# Patient Record
Sex: FEMALE | Race: BLACK OR AFRICAN AMERICAN | NOT HISPANIC OR LATINO
[De-identification: names, ages, dates, MRNs, and addresses within clinical notes are randomized per-mention and may not be internally consistent; named-entity substitution may affect disease eponyms.]

---

## 2021-01-01 ENCOUNTER — RESULT REVIEW (OUTPATIENT)
Age: 66
End: 2021-01-01

## 2021-01-01 ENCOUNTER — APPOINTMENT (OUTPATIENT)
Dept: SURGICAL ONCOLOGY | Facility: HOSPITAL | Age: 66
End: 2021-01-01

## 2021-01-01 ENCOUNTER — TRANSCRIPTION ENCOUNTER (OUTPATIENT)
Age: 66
End: 2021-01-01

## 2021-01-01 ENCOUNTER — APPOINTMENT (OUTPATIENT)
Dept: GYNECOLOGIC ONCOLOGY | Facility: CLINIC | Age: 66
End: 2021-01-01

## 2021-01-01 ENCOUNTER — INPATIENT (INPATIENT)
Facility: HOSPITAL | Age: 66
LOS: 9 days | Discharge: HOME | End: 2021-08-04
Attending: HOSPITALIST | Admitting: HOSPITALIST
Payer: MEDICARE

## 2021-01-01 ENCOUNTER — INPATIENT (INPATIENT)
Facility: HOSPITAL | Age: 66
LOS: 39 days | End: 2021-09-20
Attending: STUDENT IN AN ORGANIZED HEALTH CARE EDUCATION/TRAINING PROGRAM | Admitting: STUDENT IN AN ORGANIZED HEALTH CARE EDUCATION/TRAINING PROGRAM
Payer: MEDICARE

## 2021-01-01 ENCOUNTER — NON-APPOINTMENT (OUTPATIENT)
Age: 66
End: 2021-01-01

## 2021-01-01 ENCOUNTER — APPOINTMENT (OUTPATIENT)
Dept: PULMONOLOGY | Facility: CLINIC | Age: 66
End: 2021-01-01

## 2021-01-01 VITALS
HEIGHT: 63 IN | OXYGEN SATURATION: 94 % | RESPIRATION RATE: 27 BRPM | TEMPERATURE: 97 F | DIASTOLIC BLOOD PRESSURE: 54 MMHG | HEART RATE: 115 BPM | SYSTOLIC BLOOD PRESSURE: 100 MMHG

## 2021-01-01 VITALS
RESPIRATION RATE: 18 BRPM | OXYGEN SATURATION: 99 % | HEART RATE: 107 BPM | TEMPERATURE: 97 F | DIASTOLIC BLOOD PRESSURE: 78 MMHG | SYSTOLIC BLOOD PRESSURE: 142 MMHG

## 2021-01-01 VITALS
DIASTOLIC BLOOD PRESSURE: 34 MMHG | RESPIRATION RATE: 26 BRPM | OXYGEN SATURATION: 70 % | SYSTOLIC BLOOD PRESSURE: 56 MMHG | HEART RATE: 99 BPM

## 2021-01-01 VITALS
DIASTOLIC BLOOD PRESSURE: 83 MMHG | RESPIRATION RATE: 19 BRPM | TEMPERATURE: 98 F | OXYGEN SATURATION: 94 % | SYSTOLIC BLOOD PRESSURE: 143 MMHG | HEART RATE: 112 BPM

## 2021-01-01 DIAGNOSIS — E78.5 HYPERLIPIDEMIA, UNSPECIFIED: ICD-10-CM

## 2021-01-01 DIAGNOSIS — Z29.9 ENCOUNTER FOR PROPHYLACTIC MEASURES, UNSPECIFIED: ICD-10-CM

## 2021-01-01 DIAGNOSIS — R73.03 PREDIABETES.: ICD-10-CM

## 2021-01-01 DIAGNOSIS — R00.0 TACHYCARDIA, UNSPECIFIED: ICD-10-CM

## 2021-01-01 DIAGNOSIS — N13.30 UNSPECIFIED HYDRONEPHROSIS: ICD-10-CM

## 2021-01-01 DIAGNOSIS — R18.8 OTHER ASCITES: ICD-10-CM

## 2021-01-01 DIAGNOSIS — E87.2 ACIDOSIS: ICD-10-CM

## 2021-01-01 DIAGNOSIS — Z96.0 PRESENCE OF UROGENITAL IMPLANTS: Chronic | ICD-10-CM

## 2021-01-01 DIAGNOSIS — E87.5 HYPERKALEMIA: ICD-10-CM

## 2021-01-01 DIAGNOSIS — N17.9 ACUTE KIDNEY FAILURE, UNSPECIFIED: ICD-10-CM

## 2021-01-01 DIAGNOSIS — Z51.5 ENCOUNTER FOR PALLIATIVE CARE: ICD-10-CM

## 2021-01-01 DIAGNOSIS — C83.30 DIFFUSE LARGE B-CELL LYMPHOMA, UNSPECIFIED SITE: ICD-10-CM

## 2021-01-01 DIAGNOSIS — J96.01 ACUTE RESPIRATORY FAILURE WITH HYPOXIA: ICD-10-CM

## 2021-01-01 DIAGNOSIS — R06.02 SHORTNESS OF BREATH: ICD-10-CM

## 2021-01-01 DIAGNOSIS — I10 ESSENTIAL (PRIMARY) HYPERTENSION: ICD-10-CM

## 2021-01-01 DIAGNOSIS — J96.02 ACUTE RESPIRATORY FAILURE WITH HYPERCAPNIA: ICD-10-CM

## 2021-01-01 DIAGNOSIS — E87.3 ALKALOSIS: ICD-10-CM

## 2021-01-01 DIAGNOSIS — R18.0 MALIGNANT ASCITES: ICD-10-CM

## 2021-01-01 DIAGNOSIS — J90 PLEURAL EFFUSION, NOT ELSEWHERE CLASSIFIED: ICD-10-CM

## 2021-01-01 DIAGNOSIS — R11.2 NAUSEA WITH VOMITING, UNSPECIFIED: ICD-10-CM

## 2021-01-01 DIAGNOSIS — J18.9 PNEUMONIA, UNSPECIFIED ORGANISM: ICD-10-CM

## 2021-01-01 DIAGNOSIS — D72.829 ELEVATED WHITE BLOOD CELL COUNT, UNSPECIFIED: ICD-10-CM

## 2021-01-01 DIAGNOSIS — R52 PAIN, UNSPECIFIED: ICD-10-CM

## 2021-01-01 DIAGNOSIS — N83.8 OTHER NONINFLAMMATORY DISORDERS OF OVARY, FALLOPIAN TUBE AND BROAD LIGAMENT: ICD-10-CM

## 2021-01-01 DIAGNOSIS — E87.1 HYPO-OSMOLALITY AND HYPONATREMIA: ICD-10-CM

## 2021-01-01 DIAGNOSIS — R73.03 PREDIABETES: ICD-10-CM

## 2021-01-01 DIAGNOSIS — R60.0 LOCALIZED EDEMA: ICD-10-CM

## 2021-01-01 DIAGNOSIS — R19.00 INTRA-ABDOMINAL AND PELVIC SWELLING, MASS AND LUMP, UNSPECIFIED SITE: ICD-10-CM

## 2021-01-01 DIAGNOSIS — N28.9 DISORDER OF KIDNEY AND URETER, UNSPECIFIED: ICD-10-CM

## 2021-01-01 DIAGNOSIS — E83.51 HYPOCALCEMIA: ICD-10-CM

## 2021-01-01 DIAGNOSIS — R10.9 UNSPECIFIED ABDOMINAL PAIN: ICD-10-CM

## 2021-01-01 DIAGNOSIS — E87.0 HYPEROSMOLALITY AND HYPERNATREMIA: ICD-10-CM

## 2021-01-01 LAB
% ALBUMIN: 40.2 % — SIGNIFICANT CHANGE UP
% ALPHA 1: 6.8 % — SIGNIFICANT CHANGE UP
% ALPHA 2: 20.2 % — SIGNIFICANT CHANGE UP
% BETA: 16.6 % — SIGNIFICANT CHANGE UP
% GAMMA: 16.2 % — SIGNIFICANT CHANGE UP
-  AMIKACIN: SIGNIFICANT CHANGE UP
-  AMPICILLIN/SULBACTAM: SIGNIFICANT CHANGE UP
-  AMPICILLIN: SIGNIFICANT CHANGE UP
-  AZTREONAM: SIGNIFICANT CHANGE UP
-  CEFAZOLIN: SIGNIFICANT CHANGE UP
-  CEFEPIME: SIGNIFICANT CHANGE UP
-  CEFOXITIN: SIGNIFICANT CHANGE UP
-  CEFTRIAXONE: SIGNIFICANT CHANGE UP
-  CIPROFLOXACIN: SIGNIFICANT CHANGE UP
-  ERTAPENEM: SIGNIFICANT CHANGE UP
-  GENTAMICIN: SIGNIFICANT CHANGE UP
-  IMIPENEM: SIGNIFICANT CHANGE UP
-  K. PNEUMONIAE GROUP: SIGNIFICANT CHANGE UP
-  LEVOFLOXACIN: SIGNIFICANT CHANGE UP
-  MEROPENEM: SIGNIFICANT CHANGE UP
-  PIPERACILLIN/TAZOBACTAM: SIGNIFICANT CHANGE UP
-  TOBRAMYCIN: SIGNIFICANT CHANGE UP
-  TRIMETHOPRIM/SULFAMETHOXAZOLE: SIGNIFICANT CHANGE UP
A1C WITH ESTIMATED AVERAGE GLUCOSE RESULT: 6.5 % — HIGH (ref 4–5.6)
ALBUMIN FLD-MCNC: 1.9 G/DL — SIGNIFICANT CHANGE UP
ALBUMIN FLD-MCNC: 2.3 G/DL — SIGNIFICANT CHANGE UP
ALBUMIN FLD-MCNC: 2.5 G/DL — SIGNIFICANT CHANGE UP
ALBUMIN SERPL ELPH-MCNC: 1.8 G/DL — LOW (ref 3.3–5)
ALBUMIN SERPL ELPH-MCNC: 1.8 G/DL — LOW (ref 3.3–5)
ALBUMIN SERPL ELPH-MCNC: 1.9 G/DL — LOW (ref 3.3–5)
ALBUMIN SERPL ELPH-MCNC: 2 G/DL — LOW (ref 3.3–5)
ALBUMIN SERPL ELPH-MCNC: 2.1 G/DL — LOW (ref 3.3–5)
ALBUMIN SERPL ELPH-MCNC: 2.2 G/DL — LOW (ref 3.3–5)
ALBUMIN SERPL ELPH-MCNC: 2.3 G/DL — LOW (ref 3.3–5)
ALBUMIN SERPL ELPH-MCNC: 2.4 G/DL — LOW (ref 3.3–5)
ALBUMIN SERPL ELPH-MCNC: 2.5 G/DL — LOW (ref 3.3–5)
ALBUMIN SERPL ELPH-MCNC: 2.53 G/DL — LOW (ref 3.3–4.4)
ALBUMIN SERPL ELPH-MCNC: 2.6 G/DL — LOW (ref 3.3–5)
ALBUMIN SERPL ELPH-MCNC: 2.7 G/DL — LOW (ref 3.3–5)
ALBUMIN SERPL ELPH-MCNC: 2.8 G/DL — LOW (ref 3.3–5)
ALBUMIN SERPL ELPH-MCNC: 2.9 G/DL — LOW (ref 3.3–5)
ALBUMIN SERPL ELPH-MCNC: 3 G/DL — LOW (ref 3.3–5)
ALBUMIN SERPL ELPH-MCNC: 3.1 G/DL — LOW (ref 3.3–5)
ALBUMIN SERPL ELPH-MCNC: 3.2 G/DL — LOW (ref 3.3–5)
ALBUMIN SERPL ELPH-MCNC: 3.3 G/DL — SIGNIFICANT CHANGE UP (ref 3.3–5)
ALBUMIN SERPL ELPH-MCNC: 3.4 G/DL — SIGNIFICANT CHANGE UP (ref 3.3–5)
ALBUMIN SERPL ELPH-MCNC: 3.4 G/DL — SIGNIFICANT CHANGE UP (ref 3.3–5)
ALBUMIN SERPL ELPH-MCNC: 3.5 G/DL — SIGNIFICANT CHANGE UP (ref 3.3–5)
ALBUMIN SERPL ELPH-MCNC: 3.6 G/DL — SIGNIFICANT CHANGE UP (ref 3.3–5)
ALBUMIN SERPL ELPH-MCNC: 3.6 G/DL — SIGNIFICANT CHANGE UP (ref 3.3–5)
ALBUMIN/GLOB SERPL ELPH: 0.7 RATIO — SIGNIFICANT CHANGE UP
ALP SERPL-CCNC: 101 U/L — SIGNIFICANT CHANGE UP (ref 40–120)
ALP SERPL-CCNC: 104 U/L — SIGNIFICANT CHANGE UP (ref 40–120)
ALP SERPL-CCNC: 105 U/L — SIGNIFICANT CHANGE UP (ref 40–120)
ALP SERPL-CCNC: 107 U/L — SIGNIFICANT CHANGE UP (ref 40–120)
ALP SERPL-CCNC: 108 U/L — SIGNIFICANT CHANGE UP (ref 40–120)
ALP SERPL-CCNC: 109 U/L — SIGNIFICANT CHANGE UP (ref 40–120)
ALP SERPL-CCNC: 109 U/L — SIGNIFICANT CHANGE UP (ref 40–120)
ALP SERPL-CCNC: 110 U/L — SIGNIFICANT CHANGE UP (ref 40–120)
ALP SERPL-CCNC: 111 U/L — SIGNIFICANT CHANGE UP (ref 40–120)
ALP SERPL-CCNC: 114 U/L — SIGNIFICANT CHANGE UP (ref 40–120)
ALP SERPL-CCNC: 116 U/L — SIGNIFICANT CHANGE UP (ref 40–120)
ALP SERPL-CCNC: 116 U/L — SIGNIFICANT CHANGE UP (ref 40–120)
ALP SERPL-CCNC: 118 U/L — SIGNIFICANT CHANGE UP (ref 40–120)
ALP SERPL-CCNC: 119 U/L — SIGNIFICANT CHANGE UP (ref 40–120)
ALP SERPL-CCNC: 120 U/L — SIGNIFICANT CHANGE UP (ref 40–120)
ALP SERPL-CCNC: 125 U/L — HIGH (ref 40–120)
ALP SERPL-CCNC: 129 U/L — HIGH (ref 40–120)
ALP SERPL-CCNC: 145 U/L — HIGH (ref 40–120)
ALP SERPL-CCNC: 146 U/L — HIGH (ref 40–120)
ALP SERPL-CCNC: 152 U/L — HIGH (ref 40–120)
ALP SERPL-CCNC: 152 U/L — HIGH (ref 40–120)
ALP SERPL-CCNC: 165 U/L — HIGH (ref 40–120)
ALP SERPL-CCNC: 167 U/L — HIGH (ref 40–120)
ALP SERPL-CCNC: 187 U/L — HIGH (ref 40–120)
ALP SERPL-CCNC: 213 U/L — HIGH (ref 40–120)
ALP SERPL-CCNC: 217 U/L — HIGH (ref 40–120)
ALP SERPL-CCNC: 225 U/L — HIGH (ref 40–120)
ALP SERPL-CCNC: 236 U/L — HIGH (ref 40–120)
ALP SERPL-CCNC: 253 U/L — HIGH (ref 40–120)
ALP SERPL-CCNC: 337 U/L — HIGH (ref 40–120)
ALP SERPL-CCNC: 360 U/L — HIGH (ref 40–120)
ALP SERPL-CCNC: 410 U/L — HIGH (ref 40–120)
ALP SERPL-CCNC: 416 U/L — HIGH (ref 40–120)
ALP SERPL-CCNC: 435 U/L — HIGH (ref 40–120)
ALP SERPL-CCNC: 487 U/L — HIGH (ref 40–120)
ALP SERPL-CCNC: 500 U/L — HIGH (ref 40–120)
ALP SERPL-CCNC: 520 U/L — HIGH (ref 40–120)
ALP SERPL-CCNC: 528 U/L — HIGH (ref 40–120)
ALP SERPL-CCNC: 535 U/L — HIGH (ref 40–120)
ALP SERPL-CCNC: 565 U/L — HIGH (ref 40–120)
ALP SERPL-CCNC: 579 U/L — HIGH (ref 40–120)
ALP SERPL-CCNC: 65 U/L — SIGNIFICANT CHANGE UP (ref 40–120)
ALP SERPL-CCNC: 67 U/L — SIGNIFICANT CHANGE UP (ref 40–120)
ALP SERPL-CCNC: 67 U/L — SIGNIFICANT CHANGE UP (ref 40–120)
ALP SERPL-CCNC: 68 U/L — SIGNIFICANT CHANGE UP (ref 40–120)
ALP SERPL-CCNC: 68 U/L — SIGNIFICANT CHANGE UP (ref 40–120)
ALP SERPL-CCNC: 69 U/L — SIGNIFICANT CHANGE UP (ref 40–120)
ALP SERPL-CCNC: 71 U/L — SIGNIFICANT CHANGE UP (ref 40–120)
ALP SERPL-CCNC: 73 U/L — SIGNIFICANT CHANGE UP (ref 40–120)
ALP SERPL-CCNC: 75 U/L — SIGNIFICANT CHANGE UP (ref 40–120)
ALP SERPL-CCNC: 75 U/L — SIGNIFICANT CHANGE UP (ref 40–120)
ALP SERPL-CCNC: 76 U/L — SIGNIFICANT CHANGE UP (ref 40–120)
ALP SERPL-CCNC: 78 U/L — SIGNIFICANT CHANGE UP (ref 40–120)
ALP SERPL-CCNC: 79 U/L — SIGNIFICANT CHANGE UP (ref 40–120)
ALP SERPL-CCNC: 79 U/L — SIGNIFICANT CHANGE UP (ref 40–120)
ALP SERPL-CCNC: 81 U/L — SIGNIFICANT CHANGE UP (ref 40–120)
ALP SERPL-CCNC: 81 U/L — SIGNIFICANT CHANGE UP (ref 40–120)
ALP SERPL-CCNC: 86 U/L — SIGNIFICANT CHANGE UP (ref 40–120)
ALP SERPL-CCNC: 88 U/L — SIGNIFICANT CHANGE UP (ref 40–120)
ALP SERPL-CCNC: 89 U/L — SIGNIFICANT CHANGE UP (ref 40–120)
ALP SERPL-CCNC: 91 U/L — SIGNIFICANT CHANGE UP (ref 40–120)
ALP SERPL-CCNC: 93 U/L — SIGNIFICANT CHANGE UP (ref 40–120)
ALP SERPL-CCNC: 93 U/L — SIGNIFICANT CHANGE UP (ref 40–120)
ALP SERPL-CCNC: 94 U/L — SIGNIFICANT CHANGE UP (ref 40–120)
ALP SERPL-CCNC: 95 U/L — SIGNIFICANT CHANGE UP (ref 40–120)
ALP SERPL-CCNC: 96 U/L — SIGNIFICANT CHANGE UP (ref 40–120)
ALP SERPL-CCNC: 96 U/L — SIGNIFICANT CHANGE UP (ref 40–120)
ALP SERPL-CCNC: 97 U/L — SIGNIFICANT CHANGE UP (ref 40–120)
ALPHA1 GLOB SERPL ELPH-MCNC: 0.43 G/DL — HIGH (ref 0.1–0.3)
ALPHA2 GLOB SERPL ELPH-MCNC: 1.3 G/DL — HIGH (ref 0.6–1)
ALT FLD-CCNC: 10 U/L — SIGNIFICANT CHANGE UP (ref 4–33)
ALT FLD-CCNC: 11 U/L — SIGNIFICANT CHANGE UP (ref 4–33)
ALT FLD-CCNC: 12 U/L — SIGNIFICANT CHANGE UP (ref 4–33)
ALT FLD-CCNC: 13 U/L — SIGNIFICANT CHANGE UP (ref 4–33)
ALT FLD-CCNC: 14 U/L — SIGNIFICANT CHANGE UP (ref 4–33)
ALT FLD-CCNC: 14 U/L — SIGNIFICANT CHANGE UP (ref 4–33)
ALT FLD-CCNC: 15 U/L — SIGNIFICANT CHANGE UP (ref 4–33)
ALT FLD-CCNC: 16 U/L — SIGNIFICANT CHANGE UP (ref 4–33)
ALT FLD-CCNC: 17 U/L — SIGNIFICANT CHANGE UP (ref 4–33)
ALT FLD-CCNC: 19 U/L — SIGNIFICANT CHANGE UP (ref 4–33)
ALT FLD-CCNC: 20 U/L — SIGNIFICANT CHANGE UP (ref 4–33)
ALT FLD-CCNC: 21 U/L — SIGNIFICANT CHANGE UP (ref 4–33)
ALT FLD-CCNC: 27 U/L — SIGNIFICANT CHANGE UP (ref 4–33)
ALT FLD-CCNC: 29 U/L — SIGNIFICANT CHANGE UP (ref 4–33)
ALT FLD-CCNC: 32 U/L — SIGNIFICANT CHANGE UP (ref 4–33)
ALT FLD-CCNC: 50 U/L — HIGH (ref 4–33)
ALT FLD-CCNC: 61 U/L — HIGH (ref 4–33)
ALT FLD-CCNC: 65 U/L — HIGH (ref 4–33)
ALT FLD-CCNC: 7 U/L — SIGNIFICANT CHANGE UP (ref 4–33)
ALT FLD-CCNC: 8 U/L — SIGNIFICANT CHANGE UP (ref 4–33)
ALT FLD-CCNC: 9 U/L — SIGNIFICANT CHANGE UP (ref 4–33)
AMMONIA BLD-MCNC: 19 UMOL/L — SIGNIFICANT CHANGE UP (ref 11–55)
AMMONIA BLD-MCNC: 30 UMOL/L — SIGNIFICANT CHANGE UP (ref 11–55)
ANION GAP SERPL CALC-SCNC: 11 MMOL/L — SIGNIFICANT CHANGE UP (ref 7–14)
ANION GAP SERPL CALC-SCNC: 12 MMOL/L — SIGNIFICANT CHANGE UP (ref 7–14)
ANION GAP SERPL CALC-SCNC: 13 MMOL/L — SIGNIFICANT CHANGE UP (ref 7–14)
ANION GAP SERPL CALC-SCNC: 14 MMOL/L — SIGNIFICANT CHANGE UP (ref 7–14)
ANION GAP SERPL CALC-SCNC: 15 MMOL/L — HIGH (ref 7–14)
ANION GAP SERPL CALC-SCNC: 16 MMOL/L — HIGH (ref 7–14)
ANION GAP SERPL CALC-SCNC: 17 MMOL/L — HIGH (ref 7–14)
ANION GAP SERPL CALC-SCNC: 18 MMOL/L — HIGH (ref 7–14)
ANION GAP SERPL CALC-SCNC: 19 MMOL/L — HIGH (ref 7–14)
ANION GAP SERPL CALC-SCNC: 20 MMOL/L — HIGH (ref 7–14)
ANION GAP SERPL CALC-SCNC: 21 MMOL/L — HIGH (ref 7–14)
ANION GAP SERPL CALC-SCNC: 22 MMOL/L — HIGH (ref 7–14)
ANION GAP SERPL CALC-SCNC: 23 MMOL/L — HIGH (ref 7–14)
ANION GAP SERPL CALC-SCNC: 23 MMOL/L — HIGH (ref 7–14)
ANION GAP SERPL CALC-SCNC: 24 MMOL/L — HIGH (ref 7–14)
ANION GAP SERPL CALC-SCNC: 25 MMOL/L — HIGH (ref 7–14)
ANION GAP SERPL CALC-SCNC: 25 MMOL/L — HIGH (ref 7–14)
ANION GAP SERPL CALC-SCNC: 26 MMOL/L — HIGH (ref 7–14)
ANION GAP SERPL CALC-SCNC: 26 MMOL/L — HIGH (ref 7–14)
ANION GAP SERPL CALC-SCNC: 27 MMOL/L — HIGH (ref 7–14)
ANION GAP SERPL CALC-SCNC: 28 MMOL/L — HIGH (ref 7–14)
ANION GAP SERPL CALC-SCNC: 29 MMOL/L — HIGH (ref 7–14)
ANION GAP SERPL CALC-SCNC: 30 MMOL/L — HIGH (ref 7–14)
ANION GAP SERPL CALC-SCNC: 35 MMOL/L — HIGH (ref 7–14)
ANISOCYTOSIS BLD QL: SLIGHT — SIGNIFICANT CHANGE UP
ANISOCYTOSIS BLD QL: SLIGHT — SIGNIFICANT CHANGE UP
APPEARANCE CSF: CLEAR — SIGNIFICANT CHANGE UP
APPEARANCE SPUN FLD: COLORLESS — SIGNIFICANT CHANGE UP
APPEARANCE UR: ABNORMAL
APTT BLD: 21.3 SEC — LOW (ref 27–36.3)
APTT BLD: 23.3 SEC — LOW (ref 27–36.3)
APTT BLD: 23.3 SEC — LOW (ref 27–36.3)
APTT BLD: 24.3 SEC — LOW (ref 27–36.3)
APTT BLD: 26.2 SEC — LOW (ref 27–36.3)
APTT BLD: 26.6 SEC — LOW (ref 27–36.3)
APTT BLD: 27 SEC — SIGNIFICANT CHANGE UP (ref 27–36.3)
APTT BLD: 27.7 SEC — SIGNIFICANT CHANGE UP (ref 27–36.3)
APTT BLD: 27.8 SEC — SIGNIFICANT CHANGE UP (ref 27–36.3)
APTT BLD: 28.1 SEC — SIGNIFICANT CHANGE UP (ref 27–36.3)
APTT BLD: 29 SEC — SIGNIFICANT CHANGE UP (ref 27–36.3)
APTT BLD: 29.5 SEC — SIGNIFICANT CHANGE UP (ref 27–36.3)
APTT BLD: 29.9 SEC — SIGNIFICANT CHANGE UP (ref 27–36.3)
APTT BLD: 30.1 SEC — SIGNIFICANT CHANGE UP (ref 27–36.3)
APTT BLD: 30.2 SEC — SIGNIFICANT CHANGE UP (ref 27–36.3)
APTT BLD: 30.2 SEC — SIGNIFICANT CHANGE UP (ref 27–36.3)
APTT BLD: 30.4 SEC — SIGNIFICANT CHANGE UP (ref 27–36.3)
APTT BLD: 31.1 SEC — SIGNIFICANT CHANGE UP (ref 27–36.3)
APTT BLD: 31.1 SEC — SIGNIFICANT CHANGE UP (ref 27–36.3)
APTT BLD: 31.7 SEC — SIGNIFICANT CHANGE UP (ref 27–36.3)
APTT BLD: 32.8 SEC — SIGNIFICANT CHANGE UP (ref 27–36.3)
APTT BLD: 35 SEC — SIGNIFICANT CHANGE UP (ref 27–36.3)
APTT BLD: 36 SEC — SIGNIFICANT CHANGE UP (ref 27–36.3)
APTT BLD: 36.3 SEC — SIGNIFICANT CHANGE UP (ref 27–36.3)
APTT BLD: 41.1 SEC — HIGH (ref 27–36.3)
APTT BLD: 42.2 SEC — HIGH (ref 27–36.3)
AST SERPL-CCNC: 23 U/L — SIGNIFICANT CHANGE UP (ref 4–32)
AST SERPL-CCNC: 23 U/L — SIGNIFICANT CHANGE UP (ref 4–32)
AST SERPL-CCNC: 24 U/L — SIGNIFICANT CHANGE UP (ref 4–32)
AST SERPL-CCNC: 26 U/L — SIGNIFICANT CHANGE UP (ref 4–32)
AST SERPL-CCNC: 27 U/L — SIGNIFICANT CHANGE UP (ref 4–32)
AST SERPL-CCNC: 27 U/L — SIGNIFICANT CHANGE UP (ref 4–32)
AST SERPL-CCNC: 28 U/L — SIGNIFICANT CHANGE UP (ref 4–32)
AST SERPL-CCNC: 28 U/L — SIGNIFICANT CHANGE UP (ref 4–32)
AST SERPL-CCNC: 29 U/L — SIGNIFICANT CHANGE UP (ref 4–32)
AST SERPL-CCNC: 30 U/L — SIGNIFICANT CHANGE UP (ref 4–32)
AST SERPL-CCNC: 31 U/L — SIGNIFICANT CHANGE UP (ref 4–32)
AST SERPL-CCNC: 32 U/L — SIGNIFICANT CHANGE UP (ref 4–32)
AST SERPL-CCNC: 33 U/L — HIGH (ref 4–32)
AST SERPL-CCNC: 34 U/L — HIGH (ref 4–32)
AST SERPL-CCNC: 35 U/L — HIGH (ref 4–32)
AST SERPL-CCNC: 36 U/L — HIGH (ref 4–32)
AST SERPL-CCNC: 36 U/L — HIGH (ref 4–32)
AST SERPL-CCNC: 37 U/L — HIGH (ref 4–32)
AST SERPL-CCNC: 38 U/L — HIGH (ref 4–32)
AST SERPL-CCNC: 39 U/L — HIGH (ref 4–32)
AST SERPL-CCNC: 39 U/L — HIGH (ref 4–32)
AST SERPL-CCNC: 40 U/L — HIGH (ref 4–32)
AST SERPL-CCNC: 40 U/L — HIGH (ref 4–32)
AST SERPL-CCNC: 41 U/L — HIGH (ref 4–32)
AST SERPL-CCNC: 42 U/L — HIGH (ref 4–32)
AST SERPL-CCNC: 42 U/L — HIGH (ref 4–32)
AST SERPL-CCNC: 43 U/L — HIGH (ref 4–32)
AST SERPL-CCNC: 43 U/L — HIGH (ref 4–32)
AST SERPL-CCNC: 44 U/L — HIGH (ref 4–32)
AST SERPL-CCNC: 44 U/L — HIGH (ref 4–32)
AST SERPL-CCNC: 46 U/L — HIGH (ref 4–32)
AST SERPL-CCNC: 46 U/L — HIGH (ref 4–32)
AST SERPL-CCNC: 55 U/L — HIGH (ref 4–32)
AST SERPL-CCNC: 57 U/L — HIGH (ref 4–32)
AST SERPL-CCNC: 58 U/L — HIGH (ref 4–32)
AST SERPL-CCNC: 62 U/L — HIGH (ref 4–32)
AST SERPL-CCNC: 64 U/L — HIGH (ref 4–32)
AST SERPL-CCNC: 80 U/L — HIGH (ref 4–32)
AST SERPL-CCNC: 83 U/L — HIGH (ref 4–32)
B PERT DNA SPEC QL NAA+PROBE: SIGNIFICANT CHANGE UP
B PERT DNA SPEC QL NAA+PROBE: SIGNIFICANT CHANGE UP
B PERT IGG+IGM PNL SER: ABNORMAL
B PERT+PARAPERT DNA PNL SPEC NAA+PROBE: SIGNIFICANT CHANGE UP
B-GLOBULIN SERPL ELPH-MCNC: 1.05 G/DL — SIGNIFICANT CHANGE UP (ref 0.6–1.1)
B-OH-BUTYR SERPL-SCNC: 2.4 MMOL/L — HIGH (ref 0–0.4)
BACTERIA # UR AUTO: ABNORMAL
BACTERIAL AG PNL SER: 0 % — SIGNIFICANT CHANGE UP
BASE EXCESS BLDA CALC-SCNC: -11.8 MMOL/L — LOW (ref -2–3)
BASE EXCESS BLDA CALC-SCNC: -12.3 MMOL/L — LOW (ref -2–3)
BASE EXCESS BLDA CALC-SCNC: -3.2 MMOL/L — LOW (ref -2–3)
BASE EXCESS BLDV CALC-SCNC: -1.2 MMOL/L — SIGNIFICANT CHANGE UP (ref -2–3)
BASE EXCESS BLDV CALC-SCNC: -14.5 MMOL/L — LOW (ref -2–3)
BASE EXCESS BLDV CALC-SCNC: -3.9 MMOL/L — LOW (ref -2–3)
BASE EXCESS BLDV CALC-SCNC: 0.1 MMOL/L — SIGNIFICANT CHANGE UP (ref -2–3)
BASE EXCESS BLDV CALC-SCNC: 2.5 MMOL/L — SIGNIFICANT CHANGE UP (ref -2–3)
BASE EXCESS BLDV CALC-SCNC: 7.1 MMOL/L — HIGH (ref -3–2)
BASE EXCESS BLDV CALC-SCNC: 8.9 MMOL/L — HIGH (ref -2–3)
BASOPHILS # BLD AUTO: 0 K/UL — SIGNIFICANT CHANGE UP (ref 0–0.2)
BASOPHILS # BLD AUTO: 0.01 K/UL — SIGNIFICANT CHANGE UP (ref 0–0.2)
BASOPHILS # BLD AUTO: 0.02 K/UL — SIGNIFICANT CHANGE UP (ref 0–0.2)
BASOPHILS # BLD AUTO: 0.03 K/UL — SIGNIFICANT CHANGE UP (ref 0–0.2)
BASOPHILS # BLD AUTO: 0.04 K/UL — SIGNIFICANT CHANGE UP (ref 0–0.2)
BASOPHILS # BLD AUTO: 0.05 K/UL — SIGNIFICANT CHANGE UP (ref 0–0.2)
BASOPHILS # BLD AUTO: 0.06 K/UL — SIGNIFICANT CHANGE UP (ref 0–0.2)
BASOPHILS # BLD AUTO: 0.06 K/UL — SIGNIFICANT CHANGE UP (ref 0–0.2)
BASOPHILS # BLD AUTO: 0.09 K/UL — SIGNIFICANT CHANGE UP (ref 0–0.2)
BASOPHILS NFR BLD AUTO: 0 % — SIGNIFICANT CHANGE UP (ref 0–2)
BASOPHILS NFR BLD AUTO: 0.1 % — SIGNIFICANT CHANGE UP (ref 0–2)
BASOPHILS NFR BLD AUTO: 0.2 % — SIGNIFICANT CHANGE UP (ref 0–2)
BASOPHILS NFR BLD AUTO: 0.3 % — SIGNIFICANT CHANGE UP (ref 0–2)
BASOPHILS NFR BLD AUTO: 0.8 % — SIGNIFICANT CHANGE UP (ref 0–2)
BASOPHILS NFR BLD AUTO: 2.3 % — HIGH (ref 0–2)
BASOPHILS NFR BLD AUTO: 2.4 % — HIGH (ref 0–2)
BASOPHILS NFR BLD AUTO: 2.5 % — HIGH (ref 0–2)
BASOPHILS NFR BLD AUTO: 2.7 % — HIGH (ref 0–2)
BASOPHILS NFR BLD AUTO: 20 % — HIGH (ref 0–2)
BILIRUB DIRECT SERPL-MCNC: 1.2 MG/DL — HIGH (ref 0–0.2)
BILIRUB DIRECT SERPL-MCNC: 5.1 MG/DL — HIGH (ref 0–0.2)
BILIRUB DIRECT SERPL-MCNC: 6.1 MG/DL — HIGH (ref 0–0.2)
BILIRUB INDIRECT FLD-MCNC: 0.1 MG/DL — SIGNIFICANT CHANGE UP (ref 0–1)
BILIRUB INDIRECT FLD-MCNC: 0.4 MG/DL — SIGNIFICANT CHANGE UP (ref 0–1)
BILIRUB SERPL-MCNC: 0.2 MG/DL — SIGNIFICANT CHANGE UP (ref 0.2–1.2)
BILIRUB SERPL-MCNC: 0.3 MG/DL — SIGNIFICANT CHANGE UP (ref 0.2–1.2)
BILIRUB SERPL-MCNC: 0.4 MG/DL — SIGNIFICANT CHANGE UP (ref 0.2–1.2)
BILIRUB SERPL-MCNC: 0.5 MG/DL — SIGNIFICANT CHANGE UP (ref 0.2–1.2)
BILIRUB SERPL-MCNC: 0.6 MG/DL — SIGNIFICANT CHANGE UP (ref 0.2–1.2)
BILIRUB SERPL-MCNC: 0.6 MG/DL — SIGNIFICANT CHANGE UP (ref 0.2–1.2)
BILIRUB SERPL-MCNC: 0.8 MG/DL — SIGNIFICANT CHANGE UP (ref 0.2–1.2)
BILIRUB SERPL-MCNC: 0.9 MG/DL — SIGNIFICANT CHANGE UP (ref 0.2–1.2)
BILIRUB SERPL-MCNC: 0.9 MG/DL — SIGNIFICANT CHANGE UP (ref 0.2–1.2)
BILIRUB SERPL-MCNC: 1 MG/DL — SIGNIFICANT CHANGE UP (ref 0.2–1.2)
BILIRUB SERPL-MCNC: 1 MG/DL — SIGNIFICANT CHANGE UP (ref 0.2–1.2)
BILIRUB SERPL-MCNC: 1.2 MG/DL — SIGNIFICANT CHANGE UP (ref 0.2–1.2)
BILIRUB SERPL-MCNC: 1.2 MG/DL — SIGNIFICANT CHANGE UP (ref 0.2–1.2)
BILIRUB SERPL-MCNC: 1.3 MG/DL — HIGH (ref 0.2–1.2)
BILIRUB SERPL-MCNC: 1.5 MG/DL — HIGH (ref 0.2–1.2)
BILIRUB SERPL-MCNC: 1.6 MG/DL — HIGH (ref 0.2–1.2)
BILIRUB SERPL-MCNC: 1.6 MG/DL — HIGH (ref 0.2–1.2)
BILIRUB SERPL-MCNC: 10.1 MG/DL — HIGH (ref 0.2–1.2)
BILIRUB SERPL-MCNC: 10.2 MG/DL — HIGH (ref 0.2–1.2)
BILIRUB SERPL-MCNC: 10.6 MG/DL — HIGH (ref 0.2–1.2)
BILIRUB SERPL-MCNC: 12 MG/DL — HIGH (ref 0.2–1.2)
BILIRUB SERPL-MCNC: 13.6 MG/DL — HIGH (ref 0.2–1.2)
BILIRUB SERPL-MCNC: 2 MG/DL — HIGH (ref 0.2–1.2)
BILIRUB SERPL-MCNC: 2.5 MG/DL — HIGH (ref 0.2–1.2)
BILIRUB SERPL-MCNC: 3.6 MG/DL — HIGH (ref 0.2–1.2)
BILIRUB SERPL-MCNC: 3.6 MG/DL — HIGH (ref 0.2–1.2)
BILIRUB SERPL-MCNC: 3.7 MG/DL — HIGH (ref 0.2–1.2)
BILIRUB SERPL-MCNC: 3.7 MG/DL — HIGH (ref 0.2–1.2)
BILIRUB SERPL-MCNC: 4.1 MG/DL — HIGH (ref 0.2–1.2)
BILIRUB SERPL-MCNC: 5.3 MG/DL — HIGH (ref 0.2–1.2)
BILIRUB SERPL-MCNC: 6.1 MG/DL — HIGH (ref 0.2–1.2)
BILIRUB SERPL-MCNC: 6.2 MG/DL — HIGH (ref 0.2–1.2)
BILIRUB SERPL-MCNC: 6.2 MG/DL — HIGH (ref 0.2–1.2)
BILIRUB SERPL-MCNC: 7.8 MG/DL — HIGH (ref 0.2–1.2)
BILIRUB SERPL-MCNC: 8.4 MG/DL — HIGH (ref 0.2–1.2)
BILIRUB SERPL-MCNC: 8.6 MG/DL — HIGH (ref 0.2–1.2)
BILIRUB SERPL-MCNC: 9.6 MG/DL — HIGH (ref 0.2–1.2)
BILIRUB SERPL-MCNC: <0.2 MG/DL — SIGNIFICANT CHANGE UP (ref 0.2–1.2)
BILIRUB UR-MCNC: NEGATIVE — SIGNIFICANT CHANGE UP
BLD GP AB SCN SERPL QL: NEGATIVE — SIGNIFICANT CHANGE UP
BLOOD GAS ARTERIAL - LYTES,HGB,ICA,LACT RESULT: SIGNIFICANT CHANGE UP
BLOOD GAS ARTERIAL COMPREHENSIVE RESULT: SIGNIFICANT CHANGE UP
BLOOD GAS VENOUS COMPREHENSIVE RESULT: SIGNIFICANT CHANGE UP
BORDETELLA PARAPERTUSSIS (RAPRVP): SIGNIFICANT CHANGE UP
BUN SERPL-MCNC: 14 MG/DL — SIGNIFICANT CHANGE UP (ref 7–23)
BUN SERPL-MCNC: 16 MG/DL — SIGNIFICANT CHANGE UP (ref 7–23)
BUN SERPL-MCNC: 18 MG/DL — SIGNIFICANT CHANGE UP (ref 7–23)
BUN SERPL-MCNC: 19 MG/DL — SIGNIFICANT CHANGE UP (ref 7–23)
BUN SERPL-MCNC: 21 MG/DL — SIGNIFICANT CHANGE UP (ref 7–23)
BUN SERPL-MCNC: 22 MG/DL — SIGNIFICANT CHANGE UP (ref 7–23)
BUN SERPL-MCNC: 23 MG/DL — SIGNIFICANT CHANGE UP (ref 7–23)
BUN SERPL-MCNC: 24 MG/DL — HIGH (ref 7–23)
BUN SERPL-MCNC: 25 MG/DL — HIGH (ref 7–23)
BUN SERPL-MCNC: 26 MG/DL — HIGH (ref 7–23)
BUN SERPL-MCNC: 26 MG/DL — HIGH (ref 7–23)
BUN SERPL-MCNC: 27 MG/DL — HIGH (ref 7–23)
BUN SERPL-MCNC: 29 MG/DL — HIGH (ref 7–23)
BUN SERPL-MCNC: 29 MG/DL — HIGH (ref 7–23)
BUN SERPL-MCNC: 32 MG/DL — HIGH (ref 7–23)
BUN SERPL-MCNC: 34 MG/DL — HIGH (ref 7–23)
BUN SERPL-MCNC: 35 MG/DL — HIGH (ref 7–23)
BUN SERPL-MCNC: 39 MG/DL — HIGH (ref 7–23)
BUN SERPL-MCNC: 40 MG/DL — HIGH (ref 7–23)
BUN SERPL-MCNC: 41 MG/DL — HIGH (ref 7–23)
BUN SERPL-MCNC: 42 MG/DL — HIGH (ref 7–23)
BUN SERPL-MCNC: 42 MG/DL — HIGH (ref 7–23)
BUN SERPL-MCNC: 43 MG/DL — HIGH (ref 7–23)
BUN SERPL-MCNC: 44 MG/DL — HIGH (ref 7–23)
BUN SERPL-MCNC: 45 MG/DL — HIGH (ref 7–23)
BUN SERPL-MCNC: 45 MG/DL — HIGH (ref 7–23)
BUN SERPL-MCNC: 47 MG/DL — HIGH (ref 7–23)
BUN SERPL-MCNC: 48 MG/DL — HIGH (ref 7–23)
BUN SERPL-MCNC: 50 MG/DL — HIGH (ref 7–23)
BUN SERPL-MCNC: 51 MG/DL — HIGH (ref 7–23)
BUN SERPL-MCNC: 52 MG/DL — HIGH (ref 7–23)
BUN SERPL-MCNC: 53 MG/DL — HIGH (ref 7–23)
BUN SERPL-MCNC: 56 MG/DL — HIGH (ref 7–23)
BUN SERPL-MCNC: 56 MG/DL — HIGH (ref 7–23)
BUN SERPL-MCNC: 58 MG/DL — HIGH (ref 7–23)
BUN SERPL-MCNC: 59 MG/DL — HIGH (ref 7–23)
BUN SERPL-MCNC: 60 MG/DL — HIGH (ref 7–23)
BUN SERPL-MCNC: 61 MG/DL — HIGH (ref 7–23)
BUN SERPL-MCNC: 61 MG/DL — HIGH (ref 7–23)
BUN SERPL-MCNC: 63 MG/DL — HIGH (ref 7–23)
BUN SERPL-MCNC: 64 MG/DL — HIGH (ref 7–23)
BUN SERPL-MCNC: 66 MG/DL — HIGH (ref 7–23)
BUN SERPL-MCNC: 68 MG/DL — HIGH (ref 7–23)
BUN SERPL-MCNC: 68 MG/DL — HIGH (ref 7–23)
BUN SERPL-MCNC: 69 MG/DL — HIGH (ref 7–23)
BUN SERPL-MCNC: 71 MG/DL — HIGH (ref 7–23)
BUN SERPL-MCNC: 72 MG/DL — HIGH (ref 7–23)
BUN SERPL-MCNC: 73 MG/DL — HIGH (ref 7–23)
BUN SERPL-MCNC: 73 MG/DL — HIGH (ref 7–23)
BUN SERPL-MCNC: 74 MG/DL — HIGH (ref 7–23)
BUN SERPL-MCNC: 75 MG/DL — HIGH (ref 7–23)
BUN SERPL-MCNC: 76 MG/DL — HIGH (ref 7–23)
BUN SERPL-MCNC: 78 MG/DL — HIGH (ref 7–23)
BUN SERPL-MCNC: 78 MG/DL — HIGH (ref 7–23)
BUN SERPL-MCNC: 79 MG/DL — HIGH (ref 7–23)
BUN SERPL-MCNC: 79 MG/DL — HIGH (ref 7–23)
BUN SERPL-MCNC: 80 MG/DL — HIGH (ref 7–23)
BUN SERPL-MCNC: 81 MG/DL — HIGH (ref 7–23)
BUN SERPL-MCNC: 82 MG/DL — HIGH (ref 7–23)
BUN SERPL-MCNC: 83 MG/DL — HIGH (ref 7–23)
BUN SERPL-MCNC: 83 MG/DL — HIGH (ref 7–23)
BUN SERPL-MCNC: 84 MG/DL — HIGH (ref 7–23)
BUN SERPL-MCNC: 84 MG/DL — HIGH (ref 7–23)
BUN SERPL-MCNC: 86 MG/DL — HIGH (ref 7–23)
BUN SERPL-MCNC: 86 MG/DL — HIGH (ref 7–23)
BUN SERPL-MCNC: 87 MG/DL — HIGH (ref 7–23)
BUN SERPL-MCNC: 87 MG/DL — HIGH (ref 7–23)
C PNEUM DNA SPEC QL NAA+PROBE: SIGNIFICANT CHANGE UP
C PNEUM DNA SPEC QL NAA+PROBE: SIGNIFICANT CHANGE UP
CA-I BLD-SCNC: 0.73 MMOL/L — LOW (ref 1.15–1.29)
CA-I BLD-SCNC: 0.78 MMOL/L — LOW (ref 1.15–1.29)
CA-I BLD-SCNC: 0.81 MMOL/L — LOW (ref 1.15–1.29)
CA-I BLD-SCNC: 0.81 MMOL/L — LOW (ref 1.15–1.29)
CA-I BLD-SCNC: 0.82 MMOL/L — LOW (ref 1.15–1.29)
CA-I BLD-SCNC: 0.83 MMOL/L — LOW (ref 1.15–1.29)
CA-I BLD-SCNC: 0.85 MMOL/L — LOW (ref 1.15–1.29)
CA-I BLD-SCNC: 0.86 MMOL/L — LOW (ref 1.15–1.29)
CA-I BLD-SCNC: 0.87 MMOL/L — LOW (ref 1.15–1.29)
CA-I BLD-SCNC: 0.87 MMOL/L — LOW (ref 1.15–1.29)
CA-I BLD-SCNC: 0.88 MMOL/L — LOW (ref 1.15–1.29)
CA-I BLD-SCNC: 0.89 MMOL/L — LOW (ref 1.15–1.29)
CA-I BLD-SCNC: 0.91 MMOL/L — LOW (ref 1.15–1.29)
CA-I BLD-SCNC: 0.92 MMOL/L — LOW (ref 1.15–1.29)
CA-I BLD-SCNC: 0.93 MMOL/L — LOW (ref 1.15–1.29)
CA-I BLD-SCNC: 0.96 MMOL/L — LOW (ref 1.15–1.29)
CA-I BLD-SCNC: 0.97 MMOL/L — LOW (ref 1.15–1.29)
CA-I BLD-SCNC: 0.97 MMOL/L — LOW (ref 1.15–1.29)
CA-I BLD-SCNC: 0.98 MMOL/L — LOW (ref 1.15–1.29)
CA-I BLD-SCNC: 1 MMOL/L — LOW (ref 1.15–1.29)
CA-I BLD-SCNC: 1.02 MMOL/L — LOW (ref 1.15–1.29)
CA-I BLD-SCNC: 1.03 MMOL/L — LOW (ref 1.15–1.29)
CA-I BLD-SCNC: 1.04 MMOL/L — LOW (ref 1.15–1.29)
CA-I BLD-SCNC: 1.04 MMOL/L — LOW (ref 1.15–1.29)
CA-I BLD-SCNC: 1.05 MMOL/L — LOW (ref 1.15–1.29)
CA-I BLD-SCNC: 1.06 MMOL/L — LOW (ref 1.15–1.29)
CA-I BLD-SCNC: 1.07 MMOL/L — LOW (ref 1.15–1.29)
CA-I BLD-SCNC: 1.08 MMOL/L — LOW (ref 1.15–1.29)
CA-I BLD-SCNC: 1.09 MMOL/L — LOW (ref 1.15–1.29)
CA-I BLD-SCNC: 1.1 MMOL/L — LOW (ref 1.15–1.29)
CA-I BLD-SCNC: 1.1 MMOL/L — LOW (ref 1.15–1.29)
CALCIUM SERPL-MCNC: 10 MG/DL — SIGNIFICANT CHANGE UP (ref 8.4–10.5)
CALCIUM SERPL-MCNC: 10.2 MG/DL — SIGNIFICANT CHANGE UP (ref 8.4–10.5)
CALCIUM SERPL-MCNC: 10.3 MG/DL — SIGNIFICANT CHANGE UP (ref 8.4–10.5)
CALCIUM SERPL-MCNC: 10.3 MG/DL — SIGNIFICANT CHANGE UP (ref 8.4–10.5)
CALCIUM SERPL-MCNC: 10.8 MG/DL — HIGH (ref 8.4–10.5)
CALCIUM SERPL-MCNC: 10.8 MG/DL — HIGH (ref 8.4–10.5)
CALCIUM SERPL-MCNC: 6.4 MG/DL — CRITICAL LOW (ref 8.4–10.5)
CALCIUM SERPL-MCNC: 6.4 MG/DL — CRITICAL LOW (ref 8.4–10.5)
CALCIUM SERPL-MCNC: 6.5 MG/DL — CRITICAL LOW (ref 8.4–10.5)
CALCIUM SERPL-MCNC: 6.5 MG/DL — CRITICAL LOW (ref 8.4–10.5)
CALCIUM SERPL-MCNC: 6.6 MG/DL — LOW (ref 8.4–10.5)
CALCIUM SERPL-MCNC: 6.6 MG/DL — LOW (ref 8.4–10.5)
CALCIUM SERPL-MCNC: 6.7 MG/DL — LOW (ref 8.4–10.5)
CALCIUM SERPL-MCNC: 6.8 MG/DL — LOW (ref 8.4–10.5)
CALCIUM SERPL-MCNC: 7 MG/DL — LOW (ref 8.4–10.5)
CALCIUM SERPL-MCNC: 7 MG/DL — LOW (ref 8.4–10.5)
CALCIUM SERPL-MCNC: 7.1 MG/DL — LOW (ref 8.4–10.5)
CALCIUM SERPL-MCNC: 7.2 MG/DL — LOW (ref 8.4–10.5)
CALCIUM SERPL-MCNC: 7.3 MG/DL — LOW (ref 8.4–10.5)
CALCIUM SERPL-MCNC: 7.3 MG/DL — LOW (ref 8.4–10.5)
CALCIUM SERPL-MCNC: 7.4 MG/DL — LOW (ref 8.4–10.5)
CALCIUM SERPL-MCNC: 7.5 MG/DL — LOW (ref 8.4–10.5)
CALCIUM SERPL-MCNC: 7.6 MG/DL — LOW (ref 8.4–10.5)
CALCIUM SERPL-MCNC: 7.7 MG/DL — LOW (ref 8.4–10.5)
CALCIUM SERPL-MCNC: 7.7 MG/DL — LOW (ref 8.4–10.5)
CALCIUM SERPL-MCNC: 7.8 MG/DL — LOW (ref 8.4–10.5)
CALCIUM SERPL-MCNC: 7.9 MG/DL — LOW (ref 8.4–10.5)
CALCIUM SERPL-MCNC: 8 MG/DL — LOW (ref 8.4–10.5)
CALCIUM SERPL-MCNC: 8 MG/DL — LOW (ref 8.4–10.5)
CALCIUM SERPL-MCNC: 8.1 MG/DL — LOW (ref 8.4–10.5)
CALCIUM SERPL-MCNC: 8.2 MG/DL — LOW (ref 8.4–10.5)
CALCIUM SERPL-MCNC: 8.3 MG/DL — LOW (ref 8.4–10.5)
CALCIUM SERPL-MCNC: 8.5 MG/DL — SIGNIFICANT CHANGE UP (ref 8.4–10.5)
CALCIUM SERPL-MCNC: 8.5 MG/DL — SIGNIFICANT CHANGE UP (ref 8.4–10.5)
CALCIUM SERPL-MCNC: 8.9 MG/DL — SIGNIFICANT CHANGE UP (ref 8.4–10.5)
CALCIUM SERPL-MCNC: 9.2 MG/DL — SIGNIFICANT CHANGE UP (ref 8.4–10.5)
CALCIUM SERPL-MCNC: 9.3 MG/DL — SIGNIFICANT CHANGE UP (ref 8.4–10.5)
CALCIUM SERPL-MCNC: 9.3 MG/DL — SIGNIFICANT CHANGE UP (ref 8.4–10.5)
CALCIUM SERPL-MCNC: 9.4 MG/DL — SIGNIFICANT CHANGE UP (ref 8.4–10.5)
CALCIUM SERPL-MCNC: 9.5 MG/DL — SIGNIFICANT CHANGE UP (ref 8.4–10.5)
CALCIUM SERPL-MCNC: 9.6 MG/DL — SIGNIFICANT CHANGE UP (ref 8.4–10.5)
CALCIUM SERPL-MCNC: 9.7 MG/DL — SIGNIFICANT CHANGE UP (ref 8.4–10.5)
CALCIUM SERPL-MCNC: 9.7 MG/DL — SIGNIFICANT CHANGE UP (ref 8.4–10.5)
CALCIUM SERPL-MCNC: 9.8 MG/DL — SIGNIFICANT CHANGE UP (ref 8.4–10.5)
CALCIUM SERPL-MCNC: 9.9 MG/DL — SIGNIFICANT CHANGE UP (ref 8.4–10.5)
CANCER AG125 SERPL-ACNC: 346 U/ML — HIGH
CANCER AG19-9 SERPL-ACNC: <2 U/ML — SIGNIFICANT CHANGE UP
CEA SERPL-MCNC: <1 NG/ML — LOW (ref 1–3.8)
CHLORIDE BLDV-SCNC: 101 MMOL/L — SIGNIFICANT CHANGE UP (ref 96–108)
CHLORIDE BLDV-SCNC: 101 MMOL/L — SIGNIFICANT CHANGE UP (ref 96–108)
CHLORIDE BLDV-SCNC: 103 MMOL/L — SIGNIFICANT CHANGE UP (ref 96–108)
CHLORIDE BLDV-SCNC: 109 MMOL/L — HIGH (ref 96–108)
CHLORIDE BLDV-SCNC: 98 MMOL/L — SIGNIFICANT CHANGE UP (ref 96–108)
CHLORIDE BLDV-SCNC: 99 MMOL/L — SIGNIFICANT CHANGE UP (ref 96–108)
CHLORIDE SERPL-SCNC: 100 MMOL/L — SIGNIFICANT CHANGE UP (ref 98–107)
CHLORIDE SERPL-SCNC: 101 MMOL/L — SIGNIFICANT CHANGE UP (ref 98–107)
CHLORIDE SERPL-SCNC: 102 MMOL/L — SIGNIFICANT CHANGE UP (ref 98–107)
CHLORIDE SERPL-SCNC: 103 MMOL/L — SIGNIFICANT CHANGE UP (ref 98–107)
CHLORIDE SERPL-SCNC: 104 MMOL/L — SIGNIFICANT CHANGE UP (ref 98–107)
CHLORIDE SERPL-SCNC: 104 MMOL/L — SIGNIFICANT CHANGE UP (ref 98–107)
CHLORIDE SERPL-SCNC: 105 MMOL/L — SIGNIFICANT CHANGE UP (ref 98–107)
CHLORIDE SERPL-SCNC: 106 MMOL/L — SIGNIFICANT CHANGE UP (ref 98–107)
CHLORIDE SERPL-SCNC: 107 MMOL/L — SIGNIFICANT CHANGE UP (ref 98–107)
CHLORIDE SERPL-SCNC: 108 MMOL/L — HIGH (ref 98–107)
CHLORIDE SERPL-SCNC: 108 MMOL/L — HIGH (ref 98–107)
CHLORIDE SERPL-SCNC: 109 MMOL/L — HIGH (ref 98–107)
CHLORIDE SERPL-SCNC: 109 MMOL/L — HIGH (ref 98–107)
CHLORIDE SERPL-SCNC: 112 MMOL/L — HIGH (ref 98–107)
CHLORIDE SERPL-SCNC: 91 MMOL/L — LOW (ref 98–107)
CHLORIDE SERPL-SCNC: 91 MMOL/L — LOW (ref 98–107)
CHLORIDE SERPL-SCNC: 92 MMOL/L — LOW (ref 98–107)
CHLORIDE SERPL-SCNC: 93 MMOL/L — LOW (ref 98–107)
CHLORIDE SERPL-SCNC: 94 MMOL/L — LOW (ref 98–107)
CHLORIDE SERPL-SCNC: 95 MMOL/L — LOW (ref 98–107)
CHLORIDE SERPL-SCNC: 96 MMOL/L — LOW (ref 98–107)
CHLORIDE SERPL-SCNC: 97 MMOL/L — LOW (ref 98–107)
CHLORIDE SERPL-SCNC: 98 MMOL/L — SIGNIFICANT CHANGE UP (ref 98–107)
CHLORIDE SERPL-SCNC: 99 MMOL/L — SIGNIFICANT CHANGE UP (ref 98–107)
CHLORIDE UR-SCNC: 39 MMOL/L — SIGNIFICANT CHANGE UP
CHLORIDE UR-SCNC: <20 MMOL/L — SIGNIFICANT CHANGE UP
CO2 BLDA-SCNC: 12 MMOL/L — LOW (ref 19–24)
CO2 BLDA-SCNC: 15 MMOL/L — LOW (ref 19–24)
CO2 BLDA-SCNC: 20 MMOL/L — SIGNIFICANT CHANGE UP (ref 19–24)
CO2 BLDV-SCNC: 12 MMOL/L — LOW (ref 22–26)
CO2 BLDV-SCNC: 22.8 MMOL/L — SIGNIFICANT CHANGE UP (ref 22–26)
CO2 BLDV-SCNC: 26.7 MMOL/L — HIGH (ref 22–26)
CO2 BLDV-SCNC: 27.4 MMOL/L — HIGH (ref 22–26)
CO2 BLDV-SCNC: 30.1 MMOL/L — HIGH (ref 22–26)
CO2 BLDV-SCNC: 34.9 MMOL/L — HIGH (ref 22–26)
CO2 SERPL-SCNC: 11 MMOL/L — LOW (ref 22–31)
CO2 SERPL-SCNC: 12 MMOL/L — LOW (ref 22–31)
CO2 SERPL-SCNC: 13 MMOL/L — LOW (ref 22–31)
CO2 SERPL-SCNC: 14 MMOL/L — LOW (ref 22–31)
CO2 SERPL-SCNC: 15 MMOL/L — LOW (ref 22–31)
CO2 SERPL-SCNC: 16 MMOL/L — LOW (ref 22–31)
CO2 SERPL-SCNC: 17 MMOL/L — LOW (ref 22–31)
CO2 SERPL-SCNC: 18 MMOL/L — LOW (ref 22–31)
CO2 SERPL-SCNC: 19 MMOL/L — LOW (ref 22–31)
CO2 SERPL-SCNC: 20 MMOL/L — LOW (ref 22–31)
CO2 SERPL-SCNC: 21 MMOL/L — LOW (ref 22–31)
CO2 SERPL-SCNC: 22 MMOL/L — SIGNIFICANT CHANGE UP (ref 22–31)
CO2 SERPL-SCNC: 23 MMOL/L — SIGNIFICANT CHANGE UP (ref 22–31)
CO2 SERPL-SCNC: 24 MMOL/L — SIGNIFICANT CHANGE UP (ref 22–31)
CO2 SERPL-SCNC: 25 MMOL/L — SIGNIFICANT CHANGE UP (ref 22–31)
CO2 SERPL-SCNC: 26 MMOL/L — SIGNIFICANT CHANGE UP (ref 22–31)
CO2 SERPL-SCNC: 27 MMOL/L — SIGNIFICANT CHANGE UP (ref 22–31)
CO2 SERPL-SCNC: 28 MMOL/L — SIGNIFICANT CHANGE UP (ref 22–31)
CO2 SERPL-SCNC: 28 MMOL/L — SIGNIFICANT CHANGE UP (ref 22–31)
CO2 SERPL-SCNC: 29 MMOL/L — SIGNIFICANT CHANGE UP (ref 22–31)
CO2 SERPL-SCNC: 30 MMOL/L — SIGNIFICANT CHANGE UP (ref 22–31)
CO2 SERPL-SCNC: 9 MMOL/L — CRITICAL LOW (ref 22–31)
COLOR CSF: COLORLESS — SIGNIFICANT CHANGE UP
COLOR FLD: YELLOW
COLOR SPEC: ABNORMAL
COLOR SPEC: YELLOW — SIGNIFICANT CHANGE UP
COLOR SPEC: YELLOW — SIGNIFICANT CHANGE UP
COMMENT - FLUIDS: SIGNIFICANT CHANGE UP
CORTIS AM PEAK SERPL-MCNC: 3 UG/DL — LOW (ref 6–18.4)
COVID-19 SPIKE DOMAIN AB INTERP: POSITIVE
COVID-19 SPIKE DOMAIN AB INTERP: POSITIVE
COVID-19 SPIKE DOMAIN ANTIBODY RESULT: >250 U/ML — HIGH
COVID-19 SPIKE DOMAIN ANTIBODY RESULT: >250 U/ML — HIGH
CREAT ?TM UR-MCNC: 122 MG/DL — SIGNIFICANT CHANGE UP
CREAT ?TM UR-MCNC: 214 MG/DL — SIGNIFICANT CHANGE UP
CREAT SERPL-MCNC: 0.71 MG/DL — SIGNIFICANT CHANGE UP (ref 0.5–1.3)
CREAT SERPL-MCNC: 0.72 MG/DL — SIGNIFICANT CHANGE UP (ref 0.5–1.3)
CREAT SERPL-MCNC: 0.74 MG/DL — SIGNIFICANT CHANGE UP (ref 0.5–1.3)
CREAT SERPL-MCNC: 0.74 MG/DL — SIGNIFICANT CHANGE UP (ref 0.5–1.3)
CREAT SERPL-MCNC: 0.76 MG/DL — SIGNIFICANT CHANGE UP (ref 0.5–1.3)
CREAT SERPL-MCNC: 0.76 MG/DL — SIGNIFICANT CHANGE UP (ref 0.5–1.3)
CREAT SERPL-MCNC: 0.77 MG/DL — SIGNIFICANT CHANGE UP (ref 0.5–1.3)
CREAT SERPL-MCNC: 0.78 MG/DL — SIGNIFICANT CHANGE UP (ref 0.5–1.3)
CREAT SERPL-MCNC: 0.8 MG/DL — SIGNIFICANT CHANGE UP (ref 0.5–1.3)
CREAT SERPL-MCNC: 0.8 MG/DL — SIGNIFICANT CHANGE UP (ref 0.5–1.3)
CREAT SERPL-MCNC: 0.81 MG/DL — SIGNIFICANT CHANGE UP (ref 0.5–1.3)
CREAT SERPL-MCNC: 0.85 MG/DL — SIGNIFICANT CHANGE UP (ref 0.5–1.3)
CREAT SERPL-MCNC: 0.86 MG/DL — SIGNIFICANT CHANGE UP (ref 0.5–1.3)
CREAT SERPL-MCNC: 0.88 MG/DL — SIGNIFICANT CHANGE UP (ref 0.5–1.3)
CREAT SERPL-MCNC: 0.89 MG/DL — SIGNIFICANT CHANGE UP (ref 0.5–1.3)
CREAT SERPL-MCNC: 0.93 MG/DL — SIGNIFICANT CHANGE UP (ref 0.5–1.3)
CREAT SERPL-MCNC: 0.94 MG/DL — SIGNIFICANT CHANGE UP (ref 0.5–1.3)
CREAT SERPL-MCNC: 0.97 MG/DL — SIGNIFICANT CHANGE UP (ref 0.5–1.3)
CREAT SERPL-MCNC: 0.99 MG/DL — SIGNIFICANT CHANGE UP (ref 0.5–1.3)
CREAT SERPL-MCNC: 0.99 MG/DL — SIGNIFICANT CHANGE UP (ref 0.5–1.3)
CREAT SERPL-MCNC: 1.02 MG/DL — SIGNIFICANT CHANGE UP (ref 0.5–1.3)
CREAT SERPL-MCNC: 1.05 MG/DL — SIGNIFICANT CHANGE UP (ref 0.5–1.3)
CREAT SERPL-MCNC: 1.06 MG/DL — SIGNIFICANT CHANGE UP (ref 0.5–1.3)
CREAT SERPL-MCNC: 1.07 MG/DL — SIGNIFICANT CHANGE UP (ref 0.5–1.3)
CREAT SERPL-MCNC: 1.07 MG/DL — SIGNIFICANT CHANGE UP (ref 0.5–1.3)
CREAT SERPL-MCNC: 1.08 MG/DL — SIGNIFICANT CHANGE UP (ref 0.5–1.3)
CREAT SERPL-MCNC: 1.1 MG/DL — SIGNIFICANT CHANGE UP (ref 0.5–1.3)
CREAT SERPL-MCNC: 1.1 MG/DL — SIGNIFICANT CHANGE UP (ref 0.5–1.3)
CREAT SERPL-MCNC: 1.14 MG/DL — SIGNIFICANT CHANGE UP (ref 0.5–1.3)
CREAT SERPL-MCNC: 1.16 MG/DL — SIGNIFICANT CHANGE UP (ref 0.5–1.3)
CREAT SERPL-MCNC: 1.17 MG/DL — SIGNIFICANT CHANGE UP (ref 0.5–1.3)
CREAT SERPL-MCNC: 1.17 MG/DL — SIGNIFICANT CHANGE UP (ref 0.5–1.3)
CREAT SERPL-MCNC: 1.21 MG/DL — SIGNIFICANT CHANGE UP (ref 0.5–1.3)
CREAT SERPL-MCNC: 1.24 MG/DL — SIGNIFICANT CHANGE UP (ref 0.5–1.3)
CREAT SERPL-MCNC: 1.26 MG/DL — SIGNIFICANT CHANGE UP (ref 0.5–1.3)
CREAT SERPL-MCNC: 1.34 MG/DL — HIGH (ref 0.5–1.3)
CREAT SERPL-MCNC: 1.35 MG/DL — HIGH (ref 0.5–1.3)
CREAT SERPL-MCNC: 1.36 MG/DL — HIGH (ref 0.5–1.3)
CREAT SERPL-MCNC: 1.38 MG/DL — HIGH (ref 0.5–1.3)
CREAT SERPL-MCNC: 1.41 MG/DL — HIGH (ref 0.5–1.3)
CREAT SERPL-MCNC: 1.47 MG/DL — HIGH (ref 0.5–1.3)
CREAT SERPL-MCNC: 1.48 MG/DL — HIGH (ref 0.5–1.3)
CREAT SERPL-MCNC: 1.6 MG/DL — HIGH (ref 0.5–1.3)
CREAT SERPL-MCNC: 1.65 MG/DL — HIGH (ref 0.5–1.3)
CREAT SERPL-MCNC: 1.66 MG/DL — HIGH (ref 0.5–1.3)
CREAT SERPL-MCNC: 1.67 MG/DL — HIGH (ref 0.5–1.3)
CREAT SERPL-MCNC: 1.72 MG/DL — HIGH (ref 0.5–1.3)
CREAT SERPL-MCNC: 1.73 MG/DL — HIGH (ref 0.5–1.3)
CREAT SERPL-MCNC: 1.78 MG/DL — HIGH (ref 0.5–1.3)
CREAT SERPL-MCNC: 1.85 MG/DL — HIGH (ref 0.5–1.3)
CREAT SERPL-MCNC: 1.88 MG/DL — HIGH (ref 0.5–1.3)
CREAT SERPL-MCNC: 1.89 MG/DL — HIGH (ref 0.5–1.3)
CREAT SERPL-MCNC: 1.9 MG/DL — HIGH (ref 0.5–1.3)
CREAT SERPL-MCNC: 1.92 MG/DL — HIGH (ref 0.5–1.3)
CREAT SERPL-MCNC: 1.93 MG/DL — HIGH (ref 0.5–1.3)
CREAT SERPL-MCNC: 1.99 MG/DL — HIGH (ref 0.5–1.3)
CREAT SERPL-MCNC: 2.05 MG/DL — HIGH (ref 0.5–1.3)
CREAT SERPL-MCNC: 2.05 MG/DL — HIGH (ref 0.5–1.3)
CREAT SERPL-MCNC: 2.13 MG/DL — HIGH (ref 0.5–1.3)
CREAT SERPL-MCNC: 2.19 MG/DL — HIGH (ref 0.5–1.3)
CREAT SERPL-MCNC: 2.19 MG/DL — HIGH (ref 0.5–1.3)
CREAT SERPL-MCNC: 2.23 MG/DL — HIGH (ref 0.5–1.3)
CREAT SERPL-MCNC: 2.24 MG/DL — HIGH (ref 0.5–1.3)
CREAT SERPL-MCNC: 2.29 MG/DL — HIGH (ref 0.5–1.3)
CREAT SERPL-MCNC: 2.34 MG/DL — HIGH (ref 0.5–1.3)
CREAT SERPL-MCNC: 2.34 MG/DL — HIGH (ref 0.5–1.3)
CREAT SERPL-MCNC: 2.35 MG/DL — HIGH (ref 0.5–1.3)
CREAT SERPL-MCNC: 2.36 MG/DL — HIGH (ref 0.5–1.3)
CREAT SERPL-MCNC: 2.36 MG/DL — HIGH (ref 0.5–1.3)
CREAT SERPL-MCNC: 2.37 MG/DL — HIGH (ref 0.5–1.3)
CREAT SERPL-MCNC: 2.38 MG/DL — HIGH (ref 0.5–1.3)
CREAT SERPL-MCNC: 2.39 MG/DL — HIGH (ref 0.5–1.3)
CREAT SERPL-MCNC: 2.39 MG/DL — HIGH (ref 0.5–1.3)
CREAT SERPL-MCNC: 2.4 MG/DL — HIGH (ref 0.5–1.3)
CREAT SERPL-MCNC: 2.4 MG/DL — HIGH (ref 0.5–1.3)
CREAT SERPL-MCNC: 2.44 MG/DL — HIGH (ref 0.5–1.3)
CREAT SERPL-MCNC: 2.45 MG/DL — HIGH (ref 0.5–1.3)
CREAT SERPL-MCNC: 2.46 MG/DL — HIGH (ref 0.5–1.3)
CREAT SERPL-MCNC: 2.59 MG/DL — HIGH (ref 0.5–1.3)
CREAT SERPL-MCNC: 3.74 MG/DL — HIGH (ref 0.5–1.3)
CREAT SERPL-MCNC: 6.48 MG/DL — HIGH (ref 0.5–1.3)
CREAT SERPL-MCNC: 6.49 MG/DL — HIGH (ref 0.5–1.3)
CREAT SERPL-MCNC: 6.55 MG/DL — HIGH (ref 0.5–1.3)
CREAT SERPL-MCNC: 7.06 MG/DL — HIGH (ref 0.5–1.3)
CREAT SERPL-MCNC: 7.12 MG/DL — HIGH (ref 0.5–1.3)
CREAT SERPL-MCNC: 7.26 MG/DL — HIGH (ref 0.5–1.3)
CREAT SERPL-MCNC: 7.54 MG/DL — HIGH (ref 0.5–1.3)
CREAT SERPL-MCNC: 7.57 MG/DL — HIGH (ref 0.5–1.3)
CRYPTOC AG CSF-ACNC: NEGATIVE — SIGNIFICANT CHANGE UP
CSF PCR RESULT: SIGNIFICANT CHANGE UP
CULTURE RESULTS: NO GROWTH — SIGNIFICANT CHANGE UP
CULTURE RESULTS: SIGNIFICANT CHANGE UP
DACRYOCYTES BLD QL SMEAR: SLIGHT — SIGNIFICANT CHANGE UP
DIALYSIS INSTRUMENT RESULT - HEPATITIS B SURFACE ANTIGEN: NEGATIVE — SIGNIFICANT CHANGE UP
DIALYSIS INSTRUMENT RESULT - HEPATITIS B SURFACE ANTIGEN: NEGATIVE — SIGNIFICANT CHANGE UP
DIFF PNL FLD: ABNORMAL
ELLIPTOCYTES BLD QL SMEAR: SLIGHT — SIGNIFICANT CHANGE UP
EOSINOPHIL # BLD AUTO: 0 K/UL — SIGNIFICANT CHANGE UP (ref 0–0.5)
EOSINOPHIL # BLD AUTO: 0.01 K/UL — SIGNIFICANT CHANGE UP (ref 0–0.5)
EOSINOPHIL # BLD AUTO: 0.02 K/UL — SIGNIFICANT CHANGE UP (ref 0–0.5)
EOSINOPHIL # BLD AUTO: 0.03 K/UL — SIGNIFICANT CHANGE UP (ref 0–0.5)
EOSINOPHIL # BLD AUTO: 0.04 K/UL — SIGNIFICANT CHANGE UP (ref 0–0.5)
EOSINOPHIL # BLD AUTO: 0.07 K/UL — SIGNIFICANT CHANGE UP (ref 0–0.5)
EOSINOPHIL # BLD AUTO: 0.36 K/UL — SIGNIFICANT CHANGE UP (ref 0–0.5)
EOSINOPHIL # CSF: 0 % — SIGNIFICANT CHANGE UP
EOSINOPHIL # FLD: 0 % — SIGNIFICANT CHANGE UP
EOSINOPHIL NFR BLD AUTO: 0 % — SIGNIFICANT CHANGE UP (ref 0–6)
EOSINOPHIL NFR BLD AUTO: 0.1 % — SIGNIFICANT CHANGE UP (ref 0–6)
EOSINOPHIL NFR BLD AUTO: 0.2 % — SIGNIFICANT CHANGE UP (ref 0–6)
EOSINOPHIL NFR BLD AUTO: 0.3 % — SIGNIFICANT CHANGE UP (ref 0–6)
EOSINOPHIL NFR BLD AUTO: 0.3 % — SIGNIFICANT CHANGE UP (ref 0–6)
EOSINOPHIL NFR BLD AUTO: 0.8 % — SIGNIFICANT CHANGE UP (ref 0–6)
EOSINOPHIL NFR BLD AUTO: 0.9 % — SIGNIFICANT CHANGE UP (ref 0–6)
EOSINOPHIL NFR BLD AUTO: 1.5 % — SIGNIFICANT CHANGE UP (ref 0–6)
EOSINOPHIL NFR BLD AUTO: 1.7 % — SIGNIFICANT CHANGE UP (ref 0–6)
EOSINOPHIL NFR BLD AUTO: 1.8 % — SIGNIFICANT CHANGE UP (ref 0–6)
EOSINOPHIL NFR BLD AUTO: 10 % — HIGH (ref 0–6)
EOSINOPHIL NFR BLD AUTO: 12.5 % — HIGH (ref 0–6)
EOSINOPHIL NFR BLD AUTO: 14.3 % — HIGH (ref 0–6)
EOSINOPHIL NFR BLD AUTO: 16.7 % — HIGH (ref 0–6)
EOSINOPHIL NFR BLD AUTO: 16.7 % — SIGNIFICANT CHANGE UP (ref 0–6)
EOSINOPHIL NFR BLD AUTO: 2.4 % — SIGNIFICANT CHANGE UP (ref 0–6)
EOSINOPHIL NFR BLD AUTO: 25 % — HIGH (ref 0–6)
EOSINOPHIL NFR BLD AUTO: 7.1 % — HIGH (ref 0–6)
EOSINOPHIL NFR BLD AUTO: 8.3 % — HIGH (ref 0–6)
EOSINOPHIL NFR BLD AUTO: 9.1 % — HIGH (ref 0–6)
ESTIMATED AVERAGE GLUCOSE: 140 — SIGNIFICANT CHANGE UP
FLUAV SUBTYP SPEC NAA+PROBE: SIGNIFICANT CHANGE UP
FLUAV SUBTYP SPEC NAA+PROBE: SIGNIFICANT CHANGE UP
FLUBV RNA SPEC QL NAA+PROBE: SIGNIFICANT CHANGE UP
FLUBV RNA SPEC QL NAA+PROBE: SIGNIFICANT CHANGE UP
FLUID INTAKE SUBSTANCE CLASS: SIGNIFICANT CHANGE UP
FLUID SEGMENTED GRANULOCYTES: 0 % — SIGNIFICANT CHANGE UP
FLUID SEGMENTED GRANULOCYTES: 0 % — SIGNIFICANT CHANGE UP
FLUID SEGMENTED GRANULOCYTES: 2 % — SIGNIFICANT CHANGE UP
FLUID SEGMENTED GRANULOCYTES: 2 % — SIGNIFICANT CHANGE UP
FOLATE+VIT B12 SERBLD-IMP: 0 % — SIGNIFICANT CHANGE UP
G6PD RBC-CCNC: 21.5 U/G HGB — HIGH (ref 7–20.5)
GAMMA GLOBULIN: 1.02 G/DL — SIGNIFICANT CHANGE UP (ref 0.7–1.7)
GAMMA INTERFERON BACKGROUND BLD IA-ACNC: 0.01 IU/ML — SIGNIFICANT CHANGE UP
GAMMA INTERFERON BACKGROUND BLD IA-ACNC: 0.03 IU/ML — SIGNIFICANT CHANGE UP
GAS PNL BLDA: SIGNIFICANT CHANGE UP
GAS PNL BLDV: 130 MMOL/L — LOW (ref 136–145)
GAS PNL BLDV: 132 MMOL/L — LOW (ref 136–145)
GAS PNL BLDV: 134 MMOL/L — LOW (ref 136–145)
GAS PNL BLDV: 138 MMOL/L — SIGNIFICANT CHANGE UP (ref 136–145)
GAS PNL BLDV: 141 MMOL/L — SIGNIFICANT CHANGE UP (ref 136–145)
GAS PNL BLDV: 141 MMOL/L — SIGNIFICANT CHANGE UP (ref 136–145)
GAS PNL BLDV: SIGNIFICANT CHANGE UP
GIANT PLATELETS BLD QL SMEAR: PRESENT — SIGNIFICANT CHANGE UP
GLUCOSE BLDC GLUCOMTR-MCNC: 101 MG/DL — HIGH (ref 70–99)
GLUCOSE BLDC GLUCOMTR-MCNC: 102 MG/DL — HIGH (ref 70–99)
GLUCOSE BLDC GLUCOMTR-MCNC: 103 MG/DL — HIGH (ref 70–99)
GLUCOSE BLDC GLUCOMTR-MCNC: 104 MG/DL — HIGH (ref 70–99)
GLUCOSE BLDC GLUCOMTR-MCNC: 104 MG/DL — HIGH (ref 70–99)
GLUCOSE BLDC GLUCOMTR-MCNC: 105 MG/DL — HIGH (ref 70–99)
GLUCOSE BLDC GLUCOMTR-MCNC: 106 MG/DL — HIGH (ref 70–99)
GLUCOSE BLDC GLUCOMTR-MCNC: 108 MG/DL — HIGH (ref 70–99)
GLUCOSE BLDC GLUCOMTR-MCNC: 108 MG/DL — HIGH (ref 70–99)
GLUCOSE BLDC GLUCOMTR-MCNC: 109 MG/DL — HIGH (ref 70–99)
GLUCOSE BLDC GLUCOMTR-MCNC: 110 MG/DL — HIGH (ref 70–99)
GLUCOSE BLDC GLUCOMTR-MCNC: 111 MG/DL — HIGH (ref 70–99)
GLUCOSE BLDC GLUCOMTR-MCNC: 111 MG/DL — HIGH (ref 70–99)
GLUCOSE BLDC GLUCOMTR-MCNC: 112 MG/DL — HIGH (ref 70–99)
GLUCOSE BLDC GLUCOMTR-MCNC: 112 MG/DL — HIGH (ref 70–99)
GLUCOSE BLDC GLUCOMTR-MCNC: 113 MG/DL — HIGH (ref 70–99)
GLUCOSE BLDC GLUCOMTR-MCNC: 114 MG/DL — HIGH (ref 70–99)
GLUCOSE BLDC GLUCOMTR-MCNC: 115 MG/DL — HIGH (ref 70–99)
GLUCOSE BLDC GLUCOMTR-MCNC: 116 MG/DL — HIGH (ref 70–99)
GLUCOSE BLDC GLUCOMTR-MCNC: 118 MG/DL — HIGH (ref 70–99)
GLUCOSE BLDC GLUCOMTR-MCNC: 118 MG/DL — HIGH (ref 70–99)
GLUCOSE BLDC GLUCOMTR-MCNC: 119 MG/DL — HIGH (ref 70–99)
GLUCOSE BLDC GLUCOMTR-MCNC: 120 MG/DL — HIGH (ref 70–99)
GLUCOSE BLDC GLUCOMTR-MCNC: 120 MG/DL — HIGH (ref 70–99)
GLUCOSE BLDC GLUCOMTR-MCNC: 121 MG/DL — HIGH (ref 70–99)
GLUCOSE BLDC GLUCOMTR-MCNC: 122 MG/DL — HIGH (ref 70–99)
GLUCOSE BLDC GLUCOMTR-MCNC: 123 MG/DL — HIGH (ref 70–99)
GLUCOSE BLDC GLUCOMTR-MCNC: 124 MG/DL — HIGH (ref 70–99)
GLUCOSE BLDC GLUCOMTR-MCNC: 125 MG/DL — HIGH (ref 70–99)
GLUCOSE BLDC GLUCOMTR-MCNC: 126 MG/DL — HIGH (ref 70–99)
GLUCOSE BLDC GLUCOMTR-MCNC: 127 MG/DL — HIGH (ref 70–99)
GLUCOSE BLDC GLUCOMTR-MCNC: 127 MG/DL — HIGH (ref 70–99)
GLUCOSE BLDC GLUCOMTR-MCNC: 128 MG/DL — HIGH (ref 70–99)
GLUCOSE BLDC GLUCOMTR-MCNC: 129 MG/DL — HIGH (ref 70–99)
GLUCOSE BLDC GLUCOMTR-MCNC: 130 MG/DL — HIGH (ref 70–99)
GLUCOSE BLDC GLUCOMTR-MCNC: 131 MG/DL — HIGH (ref 70–99)
GLUCOSE BLDC GLUCOMTR-MCNC: 132 MG/DL — HIGH (ref 70–99)
GLUCOSE BLDC GLUCOMTR-MCNC: 132 MG/DL — HIGH (ref 70–99)
GLUCOSE BLDC GLUCOMTR-MCNC: 133 MG/DL — HIGH (ref 70–99)
GLUCOSE BLDC GLUCOMTR-MCNC: 135 MG/DL — HIGH (ref 70–99)
GLUCOSE BLDC GLUCOMTR-MCNC: 136 MG/DL — HIGH (ref 70–99)
GLUCOSE BLDC GLUCOMTR-MCNC: 139 MG/DL — HIGH (ref 70–99)
GLUCOSE BLDC GLUCOMTR-MCNC: 140 MG/DL — HIGH (ref 70–99)
GLUCOSE BLDC GLUCOMTR-MCNC: 141 MG/DL — HIGH (ref 70–99)
GLUCOSE BLDC GLUCOMTR-MCNC: 142 MG/DL — HIGH (ref 70–99)
GLUCOSE BLDC GLUCOMTR-MCNC: 143 MG/DL — HIGH (ref 70–99)
GLUCOSE BLDC GLUCOMTR-MCNC: 147 MG/DL — HIGH (ref 70–99)
GLUCOSE BLDC GLUCOMTR-MCNC: 147 MG/DL — HIGH (ref 70–99)
GLUCOSE BLDC GLUCOMTR-MCNC: 148 MG/DL — HIGH (ref 70–99)
GLUCOSE BLDC GLUCOMTR-MCNC: 151 MG/DL — HIGH (ref 70–99)
GLUCOSE BLDC GLUCOMTR-MCNC: 159 MG/DL — HIGH (ref 70–99)
GLUCOSE BLDC GLUCOMTR-MCNC: 160 MG/DL — HIGH (ref 70–99)
GLUCOSE BLDC GLUCOMTR-MCNC: 160 MG/DL — HIGH (ref 70–99)
GLUCOSE BLDC GLUCOMTR-MCNC: 161 MG/DL — HIGH (ref 70–99)
GLUCOSE BLDC GLUCOMTR-MCNC: 165 MG/DL — HIGH (ref 70–99)
GLUCOSE BLDC GLUCOMTR-MCNC: 166 MG/DL — HIGH (ref 70–99)
GLUCOSE BLDC GLUCOMTR-MCNC: 168 MG/DL — HIGH (ref 70–99)
GLUCOSE BLDC GLUCOMTR-MCNC: 173 MG/DL — HIGH (ref 70–99)
GLUCOSE BLDC GLUCOMTR-MCNC: 213 MG/DL — HIGH (ref 70–99)
GLUCOSE BLDC GLUCOMTR-MCNC: 221 MG/DL — HIGH (ref 70–99)
GLUCOSE BLDC GLUCOMTR-MCNC: 227 MG/DL — HIGH (ref 70–99)
GLUCOSE BLDC GLUCOMTR-MCNC: 228 MG/DL — HIGH (ref 70–99)
GLUCOSE BLDC GLUCOMTR-MCNC: 236 MG/DL — HIGH (ref 70–99)
GLUCOSE BLDC GLUCOMTR-MCNC: 244 MG/DL — HIGH (ref 70–99)
GLUCOSE BLDC GLUCOMTR-MCNC: 251 MG/DL — HIGH (ref 70–99)
GLUCOSE BLDC GLUCOMTR-MCNC: 271 MG/DL — HIGH (ref 70–99)
GLUCOSE BLDC GLUCOMTR-MCNC: 281 MG/DL — HIGH (ref 70–99)
GLUCOSE BLDC GLUCOMTR-MCNC: 299 MG/DL — HIGH (ref 70–99)
GLUCOSE BLDC GLUCOMTR-MCNC: 51 MG/DL — CRITICAL LOW (ref 70–99)
GLUCOSE BLDC GLUCOMTR-MCNC: 51 MG/DL — CRITICAL LOW (ref 70–99)
GLUCOSE BLDC GLUCOMTR-MCNC: 64 MG/DL — LOW (ref 70–99)
GLUCOSE BLDC GLUCOMTR-MCNC: 76 MG/DL — SIGNIFICANT CHANGE UP (ref 70–99)
GLUCOSE BLDC GLUCOMTR-MCNC: 78 MG/DL — SIGNIFICANT CHANGE UP (ref 70–99)
GLUCOSE BLDC GLUCOMTR-MCNC: 80 MG/DL — SIGNIFICANT CHANGE UP (ref 70–99)
GLUCOSE BLDC GLUCOMTR-MCNC: 83 MG/DL — SIGNIFICANT CHANGE UP (ref 70–99)
GLUCOSE BLDC GLUCOMTR-MCNC: 87 MG/DL — SIGNIFICANT CHANGE UP (ref 70–99)
GLUCOSE BLDC GLUCOMTR-MCNC: 92 MG/DL — SIGNIFICANT CHANGE UP (ref 70–99)
GLUCOSE BLDC GLUCOMTR-MCNC: 94 MG/DL — SIGNIFICANT CHANGE UP (ref 70–99)
GLUCOSE BLDC GLUCOMTR-MCNC: 95 MG/DL — SIGNIFICANT CHANGE UP (ref 70–99)
GLUCOSE BLDC GLUCOMTR-MCNC: 99 MG/DL — SIGNIFICANT CHANGE UP (ref 70–99)
GLUCOSE BLDV-MCNC: 102 MG/DL — HIGH (ref 70–99)
GLUCOSE BLDV-MCNC: 105 MG/DL — HIGH (ref 70–99)
GLUCOSE BLDV-MCNC: 122 MG/DL — HIGH (ref 70–99)
GLUCOSE BLDV-MCNC: 124 MG/DL — HIGH (ref 70–99)
GLUCOSE BLDV-MCNC: 88 MG/DL — SIGNIFICANT CHANGE UP (ref 70–99)
GLUCOSE BLDV-MCNC: 95 MG/DL — SIGNIFICANT CHANGE UP (ref 70–99)
GLUCOSE CSF-MCNC: 111 MG/DL — HIGH (ref 40–70)
GLUCOSE FLD-MCNC: 125 MG/DL — SIGNIFICANT CHANGE UP
GLUCOSE FLD-MCNC: 138 MG/DL — SIGNIFICANT CHANGE UP
GLUCOSE FLD-MCNC: 96 MG/DL — SIGNIFICANT CHANGE UP
GLUCOSE SERPL-MCNC: 102 MG/DL — HIGH (ref 70–99)
GLUCOSE SERPL-MCNC: 104 MG/DL — HIGH (ref 70–99)
GLUCOSE SERPL-MCNC: 104 MG/DL — HIGH (ref 70–99)
GLUCOSE SERPL-MCNC: 105 MG/DL — HIGH (ref 70–99)
GLUCOSE SERPL-MCNC: 105 MG/DL — HIGH (ref 70–99)
GLUCOSE SERPL-MCNC: 106 MG/DL — HIGH (ref 70–99)
GLUCOSE SERPL-MCNC: 106 MG/DL — HIGH (ref 70–99)
GLUCOSE SERPL-MCNC: 107 MG/DL — HIGH (ref 70–99)
GLUCOSE SERPL-MCNC: 108 MG/DL — HIGH (ref 70–99)
GLUCOSE SERPL-MCNC: 109 MG/DL — HIGH (ref 70–99)
GLUCOSE SERPL-MCNC: 110 MG/DL — HIGH (ref 70–99)
GLUCOSE SERPL-MCNC: 111 MG/DL — HIGH (ref 70–99)
GLUCOSE SERPL-MCNC: 112 MG/DL — HIGH (ref 70–99)
GLUCOSE SERPL-MCNC: 112 MG/DL — HIGH (ref 70–99)
GLUCOSE SERPL-MCNC: 113 MG/DL — HIGH (ref 70–99)
GLUCOSE SERPL-MCNC: 114 MG/DL — HIGH (ref 70–99)
GLUCOSE SERPL-MCNC: 115 MG/DL — HIGH (ref 70–99)
GLUCOSE SERPL-MCNC: 116 MG/DL — HIGH (ref 70–99)
GLUCOSE SERPL-MCNC: 117 MG/DL — HIGH (ref 70–99)
GLUCOSE SERPL-MCNC: 118 MG/DL — HIGH (ref 70–99)
GLUCOSE SERPL-MCNC: 119 MG/DL — HIGH (ref 70–99)
GLUCOSE SERPL-MCNC: 120 MG/DL — HIGH (ref 70–99)
GLUCOSE SERPL-MCNC: 121 MG/DL — HIGH (ref 70–99)
GLUCOSE SERPL-MCNC: 122 MG/DL — HIGH (ref 70–99)
GLUCOSE SERPL-MCNC: 122 MG/DL — HIGH (ref 70–99)
GLUCOSE SERPL-MCNC: 123 MG/DL — HIGH (ref 70–99)
GLUCOSE SERPL-MCNC: 125 MG/DL — HIGH (ref 70–99)
GLUCOSE SERPL-MCNC: 126 MG/DL — HIGH (ref 70–99)
GLUCOSE SERPL-MCNC: 126 MG/DL — HIGH (ref 70–99)
GLUCOSE SERPL-MCNC: 127 MG/DL — HIGH (ref 70–99)
GLUCOSE SERPL-MCNC: 127 MG/DL — HIGH (ref 70–99)
GLUCOSE SERPL-MCNC: 129 MG/DL — HIGH (ref 70–99)
GLUCOSE SERPL-MCNC: 130 MG/DL — HIGH (ref 70–99)
GLUCOSE SERPL-MCNC: 134 MG/DL — HIGH (ref 70–99)
GLUCOSE SERPL-MCNC: 135 MG/DL — HIGH (ref 70–99)
GLUCOSE SERPL-MCNC: 136 MG/DL — HIGH (ref 70–99)
GLUCOSE SERPL-MCNC: 137 MG/DL — HIGH (ref 70–99)
GLUCOSE SERPL-MCNC: 141 MG/DL — HIGH (ref 70–99)
GLUCOSE SERPL-MCNC: 141 MG/DL — HIGH (ref 70–99)
GLUCOSE SERPL-MCNC: 142 MG/DL — HIGH (ref 70–99)
GLUCOSE SERPL-MCNC: 149 MG/DL — HIGH (ref 70–99)
GLUCOSE SERPL-MCNC: 149 MG/DL — HIGH (ref 70–99)
GLUCOSE SERPL-MCNC: 152 MG/DL — HIGH (ref 70–99)
GLUCOSE SERPL-MCNC: 153 MG/DL — HIGH (ref 70–99)
GLUCOSE SERPL-MCNC: 153 MG/DL — HIGH (ref 70–99)
GLUCOSE SERPL-MCNC: 155 MG/DL — HIGH (ref 70–99)
GLUCOSE SERPL-MCNC: 156 MG/DL — HIGH (ref 70–99)
GLUCOSE SERPL-MCNC: 166 MG/DL — HIGH (ref 70–99)
GLUCOSE SERPL-MCNC: 167 MG/DL — HIGH (ref 70–99)
GLUCOSE SERPL-MCNC: 167 MG/DL — HIGH (ref 70–99)
GLUCOSE SERPL-MCNC: 169 MG/DL — HIGH (ref 70–99)
GLUCOSE SERPL-MCNC: 171 MG/DL — HIGH (ref 70–99)
GLUCOSE SERPL-MCNC: 178 MG/DL — HIGH (ref 70–99)
GLUCOSE SERPL-MCNC: 180 MG/DL — HIGH (ref 70–99)
GLUCOSE SERPL-MCNC: 182 MG/DL — HIGH (ref 70–99)
GLUCOSE SERPL-MCNC: 184 MG/DL — HIGH (ref 70–99)
GLUCOSE SERPL-MCNC: 185 MG/DL — HIGH (ref 70–99)
GLUCOSE SERPL-MCNC: 186 MG/DL — HIGH (ref 70–99)
GLUCOSE SERPL-MCNC: 191 MG/DL — HIGH (ref 70–99)
GLUCOSE SERPL-MCNC: 209 MG/DL — HIGH (ref 70–99)
GLUCOSE SERPL-MCNC: 216 MG/DL — HIGH (ref 70–99)
GLUCOSE SERPL-MCNC: 237 MG/DL — HIGH (ref 70–99)
GLUCOSE SERPL-MCNC: 254 MG/DL — HIGH (ref 70–99)
GLUCOSE SERPL-MCNC: 264 MG/DL — HIGH (ref 70–99)
GLUCOSE SERPL-MCNC: 310 MG/DL — HIGH (ref 70–99)
GLUCOSE SERPL-MCNC: 64 MG/DL — LOW (ref 70–99)
GLUCOSE SERPL-MCNC: 67 MG/DL — LOW (ref 70–99)
GLUCOSE SERPL-MCNC: 71 MG/DL — SIGNIFICANT CHANGE UP (ref 70–99)
GLUCOSE SERPL-MCNC: 84 MG/DL — SIGNIFICANT CHANGE UP (ref 70–99)
GLUCOSE SERPL-MCNC: 85 MG/DL — SIGNIFICANT CHANGE UP (ref 70–99)
GLUCOSE SERPL-MCNC: 86 MG/DL — SIGNIFICANT CHANGE UP (ref 70–99)
GLUCOSE SERPL-MCNC: 88 MG/DL — SIGNIFICANT CHANGE UP (ref 70–99)
GLUCOSE SERPL-MCNC: 93 MG/DL — SIGNIFICANT CHANGE UP (ref 70–99)
GLUCOSE SERPL-MCNC: 96 MG/DL — SIGNIFICANT CHANGE UP (ref 70–99)
GLUCOSE SERPL-MCNC: 96 MG/DL — SIGNIFICANT CHANGE UP (ref 70–99)
GLUCOSE SERPL-MCNC: 97 MG/DL — SIGNIFICANT CHANGE UP (ref 70–99)
GLUCOSE SERPL-MCNC: 98 MG/DL — SIGNIFICANT CHANGE UP (ref 70–99)
GLUCOSE SERPL-MCNC: 98 MG/DL — SIGNIFICANT CHANGE UP (ref 70–99)
GLUCOSE SERPL-MCNC: 99 MG/DL — SIGNIFICANT CHANGE UP (ref 70–99)
GLUCOSE UR QL: ABNORMAL
GLUCOSE UR QL: NEGATIVE — SIGNIFICANT CHANGE UP
GLUCOSE UR QL: NEGATIVE — SIGNIFICANT CHANGE UP
GRAM STN FLD: SIGNIFICANT CHANGE UP
GRAN CASTS # UR COMP ASSIST: 2 /LPF — HIGH
HADV DNA SPEC QL NAA+PROBE: SIGNIFICANT CHANGE UP
HADV DNA SPEC QL NAA+PROBE: SIGNIFICANT CHANGE UP
HAPTOGLOB SERPL-MCNC: 263 MG/DL — HIGH (ref 34–200)
HAV IGM SER-ACNC: SIGNIFICANT CHANGE UP
HBV CORE AB SER-ACNC: SIGNIFICANT CHANGE UP
HBV CORE IGM SER-ACNC: SIGNIFICANT CHANGE UP
HBV SURFACE AB SER-ACNC: <3 MIU/ML — LOW
HBV SURFACE AG SER-ACNC: SIGNIFICANT CHANGE UP
HBV SURFACE AG SER-ACNC: SIGNIFICANT CHANGE UP
HCO3 BLDA-SCNC: 11 MMOL/L — LOW (ref 21–28)
HCO3 BLDA-SCNC: 14 MMOL/L — LOW (ref 21–28)
HCO3 BLDA-SCNC: 20 MMOL/L — LOW (ref 21–28)
HCO3 BLDV-SCNC: 11 MMOL/L — LOW (ref 22–29)
HCO3 BLDV-SCNC: 22 MMOL/L — SIGNIFICANT CHANGE UP (ref 22–29)
HCO3 BLDV-SCNC: 25 MMOL/L — SIGNIFICANT CHANGE UP (ref 22–29)
HCO3 BLDV-SCNC: 26 MMOL/L — SIGNIFICANT CHANGE UP (ref 22–29)
HCO3 BLDV-SCNC: 28 MMOL/L — SIGNIFICANT CHANGE UP (ref 22–29)
HCO3 BLDV-SCNC: 31 MMOL/L — HIGH (ref 20–27)
HCO3 BLDV-SCNC: 34 MMOL/L — HIGH (ref 22–29)
HCOV 229E RNA SPEC QL NAA+PROBE: SIGNIFICANT CHANGE UP
HCOV 229E RNA SPEC QL NAA+PROBE: SIGNIFICANT CHANGE UP
HCOV HKU1 RNA SPEC QL NAA+PROBE: SIGNIFICANT CHANGE UP
HCOV HKU1 RNA SPEC QL NAA+PROBE: SIGNIFICANT CHANGE UP
HCOV NL63 RNA SPEC QL NAA+PROBE: SIGNIFICANT CHANGE UP
HCOV NL63 RNA SPEC QL NAA+PROBE: SIGNIFICANT CHANGE UP
HCOV OC43 RNA SPEC QL NAA+PROBE: SIGNIFICANT CHANGE UP
HCOV OC43 RNA SPEC QL NAA+PROBE: SIGNIFICANT CHANGE UP
HCT VFR BLD CALC: 18.1 % — CRITICAL LOW (ref 34.5–45)
HCT VFR BLD CALC: 18.8 % — CRITICAL LOW (ref 34.5–45)
HCT VFR BLD CALC: 19.5 % — CRITICAL LOW (ref 34.5–45)
HCT VFR BLD CALC: 20.9 % — CRITICAL LOW (ref 34.5–45)
HCT VFR BLD CALC: 20.9 % — CRITICAL LOW (ref 34.5–45)
HCT VFR BLD CALC: 21.4 % — LOW (ref 34.5–45)
HCT VFR BLD CALC: 21.5 % — LOW (ref 34.5–45)
HCT VFR BLD CALC: 21.5 % — LOW (ref 34.5–45)
HCT VFR BLD CALC: 21.7 % — LOW (ref 34.5–45)
HCT VFR BLD CALC: 21.8 % — LOW (ref 34.5–45)
HCT VFR BLD CALC: 22 % — LOW (ref 34.5–45)
HCT VFR BLD CALC: 22.1 % — LOW (ref 34.5–45)
HCT VFR BLD CALC: 22.3 % — LOW (ref 34.5–45)
HCT VFR BLD CALC: 22.6 % — LOW (ref 34.5–45)
HCT VFR BLD CALC: 22.7 % — LOW (ref 34.5–45)
HCT VFR BLD CALC: 22.7 % — LOW (ref 34.5–45)
HCT VFR BLD CALC: 22.8 % — LOW (ref 34.5–45)
HCT VFR BLD CALC: 23.4 % — LOW (ref 34.5–45)
HCT VFR BLD CALC: 23.6 % — LOW (ref 34.5–45)
HCT VFR BLD CALC: 23.6 % — LOW (ref 34.5–45)
HCT VFR BLD CALC: 23.7 % — LOW (ref 34.5–45)
HCT VFR BLD CALC: 23.7 % — LOW (ref 34.5–45)
HCT VFR BLD CALC: 23.8 % — LOW (ref 34.5–45)
HCT VFR BLD CALC: 23.9 % — LOW (ref 34.5–45)
HCT VFR BLD CALC: 24 % — LOW (ref 34.5–45)
HCT VFR BLD CALC: 24.8 % — LOW (ref 34.5–45)
HCT VFR BLD CALC: 25.1 % — LOW (ref 34.5–45)
HCT VFR BLD CALC: 25.5 % — LOW (ref 34.5–45)
HCT VFR BLD CALC: 26.3 % — LOW (ref 34.5–45)
HCT VFR BLD CALC: 26.4 % — LOW (ref 34.5–45)
HCT VFR BLD CALC: 26.4 % — LOW (ref 34.5–45)
HCT VFR BLD CALC: 27.3 % — LOW (ref 34.5–45)
HCT VFR BLD CALC: 27.6 % — LOW (ref 34.5–45)
HCT VFR BLD CALC: 28 % — LOW (ref 34.5–45)
HCT VFR BLD CALC: 28.9 % — LOW (ref 34.5–45)
HCT VFR BLD CALC: 29 % — LOW (ref 34.5–45)
HCT VFR BLD CALC: 29.5 % — LOW (ref 34.5–45)
HCT VFR BLD CALC: 29.6 % — LOW (ref 34.5–45)
HCT VFR BLD CALC: 29.7 % — LOW (ref 34.5–45)
HCT VFR BLD CALC: 29.7 % — LOW (ref 34.5–45)
HCT VFR BLD CALC: 29.8 % — LOW (ref 34.5–45)
HCT VFR BLD CALC: 29.9 % — LOW (ref 34.5–45)
HCT VFR BLD CALC: 30.5 % — LOW (ref 34.5–45)
HCT VFR BLD CALC: 30.6 % — LOW (ref 34.5–45)
HCT VFR BLD CALC: 30.8 % — LOW (ref 34.5–45)
HCT VFR BLD CALC: 30.8 % — LOW (ref 34.5–45)
HCT VFR BLD CALC: 31.2 % — LOW (ref 34.5–45)
HCT VFR BLD CALC: 31.3 % — LOW (ref 34.5–45)
HCT VFR BLD CALC: 31.4 % — LOW (ref 34.5–45)
HCT VFR BLD CALC: 31.8 % — LOW (ref 34.5–45)
HCT VFR BLD CALC: 31.9 % — LOW (ref 34.5–45)
HCT VFR BLD CALC: 33.4 % — LOW (ref 34.5–45)
HCT VFR BLD CALC: 33.5 % — LOW (ref 34.5–45)
HCT VFR BLD CALC: 33.6 % — LOW (ref 34.5–45)
HCT VFR BLD CALC: 33.8 % — LOW (ref 34.5–45)
HCT VFR BLD CALC: 33.8 % — LOW (ref 34.5–45)
HCT VFR BLD CALC: 33.9 % — LOW (ref 34.5–45)
HCT VFR BLD CALC: 33.9 % — LOW (ref 34.5–45)
HCT VFR BLD CALC: 34 % — LOW (ref 34.5–45)
HCT VFR BLD CALC: 34.1 % — LOW (ref 34.5–45)
HCT VFR BLD CALC: 34.4 % — LOW (ref 34.5–45)
HCT VFR BLD CALC: 35 % — SIGNIFICANT CHANGE UP (ref 34.5–45)
HCT VFR BLD CALC: 35.1 % — SIGNIFICANT CHANGE UP (ref 34.5–45)
HCT VFR BLD CALC: 35.4 % — SIGNIFICANT CHANGE UP (ref 34.5–45)
HCT VFR BLD CALC: 36.9 % — SIGNIFICANT CHANGE UP (ref 34.5–45)
HCT VFR BLD CALC: 37 % — SIGNIFICANT CHANGE UP (ref 34.5–45)
HCT VFR BLD CALC: 37.9 % — SIGNIFICANT CHANGE UP (ref 34.5–45)
HCT VFR BLDA CALC: 23 % — LOW (ref 34.5–46.5)
HCT VFR BLDA CALC: 29 % — LOW (ref 34.5–46.5)
HCT VFR BLDA CALC: 31 % — LOW (ref 34.5–46.5)
HCT VFR BLDA CALC: 31 % — LOW (ref 34.5–46.5)
HCT VFR BLDA CALC: 33 % — LOW (ref 34.5–46.5)
HCT VFR BLDA CALC: 34 % — LOW (ref 34.5–46.5)
HCV AB S/CO SERPL IA: 0.13 S/CO — SIGNIFICANT CHANGE UP (ref 0–0.99)
HCV AB S/CO SERPL IA: 0.2 S/CO — SIGNIFICANT CHANGE UP (ref 0–0.99)
HCV AB SERPL-IMP: SIGNIFICANT CHANGE UP
HCV AB SERPL-IMP: SIGNIFICANT CHANGE UP
HEMATOPATHOLOGY REPORT: SIGNIFICANT CHANGE UP
HGB BLD CALC-MCNC: 10.2 G/DL — LOW (ref 11.5–15.5)
HGB BLD CALC-MCNC: 10.3 G/DL — LOW (ref 11.5–15.5)
HGB BLD CALC-MCNC: 11.1 G/DL — LOW (ref 11.5–15.5)
HGB BLD CALC-MCNC: 11.2 G/DL — LOW (ref 11.5–15.5)
HGB BLD CALC-MCNC: 7.6 G/DL — LOW (ref 11.5–15.5)
HGB BLD CALC-MCNC: 9.7 G/DL — LOW (ref 11.5–15.5)
HGB BLD-MCNC: 10 G/DL — LOW (ref 11.5–15.5)
HGB BLD-MCNC: 10 G/DL — LOW (ref 11.5–15.5)
HGB BLD-MCNC: 10.1 G/DL — LOW (ref 11.5–15.5)
HGB BLD-MCNC: 10.3 G/DL — LOW (ref 11.5–15.5)
HGB BLD-MCNC: 10.4 G/DL — LOW (ref 11.5–15.5)
HGB BLD-MCNC: 10.5 G/DL — LOW (ref 11.5–15.5)
HGB BLD-MCNC: 10.5 G/DL — LOW (ref 11.5–15.5)
HGB BLD-MCNC: 10.6 G/DL — LOW (ref 11.5–15.5)
HGB BLD-MCNC: 10.7 G/DL — LOW (ref 11.5–15.5)
HGB BLD-MCNC: 10.7 G/DL — LOW (ref 11.5–15.5)
HGB BLD-MCNC: 10.8 G/DL — LOW (ref 11.5–15.5)
HGB BLD-MCNC: 10.8 G/DL — LOW (ref 11.5–15.5)
HGB BLD-MCNC: 10.9 G/DL — LOW (ref 11.5–15.5)
HGB BLD-MCNC: 10.9 G/DL — LOW (ref 11.5–15.5)
HGB BLD-MCNC: 11 G/DL — LOW (ref 11.5–15.5)
HGB BLD-MCNC: 11 G/DL — LOW (ref 11.5–15.5)
HGB BLD-MCNC: 11.3 G/DL — LOW (ref 11.5–15.5)
HGB BLD-MCNC: 11.5 G/DL — SIGNIFICANT CHANGE UP (ref 11.5–15.5)
HGB BLD-MCNC: 11.6 G/DL — SIGNIFICANT CHANGE UP (ref 11.5–15.5)
HGB BLD-MCNC: 11.9 G/DL — SIGNIFICANT CHANGE UP (ref 11.5–15.5)
HGB BLD-MCNC: 6.4 G/DL — CRITICAL LOW (ref 11.5–15.5)
HGB BLD-MCNC: 6.8 G/DL — CRITICAL LOW (ref 11.5–15.5)
HGB BLD-MCNC: 7.1 G/DL — LOW (ref 11.5–15.5)
HGB BLD-MCNC: 7.5 G/DL — LOW (ref 11.5–15.5)
HGB BLD-MCNC: 7.5 G/DL — LOW (ref 11.5–15.5)
HGB BLD-MCNC: 7.7 G/DL — LOW (ref 11.5–15.5)
HGB BLD-MCNC: 7.8 G/DL — LOW (ref 11.5–15.5)
HGB BLD-MCNC: 7.9 G/DL — LOW (ref 11.5–15.5)
HGB BLD-MCNC: 8 G/DL — LOW (ref 11.5–15.5)
HGB BLD-MCNC: 8 G/DL — LOW (ref 11.5–15.5)
HGB BLD-MCNC: 8.2 G/DL — LOW (ref 11.5–15.5)
HGB BLD-MCNC: 8.3 G/DL — LOW (ref 11.5–15.5)
HGB BLD-MCNC: 8.4 G/DL — LOW (ref 11.5–15.5)
HGB BLD-MCNC: 8.5 G/DL — LOW (ref 11.5–15.5)
HGB BLD-MCNC: 8.6 G/DL — LOW (ref 11.5–15.5)
HGB BLD-MCNC: 8.7 G/DL — LOW (ref 11.5–15.5)
HGB BLD-MCNC: 8.7 G/DL — LOW (ref 11.5–15.5)
HGB BLD-MCNC: 8.8 G/DL — LOW (ref 11.5–15.5)
HGB BLD-MCNC: 8.8 G/DL — LOW (ref 11.5–15.5)
HGB BLD-MCNC: 8.9 G/DL — LOW (ref 11.5–15.5)
HGB BLD-MCNC: 9 G/DL — LOW (ref 11.5–15.5)
HGB BLD-MCNC: 9 G/DL — LOW (ref 11.5–15.5)
HGB BLD-MCNC: 9.1 G/DL — LOW (ref 11.5–15.5)
HGB BLD-MCNC: 9.2 G/DL — LOW (ref 11.5–15.5)
HGB BLD-MCNC: 9.2 G/DL — LOW (ref 11.5–15.5)
HGB BLD-MCNC: 9.3 G/DL — LOW (ref 11.5–15.5)
HGB BLD-MCNC: 9.4 G/DL — LOW (ref 11.5–15.5)
HGB BLD-MCNC: 9.4 G/DL — LOW (ref 11.5–15.5)
HGB BLD-MCNC: 9.5 G/DL — LOW (ref 11.5–15.5)
HGB BLD-MCNC: 9.7 G/DL — LOW (ref 11.5–15.5)
HGB BLD-MCNC: 9.7 G/DL — LOW (ref 11.5–15.5)
HGB BLD-MCNC: 9.8 G/DL — LOW (ref 11.5–15.5)
HIV 1+2 AB+HIV1 P24 AG SERPL QL IA: SIGNIFICANT CHANGE UP
HMPV RNA SPEC QL NAA+PROBE: SIGNIFICANT CHANGE UP
HMPV RNA SPEC QL NAA+PROBE: SIGNIFICANT CHANGE UP
HPIV1 RNA SPEC QL NAA+PROBE: SIGNIFICANT CHANGE UP
HPIV1 RNA SPEC QL NAA+PROBE: SIGNIFICANT CHANGE UP
HPIV2 RNA SPEC QL NAA+PROBE: SIGNIFICANT CHANGE UP
HPIV2 RNA SPEC QL NAA+PROBE: SIGNIFICANT CHANGE UP
HPIV3 RNA SPEC QL NAA+PROBE: SIGNIFICANT CHANGE UP
HPIV3 RNA SPEC QL NAA+PROBE: SIGNIFICANT CHANGE UP
HPIV4 RNA SPEC QL NAA+PROBE: SIGNIFICANT CHANGE UP
HPIV4 RNA SPEC QL NAA+PROBE: SIGNIFICANT CHANGE UP
HTLV I+II AB PATRN SER RIPA-IMP: SIGNIFICANT CHANGE UP
HYPOCHROMIA BLD QL: SLIGHT — SIGNIFICANT CHANGE UP
IANC: 0 K/UL — LOW (ref 1.5–8.5)
IANC: 0.01 K/UL — LOW (ref 1.5–8.5)
IANC: 0.02 K/UL — LOW (ref 1.5–8.5)
IANC: 0.07 K/UL — LOW (ref 1.5–8.5)
IANC: 0.53 K/UL — LOW (ref 1.5–8.5)
IANC: 1.37 K/UL — LOW (ref 1.5–8.5)
IANC: 10.61 K/UL — HIGH (ref 1.5–8.5)
IANC: 10.98 K/UL — HIGH (ref 1.5–8.5)
IANC: 11.05 K/UL — HIGH (ref 1.5–8.5)
IANC: 11.41 K/UL — HIGH (ref 1.5–8.5)
IANC: 11.56 K/UL — HIGH (ref 1.5–8.5)
IANC: 11.64 K/UL — HIGH (ref 1.5–8.5)
IANC: 11.92 K/UL — HIGH (ref 1.5–8.5)
IANC: 12.07 K/UL — HIGH (ref 1.5–8.5)
IANC: 12.18 K/UL — HIGH (ref 1.5–8.5)
IANC: 12.21 K/UL — HIGH (ref 1.5–8.5)
IANC: 12.3 K/UL — HIGH (ref 1.5–8.5)
IANC: 12.85 K/UL — HIGH (ref 1.5–8.5)
IANC: 13.13 K/UL — HIGH (ref 1.5–8.5)
IANC: 13.46 K/UL — HIGH (ref 1.5–8.5)
IANC: 14.12 K/UL — HIGH (ref 1.5–8.5)
IANC: 14.22 K/UL — HIGH (ref 1.5–8.5)
IANC: 14.57 K/UL — HIGH (ref 1.5–8.5)
IANC: 14.75 K/UL — HIGH (ref 1.5–8.5)
IANC: 15.09 K/UL — HIGH (ref 1.5–8.5)
IANC: 15.3 K/UL — HIGH (ref 1.5–8.5)
IANC: 15.37 K/UL — HIGH (ref 1.5–8.5)
IANC: 15.42 K/UL — HIGH (ref 1.5–8.5)
IANC: 15.56 K/UL — HIGH (ref 1.5–8.5)
IANC: 16.59 K/UL — HIGH (ref 1.5–8.5)
IANC: 16.67 K/UL — HIGH (ref 1.5–8.5)
IANC: 16.79 K/UL — HIGH (ref 1.5–8.5)
IANC: 16.91 K/UL — HIGH (ref 1.5–8.5)
IANC: 17.12 K/UL — HIGH (ref 1.5–8.5)
IANC: 19.42 K/UL — HIGH (ref 1.5–8.5)
IANC: 19.55 K/UL — HIGH (ref 1.5–8.5)
IANC: 2.05 K/UL — SIGNIFICANT CHANGE UP (ref 1.5–8.5)
IANC: 2.89 K/UL — SIGNIFICANT CHANGE UP (ref 1.5–8.5)
IANC: 6.05 K/UL — SIGNIFICANT CHANGE UP (ref 1.5–8.5)
IANC: 7.22 K/UL — SIGNIFICANT CHANGE UP (ref 1.5–8.5)
IANC: 8.99 K/UL — HIGH (ref 1.5–8.5)
IANC: 9.98 K/UL — HIGH (ref 1.5–8.5)
IMM GRANULOCYTES NFR BLD AUTO: 0 % — SIGNIFICANT CHANGE UP (ref 0–1.5)
IMM GRANULOCYTES NFR BLD AUTO: 0.6 % — SIGNIFICANT CHANGE UP (ref 0–1.5)
IMM GRANULOCYTES NFR BLD AUTO: 0.6 % — SIGNIFICANT CHANGE UP (ref 0–1.5)
IMM GRANULOCYTES NFR BLD AUTO: 0.7 % — SIGNIFICANT CHANGE UP (ref 0–1.5)
IMM GRANULOCYTES NFR BLD AUTO: 0.7 % — SIGNIFICANT CHANGE UP (ref 0–1.5)
IMM GRANULOCYTES NFR BLD AUTO: 0.9 % — SIGNIFICANT CHANGE UP (ref 0–1.5)
IMM GRANULOCYTES NFR BLD AUTO: 1.1 % — SIGNIFICANT CHANGE UP (ref 0–1.5)
IMM GRANULOCYTES NFR BLD AUTO: 1.1 % — SIGNIFICANT CHANGE UP (ref 0–1.5)
IMM GRANULOCYTES NFR BLD AUTO: 1.2 % — SIGNIFICANT CHANGE UP (ref 0–1.5)
IMM GRANULOCYTES NFR BLD AUTO: 1.2 % — SIGNIFICANT CHANGE UP (ref 0–1.5)
IMM GRANULOCYTES NFR BLD AUTO: 1.4 % — SIGNIFICANT CHANGE UP (ref 0–1.5)
IMM GRANULOCYTES NFR BLD AUTO: 1.5 % — SIGNIFICANT CHANGE UP (ref 0–1.5)
IMM GRANULOCYTES NFR BLD AUTO: 1.5 % — SIGNIFICANT CHANGE UP (ref 0–1.5)
IMM GRANULOCYTES NFR BLD AUTO: 1.6 % — HIGH (ref 0–1.5)
IMM GRANULOCYTES NFR BLD AUTO: 1.7 % — HIGH (ref 0–1.5)
IMM GRANULOCYTES NFR BLD AUTO: 1.8 % — HIGH (ref 0–1.5)
IMM GRANULOCYTES NFR BLD AUTO: 1.9 % — HIGH (ref 0–1.5)
IMM GRANULOCYTES NFR BLD AUTO: 1.9 % — HIGH (ref 0–1.5)
IMM GRANULOCYTES NFR BLD AUTO: 12.3 % — HIGH (ref 0–1.5)
IMM GRANULOCYTES NFR BLD AUTO: 13.6 % — HIGH (ref 0–1.5)
IMM GRANULOCYTES NFR BLD AUTO: 19.5 % — HIGH (ref 0–1.5)
IMM GRANULOCYTES NFR BLD AUTO: 2.1 % — HIGH (ref 0–1.5)
IMM GRANULOCYTES NFR BLD AUTO: 2.8 % — HIGH (ref 0–1.5)
IMM GRANULOCYTES NFR BLD AUTO: 2.9 % — HIGH (ref 0–1.5)
IMM GRANULOCYTES NFR BLD AUTO: 2.9 % — HIGH (ref 0–1.5)
IMM GRANULOCYTES NFR BLD AUTO: 3 % — HIGH (ref 0–1.5)
IMM GRANULOCYTES NFR BLD AUTO: 3.3 % — HIGH (ref 0–1.5)
IMM GRANULOCYTES NFR BLD AUTO: 4.2 % — HIGH (ref 0–1.5)
IMM GRANULOCYTES NFR BLD AUTO: 4.3 % — HIGH (ref 0–1.5)
IMM GRANULOCYTES NFR BLD AUTO: 4.4 % — HIGH (ref 0–1.5)
IMM GRANULOCYTES NFR BLD AUTO: 4.4 % — HIGH (ref 0–1.5)
IMM GRANULOCYTES NFR BLD AUTO: 4.6 % — HIGH (ref 0–1.5)
IMM GRANULOCYTES NFR BLD AUTO: 4.7 % — HIGH (ref 0–1.5)
IMM GRANULOCYTES NFR BLD AUTO: 4.8 % — HIGH (ref 0–1.5)
IMM GRANULOCYTES NFR BLD AUTO: 8.3 % — HIGH (ref 0–1.5)
IMM GRANULOCYTES NFR BLD AUTO: 9.1 % — HIGH (ref 0–1.5)
INR BLD: 0.96 RATIO — SIGNIFICANT CHANGE UP (ref 0.88–1.16)
INR BLD: 0.99 RATIO — SIGNIFICANT CHANGE UP (ref 0.88–1.16)
INR BLD: 1 RATIO — SIGNIFICANT CHANGE UP (ref 0.88–1.16)
INR BLD: 1.01 RATIO — SIGNIFICANT CHANGE UP (ref 0.88–1.16)
INR BLD: 1.02 RATIO — SIGNIFICANT CHANGE UP (ref 0.88–1.16)
INR BLD: 1.03 RATIO — SIGNIFICANT CHANGE UP (ref 0.88–1.16)
INR BLD: 1.07 RATIO — SIGNIFICANT CHANGE UP (ref 0.88–1.16)
INR BLD: 1.1 RATIO — SIGNIFICANT CHANGE UP (ref 0.88–1.16)
INR BLD: 1.12 RATIO — SIGNIFICANT CHANGE UP (ref 0.88–1.16)
INR BLD: 1.13 RATIO — SIGNIFICANT CHANGE UP (ref 0.88–1.16)
INR BLD: 1.13 RATIO — SIGNIFICANT CHANGE UP (ref 0.88–1.16)
INR BLD: 1.14 RATIO — SIGNIFICANT CHANGE UP (ref 0.88–1.16)
INR BLD: 1.17 RATIO — HIGH (ref 0.88–1.16)
INR BLD: 1.18 RATIO — HIGH (ref 0.88–1.16)
INR BLD: 1.19 RATIO — HIGH (ref 0.88–1.16)
INR BLD: 1.22 RATIO — HIGH (ref 0.88–1.16)
INR BLD: 1.24 RATIO — HIGH (ref 0.88–1.16)
INR BLD: 1.25 RATIO — HIGH (ref 0.88–1.16)
INR BLD: 1.28 RATIO — HIGH (ref 0.88–1.16)
INR BLD: 1.28 RATIO — HIGH (ref 0.88–1.16)
INR BLD: 1.3 RATIO — HIGH (ref 0.88–1.16)
INR BLD: 1.32 RATIO — HIGH (ref 0.88–1.16)
INR BLD: 1.33 RATIO — HIGH (ref 0.88–1.16)
INR BLD: 1.34 RATIO — HIGH (ref 0.88–1.16)
KETONES UR-MCNC: ABNORMAL
KETONES UR-MCNC: ABNORMAL
KETONES UR-MCNC: NEGATIVE — SIGNIFICANT CHANGE UP
LABORATORY COMMENT REPORT: SIGNIFICANT CHANGE UP
LACTATE BLDV-MCNC: 1.6 MMOL/L — SIGNIFICANT CHANGE UP (ref 0.5–2)
LACTATE BLDV-MCNC: 2.2 MMOL/L — HIGH (ref 0.5–2)
LACTATE BLDV-MCNC: 2.2 MMOL/L — HIGH (ref 0.5–2)
LACTATE BLDV-MCNC: 2.5 MMOL/L — HIGH (ref 0.5–2)
LACTATE BLDV-MCNC: 2.8 MMOL/L — HIGH (ref 0.5–2)
LACTATE BLDV-MCNC: 3.4 MMOL/L — HIGH (ref 0.5–2)
LACTATE SERPL-SCNC: 1 MMOL/L — SIGNIFICANT CHANGE UP (ref 0.5–2)
LACTATE SERPL-SCNC: 1.9 MMOL/L — SIGNIFICANT CHANGE UP (ref 0.5–2)
LACTATE SERPL-SCNC: 2.4 MMOL/L — HIGH (ref 0.5–2)
LACTATE SERPL-SCNC: 2.4 MMOL/L — HIGH (ref 0.5–2)
LDH CSF L TO P-CCNC: 25 U/L — SIGNIFICANT CHANGE UP
LDH FLD-CCNC: 25 U/L — SIGNIFICANT CHANGE UP
LDH SERPL L TO P-CCNC: 1024 U/L — HIGH (ref 135–225)
LDH SERPL L TO P-CCNC: 1024 U/L — HIGH (ref 135–225)
LDH SERPL L TO P-CCNC: 1061 U/L — HIGH (ref 135–225)
LDH SERPL L TO P-CCNC: 1064 U/L — HIGH (ref 135–225)
LDH SERPL L TO P-CCNC: 1084 U/L — HIGH (ref 135–225)
LDH SERPL L TO P-CCNC: 1088 U/L — HIGH (ref 135–225)
LDH SERPL L TO P-CCNC: 1097 U/L — HIGH (ref 135–225)
LDH SERPL L TO P-CCNC: 1098 U/L — HIGH (ref 135–225)
LDH SERPL L TO P-CCNC: 1145 U/L — HIGH (ref 135–225)
LDH SERPL L TO P-CCNC: 382 U/L — HIGH (ref 135–225)
LDH SERPL L TO P-CCNC: 395 U/L — HIGH (ref 135–225)
LDH SERPL L TO P-CCNC: 395 U/L — HIGH (ref 135–225)
LDH SERPL L TO P-CCNC: 408 U/L — HIGH (ref 135–225)
LDH SERPL L TO P-CCNC: 466 U/L — HIGH (ref 135–225)
LDH SERPL L TO P-CCNC: 487 U/L — HIGH (ref 135–225)
LDH SERPL L TO P-CCNC: 491 U/L — HIGH (ref 135–225)
LDH SERPL L TO P-CCNC: 549 U/L — HIGH (ref 135–225)
LDH SERPL L TO P-CCNC: 572 U/L — HIGH (ref 135–225)
LDH SERPL L TO P-CCNC: 572 U/L — HIGH (ref 135–225)
LDH SERPL L TO P-CCNC: 588 U/L — HIGH (ref 135–225)
LDH SERPL L TO P-CCNC: 590 U/L — HIGH (ref 135–225)
LDH SERPL L TO P-CCNC: 601 U/L — HIGH (ref 135–225)
LDH SERPL L TO P-CCNC: 631 U/L — HIGH (ref 135–225)
LDH SERPL L TO P-CCNC: 647 U/L — HIGH (ref 135–225)
LDH SERPL L TO P-CCNC: 654 U/L — HIGH (ref 135–225)
LDH SERPL L TO P-CCNC: 664 U/L — HIGH (ref 135–225)
LDH SERPL L TO P-CCNC: 674 U/L — HIGH (ref 135–225)
LDH SERPL L TO P-CCNC: 677 U/L — HIGH (ref 135–225)
LDH SERPL L TO P-CCNC: 677 U/L — HIGH (ref 135–225)
LDH SERPL L TO P-CCNC: 687 U/L — HIGH (ref 135–225)
LDH SERPL L TO P-CCNC: 689 U/L — SIGNIFICANT CHANGE UP
LDH SERPL L TO P-CCNC: 692 U/L — HIGH (ref 135–225)
LDH SERPL L TO P-CCNC: 702 U/L — HIGH (ref 135–225)
LDH SERPL L TO P-CCNC: 702 U/L — SIGNIFICANT CHANGE UP
LDH SERPL L TO P-CCNC: 703 U/L — HIGH (ref 135–225)
LDH SERPL L TO P-CCNC: 734 U/L — HIGH (ref 135–225)
LDH SERPL L TO P-CCNC: 760 U/L — HIGH (ref 135–225)
LDH SERPL L TO P-CCNC: 777 U/L — HIGH (ref 135–225)
LDH SERPL L TO P-CCNC: 780 U/L — HIGH (ref 135–225)
LDH SERPL L TO P-CCNC: 819 U/L — HIGH (ref 135–225)
LDH SERPL L TO P-CCNC: 827 U/L — HIGH (ref 135–225)
LDH SERPL L TO P-CCNC: 830 U/L — HIGH (ref 135–225)
LDH SERPL L TO P-CCNC: 837 U/L — HIGH (ref 135–225)
LDH SERPL L TO P-CCNC: 838 U/L — HIGH (ref 135–225)
LDH SERPL L TO P-CCNC: 846 U/L — SIGNIFICANT CHANGE UP
LDH SERPL L TO P-CCNC: 849 U/L — HIGH (ref 135–225)
LDH SERPL L TO P-CCNC: 851 U/L — HIGH (ref 135–225)
LDH SERPL L TO P-CCNC: 860 U/L — HIGH (ref 135–225)
LDH SERPL L TO P-CCNC: 864 U/L — HIGH (ref 135–225)
LDH SERPL L TO P-CCNC: 869 U/L — HIGH (ref 135–225)
LDH SERPL L TO P-CCNC: 870 U/L — HIGH (ref 135–225)
LDH SERPL L TO P-CCNC: 871 U/L — HIGH (ref 135–225)
LDH SERPL L TO P-CCNC: 875 U/L — HIGH (ref 135–225)
LDH SERPL L TO P-CCNC: 876 U/L — HIGH (ref 135–225)
LDH SERPL L TO P-CCNC: 888 U/L — HIGH (ref 135–225)
LDH SERPL L TO P-CCNC: 898 U/L — HIGH (ref 135–225)
LDH SERPL L TO P-CCNC: 898 U/L — HIGH (ref 135–225)
LDH SERPL L TO P-CCNC: 906 U/L — HIGH (ref 135–225)
LDH SERPL L TO P-CCNC: 912 U/L — HIGH (ref 135–225)
LDH SERPL L TO P-CCNC: 923 U/L — HIGH (ref 135–225)
LDH SERPL L TO P-CCNC: 927 U/L — HIGH (ref 135–225)
LDH SERPL L TO P-CCNC: 932 U/L — HIGH (ref 135–225)
LDH SERPL L TO P-CCNC: 943 U/L — HIGH (ref 135–225)
LDH SERPL L TO P-CCNC: 948 U/L — HIGH (ref 135–225)
LDH SERPL L TO P-CCNC: 969 U/L — HIGH (ref 135–225)
LEUKOCYTE ESTERASE UR-ACNC: ABNORMAL
LIDOCAIN IGE QN: 104 U/L — HIGH (ref 7–60)
LYMPHOCYTES # BLD AUTO: 0 % — LOW (ref 13–44)
LYMPHOCYTES # BLD AUTO: 0 K/UL — LOW (ref 1–3.3)
LYMPHOCYTES # BLD AUTO: 0.03 K/UL — LOW (ref 1–3.3)
LYMPHOCYTES # BLD AUTO: 0.04 K/UL — LOW (ref 1–3.3)
LYMPHOCYTES # BLD AUTO: 0.04 K/UL — SIGNIFICANT CHANGE UP (ref 1–3.3)
LYMPHOCYTES # BLD AUTO: 0.05 K/UL — LOW (ref 1–3.3)
LYMPHOCYTES # BLD AUTO: 0.06 K/UL — LOW (ref 1–3.3)
LYMPHOCYTES # BLD AUTO: 0.08 K/UL — LOW (ref 1–3.3)
LYMPHOCYTES # BLD AUTO: 0.1 K/UL — LOW (ref 1–3.3)
LYMPHOCYTES # BLD AUTO: 0.11 K/UL — LOW (ref 1–3.3)
LYMPHOCYTES # BLD AUTO: 0.11 K/UL — LOW (ref 1–3.3)
LYMPHOCYTES # BLD AUTO: 0.13 K/UL — LOW (ref 1–3.3)
LYMPHOCYTES # BLD AUTO: 0.14 K/UL — LOW (ref 1–3.3)
LYMPHOCYTES # BLD AUTO: 0.15 K/UL — LOW (ref 1–3.3)
LYMPHOCYTES # BLD AUTO: 0.22 K/UL — LOW (ref 1–3.3)
LYMPHOCYTES # BLD AUTO: 0.25 K/UL — LOW (ref 1–3.3)
LYMPHOCYTES # BLD AUTO: 0.32 K/UL — LOW (ref 1–3.3)
LYMPHOCYTES # BLD AUTO: 0.37 K/UL — LOW (ref 1–3.3)
LYMPHOCYTES # BLD AUTO: 0.37 K/UL — LOW (ref 1–3.3)
LYMPHOCYTES # BLD AUTO: 0.38 K/UL — LOW (ref 1–3.3)
LYMPHOCYTES # BLD AUTO: 0.4 K/UL — LOW (ref 1–3.3)
LYMPHOCYTES # BLD AUTO: 0.45 K/UL — LOW (ref 1–3.3)
LYMPHOCYTES # BLD AUTO: 0.46 K/UL — LOW (ref 1–3.3)
LYMPHOCYTES # BLD AUTO: 0.5 K/UL — LOW (ref 1–3.3)
LYMPHOCYTES # BLD AUTO: 0.65 K/UL — LOW (ref 1–3.3)
LYMPHOCYTES # BLD AUTO: 0.66 K/UL — LOW (ref 1–3.3)
LYMPHOCYTES # BLD AUTO: 0.68 K/UL — LOW (ref 1–3.3)
LYMPHOCYTES # BLD AUTO: 0.7 K/UL — LOW (ref 1–3.3)
LYMPHOCYTES # BLD AUTO: 0.71 K/UL — LOW (ref 1–3.3)
LYMPHOCYTES # BLD AUTO: 0.73 K/UL — LOW (ref 1–3.3)
LYMPHOCYTES # BLD AUTO: 0.75 K/UL — LOW (ref 1–3.3)
LYMPHOCYTES # BLD AUTO: 0.78 K/UL — LOW (ref 1–3.3)
LYMPHOCYTES # BLD AUTO: 0.79 K/UL — LOW (ref 1–3.3)
LYMPHOCYTES # BLD AUTO: 0.8 % — LOW (ref 13–44)
LYMPHOCYTES # BLD AUTO: 0.8 K/UL — LOW (ref 1–3.3)
LYMPHOCYTES # BLD AUTO: 0.81 K/UL — LOW (ref 1–3.3)
LYMPHOCYTES # BLD AUTO: 0.87 K/UL — LOW (ref 1–3.3)
LYMPHOCYTES # BLD AUTO: 0.88 K/UL — LOW (ref 1–3.3)
LYMPHOCYTES # BLD AUTO: 0.9 % — LOW (ref 13–44)
LYMPHOCYTES # BLD AUTO: 0.93 K/UL — LOW (ref 1–3.3)
LYMPHOCYTES # BLD AUTO: 0.94 K/UL — LOW (ref 1–3.3)
LYMPHOCYTES # BLD AUTO: 0.99 K/UL — LOW (ref 1–3.3)
LYMPHOCYTES # BLD AUTO: 1 % — LOW (ref 13–44)
LYMPHOCYTES # BLD AUTO: 1.02 K/UL — SIGNIFICANT CHANGE UP (ref 1–3.3)
LYMPHOCYTES # BLD AUTO: 1.14 K/UL — SIGNIFICANT CHANGE UP (ref 1–3.3)
LYMPHOCYTES # BLD AUTO: 1.2 % — LOW (ref 13–44)
LYMPHOCYTES # BLD AUTO: 1.3 % — LOW (ref 13–44)
LYMPHOCYTES # BLD AUTO: 1.7 % — LOW (ref 13–44)
LYMPHOCYTES # BLD AUTO: 100 % — HIGH (ref 13–44)
LYMPHOCYTES # BLD AUTO: 2 % — LOW (ref 13–44)
LYMPHOCYTES # BLD AUTO: 2.2 % — LOW (ref 13–44)
LYMPHOCYTES # BLD AUTO: 2.5 % — LOW (ref 13–44)
LYMPHOCYTES # BLD AUTO: 2.9 % — LOW (ref 13–44)
LYMPHOCYTES # BLD AUTO: 3.1 % — LOW (ref 13–44)
LYMPHOCYTES # BLD AUTO: 3.1 % — LOW (ref 13–44)
LYMPHOCYTES # BLD AUTO: 3.5 % — LOW (ref 13–44)
LYMPHOCYTES # BLD AUTO: 3.6 % — LOW (ref 13–44)
LYMPHOCYTES # BLD AUTO: 3.9 % — LOW (ref 13–44)
LYMPHOCYTES # BLD AUTO: 4.1 % — LOW (ref 13–44)
LYMPHOCYTES # BLD AUTO: 4.3 % — LOW (ref 13–44)
LYMPHOCYTES # BLD AUTO: 4.3 % — LOW (ref 13–44)
LYMPHOCYTES # BLD AUTO: 4.4 % — LOW (ref 13–44)
LYMPHOCYTES # BLD AUTO: 4.4 % — LOW (ref 13–44)
LYMPHOCYTES # BLD AUTO: 4.6 % — LOW (ref 13–44)
LYMPHOCYTES # BLD AUTO: 4.6 % — LOW (ref 13–44)
LYMPHOCYTES # BLD AUTO: 4.7 % — LOW (ref 13–44)
LYMPHOCYTES # BLD AUTO: 4.7 % — LOW (ref 13–44)
LYMPHOCYTES # BLD AUTO: 4.8 % — LOW (ref 13–44)
LYMPHOCYTES # BLD AUTO: 4.8 % — LOW (ref 13–44)
LYMPHOCYTES # BLD AUTO: 4.9 % — LOW (ref 13–44)
LYMPHOCYTES # BLD AUTO: 5.2 % — LOW (ref 13–44)
LYMPHOCYTES # BLD AUTO: 5.3 % — LOW (ref 13–44)
LYMPHOCYTES # BLD AUTO: 5.4 % — LOW (ref 13–44)
LYMPHOCYTES # BLD AUTO: 5.6 % — LOW (ref 13–44)
LYMPHOCYTES # BLD AUTO: 5.6 % — LOW (ref 13–44)
LYMPHOCYTES # BLD AUTO: 5.9 % — LOW (ref 13–44)
LYMPHOCYTES # BLD AUTO: 50 % — HIGH (ref 13–44)
LYMPHOCYTES # BLD AUTO: 6.8 % — LOW (ref 13–44)
LYMPHOCYTES # BLD AUTO: 60 % — HIGH (ref 13–44)
LYMPHOCYTES # BLD AUTO: 62.5 % — HIGH (ref 13–44)
LYMPHOCYTES # BLD AUTO: 66.7 % — HIGH (ref 13–44)
LYMPHOCYTES # BLD AUTO: 66.7 % — SIGNIFICANT CHANGE UP (ref 13–44)
LYMPHOCYTES # BLD AUTO: 7.6 % — LOW (ref 13–44)
LYMPHOCYTES # BLD AUTO: 71.4 % — HIGH (ref 13–44)
LYMPHOCYTES # BLD AUTO: 72.7 % — HIGH (ref 13–44)
LYMPHOCYTES # BLD AUTO: 75 % — HIGH (ref 13–44)
LYMPHOCYTES # BLD AUTO: 75 % — HIGH (ref 13–44)
LYMPHOCYTES # BLD AUTO: 8 % — LOW (ref 13–44)
LYMPHOCYTES # BLD AUTO: 80 % — HIGH (ref 13–44)
LYMPHOCYTES # BLD AUTO: 83.3 % — HIGH (ref 13–44)
LYMPHOCYTES # BLD AUTO: 83.3 % — HIGH (ref 13–44)
LYMPHOCYTES # BLD AUTO: 9.8 % — LOW (ref 13–44)
LYMPHOCYTES # CSF: 77 % — SIGNIFICANT CHANGE UP
LYMPHOCYTES # FLD: 0 % — SIGNIFICANT CHANGE UP
LYMPHOCYTES # FLD: 17 % — SIGNIFICANT CHANGE UP
LYMPHOCYTES # FLD: 23 % — SIGNIFICANT CHANGE UP
LYMPHOCYTES # FLD: 84 % — SIGNIFICANT CHANGE UP
M PNEUMO DNA SPEC QL NAA+PROBE: SIGNIFICANT CHANGE UP
M TB IFN-G BLD-IMP: ABNORMAL
M TB IFN-G BLD-IMP: ABNORMAL
M TB IFN-G CD4+ BCKGRND COR BLD-ACNC: -0.01 IU/ML — SIGNIFICANT CHANGE UP
M TB IFN-G CD4+ BCKGRND COR BLD-ACNC: 0 IU/ML — SIGNIFICANT CHANGE UP
M TB IFN-G CD4+CD8+ BCKGRND COR BLD-ACNC: -0.01 IU/ML — SIGNIFICANT CHANGE UP
M TB IFN-G CD4+CD8+ BCKGRND COR BLD-ACNC: 0 IU/ML — SIGNIFICANT CHANGE UP
MAGNESIUM SERPL-MCNC: 1.5 MG/DL — LOW (ref 1.6–2.6)
MAGNESIUM SERPL-MCNC: 1.6 MG/DL — SIGNIFICANT CHANGE UP (ref 1.6–2.6)
MAGNESIUM SERPL-MCNC: 1.7 MG/DL — SIGNIFICANT CHANGE UP (ref 1.6–2.6)
MAGNESIUM SERPL-MCNC: 1.8 MG/DL — SIGNIFICANT CHANGE UP (ref 1.6–2.6)
MAGNESIUM SERPL-MCNC: 1.8 MG/DL — SIGNIFICANT CHANGE UP (ref 1.6–2.6)
MAGNESIUM SERPL-MCNC: 1.9 MG/DL — SIGNIFICANT CHANGE UP (ref 1.6–2.6)
MAGNESIUM SERPL-MCNC: 2 MG/DL — SIGNIFICANT CHANGE UP (ref 1.6–2.6)
MAGNESIUM SERPL-MCNC: 2.1 MG/DL — SIGNIFICANT CHANGE UP (ref 1.6–2.6)
MAGNESIUM SERPL-MCNC: 2.2 MG/DL — SIGNIFICANT CHANGE UP (ref 1.6–2.6)
MAGNESIUM SERPL-MCNC: 2.3 MG/DL — SIGNIFICANT CHANGE UP (ref 1.6–2.6)
MAGNESIUM SERPL-MCNC: 2.4 MG/DL — SIGNIFICANT CHANGE UP (ref 1.6–2.6)
MAGNESIUM SERPL-MCNC: 2.5 MG/DL — SIGNIFICANT CHANGE UP (ref 1.6–2.6)
MAGNESIUM SERPL-MCNC: 2.5 MG/DL — SIGNIFICANT CHANGE UP (ref 1.6–2.6)
MAGNESIUM SERPL-MCNC: 2.7 MG/DL — HIGH (ref 1.6–2.6)
MAGNESIUM SERPL-MCNC: 3 MG/DL — HIGH (ref 1.6–2.6)
MANUAL SMEAR VERIFICATION: SIGNIFICANT CHANGE UP
MCHC RBC-ENTMCNC: 24 PG — LOW (ref 27–34)
MCHC RBC-ENTMCNC: 24.2 PG — LOW (ref 27–34)
MCHC RBC-ENTMCNC: 24.3 PG — LOW (ref 27–34)
MCHC RBC-ENTMCNC: 24.4 PG — LOW (ref 27–34)
MCHC RBC-ENTMCNC: 24.5 PG — LOW (ref 27–34)
MCHC RBC-ENTMCNC: 24.6 PG — LOW (ref 27–34)
MCHC RBC-ENTMCNC: 24.7 PG — LOW (ref 27–34)
MCHC RBC-ENTMCNC: 24.8 PG — LOW (ref 27–34)
MCHC RBC-ENTMCNC: 24.8 PG — LOW (ref 27–34)
MCHC RBC-ENTMCNC: 24.9 PG — LOW (ref 27–34)
MCHC RBC-ENTMCNC: 25 PG — LOW (ref 27–34)
MCHC RBC-ENTMCNC: 25 PG — LOW (ref 27–34)
MCHC RBC-ENTMCNC: 25.1 PG — LOW (ref 27–34)
MCHC RBC-ENTMCNC: 25.2 PG — LOW (ref 27–34)
MCHC RBC-ENTMCNC: 25.3 PG — LOW (ref 27–34)
MCHC RBC-ENTMCNC: 25.6 PG — LOW (ref 27–34)
MCHC RBC-ENTMCNC: 25.7 PG — LOW (ref 27–34)
MCHC RBC-ENTMCNC: 25.7 PG — LOW (ref 27–34)
MCHC RBC-ENTMCNC: 25.9 PG — LOW (ref 27–34)
MCHC RBC-ENTMCNC: 26 PG — LOW (ref 27–34)
MCHC RBC-ENTMCNC: 26 PG — LOW (ref 27–34)
MCHC RBC-ENTMCNC: 26.2 PG — LOW (ref 27–34)
MCHC RBC-ENTMCNC: 26.3 PG — LOW (ref 27–34)
MCHC RBC-ENTMCNC: 26.3 PG — LOW (ref 27–34)
MCHC RBC-ENTMCNC: 26.4 PG — LOW (ref 27–34)
MCHC RBC-ENTMCNC: 26.5 PG — LOW (ref 27–34)
MCHC RBC-ENTMCNC: 26.6 PG — LOW (ref 27–34)
MCHC RBC-ENTMCNC: 26.7 PG — LOW (ref 27–34)
MCHC RBC-ENTMCNC: 26.8 PG — LOW (ref 27–34)
MCHC RBC-ENTMCNC: 26.9 PG — LOW (ref 27–34)
MCHC RBC-ENTMCNC: 26.9 PG — LOW (ref 27–34)
MCHC RBC-ENTMCNC: 27 PG — SIGNIFICANT CHANGE UP (ref 27–34)
MCHC RBC-ENTMCNC: 27.1 PG — SIGNIFICANT CHANGE UP (ref 27–34)
MCHC RBC-ENTMCNC: 27.3 PG — SIGNIFICANT CHANGE UP (ref 27–34)
MCHC RBC-ENTMCNC: 27.3 PG — SIGNIFICANT CHANGE UP (ref 27–34)
MCHC RBC-ENTMCNC: 28 PG — SIGNIFICANT CHANGE UP (ref 27–34)
MCHC RBC-ENTMCNC: 28 PG — SIGNIFICANT CHANGE UP (ref 27–34)
MCHC RBC-ENTMCNC: 28.4 PG — SIGNIFICANT CHANGE UP (ref 27–34)
MCHC RBC-ENTMCNC: 30.2 GM/DL — LOW (ref 32–36)
MCHC RBC-ENTMCNC: 30.4 GM/DL — LOW (ref 32–36)
MCHC RBC-ENTMCNC: 30.8 GM/DL — LOW (ref 32–36)
MCHC RBC-ENTMCNC: 30.8 GM/DL — LOW (ref 32–36)
MCHC RBC-ENTMCNC: 31 GM/DL — LOW (ref 32–36)
MCHC RBC-ENTMCNC: 31 GM/DL — LOW (ref 32–36)
MCHC RBC-ENTMCNC: 31.1 GM/DL — LOW (ref 32–36)
MCHC RBC-ENTMCNC: 31.1 GM/DL — LOW (ref 32–36)
MCHC RBC-ENTMCNC: 31.2 GM/DL — LOW (ref 32–36)
MCHC RBC-ENTMCNC: 31.3 GM/DL — LOW (ref 32–36)
MCHC RBC-ENTMCNC: 31.3 GM/DL — LOW (ref 32–36)
MCHC RBC-ENTMCNC: 31.4 GM/DL — LOW (ref 32–36)
MCHC RBC-ENTMCNC: 31.5 GM/DL — LOW (ref 32–36)
MCHC RBC-ENTMCNC: 31.5 GM/DL — LOW (ref 32–36)
MCHC RBC-ENTMCNC: 31.6 GM/DL — LOW (ref 32–36)
MCHC RBC-ENTMCNC: 31.7 GM/DL — LOW (ref 32–36)
MCHC RBC-ENTMCNC: 31.7 GM/DL — LOW (ref 32–36)
MCHC RBC-ENTMCNC: 31.8 GM/DL — LOW (ref 32–36)
MCHC RBC-ENTMCNC: 31.9 GM/DL — LOW (ref 32–36)
MCHC RBC-ENTMCNC: 32.2 GM/DL — SIGNIFICANT CHANGE UP (ref 32–36)
MCHC RBC-ENTMCNC: 32.4 GM/DL — SIGNIFICANT CHANGE UP (ref 32–36)
MCHC RBC-ENTMCNC: 32.5 GM/DL — SIGNIFICANT CHANGE UP (ref 32–36)
MCHC RBC-ENTMCNC: 32.6 GM/DL — SIGNIFICANT CHANGE UP (ref 32–36)
MCHC RBC-ENTMCNC: 32.6 GM/DL — SIGNIFICANT CHANGE UP (ref 32–36)
MCHC RBC-ENTMCNC: 33.1 GM/DL — SIGNIFICANT CHANGE UP (ref 32–36)
MCHC RBC-ENTMCNC: 33.2 GM/DL — SIGNIFICANT CHANGE UP (ref 32–36)
MCHC RBC-ENTMCNC: 33.7 GM/DL — SIGNIFICANT CHANGE UP (ref 32–36)
MCHC RBC-ENTMCNC: 33.8 GM/DL — SIGNIFICANT CHANGE UP (ref 32–36)
MCHC RBC-ENTMCNC: 33.9 GM/DL — SIGNIFICANT CHANGE UP (ref 32–36)
MCHC RBC-ENTMCNC: 34.2 GM/DL — SIGNIFICANT CHANGE UP (ref 32–36)
MCHC RBC-ENTMCNC: 34.6 GM/DL — SIGNIFICANT CHANGE UP (ref 32–36)
MCHC RBC-ENTMCNC: 34.6 GM/DL — SIGNIFICANT CHANGE UP (ref 32–36)
MCHC RBC-ENTMCNC: 34.7 GM/DL — SIGNIFICANT CHANGE UP (ref 32–36)
MCHC RBC-ENTMCNC: 34.9 GM/DL — SIGNIFICANT CHANGE UP (ref 32–36)
MCHC RBC-ENTMCNC: 35 GM/DL — SIGNIFICANT CHANGE UP (ref 32–36)
MCHC RBC-ENTMCNC: 35.3 GM/DL — SIGNIFICANT CHANGE UP (ref 32–36)
MCHC RBC-ENTMCNC: 35.3 GM/DL — SIGNIFICANT CHANGE UP (ref 32–36)
MCHC RBC-ENTMCNC: 35.4 GM/DL — SIGNIFICANT CHANGE UP (ref 32–36)
MCHC RBC-ENTMCNC: 35.8 GM/DL — SIGNIFICANT CHANGE UP (ref 32–36)
MCHC RBC-ENTMCNC: 35.9 GM/DL — SIGNIFICANT CHANGE UP (ref 32–36)
MCHC RBC-ENTMCNC: 36.2 GM/DL — HIGH (ref 32–36)
MCHC RBC-ENTMCNC: 36.3 GM/DL — HIGH (ref 32–36)
MCHC RBC-ENTMCNC: 36.3 GM/DL — HIGH (ref 32–36)
MCHC RBC-ENTMCNC: 36.4 GM/DL — HIGH (ref 32–36)
MCHC RBC-ENTMCNC: 36.4 GM/DL — HIGH (ref 32–36)
MCHC RBC-ENTMCNC: 36.7 GM/DL — HIGH (ref 32–36)
MCHC RBC-ENTMCNC: 36.8 GM/DL — HIGH (ref 32–36)
MCHC RBC-ENTMCNC: 36.8 GM/DL — HIGH (ref 32–36)
MCHC RBC-ENTMCNC: 37 GM/DL — HIGH (ref 32–36)
MCV RBC AUTO: 72.7 FL — LOW (ref 80–100)
MCV RBC AUTO: 73 FL — LOW (ref 80–100)
MCV RBC AUTO: 73.1 FL — LOW (ref 80–100)
MCV RBC AUTO: 73.1 FL — LOW (ref 80–100)
MCV RBC AUTO: 73.3 FL — LOW (ref 80–100)
MCV RBC AUTO: 73.3 FL — LOW (ref 80–100)
MCV RBC AUTO: 73.9 FL — LOW (ref 80–100)
MCV RBC AUTO: 74.4 FL — LOW (ref 80–100)
MCV RBC AUTO: 74.6 FL — LOW (ref 80–100)
MCV RBC AUTO: 74.7 FL — LOW (ref 80–100)
MCV RBC AUTO: 74.8 FL — LOW (ref 80–100)
MCV RBC AUTO: 74.8 FL — LOW (ref 80–100)
MCV RBC AUTO: 75 FL — LOW (ref 80–100)
MCV RBC AUTO: 75.2 FL — LOW (ref 80–100)
MCV RBC AUTO: 75.2 FL — LOW (ref 80–100)
MCV RBC AUTO: 75.4 FL — LOW (ref 80–100)
MCV RBC AUTO: 75.5 FL — LOW (ref 80–100)
MCV RBC AUTO: 75.6 FL — LOW (ref 80–100)
MCV RBC AUTO: 76.3 FL — LOW (ref 80–100)
MCV RBC AUTO: 76.5 FL — LOW (ref 80–100)
MCV RBC AUTO: 76.6 FL — LOW (ref 80–100)
MCV RBC AUTO: 76.7 FL — LOW (ref 80–100)
MCV RBC AUTO: 76.9 FL — LOW (ref 80–100)
MCV RBC AUTO: 77 FL — LOW (ref 80–100)
MCV RBC AUTO: 77 FL — LOW (ref 80–100)
MCV RBC AUTO: 77.1 FL — LOW (ref 80–100)
MCV RBC AUTO: 77.1 FL — LOW (ref 80–100)
MCV RBC AUTO: 77.3 FL — LOW (ref 80–100)
MCV RBC AUTO: 77.5 FL — LOW (ref 80–100)
MCV RBC AUTO: 77.6 FL — LOW (ref 80–100)
MCV RBC AUTO: 77.6 FL — LOW (ref 80–100)
MCV RBC AUTO: 77.7 FL — LOW (ref 80–100)
MCV RBC AUTO: 77.7 FL — LOW (ref 80–100)
MCV RBC AUTO: 77.8 FL — LOW (ref 80–100)
MCV RBC AUTO: 77.9 FL — LOW (ref 80–100)
MCV RBC AUTO: 78 FL — LOW (ref 80–100)
MCV RBC AUTO: 78.1 FL — LOW (ref 80–100)
MCV RBC AUTO: 78.1 FL — LOW (ref 80–100)
MCV RBC AUTO: 78.3 FL — LOW (ref 80–100)
MCV RBC AUTO: 78.4 FL — LOW (ref 80–100)
MCV RBC AUTO: 78.5 FL — LOW (ref 80–100)
MCV RBC AUTO: 78.6 FL — LOW (ref 80–100)
MCV RBC AUTO: 78.8 FL — LOW (ref 80–100)
MCV RBC AUTO: 78.9 FL — LOW (ref 80–100)
MCV RBC AUTO: 79.1 FL — LOW (ref 80–100)
MCV RBC AUTO: 79.1 FL — LOW (ref 80–100)
MCV RBC AUTO: 79.2 FL — LOW (ref 80–100)
MCV RBC AUTO: 79.3 FL — LOW (ref 80–100)
MCV RBC AUTO: 79.4 FL — LOW (ref 80–100)
MCV RBC AUTO: 79.7 FL — LOW (ref 80–100)
MCV RBC AUTO: 79.8 FL — LOW (ref 80–100)
MCV RBC AUTO: 80.1 FL — SIGNIFICANT CHANGE UP (ref 80–100)
MCV RBC AUTO: 80.2 FL — SIGNIFICANT CHANGE UP (ref 80–100)
MCV RBC AUTO: 80.3 FL — SIGNIFICANT CHANGE UP (ref 80–100)
MCV RBC AUTO: 80.5 FL — SIGNIFICANT CHANGE UP (ref 80–100)
MCV RBC AUTO: 80.5 FL — SIGNIFICANT CHANGE UP (ref 80–100)
MCV RBC AUTO: 80.9 FL — SIGNIFICANT CHANGE UP (ref 80–100)
MCV RBC AUTO: 80.9 FL — SIGNIFICANT CHANGE UP (ref 80–100)
MCV RBC AUTO: 81.4 FL — SIGNIFICANT CHANGE UP (ref 80–100)
MCV RBC AUTO: 81.8 FL — SIGNIFICANT CHANGE UP (ref 80–100)
MCV RBC AUTO: 82.9 FL — SIGNIFICANT CHANGE UP (ref 80–100)
MCV RBC AUTO: 83.3 FL — SIGNIFICANT CHANGE UP (ref 80–100)
MESOTHL CELL # FLD: 0 % — SIGNIFICANT CHANGE UP
METHOD TYPE: SIGNIFICANT CHANGE UP
METHOD TYPE: SIGNIFICANT CHANGE UP
MICROCYTES BLD QL: SLIGHT — SIGNIFICANT CHANGE UP
MICROCYTES BLD QL: SLIGHT — SIGNIFICANT CHANGE UP
MONOCYTES # BLD AUTO: 0 K/UL — SIGNIFICANT CHANGE UP (ref 0–0.9)
MONOCYTES # BLD AUTO: 0.01 K/UL — SIGNIFICANT CHANGE UP (ref 0–0.9)
MONOCYTES # BLD AUTO: 0.02 K/UL — SIGNIFICANT CHANGE UP (ref 0–0.9)
MONOCYTES # BLD AUTO: 0.03 K/UL — SIGNIFICANT CHANGE UP (ref 0–0.9)
MONOCYTES # BLD AUTO: 0.04 K/UL — SIGNIFICANT CHANGE UP (ref 0–0.9)
MONOCYTES # BLD AUTO: 0.1 K/UL — SIGNIFICANT CHANGE UP (ref 0–0.9)
MONOCYTES # BLD AUTO: 0.12 K/UL — SIGNIFICANT CHANGE UP (ref 0–0.9)
MONOCYTES # BLD AUTO: 0.12 K/UL — SIGNIFICANT CHANGE UP (ref 0–0.9)
MONOCYTES # BLD AUTO: 0.13 K/UL — SIGNIFICANT CHANGE UP (ref 0–0.9)
MONOCYTES # BLD AUTO: 0.16 K/UL — SIGNIFICANT CHANGE UP (ref 0–0.9)
MONOCYTES # BLD AUTO: 0.19 K/UL — SIGNIFICANT CHANGE UP (ref 0–0.9)
MONOCYTES # BLD AUTO: 0.21 K/UL — SIGNIFICANT CHANGE UP (ref 0–0.9)
MONOCYTES # BLD AUTO: 0.22 K/UL — SIGNIFICANT CHANGE UP (ref 0–0.9)
MONOCYTES # BLD AUTO: 0.24 K/UL — SIGNIFICANT CHANGE UP (ref 0–0.9)
MONOCYTES # BLD AUTO: 0.25 K/UL — SIGNIFICANT CHANGE UP (ref 0–0.9)
MONOCYTES # BLD AUTO: 0.26 K/UL — SIGNIFICANT CHANGE UP (ref 0–0.9)
MONOCYTES # BLD AUTO: 0.27 K/UL — SIGNIFICANT CHANGE UP (ref 0–0.9)
MONOCYTES # BLD AUTO: 0.29 K/UL — SIGNIFICANT CHANGE UP (ref 0–0.9)
MONOCYTES # BLD AUTO: 0.29 K/UL — SIGNIFICANT CHANGE UP (ref 0–0.9)
MONOCYTES # BLD AUTO: 0.34 K/UL — SIGNIFICANT CHANGE UP (ref 0–0.9)
MONOCYTES # BLD AUTO: 0.37 K/UL — SIGNIFICANT CHANGE UP (ref 0–0.9)
MONOCYTES # BLD AUTO: 0.51 K/UL — SIGNIFICANT CHANGE UP (ref 0–0.9)
MONOCYTES # BLD AUTO: 0.67 K/UL — SIGNIFICANT CHANGE UP (ref 0–0.9)
MONOCYTES # BLD AUTO: 0.68 K/UL — SIGNIFICANT CHANGE UP (ref 0–0.9)
MONOCYTES # BLD AUTO: 0.73 K/UL — SIGNIFICANT CHANGE UP (ref 0–0.9)
MONOCYTES # BLD AUTO: 0.77 K/UL — SIGNIFICANT CHANGE UP (ref 0–0.9)
MONOCYTES # BLD AUTO: 0.78 K/UL — SIGNIFICANT CHANGE UP (ref 0–0.9)
MONOCYTES # BLD AUTO: 0.81 K/UL — SIGNIFICANT CHANGE UP (ref 0–0.9)
MONOCYTES # BLD AUTO: 0.85 K/UL — SIGNIFICANT CHANGE UP (ref 0–0.9)
MONOCYTES # BLD AUTO: 0.88 K/UL — SIGNIFICANT CHANGE UP (ref 0–0.9)
MONOCYTES # BLD AUTO: 0.9 K/UL — SIGNIFICANT CHANGE UP (ref 0–0.9)
MONOCYTES # BLD AUTO: 0.92 K/UL — HIGH (ref 0–0.9)
MONOCYTES # BLD AUTO: 0.98 K/UL — HIGH (ref 0–0.9)
MONOCYTES # BLD AUTO: 0.99 K/UL — HIGH (ref 0–0.9)
MONOCYTES # BLD AUTO: 1.11 K/UL — HIGH (ref 0–0.9)
MONOCYTES NFR BLD AUTO: 0 % — LOW (ref 2–14)
MONOCYTES NFR BLD AUTO: 0 % — SIGNIFICANT CHANGE UP (ref 2–14)
MONOCYTES NFR BLD AUTO: 0.1 % — LOW (ref 2–14)
MONOCYTES NFR BLD AUTO: 0.2 % — LOW (ref 2–14)
MONOCYTES NFR BLD AUTO: 0.2 % — LOW (ref 2–14)
MONOCYTES NFR BLD AUTO: 0.3 % — LOW (ref 2–14)
MONOCYTES NFR BLD AUTO: 0.4 % — LOW (ref 2–14)
MONOCYTES NFR BLD AUTO: 0.5 % — LOW (ref 2–14)
MONOCYTES NFR BLD AUTO: 0.9 % — LOW (ref 2–14)
MONOCYTES NFR BLD AUTO: 1 % — LOW (ref 2–14)
MONOCYTES NFR BLD AUTO: 1.1 % — LOW (ref 2–14)
MONOCYTES NFR BLD AUTO: 1.2 % — LOW (ref 2–14)
MONOCYTES NFR BLD AUTO: 1.8 % — LOW (ref 2–14)
MONOCYTES NFR BLD AUTO: 1.9 % — LOW (ref 2–14)
MONOCYTES NFR BLD AUTO: 1.9 % — LOW (ref 2–14)
MONOCYTES NFR BLD AUTO: 10 % — SIGNIFICANT CHANGE UP (ref 2–14)
MONOCYTES NFR BLD AUTO: 11.1 % — SIGNIFICANT CHANGE UP (ref 2–14)
MONOCYTES NFR BLD AUTO: 2 % — SIGNIFICANT CHANGE UP (ref 2–14)
MONOCYTES NFR BLD AUTO: 2.2 % — SIGNIFICANT CHANGE UP (ref 2–14)
MONOCYTES NFR BLD AUTO: 2.3 % — SIGNIFICANT CHANGE UP (ref 2–14)
MONOCYTES NFR BLD AUTO: 3 % — SIGNIFICANT CHANGE UP (ref 2–14)
MONOCYTES NFR BLD AUTO: 3.1 % — SIGNIFICANT CHANGE UP (ref 2–14)
MONOCYTES NFR BLD AUTO: 3.4 % — SIGNIFICANT CHANGE UP (ref 2–14)
MONOCYTES NFR BLD AUTO: 3.9 % — SIGNIFICANT CHANGE UP (ref 2–14)
MONOCYTES NFR BLD AUTO: 3.9 % — SIGNIFICANT CHANGE UP (ref 2–14)
MONOCYTES NFR BLD AUTO: 4.2 % — SIGNIFICANT CHANGE UP (ref 2–14)
MONOCYTES NFR BLD AUTO: 4.5 % — SIGNIFICANT CHANGE UP (ref 2–14)
MONOCYTES NFR BLD AUTO: 4.9 % — SIGNIFICANT CHANGE UP (ref 2–14)
MONOCYTES NFR BLD AUTO: 4.9 % — SIGNIFICANT CHANGE UP (ref 2–14)
MONOCYTES NFR BLD AUTO: 5.2 % — SIGNIFICANT CHANGE UP (ref 2–14)
MONOCYTES NFR BLD AUTO: 5.2 % — SIGNIFICANT CHANGE UP (ref 2–14)
MONOCYTES NFR BLD AUTO: 5.6 % — SIGNIFICANT CHANGE UP (ref 2–14)
MONOCYTES NFR BLD AUTO: 6.7 % — SIGNIFICANT CHANGE UP (ref 2–14)
MONOCYTES NFR BLD AUTO: 7.1 % — SIGNIFICANT CHANGE UP (ref 2–14)
MONOCYTES NFR BLD AUTO: 7.4 % — SIGNIFICANT CHANGE UP (ref 2–14)
MONOCYTES NFR BLD AUTO: 8 % — SIGNIFICANT CHANGE UP (ref 2–14)
MONOS+MACROS # FLD: 0 % — SIGNIFICANT CHANGE UP
MONOS+MACROS # FLD: 10 % — SIGNIFICANT CHANGE UP
MONOS+MACROS # FLD: 16 % — SIGNIFICANT CHANGE UP
MONOS+MACROS # FLD: 23 % — SIGNIFICANT CHANGE UP
MONOS+MACROS NFR CSF: 0 % — SIGNIFICANT CHANGE UP
MRSA PCR RESULT.: SIGNIFICANT CHANGE UP
MRSA PCR RESULT.: SIGNIFICANT CHANGE UP
NEUTROPHILS # BLD AUTO: 0 K/UL — LOW (ref 1.8–7.4)
NEUTROPHILS # BLD AUTO: 0.01 K/UL — LOW (ref 1.8–7.4)
NEUTROPHILS # BLD AUTO: 0.01 K/UL — LOW (ref 1.8–7.4)
NEUTROPHILS # BLD AUTO: 0.01 K/UL — SIGNIFICANT CHANGE UP (ref 1.8–7.4)
NEUTROPHILS # BLD AUTO: 0.02 K/UL — LOW (ref 1.8–7.4)
NEUTROPHILS # BLD AUTO: 0.06 K/UL — LOW (ref 1.8–7.4)
NEUTROPHILS # BLD AUTO: 0.53 K/UL — LOW (ref 1.8–7.4)
NEUTROPHILS # BLD AUTO: 1.54 K/UL — LOW (ref 1.8–7.4)
NEUTROPHILS # BLD AUTO: 10.61 K/UL — HIGH (ref 1.8–7.4)
NEUTROPHILS # BLD AUTO: 10.98 K/UL — HIGH (ref 1.8–7.4)
NEUTROPHILS # BLD AUTO: 11.41 K/UL — HIGH (ref 1.8–7.4)
NEUTROPHILS # BLD AUTO: 11.56 K/UL — HIGH (ref 1.8–7.4)
NEUTROPHILS # BLD AUTO: 11.64 K/UL — HIGH (ref 1.8–7.4)
NEUTROPHILS # BLD AUTO: 11.66 K/UL — HIGH (ref 1.8–7.4)
NEUTROPHILS # BLD AUTO: 11.92 K/UL — HIGH (ref 1.8–7.4)
NEUTROPHILS # BLD AUTO: 12.07 K/UL — HIGH (ref 1.8–7.4)
NEUTROPHILS # BLD AUTO: 12.18 K/UL — HIGH (ref 1.8–7.4)
NEUTROPHILS # BLD AUTO: 12.21 K/UL — HIGH (ref 1.8–7.4)
NEUTROPHILS # BLD AUTO: 12.3 K/UL — HIGH (ref 1.8–7.4)
NEUTROPHILS # BLD AUTO: 12.85 K/UL — HIGH (ref 1.8–7.4)
NEUTROPHILS # BLD AUTO: 13.46 K/UL — HIGH (ref 1.8–7.4)
NEUTROPHILS # BLD AUTO: 14.12 K/UL — HIGH (ref 1.8–7.4)
NEUTROPHILS # BLD AUTO: 14.22 K/UL — HIGH (ref 1.8–7.4)
NEUTROPHILS # BLD AUTO: 14.23 K/UL — HIGH (ref 1.8–7.4)
NEUTROPHILS # BLD AUTO: 14.57 K/UL — HIGH (ref 1.8–7.4)
NEUTROPHILS # BLD AUTO: 14.75 K/UL — HIGH (ref 1.8–7.4)
NEUTROPHILS # BLD AUTO: 15.09 K/UL — HIGH (ref 1.8–7.4)
NEUTROPHILS # BLD AUTO: 15.3 K/UL — HIGH (ref 1.8–7.4)
NEUTROPHILS # BLD AUTO: 15.37 K/UL — HIGH (ref 1.8–7.4)
NEUTROPHILS # BLD AUTO: 15.42 K/UL — HIGH (ref 1.8–7.4)
NEUTROPHILS # BLD AUTO: 15.56 K/UL — HIGH (ref 1.8–7.4)
NEUTROPHILS # BLD AUTO: 16.67 K/UL — HIGH (ref 1.8–7.4)
NEUTROPHILS # BLD AUTO: 16.78 K/UL — HIGH (ref 1.8–7.4)
NEUTROPHILS # BLD AUTO: 16.79 K/UL — HIGH (ref 1.8–7.4)
NEUTROPHILS # BLD AUTO: 16.91 K/UL — HIGH (ref 1.8–7.4)
NEUTROPHILS # BLD AUTO: 17.12 K/UL — HIGH (ref 1.8–7.4)
NEUTROPHILS # BLD AUTO: 19.42 K/UL — HIGH (ref 1.8–7.4)
NEUTROPHILS # BLD AUTO: 2.05 K/UL — SIGNIFICANT CHANGE UP (ref 1.8–7.4)
NEUTROPHILS # BLD AUTO: 2.89 K/UL — SIGNIFICANT CHANGE UP (ref 1.8–7.4)
NEUTROPHILS # BLD AUTO: 20.69 K/UL — HIGH (ref 1.8–7.4)
NEUTROPHILS # BLD AUTO: 6.05 K/UL — SIGNIFICANT CHANGE UP (ref 1.8–7.4)
NEUTROPHILS # BLD AUTO: 7.22 K/UL — SIGNIFICANT CHANGE UP (ref 1.8–7.4)
NEUTROPHILS # BLD AUTO: 8.99 K/UL — HIGH (ref 1.8–7.4)
NEUTROPHILS # BLD AUTO: 9.98 K/UL — HIGH (ref 1.8–7.4)
NEUTROPHILS # CSF: 0 % — SIGNIFICANT CHANGE UP
NEUTROPHILS NFR BLD AUTO: 0 % — LOW (ref 43–77)
NEUTROPHILS NFR BLD AUTO: 14.4 % — LOW (ref 43–77)
NEUTROPHILS NFR BLD AUTO: 16.6 % — LOW (ref 43–77)
NEUTROPHILS NFR BLD AUTO: 16.6 % — SIGNIFICANT CHANGE UP (ref 43–77)
NEUTROPHILS NFR BLD AUTO: 16.7 % — LOW (ref 43–77)
NEUTROPHILS NFR BLD AUTO: 20 % — LOW (ref 43–77)
NEUTROPHILS NFR BLD AUTO: 22.2 % — LOW (ref 43–77)
NEUTROPHILS NFR BLD AUTO: 25 % — LOW (ref 43–77)
NEUTROPHILS NFR BLD AUTO: 25 % — LOW (ref 43–77)
NEUTROPHILS NFR BLD AUTO: 28.6 % — LOW (ref 43–77)
NEUTROPHILS NFR BLD AUTO: 64.7 % — SIGNIFICANT CHANGE UP (ref 43–77)
NEUTROPHILS NFR BLD AUTO: 77.3 % — HIGH (ref 43–77)
NEUTROPHILS NFR BLD AUTO: 81 % — HIGH (ref 43–77)
NEUTROPHILS NFR BLD AUTO: 81.8 % — HIGH (ref 43–77)
NEUTROPHILS NFR BLD AUTO: 84.2 % — HIGH (ref 43–77)
NEUTROPHILS NFR BLD AUTO: 84.9 % — HIGH (ref 43–77)
NEUTROPHILS NFR BLD AUTO: 85.7 % — HIGH (ref 43–77)
NEUTROPHILS NFR BLD AUTO: 86.4 % — HIGH (ref 43–77)
NEUTROPHILS NFR BLD AUTO: 86.6 % — HIGH (ref 43–77)
NEUTROPHILS NFR BLD AUTO: 86.8 % — HIGH (ref 43–77)
NEUTROPHILS NFR BLD AUTO: 87.5 % — HIGH (ref 43–77)
NEUTROPHILS NFR BLD AUTO: 87.6 % — HIGH (ref 43–77)
NEUTROPHILS NFR BLD AUTO: 87.8 % — HIGH (ref 43–77)
NEUTROPHILS NFR BLD AUTO: 88.2 % — HIGH (ref 43–77)
NEUTROPHILS NFR BLD AUTO: 88.5 % — HIGH (ref 43–77)
NEUTROPHILS NFR BLD AUTO: 88.7 % — HIGH (ref 43–77)
NEUTROPHILS NFR BLD AUTO: 88.8 % — HIGH (ref 43–77)
NEUTROPHILS NFR BLD AUTO: 89 % — HIGH (ref 43–77)
NEUTROPHILS NFR BLD AUTO: 89.1 % — HIGH (ref 43–77)
NEUTROPHILS NFR BLD AUTO: 89.2 % — HIGH (ref 43–77)
NEUTROPHILS NFR BLD AUTO: 9.1 % — LOW (ref 43–77)
NEUTROPHILS NFR BLD AUTO: 90.1 % — HIGH (ref 43–77)
NEUTROPHILS NFR BLD AUTO: 90.5 % — HIGH (ref 43–77)
NEUTROPHILS NFR BLD AUTO: 90.7 % — HIGH (ref 43–77)
NEUTROPHILS NFR BLD AUTO: 91.2 % — HIGH (ref 43–77)
NEUTROPHILS NFR BLD AUTO: 91.4 % — HIGH (ref 43–77)
NEUTROPHILS NFR BLD AUTO: 91.7 % — HIGH (ref 43–77)
NEUTROPHILS NFR BLD AUTO: 92.2 % — HIGH (ref 43–77)
NEUTROPHILS NFR BLD AUTO: 92.2 % — HIGH (ref 43–77)
NEUTROPHILS NFR BLD AUTO: 94 % — HIGH (ref 43–77)
NEUTROPHILS NFR BLD AUTO: 94.2 % — HIGH (ref 43–77)
NEUTROPHILS NFR BLD AUTO: 94.9 % — HIGH (ref 43–77)
NEUTROPHILS NFR BLD AUTO: 96 % — HIGH (ref 43–77)
NEUTROPHILS NFR BLD AUTO: 96.5 % — HIGH (ref 43–77)
NEUTROPHILS NFR BLD AUTO: 96.8 % — HIGH (ref 43–77)
NEUTROPHILS NFR BLD AUTO: 97.1 % — HIGH (ref 43–77)
NEUTROPHILS NFR BLD AUTO: 97.9 % — HIGH (ref 43–77)
NIGHT BLUE STAIN TISS: SIGNIFICANT CHANGE UP
NITRITE UR-MCNC: NEGATIVE — SIGNIFICANT CHANGE UP
NON-GYNECOLOGICAL CYTOLOGY STUDY: SIGNIFICANT CHANGE UP
NRBC # BLD: 0 /100 WBCS — SIGNIFICANT CHANGE UP
NRBC # BLD: 1 /100 WBCS — SIGNIFICANT CHANGE UP
NRBC # BLD: 14 /100 WBCS — SIGNIFICANT CHANGE UP
NRBC # BLD: 2 /100 WBCS — SIGNIFICANT CHANGE UP
NRBC # BLD: 3 /100 WBCS — SIGNIFICANT CHANGE UP
NRBC # BLD: 4 /100 WBCS — SIGNIFICANT CHANGE UP
NRBC # BLD: 4 /100 WBCS — SIGNIFICANT CHANGE UP
NRBC # BLD: 5 /100 WBCS — SIGNIFICANT CHANGE UP
NRBC # BLD: 6 /100 WBCS — SIGNIFICANT CHANGE UP
NRBC # BLD: 7 /100 WBCS — SIGNIFICANT CHANGE UP
NRBC # FLD: 0 K/UL — SIGNIFICANT CHANGE UP
NRBC # FLD: 0.02 K/UL — HIGH
NRBC # FLD: 0.03 K/UL — HIGH
NRBC # FLD: 0.04 K/UL — HIGH
NRBC # FLD: 0.04 K/UL — HIGH
NRBC # FLD: 0.05 K/UL — HIGH
NRBC # FLD: 0.06 K/UL — HIGH
NRBC # FLD: 0.07 K/UL — HIGH
NRBC # FLD: 0.08 K/UL — HIGH
NRBC # FLD: 0.1 K/UL — HIGH
NRBC # FLD: 0.12 K/UL — HIGH
NRBC # FLD: 0.14 K/UL — HIGH
NRBC # FLD: 0.24 K/UL — HIGH
NRBC # FLD: 0.38 K/UL — HIGH
NRBC # FLD: 0.41 K/UL — HIGH
NRBC # FLD: 0.44 K/UL — HIGH
NRBC # FLD: 0.59 K/UL — HIGH
NRBC # FLD: 0.73 K/UL — HIGH
NRBC # FLD: 0.91 K/UL — HIGH
NRBC # FLD: 0.94 K/UL — HIGH
NRBC # FLD: 1 K/UL — HIGH
NRBC NFR CSF: 0 CELLS/UL — SIGNIFICANT CHANGE UP (ref 0–5)
NT-PROBNP SERPL-SCNC: 34 PG/ML — SIGNIFICANT CHANGE UP
NT-PROBNP SERPL-SCNC: 730 PG/ML — HIGH
ORGANISM # SPEC MICROSCOPIC CNT: SIGNIFICANT CHANGE UP
OSMOLALITY UR: 445 MOSM/KG — SIGNIFICANT CHANGE UP (ref 50–1200)
OTHER CELLS CSF MANUAL: 0 % — SIGNIFICANT CHANGE UP
OTHER CELLS FLD MANUAL: 0 % — SIGNIFICANT CHANGE UP
OTHER CELLS FLD MANUAL: 0 % — SIGNIFICANT CHANGE UP
OTHER CELLS FLD MANUAL: 65 % — SIGNIFICANT CHANGE UP
OTHER CELLS FLD MANUAL: 81 % — SIGNIFICANT CHANGE UP
OVALOCYTES BLD QL SMEAR: SLIGHT — SIGNIFICANT CHANGE UP
OVALOCYTES BLD QL SMEAR: SLIGHT — SIGNIFICANT CHANGE UP
PCO2 BLDA: 20 MMHG — LOW (ref 32–35)
PCO2 BLDA: 28 MMHG — LOW (ref 32–35)
PCO2 BLDA: 30 MMHG — LOW (ref 32–35)
PCO2 BLDV: 25 MMHG — LOW (ref 39–42)
PCO2 BLDV: 37 MMHG — LOW (ref 39–42)
PCO2 BLDV: 40 MMHG — SIGNIFICANT CHANGE UP (ref 39–42)
PCO2 BLDV: 41 MMHG — SIGNIFICANT CHANGE UP (ref 41–51)
PCO2 BLDV: 45 MMHG — HIGH (ref 39–42)
PCO2 BLDV: 49 MMHG — HIGH (ref 39–42)
PCO2 BLDV: 64 MMHG — HIGH (ref 39–42)
PH BLDA: 7.27 — LOW (ref 7.35–7.45)
PH BLDA: 7.35 — SIGNIFICANT CHANGE UP (ref 7.35–7.45)
PH BLDA: 7.45 — SIGNIFICANT CHANGE UP (ref 7.35–7.45)
PH BLDV: 7.25 — LOW (ref 7.32–7.43)
PH BLDV: 7.26 — LOW (ref 7.32–7.43)
PH BLDV: 7.32 — SIGNIFICANT CHANGE UP (ref 7.32–7.43)
PH BLDV: 7.34 — SIGNIFICANT CHANGE UP (ref 7.32–7.43)
PH BLDV: 7.46 — HIGH (ref 7.32–7.43)
PH BLDV: 7.48 — HIGH (ref 7.32–7.43)
PH BLDV: 7.49 — HIGH (ref 7.32–7.43)
PH FLD: 7.6 — SIGNIFICANT CHANGE UP
PH UR: 5.5 — SIGNIFICANT CHANGE UP (ref 5–8)
PH UR: 5.5 — SIGNIFICANT CHANGE UP (ref 5–8)
PH UR: 6 — SIGNIFICANT CHANGE UP (ref 5–8)
PH UR: 6.5 — SIGNIFICANT CHANGE UP (ref 5–8)
PHOSPHATE SERPL-MCNC: 2.1 MG/DL — LOW (ref 2.5–4.5)
PHOSPHATE SERPL-MCNC: 2.2 MG/DL — LOW (ref 2.5–4.5)
PHOSPHATE SERPL-MCNC: 2.4 MG/DL — LOW (ref 2.5–4.5)
PHOSPHATE SERPL-MCNC: 2.6 MG/DL — SIGNIFICANT CHANGE UP (ref 2.5–4.5)
PHOSPHATE SERPL-MCNC: 2.7 MG/DL — SIGNIFICANT CHANGE UP (ref 2.5–4.5)
PHOSPHATE SERPL-MCNC: 2.8 MG/DL — SIGNIFICANT CHANGE UP (ref 2.5–4.5)
PHOSPHATE SERPL-MCNC: 3 MG/DL — SIGNIFICANT CHANGE UP (ref 2.5–4.5)
PHOSPHATE SERPL-MCNC: 3.1 MG/DL — SIGNIFICANT CHANGE UP (ref 2.5–4.5)
PHOSPHATE SERPL-MCNC: 3.2 MG/DL — SIGNIFICANT CHANGE UP (ref 2.5–4.5)
PHOSPHATE SERPL-MCNC: 3.3 MG/DL — SIGNIFICANT CHANGE UP (ref 2.5–4.5)
PHOSPHATE SERPL-MCNC: 3.4 MG/DL — SIGNIFICANT CHANGE UP (ref 2.5–4.5)
PHOSPHATE SERPL-MCNC: 3.5 MG/DL — SIGNIFICANT CHANGE UP (ref 2.5–4.5)
PHOSPHATE SERPL-MCNC: 3.6 MG/DL — SIGNIFICANT CHANGE UP (ref 2.5–4.5)
PHOSPHATE SERPL-MCNC: 3.7 MG/DL — SIGNIFICANT CHANGE UP (ref 2.5–4.5)
PHOSPHATE SERPL-MCNC: 3.8 MG/DL — SIGNIFICANT CHANGE UP (ref 2.5–4.5)
PHOSPHATE SERPL-MCNC: 4 MG/DL — SIGNIFICANT CHANGE UP (ref 2.5–4.5)
PHOSPHATE SERPL-MCNC: 4.1 MG/DL — SIGNIFICANT CHANGE UP (ref 2.5–4.5)
PHOSPHATE SERPL-MCNC: 4.1 MG/DL — SIGNIFICANT CHANGE UP (ref 2.5–4.5)
PHOSPHATE SERPL-MCNC: 4.2 MG/DL — SIGNIFICANT CHANGE UP (ref 2.5–4.5)
PHOSPHATE SERPL-MCNC: 4.5 MG/DL — SIGNIFICANT CHANGE UP (ref 2.5–4.5)
PHOSPHATE SERPL-MCNC: 4.6 MG/DL — HIGH (ref 2.5–4.5)
PHOSPHATE SERPL-MCNC: 4.9 MG/DL — HIGH (ref 2.5–4.5)
PHOSPHATE SERPL-MCNC: 5.1 MG/DL — HIGH (ref 2.5–4.5)
PHOSPHATE SERPL-MCNC: 5.1 MG/DL — HIGH (ref 2.5–4.5)
PHOSPHATE SERPL-MCNC: 5.2 MG/DL — HIGH (ref 2.5–4.5)
PHOSPHATE SERPL-MCNC: 5.2 MG/DL — HIGH (ref 2.5–4.5)
PHOSPHATE SERPL-MCNC: 5.3 MG/DL — HIGH (ref 2.5–4.5)
PHOSPHATE SERPL-MCNC: 5.5 MG/DL — HIGH (ref 2.5–4.5)
PHOSPHATE SERPL-MCNC: 5.7 MG/DL — HIGH (ref 2.5–4.5)
PHOSPHATE SERPL-MCNC: 5.8 MG/DL — HIGH (ref 2.5–4.5)
PHOSPHATE SERPL-MCNC: 6 MG/DL — HIGH (ref 2.5–4.5)
PHOSPHATE SERPL-MCNC: 6.1 MG/DL — HIGH (ref 2.5–4.5)
PHOSPHATE SERPL-MCNC: 6.3 MG/DL — HIGH (ref 2.5–4.5)
PHOSPHATE SERPL-MCNC: 6.4 MG/DL — HIGH (ref 2.5–4.5)
PHOSPHATE SERPL-MCNC: 6.5 MG/DL — HIGH (ref 2.5–4.5)
PHOSPHATE SERPL-MCNC: 6.9 MG/DL — HIGH (ref 2.5–4.5)
PHOSPHATE SERPL-MCNC: 6.9 MG/DL — HIGH (ref 2.5–4.5)
PHOSPHATE SERPL-MCNC: 7 MG/DL — HIGH (ref 2.5–4.5)
PHOSPHATE SERPL-MCNC: 7.3 MG/DL — HIGH (ref 2.5–4.5)
PHOSPHATE SERPL-MCNC: 7.3 MG/DL — HIGH (ref 2.5–4.5)
PHOSPHATE SERPL-MCNC: 7.5 MG/DL — HIGH (ref 2.5–4.5)
PHOSPHATE SERPL-MCNC: 7.6 MG/DL — HIGH (ref 2.5–4.5)
PHOSPHATE SERPL-MCNC: 7.7 MG/DL — HIGH (ref 2.5–4.5)
PHOSPHATE SERPL-MCNC: 8 MG/DL — HIGH (ref 2.5–4.5)
PHOSPHATE SERPL-MCNC: 8.1 MG/DL — HIGH (ref 2.5–4.5)
PHOSPHATE SERPL-MCNC: 8.1 MG/DL — HIGH (ref 2.5–4.5)
PHOSPHATE SERPL-MCNC: 8.2 MG/DL — HIGH (ref 2.5–4.5)
PHOSPHATE SERPL-MCNC: 8.2 MG/DL — HIGH (ref 2.5–4.5)
PHOSPHATE SERPL-MCNC: 8.4 MG/DL — HIGH (ref 2.5–4.5)
PHOSPHATE SERPL-MCNC: 8.4 MG/DL — HIGH (ref 2.5–4.5)
PHOSPHATE SERPL-MCNC: 8.5 MG/DL — HIGH (ref 2.5–4.5)
PHOSPHATE SERPL-MCNC: 8.6 MG/DL — HIGH (ref 2.5–4.5)
PHOSPHATE SERPL-MCNC: 8.7 MG/DL — HIGH (ref 2.5–4.5)
PHOSPHATE SERPL-MCNC: 9.2 MG/DL — HIGH (ref 2.5–4.5)
PHOSPHATE SERPL-MCNC: 9.4 MG/DL — HIGH (ref 2.5–4.5)
PHOSPHATE SERPL-MCNC: 9.4 MG/DL — HIGH (ref 2.5–4.5)
PHOSPHATE SERPL-MCNC: 9.5 MG/DL — HIGH (ref 2.5–4.5)
PHOSPHATE SERPL-MCNC: 9.8 MG/DL — HIGH (ref 2.5–4.5)
PLAT MORPH BLD: NORMAL — SIGNIFICANT CHANGE UP
PLAT MORPH BLD: NORMAL — SIGNIFICANT CHANGE UP
PLATELET # BLD AUTO: 1 K/UL — CRITICAL LOW (ref 150–400)
PLATELET # BLD AUTO: 1 K/UL — CRITICAL LOW (ref 150–400)
PLATELET # BLD AUTO: 122 K/UL — LOW (ref 150–400)
PLATELET # BLD AUTO: 145 K/UL — LOW (ref 150–400)
PLATELET # BLD AUTO: 162 K/UL — SIGNIFICANT CHANGE UP (ref 150–400)
PLATELET # BLD AUTO: 172 K/UL — SIGNIFICANT CHANGE UP (ref 150–400)
PLATELET # BLD AUTO: 178 K/UL — SIGNIFICANT CHANGE UP (ref 150–400)
PLATELET # BLD AUTO: 18 K/UL — CRITICAL LOW (ref 150–400)
PLATELET # BLD AUTO: 183 K/UL — SIGNIFICANT CHANGE UP (ref 150–400)
PLATELET # BLD AUTO: 192 K/UL — SIGNIFICANT CHANGE UP (ref 150–400)
PLATELET # BLD AUTO: 198 K/UL — SIGNIFICANT CHANGE UP (ref 150–400)
PLATELET # BLD AUTO: 198 K/UL — SIGNIFICANT CHANGE UP (ref 150–400)
PLATELET # BLD AUTO: 2 K/UL — CRITICAL LOW (ref 150–400)
PLATELET # BLD AUTO: 2 K/UL — CRITICAL LOW (ref 150–400)
PLATELET # BLD AUTO: 20 K/UL — CRITICAL LOW (ref 150–400)
PLATELET # BLD AUTO: 20 K/UL — CRITICAL LOW (ref 150–400)
PLATELET # BLD AUTO: 202 K/UL — SIGNIFICANT CHANGE UP (ref 150–400)
PLATELET # BLD AUTO: 205 K/UL — SIGNIFICANT CHANGE UP (ref 150–400)
PLATELET # BLD AUTO: 208 K/UL — SIGNIFICANT CHANGE UP (ref 150–400)
PLATELET # BLD AUTO: 213 K/UL — SIGNIFICANT CHANGE UP (ref 150–400)
PLATELET # BLD AUTO: 217 K/UL — SIGNIFICANT CHANGE UP (ref 150–400)
PLATELET # BLD AUTO: 247 K/UL — SIGNIFICANT CHANGE UP (ref 150–400)
PLATELET # BLD AUTO: 249 K/UL — SIGNIFICANT CHANGE UP (ref 150–400)
PLATELET # BLD AUTO: 26 K/UL — LOW (ref 150–400)
PLATELET # BLD AUTO: 274 K/UL — SIGNIFICANT CHANGE UP (ref 150–400)
PLATELET # BLD AUTO: 285 K/UL — SIGNIFICANT CHANGE UP (ref 150–400)
PLATELET # BLD AUTO: 287 K/UL — SIGNIFICANT CHANGE UP (ref 150–400)
PLATELET # BLD AUTO: 29 K/UL — LOW (ref 150–400)
PLATELET # BLD AUTO: 302 K/UL — SIGNIFICANT CHANGE UP (ref 150–400)
PLATELET # BLD AUTO: 310 K/UL — SIGNIFICANT CHANGE UP (ref 150–400)
PLATELET # BLD AUTO: 313 K/UL — SIGNIFICANT CHANGE UP (ref 150–400)
PLATELET # BLD AUTO: 319 K/UL — SIGNIFICANT CHANGE UP (ref 150–400)
PLATELET # BLD AUTO: 322 K/UL — SIGNIFICANT CHANGE UP (ref 150–400)
PLATELET # BLD AUTO: 322 K/UL — SIGNIFICANT CHANGE UP (ref 150–400)
PLATELET # BLD AUTO: 323 K/UL — SIGNIFICANT CHANGE UP (ref 150–400)
PLATELET # BLD AUTO: 324 K/UL — SIGNIFICANT CHANGE UP (ref 150–400)
PLATELET # BLD AUTO: 328 K/UL — SIGNIFICANT CHANGE UP (ref 150–400)
PLATELET # BLD AUTO: 339 K/UL — SIGNIFICANT CHANGE UP (ref 150–400)
PLATELET # BLD AUTO: 341 K/UL — SIGNIFICANT CHANGE UP (ref 150–400)
PLATELET # BLD AUTO: 352 K/UL — SIGNIFICANT CHANGE UP (ref 150–400)
PLATELET # BLD AUTO: 357 K/UL — SIGNIFICANT CHANGE UP (ref 150–400)
PLATELET # BLD AUTO: 372 K/UL — SIGNIFICANT CHANGE UP (ref 150–400)
PLATELET # BLD AUTO: 379 K/UL — SIGNIFICANT CHANGE UP (ref 150–400)
PLATELET # BLD AUTO: 379 K/UL — SIGNIFICANT CHANGE UP (ref 150–400)
PLATELET # BLD AUTO: 380 K/UL — SIGNIFICANT CHANGE UP (ref 150–400)
PLATELET # BLD AUTO: 388 K/UL — SIGNIFICANT CHANGE UP (ref 150–400)
PLATELET # BLD AUTO: 389 K/UL — SIGNIFICANT CHANGE UP (ref 150–400)
PLATELET # BLD AUTO: 392 K/UL — SIGNIFICANT CHANGE UP (ref 150–400)
PLATELET # BLD AUTO: 399 K/UL — SIGNIFICANT CHANGE UP (ref 150–400)
PLATELET # BLD AUTO: 403 K/UL — HIGH (ref 150–400)
PLATELET # BLD AUTO: 408 K/UL — HIGH (ref 150–400)
PLATELET # BLD AUTO: 41 K/UL — LOW (ref 150–400)
PLATELET # BLD AUTO: 411 K/UL — HIGH (ref 150–400)
PLATELET # BLD AUTO: 420 K/UL — HIGH (ref 150–400)
PLATELET # BLD AUTO: 44 K/UL — LOW (ref 150–400)
PLATELET # BLD AUTO: 44 K/UL — LOW (ref 150–400)
PLATELET # BLD AUTO: 45 K/UL — LOW (ref 150–400)
PLATELET # BLD AUTO: 461 K/UL — HIGH (ref 150–400)
PLATELET # BLD AUTO: 466 K/UL — HIGH (ref 150–400)
PLATELET # BLD AUTO: 49 K/UL — LOW (ref 150–400)
PLATELET # BLD AUTO: 5 K/UL — CRITICAL LOW (ref 150–400)
PLATELET # BLD AUTO: 51 K/UL — LOW (ref 150–400)
PLATELET # BLD AUTO: 54 K/UL — LOW (ref 150–400)
PLATELET # BLD AUTO: 6 K/UL — CRITICAL LOW (ref 150–400)
PLATELET # BLD AUTO: 63 K/UL — LOW (ref 150–400)
PLATELET # BLD AUTO: 7 K/UL — CRITICAL LOW (ref 150–400)
PLATELET # BLD AUTO: 78 K/UL — LOW (ref 150–400)
PLATELET COUNT - ESTIMATE: ABNORMAL
PLATELET COUNT - ESTIMATE: ABNORMAL
PO2 BLDA: 114 MMHG — HIGH (ref 83–108)
PO2 BLDA: 141 MMHG — HIGH (ref 83–108)
PO2 BLDA: 198 MMHG — HIGH (ref 83–108)
PO2 BLDV: 104 MMHG — SIGNIFICANT CHANGE UP
PO2 BLDV: 48 MMHG — SIGNIFICANT CHANGE UP
PO2 BLDV: 50 MMHG — HIGH (ref 35–40)
PO2 BLDV: 58 MMHG — SIGNIFICANT CHANGE UP
PO2 BLDV: 61 MMHG — SIGNIFICANT CHANGE UP
PO2 BLDV: 63 MMHG — SIGNIFICANT CHANGE UP
PO2 BLDV: 64 MMHG — SIGNIFICANT CHANGE UP
POIKILOCYTOSIS BLD QL AUTO: SLIGHT — SIGNIFICANT CHANGE UP
POIKILOCYTOSIS BLD QL AUTO: SLIGHT — SIGNIFICANT CHANGE UP
POLYCHROMASIA BLD QL SMEAR: SLIGHT — SIGNIFICANT CHANGE UP
POLYCHROMASIA BLD QL SMEAR: SLIGHT — SIGNIFICANT CHANGE UP
POTASSIUM BLDV-SCNC: 3.1 MMOL/L — LOW (ref 3.5–5.1)
POTASSIUM BLDV-SCNC: 3.8 MMOL/L — SIGNIFICANT CHANGE UP (ref 3.5–5.1)
POTASSIUM BLDV-SCNC: 4.2 MMOL/L — SIGNIFICANT CHANGE UP (ref 3.5–5.1)
POTASSIUM BLDV-SCNC: 4.7 MMOL/L — SIGNIFICANT CHANGE UP (ref 3.5–5.1)
POTASSIUM BLDV-SCNC: 5 MMOL/L — SIGNIFICANT CHANGE UP (ref 3.5–5.1)
POTASSIUM BLDV-SCNC: 5.4 MMOL/L — HIGH (ref 3.5–5.1)
POTASSIUM SERPL-MCNC: 2.6 MMOL/L — CRITICAL LOW (ref 3.5–5.3)
POTASSIUM SERPL-MCNC: 2.6 MMOL/L — CRITICAL LOW (ref 3.5–5.3)
POTASSIUM SERPL-MCNC: 3 MMOL/L — LOW (ref 3.5–5.3)
POTASSIUM SERPL-MCNC: 3 MMOL/L — LOW (ref 3.5–5.3)
POTASSIUM SERPL-MCNC: 3.1 MMOL/L — LOW (ref 3.5–5.3)
POTASSIUM SERPL-MCNC: 3.2 MMOL/L — LOW (ref 3.5–5.3)
POTASSIUM SERPL-MCNC: 3.3 MMOL/L — LOW (ref 3.5–5.3)
POTASSIUM SERPL-MCNC: 3.3 MMOL/L — LOW (ref 3.5–5.3)
POTASSIUM SERPL-MCNC: 3.4 MMOL/L — LOW (ref 3.5–5.3)
POTASSIUM SERPL-MCNC: 3.5 MMOL/L — SIGNIFICANT CHANGE UP (ref 3.5–5.3)
POTASSIUM SERPL-MCNC: 3.5 MMOL/L — SIGNIFICANT CHANGE UP (ref 3.5–5.3)
POTASSIUM SERPL-MCNC: 3.6 MMOL/L — SIGNIFICANT CHANGE UP (ref 3.5–5.3)
POTASSIUM SERPL-MCNC: 3.7 MMOL/L — SIGNIFICANT CHANGE UP (ref 3.5–5.3)
POTASSIUM SERPL-MCNC: 3.8 MMOL/L — SIGNIFICANT CHANGE UP (ref 3.5–5.3)
POTASSIUM SERPL-MCNC: 3.9 MMOL/L — SIGNIFICANT CHANGE UP (ref 3.5–5.3)
POTASSIUM SERPL-MCNC: 4 MMOL/L — SIGNIFICANT CHANGE UP (ref 3.5–5.3)
POTASSIUM SERPL-MCNC: 4.1 MMOL/L — SIGNIFICANT CHANGE UP (ref 3.5–5.3)
POTASSIUM SERPL-MCNC: 4.2 MMOL/L — SIGNIFICANT CHANGE UP (ref 3.5–5.3)
POTASSIUM SERPL-MCNC: 4.3 MMOL/L — SIGNIFICANT CHANGE UP (ref 3.5–5.3)
POTASSIUM SERPL-MCNC: 4.4 MMOL/L — SIGNIFICANT CHANGE UP (ref 3.5–5.3)
POTASSIUM SERPL-MCNC: 4.5 MMOL/L — SIGNIFICANT CHANGE UP (ref 3.5–5.3)
POTASSIUM SERPL-MCNC: 4.5 MMOL/L — SIGNIFICANT CHANGE UP (ref 3.5–5.3)
POTASSIUM SERPL-MCNC: 4.6 MMOL/L — SIGNIFICANT CHANGE UP (ref 3.5–5.3)
POTASSIUM SERPL-MCNC: 4.6 MMOL/L — SIGNIFICANT CHANGE UP (ref 3.5–5.3)
POTASSIUM SERPL-MCNC: 4.7 MMOL/L — SIGNIFICANT CHANGE UP (ref 3.5–5.3)
POTASSIUM SERPL-MCNC: 4.7 MMOL/L — SIGNIFICANT CHANGE UP (ref 3.5–5.3)
POTASSIUM SERPL-MCNC: 4.8 MMOL/L — SIGNIFICANT CHANGE UP (ref 3.5–5.3)
POTASSIUM SERPL-MCNC: 5 MMOL/L — SIGNIFICANT CHANGE UP (ref 3.5–5.3)
POTASSIUM SERPL-MCNC: 5 MMOL/L — SIGNIFICANT CHANGE UP (ref 3.5–5.3)
POTASSIUM SERPL-MCNC: 5.1 MMOL/L — SIGNIFICANT CHANGE UP (ref 3.5–5.3)
POTASSIUM SERPL-MCNC: 5.2 MMOL/L — SIGNIFICANT CHANGE UP (ref 3.5–5.3)
POTASSIUM SERPL-MCNC: 5.4 MMOL/L — HIGH (ref 3.5–5.3)
POTASSIUM SERPL-MCNC: 5.5 MMOL/L — HIGH (ref 3.5–5.3)
POTASSIUM SERPL-MCNC: 5.6 MMOL/L — HIGH (ref 3.5–5.3)
POTASSIUM SERPL-MCNC: 5.7 MMOL/L — HIGH (ref 3.5–5.3)
POTASSIUM SERPL-MCNC: 5.9 MMOL/L — HIGH (ref 3.5–5.3)
POTASSIUM SERPL-MCNC: 6 MMOL/L — HIGH (ref 3.5–5.3)
POTASSIUM SERPL-MCNC: 6 MMOL/L — HIGH (ref 3.5–5.3)
POTASSIUM SERPL-MCNC: 6.4 MMOL/L — CRITICAL HIGH (ref 3.5–5.3)
POTASSIUM SERPL-SCNC: 2.6 MMOL/L — CRITICAL LOW (ref 3.5–5.3)
POTASSIUM SERPL-SCNC: 2.6 MMOL/L — CRITICAL LOW (ref 3.5–5.3)
POTASSIUM SERPL-SCNC: 3 MMOL/L — LOW (ref 3.5–5.3)
POTASSIUM SERPL-SCNC: 3 MMOL/L — LOW (ref 3.5–5.3)
POTASSIUM SERPL-SCNC: 3.1 MMOL/L — LOW (ref 3.5–5.3)
POTASSIUM SERPL-SCNC: 3.2 MMOL/L — LOW (ref 3.5–5.3)
POTASSIUM SERPL-SCNC: 3.3 MMOL/L — LOW (ref 3.5–5.3)
POTASSIUM SERPL-SCNC: 3.3 MMOL/L — LOW (ref 3.5–5.3)
POTASSIUM SERPL-SCNC: 3.4 MMOL/L — LOW (ref 3.5–5.3)
POTASSIUM SERPL-SCNC: 3.5 MMOL/L — SIGNIFICANT CHANGE UP (ref 3.5–5.3)
POTASSIUM SERPL-SCNC: 3.5 MMOL/L — SIGNIFICANT CHANGE UP (ref 3.5–5.3)
POTASSIUM SERPL-SCNC: 3.6 MMOL/L — SIGNIFICANT CHANGE UP (ref 3.5–5.3)
POTASSIUM SERPL-SCNC: 3.7 MMOL/L — SIGNIFICANT CHANGE UP (ref 3.5–5.3)
POTASSIUM SERPL-SCNC: 3.8 MMOL/L — SIGNIFICANT CHANGE UP (ref 3.5–5.3)
POTASSIUM SERPL-SCNC: 3.9 MMOL/L — SIGNIFICANT CHANGE UP (ref 3.5–5.3)
POTASSIUM SERPL-SCNC: 4 MMOL/L — SIGNIFICANT CHANGE UP (ref 3.5–5.3)
POTASSIUM SERPL-SCNC: 4.1 MMOL/L — SIGNIFICANT CHANGE UP (ref 3.5–5.3)
POTASSIUM SERPL-SCNC: 4.2 MMOL/L — SIGNIFICANT CHANGE UP (ref 3.5–5.3)
POTASSIUM SERPL-SCNC: 4.3 MMOL/L — SIGNIFICANT CHANGE UP (ref 3.5–5.3)
POTASSIUM SERPL-SCNC: 4.4 MMOL/L — SIGNIFICANT CHANGE UP (ref 3.5–5.3)
POTASSIUM SERPL-SCNC: 4.5 MMOL/L — SIGNIFICANT CHANGE UP (ref 3.5–5.3)
POTASSIUM SERPL-SCNC: 4.5 MMOL/L — SIGNIFICANT CHANGE UP (ref 3.5–5.3)
POTASSIUM SERPL-SCNC: 4.6 MMOL/L — SIGNIFICANT CHANGE UP (ref 3.5–5.3)
POTASSIUM SERPL-SCNC: 4.6 MMOL/L — SIGNIFICANT CHANGE UP (ref 3.5–5.3)
POTASSIUM SERPL-SCNC: 4.7 MMOL/L — SIGNIFICANT CHANGE UP (ref 3.5–5.3)
POTASSIUM SERPL-SCNC: 4.7 MMOL/L — SIGNIFICANT CHANGE UP (ref 3.5–5.3)
POTASSIUM SERPL-SCNC: 4.8 MMOL/L — SIGNIFICANT CHANGE UP (ref 3.5–5.3)
POTASSIUM SERPL-SCNC: 5 MMOL/L — SIGNIFICANT CHANGE UP (ref 3.5–5.3)
POTASSIUM SERPL-SCNC: 5 MMOL/L — SIGNIFICANT CHANGE UP (ref 3.5–5.3)
POTASSIUM SERPL-SCNC: 5.1 MMOL/L — SIGNIFICANT CHANGE UP (ref 3.5–5.3)
POTASSIUM SERPL-SCNC: 5.2 MMOL/L — SIGNIFICANT CHANGE UP (ref 3.5–5.3)
POTASSIUM SERPL-SCNC: 5.4 MMOL/L — HIGH (ref 3.5–5.3)
POTASSIUM SERPL-SCNC: 5.5 MMOL/L — HIGH (ref 3.5–5.3)
POTASSIUM SERPL-SCNC: 5.6 MMOL/L — HIGH (ref 3.5–5.3)
POTASSIUM SERPL-SCNC: 5.7 MMOL/L — HIGH (ref 3.5–5.3)
POTASSIUM SERPL-SCNC: 5.9 MMOL/L — HIGH (ref 3.5–5.3)
POTASSIUM SERPL-SCNC: 6 MMOL/L — HIGH (ref 3.5–5.3)
POTASSIUM SERPL-SCNC: 6 MMOL/L — HIGH (ref 3.5–5.3)
POTASSIUM SERPL-SCNC: 6.4 MMOL/L — CRITICAL HIGH (ref 3.5–5.3)
POTASSIUM UR-SCNC: 63.3 MMOL/L — SIGNIFICANT CHANGE UP
POTASSIUM UR-SCNC: 77.8 MMOL/L — SIGNIFICANT CHANGE UP
PROT ?TM UR-MCNC: 198 MG/DL — SIGNIFICANT CHANGE UP
PROT CSF-MCNC: 32 MG/DL — SIGNIFICANT CHANGE UP (ref 15–45)
PROT FLD-MCNC: 2.6 G/DL — SIGNIFICANT CHANGE UP
PROT FLD-MCNC: 4.1 G/DL — SIGNIFICANT CHANGE UP
PROT FLD-MCNC: 4.2 G/DL — SIGNIFICANT CHANGE UP
PROT PATTERN SERPL ELPH-IMP: SIGNIFICANT CHANGE UP
PROT PATTERN SERPL ELPH-IMP: SIGNIFICANT CHANGE UP
PROT SERPL-MCNC: 4.4 G/DL — LOW (ref 6–8.3)
PROT SERPL-MCNC: 4.5 G/DL — LOW (ref 6–8.3)
PROT SERPL-MCNC: 4.7 G/DL — LOW (ref 6–8.3)
PROT SERPL-MCNC: 4.7 G/DL — LOW (ref 6–8.3)
PROT SERPL-MCNC: 4.8 G/DL — LOW (ref 6–8.3)
PROT SERPL-MCNC: 4.9 G/DL — LOW (ref 6–8.3)
PROT SERPL-MCNC: 5 G/DL — LOW (ref 6–8.3)
PROT SERPL-MCNC: 5.1 G/DL — LOW (ref 6–8.3)
PROT SERPL-MCNC: 5.2 G/DL — LOW (ref 6–8.3)
PROT SERPL-MCNC: 5.2 G/DL — LOW (ref 6–8.3)
PROT SERPL-MCNC: 5.4 G/DL — LOW (ref 6–8.3)
PROT SERPL-MCNC: 5.5 G/DL — LOW (ref 6–8.3)
PROT SERPL-MCNC: 5.6 G/DL — LOW (ref 6–8.3)
PROT SERPL-MCNC: 5.7 G/DL — LOW (ref 6–8.3)
PROT SERPL-MCNC: 5.9 G/DL — LOW (ref 6–8.3)
PROT SERPL-MCNC: 6 G/DL — SIGNIFICANT CHANGE UP (ref 6–8.3)
PROT SERPL-MCNC: 6.1 G/DL — SIGNIFICANT CHANGE UP (ref 6–8.3)
PROT SERPL-MCNC: 6.1 G/DL — SIGNIFICANT CHANGE UP (ref 6–8.3)
PROT SERPL-MCNC: 6.2 G/DL — SIGNIFICANT CHANGE UP (ref 6–8.3)
PROT SERPL-MCNC: 6.2 G/DL — SIGNIFICANT CHANGE UP (ref 6–8.3)
PROT SERPL-MCNC: 6.3 G/DL — SIGNIFICANT CHANGE UP
PROT SERPL-MCNC: 6.3 G/DL — SIGNIFICANT CHANGE UP (ref 6–8.3)
PROT SERPL-MCNC: 6.3 G/DL — SIGNIFICANT CHANGE UP (ref 6–8.3)
PROT SERPL-MCNC: 6.5 G/DL — SIGNIFICANT CHANGE UP (ref 6–8.3)
PROT SERPL-MCNC: 6.8 G/DL — SIGNIFICANT CHANGE UP (ref 6–8.3)
PROT SERPL-MCNC: 6.9 G/DL — SIGNIFICANT CHANGE UP (ref 6–8.3)
PROT SERPL-MCNC: 7.1 G/DL — SIGNIFICANT CHANGE UP (ref 6–8.3)
PROT SERPL-MCNC: 7.1 G/DL — SIGNIFICANT CHANGE UP (ref 6–8.3)
PROT SERPL-MCNC: 7.4 G/DL — SIGNIFICANT CHANGE UP (ref 6–8.3)
PROT UR-MCNC: ABNORMAL
PROT/CREAT UR-RTO: 1.6 RATIO — HIGH (ref 0–0.2)
PROTHROM AB SERPL-ACNC: 11 SEC — SIGNIFICANT CHANGE UP (ref 10.6–13.6)
PROTHROM AB SERPL-ACNC: 11.3 SEC — SIGNIFICANT CHANGE UP (ref 10.6–13.6)
PROTHROM AB SERPL-ACNC: 11.5 SEC — SIGNIFICANT CHANGE UP (ref 10.6–13.6)
PROTHROM AB SERPL-ACNC: 11.5 SEC — SIGNIFICANT CHANGE UP (ref 10.6–13.6)
PROTHROM AB SERPL-ACNC: 11.6 SEC — SIGNIFICANT CHANGE UP (ref 10.6–13.6)
PROTHROM AB SERPL-ACNC: 11.7 SEC — SIGNIFICANT CHANGE UP (ref 10.6–13.6)
PROTHROM AB SERPL-ACNC: 12.3 SEC — SIGNIFICANT CHANGE UP (ref 10.6–13.6)
PROTHROM AB SERPL-ACNC: 12.5 SEC — SIGNIFICANT CHANGE UP (ref 10.6–13.6)
PROTHROM AB SERPL-ACNC: 12.5 SEC — SIGNIFICANT CHANGE UP (ref 10.6–13.6)
PROTHROM AB SERPL-ACNC: 12.6 SEC — SIGNIFICANT CHANGE UP (ref 10.6–13.6)
PROTHROM AB SERPL-ACNC: 12.8 SEC — SIGNIFICANT CHANGE UP (ref 10.6–13.6)
PROTHROM AB SERPL-ACNC: 12.8 SEC — SIGNIFICANT CHANGE UP (ref 10.6–13.6)
PROTHROM AB SERPL-ACNC: 12.9 SEC — SIGNIFICANT CHANGE UP (ref 10.6–13.6)
PROTHROM AB SERPL-ACNC: 13 SEC — SIGNIFICANT CHANGE UP (ref 10.6–13.6)
PROTHROM AB SERPL-ACNC: 13.2 SEC — SIGNIFICANT CHANGE UP (ref 10.6–13.6)
PROTHROM AB SERPL-ACNC: 13.4 SEC — SIGNIFICANT CHANGE UP (ref 10.6–13.6)
PROTHROM AB SERPL-ACNC: 13.5 SEC — SIGNIFICANT CHANGE UP (ref 10.6–13.6)
PROTHROM AB SERPL-ACNC: 13.8 SEC — HIGH (ref 10.6–13.6)
PROTHROM AB SERPL-ACNC: 14 SEC — HIGH (ref 10.6–13.6)
PROTHROM AB SERPL-ACNC: 14.2 SEC — HIGH (ref 10.6–13.6)
PROTHROM AB SERPL-ACNC: 14.4 SEC — HIGH (ref 10.6–13.6)
PROTHROM AB SERPL-ACNC: 14.6 SEC — HIGH (ref 10.6–13.6)
PROTHROM AB SERPL-ACNC: 14.8 SEC — HIGH (ref 10.6–13.6)
PROTHROM AB SERPL-ACNC: 14.8 SEC — HIGH (ref 10.6–13.6)
PROTHROM AB SERPL-ACNC: 15.1 SEC — HIGH (ref 10.6–13.6)
PROTHROM AB SERPL-ACNC: 15.2 SEC — HIGH (ref 10.6–13.6)
QUANT TB PLUS MITOGEN MINUS NIL: -0.01 IU/ML — SIGNIFICANT CHANGE UP
QUANT TB PLUS MITOGEN MINUS NIL: 0.01 IU/ML — SIGNIFICANT CHANGE UP
RAPID RVP RESULT: SIGNIFICANT CHANGE UP
RAPID RVP RESULT: SIGNIFICANT CHANGE UP
RBC # BLD: 2.42 M/UL — LOW (ref 3.8–5.2)
RBC # BLD: 2.49 M/UL — LOW (ref 3.8–5.2)
RBC # BLD: 2.6 M/UL — LOW (ref 3.8–5.2)
RBC # BLD: 2.71 M/UL — LOW (ref 3.8–5.2)
RBC # BLD: 2.78 M/UL — LOW (ref 3.8–5.2)
RBC # BLD: 2.84 M/UL — LOW (ref 3.8–5.2)
RBC # BLD: 2.86 M/UL — LOW (ref 3.8–5.2)
RBC # BLD: 2.89 M/UL — LOW (ref 3.8–5.2)
RBC # BLD: 2.91 M/UL — LOW (ref 3.8–5.2)
RBC # BLD: 2.92 M/UL — LOW (ref 3.8–5.2)
RBC # BLD: 2.93 M/UL — LOW (ref 3.8–5.2)
RBC # BLD: 2.93 M/UL — LOW (ref 3.8–5.2)
RBC # BLD: 2.94 M/UL — LOW (ref 3.8–5.2)
RBC # BLD: 2.95 M/UL — LOW (ref 3.8–5.2)
RBC # BLD: 2.96 M/UL — LOW (ref 3.8–5.2)
RBC # BLD: 2.99 M/UL — LOW (ref 3.8–5.2)
RBC # BLD: 3 M/UL — LOW (ref 3.8–5.2)
RBC # BLD: 3.01 M/UL — LOW (ref 3.8–5.2)
RBC # BLD: 3.09 M/UL — LOW (ref 3.8–5.2)
RBC # BLD: 3.09 M/UL — LOW (ref 3.8–5.2)
RBC # BLD: 3.2 M/UL — LOW (ref 3.8–5.2)
RBC # BLD: 3.22 M/UL — LOW (ref 3.8–5.2)
RBC # BLD: 3.24 M/UL — LOW (ref 3.8–5.2)
RBC # BLD: 3.26 M/UL — LOW (ref 3.8–5.2)
RBC # BLD: 3.29 M/UL — LOW (ref 3.8–5.2)
RBC # BLD: 3.39 M/UL — LOW (ref 3.8–5.2)
RBC # BLD: 3.4 M/UL — LOW (ref 3.8–5.2)
RBC # BLD: 3.41 M/UL — LOW (ref 3.8–5.2)
RBC # BLD: 3.42 M/UL — LOW (ref 3.8–5.2)
RBC # BLD: 3.48 M/UL — LOW (ref 3.8–5.2)
RBC # BLD: 3.57 M/UL — LOW (ref 3.8–5.2)
RBC # BLD: 3.57 M/UL — LOW (ref 3.8–5.2)
RBC # BLD: 3.7 M/UL — LOW (ref 3.8–5.2)
RBC # BLD: 3.73 M/UL — LOW (ref 3.8–5.2)
RBC # BLD: 3.73 M/UL — LOW (ref 3.8–5.2)
RBC # BLD: 3.74 M/UL — LOW (ref 3.8–5.2)
RBC # BLD: 3.75 M/UL — LOW (ref 3.8–5.2)
RBC # BLD: 3.77 M/UL — LOW (ref 3.8–5.2)
RBC # BLD: 3.78 M/UL — LOW (ref 3.8–5.2)
RBC # BLD: 3.82 M/UL — SIGNIFICANT CHANGE UP (ref 3.8–5.2)
RBC # BLD: 3.88 M/UL — SIGNIFICANT CHANGE UP (ref 3.8–5.2)
RBC # BLD: 3.91 M/UL — SIGNIFICANT CHANGE UP (ref 3.8–5.2)
RBC # BLD: 3.93 M/UL — SIGNIFICANT CHANGE UP (ref 3.8–5.2)
RBC # BLD: 3.94 M/UL — SIGNIFICANT CHANGE UP (ref 3.8–5.2)
RBC # BLD: 3.98 M/UL — SIGNIFICANT CHANGE UP (ref 3.8–5.2)
RBC # BLD: 4 M/UL — SIGNIFICANT CHANGE UP (ref 3.8–5.2)
RBC # BLD: 4.07 M/UL — SIGNIFICANT CHANGE UP (ref 3.8–5.2)
RBC # BLD: 4.07 M/UL — SIGNIFICANT CHANGE UP (ref 3.8–5.2)
RBC # BLD: 4.16 M/UL — SIGNIFICANT CHANGE UP (ref 3.8–5.2)
RBC # BLD: 4.17 M/UL — SIGNIFICANT CHANGE UP (ref 3.8–5.2)
RBC # BLD: 4.18 M/UL — SIGNIFICANT CHANGE UP (ref 3.8–5.2)
RBC # BLD: 4.22 M/UL — SIGNIFICANT CHANGE UP (ref 3.8–5.2)
RBC # BLD: 4.23 M/UL — SIGNIFICANT CHANGE UP (ref 3.8–5.2)
RBC # BLD: 4.25 M/UL — SIGNIFICANT CHANGE UP (ref 3.8–5.2)
RBC # BLD: 4.25 M/UL — SIGNIFICANT CHANGE UP (ref 3.8–5.2)
RBC # BLD: 4.26 M/UL — SIGNIFICANT CHANGE UP (ref 3.8–5.2)
RBC # BLD: 4.3 M/UL — SIGNIFICANT CHANGE UP (ref 3.8–5.2)
RBC # BLD: 4.4 M/UL — SIGNIFICANT CHANGE UP (ref 3.8–5.2)
RBC # BLD: 4.4 M/UL — SIGNIFICANT CHANGE UP (ref 3.8–5.2)
RBC # BLD: 4.41 M/UL — SIGNIFICANT CHANGE UP (ref 3.8–5.2)
RBC # BLD: 4.44 M/UL — SIGNIFICANT CHANGE UP (ref 3.8–5.2)
RBC # BLD: 4.51 M/UL — SIGNIFICANT CHANGE UP (ref 3.8–5.2)
RBC # BLD: 4.57 M/UL — SIGNIFICANT CHANGE UP (ref 3.8–5.2)
RBC # CSF: 0 CELLS/UL — SIGNIFICANT CHANGE UP (ref 0–0)
RBC # FLD: 13.2 % — SIGNIFICANT CHANGE UP (ref 10.3–14.5)
RBC # FLD: 13.2 % — SIGNIFICANT CHANGE UP (ref 10.3–14.5)
RBC # FLD: 13.5 % — SIGNIFICANT CHANGE UP (ref 10.3–14.5)
RBC # FLD: 13.5 % — SIGNIFICANT CHANGE UP (ref 10.3–14.5)
RBC # FLD: 13.6 % — SIGNIFICANT CHANGE UP (ref 10.3–14.5)
RBC # FLD: 13.7 % — SIGNIFICANT CHANGE UP (ref 10.3–14.5)
RBC # FLD: 14 % — SIGNIFICANT CHANGE UP (ref 10.3–14.5)
RBC # FLD: 14.1 % — SIGNIFICANT CHANGE UP (ref 10.3–14.5)
RBC # FLD: 14.1 % — SIGNIFICANT CHANGE UP (ref 10.3–14.5)
RBC # FLD: 14.2 % — SIGNIFICANT CHANGE UP (ref 10.3–14.5)
RBC # FLD: 14.2 % — SIGNIFICANT CHANGE UP (ref 10.3–14.5)
RBC # FLD: 14.3 % — SIGNIFICANT CHANGE UP (ref 10.3–14.5)
RBC # FLD: 14.3 % — SIGNIFICANT CHANGE UP (ref 10.3–14.5)
RBC # FLD: 14.4 % — SIGNIFICANT CHANGE UP (ref 10.3–14.5)
RBC # FLD: 14.5 % — SIGNIFICANT CHANGE UP (ref 10.3–14.5)
RBC # FLD: 14.9 % — HIGH (ref 10.3–14.5)
RBC # FLD: 15.5 % — HIGH (ref 10.3–14.5)
RBC # FLD: 15.6 % — HIGH (ref 10.3–14.5)
RBC # FLD: 16 % — HIGH (ref 10.3–14.5)
RBC # FLD: 16.1 % — HIGH (ref 10.3–14.5)
RBC # FLD: 16.1 % — HIGH (ref 10.3–14.5)
RBC # FLD: 16.2 % — HIGH (ref 10.3–14.5)
RBC # FLD: 16.2 % — HIGH (ref 10.3–14.5)
RBC # FLD: 16.3 % — HIGH (ref 10.3–14.5)
RBC # FLD: 16.3 % — HIGH (ref 10.3–14.5)
RBC # FLD: 16.4 % — HIGH (ref 10.3–14.5)
RBC # FLD: 16.8 % — HIGH (ref 10.3–14.5)
RBC # FLD: 17.1 % — HIGH (ref 10.3–14.5)
RBC # FLD: 17.5 % — HIGH (ref 10.3–14.5)
RBC # FLD: 17.9 % — HIGH (ref 10.3–14.5)
RBC # FLD: 18 % — HIGH (ref 10.3–14.5)
RBC # FLD: 18 % — HIGH (ref 10.3–14.5)
RBC # FLD: 18.2 % — HIGH (ref 10.3–14.5)
RBC # FLD: 18.3 % — HIGH (ref 10.3–14.5)
RBC # FLD: 18.5 % — HIGH (ref 10.3–14.5)
RBC # FLD: 18.6 % — HIGH (ref 10.3–14.5)
RBC # FLD: 18.7 % — HIGH (ref 10.3–14.5)
RBC # FLD: 18.7 % — HIGH (ref 10.3–14.5)
RBC # FLD: 18.8 % — HIGH (ref 10.3–14.5)
RBC # FLD: 19 % — HIGH (ref 10.3–14.5)
RBC # FLD: 19.1 % — HIGH (ref 10.3–14.5)
RBC # FLD: 19.2 % — HIGH (ref 10.3–14.5)
RBC # FLD: 19.3 % — HIGH (ref 10.3–14.5)
RBC # FLD: 19.4 % — HIGH (ref 10.3–14.5)
RBC # FLD: 19.4 % — HIGH (ref 10.3–14.5)
RBC # FLD: 19.5 % — HIGH (ref 10.3–14.5)
RBC # FLD: 19.5 % — HIGH (ref 10.3–14.5)
RBC # FLD: 19.6 % — HIGH (ref 10.3–14.5)
RBC # FLD: 19.6 % — HIGH (ref 10.3–14.5)
RBC # FLD: 19.7 % — HIGH (ref 10.3–14.5)
RBC # FLD: 19.8 % — HIGH (ref 10.3–14.5)
RBC # FLD: 19.9 % — HIGH (ref 10.3–14.5)
RBC # FLD: 19.9 % — HIGH (ref 10.3–14.5)
RBC # FLD: 20.8 % — HIGH (ref 10.3–14.5)
RBC BLD AUTO: ABNORMAL
RBC BLD AUTO: ABNORMAL
RBC CASTS # UR COMP ASSIST: 7 /HPF — HIGH (ref 0–4)
RCV VOL RI: 2000 CELLS/UL — HIGH (ref 0–5)
RCV VOL RI: 5000 CELLS/UL — HIGH (ref 0–5)
RCV VOL RI: 6000 CELLS/UL — HIGH (ref 0–5)
RCV VOL RI: 9000 CELLS/UL — HIGH (ref 0–5)
RH IG SCN BLD-IMP: POSITIVE — SIGNIFICANT CHANGE UP
RSV RNA SPEC QL NAA+PROBE: SIGNIFICANT CHANGE UP
RSV RNA SPEC QL NAA+PROBE: SIGNIFICANT CHANGE UP
RV+EV RNA SPEC QL NAA+PROBE: SIGNIFICANT CHANGE UP
RV+EV RNA SPEC QL NAA+PROBE: SIGNIFICANT CHANGE UP
S AUREUS DNA NOSE QL NAA+PROBE: SIGNIFICANT CHANGE UP
S AUREUS DNA NOSE QL NAA+PROBE: SIGNIFICANT CHANGE UP
SAO2 % BLDA: 98.8 % — HIGH (ref 94–98)
SAO2 % BLDA: 99.2 % — HIGH (ref 94–98)
SAO2 % BLDA: 99.4 % — HIGH (ref 94–98)
SAO2 % BLDV: 82.6 % — SIGNIFICANT CHANGE UP
SAO2 % BLDV: 86.5 % — HIGH (ref 60–85)
SAO2 % BLDV: 89.3 % — SIGNIFICANT CHANGE UP
SAO2 % BLDV: 91.7 % — SIGNIFICANT CHANGE UP
SAO2 % BLDV: 92.7 % — SIGNIFICANT CHANGE UP
SAO2 % BLDV: 93.3 % — SIGNIFICANT CHANGE UP
SAO2 % BLDV: 98.5 % — SIGNIFICANT CHANGE UP
SARS-COV-2 IGG+IGM SERPL QL IA: >250 U/ML — HIGH
SARS-COV-2 IGG+IGM SERPL QL IA: >250 U/ML — HIGH
SARS-COV-2 IGG+IGM SERPL QL IA: POSITIVE
SARS-COV-2 IGG+IGM SERPL QL IA: POSITIVE
SARS-COV-2 RNA SPEC QL NAA+PROBE: SIGNIFICANT CHANGE UP
SCHISTOCYTES BLD QL AUTO: SLIGHT — SIGNIFICANT CHANGE UP
SODIUM SERPL-SCNC: 127 MMOL/L — LOW (ref 135–145)
SODIUM SERPL-SCNC: 129 MMOL/L — LOW (ref 135–145)
SODIUM SERPL-SCNC: 130 MMOL/L — LOW (ref 135–145)
SODIUM SERPL-SCNC: 130 MMOL/L — LOW (ref 135–145)
SODIUM SERPL-SCNC: 131 MMOL/L — LOW (ref 135–145)
SODIUM SERPL-SCNC: 132 MMOL/L — LOW (ref 135–145)
SODIUM SERPL-SCNC: 133 MMOL/L — LOW (ref 135–145)
SODIUM SERPL-SCNC: 134 MMOL/L — LOW (ref 135–145)
SODIUM SERPL-SCNC: 135 MMOL/L — SIGNIFICANT CHANGE UP (ref 135–145)
SODIUM SERPL-SCNC: 136 MMOL/L — SIGNIFICANT CHANGE UP (ref 135–145)
SODIUM SERPL-SCNC: 137 MMOL/L — SIGNIFICANT CHANGE UP (ref 135–145)
SODIUM SERPL-SCNC: 138 MMOL/L — SIGNIFICANT CHANGE UP (ref 135–145)
SODIUM SERPL-SCNC: 139 MMOL/L — SIGNIFICANT CHANGE UP (ref 135–145)
SODIUM SERPL-SCNC: 140 MMOL/L — SIGNIFICANT CHANGE UP (ref 135–145)
SODIUM SERPL-SCNC: 141 MMOL/L — SIGNIFICANT CHANGE UP (ref 135–145)
SODIUM SERPL-SCNC: 142 MMOL/L — SIGNIFICANT CHANGE UP (ref 135–145)
SODIUM SERPL-SCNC: 143 MMOL/L — SIGNIFICANT CHANGE UP (ref 135–145)
SODIUM SERPL-SCNC: 144 MMOL/L — SIGNIFICANT CHANGE UP (ref 135–145)
SODIUM SERPL-SCNC: 144 MMOL/L — SIGNIFICANT CHANGE UP (ref 135–145)
SODIUM SERPL-SCNC: 145 MMOL/L — SIGNIFICANT CHANGE UP (ref 135–145)
SODIUM SERPL-SCNC: 152 MMOL/L — HIGH (ref 135–145)
SODIUM UR-SCNC: 67 MMOL/L — SIGNIFICANT CHANGE UP
SODIUM UR-SCNC: <20 MMOL/L — SIGNIFICANT CHANGE UP
SOURCE HSV 1/2: SIGNIFICANT CHANGE UP
SP GR SPEC: 1.02 — SIGNIFICANT CHANGE UP (ref 1.01–1.02)
SP GR SPEC: 1.03 — HIGH (ref 1.01–1.02)
SP GR SPEC: 1.03 — SIGNIFICANT CHANGE UP (ref 1–1.05)
SPECIMEN SOURCE: SIGNIFICANT CHANGE UP
STRONGYLOIDES AB SER-ACNC: POSITIVE
TARGETS BLD QL SMEAR: SLIGHT — SIGNIFICANT CHANGE UP
TM INTERPRETATION: SIGNIFICANT CHANGE UP
TOTAL CELLS COUNTED, BODY FLUID: 100 CELLS — SIGNIFICANT CHANGE UP
TOTAL CELLS COUNTED, BODY FLUID: 30 CELLS — SIGNIFICANT CHANGE UP
TOTAL CELLS COUNTED, SPINAL FLUID: 30 CELLS — SIGNIFICANT CHANGE UP
TOTAL NUCLEATED CELL COUNT, BODY FLUID: 3974 CELLS/UL — HIGH (ref 0–5)
TOTAL NUCLEATED CELL COUNT, BODY FLUID: 5996 CELLS/UL — HIGH (ref 0–5)
TOTAL NUCLEATED CELL COUNT, BODY FLUID: 7177 CELLS/UL — HIGH (ref 0–5)
TOTAL NUCLEATED CELL COUNT, BODY FLUID: 8670 CELLS/UL — HIGH (ref 0–5)
TRIGL FLD-MCNC: 73 MG/DL — SIGNIFICANT CHANGE UP
TROPONIN T, HIGH SENSITIVITY RESULT: 24 NG/L — SIGNIFICANT CHANGE UP
TROPONIN T, HIGH SENSITIVITY RESULT: 26 NG/L — SIGNIFICANT CHANGE UP
TSH SERPL-MCNC: 1.85 UIU/ML — SIGNIFICANT CHANGE UP (ref 0.27–4.2)
TUBE TYPE: SIGNIFICANT CHANGE UP
URATE SERPL-MCNC: 2.1 MG/DL — LOW (ref 2.5–7)
URATE SERPL-MCNC: 2.2 MG/DL — LOW (ref 2.5–7)
URATE SERPL-MCNC: 2.2 MG/DL — LOW (ref 2.5–7)
URATE SERPL-MCNC: 2.3 MG/DL — LOW (ref 2.5–7)
URATE SERPL-MCNC: 2.4 MG/DL — LOW (ref 2.5–7)
URATE SERPL-MCNC: 2.4 MG/DL — LOW (ref 2.5–7)
URATE SERPL-MCNC: 2.5 MG/DL — SIGNIFICANT CHANGE UP (ref 2.5–7)
URATE SERPL-MCNC: 2.6 MG/DL — SIGNIFICANT CHANGE UP (ref 2.5–7)
URATE SERPL-MCNC: 2.7 MG/DL — SIGNIFICANT CHANGE UP (ref 2.5–7)
URATE SERPL-MCNC: 2.8 MG/DL — SIGNIFICANT CHANGE UP (ref 2.5–7)
URATE SERPL-MCNC: 2.9 MG/DL — SIGNIFICANT CHANGE UP (ref 2.5–7)
URATE SERPL-MCNC: 3 MG/DL — SIGNIFICANT CHANGE UP (ref 2.5–7)
URATE SERPL-MCNC: 3.2 MG/DL — SIGNIFICANT CHANGE UP (ref 2.5–7)
URATE SERPL-MCNC: 3.3 MG/DL — SIGNIFICANT CHANGE UP (ref 2.5–7)
URATE SERPL-MCNC: 3.3 MG/DL — SIGNIFICANT CHANGE UP (ref 2.5–7)
URATE SERPL-MCNC: 3.5 MG/DL — SIGNIFICANT CHANGE UP (ref 2.5–7)
URATE SERPL-MCNC: 3.6 MG/DL — SIGNIFICANT CHANGE UP (ref 2.5–7)
URATE SERPL-MCNC: 3.7 MG/DL — SIGNIFICANT CHANGE UP (ref 2.5–7)
URATE SERPL-MCNC: 3.9 MG/DL — SIGNIFICANT CHANGE UP (ref 2.5–7)
URATE SERPL-MCNC: 3.9 MG/DL — SIGNIFICANT CHANGE UP (ref 2.5–7)
URATE SERPL-MCNC: 4 MG/DL — SIGNIFICANT CHANGE UP (ref 2.5–7)
URATE SERPL-MCNC: 4.3 MG/DL — SIGNIFICANT CHANGE UP (ref 2.5–7)
URATE SERPL-MCNC: 4.6 MG/DL — SIGNIFICANT CHANGE UP (ref 2.5–7)
URATE SERPL-MCNC: 4.8 MG/DL — SIGNIFICANT CHANGE UP (ref 2.5–7)
URATE SERPL-MCNC: 5 MG/DL — SIGNIFICANT CHANGE UP (ref 2.5–7)
URATE SERPL-MCNC: 5.1 MG/DL — SIGNIFICANT CHANGE UP (ref 2.5–7)
URATE SERPL-MCNC: 5.2 MG/DL — SIGNIFICANT CHANGE UP (ref 2.5–7)
URATE SERPL-MCNC: 5.3 MG/DL — SIGNIFICANT CHANGE UP (ref 2.5–7)
URATE SERPL-MCNC: 5.4 MG/DL — SIGNIFICANT CHANGE UP (ref 2.5–7)
URATE SERPL-MCNC: 5.5 MG/DL — SIGNIFICANT CHANGE UP (ref 2.5–7)
URATE SERPL-MCNC: 5.8 MG/DL — SIGNIFICANT CHANGE UP (ref 2.5–7)
URATE SERPL-MCNC: 6.2 MG/DL — SIGNIFICANT CHANGE UP (ref 2.5–7)
URATE SERPL-MCNC: 6.2 MG/DL — SIGNIFICANT CHANGE UP (ref 2.5–7)
URATE SERPL-MCNC: 6.4 MG/DL — SIGNIFICANT CHANGE UP (ref 2.5–7)
URATE SERPL-MCNC: 6.7 MG/DL — SIGNIFICANT CHANGE UP (ref 2.5–7)
URATE SERPL-MCNC: 6.9 MG/DL — SIGNIFICANT CHANGE UP (ref 2.5–7)
URATE SERPL-MCNC: 7 MG/DL — SIGNIFICANT CHANGE UP (ref 2.5–7)
URATE SERPL-MCNC: 7.1 MG/DL — HIGH (ref 2.5–7)
URATE SERPL-MCNC: 7.1 MG/DL — HIGH (ref 2.5–7)
URATE SERPL-MCNC: 7.2 MG/DL — HIGH (ref 2.5–7)
URATE SERPL-MCNC: 7.5 MG/DL — HIGH (ref 2.5–7)
URATE SERPL-MCNC: 7.5 MG/DL — HIGH (ref 2.5–7)
URATE SERPL-MCNC: 7.6 MG/DL — HIGH (ref 2.5–7)
URATE SERPL-MCNC: 7.9 MG/DL — HIGH (ref 2.5–7)
URATE SERPL-MCNC: 8.7 MG/DL — HIGH (ref 2.5–7)
URATE SERPL-MCNC: 9.3 MG/DL — HIGH (ref 2.5–7)
UROBILINOGEN FLD QL: SIGNIFICANT CHANGE UP
UUN UR-MCNC: 454 MG/DL — SIGNIFICANT CHANGE UP
VANCOMYCIN FLD-MCNC: 13.9 UG/ML — SIGNIFICANT CHANGE UP
VANCOMYCIN FLD-MCNC: 26 UG/ML
VANCOMYCIN FLD-MCNC: 28.2 UG/ML
VANCOMYCIN FLD-MCNC: 33.9 UG/ML
VANCOMYCIN TROUGH SERPL-MCNC: 20.2 UG/ML — HIGH (ref 10–20)
VANCOMYCIN TROUGH SERPL-MCNC: 24.7 UG/ML — HIGH (ref 10–20)
VANCOMYCIN TROUGH SERPL-MCNC: 26.5 UG/ML — CRITICAL HIGH (ref 10–20)
VANCOMYCIN TROUGH SERPL-MCNC: 31.9 UG/ML — CRITICAL HIGH (ref 10–20)
VANCOMYCIN TROUGH SERPL-MCNC: 40.3 UG/ML — CRITICAL HIGH (ref 10–20)
VARIANT LYMPHS # BLD: 16.7 % — HIGH (ref 0–6)
WBC # BLD: 0.04 K/UL — CRITICAL LOW (ref 3.8–10.5)
WBC # BLD: 0.04 K/UL — CRITICAL LOW (ref 3.8–10.5)
WBC # BLD: 0.05 K/UL — CRITICAL LOW (ref 3.8–10.5)
WBC # BLD: 0.05 K/UL — CRITICAL LOW (ref 3.8–10.5)
WBC # BLD: 0.06 K/UL — CRITICAL LOW (ref 3.8–10.5)
WBC # BLD: 0.07 K/UL — CRITICAL LOW (ref 3.8–10.5)
WBC # BLD: 0.07 K/UL — CRITICAL LOW (ref 3.8–10.5)
WBC # BLD: 0.08 K/UL — CRITICAL LOW (ref 3.8–10.5)
WBC # BLD: 0.08 K/UL — CRITICAL LOW (ref 3.8–10.5)
WBC # BLD: 0.09 K/UL — CRITICAL LOW (ref 3.8–10.5)
WBC # BLD: 0.1 K/UL — CRITICAL LOW (ref 3.8–10.5)
WBC # BLD: 0.11 K/UL — CRITICAL LOW (ref 3.8–10.5)
WBC # BLD: 0.12 K/UL — CRITICAL LOW (ref 3.8–10.5)
WBC # BLD: 0.14 K/UL — CRITICAL LOW (ref 3.8–10.5)
WBC # BLD: 0.2 K/UL — CRITICAL LOW (ref 3.8–10.5)
WBC # BLD: 0.82 K/UL — CRITICAL LOW (ref 3.8–10.5)
WBC # BLD: 1.8 K/UL — LOW (ref 3.8–10.5)
WBC # BLD: 10.12 K/UL — SIGNIFICANT CHANGE UP (ref 3.8–10.5)
WBC # BLD: 10.67 K/UL — HIGH (ref 3.8–10.5)
WBC # BLD: 11.17 K/UL — HIGH (ref 3.8–10.5)
WBC # BLD: 11.19 K/UL — HIGH (ref 3.8–10.5)
WBC # BLD: 11.3 K/UL — HIGH (ref 3.8–10.5)
WBC # BLD: 11.59 K/UL — HIGH (ref 3.8–10.5)
WBC # BLD: 12.11 K/UL — HIGH (ref 3.8–10.5)
WBC # BLD: 12.41 K/UL — HIGH (ref 3.8–10.5)
WBC # BLD: 12.43 K/UL — HIGH (ref 3.8–10.5)
WBC # BLD: 12.79 K/UL — HIGH (ref 3.8–10.5)
WBC # BLD: 12.82 K/UL — HIGH (ref 3.8–10.5)
WBC # BLD: 13.27 K/UL — HIGH (ref 3.8–10.5)
WBC # BLD: 13.4 K/UL — HIGH (ref 3.8–10.5)
WBC # BLD: 13.62 K/UL — HIGH (ref 3.8–10.5)
WBC # BLD: 13.76 K/UL — HIGH (ref 3.8–10.5)
WBC # BLD: 13.85 K/UL — HIGH (ref 3.8–10.5)
WBC # BLD: 14.09 K/UL — HIGH (ref 3.8–10.5)
WBC # BLD: 14.9 K/UL — HIGH (ref 3.8–10.5)
WBC # BLD: 15.03 K/UL — HIGH (ref 3.8–10.5)
WBC # BLD: 15.15 K/UL — HIGH (ref 3.8–10.5)
WBC # BLD: 15.19 K/UL — HIGH (ref 3.8–10.5)
WBC # BLD: 15.72 K/UL — HIGH (ref 3.8–10.5)
WBC # BLD: 15.93 K/UL — HIGH (ref 3.8–10.5)
WBC # BLD: 16.2 K/UL — HIGH (ref 3.8–10.5)
WBC # BLD: 16.36 K/UL — HIGH (ref 3.8–10.5)
WBC # BLD: 16.48 K/UL — HIGH (ref 3.8–10.5)
WBC # BLD: 16.52 K/UL — HIGH (ref 3.8–10.5)
WBC # BLD: 16.55 K/UL — HIGH (ref 3.8–10.5)
WBC # BLD: 16.63 K/UL — HIGH (ref 3.8–10.5)
WBC # BLD: 16.98 K/UL — HIGH (ref 3.8–10.5)
WBC # BLD: 17.11 K/UL — HIGH (ref 3.8–10.5)
WBC # BLD: 17.16 K/UL — HIGH (ref 3.8–10.5)
WBC # BLD: 17.23 K/UL — HIGH (ref 3.8–10.5)
WBC # BLD: 17.55 K/UL — HIGH (ref 3.8–10.5)
WBC # BLD: 17.6 K/UL — HIGH (ref 3.8–10.5)
WBC # BLD: 18.7 K/UL — HIGH (ref 3.8–10.5)
WBC # BLD: 18.77 K/UL — HIGH (ref 3.8–10.5)
WBC # BLD: 18.87 K/UL — HIGH (ref 3.8–10.5)
WBC # BLD: 19.22 K/UL — HIGH (ref 3.8–10.5)
WBC # BLD: 2.65 K/UL — LOW (ref 3.8–10.5)
WBC # BLD: 21.44 K/UL — HIGH (ref 3.8–10.5)
WBC # BLD: 21.58 K/UL — HIGH (ref 3.8–10.5)
WBC # BLD: 3.57 K/UL — LOW (ref 3.8–10.5)
WBC # BLD: 6.99 K/UL — SIGNIFICANT CHANGE UP (ref 3.8–10.5)
WBC # BLD: 7.48 K/UL — SIGNIFICANT CHANGE UP (ref 3.8–10.5)
WBC # BLD: 7.87 K/UL — SIGNIFICANT CHANGE UP (ref 3.8–10.5)
WBC # BLD: 9.03 K/UL — SIGNIFICANT CHANGE UP (ref 3.8–10.5)
WBC # BLD: 9.95 K/UL — SIGNIFICANT CHANGE UP (ref 3.8–10.5)
WBC # FLD AUTO: 0.04 K/UL — CRITICAL LOW (ref 3.8–10.5)
WBC # FLD AUTO: 0.04 K/UL — CRITICAL LOW (ref 3.8–10.5)
WBC # FLD AUTO: 0.05 K/UL — CRITICAL LOW (ref 3.8–10.5)
WBC # FLD AUTO: 0.05 K/UL — CRITICAL LOW (ref 3.8–10.5)
WBC # FLD AUTO: 0.06 K/UL — CRITICAL LOW (ref 3.8–10.5)
WBC # FLD AUTO: 0.07 K/UL — CRITICAL LOW (ref 3.8–10.5)
WBC # FLD AUTO: 0.07 K/UL — CRITICAL LOW (ref 3.8–10.5)
WBC # FLD AUTO: 0.08 K/UL — CRITICAL LOW (ref 3.8–10.5)
WBC # FLD AUTO: 0.08 K/UL — CRITICAL LOW (ref 3.8–10.5)
WBC # FLD AUTO: 0.09 K/UL — CRITICAL LOW (ref 3.8–10.5)
WBC # FLD AUTO: 0.1 K/UL — CRITICAL LOW (ref 3.8–10.5)
WBC # FLD AUTO: 0.11 K/UL — CRITICAL LOW (ref 3.8–10.5)
WBC # FLD AUTO: 0.12 K/UL — CRITICAL LOW (ref 3.8–10.5)
WBC # FLD AUTO: 0.14 K/UL — CRITICAL LOW (ref 3.8–10.5)
WBC # FLD AUTO: 0.2 K/UL — CRITICAL LOW (ref 3.8–10.5)
WBC # FLD AUTO: 0.82 K/UL — CRITICAL LOW (ref 3.8–10.5)
WBC # FLD AUTO: 1.8 K/UL — LOW (ref 3.8–10.5)
WBC # FLD AUTO: 10.12 K/UL — SIGNIFICANT CHANGE UP (ref 3.8–10.5)
WBC # FLD AUTO: 10.67 K/UL — HIGH (ref 3.8–10.5)
WBC # FLD AUTO: 11.17 K/UL — HIGH (ref 3.8–10.5)
WBC # FLD AUTO: 11.19 K/UL — HIGH (ref 3.8–10.5)
WBC # FLD AUTO: 11.3 K/UL — HIGH (ref 3.8–10.5)
WBC # FLD AUTO: 11.59 K/UL — HIGH (ref 3.8–10.5)
WBC # FLD AUTO: 12.11 K/UL — HIGH (ref 3.8–10.5)
WBC # FLD AUTO: 12.41 K/UL — HIGH (ref 3.8–10.5)
WBC # FLD AUTO: 12.43 K/UL — HIGH (ref 3.8–10.5)
WBC # FLD AUTO: 12.79 K/UL — HIGH (ref 3.8–10.5)
WBC # FLD AUTO: 12.82 K/UL — HIGH (ref 3.8–10.5)
WBC # FLD AUTO: 13.27 K/UL — HIGH (ref 3.8–10.5)
WBC # FLD AUTO: 13.4 K/UL — HIGH (ref 3.8–10.5)
WBC # FLD AUTO: 13.62 K/UL — HIGH (ref 3.8–10.5)
WBC # FLD AUTO: 13.76 K/UL — HIGH (ref 3.8–10.5)
WBC # FLD AUTO: 13.85 K/UL — HIGH (ref 3.8–10.5)
WBC # FLD AUTO: 14.09 K/UL — HIGH (ref 3.8–10.5)
WBC # FLD AUTO: 14.9 K/UL — HIGH (ref 3.8–10.5)
WBC # FLD AUTO: 15.03 K/UL — HIGH (ref 3.8–10.5)
WBC # FLD AUTO: 15.15 K/UL — HIGH (ref 3.8–10.5)
WBC # FLD AUTO: 15.19 K/UL — HIGH (ref 3.8–10.5)
WBC # FLD AUTO: 15.72 K/UL — HIGH (ref 3.8–10.5)
WBC # FLD AUTO: 15.93 K/UL — HIGH (ref 3.8–10.5)
WBC # FLD AUTO: 16.2 K/UL — HIGH (ref 3.8–10.5)
WBC # FLD AUTO: 16.36 K/UL — HIGH (ref 3.8–10.5)
WBC # FLD AUTO: 16.48 K/UL — HIGH (ref 3.8–10.5)
WBC # FLD AUTO: 16.52 K/UL — HIGH (ref 3.8–10.5)
WBC # FLD AUTO: 16.55 K/UL — HIGH (ref 3.8–10.5)
WBC # FLD AUTO: 16.63 K/UL — HIGH (ref 3.8–10.5)
WBC # FLD AUTO: 16.98 K/UL — HIGH (ref 3.8–10.5)
WBC # FLD AUTO: 17.11 K/UL — HIGH (ref 3.8–10.5)
WBC # FLD AUTO: 17.16 K/UL — HIGH (ref 3.8–10.5)
WBC # FLD AUTO: 17.23 K/UL — HIGH (ref 3.8–10.5)
WBC # FLD AUTO: 17.55 K/UL — HIGH (ref 3.8–10.5)
WBC # FLD AUTO: 17.6 K/UL — HIGH (ref 3.8–10.5)
WBC # FLD AUTO: 18.7 K/UL — HIGH (ref 3.8–10.5)
WBC # FLD AUTO: 18.77 K/UL — HIGH (ref 3.8–10.5)
WBC # FLD AUTO: 18.87 K/UL — HIGH (ref 3.8–10.5)
WBC # FLD AUTO: 19.22 K/UL — HIGH (ref 3.8–10.5)
WBC # FLD AUTO: 2.65 K/UL — LOW (ref 3.8–10.5)
WBC # FLD AUTO: 21.44 K/UL — HIGH (ref 3.8–10.5)
WBC # FLD AUTO: 21.58 K/UL — HIGH (ref 3.8–10.5)
WBC # FLD AUTO: 3.57 K/UL — LOW (ref 3.8–10.5)
WBC # FLD AUTO: 6.99 K/UL — SIGNIFICANT CHANGE UP (ref 3.8–10.5)
WBC # FLD AUTO: 7.48 K/UL — SIGNIFICANT CHANGE UP (ref 3.8–10.5)
WBC # FLD AUTO: 7.87 K/UL — SIGNIFICANT CHANGE UP (ref 3.8–10.5)
WBC # FLD AUTO: 9.03 K/UL — SIGNIFICANT CHANGE UP (ref 3.8–10.5)
WBC # FLD AUTO: 9.95 K/UL — SIGNIFICANT CHANGE UP (ref 3.8–10.5)
WBC UR QL: >50 /HPF — SIGNIFICANT CHANGE UP (ref 0–5)

## 2021-01-01 PROCEDURE — 99291 CRITICAL CARE FIRST HOUR: CPT | Mod: 25

## 2021-01-01 PROCEDURE — 99232 SBSQ HOSP IP/OBS MODERATE 35: CPT | Mod: GC

## 2021-01-01 PROCEDURE — 88112 CYTOPATH CELL ENHANCE TECH: CPT | Mod: 26

## 2021-01-01 PROCEDURE — 77001 FLUOROGUIDE FOR VEIN DEVICE: CPT | Mod: 26,GC

## 2021-01-01 PROCEDURE — 99285 EMERGENCY DEPT VISIT HI MDM: CPT | Mod: 25,GC

## 2021-01-01 PROCEDURE — 99233 SBSQ HOSP IP/OBS HIGH 50: CPT | Mod: GC

## 2021-01-01 PROCEDURE — 88305 TISSUE EXAM BY PATHOLOGIST: CPT | Mod: 26

## 2021-01-01 PROCEDURE — 49083 ABD PARACENTESIS W/IMAGING: CPT

## 2021-01-01 PROCEDURE — 99231 SBSQ HOSP IP/OBS SF/LOW 25: CPT

## 2021-01-01 PROCEDURE — 99223 1ST HOSP IP/OBS HIGH 75: CPT | Mod: GC

## 2021-01-01 PROCEDURE — 99291 CRITICAL CARE FIRST HOUR: CPT | Mod: 25,GC

## 2021-01-01 PROCEDURE — 32550 INSERT PLEURAL CATH: CPT

## 2021-01-01 PROCEDURE — 36556 INSERT NON-TUNNEL CV CATH: CPT

## 2021-01-01 PROCEDURE — 99232 SBSQ HOSP IP/OBS MODERATE 35: CPT

## 2021-01-01 PROCEDURE — 36620 INSERTION CATHETER ARTERY: CPT | Mod: GC

## 2021-01-01 PROCEDURE — 76604 US EXAM CHEST: CPT | Mod: 26,GC

## 2021-01-01 PROCEDURE — 74176 CT ABD & PELVIS W/O CONTRAST: CPT | Mod: 26

## 2021-01-01 PROCEDURE — 88365 INSITU HYBRIDIZATION (FISH): CPT | Mod: 26,59

## 2021-01-01 PROCEDURE — G0452: CPT | Mod: 26

## 2021-01-01 PROCEDURE — 71045 X-RAY EXAM CHEST 1 VIEW: CPT | Mod: 26,76

## 2021-01-01 PROCEDURE — 99233 SBSQ HOSP IP/OBS HIGH 50: CPT

## 2021-01-01 PROCEDURE — 99291 CRITICAL CARE FIRST HOUR: CPT

## 2021-01-01 PROCEDURE — 76700 US EXAM ABDOM COMPLETE: CPT | Mod: 26

## 2021-01-01 PROCEDURE — 76705 ECHO EXAM OF ABDOMEN: CPT | Mod: 26

## 2021-01-01 PROCEDURE — 76604 US EXAM CHEST: CPT | Mod: 26

## 2021-01-01 PROCEDURE — 43752 NASAL/OROGASTRIC W/TUBE PLMT: CPT

## 2021-01-01 PROCEDURE — 78582 LUNG VENTILAT&PERFUS IMAGING: CPT | Mod: 26,GC

## 2021-01-01 PROCEDURE — 88342 IMHCHEM/IMCYTCHM 1ST ANTB: CPT | Mod: 26,59

## 2021-01-01 PROCEDURE — 71045 X-RAY EXAM CHEST 1 VIEW: CPT | Mod: 26

## 2021-01-01 PROCEDURE — 93010 ELECTROCARDIOGRAM REPORT: CPT

## 2021-01-01 PROCEDURE — 88360 TUMOR IMMUNOHISTOCHEM/MANUAL: CPT | Mod: 26

## 2021-01-01 PROCEDURE — 88188 FLOWCYTOMETRY/READ 9-15: CPT

## 2021-01-01 PROCEDURE — 71045 X-RAY EXAM CHEST 1 VIEW: CPT | Mod: 26,77

## 2021-01-01 PROCEDURE — 76705 ECHO EXAM OF ABDOMEN: CPT | Mod: 26,GC

## 2021-01-01 PROCEDURE — 93308 TTE F-UP OR LMTD: CPT | Mod: 26

## 2021-01-01 PROCEDURE — 93308 TTE F-UP OR LMTD: CPT | Mod: 26,GC

## 2021-01-01 PROCEDURE — 31500 INSERT EMERGENCY AIRWAY: CPT

## 2021-01-01 PROCEDURE — 88112 CYTOPATH CELL ENHANCE TECH: CPT | Mod: 26,59

## 2021-01-01 PROCEDURE — 99223 1ST HOSP IP/OBS HIGH 75: CPT | Mod: GC,AI

## 2021-01-01 PROCEDURE — 71250 CT THORAX DX C-: CPT | Mod: 26

## 2021-01-01 PROCEDURE — 88108 CYTOPATH CONCENTRATE TECH: CPT | Mod: 26

## 2021-01-01 PROCEDURE — 74018 RADEX ABDOMEN 1 VIEW: CPT | Mod: 26

## 2021-01-01 PROCEDURE — 99222 1ST HOSP IP/OBS MODERATE 55: CPT | Mod: GC

## 2021-01-01 PROCEDURE — 88108 CYTOPATH CONCENTRATE TECH: CPT | Mod: 26,59

## 2021-01-01 PROCEDURE — 99233 SBSQ HOSP IP/OBS HIGH 50: CPT | Mod: GC,25

## 2021-01-01 PROCEDURE — 88189 FLOWCYTOMETRY/READ 16 & >: CPT

## 2021-01-01 PROCEDURE — 84165 PROTEIN E-PHORESIS SERUM: CPT | Mod: 26

## 2021-01-01 PROCEDURE — 88341 IMHCHEM/IMCYTCHM EA ADD ANTB: CPT | Mod: 26,59

## 2021-01-01 PROCEDURE — 88367 INSITU HYBRIDIZATION AUTO: CPT | Mod: 26

## 2021-01-01 PROCEDURE — 88341 IMHCHEM/IMCYTCHM EA ADD ANTB: CPT | Mod: 26

## 2021-01-01 PROCEDURE — 74177 CT ABD & PELVIS W/CONTRAST: CPT | Mod: 26

## 2021-01-01 PROCEDURE — 36556 INSERT NON-TUNNEL CV CATH: CPT | Mod: GC

## 2021-01-01 PROCEDURE — 93970 EXTREMITY STUDY: CPT | Mod: 26

## 2021-01-01 PROCEDURE — 99222 1ST HOSP IP/OBS MODERATE 55: CPT

## 2021-01-01 PROCEDURE — 49083 ABD PARACENTESIS W/IMAGING: CPT | Mod: GC

## 2021-01-01 PROCEDURE — 70450 CT HEAD/BRAIN W/O DYE: CPT | Mod: 26

## 2021-01-01 PROCEDURE — 99232 SBSQ HOSP IP/OBS MODERATE 35: CPT | Mod: GC,57

## 2021-01-01 PROCEDURE — 76775 US EXAM ABDO BACK WALL LIM: CPT | Mod: 26,GC

## 2021-01-01 PROCEDURE — 99223 1ST HOSP IP/OBS HIGH 75: CPT

## 2021-01-01 PROCEDURE — 93010 ELECTROCARDIOGRAM REPORT: CPT | Mod: GC

## 2021-01-01 PROCEDURE — 99233 SBSQ HOSP IP/OBS HIGH 50: CPT | Mod: 25

## 2021-01-01 PROCEDURE — 76770 US EXAM ABDO BACK WALL COMP: CPT | Mod: 26

## 2021-01-01 PROCEDURE — 88307 TISSUE EXAM BY PATHOLOGIST: CPT | Mod: 26

## 2021-01-01 PROCEDURE — 71046 X-RAY EXAM CHEST 2 VIEWS: CPT | Mod: 26

## 2021-01-01 PROCEDURE — 36010 PLACE CATHETER IN VEIN: CPT

## 2021-01-01 PROCEDURE — 99239 HOSP IP/OBS DSCHRG MGMT >30: CPT | Mod: GC

## 2021-01-01 PROCEDURE — 93306 TTE W/DOPPLER COMPLETE: CPT | Mod: 26

## 2021-01-01 PROCEDURE — 32555 ASPIRATE PLEURA W/ IMAGING: CPT

## 2021-01-01 PROCEDURE — 88342 IMHCHEM/IMCYTCHM 1ST ANTB: CPT | Mod: 26

## 2021-01-01 PROCEDURE — 71260 CT THORAX DX C+: CPT | Mod: 26

## 2021-01-01 PROCEDURE — 62270 DX LMBR SPI PNXR: CPT

## 2021-01-01 PROCEDURE — 12345: CPT | Mod: NC,GC

## 2021-01-01 PROCEDURE — 76937 US GUIDE VASCULAR ACCESS: CPT | Mod: 26

## 2021-01-01 PROCEDURE — 32550 INSERT PLEURAL CATH: CPT | Mod: GC

## 2021-01-01 PROCEDURE — 38531 OPEN BX/EXC INGUINOFEM NODES: CPT

## 2021-01-01 PROCEDURE — 70491 CT SOFT TISSUE NECK W/DYE: CPT | Mod: 26

## 2021-01-01 PROCEDURE — 93930 UPPER EXTREMITY STUDY: CPT | Mod: 26

## 2021-01-01 RX ORDER — POTASSIUM PHOSPHATE, MONOBASIC POTASSIUM PHOSPHATE, DIBASIC 236; 224 MG/ML; MG/ML
15 INJECTION, SOLUTION INTRAVENOUS ONCE
Refills: 0 | Status: DISCONTINUED | OUTPATIENT
Start: 2021-01-01 | End: 2021-01-01

## 2021-01-01 RX ORDER — CHLORHEXIDINE GLUCONATE 213 G/1000ML
15 SOLUTION TOPICAL EVERY 12 HOURS
Refills: 0 | Status: DISCONTINUED | OUTPATIENT
Start: 2021-01-01 | End: 2021-01-01

## 2021-01-01 RX ORDER — DOXORUBICIN HYDROCHLORIDE 2 MG/ML
18 INJECTION, SOLUTION INTRAVENOUS EVERY 24 HOURS
Refills: 0 | Status: DISCONTINUED | OUTPATIENT
Start: 2021-01-01 | End: 2021-01-01

## 2021-01-01 RX ORDER — FENTANYL CITRATE 50 UG/ML
100 INJECTION INTRAVENOUS
Refills: 0 | Status: DISCONTINUED | OUTPATIENT
Start: 2021-01-01 | End: 2021-01-01

## 2021-01-01 RX ORDER — SEVELAMER CARBONATE 2400 MG/1
1200 POWDER, FOR SUSPENSION ORAL THREE TIMES A DAY
Refills: 0 | Status: DISCONTINUED | OUTPATIENT
Start: 2021-01-01 | End: 2021-01-01

## 2021-01-01 RX ORDER — MORPHINE SULFATE 50 MG/1
4 CAPSULE, EXTENDED RELEASE ORAL ONCE
Refills: 0 | Status: DISCONTINUED | OUTPATIENT
Start: 2021-01-01 | End: 2021-01-01

## 2021-01-01 RX ORDER — INSULIN HUMAN 100 [IU]/ML
5 INJECTION, SOLUTION SUBCUTANEOUS ONCE
Refills: 0 | Status: COMPLETED | OUTPATIENT
Start: 2021-01-01 | End: 2021-01-01

## 2021-01-01 RX ORDER — DIPHENHYDRAMINE HCL 50 MG
25 CAPSULE ORAL ONCE
Refills: 0 | Status: COMPLETED | OUTPATIENT
Start: 2021-01-01 | End: 2021-01-01

## 2021-01-01 RX ORDER — PHENYLEPHRINE HYDROCHLORIDE 10 MG/ML
0.5 INJECTION INTRAVENOUS
Qty: 160 | Refills: 0 | Status: DISCONTINUED | OUTPATIENT
Start: 2021-01-01 | End: 2021-01-01

## 2021-01-01 RX ORDER — ENOXAPARIN SODIUM 100 MG/ML
40 INJECTION SUBCUTANEOUS DAILY
Refills: 0 | Status: DISCONTINUED | OUTPATIENT
Start: 2021-01-01 | End: 2021-01-01

## 2021-01-01 RX ORDER — FENTANYL CITRATE 50 UG/ML
100 INJECTION INTRAVENOUS ONCE
Refills: 0 | Status: DISCONTINUED | OUTPATIENT
Start: 2021-01-01 | End: 2021-01-01

## 2021-01-01 RX ORDER — IBUPROFEN 200 MG
2 TABLET ORAL
Qty: 0 | Refills: 0 | DISCHARGE

## 2021-01-01 RX ORDER — CALCIUM GLUCONATE 100 MG/ML
2 VIAL (ML) INTRAVENOUS ONCE
Refills: 0 | Status: COMPLETED | OUTPATIENT
Start: 2021-01-01 | End: 2021-01-01

## 2021-01-01 RX ORDER — POTASSIUM CHLORIDE 20 MEQ
10 PACKET (EA) ORAL
Refills: 0 | Status: COMPLETED | OUTPATIENT
Start: 2021-01-01 | End: 2021-01-01

## 2021-01-01 RX ORDER — POLYETHYLENE GLYCOL 3350 17 G/17G
17 POWDER, FOR SOLUTION ORAL DAILY
Refills: 0 | Status: DISCONTINUED | OUTPATIENT
Start: 2021-01-01 | End: 2021-01-01

## 2021-01-01 RX ORDER — ACETAMINOPHEN 500 MG
650 TABLET ORAL ONCE
Refills: 0 | Status: COMPLETED | OUTPATIENT
Start: 2021-01-01 | End: 2021-01-01

## 2021-01-01 RX ORDER — PIPERACILLIN AND TAZOBACTAM 4; .5 G/20ML; G/20ML
3.38 INJECTION, POWDER, LYOPHILIZED, FOR SOLUTION INTRAVENOUS ONCE
Refills: 0 | Status: COMPLETED | OUTPATIENT
Start: 2021-01-01 | End: 2021-01-01

## 2021-01-01 RX ORDER — ALBUMIN HUMAN 25 %
100 VIAL (ML) INTRAVENOUS EVERY 6 HOURS
Refills: 0 | Status: COMPLETED | OUTPATIENT
Start: 2021-01-01 | End: 2021-01-01

## 2021-01-01 RX ORDER — POTASSIUM CHLORIDE 20 MEQ
40 PACKET (EA) ORAL EVERY 4 HOURS
Refills: 0 | Status: DISCONTINUED | OUTPATIENT
Start: 2021-01-01 | End: 2021-01-01

## 2021-01-01 RX ORDER — LIDOCAINE HCL 20 MG/ML
10 VIAL (ML) INJECTION ONCE
Refills: 0 | Status: COMPLETED | OUTPATIENT
Start: 2021-01-01 | End: 2021-01-01

## 2021-01-01 RX ORDER — MAGNESIUM SULFATE 500 MG/ML
2 VIAL (ML) INJECTION ONCE
Refills: 0 | Status: COMPLETED | OUTPATIENT
Start: 2021-01-01 | End: 2021-01-01

## 2021-01-01 RX ORDER — INSULIN HUMAN 100 [IU]/ML
10 INJECTION, SOLUTION SUBCUTANEOUS ONCE
Refills: 0 | Status: COMPLETED | OUTPATIENT
Start: 2021-01-01 | End: 2021-01-01

## 2021-01-01 RX ORDER — MIDAZOLAM HYDROCHLORIDE 1 MG/ML
4 INJECTION, SOLUTION INTRAMUSCULAR; INTRAVENOUS ONCE
Refills: 0 | Status: DISCONTINUED | OUTPATIENT
Start: 2021-01-01 | End: 2021-01-01

## 2021-01-01 RX ORDER — SODIUM CHLORIDE 9 MG/ML
1000 INJECTION, SOLUTION INTRAVENOUS
Refills: 0 | Status: DISCONTINUED | OUTPATIENT
Start: 2021-01-01 | End: 2021-01-01

## 2021-01-01 RX ORDER — ONDANSETRON 8 MG/1
4 TABLET, FILM COATED ORAL EVERY 8 HOURS
Refills: 0 | Status: DISCONTINUED | OUTPATIENT
Start: 2021-01-01 | End: 2021-01-01

## 2021-01-01 RX ORDER — DEXTROSE 50 % IN WATER 50 %
25 SYRINGE (ML) INTRAVENOUS ONCE
Refills: 0 | Status: COMPLETED | OUTPATIENT
Start: 2021-01-01 | End: 2021-01-01

## 2021-01-01 RX ORDER — FAMOTIDINE 10 MG/ML
20 INJECTION INTRAVENOUS DAILY
Refills: 0 | Status: DISCONTINUED | OUTPATIENT
Start: 2021-01-01 | End: 2021-01-01

## 2021-01-01 RX ORDER — POTASSIUM CHLORIDE 20 MEQ
10 PACKET (EA) ORAL
Refills: 0 | Status: DISCONTINUED | OUTPATIENT
Start: 2021-01-01 | End: 2021-01-01

## 2021-01-01 RX ORDER — VANCOMYCIN HCL 1 G
1000 VIAL (EA) INTRAVENOUS EVERY 12 HOURS
Refills: 0 | Status: DISCONTINUED | OUTPATIENT
Start: 2021-01-01 | End: 2021-01-01

## 2021-01-01 RX ORDER — FUROSEMIDE 40 MG
40 TABLET ORAL DAILY
Refills: 0 | Status: DISCONTINUED | OUTPATIENT
Start: 2021-01-01 | End: 2021-01-01

## 2021-01-01 RX ORDER — SODIUM BICARBONATE 1 MEQ/ML
50 SYRINGE (ML) INTRAVENOUS ONCE
Refills: 0 | Status: COMPLETED | OUTPATIENT
Start: 2021-01-01 | End: 2021-01-01

## 2021-01-01 RX ORDER — FENTANYL CITRATE 50 UG/ML
50 INJECTION INTRAVENOUS ONCE
Refills: 0 | Status: DISCONTINUED | OUTPATIENT
Start: 2021-01-01 | End: 2021-01-01

## 2021-01-01 RX ORDER — DEXTROSE 50 % IN WATER 50 %
15 SYRINGE (ML) INTRAVENOUS ONCE
Refills: 0 | Status: DISCONTINUED | OUTPATIENT
Start: 2021-01-01 | End: 2021-01-01

## 2021-01-01 RX ORDER — ACETAMINOPHEN 500 MG
650 TABLET ORAL EVERY 6 HOURS
Refills: 0 | Status: DISCONTINUED | OUTPATIENT
Start: 2021-01-01 | End: 2021-01-01

## 2021-01-01 RX ORDER — DEXTROSE 50 % IN WATER 50 %
50 SYRINGE (ML) INTRAVENOUS ONCE
Refills: 0 | Status: COMPLETED | OUTPATIENT
Start: 2021-01-01 | End: 2021-01-01

## 2021-01-01 RX ORDER — HEXAVITAMINS
300 TABLET ORAL EVERY 24 HOURS
Refills: 0 | Status: DISCONTINUED | OUTPATIENT
Start: 2021-01-01 | End: 2021-01-01

## 2021-01-01 RX ORDER — SODIUM POLYSTYRENE SULFONATE 4.1 MEQ/G
30 POWDER, FOR SUSPENSION ORAL ONCE
Refills: 0 | Status: DISCONTINUED | OUTPATIENT
Start: 2021-01-01 | End: 2021-01-01

## 2021-01-01 RX ORDER — MIDODRINE HYDROCHLORIDE 2.5 MG/1
20 TABLET ORAL EVERY 8 HOURS
Refills: 0 | Status: DISCONTINUED | OUTPATIENT
Start: 2021-01-01 | End: 2021-01-01

## 2021-01-01 RX ORDER — MIDAZOLAM HYDROCHLORIDE 1 MG/ML
8 INJECTION, SOLUTION INTRAMUSCULAR; INTRAVENOUS ONCE
Refills: 0 | Status: DISCONTINUED | OUTPATIENT
Start: 2021-01-01 | End: 2021-01-01

## 2021-01-01 RX ORDER — PROPOFOL 10 MG/ML
30 INJECTION, EMULSION INTRAVENOUS
Qty: 1000 | Refills: 0 | Status: DISCONTINUED | OUTPATIENT
Start: 2021-01-01 | End: 2021-01-01

## 2021-01-01 RX ORDER — CEFEPIME 1 G/1
2000 INJECTION, POWDER, FOR SOLUTION INTRAMUSCULAR; INTRAVENOUS ONCE
Refills: 0 | Status: COMPLETED | OUTPATIENT
Start: 2021-01-01 | End: 2021-01-01

## 2021-01-01 RX ORDER — ONDANSETRON 8 MG/1
4 TABLET, FILM COATED ORAL ONCE
Refills: 0 | Status: COMPLETED | OUTPATIENT
Start: 2021-01-01 | End: 2021-01-01

## 2021-01-01 RX ORDER — FOSAPREPITANT DIMEGLUMINE 150 MG/5ML
150 INJECTION, POWDER, LYOPHILIZED, FOR SOLUTION INTRAVENOUS ONCE
Refills: 0 | Status: DISCONTINUED | OUTPATIENT
Start: 2021-01-01 | End: 2021-01-01

## 2021-01-01 RX ORDER — AMLODIPINE BESYLATE 2.5 MG/1
5 TABLET ORAL DAILY
Refills: 0 | Status: DISCONTINUED | OUTPATIENT
Start: 2021-01-01 | End: 2021-01-01

## 2021-01-01 RX ORDER — ACETAMINOPHEN 500 MG
1000 TABLET ORAL ONCE
Refills: 0 | Status: COMPLETED | OUTPATIENT
Start: 2021-01-01 | End: 2021-01-01

## 2021-01-01 RX ORDER — ACETAMINOPHEN 500 MG
650 TABLET ORAL ONCE
Refills: 0 | Status: DISCONTINUED | OUTPATIENT
Start: 2021-01-01 | End: 2021-01-01

## 2021-01-01 RX ORDER — VANCOMYCIN HCL 1 G
VIAL (EA) INTRAVENOUS
Refills: 0 | Status: DISCONTINUED | OUTPATIENT
Start: 2021-01-01 | End: 2021-01-01

## 2021-01-01 RX ORDER — FAMOTIDINE 10 MG/ML
1 INJECTION INTRAVENOUS
Qty: 30 | Refills: 0
Start: 2021-01-01 | End: 2021-01-01

## 2021-01-01 RX ORDER — LACTULOSE 10 G/15ML
200 SOLUTION ORAL ONCE
Refills: 0 | Status: COMPLETED | OUTPATIENT
Start: 2021-01-01 | End: 2021-01-01

## 2021-01-01 RX ORDER — METOCLOPRAMIDE HCL 10 MG
6 TABLET ORAL ONCE
Refills: 0 | Status: COMPLETED | OUTPATIENT
Start: 2021-01-01 | End: 2021-01-01

## 2021-01-01 RX ORDER — CHLORHEXIDINE GLUCONATE 213 G/1000ML
1 SOLUTION TOPICAL
Refills: 0 | Status: DISCONTINUED | OUTPATIENT
Start: 2021-01-01 | End: 2021-01-01

## 2021-01-01 RX ORDER — VANCOMYCIN HCL 1 G
1000 VIAL (EA) INTRAVENOUS ONCE
Refills: 0 | Status: COMPLETED | OUTPATIENT
Start: 2021-01-01 | End: 2021-01-01

## 2021-01-01 RX ORDER — DOXORUBICIN HYDROCHLORIDE 2 MG/ML
82 INJECTION, SOLUTION INTRAVENOUS ONCE
Refills: 0 | Status: DISCONTINUED | OUTPATIENT
Start: 2021-01-01 | End: 2021-01-01

## 2021-01-01 RX ORDER — POLYETHYLENE GLYCOL 3350 17 G/17G
17 POWDER, FOR SOLUTION ORAL
Qty: 510 | Refills: 0
Start: 2021-01-01 | End: 2021-01-01

## 2021-01-01 RX ORDER — MIDAZOLAM HYDROCHLORIDE 1 MG/ML
2 INJECTION, SOLUTION INTRAMUSCULAR; INTRAVENOUS ONCE
Refills: 0 | Status: DISCONTINUED | OUTPATIENT
Start: 2021-01-01 | End: 2021-01-01

## 2021-01-01 RX ORDER — DEXTROSE 50 % IN WATER 50 %
25 SYRINGE (ML) INTRAVENOUS ONCE
Refills: 0 | Status: DISCONTINUED | OUTPATIENT
Start: 2021-01-01 | End: 2021-01-01

## 2021-01-01 RX ORDER — SODIUM ZIRCONIUM CYCLOSILICATE 10 G/10G
5 POWDER, FOR SUSPENSION ORAL ONCE
Refills: 0 | Status: COMPLETED | OUTPATIENT
Start: 2021-01-01 | End: 2021-01-01

## 2021-01-01 RX ORDER — CALCIUM GLUCONATE 100 MG/ML
2 VIAL (ML) INTRAVENOUS ONCE
Refills: 0 | Status: DISCONTINUED | OUTPATIENT
Start: 2021-01-01 | End: 2021-01-01

## 2021-01-01 RX ORDER — DEXAMETHASONE 0.5 MG/5ML
20 ELIXIR ORAL DAILY
Refills: 0 | Status: COMPLETED | OUTPATIENT
Start: 2021-01-01 | End: 2021-01-01

## 2021-01-01 RX ORDER — MAGNESIUM SULFATE 500 MG/ML
1 VIAL (ML) INJECTION ONCE
Refills: 0 | Status: COMPLETED | OUTPATIENT
Start: 2021-01-01 | End: 2021-01-01

## 2021-01-01 RX ORDER — POTASSIUM CHLORIDE 20 MEQ
20 PACKET (EA) ORAL
Refills: 0 | Status: DISCONTINUED | OUTPATIENT
Start: 2021-01-01 | End: 2021-01-01

## 2021-01-01 RX ORDER — CALCIUM GLUCONATE 100 MG/ML
1 VIAL (ML) INTRAVENOUS ONCE
Refills: 0 | Status: COMPLETED | OUTPATIENT
Start: 2021-01-01 | End: 2021-01-01

## 2021-01-01 RX ORDER — CYCLOPHOSPHAMIDE 100 MG
225 VIAL (EA) INTRAVENOUS EVERY 12 HOURS
Refills: 0 | Status: DISCONTINUED | OUTPATIENT
Start: 2021-01-01 | End: 2021-01-01

## 2021-01-01 RX ORDER — FUROSEMIDE 40 MG
40 TABLET ORAL ONCE
Refills: 0 | Status: COMPLETED | OUTPATIENT
Start: 2021-01-01 | End: 2021-01-01

## 2021-01-01 RX ORDER — CEFEPIME 1 G/1
1000 INJECTION, POWDER, FOR SOLUTION INTRAMUSCULAR; INTRAVENOUS EVERY 12 HOURS
Refills: 0 | Status: DISCONTINUED | OUTPATIENT
Start: 2021-01-01 | End: 2021-01-01

## 2021-01-01 RX ORDER — DEXAMETHASONE 0.5 MG/5ML
20 ELIXIR ORAL EVERY 12 HOURS
Refills: 0 | Status: DISCONTINUED | OUTPATIENT
Start: 2021-01-01 | End: 2021-01-01

## 2021-01-01 RX ORDER — FOSAPREPITANT DIMEGLUMINE 150 MG/5ML
150 INJECTION, POWDER, LYOPHILIZED, FOR SOLUTION INTRAVENOUS ONCE
Refills: 0 | Status: COMPLETED | OUTPATIENT
Start: 2021-01-01 | End: 2021-01-01

## 2021-01-01 RX ORDER — AMIODARONE HYDROCHLORIDE 400 MG/1
0.5 TABLET ORAL
Qty: 450 | Refills: 0 | Status: DISCONTINUED | OUTPATIENT
Start: 2021-01-01 | End: 2021-01-01

## 2021-01-01 RX ORDER — POTASSIUM CHLORIDE 20 MEQ
40 PACKET (EA) ORAL ONCE
Refills: 0 | Status: COMPLETED | OUTPATIENT
Start: 2021-01-01 | End: 2021-01-01

## 2021-01-01 RX ORDER — METRONIDAZOLE 500 MG
500 TABLET ORAL EVERY 8 HOURS
Refills: 0 | Status: DISCONTINUED | OUTPATIENT
Start: 2021-01-01 | End: 2021-01-01

## 2021-01-01 RX ORDER — CYCLOPHOSPHAMIDE 100 MG
100 VIAL (EA) INTRAVENOUS DAILY
Refills: 0 | Status: DISCONTINUED | OUTPATIENT
Start: 2021-01-01 | End: 2021-01-01

## 2021-01-01 RX ORDER — LIDOCAINE HCL 20 MG/ML
20 VIAL (ML) INJECTION ONCE
Refills: 0 | Status: COMPLETED | OUTPATIENT
Start: 2021-01-01 | End: 2021-01-01

## 2021-01-01 RX ORDER — PHENYLEPHRINE HYDROCHLORIDE 10 MG/ML
0.1 INJECTION INTRAVENOUS
Qty: 160 | Refills: 0 | Status: DISCONTINUED | OUTPATIENT
Start: 2021-01-01 | End: 2021-01-01

## 2021-01-01 RX ORDER — MEROPENEM 1 G/30ML
INJECTION INTRAVENOUS
Refills: 0 | Status: DISCONTINUED | OUTPATIENT
Start: 2021-01-01 | End: 2021-01-01

## 2021-01-01 RX ORDER — METOCLOPRAMIDE HCL 10 MG
10 TABLET ORAL EVERY 6 HOURS
Refills: 0 | Status: DISCONTINUED | OUTPATIENT
Start: 2021-01-01 | End: 2021-01-01

## 2021-01-01 RX ORDER — RITUXIMAB 10 MG/ML
620 INJECTION, SOLUTION INTRAVENOUS ONCE
Refills: 0 | Status: COMPLETED | OUTPATIENT
Start: 2021-01-01 | End: 2021-01-01

## 2021-01-01 RX ORDER — POTASSIUM CHLORIDE 20 MEQ
20 PACKET (EA) ORAL
Refills: 0 | Status: COMPLETED | OUTPATIENT
Start: 2021-01-01 | End: 2021-01-01

## 2021-01-01 RX ORDER — SODIUM CHLORIDE 9 MG/ML
500 INJECTION, SOLUTION INTRAVENOUS
Refills: 0 | Status: DISCONTINUED | OUTPATIENT
Start: 2021-01-01 | End: 2021-01-01

## 2021-01-01 RX ORDER — MEROPENEM 1 G/30ML
500 INJECTION INTRAVENOUS EVERY 24 HOURS
Refills: 0 | Status: DISCONTINUED | OUTPATIENT
Start: 2021-01-01 | End: 2021-01-01

## 2021-01-01 RX ORDER — AMIODARONE HYDROCHLORIDE 400 MG/1
150 TABLET ORAL ONCE
Refills: 0 | Status: COMPLETED | OUTPATIENT
Start: 2021-01-01 | End: 2021-01-01

## 2021-01-01 RX ORDER — DEXMEDETOMIDINE HYDROCHLORIDE IN 0.9% SODIUM CHLORIDE 4 UG/ML
0.5 INJECTION INTRAVENOUS
Qty: 400 | Refills: 0 | Status: DISCONTINUED | OUTPATIENT
Start: 2021-01-01 | End: 2021-01-01

## 2021-01-01 RX ORDER — ALBUMIN HUMAN 25 %
100 VIAL (ML) INTRAVENOUS EVERY 6 HOURS
Refills: 0 | Status: DISCONTINUED | OUTPATIENT
Start: 2021-01-01 | End: 2021-01-01

## 2021-01-01 RX ORDER — SODIUM ZIRCONIUM CYCLOSILICATE 10 G/10G
10 POWDER, FOR SUSPENSION ORAL EVERY 8 HOURS
Refills: 0 | Status: DISCONTINUED | OUTPATIENT
Start: 2021-01-01 | End: 2021-01-01

## 2021-01-01 RX ORDER — ASPIRIN/CALCIUM CARB/MAGNESIUM 324 MG
1 TABLET ORAL
Qty: 0 | Refills: 0 | DISCHARGE

## 2021-01-01 RX ORDER — HUMAN INSULIN 100 [IU]/ML
6 INJECTION, SUSPENSION SUBCUTANEOUS EVERY 6 HOURS
Refills: 0 | Status: DISCONTINUED | OUTPATIENT
Start: 2021-01-01 | End: 2021-01-01

## 2021-01-01 RX ORDER — DEXTROSE 50 % IN WATER 50 %
10 SYRINGE (ML) INTRAVENOUS ONCE
Refills: 0 | Status: COMPLETED | OUTPATIENT
Start: 2021-01-01 | End: 2021-01-01

## 2021-01-01 RX ORDER — HEPARIN SODIUM 5000 [USP'U]/ML
5000 INJECTION INTRAVENOUS; SUBCUTANEOUS ONCE
Refills: 0 | Status: DISCONTINUED | OUTPATIENT
Start: 2021-01-01 | End: 2021-01-01

## 2021-01-01 RX ORDER — NOREPINEPHRINE BITARTRATE/D5W 8 MG/250ML
0.05 PLASTIC BAG, INJECTION (ML) INTRAVENOUS
Qty: 8 | Refills: 0 | Status: DISCONTINUED | OUTPATIENT
Start: 2021-01-01 | End: 2021-01-01

## 2021-01-01 RX ORDER — LACTULOSE 10 G/15ML
10 SOLUTION ORAL ONCE
Refills: 0 | Status: COMPLETED | OUTPATIENT
Start: 2021-01-01 | End: 2021-01-01

## 2021-01-01 RX ORDER — METHYLNALTREXONE BROMIDE 12 MG/.6ML
12 INJECTION, SOLUTION SUBCUTANEOUS ONCE
Refills: 0 | Status: COMPLETED | OUTPATIENT
Start: 2021-01-01 | End: 2021-01-01

## 2021-01-01 RX ORDER — GLUCAGON INJECTION, SOLUTION 0.5 MG/.1ML
1 INJECTION, SOLUTION SUBCUTANEOUS ONCE
Refills: 0 | Status: DISCONTINUED | OUTPATIENT
Start: 2021-01-01 | End: 2021-01-01

## 2021-01-01 RX ORDER — MIDAZOLAM HYDROCHLORIDE 1 MG/ML
4 INJECTION, SOLUTION INTRAMUSCULAR; INTRAVENOUS
Refills: 0 | Status: DISCONTINUED | OUTPATIENT
Start: 2021-01-01 | End: 2021-01-01

## 2021-01-01 RX ORDER — METOCLOPRAMIDE HCL 10 MG
5 TABLET ORAL ONCE
Refills: 0 | Status: DISCONTINUED | OUTPATIENT
Start: 2021-01-01 | End: 2021-01-01

## 2021-01-01 RX ORDER — VINCRISTINE SULFATE 1 MG/ML
2 VIAL (ML) INTRAVENOUS ONCE
Refills: 0 | Status: DISCONTINUED | OUTPATIENT
Start: 2021-01-01 | End: 2021-01-01

## 2021-01-01 RX ORDER — HEPARIN SODIUM 5000 [USP'U]/ML
5000 INJECTION INTRAVENOUS; SUBCUTANEOUS EVERY 8 HOURS
Refills: 0 | Status: DISCONTINUED | OUTPATIENT
Start: 2021-01-01 | End: 2021-01-01

## 2021-01-01 RX ORDER — BUMETANIDE 0.25 MG/ML
2 INJECTION INTRAMUSCULAR; INTRAVENOUS ONCE
Refills: 0 | Status: COMPLETED | OUTPATIENT
Start: 2021-01-01 | End: 2021-01-01

## 2021-01-01 RX ORDER — SODIUM,POTASSIUM PHOSPHATES 278-250MG
1 POWDER IN PACKET (EA) ORAL
Refills: 0 | Status: COMPLETED | OUTPATIENT
Start: 2021-01-01 | End: 2021-01-01

## 2021-01-01 RX ORDER — PIPERACILLIN AND TAZOBACTAM 4; .5 G/20ML; G/20ML
3.38 INJECTION, POWDER, LYOPHILIZED, FOR SOLUTION INTRAVENOUS EVERY 8 HOURS
Refills: 0 | Status: COMPLETED | OUTPATIENT
Start: 2021-01-01 | End: 2021-01-01

## 2021-01-01 RX ORDER — ONDANSETRON 8 MG/1
8 TABLET, FILM COATED ORAL EVERY 8 HOURS
Refills: 0 | Status: COMPLETED | OUTPATIENT
Start: 2021-01-01 | End: 2021-01-01

## 2021-01-01 RX ORDER — ONDANSETRON 8 MG/1
8 TABLET, FILM COATED ORAL EVERY 8 HOURS
Refills: 0 | Status: DISCONTINUED | OUTPATIENT
Start: 2021-01-01 | End: 2021-01-01

## 2021-01-01 RX ORDER — SODIUM CHLORIDE 9 MG/ML
1000 INJECTION INTRAMUSCULAR; INTRAVENOUS; SUBCUTANEOUS ONCE
Refills: 0 | Status: COMPLETED | OUTPATIENT
Start: 2021-01-01 | End: 2021-01-01

## 2021-01-01 RX ORDER — DEXAMETHASONE 0.5 MG/5ML
40 ELIXIR ORAL DAILY
Refills: 0 | Status: DISCONTINUED | OUTPATIENT
Start: 2021-01-01 | End: 2021-01-01

## 2021-01-01 RX ORDER — CEFEPIME 1 G/1
2000 INJECTION, POWDER, FOR SOLUTION INTRAMUSCULAR; INTRAVENOUS EVERY 12 HOURS
Refills: 0 | Status: DISCONTINUED | OUTPATIENT
Start: 2021-01-01 | End: 2021-01-01

## 2021-01-01 RX ORDER — SODIUM CHLORIDE 9 MG/ML
250 INJECTION INTRAMUSCULAR; INTRAVENOUS; SUBCUTANEOUS ONCE
Refills: 0 | Status: COMPLETED | OUTPATIENT
Start: 2021-01-01 | End: 2021-01-01

## 2021-01-01 RX ORDER — CYCLOPHOSPHAMIDE 100 MG
225 VIAL (EA) INTRAVENOUS EVERY 12 HOURS
Refills: 0 | Status: COMPLETED | OUTPATIENT
Start: 2021-01-01 | End: 2021-01-01

## 2021-01-01 RX ORDER — RASBURICASE 7.5 MG
6 KIT INTRAVENOUS ONCE
Refills: 0 | Status: DISCONTINUED | OUTPATIENT
Start: 2021-01-01 | End: 2021-01-01

## 2021-01-01 RX ORDER — FAMOTIDINE 10 MG/ML
20 INJECTION INTRAVENOUS EVERY 12 HOURS
Refills: 0 | Status: DISCONTINUED | OUTPATIENT
Start: 2021-01-01 | End: 2021-01-01

## 2021-01-01 RX ORDER — FUROSEMIDE 40 MG
40 TABLET ORAL ONCE
Refills: 0 | Status: DISCONTINUED | OUTPATIENT
Start: 2021-01-01 | End: 2021-01-01

## 2021-01-01 RX ORDER — SODIUM BICARBONATE 1 MEQ/ML
0.18 SYRINGE (ML) INTRAVENOUS
Qty: 150 | Refills: 0 | Status: DISCONTINUED | OUTPATIENT
Start: 2021-01-01 | End: 2021-01-01

## 2021-01-01 RX ORDER — HYDROCORTISONE 20 MG
100 TABLET ORAL ONCE
Refills: 0 | Status: DISCONTINUED | OUTPATIENT
Start: 2021-01-01 | End: 2021-01-01

## 2021-01-01 RX ORDER — AMLODIPINE BESYLATE 2.5 MG/1
1 TABLET ORAL
Qty: 30 | Refills: 0
Start: 2021-01-01 | End: 2021-01-01

## 2021-01-01 RX ORDER — PROPOFOL 10 MG/ML
30 INJECTION, EMULSION INTRAVENOUS ONCE
Refills: 0 | Status: COMPLETED | OUTPATIENT
Start: 2021-01-01 | End: 2021-01-01

## 2021-01-01 RX ORDER — SOD SULF/SODIUM/NAHCO3/KCL/PEG
4000 SOLUTION, RECONSTITUTED, ORAL ORAL ONCE
Refills: 0 | Status: COMPLETED | OUTPATIENT
Start: 2021-01-01 | End: 2021-01-01

## 2021-01-01 RX ORDER — PHENYLEPHRINE HYDROCHLORIDE 10 MG/ML
0.1 INJECTION INTRAVENOUS
Qty: 40 | Refills: 0 | Status: DISCONTINUED | OUTPATIENT
Start: 2021-01-01 | End: 2021-01-01

## 2021-01-01 RX ORDER — FILGRASTIM 480MCG/1.6
300 VIAL (ML) INJECTION DAILY
Refills: 0 | Status: DISCONTINUED | OUTPATIENT
Start: 2021-01-01 | End: 2021-01-01

## 2021-01-01 RX ORDER — DOXORUBICIN HYDROCHLORIDE 2 MG/ML
5 INJECTION, SOLUTION INTRAVENOUS DAILY
Refills: 0 | Status: COMPLETED | OUTPATIENT
Start: 2021-01-01 | End: 2021-01-01

## 2021-01-01 RX ORDER — FENTANYL CITRATE 50 UG/ML
2 INJECTION INTRAVENOUS
Qty: 2500 | Refills: 0 | Status: DISCONTINUED | OUTPATIENT
Start: 2021-01-01 | End: 2021-01-01

## 2021-01-01 RX ORDER — SODIUM ZIRCONIUM CYCLOSILICATE 10 G/10G
5 POWDER, FOR SUSPENSION ORAL EVERY 8 HOURS
Refills: 0 | Status: COMPLETED | OUTPATIENT
Start: 2021-01-01 | End: 2021-01-01

## 2021-01-01 RX ORDER — ASPIRIN/CALCIUM CARB/MAGNESIUM 324 MG
81 TABLET ORAL DAILY
Refills: 0 | Status: DISCONTINUED | OUTPATIENT
Start: 2021-01-01 | End: 2021-01-01

## 2021-01-01 RX ORDER — HEPARIN SODIUM 5000 [USP'U]/ML
5000 INJECTION INTRAVENOUS; SUBCUTANEOUS ONCE
Refills: 0 | Status: COMPLETED | OUTPATIENT
Start: 2021-01-01 | End: 2021-01-01

## 2021-01-01 RX ORDER — POLYETHYLENE GLYCOL 3350 17 G/17G
17 POWDER, FOR SOLUTION ORAL
Refills: 0 | Status: DISCONTINUED | OUTPATIENT
Start: 2021-01-01 | End: 2021-01-01

## 2021-01-01 RX ORDER — HEPARIN SODIUM 5000 [USP'U]/ML
5000 INJECTION INTRAVENOUS; SUBCUTANEOUS EVERY 8 HOURS
Refills: 0 | Status: COMPLETED | OUTPATIENT
Start: 2021-01-01 | End: 2021-01-01

## 2021-01-01 RX ORDER — BUMETANIDE 0.25 MG/ML
4 INJECTION INTRAMUSCULAR; INTRAVENOUS
Qty: 20 | Refills: 0 | Status: DISCONTINUED | OUTPATIENT
Start: 2021-01-01 | End: 2021-01-01

## 2021-01-01 RX ORDER — CALCIUM GLUCONATE 100 MG/ML
1 VIAL (ML) INTRAVENOUS ONCE
Refills: 0 | Status: DISCONTINUED | OUTPATIENT
Start: 2021-01-01 | End: 2021-01-01

## 2021-01-01 RX ORDER — LANOLIN ALCOHOL/MO/W.PET/CERES
3 CREAM (GRAM) TOPICAL AT BEDTIME
Refills: 0 | Status: DISCONTINUED | OUTPATIENT
Start: 2021-01-01 | End: 2021-01-01

## 2021-01-01 RX ORDER — INSULIN HUMAN 100 [IU]/ML
5 INJECTION, SOLUTION SUBCUTANEOUS ONCE
Refills: 0 | Status: DISCONTINUED | OUTPATIENT
Start: 2021-01-01 | End: 2021-01-01

## 2021-01-01 RX ORDER — RASBURICASE 7.5 MG
3 KIT INTRAVENOUS ONCE
Refills: 0 | Status: COMPLETED | OUTPATIENT
Start: 2021-01-01 | End: 2021-01-01

## 2021-01-01 RX ORDER — POTASSIUM CHLORIDE 20 MEQ
20 PACKET (EA) ORAL ONCE
Refills: 0 | Status: COMPLETED | OUTPATIENT
Start: 2021-01-01 | End: 2021-01-01

## 2021-01-01 RX ORDER — METOCLOPRAMIDE HCL 10 MG
10 TABLET ORAL ONCE
Refills: 0 | Status: COMPLETED | OUTPATIENT
Start: 2021-01-01 | End: 2021-01-01

## 2021-01-01 RX ORDER — POTASSIUM CHLORIDE 20 MEQ
40 PACKET (EA) ORAL ONCE
Refills: 0 | Status: DISCONTINUED | OUTPATIENT
Start: 2021-01-01 | End: 2021-01-01

## 2021-01-01 RX ORDER — ACETAMINOPHEN 500 MG
960 TABLET ORAL ONCE
Refills: 0 | Status: COMPLETED | OUTPATIENT
Start: 2021-01-01 | End: 2021-01-01

## 2021-01-01 RX ORDER — ACETAMINOPHEN 500 MG
630 TABLET ORAL EVERY 6 HOURS
Refills: 0 | Status: DISCONTINUED | OUTPATIENT
Start: 2021-01-01 | End: 2021-01-01

## 2021-01-01 RX ORDER — INSULIN HUMAN 100 [IU]/ML
10 INJECTION, SOLUTION SUBCUTANEOUS ONCE
Refills: 0 | Status: DISCONTINUED | OUTPATIENT
Start: 2021-01-01 | End: 2021-01-01

## 2021-01-01 RX ORDER — CEFEPIME 1 G/1
INJECTION, POWDER, FOR SOLUTION INTRAMUSCULAR; INTRAVENOUS
Refills: 0 | Status: DISCONTINUED | OUTPATIENT
Start: 2021-01-01 | End: 2021-01-01

## 2021-01-01 RX ORDER — SODIUM BICARBONATE 1 MEQ/ML
1300 SYRINGE (ML) INTRAVENOUS THREE TIMES A DAY
Refills: 0 | Status: DISCONTINUED | OUTPATIENT
Start: 2021-01-01 | End: 2021-01-01

## 2021-01-01 RX ORDER — IVERMECTIN 3 MG/1
12 TABLET ORAL EVERY 24 HOURS
Refills: 0 | Status: COMPLETED | OUTPATIENT
Start: 2021-01-01 | End: 2021-01-01

## 2021-01-01 RX ORDER — MEPERIDINE HYDROCHLORIDE 50 MG/ML
25 INJECTION INTRAMUSCULAR; INTRAVENOUS; SUBCUTANEOUS ONCE
Refills: 0 | Status: DISCONTINUED | OUTPATIENT
Start: 2021-01-01 | End: 2021-01-01

## 2021-01-01 RX ORDER — MEROPENEM 1 G/30ML
1000 INJECTION INTRAVENOUS ONCE
Refills: 0 | Status: COMPLETED | OUTPATIENT
Start: 2021-01-01 | End: 2021-01-01

## 2021-01-01 RX ORDER — INSULIN LISPRO 100/ML
VIAL (ML) SUBCUTANEOUS EVERY 6 HOURS
Refills: 0 | Status: DISCONTINUED | OUTPATIENT
Start: 2021-01-01 | End: 2021-01-01

## 2021-01-01 RX ORDER — SODIUM BICARBONATE 1 MEQ/ML
0.24 SYRINGE (ML) INTRAVENOUS
Qty: 150 | Refills: 0 | Status: DISCONTINUED | OUTPATIENT
Start: 2021-01-01 | End: 2021-01-01

## 2021-01-01 RX ORDER — CEFEPIME 1 G/1
2000 INJECTION, POWDER, FOR SOLUTION INTRAMUSCULAR; INTRAVENOUS EVERY 8 HOURS
Refills: 0 | Status: DISCONTINUED | OUTPATIENT
Start: 2021-01-01 | End: 2021-01-01

## 2021-01-01 RX ORDER — SODIUM CHLORIDE 9 MG/ML
1000 INJECTION INTRAMUSCULAR; INTRAVENOUS; SUBCUTANEOUS
Refills: 0 | Status: DISCONTINUED | OUTPATIENT
Start: 2021-01-01 | End: 2021-01-01

## 2021-01-01 RX ORDER — SODIUM CHLORIDE 9 MG/ML
500 INJECTION INTRAMUSCULAR; INTRAVENOUS; SUBCUTANEOUS ONCE
Refills: 0 | Status: COMPLETED | OUTPATIENT
Start: 2021-01-01 | End: 2021-01-01

## 2021-01-01 RX ORDER — FUROSEMIDE 40 MG
20 TABLET ORAL ONCE
Refills: 0 | Status: COMPLETED | OUTPATIENT
Start: 2021-01-01 | End: 2021-01-01

## 2021-01-01 RX ORDER — FAMOTIDINE 10 MG/ML
20 INJECTION INTRAVENOUS
Refills: 0 | Status: DISCONTINUED | OUTPATIENT
Start: 2021-01-01 | End: 2021-01-01

## 2021-01-01 RX ORDER — SODIUM CHLORIDE 9 MG/ML
500 INJECTION, SOLUTION INTRAVENOUS ONCE
Refills: 0 | Status: COMPLETED | OUTPATIENT
Start: 2021-01-01 | End: 2021-01-01

## 2021-01-01 RX ORDER — CEFEPIME 1 G/1
1000 INJECTION, POWDER, FOR SOLUTION INTRAMUSCULAR; INTRAVENOUS EVERY 24 HOURS
Refills: 0 | Status: DISCONTINUED | OUTPATIENT
Start: 2021-01-01 | End: 2021-01-01

## 2021-01-01 RX ORDER — PROPOFOL 10 MG/ML
50 INJECTION, EMULSION INTRAVENOUS
Qty: 1000 | Refills: 0 | Status: DISCONTINUED | OUTPATIENT
Start: 2021-01-01 | End: 2021-01-01

## 2021-01-01 RX ORDER — SODIUM CHLORIDE 9 MG/ML
10 INJECTION INTRAMUSCULAR; INTRAVENOUS; SUBCUTANEOUS
Refills: 0 | Status: DISCONTINUED | OUTPATIENT
Start: 2021-01-01 | End: 2021-01-01

## 2021-01-01 RX ORDER — ONDANSETRON 8 MG/1
8 TABLET, FILM COATED ORAL ONCE
Refills: 0 | Status: COMPLETED | OUTPATIENT
Start: 2021-01-01 | End: 2021-01-01

## 2021-01-01 RX ORDER — ALLOPURINOL 300 MG
100 TABLET ORAL DAILY
Refills: 0 | Status: DISCONTINUED | OUTPATIENT
Start: 2021-01-01 | End: 2021-01-01

## 2021-01-01 RX ORDER — MAGNESIUM SULFATE 500 MG/ML
2 VIAL (ML) INJECTION ONCE
Refills: 0 | Status: DISCONTINUED | OUTPATIENT
Start: 2021-01-01 | End: 2021-01-01

## 2021-01-01 RX ORDER — SODIUM POLYSTYRENE SULFONATE 4.1 MEQ/G
10 POWDER, FOR SUSPENSION ORAL ONCE
Refills: 0 | Status: DISCONTINUED | OUTPATIENT
Start: 2021-01-01 | End: 2021-01-01

## 2021-01-01 RX ORDER — DESMOPRESSIN ACETATE 0.1 MG/1
18 TABLET ORAL ONCE
Refills: 0 | Status: COMPLETED | OUTPATIENT
Start: 2021-01-01 | End: 2021-01-01

## 2021-01-01 RX ORDER — NOREPINEPHRINE BITARTRATE/D5W 8 MG/250ML
0.05 PLASTIC BAG, INJECTION (ML) INTRAVENOUS
Qty: 16 | Refills: 0 | Status: DISCONTINUED | OUTPATIENT
Start: 2021-01-01 | End: 2021-01-01

## 2021-01-01 RX ORDER — SEVELAMER CARBONATE 2400 MG/1
1200 POWDER, FOR SUSPENSION ORAL
Refills: 0 | Status: DISCONTINUED | OUTPATIENT
Start: 2021-01-01 | End: 2021-01-01

## 2021-01-01 RX ORDER — ALBUTEROL 90 UG/1
10 AEROSOL, METERED ORAL ONCE
Refills: 0 | Status: DISCONTINUED | OUTPATIENT
Start: 2021-01-01 | End: 2021-01-01

## 2021-01-01 RX ORDER — ONDANSETRON 8 MG/1
4 TABLET, FILM COATED ORAL EVERY 6 HOURS
Refills: 0 | Status: DISCONTINUED | OUTPATIENT
Start: 2021-01-01 | End: 2021-01-01

## 2021-01-01 RX ORDER — IPRATROPIUM/ALBUTEROL SULFATE 18-103MCG
3 AEROSOL WITH ADAPTER (GRAM) INHALATION EVERY 6 HOURS
Refills: 0 | Status: DISCONTINUED | OUTPATIENT
Start: 2021-01-01 | End: 2021-01-01

## 2021-01-01 RX ORDER — DEXTROSE 50 % IN WATER 50 %
12.5 SYRINGE (ML) INTRAVENOUS ONCE
Refills: 0 | Status: DISCONTINUED | OUTPATIENT
Start: 2021-01-01 | End: 2021-01-01

## 2021-01-01 RX ORDER — LEVALBUTEROL 1.25 MG/.5ML
0.63 SOLUTION, CONCENTRATE RESPIRATORY (INHALATION) EVERY 6 HOURS
Refills: 0 | Status: DISCONTINUED | OUTPATIENT
Start: 2021-01-01 | End: 2021-01-01

## 2021-01-01 RX ORDER — DEXAMETHASONE 0.5 MG/5ML
40 ELIXIR ORAL DAILY
Refills: 0 | Status: COMPLETED | OUTPATIENT
Start: 2021-01-01 | End: 2021-01-01

## 2021-01-01 RX ORDER — TRIAMTERENE/HYDROCHLOROTHIAZID 75 MG-50MG
0 TABLET ORAL
Qty: 0 | Refills: 4 | DISCHARGE

## 2021-01-01 RX ORDER — VASOPRESSIN 20 [USP'U]/ML
0.04 INJECTION INTRAVENOUS
Qty: 50 | Refills: 0 | Status: DISCONTINUED | OUTPATIENT
Start: 2021-01-01 | End: 2021-01-01

## 2021-01-01 RX ORDER — ASPIRIN/CALCIUM CARB/MAGNESIUM 324 MG
1 TABLET ORAL
Qty: 30 | Refills: 0
Start: 2021-01-01 | End: 2021-01-01

## 2021-01-01 RX ORDER — FUROSEMIDE 40 MG
60 TABLET ORAL ONCE
Refills: 0 | Status: COMPLETED | OUTPATIENT
Start: 2021-01-01 | End: 2021-01-01

## 2021-01-01 RX ORDER — METOCLOPRAMIDE HCL 10 MG
5 TABLET ORAL ONCE
Refills: 0 | Status: COMPLETED | OUTPATIENT
Start: 2021-01-01 | End: 2021-01-01

## 2021-01-01 RX ORDER — NOREPINEPHRINE BITARTRATE/D5W 8 MG/250ML
0.5 PLASTIC BAG, INJECTION (ML) INTRAVENOUS
Qty: 8 | Refills: 0 | Status: DISCONTINUED | OUTPATIENT
Start: 2021-01-01 | End: 2021-01-01

## 2021-01-01 RX ORDER — MEROPENEM 1 G/30ML
1000 INJECTION INTRAVENOUS EVERY 12 HOURS
Refills: 0 | Status: DISCONTINUED | OUTPATIENT
Start: 2021-01-01 | End: 2021-01-01

## 2021-01-01 RX ORDER — SENNA PLUS 8.6 MG/1
15 TABLET ORAL AT BEDTIME
Refills: 0 | Status: DISCONTINUED | OUTPATIENT
Start: 2021-01-01 | End: 2021-01-01

## 2021-01-01 RX ORDER — TRIAMTERENE/HYDROCHLOROTHIAZID 75 MG-50MG
1 TABLET ORAL
Qty: 0 | Refills: 4 | DISCHARGE

## 2021-01-01 RX ORDER — SODIUM CHLORIDE 9 MG/ML
1000 INJECTION, SOLUTION INTRAVENOUS ONCE
Refills: 0 | Status: COMPLETED | OUTPATIENT
Start: 2021-01-01 | End: 2021-01-01

## 2021-01-01 RX ORDER — CYCLOPHOSPHAMIDE 100 MG
1240 VIAL (EA) INTRAVENOUS ONCE
Refills: 0 | Status: DISCONTINUED | OUTPATIENT
Start: 2021-01-01 | End: 2021-01-01

## 2021-01-01 RX ORDER — ENOXAPARIN SODIUM 100 MG/ML
40 INJECTION SUBCUTANEOUS DAILY
Refills: 0 | Status: COMPLETED | OUTPATIENT
Start: 2021-01-01 | End: 2021-01-01

## 2021-01-01 RX ORDER — AMIODARONE HYDROCHLORIDE 400 MG/1
1 TABLET ORAL
Qty: 450 | Refills: 0 | Status: DISCONTINUED | OUTPATIENT
Start: 2021-01-01 | End: 2021-01-01

## 2021-01-01 RX ORDER — DEXTROSE 50 % IN WATER 50 %
50 SYRINGE (ML) INTRAVENOUS ONCE
Refills: 0 | Status: DISCONTINUED | OUTPATIENT
Start: 2021-01-01 | End: 2021-01-01

## 2021-01-01 RX ORDER — SODIUM BICARBONATE 1 MEQ/ML
1300 SYRINGE (ML) INTRAVENOUS
Refills: 0 | Status: DISCONTINUED | OUTPATIENT
Start: 2021-01-01 | End: 2021-01-01

## 2021-01-01 RX ORDER — MORPHINE SULFATE 50 MG/1
1 CAPSULE, EXTENDED RELEASE ORAL
Refills: 0 | Status: DISCONTINUED | OUTPATIENT
Start: 2021-01-01 | End: 2021-01-01

## 2021-01-01 RX ORDER — SODIUM ZIRCONIUM CYCLOSILICATE 10 G/10G
10 POWDER, FOR SUSPENSION ORAL ONCE
Refills: 0 | Status: COMPLETED | OUTPATIENT
Start: 2021-01-01 | End: 2021-01-01

## 2021-01-01 RX ORDER — SODIUM,POTASSIUM PHOSPHATES 278-250MG
1 POWDER IN PACKET (EA) ORAL EVERY 6 HOURS
Refills: 0 | Status: COMPLETED | OUTPATIENT
Start: 2021-01-01 | End: 2021-01-01

## 2021-01-01 RX ORDER — MIDODRINE HYDROCHLORIDE 2.5 MG/1
10 TABLET ORAL EVERY 8 HOURS
Refills: 0 | Status: DISCONTINUED | OUTPATIENT
Start: 2021-01-01 | End: 2021-01-01

## 2021-01-01 RX ORDER — POTASSIUM CHLORIDE 20 MEQ
40 PACKET (EA) ORAL
Refills: 0 | Status: DISCONTINUED | OUTPATIENT
Start: 2021-01-01 | End: 2021-01-01

## 2021-01-01 RX ORDER — FUROSEMIDE 40 MG
40 TABLET ORAL
Refills: 0 | Status: COMPLETED | OUTPATIENT
Start: 2021-01-01 | End: 2021-01-01

## 2021-01-01 RX ORDER — VANCOMYCIN HCL 1 G
1000 VIAL (EA) INTRAVENOUS EVERY 24 HOURS
Refills: 0 | Status: DISCONTINUED | OUTPATIENT
Start: 2021-01-01 | End: 2021-01-01

## 2021-01-01 RX ORDER — DESMOPRESSIN ACETATE 0.1 MG/1
20 TABLET ORAL ONCE
Refills: 0 | Status: COMPLETED | OUTPATIENT
Start: 2021-01-01 | End: 2021-01-01

## 2021-01-01 RX ORDER — ALBUMIN HUMAN 25 %
250 VIAL (ML) INTRAVENOUS EVERY 6 HOURS
Refills: 0 | Status: DISCONTINUED | OUTPATIENT
Start: 2021-01-01 | End: 2021-01-01

## 2021-01-01 RX ORDER — FENTANYL CITRATE 50 UG/ML
1 INJECTION INTRAVENOUS
Qty: 2500 | Refills: 0 | Status: DISCONTINUED | OUTPATIENT
Start: 2021-01-01 | End: 2021-01-01

## 2021-01-01 RX ORDER — PYRIDOXINE HCL (VITAMIN B6) 100 MG
50 TABLET ORAL DAILY
Refills: 0 | Status: DISCONTINUED | OUTPATIENT
Start: 2021-01-01 | End: 2021-01-01

## 2021-01-01 RX ORDER — ALLOPURINOL 300 MG
300 TABLET ORAL DAILY
Refills: 0 | Status: DISCONTINUED | OUTPATIENT
Start: 2021-01-01 | End: 2021-01-01

## 2021-01-01 RX ADMIN — PHENYLEPHRINE HYDROCHLORIDE 1.17 MICROGRAM(S)/KG/MIN: 10 INJECTION INTRAVENOUS at 19:47

## 2021-01-01 RX ADMIN — FENTANYL CITRATE 12.8 MICROGRAM(S)/KG/HR: 50 INJECTION INTRAVENOUS at 07:33

## 2021-01-01 RX ADMIN — CHLORHEXIDINE GLUCONATE 1 APPLICATION(S): 213 SOLUTION TOPICAL at 06:35

## 2021-01-01 RX ADMIN — Medication 100 MILLIEQUIVALENT(S): at 22:45

## 2021-01-01 RX ADMIN — Medication 1 PACKET(S): at 12:48

## 2021-01-01 RX ADMIN — SODIUM CHLORIDE 75 MILLILITER(S): 9 INJECTION, SOLUTION INTRAVENOUS at 10:56

## 2021-01-01 RX ADMIN — CHLORHEXIDINE GLUCONATE 15 MILLILITER(S): 213 SOLUTION TOPICAL at 17:59

## 2021-01-01 RX ADMIN — HEPARIN SODIUM 5000 UNIT(S): 5000 INJECTION INTRAVENOUS; SUBCUTANEOUS at 13:19

## 2021-01-01 RX ADMIN — Medication 6: at 05:27

## 2021-01-01 RX ADMIN — CHLORHEXIDINE GLUCONATE 15 MILLILITER(S): 213 SOLUTION TOPICAL at 05:49

## 2021-01-01 RX ADMIN — Medication 100 MILLIGRAM(S): at 11:19

## 2021-01-01 RX ADMIN — Medication 50 MILLIGRAM(S): at 12:14

## 2021-01-01 RX ADMIN — Medication 40 MILLIGRAM(S): at 11:08

## 2021-01-01 RX ADMIN — SENNA PLUS 15 MILLILITER(S): 8.6 TABLET ORAL at 22:50

## 2021-01-01 RX ADMIN — Medication 1300 MILLIGRAM(S): at 14:04

## 2021-01-01 RX ADMIN — Medication 50 MILLIGRAM(S): at 11:19

## 2021-01-01 RX ADMIN — SODIUM CHLORIDE 75 MILLILITER(S): 9 INJECTION, SOLUTION INTRAVENOUS at 00:00

## 2021-01-01 RX ADMIN — CHLORHEXIDINE GLUCONATE 1 APPLICATION(S): 213 SOLUTION TOPICAL at 06:02

## 2021-01-01 RX ADMIN — MIDODRINE HYDROCHLORIDE 20 MILLIGRAM(S): 2.5 TABLET ORAL at 22:50

## 2021-01-01 RX ADMIN — HEPARIN SODIUM 5000 UNIT(S): 5000 INJECTION INTRAVENOUS; SUBCUTANEOUS at 09:19

## 2021-01-01 RX ADMIN — HEPARIN SODIUM 5000 UNIT(S): 5000 INJECTION INTRAVENOUS; SUBCUTANEOUS at 18:35

## 2021-01-01 RX ADMIN — PIPERACILLIN AND TAZOBACTAM 25 GRAM(S): 4; .5 INJECTION, POWDER, LYOPHILIZED, FOR SOLUTION INTRAVENOUS at 13:07

## 2021-01-01 RX ADMIN — CHLORHEXIDINE GLUCONATE 15 MILLILITER(S): 213 SOLUTION TOPICAL at 06:02

## 2021-01-01 RX ADMIN — Medication 1 APPLICATION(S): at 19:00

## 2021-01-01 RX ADMIN — Medication 1 PACKET(S): at 22:48

## 2021-01-01 RX ADMIN — Medication 1300 MILLIGRAM(S): at 05:16

## 2021-01-01 RX ADMIN — SEVELAMER CARBONATE 1200 MILLIGRAM(S): 2400 POWDER, FOR SUSPENSION ORAL at 13:07

## 2021-01-01 RX ADMIN — HEPARIN SODIUM 5000 UNIT(S): 5000 INJECTION INTRAVENOUS; SUBCUTANEOUS at 17:06

## 2021-01-01 RX ADMIN — Medication 1000 MILLIGRAM(S): at 21:30

## 2021-01-01 RX ADMIN — Medication 1000 MILLIGRAM(S): at 01:00

## 2021-01-01 RX ADMIN — SODIUM CHLORIDE 100 MILLILITER(S): 9 INJECTION, SOLUTION INTRAVENOUS at 10:00

## 2021-01-01 RX ADMIN — CEFEPIME 100 MILLIGRAM(S): 1 INJECTION, POWDER, FOR SOLUTION INTRAMUSCULAR; INTRAVENOUS at 05:04

## 2021-01-01 RX ADMIN — CHLORHEXIDINE GLUCONATE 15 MILLILITER(S): 213 SOLUTION TOPICAL at 05:29

## 2021-01-01 RX ADMIN — PIPERACILLIN AND TAZOBACTAM 200 GRAM(S): 4; .5 INJECTION, POWDER, LYOPHILIZED, FOR SOLUTION INTRAVENOUS at 10:27

## 2021-01-01 RX ADMIN — PIPERACILLIN AND TAZOBACTAM 25 GRAM(S): 4; .5 INJECTION, POWDER, LYOPHILIZED, FOR SOLUTION INTRAVENOUS at 22:43

## 2021-01-01 RX ADMIN — CHLORHEXIDINE GLUCONATE 1 APPLICATION(S): 213 SOLUTION TOPICAL at 06:18

## 2021-01-01 RX ADMIN — Medication 100 MILLIGRAM(S): at 11:28

## 2021-01-01 RX ADMIN — CHLORHEXIDINE GLUCONATE 15 MILLILITER(S): 213 SOLUTION TOPICAL at 06:49

## 2021-01-01 RX ADMIN — VASOPRESSIN 2.4 UNIT(S)/MIN: 20 INJECTION INTRAVENOUS at 19:32

## 2021-01-01 RX ADMIN — SEVELAMER CARBONATE 1200 MILLIGRAM(S): 2400 POWDER, FOR SUSPENSION ORAL at 09:29

## 2021-01-01 RX ADMIN — PIPERACILLIN AND TAZOBACTAM 25 GRAM(S): 4; .5 INJECTION, POWDER, LYOPHILIZED, FOR SOLUTION INTRAVENOUS at 14:00

## 2021-01-01 RX ADMIN — PROPOFOL 19.2 MICROGRAM(S)/KG/MIN: 10 INJECTION, EMULSION INTRAVENOUS at 21:16

## 2021-01-01 RX ADMIN — CHLORHEXIDINE GLUCONATE 1 APPLICATION(S): 213 SOLUTION TOPICAL at 06:36

## 2021-01-01 RX ADMIN — FENTANYL CITRATE 12.8 MICROGRAM(S)/KG/HR: 50 INJECTION INTRAVENOUS at 19:57

## 2021-01-01 RX ADMIN — SODIUM CHLORIDE 150 MILLILITER(S): 9 INJECTION, SOLUTION INTRAVENOUS at 03:22

## 2021-01-01 RX ADMIN — SODIUM CHLORIDE 150 MILLILITER(S): 9 INJECTION, SOLUTION INTRAVENOUS at 07:33

## 2021-01-01 RX ADMIN — PIPERACILLIN AND TAZOBACTAM 25 GRAM(S): 4; .5 INJECTION, POWDER, LYOPHILIZED, FOR SOLUTION INTRAVENOUS at 21:54

## 2021-01-01 RX ADMIN — Medication 4: at 05:28

## 2021-01-01 RX ADMIN — Medication 1 APPLICATION(S): at 17:48

## 2021-01-01 RX ADMIN — POLYETHYLENE GLYCOL 3350 17 GRAM(S): 17 POWDER, FOR SOLUTION ORAL at 13:20

## 2021-01-01 RX ADMIN — POLYETHYLENE GLYCOL 3350 17 GRAM(S): 17 POWDER, FOR SOLUTION ORAL at 05:23

## 2021-01-01 RX ADMIN — Medication 1 APPLICATION(S): at 05:37

## 2021-01-01 RX ADMIN — SEVELAMER CARBONATE 1200 MILLIGRAM(S): 2400 POWDER, FOR SUSPENSION ORAL at 18:43

## 2021-01-01 RX ADMIN — PROPOFOL 19.2 MICROGRAM(S)/KG/MIN: 10 INJECTION, EMULSION INTRAVENOUS at 17:54

## 2021-01-01 RX ADMIN — Medication 81 MILLIGRAM(S): at 11:06

## 2021-01-01 RX ADMIN — Medication 100 GRAM(S): at 07:50

## 2021-01-01 RX ADMIN — CHLORHEXIDINE GLUCONATE 1 APPLICATION(S): 213 SOLUTION TOPICAL at 05:35

## 2021-01-01 RX ADMIN — Medication 1 APPLICATION(S): at 06:12

## 2021-01-01 RX ADMIN — CHLORHEXIDINE GLUCONATE 15 MILLILITER(S): 213 SOLUTION TOPICAL at 18:16

## 2021-01-01 RX ADMIN — HEPARIN SODIUM 5000 UNIT(S): 5000 INJECTION INTRAVENOUS; SUBCUTANEOUS at 09:17

## 2021-01-01 RX ADMIN — MIDAZOLAM HYDROCHLORIDE 4 MILLIGRAM(S): 1 INJECTION, SOLUTION INTRAMUSCULAR; INTRAVENOUS at 18:00

## 2021-01-01 RX ADMIN — Medication 1300 MILLIGRAM(S): at 05:50

## 2021-01-01 RX ADMIN — PROPOFOL 19.2 MICROGRAM(S)/KG/MIN: 10 INJECTION, EMULSION INTRAVENOUS at 08:50

## 2021-01-01 RX ADMIN — PROPOFOL 19.2 MICROGRAM(S)/KG/MIN: 10 INJECTION, EMULSION INTRAVENOUS at 21:55

## 2021-01-01 RX ADMIN — MIDAZOLAM HYDROCHLORIDE 4 MILLIGRAM(S): 1 INJECTION, SOLUTION INTRAMUSCULAR; INTRAVENOUS at 00:20

## 2021-01-01 RX ADMIN — Medication 1 APPLICATION(S): at 05:42

## 2021-01-01 RX ADMIN — PHENYLEPHRINE HYDROCHLORIDE 1.17 MICROGRAM(S)/KG/MIN: 10 INJECTION INTRAVENOUS at 19:31

## 2021-01-01 RX ADMIN — Medication 1300 MILLIGRAM(S): at 13:01

## 2021-01-01 RX ADMIN — Medication 81 MILLIGRAM(S): at 08:37

## 2021-01-01 RX ADMIN — CEFEPIME 100 MILLIGRAM(S): 1 INJECTION, POWDER, FOR SOLUTION INTRAMUSCULAR; INTRAVENOUS at 14:08

## 2021-01-01 RX ADMIN — BUMETANIDE 2 MILLIGRAM(S): 0.25 INJECTION INTRAMUSCULAR; INTRAVENOUS at 17:36

## 2021-01-01 RX ADMIN — IVERMECTIN 12 MILLIGRAM(S): 3 TABLET ORAL at 18:34

## 2021-01-01 RX ADMIN — Medication 1300 MILLIGRAM(S): at 22:14

## 2021-01-01 RX ADMIN — MIDODRINE HYDROCHLORIDE 20 MILLIGRAM(S): 2.5 TABLET ORAL at 06:12

## 2021-01-01 RX ADMIN — CEFEPIME 100 MILLIGRAM(S): 1 INJECTION, POWDER, FOR SOLUTION INTRAMUSCULAR; INTRAVENOUS at 21:48

## 2021-01-01 RX ADMIN — BUMETANIDE 5 MG/HR: 0.25 INJECTION INTRAMUSCULAR; INTRAVENOUS at 22:24

## 2021-01-01 RX ADMIN — CHLORHEXIDINE GLUCONATE 1 APPLICATION(S): 213 SOLUTION TOPICAL at 08:17

## 2021-01-01 RX ADMIN — CEFEPIME 100 MILLIGRAM(S): 1 INJECTION, POWDER, FOR SOLUTION INTRAMUSCULAR; INTRAVENOUS at 05:38

## 2021-01-01 RX ADMIN — SENNA PLUS 15 MILLILITER(S): 8.6 TABLET ORAL at 21:54

## 2021-01-01 RX ADMIN — HEPARIN SODIUM 5000 UNIT(S): 5000 INJECTION INTRAVENOUS; SUBCUTANEOUS at 01:57

## 2021-01-01 RX ADMIN — CEFEPIME 100 MILLIGRAM(S): 1 INJECTION, POWDER, FOR SOLUTION INTRAMUSCULAR; INTRAVENOUS at 23:43

## 2021-01-01 RX ADMIN — Medication 20 MILLILITER(S): at 15:31

## 2021-01-01 RX ADMIN — SODIUM CHLORIDE 250 MILLILITER(S): 9 INJECTION INTRAMUSCULAR; INTRAVENOUS; SUBCUTANEOUS at 21:45

## 2021-01-01 RX ADMIN — Medication 1 APPLICATION(S): at 18:23

## 2021-01-01 RX ADMIN — Medication 50 MILLIEQUIVALENT(S): at 13:59

## 2021-01-01 RX ADMIN — Medication 650 MILLIGRAM(S): at 21:45

## 2021-01-01 RX ADMIN — Medication 100 MILLIGRAM(S): at 11:05

## 2021-01-01 RX ADMIN — SODIUM ZIRCONIUM CYCLOSILICATE 10 GRAM(S): 10 POWDER, FOR SUSPENSION ORAL at 16:04

## 2021-01-01 RX ADMIN — SEVELAMER CARBONATE 1200 MILLIGRAM(S): 2400 POWDER, FOR SUSPENSION ORAL at 08:01

## 2021-01-01 RX ADMIN — Medication 100 GRAM(S): at 06:18

## 2021-01-01 RX ADMIN — Medication 1300 MILLIGRAM(S): at 13:16

## 2021-01-01 RX ADMIN — ENOXAPARIN SODIUM 40 MILLIGRAM(S): 100 INJECTION SUBCUTANEOUS at 17:42

## 2021-01-01 RX ADMIN — DEXMEDETOMIDINE HYDROCHLORIDE IN 0.9% SODIUM CHLORIDE 7.83 MICROGRAM(S)/KG/HR: 4 INJECTION INTRAVENOUS at 21:14

## 2021-01-01 RX ADMIN — SEVELAMER CARBONATE 1200 MILLIGRAM(S): 2400 POWDER, FOR SUSPENSION ORAL at 13:20

## 2021-01-01 RX ADMIN — CEFEPIME 100 MILLIGRAM(S): 1 INJECTION, POWDER, FOR SOLUTION INTRAMUSCULAR; INTRAVENOUS at 05:29

## 2021-01-01 RX ADMIN — CHLORHEXIDINE GLUCONATE 15 MILLILITER(S): 213 SOLUTION TOPICAL at 19:45

## 2021-01-01 RX ADMIN — PROPOFOL 19.2 MICROGRAM(S)/KG/MIN: 10 INJECTION, EMULSION INTRAVENOUS at 22:57

## 2021-01-01 RX ADMIN — FENTANYL CITRATE 100 MICROGRAM(S): 50 INJECTION INTRAVENOUS at 17:05

## 2021-01-01 RX ADMIN — Medication 1 APPLICATION(S): at 06:02

## 2021-01-01 RX ADMIN — Medication 1 GRAM(S): at 18:40

## 2021-01-01 RX ADMIN — SEVELAMER CARBONATE 1200 MILLIGRAM(S): 2400 POWDER, FOR SUSPENSION ORAL at 09:23

## 2021-01-01 RX ADMIN — Medication 1 TABLET(S): at 11:28

## 2021-01-01 RX ADMIN — SENNA PLUS 15 MILLILITER(S): 8.6 TABLET ORAL at 21:39

## 2021-01-01 RX ADMIN — PROPOFOL 19.2 MICROGRAM(S)/KG/MIN: 10 INJECTION, EMULSION INTRAVENOUS at 01:18

## 2021-01-01 RX ADMIN — INSULIN HUMAN 5 UNIT(S): 100 INJECTION, SOLUTION SUBCUTANEOUS at 18:29

## 2021-01-01 RX ADMIN — Medication 1 APPLICATION(S): at 18:14

## 2021-01-01 RX ADMIN — CHLORHEXIDINE GLUCONATE 15 MILLILITER(S): 213 SOLUTION TOPICAL at 18:35

## 2021-01-01 RX ADMIN — FENTANYL CITRATE 12.8 MICROGRAM(S)/KG/HR: 50 INJECTION INTRAVENOUS at 07:48

## 2021-01-01 RX ADMIN — SODIUM CHLORIDE 150 MILLILITER(S): 9 INJECTION, SOLUTION INTRAVENOUS at 22:21

## 2021-01-01 RX ADMIN — Medication 1 APPLICATION(S): at 18:43

## 2021-01-01 RX ADMIN — FENTANYL CITRATE 50 MICROGRAM(S): 50 INJECTION INTRAVENOUS at 10:50

## 2021-01-01 RX ADMIN — VASOPRESSIN 2.4 UNIT(S)/MIN: 20 INJECTION INTRAVENOUS at 11:11

## 2021-01-01 RX ADMIN — Medication 1300 MILLIGRAM(S): at 15:21

## 2021-01-01 RX ADMIN — PIPERACILLIN AND TAZOBACTAM 25 GRAM(S): 4; .5 INJECTION, POWDER, LYOPHILIZED, FOR SOLUTION INTRAVENOUS at 09:07

## 2021-01-01 RX ADMIN — Medication 1 APPLICATION(S): at 17:15

## 2021-01-01 RX ADMIN — CHLORHEXIDINE GLUCONATE 1 APPLICATION(S): 213 SOLUTION TOPICAL at 05:27

## 2021-01-01 RX ADMIN — Medication 1300 MILLIGRAM(S): at 14:30

## 2021-01-01 RX ADMIN — Medication 1 APPLICATION(S): at 05:49

## 2021-01-01 RX ADMIN — PIPERACILLIN AND TAZOBACTAM 25 GRAM(S): 4; .5 INJECTION, POWDER, LYOPHILIZED, FOR SOLUTION INTRAVENOUS at 05:12

## 2021-01-01 RX ADMIN — Medication 100 MILLIGRAM(S): at 12:47

## 2021-01-01 RX ADMIN — MIDODRINE HYDROCHLORIDE 20 MILLIGRAM(S): 2.5 TABLET ORAL at 21:39

## 2021-01-01 RX ADMIN — Medication 60 MICROGRAM(S)/KG/MIN: at 17:37

## 2021-01-01 RX ADMIN — HEPARIN SODIUM 5000 UNIT(S): 5000 INJECTION INTRAVENOUS; SUBCUTANEOUS at 01:43

## 2021-01-01 RX ADMIN — POLYETHYLENE GLYCOL 3350 17 GRAM(S): 17 POWDER, FOR SOLUTION ORAL at 13:31

## 2021-01-01 RX ADMIN — Medication 2.93 MICROGRAM(S)/KG/MIN: at 07:40

## 2021-01-01 RX ADMIN — FENTANYL CITRATE 50 MICROGRAM(S): 50 INJECTION INTRAVENOUS at 17:45

## 2021-01-01 RX ADMIN — SEVELAMER CARBONATE 1200 MILLIGRAM(S): 2400 POWDER, FOR SUSPENSION ORAL at 12:32

## 2021-01-01 RX ADMIN — Medication 81 MILLIGRAM(S): at 14:20

## 2021-01-01 RX ADMIN — Medication 100 MILLIEQUIVALENT(S): at 16:48

## 2021-01-01 RX ADMIN — HEPARIN SODIUM 5000 UNIT(S): 5000 INJECTION INTRAVENOUS; SUBCUTANEOUS at 17:41

## 2021-01-01 RX ADMIN — Medication 650 MILLIGRAM(S): at 18:53

## 2021-01-01 RX ADMIN — MIDODRINE HYDROCHLORIDE 10 MILLIGRAM(S): 2.5 TABLET ORAL at 05:16

## 2021-01-01 RX ADMIN — Medication 1300 MILLIGRAM(S): at 05:18

## 2021-01-01 RX ADMIN — PHENYLEPHRINE HYDROCHLORIDE 6 MICROGRAM(S)/KG/MIN: 10 INJECTION INTRAVENOUS at 17:37

## 2021-01-01 RX ADMIN — AMLODIPINE BESYLATE 5 MILLIGRAM(S): 2.5 TABLET ORAL at 08:23

## 2021-01-01 RX ADMIN — PROPOFOL 19.2 MICROGRAM(S)/KG/MIN: 10 INJECTION, EMULSION INTRAVENOUS at 07:48

## 2021-01-01 RX ADMIN — HEPARIN SODIUM 5000 UNIT(S): 5000 INJECTION INTRAVENOUS; SUBCUTANEOUS at 01:15

## 2021-01-01 RX ADMIN — Medication 1 APPLICATION(S): at 05:17

## 2021-01-01 RX ADMIN — Medication 81 MILLIGRAM(S): at 12:27

## 2021-01-01 RX ADMIN — SEVELAMER CARBONATE 1200 MILLIGRAM(S): 2400 POWDER, FOR SUSPENSION ORAL at 17:08

## 2021-01-01 RX ADMIN — Medication 2.93 MICROGRAM(S)/KG/MIN: at 22:23

## 2021-01-01 RX ADMIN — Medication 100 GRAM(S): at 03:39

## 2021-01-01 RX ADMIN — CHLORHEXIDINE GLUCONATE 15 MILLILITER(S): 213 SOLUTION TOPICAL at 05:09

## 2021-01-01 RX ADMIN — DEXMEDETOMIDINE HYDROCHLORIDE IN 0.9% SODIUM CHLORIDE 7.83 MICROGRAM(S)/KG/HR: 4 INJECTION INTRAVENOUS at 20:30

## 2021-01-01 RX ADMIN — Medication 1 APPLICATION(S): at 17:39

## 2021-01-01 RX ADMIN — FENTANYL CITRATE 50 MICROGRAM(S): 50 INJECTION INTRAVENOUS at 17:58

## 2021-01-01 RX ADMIN — Medication 120 MILLIGRAM(S): at 06:23

## 2021-01-01 RX ADMIN — Medication 100 MILLIGRAM(S): at 13:11

## 2021-01-01 RX ADMIN — Medication 50 MILLIGRAM(S): at 11:58

## 2021-01-01 RX ADMIN — Medication 1000 MILLIGRAM(S): at 18:00

## 2021-01-01 RX ADMIN — MIDODRINE HYDROCHLORIDE 20 MILLIGRAM(S): 2.5 TABLET ORAL at 05:18

## 2021-01-01 RX ADMIN — Medication 200 GRAM(S): at 15:48

## 2021-01-01 RX ADMIN — MORPHINE SULFATE 4 MILLIGRAM(S): 50 CAPSULE, EXTENDED RELEASE ORAL at 06:46

## 2021-01-01 RX ADMIN — Medication 1 APPLICATION(S): at 05:26

## 2021-01-01 RX ADMIN — Medication 1 APPLICATION(S): at 05:11

## 2021-01-01 RX ADMIN — SODIUM CHLORIDE 75 MILLILITER(S): 9 INJECTION, SOLUTION INTRAVENOUS at 09:18

## 2021-01-01 RX ADMIN — Medication 75 MEQ/KG/HR: at 15:28

## 2021-01-01 RX ADMIN — CHLORHEXIDINE GLUCONATE 1 APPLICATION(S): 213 SOLUTION TOPICAL at 13:00

## 2021-01-01 RX ADMIN — DEXMEDETOMIDINE HYDROCHLORIDE IN 0.9% SODIUM CHLORIDE 7.83 MICROGRAM(S)/KG/HR: 4 INJECTION INTRAVENOUS at 08:44

## 2021-01-01 RX ADMIN — SODIUM ZIRCONIUM CYCLOSILICATE 10 GRAM(S): 10 POWDER, FOR SUSPENSION ORAL at 05:23

## 2021-01-01 RX ADMIN — PROPOFOL 30 MILLIGRAM(S): 10 INJECTION, EMULSION INTRAVENOUS at 16:41

## 2021-01-01 RX ADMIN — SEVELAMER CARBONATE 1200 MILLIGRAM(S): 2400 POWDER, FOR SUSPENSION ORAL at 09:14

## 2021-01-01 RX ADMIN — SEVELAMER CARBONATE 1200 MILLIGRAM(S): 2400 POWDER, FOR SUSPENSION ORAL at 17:40

## 2021-01-01 RX ADMIN — Medication 1 APPLICATION(S): at 18:45

## 2021-01-01 RX ADMIN — SENNA PLUS 15 MILLILITER(S): 8.6 TABLET ORAL at 21:45

## 2021-01-01 RX ADMIN — MEROPENEM 100 MILLIGRAM(S): 1 INJECTION INTRAVENOUS at 22:49

## 2021-01-01 RX ADMIN — HEPARIN SODIUM 5000 UNIT(S): 5000 INJECTION INTRAVENOUS; SUBCUTANEOUS at 23:43

## 2021-01-01 RX ADMIN — FAMOTIDINE 20 MILLIGRAM(S): 10 INJECTION INTRAVENOUS at 11:46

## 2021-01-01 RX ADMIN — PIPERACILLIN AND TAZOBACTAM 25 GRAM(S): 4; .5 INJECTION, POWDER, LYOPHILIZED, FOR SOLUTION INTRAVENOUS at 08:07

## 2021-01-01 RX ADMIN — MIDAZOLAM HYDROCHLORIDE 4 MILLIGRAM(S): 1 INJECTION, SOLUTION INTRAMUSCULAR; INTRAVENOUS at 16:00

## 2021-01-01 RX ADMIN — Medication 300 MILLIGRAM(S): at 17:18

## 2021-01-01 RX ADMIN — VASOPRESSIN 2.4 UNIT(S)/MIN: 20 INJECTION INTRAVENOUS at 08:10

## 2021-01-01 RX ADMIN — Medication 1300 MILLIGRAM(S): at 05:48

## 2021-01-01 RX ADMIN — RITUXIMAB 620 MILLIGRAM(S): 10 INJECTION, SOLUTION INTRAVENOUS at 12:21

## 2021-01-01 RX ADMIN — Medication 100 MILLIGRAM(S): at 13:12

## 2021-01-01 RX ADMIN — Medication 2: at 00:29

## 2021-01-01 RX ADMIN — POLYETHYLENE GLYCOL 3350 17 GRAM(S): 17 POWDER, FOR SOLUTION ORAL at 11:05

## 2021-01-01 RX ADMIN — PIPERACILLIN AND TAZOBACTAM 25 GRAM(S): 4; .5 INJECTION, POWDER, LYOPHILIZED, FOR SOLUTION INTRAVENOUS at 05:16

## 2021-01-01 RX ADMIN — Medication 1 TABLET(S): at 12:55

## 2021-01-01 RX ADMIN — POLYETHYLENE GLYCOL 3350 17 GRAM(S): 17 POWDER, FOR SOLUTION ORAL at 12:14

## 2021-01-01 RX ADMIN — MIDODRINE HYDROCHLORIDE 20 MILLIGRAM(S): 2.5 TABLET ORAL at 21:55

## 2021-01-01 RX ADMIN — HEPARIN SODIUM 5000 UNIT(S): 5000 INJECTION INTRAVENOUS; SUBCUTANEOUS at 05:26

## 2021-01-01 RX ADMIN — CHLORHEXIDINE GLUCONATE 15 MILLILITER(S): 213 SOLUTION TOPICAL at 18:13

## 2021-01-01 RX ADMIN — Medication 50 MILLILITER(S): at 13:10

## 2021-01-01 RX ADMIN — INSULIN HUMAN 5 UNIT(S): 100 INJECTION, SOLUTION SUBCUTANEOUS at 11:12

## 2021-01-01 RX ADMIN — Medication 400 MILLIGRAM(S): at 18:42

## 2021-01-01 RX ADMIN — Medication 1 APPLICATION(S): at 05:29

## 2021-01-01 RX ADMIN — Medication 10 MILLIGRAM(S): at 15:48

## 2021-01-01 RX ADMIN — Medication 100 GRAM(S): at 12:47

## 2021-01-01 RX ADMIN — METHYLNALTREXONE BROMIDE 12 MILLIGRAM(S): 12 INJECTION, SOLUTION SUBCUTANEOUS at 07:33

## 2021-01-01 RX ADMIN — SEVELAMER CARBONATE 1200 MILLIGRAM(S): 2400 POWDER, FOR SUSPENSION ORAL at 13:31

## 2021-01-01 RX ADMIN — Medication 3 MILLILITER(S): at 09:45

## 2021-01-01 RX ADMIN — CHLORHEXIDINE GLUCONATE 15 MILLILITER(S): 213 SOLUTION TOPICAL at 05:26

## 2021-01-01 RX ADMIN — AMLODIPINE BESYLATE 5 MILLIGRAM(S): 2.5 TABLET ORAL at 11:44

## 2021-01-01 RX ADMIN — Medication 1 APPLICATION(S): at 17:41

## 2021-01-01 RX ADMIN — Medication 250 MILLIGRAM(S): at 00:13

## 2021-01-01 RX ADMIN — PHENYLEPHRINE HYDROCHLORIDE 6 MICROGRAM(S)/KG/MIN: 10 INJECTION INTRAVENOUS at 21:16

## 2021-01-01 RX ADMIN — POLYETHYLENE GLYCOL 3350 17 GRAM(S): 17 POWDER, FOR SOLUTION ORAL at 17:39

## 2021-01-01 RX ADMIN — SODIUM ZIRCONIUM CYCLOSILICATE 10 GRAM(S): 10 POWDER, FOR SUSPENSION ORAL at 11:12

## 2021-01-01 RX ADMIN — BUMETANIDE 15 MG/HR: 0.25 INJECTION INTRAMUSCULAR; INTRAVENOUS at 16:10

## 2021-01-01 RX ADMIN — Medication 50 MILLILITER(S): at 11:20

## 2021-01-01 RX ADMIN — SODIUM CHLORIDE 150 MILLILITER(S): 9 INJECTION, SOLUTION INTRAVENOUS at 09:03

## 2021-01-01 RX ADMIN — HEPARIN SODIUM 5000 UNIT(S): 5000 INJECTION INTRAVENOUS; SUBCUTANEOUS at 17:18

## 2021-01-01 RX ADMIN — Medication 1300 MILLIGRAM(S): at 13:42

## 2021-01-01 RX ADMIN — FENTANYL CITRATE 12.8 MICROGRAM(S)/KG/HR: 50 INJECTION INTRAVENOUS at 21:15

## 2021-01-01 RX ADMIN — CHLORHEXIDINE GLUCONATE 15 MILLILITER(S): 213 SOLUTION TOPICAL at 05:41

## 2021-01-01 RX ADMIN — Medication 1 TABLET(S): at 11:06

## 2021-01-01 RX ADMIN — Medication 1 APPLICATION(S): at 17:06

## 2021-01-01 RX ADMIN — Medication 1300 MILLIGRAM(S): at 14:22

## 2021-01-01 RX ADMIN — Medication 1300 MILLIGRAM(S): at 16:49

## 2021-01-01 RX ADMIN — CHLORHEXIDINE GLUCONATE 15 MILLILITER(S): 213 SOLUTION TOPICAL at 05:46

## 2021-01-01 RX ADMIN — Medication 650 MILLIGRAM(S): at 11:20

## 2021-01-01 RX ADMIN — AMLODIPINE BESYLATE 5 MILLIGRAM(S): 2.5 TABLET ORAL at 07:02

## 2021-01-01 RX ADMIN — CHLORHEXIDINE GLUCONATE 15 MILLILITER(S): 213 SOLUTION TOPICAL at 05:14

## 2021-01-01 RX ADMIN — FAMOTIDINE 20 MILLIGRAM(S): 10 INJECTION INTRAVENOUS at 06:07

## 2021-01-01 RX ADMIN — Medication 25 MILLILITER(S): at 06:23

## 2021-01-01 RX ADMIN — Medication 300 MILLIGRAM(S): at 18:23

## 2021-01-01 RX ADMIN — SEVELAMER CARBONATE 1200 MILLIGRAM(S): 2400 POWDER, FOR SUSPENSION ORAL at 20:41

## 2021-01-01 RX ADMIN — SODIUM CHLORIDE 100 MILLILITER(S): 9 INJECTION, SOLUTION INTRAVENOUS at 17:50

## 2021-01-01 RX ADMIN — Medication 2: at 05:13

## 2021-01-01 RX ADMIN — Medication 50 MILLILITER(S): at 17:07

## 2021-01-01 RX ADMIN — Medication 50 GRAM(S): at 01:36

## 2021-01-01 RX ADMIN — CHLORHEXIDINE GLUCONATE 15 MILLILITER(S): 213 SOLUTION TOPICAL at 06:26

## 2021-01-01 RX ADMIN — CHLORHEXIDINE GLUCONATE 1 APPLICATION(S): 213 SOLUTION TOPICAL at 06:05

## 2021-01-01 RX ADMIN — DOXORUBICIN HYDROCHLORIDE 41.67 MILLIGRAM(S): 2 INJECTION, SOLUTION INTRAVENOUS at 16:02

## 2021-01-01 RX ADMIN — PIPERACILLIN AND TAZOBACTAM 25 GRAM(S): 4; .5 INJECTION, POWDER, LYOPHILIZED, FOR SOLUTION INTRAVENOUS at 13:43

## 2021-01-01 RX ADMIN — HEPARIN SODIUM 5000 UNIT(S): 5000 INJECTION INTRAVENOUS; SUBCUTANEOUS at 05:44

## 2021-01-01 RX ADMIN — ONDANSETRON 4 MILLIGRAM(S): 8 TABLET, FILM COATED ORAL at 08:21

## 2021-01-01 RX ADMIN — BUMETANIDE 15 MG/HR: 0.25 INJECTION INTRAMUSCULAR; INTRAVENOUS at 08:09

## 2021-01-01 RX ADMIN — Medication 300 MILLIGRAM(S): at 17:39

## 2021-01-01 RX ADMIN — Medication 1 TABLET(S): at 12:32

## 2021-01-01 RX ADMIN — PHENYLEPHRINE HYDROCHLORIDE 1.17 MICROGRAM(S)/KG/MIN: 10 INJECTION INTRAVENOUS at 22:49

## 2021-01-01 RX ADMIN — CHLORHEXIDINE GLUCONATE 15 MILLILITER(S): 213 SOLUTION TOPICAL at 17:27

## 2021-01-01 RX ADMIN — SODIUM CHLORIDE 100 MILLILITER(S): 9 INJECTION, SOLUTION INTRAVENOUS at 08:20

## 2021-01-01 RX ADMIN — Medication 50 MILLILITER(S): at 08:54

## 2021-01-01 RX ADMIN — Medication 300 MILLIGRAM(S): at 18:29

## 2021-01-01 RX ADMIN — Medication 2.93 MICROGRAM(S)/KG/MIN: at 07:50

## 2021-01-01 RX ADMIN — Medication 1300 MILLIGRAM(S): at 13:15

## 2021-01-01 RX ADMIN — SEVELAMER CARBONATE 1200 MILLIGRAM(S): 2400 POWDER, FOR SUSPENSION ORAL at 18:33

## 2021-01-01 RX ADMIN — HEPARIN SODIUM 5000 UNIT(S): 5000 INJECTION INTRAVENOUS; SUBCUTANEOUS at 12:20

## 2021-01-01 RX ADMIN — Medication 2: at 01:24

## 2021-01-01 RX ADMIN — MIDODRINE HYDROCHLORIDE 10 MILLIGRAM(S): 2.5 TABLET ORAL at 13:06

## 2021-01-01 RX ADMIN — CHLORHEXIDINE GLUCONATE 1 APPLICATION(S): 213 SOLUTION TOPICAL at 05:21

## 2021-01-01 RX ADMIN — Medication 1 APPLICATION(S): at 05:14

## 2021-01-01 RX ADMIN — Medication 50 MILLIEQUIVALENT(S): at 20:29

## 2021-01-01 RX ADMIN — Medication 1300 MILLIGRAM(S): at 13:06

## 2021-01-01 RX ADMIN — Medication 300 MILLIGRAM(S): at 18:43

## 2021-01-01 RX ADMIN — ENOXAPARIN SODIUM 40 MILLIGRAM(S): 100 INJECTION SUBCUTANEOUS at 11:09

## 2021-01-01 RX ADMIN — Medication 1 APPLICATION(S): at 05:09

## 2021-01-01 RX ADMIN — FENTANYL CITRATE 12.8 MICROGRAM(S)/KG/HR: 50 INJECTION INTRAVENOUS at 08:00

## 2021-01-01 RX ADMIN — HEPARIN SODIUM 5000 UNIT(S): 5000 INJECTION INTRAVENOUS; SUBCUTANEOUS at 09:14

## 2021-01-01 RX ADMIN — Medication 400 MILLIGRAM(S): at 17:05

## 2021-01-01 RX ADMIN — Medication 2.93 MICROGRAM(S)/KG/MIN: at 19:31

## 2021-01-01 RX ADMIN — Medication 50 MILLIGRAM(S): at 13:06

## 2021-01-01 RX ADMIN — SODIUM CHLORIDE 100 MILLILITER(S): 9 INJECTION, SOLUTION INTRAVENOUS at 22:23

## 2021-01-01 RX ADMIN — Medication 1300 MILLIGRAM(S): at 22:10

## 2021-01-01 RX ADMIN — CHLORHEXIDINE GLUCONATE 15 MILLILITER(S): 213 SOLUTION TOPICAL at 05:38

## 2021-01-01 RX ADMIN — Medication 50 MILLILITER(S): at 23:18

## 2021-01-01 RX ADMIN — POLYETHYLENE GLYCOL 3350 17 GRAM(S): 17 POWDER, FOR SOLUTION ORAL at 18:13

## 2021-01-01 RX ADMIN — Medication 400 MILLIGRAM(S): at 01:04

## 2021-01-01 RX ADMIN — Medication 50 MILLIGRAM(S): at 13:16

## 2021-01-01 RX ADMIN — ONDANSETRON 8 MILLIGRAM(S): 8 TABLET, FILM COATED ORAL at 12:08

## 2021-01-01 RX ADMIN — Medication 300 MICROGRAM(S): at 13:00

## 2021-01-01 RX ADMIN — MIDODRINE HYDROCHLORIDE 10 MILLIGRAM(S): 2.5 TABLET ORAL at 13:43

## 2021-01-01 RX ADMIN — FENTANYL CITRATE 12.8 MICROGRAM(S)/KG/HR: 50 INJECTION INTRAVENOUS at 19:56

## 2021-01-01 RX ADMIN — Medication 1 APPLICATION(S): at 06:23

## 2021-01-01 RX ADMIN — PHENYLEPHRINE HYDROCHLORIDE 6 MICROGRAM(S)/KG/MIN: 10 INJECTION INTRAVENOUS at 07:46

## 2021-01-01 RX ADMIN — Medication 6 MILLIGRAM(S): at 01:15

## 2021-01-01 RX ADMIN — Medication 1 APPLICATION(S): at 18:32

## 2021-01-01 RX ADMIN — HEPARIN SODIUM 5000 UNIT(S): 5000 INJECTION INTRAVENOUS; SUBCUTANEOUS at 11:45

## 2021-01-01 RX ADMIN — MIDODRINE HYDROCHLORIDE 20 MILLIGRAM(S): 2.5 TABLET ORAL at 21:13

## 2021-01-01 RX ADMIN — Medication 1300 MILLIGRAM(S): at 15:35

## 2021-01-01 RX ADMIN — POLYETHYLENE GLYCOL 3350 17 GRAM(S): 17 POWDER, FOR SOLUTION ORAL at 05:11

## 2021-01-01 RX ADMIN — Medication 1300 MILLIGRAM(S): at 21:55

## 2021-01-01 RX ADMIN — Medication 300 MILLIGRAM(S): at 19:13

## 2021-01-01 RX ADMIN — HUMAN INSULIN 6 UNIT(S): 100 INJECTION, SUSPENSION SUBCUTANEOUS at 05:29

## 2021-01-01 RX ADMIN — Medication 1300 MILLIGRAM(S): at 06:35

## 2021-01-01 RX ADMIN — ONDANSETRON 8 MILLIGRAM(S): 8 TABLET, FILM COATED ORAL at 14:22

## 2021-01-01 RX ADMIN — FENTANYL CITRATE 100 MICROGRAM(S): 50 INJECTION INTRAVENOUS at 15:20

## 2021-01-01 RX ADMIN — Medication 1 TABLET(S): at 12:49

## 2021-01-01 RX ADMIN — SEVELAMER CARBONATE 1200 MILLIGRAM(S): 2400 POWDER, FOR SUSPENSION ORAL at 12:59

## 2021-01-01 RX ADMIN — PHENYLEPHRINE HYDROCHLORIDE 6 MICROGRAM(S)/KG/MIN: 10 INJECTION INTRAVENOUS at 08:00

## 2021-01-01 RX ADMIN — Medication 300 MILLIGRAM(S): at 17:27

## 2021-01-01 RX ADMIN — Medication 2.93 MICROGRAM(S)/KG/MIN: at 08:10

## 2021-01-01 RX ADMIN — DOXORUBICIN HYDROCHLORIDE 41.67 MILLIGRAM(S): 2 INJECTION, SOLUTION INTRAVENOUS at 17:20

## 2021-01-01 RX ADMIN — Medication 20 MILLIGRAM(S): at 09:31

## 2021-01-01 RX ADMIN — PIPERACILLIN AND TAZOBACTAM 25 GRAM(S): 4; .5 INJECTION, POWDER, LYOPHILIZED, FOR SOLUTION INTRAVENOUS at 15:14

## 2021-01-01 RX ADMIN — CHLORHEXIDINE GLUCONATE 1 APPLICATION(S): 213 SOLUTION TOPICAL at 05:29

## 2021-01-01 RX ADMIN — SENNA PLUS 15 MILLILITER(S): 8.6 TABLET ORAL at 22:41

## 2021-01-01 RX ADMIN — PROPOFOL 11.3 MICROGRAM(S)/KG/MIN: 10 INJECTION, EMULSION INTRAVENOUS at 08:15

## 2021-01-01 RX ADMIN — HEPARIN SODIUM 5000 UNIT(S): 5000 INJECTION INTRAVENOUS; SUBCUTANEOUS at 10:46

## 2021-01-01 RX ADMIN — CEFEPIME 100 MILLIGRAM(S): 1 INJECTION, POWDER, FOR SOLUTION INTRAMUSCULAR; INTRAVENOUS at 06:52

## 2021-01-01 RX ADMIN — Medication 20 MILLIEQUIVALENT(S): at 12:48

## 2021-01-01 RX ADMIN — Medication 400 MILLIGRAM(S): at 17:41

## 2021-01-01 RX ADMIN — DEXMEDETOMIDINE HYDROCHLORIDE IN 0.9% SODIUM CHLORIDE 7.83 MICROGRAM(S)/KG/HR: 4 INJECTION INTRAVENOUS at 08:09

## 2021-01-01 RX ADMIN — Medication 200 GRAM(S): at 00:22

## 2021-01-01 RX ADMIN — HEPARIN SODIUM 5000 UNIT(S): 5000 INJECTION INTRAVENOUS; SUBCUTANEOUS at 17:23

## 2021-01-01 RX ADMIN — Medication 300 MILLIGRAM(S): at 17:09

## 2021-01-01 RX ADMIN — Medication 400 MILLIGRAM(S): at 23:57

## 2021-01-01 RX ADMIN — AMLODIPINE BESYLATE 5 MILLIGRAM(S): 2.5 TABLET ORAL at 06:43

## 2021-01-01 RX ADMIN — Medication 250 MILLIGRAM(S): at 00:00

## 2021-01-01 RX ADMIN — Medication 960 MILLIGRAM(S): at 23:52

## 2021-01-01 RX ADMIN — FAMOTIDINE 20 MILLIGRAM(S): 10 INJECTION INTRAVENOUS at 18:23

## 2021-01-01 RX ADMIN — SENNA PLUS 15 MILLILITER(S): 8.6 TABLET ORAL at 23:35

## 2021-01-01 RX ADMIN — PIPERACILLIN AND TAZOBACTAM 25 GRAM(S): 4; .5 INJECTION, POWDER, LYOPHILIZED, FOR SOLUTION INTRAVENOUS at 16:48

## 2021-01-01 RX ADMIN — POLYETHYLENE GLYCOL 3350 17 GRAM(S): 17 POWDER, FOR SOLUTION ORAL at 05:09

## 2021-01-01 RX ADMIN — CHLORHEXIDINE GLUCONATE 15 MILLILITER(S): 213 SOLUTION TOPICAL at 06:18

## 2021-01-01 RX ADMIN — Medication 250 MILLIGRAM(S): at 12:22

## 2021-01-01 RX ADMIN — ONDANSETRON 8 MILLIGRAM(S): 8 TABLET, FILM COATED ORAL at 14:38

## 2021-01-01 RX ADMIN — FENTANYL CITRATE 100 MICROGRAM(S): 50 INJECTION INTRAVENOUS at 17:59

## 2021-01-01 RX ADMIN — FAMOTIDINE 20 MILLIGRAM(S): 10 INJECTION INTRAVENOUS at 17:54

## 2021-01-01 RX ADMIN — ENOXAPARIN SODIUM 40 MILLIGRAM(S): 100 INJECTION SUBCUTANEOUS at 12:55

## 2021-01-01 RX ADMIN — PHENYLEPHRINE HYDROCHLORIDE 6 MICROGRAM(S)/KG/MIN: 10 INJECTION INTRAVENOUS at 08:50

## 2021-01-01 RX ADMIN — CHLORHEXIDINE GLUCONATE 15 MILLILITER(S): 213 SOLUTION TOPICAL at 06:11

## 2021-01-01 RX ADMIN — PIPERACILLIN AND TAZOBACTAM 25 GRAM(S): 4; .5 INJECTION, POWDER, LYOPHILIZED, FOR SOLUTION INTRAVENOUS at 21:46

## 2021-01-01 RX ADMIN — Medication 50 MILLIGRAM(S): at 13:02

## 2021-01-01 RX ADMIN — ONDANSETRON 8 MILLIGRAM(S): 8 TABLET, FILM COATED ORAL at 05:18

## 2021-01-01 RX ADMIN — Medication 300 MILLIGRAM(S): at 17:42

## 2021-01-01 RX ADMIN — Medication 2.93 MICROGRAM(S)/KG/MIN: at 11:48

## 2021-01-01 RX ADMIN — Medication 1 APPLICATION(S): at 18:39

## 2021-01-01 RX ADMIN — POLYETHYLENE GLYCOL 3350 17 GRAM(S): 17 POWDER, FOR SOLUTION ORAL at 13:12

## 2021-01-01 RX ADMIN — Medication 100 MILLIGRAM(S): at 12:14

## 2021-01-01 RX ADMIN — SEVELAMER CARBONATE 1200 MILLIGRAM(S): 2400 POWDER, FOR SUSPENSION ORAL at 13:02

## 2021-01-01 RX ADMIN — MIDODRINE HYDROCHLORIDE 20 MILLIGRAM(S): 2.5 TABLET ORAL at 13:15

## 2021-01-01 RX ADMIN — DEXMEDETOMIDINE HYDROCHLORIDE IN 0.9% SODIUM CHLORIDE 7.83 MICROGRAM(S)/KG/HR: 4 INJECTION INTRAVENOUS at 16:10

## 2021-01-01 RX ADMIN — Medication 1300 MILLIGRAM(S): at 14:56

## 2021-01-01 RX ADMIN — Medication 50 MILLIGRAM(S): at 13:15

## 2021-01-01 RX ADMIN — Medication 1 APPLICATION(S): at 06:19

## 2021-01-01 RX ADMIN — LACTULOSE 10 GRAM(S): 10 SOLUTION ORAL at 11:13

## 2021-01-01 RX ADMIN — Medication 2.93 MICROGRAM(S)/KG/MIN: at 19:14

## 2021-01-01 RX ADMIN — Medication 2.93 MICROGRAM(S)/KG/MIN: at 07:46

## 2021-01-01 RX ADMIN — Medication 1 APPLICATION(S): at 06:25

## 2021-01-01 RX ADMIN — FENTANYL CITRATE 12.8 MICROGRAM(S)/KG/HR: 50 INJECTION INTRAVENOUS at 07:39

## 2021-01-01 RX ADMIN — Medication 1 TABLET(S): at 10:20

## 2021-01-01 RX ADMIN — FAMOTIDINE 20 MILLIGRAM(S): 10 INJECTION INTRAVENOUS at 17:23

## 2021-01-01 RX ADMIN — Medication 200 GRAM(S): at 18:23

## 2021-01-01 RX ADMIN — Medication 2.93 MICROGRAM(S)/KG/MIN: at 07:25

## 2021-01-01 RX ADMIN — Medication 250 MILLIGRAM(S): at 12:45

## 2021-01-01 RX ADMIN — Medication 50 MILLIGRAM(S): at 13:31

## 2021-01-01 RX ADMIN — HEPARIN SODIUM 5000 UNIT(S): 5000 INJECTION INTRAVENOUS; SUBCUTANEOUS at 18:32

## 2021-01-01 RX ADMIN — Medication 2.93 MICROGRAM(S)/KG/MIN: at 22:36

## 2021-01-01 RX ADMIN — Medication 1000 MILLIGRAM(S): at 02:30

## 2021-01-01 RX ADMIN — AMLODIPINE BESYLATE 5 MILLIGRAM(S): 2.5 TABLET ORAL at 05:54

## 2021-01-01 RX ADMIN — INSULIN HUMAN 5 UNIT(S): 100 INJECTION, SOLUTION SUBCUTANEOUS at 06:24

## 2021-01-01 RX ADMIN — ONDANSETRON 4 MILLIGRAM(S): 8 TABLET, FILM COATED ORAL at 15:35

## 2021-01-01 RX ADMIN — Medication 1300 MILLIGRAM(S): at 13:31

## 2021-01-01 RX ADMIN — MIDODRINE HYDROCHLORIDE 20 MILLIGRAM(S): 2.5 TABLET ORAL at 05:42

## 2021-01-01 RX ADMIN — SENNA PLUS 15 MILLILITER(S): 8.6 TABLET ORAL at 21:08

## 2021-01-01 RX ADMIN — BUMETANIDE 15 MG/HR: 0.25 INJECTION INTRAMUSCULAR; INTRAVENOUS at 08:18

## 2021-01-01 RX ADMIN — Medication 50 MILLILITER(S): at 11:04

## 2021-01-01 RX ADMIN — MIDODRINE HYDROCHLORIDE 20 MILLIGRAM(S): 2.5 TABLET ORAL at 13:01

## 2021-01-01 RX ADMIN — Medication 1300 MILLIGRAM(S): at 21:07

## 2021-01-01 RX ADMIN — INSULIN HUMAN 5 UNIT(S): 100 INJECTION, SOLUTION SUBCUTANEOUS at 08:54

## 2021-01-01 RX ADMIN — CHLORHEXIDINE GLUCONATE 1 APPLICATION(S): 213 SOLUTION TOPICAL at 08:11

## 2021-01-01 RX ADMIN — SEVELAMER CARBONATE 1200 MILLIGRAM(S): 2400 POWDER, FOR SUSPENSION ORAL at 09:07

## 2021-01-01 RX ADMIN — CEFEPIME 100 MILLIGRAM(S): 1 INJECTION, POWDER, FOR SOLUTION INTRAMUSCULAR; INTRAVENOUS at 13:24

## 2021-01-01 RX ADMIN — PIPERACILLIN AND TAZOBACTAM 25 GRAM(S): 4; .5 INJECTION, POWDER, LYOPHILIZED, FOR SOLUTION INTRAVENOUS at 22:10

## 2021-01-01 RX ADMIN — POLYETHYLENE GLYCOL 3350 17 GRAM(S): 17 POWDER, FOR SOLUTION ORAL at 18:44

## 2021-01-01 RX ADMIN — MIDODRINE HYDROCHLORIDE 20 MILLIGRAM(S): 2.5 TABLET ORAL at 06:05

## 2021-01-01 RX ADMIN — Medication 40 MILLIEQUIVALENT(S): at 08:49

## 2021-01-01 RX ADMIN — Medication 1 TABLET(S): at 08:23

## 2021-01-01 RX ADMIN — Medication 1 APPLICATION(S): at 05:50

## 2021-01-01 RX ADMIN — PIPERACILLIN AND TAZOBACTAM 25 GRAM(S): 4; .5 INJECTION, POWDER, LYOPHILIZED, FOR SOLUTION INTRAVENOUS at 17:37

## 2021-01-01 RX ADMIN — SENNA PLUS 15 MILLILITER(S): 8.6 TABLET ORAL at 21:55

## 2021-01-01 RX ADMIN — Medication 50 MILLIEQUIVALENT(S): at 17:02

## 2021-01-01 RX ADMIN — Medication 81 MILLIGRAM(S): at 12:48

## 2021-01-01 RX ADMIN — Medication 1300 MILLIGRAM(S): at 15:22

## 2021-01-01 RX ADMIN — Medication 300 MILLIGRAM(S): at 17:40

## 2021-01-01 RX ADMIN — PROPOFOL 19.2 MICROGRAM(S)/KG/MIN: 10 INJECTION, EMULSION INTRAVENOUS at 19:56

## 2021-01-01 RX ADMIN — HEPARIN SODIUM 5000 UNIT(S): 5000 INJECTION INTRAVENOUS; SUBCUTANEOUS at 02:43

## 2021-01-01 RX ADMIN — HEPARIN SODIUM 5000 UNIT(S): 5000 INJECTION INTRAVENOUS; SUBCUTANEOUS at 02:30

## 2021-01-01 RX ADMIN — Medication 1 APPLICATION(S): at 05:06

## 2021-01-01 RX ADMIN — Medication 6: at 23:43

## 2021-01-01 RX ADMIN — Medication 50 MILLILITER(S): at 06:05

## 2021-01-01 RX ADMIN — CHLORHEXIDINE GLUCONATE 1 APPLICATION(S): 213 SOLUTION TOPICAL at 07:32

## 2021-01-01 RX ADMIN — MIDODRINE HYDROCHLORIDE 20 MILLIGRAM(S): 2.5 TABLET ORAL at 14:05

## 2021-01-01 RX ADMIN — ONDANSETRON 8 MILLIGRAM(S): 8 TABLET, FILM COATED ORAL at 21:45

## 2021-01-01 RX ADMIN — Medication 1 PACKET(S): at 18:25

## 2021-01-01 RX ADMIN — HEPARIN SODIUM 5000 UNIT(S): 5000 INJECTION INTRAVENOUS; SUBCUTANEOUS at 17:02

## 2021-01-01 RX ADMIN — Medication 200 GRAM(S): at 02:30

## 2021-01-01 RX ADMIN — Medication 200 GRAM(S): at 13:14

## 2021-01-01 RX ADMIN — CHLORHEXIDINE GLUCONATE 15 MILLILITER(S): 213 SOLUTION TOPICAL at 05:02

## 2021-01-01 RX ADMIN — Medication 1 TABLET(S): at 12:27

## 2021-01-01 RX ADMIN — SENNA PLUS 15 MILLILITER(S): 8.6 TABLET ORAL at 22:10

## 2021-01-01 RX ADMIN — CHLORHEXIDINE GLUCONATE 1 APPLICATION(S): 213 SOLUTION TOPICAL at 05:09

## 2021-01-01 RX ADMIN — Medication 50 MILLIEQUIVALENT(S): at 12:30

## 2021-01-01 RX ADMIN — Medication 1 APPLICATION(S): at 18:59

## 2021-01-01 RX ADMIN — Medication 100 GRAM(S): at 17:45

## 2021-01-01 RX ADMIN — Medication 250 MILLIGRAM(S): at 11:56

## 2021-01-01 RX ADMIN — PROPOFOL 19.2 MICROGRAM(S)/KG/MIN: 10 INJECTION, EMULSION INTRAVENOUS at 07:59

## 2021-01-01 RX ADMIN — Medication 1300 MILLIGRAM(S): at 06:19

## 2021-01-01 RX ADMIN — Medication 300 MICROGRAM(S): at 14:34

## 2021-01-01 RX ADMIN — Medication 1 APPLICATION(S): at 18:00

## 2021-01-01 RX ADMIN — Medication 100 MEQ/KG/HR: at 08:00

## 2021-01-01 RX ADMIN — Medication 200 GRAM(S): at 09:44

## 2021-01-01 RX ADMIN — PIPERACILLIN AND TAZOBACTAM 25 GRAM(S): 4; .5 INJECTION, POWDER, LYOPHILIZED, FOR SOLUTION INTRAVENOUS at 06:01

## 2021-01-01 RX ADMIN — Medication 1 TABLET(S): at 13:07

## 2021-01-01 RX ADMIN — CEFEPIME 100 MILLIGRAM(S): 1 INJECTION, POWDER, FOR SOLUTION INTRAMUSCULAR; INTRAVENOUS at 21:54

## 2021-01-01 RX ADMIN — Medication 10 MILLILITER(S): at 10:25

## 2021-01-01 RX ADMIN — CHLORHEXIDINE GLUCONATE 15 MILLILITER(S): 213 SOLUTION TOPICAL at 18:23

## 2021-01-01 RX ADMIN — FAMOTIDINE 20 MILLIGRAM(S): 10 INJECTION INTRAVENOUS at 05:18

## 2021-01-01 RX ADMIN — Medication 400 MILLIGRAM(S): at 20:30

## 2021-01-01 RX ADMIN — POLYETHYLENE GLYCOL 3350 17 GRAM(S): 17 POWDER, FOR SOLUTION ORAL at 21:55

## 2021-01-01 RX ADMIN — HEPARIN SODIUM 5000 UNIT(S): 5000 INJECTION INTRAVENOUS; SUBCUTANEOUS at 01:00

## 2021-01-01 RX ADMIN — PROPOFOL 19.2 MICROGRAM(S)/KG/MIN: 10 INJECTION, EMULSION INTRAVENOUS at 20:18

## 2021-01-01 RX ADMIN — ENOXAPARIN SODIUM 40 MILLIGRAM(S): 100 INJECTION SUBCUTANEOUS at 11:59

## 2021-01-01 RX ADMIN — Medication 100 MILLIEQUIVALENT(S): at 18:55

## 2021-01-01 RX ADMIN — PIPERACILLIN AND TAZOBACTAM 25 GRAM(S): 4; .5 INJECTION, POWDER, LYOPHILIZED, FOR SOLUTION INTRAVENOUS at 22:19

## 2021-01-01 RX ADMIN — Medication 300 MILLIGRAM(S): at 18:35

## 2021-01-01 RX ADMIN — FENTANYL CITRATE 12.8 MICROGRAM(S)/KG/HR: 50 INJECTION INTRAVENOUS at 22:21

## 2021-01-01 RX ADMIN — PROPOFOL 19.2 MICROGRAM(S)/KG/MIN: 10 INJECTION, EMULSION INTRAVENOUS at 07:33

## 2021-01-01 RX ADMIN — INSULIN HUMAN 5 UNIT(S): 100 INJECTION, SOLUTION SUBCUTANEOUS at 13:11

## 2021-01-01 RX ADMIN — MEROPENEM 100 MILLIGRAM(S): 1 INJECTION INTRAVENOUS at 22:14

## 2021-01-01 RX ADMIN — HEPARIN SODIUM 5000 UNIT(S): 5000 INJECTION INTRAVENOUS; SUBCUTANEOUS at 01:30

## 2021-01-01 RX ADMIN — Medication 1300 MILLIGRAM(S): at 13:08

## 2021-01-01 RX ADMIN — PROPOFOL 19.2 MICROGRAM(S)/KG/MIN: 10 INJECTION, EMULSION INTRAVENOUS at 07:38

## 2021-01-01 RX ADMIN — Medication 1300 MILLIGRAM(S): at 23:42

## 2021-01-01 RX ADMIN — Medication 250 MILLIGRAM(S): at 20:37

## 2021-01-01 RX ADMIN — PHENYLEPHRINE HYDROCHLORIDE 1.17 MICROGRAM(S)/KG/MIN: 10 INJECTION INTRAVENOUS at 08:10

## 2021-01-01 RX ADMIN — PROPOFOL 19.2 MICROGRAM(S)/KG/MIN: 10 INJECTION, EMULSION INTRAVENOUS at 08:00

## 2021-01-01 RX ADMIN — FENTANYL CITRATE 100 MICROGRAM(S): 50 INJECTION INTRAVENOUS at 18:36

## 2021-01-01 RX ADMIN — PIPERACILLIN AND TAZOBACTAM 25 GRAM(S): 4; .5 INJECTION, POWDER, LYOPHILIZED, FOR SOLUTION INTRAVENOUS at 06:22

## 2021-01-01 RX ADMIN — Medication 50 GRAM(S): at 09:19

## 2021-01-01 RX ADMIN — SENNA PLUS 15 MILLILITER(S): 8.6 TABLET ORAL at 21:26

## 2021-01-01 RX ADMIN — HUMAN INSULIN 6 UNIT(S): 100 INJECTION, SUSPENSION SUBCUTANEOUS at 17:02

## 2021-01-01 RX ADMIN — Medication 40 MILLIGRAM(S): at 10:43

## 2021-01-01 RX ADMIN — POLYETHYLENE GLYCOL 3350 17 GRAM(S): 17 POWDER, FOR SOLUTION ORAL at 11:33

## 2021-01-01 RX ADMIN — VASOPRESSIN 2.4 UNIT(S)/MIN: 20 INJECTION INTRAVENOUS at 08:45

## 2021-01-01 RX ADMIN — DEXMEDETOMIDINE HYDROCHLORIDE IN 0.9% SODIUM CHLORIDE 7.83 MICROGRAM(S)/KG/HR: 4 INJECTION INTRAVENOUS at 19:14

## 2021-01-01 RX ADMIN — PHENYLEPHRINE HYDROCHLORIDE 1.17 MICROGRAM(S)/KG/MIN: 10 INJECTION INTRAVENOUS at 20:55

## 2021-01-01 RX ADMIN — Medication 400 MILLIGRAM(S): at 01:03

## 2021-01-01 RX ADMIN — POLYETHYLENE GLYCOL 3350 17 GRAM(S): 17 POWDER, FOR SOLUTION ORAL at 11:21

## 2021-01-01 RX ADMIN — Medication 100 GRAM(S): at 06:11

## 2021-01-01 RX ADMIN — Medication 100 MILLIGRAM(S): at 15:17

## 2021-01-01 RX ADMIN — Medication 50 MILLILITER(S): at 07:01

## 2021-01-01 RX ADMIN — HEPARIN SODIUM 5000 UNIT(S): 5000 INJECTION INTRAVENOUS; SUBCUTANEOUS at 10:53

## 2021-01-01 RX ADMIN — HEPARIN SODIUM 5000 UNIT(S): 5000 INJECTION INTRAVENOUS; SUBCUTANEOUS at 10:42

## 2021-01-01 RX ADMIN — MIDODRINE HYDROCHLORIDE 20 MILLIGRAM(S): 2.5 TABLET ORAL at 22:14

## 2021-01-01 RX ADMIN — Medication 1300 MILLIGRAM(S): at 06:05

## 2021-01-01 RX ADMIN — HEPARIN SODIUM 5000 UNIT(S): 5000 INJECTION INTRAVENOUS; SUBCUTANEOUS at 01:51

## 2021-01-01 RX ADMIN — DESMOPRESSIN ACETATE 220 MICROGRAM(S): 0.1 TABLET ORAL at 18:40

## 2021-01-01 RX ADMIN — HEPARIN SODIUM 5000 UNIT(S): 5000 INJECTION INTRAVENOUS; SUBCUTANEOUS at 11:25

## 2021-01-01 RX ADMIN — Medication 650 MILLIGRAM(S): at 09:30

## 2021-01-01 RX ADMIN — Medication 1300 MILLIGRAM(S): at 22:50

## 2021-01-01 RX ADMIN — SEVELAMER CARBONATE 1200 MILLIGRAM(S): 2400 POWDER, FOR SUSPENSION ORAL at 08:26

## 2021-01-01 RX ADMIN — Medication 650 MILLIGRAM(S): at 20:47

## 2021-01-01 RX ADMIN — Medication 50 MILLILITER(S): at 19:46

## 2021-01-01 RX ADMIN — Medication 50 MILLIGRAM(S): at 12:18

## 2021-01-01 RX ADMIN — Medication 1300 MILLIGRAM(S): at 05:11

## 2021-01-01 RX ADMIN — FENTANYL CITRATE 12.8 MICROGRAM(S)/KG/HR: 50 INJECTION INTRAVENOUS at 01:18

## 2021-01-01 RX ADMIN — Medication 1300 MILLIGRAM(S): at 05:43

## 2021-01-01 RX ADMIN — Medication 81 MILLIGRAM(S): at 09:04

## 2021-01-01 RX ADMIN — MIDODRINE HYDROCHLORIDE 10 MILLIGRAM(S): 2.5 TABLET ORAL at 05:10

## 2021-01-01 RX ADMIN — Medication 2.93 MICROGRAM(S)/KG/MIN: at 23:43

## 2021-01-01 RX ADMIN — FENTANYL CITRATE 12.8 MICROGRAM(S)/KG/HR: 50 INJECTION INTRAVENOUS at 07:46

## 2021-01-01 RX ADMIN — DEXMEDETOMIDINE HYDROCHLORIDE IN 0.9% SODIUM CHLORIDE 7.83 MICROGRAM(S)/KG/HR: 4 INJECTION INTRAVENOUS at 07:49

## 2021-01-01 RX ADMIN — MEROPENEM 100 MILLIGRAM(S): 1 INJECTION INTRAVENOUS at 22:29

## 2021-01-01 RX ADMIN — ENOXAPARIN SODIUM 40 MILLIGRAM(S): 100 INJECTION SUBCUTANEOUS at 12:27

## 2021-01-01 RX ADMIN — Medication 300 MICROGRAM(S): at 13:34

## 2021-01-01 RX ADMIN — Medication 300 MILLIGRAM(S): at 19:45

## 2021-01-01 RX ADMIN — HEPARIN SODIUM 5000 UNIT(S): 5000 INJECTION INTRAVENOUS; SUBCUTANEOUS at 21:45

## 2021-01-01 RX ADMIN — Medication 50 MILLIGRAM(S): at 12:32

## 2021-01-01 RX ADMIN — Medication 1000 MILLIGRAM(S): at 02:00

## 2021-01-01 RX ADMIN — FAMOTIDINE 20 MILLIGRAM(S): 10 INJECTION INTRAVENOUS at 16:54

## 2021-01-01 RX ADMIN — HEPARIN SODIUM 5000 UNIT(S): 5000 INJECTION INTRAVENOUS; SUBCUTANEOUS at 01:21

## 2021-01-01 RX ADMIN — Medication 100 MILLIEQUIVALENT(S): at 21:00

## 2021-01-01 RX ADMIN — SENNA PLUS 15 MILLILITER(S): 8.6 TABLET ORAL at 21:13

## 2021-01-01 RX ADMIN — Medication 300 MILLIGRAM(S): at 17:07

## 2021-01-01 RX ADMIN — HEPARIN SODIUM 5000 UNIT(S): 5000 INJECTION INTRAVENOUS; SUBCUTANEOUS at 21:56

## 2021-01-01 RX ADMIN — AMIODARONE HYDROCHLORIDE 33.3 MG/MIN: 400 TABLET ORAL at 11:15

## 2021-01-01 RX ADMIN — POLYETHYLENE GLYCOL 3350 17 GRAM(S): 17 POWDER, FOR SOLUTION ORAL at 05:06

## 2021-01-01 RX ADMIN — Medication 10 MILLIGRAM(S): at 17:03

## 2021-01-01 RX ADMIN — Medication 100 MILLIGRAM(S): at 13:31

## 2021-01-01 RX ADMIN — Medication 1 TABLET(S): at 09:04

## 2021-01-01 RX ADMIN — Medication 100 MEQ/KG/HR: at 16:38

## 2021-01-01 RX ADMIN — Medication 2.93 MICROGRAM(S)/KG/MIN: at 22:45

## 2021-01-01 RX ADMIN — DEXMEDETOMIDINE HYDROCHLORIDE IN 0.9% SODIUM CHLORIDE 7.83 MICROGRAM(S)/KG/HR: 4 INJECTION INTRAVENOUS at 22:24

## 2021-01-01 RX ADMIN — Medication 50 MILLIGRAM(S): at 12:34

## 2021-01-01 RX ADMIN — Medication 1300 MILLIGRAM(S): at 21:45

## 2021-01-01 RX ADMIN — FAMOTIDINE 20 MILLIGRAM(S): 10 INJECTION INTRAVENOUS at 13:33

## 2021-01-01 RX ADMIN — Medication 40 MILLIGRAM(S): at 00:10

## 2021-01-01 RX ADMIN — CHLORHEXIDINE GLUCONATE 15 MILLILITER(S): 213 SOLUTION TOPICAL at 18:42

## 2021-01-01 RX ADMIN — ENOXAPARIN SODIUM 40 MILLIGRAM(S): 100 INJECTION SUBCUTANEOUS at 11:23

## 2021-01-01 RX ADMIN — CHLORHEXIDINE GLUCONATE 15 MILLILITER(S): 213 SOLUTION TOPICAL at 05:06

## 2021-01-01 RX ADMIN — Medication 1 APPLICATION(S): at 06:49

## 2021-01-01 RX ADMIN — AMIODARONE HYDROCHLORIDE 618 MILLIGRAM(S): 400 TABLET ORAL at 10:00

## 2021-01-01 RX ADMIN — Medication 1 APPLICATION(S): at 17:09

## 2021-01-01 RX ADMIN — HEPARIN SODIUM 5000 UNIT(S): 5000 INJECTION INTRAVENOUS; SUBCUTANEOUS at 21:25

## 2021-01-01 RX ADMIN — Medication 300 MILLIGRAM(S): at 18:41

## 2021-01-01 RX ADMIN — VASOPRESSIN 2.4 UNIT(S)/MIN: 20 INJECTION INTRAVENOUS at 19:49

## 2021-01-01 RX ADMIN — Medication 1000 MILLIGRAM(S): at 11:05

## 2021-01-01 RX ADMIN — DEXMEDETOMIDINE HYDROCHLORIDE IN 0.9% SODIUM CHLORIDE 7.83 MICROGRAM(S)/KG/HR: 4 INJECTION INTRAVENOUS at 08:19

## 2021-01-01 RX ADMIN — Medication 1300 MILLIGRAM(S): at 22:41

## 2021-01-01 RX ADMIN — CHLORHEXIDINE GLUCONATE 15 MILLILITER(S): 213 SOLUTION TOPICAL at 06:22

## 2021-01-01 RX ADMIN — PIPERACILLIN AND TAZOBACTAM 25 GRAM(S): 4; .5 INJECTION, POWDER, LYOPHILIZED, FOR SOLUTION INTRAVENOUS at 22:53

## 2021-01-01 RX ADMIN — MIDAZOLAM HYDROCHLORIDE 4 MILLIGRAM(S): 1 INJECTION, SOLUTION INTRAMUSCULAR; INTRAVENOUS at 17:05

## 2021-01-01 RX ADMIN — MIDODRINE HYDROCHLORIDE 20 MILLIGRAM(S): 2.5 TABLET ORAL at 05:11

## 2021-01-01 RX ADMIN — FAMOTIDINE 20 MILLIGRAM(S): 10 INJECTION INTRAVENOUS at 05:42

## 2021-01-01 RX ADMIN — CHLORHEXIDINE GLUCONATE 1 APPLICATION(S): 213 SOLUTION TOPICAL at 05:13

## 2021-01-01 RX ADMIN — DEXMEDETOMIDINE HYDROCHLORIDE IN 0.9% SODIUM CHLORIDE 7.83 MICROGRAM(S)/KG/HR: 4 INJECTION INTRAVENOUS at 19:32

## 2021-01-01 RX ADMIN — PIPERACILLIN AND TAZOBACTAM 25 GRAM(S): 4; .5 INJECTION, POWDER, LYOPHILIZED, FOR SOLUTION INTRAVENOUS at 21:58

## 2021-01-01 RX ADMIN — Medication 5.87 MICROGRAM(S)/KG/MIN: at 01:18

## 2021-01-01 RX ADMIN — PIPERACILLIN AND TAZOBACTAM 25 GRAM(S): 4; .5 INJECTION, POWDER, LYOPHILIZED, FOR SOLUTION INTRAVENOUS at 23:43

## 2021-01-01 RX ADMIN — ONDANSETRON 8 MILLIGRAM(S): 8 TABLET, FILM COATED ORAL at 06:19

## 2021-01-01 RX ADMIN — FENTANYL CITRATE 50 MICROGRAM(S): 50 INJECTION INTRAVENOUS at 17:59

## 2021-01-01 RX ADMIN — CHLORHEXIDINE GLUCONATE 15 MILLILITER(S): 213 SOLUTION TOPICAL at 17:47

## 2021-01-01 RX ADMIN — MIDODRINE HYDROCHLORIDE 20 MILLIGRAM(S): 2.5 TABLET ORAL at 14:00

## 2021-01-01 RX ADMIN — Medication 25 GRAM(S): at 05:02

## 2021-01-01 RX ADMIN — PIPERACILLIN AND TAZOBACTAM 25 GRAM(S): 4; .5 INJECTION, POWDER, LYOPHILIZED, FOR SOLUTION INTRAVENOUS at 18:42

## 2021-01-01 RX ADMIN — Medication 4: at 12:48

## 2021-01-01 RX ADMIN — Medication 40 MILLIGRAM(S): at 06:28

## 2021-01-01 RX ADMIN — POLYETHYLENE GLYCOL 3350 17 GRAM(S): 17 POWDER, FOR SOLUTION ORAL at 12:18

## 2021-01-01 RX ADMIN — SENNA PLUS 15 MILLILITER(S): 8.6 TABLET ORAL at 22:22

## 2021-01-01 RX ADMIN — Medication 1 APPLICATION(S): at 05:44

## 2021-01-01 RX ADMIN — ONDANSETRON 8 MILLIGRAM(S): 8 TABLET, FILM COATED ORAL at 11:05

## 2021-01-01 RX ADMIN — BUMETANIDE 20 MG/HR: 0.25 INJECTION INTRAMUSCULAR; INTRAVENOUS at 08:18

## 2021-01-01 RX ADMIN — CHLORHEXIDINE GLUCONATE 15 MILLILITER(S): 213 SOLUTION TOPICAL at 17:05

## 2021-01-01 RX ADMIN — Medication 100 MILLIEQUIVALENT(S): at 17:33

## 2021-01-01 RX ADMIN — Medication 6: at 12:56

## 2021-01-01 RX ADMIN — MIDODRINE HYDROCHLORIDE 20 MILLIGRAM(S): 2.5 TABLET ORAL at 14:56

## 2021-01-01 RX ADMIN — Medication 200 GRAM(S): at 16:40

## 2021-01-01 RX ADMIN — BUMETANIDE 15 MG/HR: 0.25 INJECTION INTRAMUSCULAR; INTRAVENOUS at 11:23

## 2021-01-01 RX ADMIN — Medication 4: at 11:21

## 2021-01-01 RX ADMIN — CHLORHEXIDINE GLUCONATE 15 MILLILITER(S): 213 SOLUTION TOPICAL at 17:09

## 2021-01-01 RX ADMIN — Medication 2: at 07:09

## 2021-01-01 RX ADMIN — Medication 1000 MILLIGRAM(S): at 00:00

## 2021-01-01 RX ADMIN — SODIUM CHLORIDE 1000 MILLILITER(S): 9 INJECTION, SOLUTION INTRAVENOUS at 22:58

## 2021-01-01 RX ADMIN — SODIUM CHLORIDE 1000 MILLILITER(S): 9 INJECTION INTRAMUSCULAR; INTRAVENOUS; SUBCUTANEOUS at 23:31

## 2021-01-01 RX ADMIN — MIDODRINE HYDROCHLORIDE 10 MILLIGRAM(S): 2.5 TABLET ORAL at 22:40

## 2021-01-01 RX ADMIN — CHLORHEXIDINE GLUCONATE 15 MILLILITER(S): 213 SOLUTION TOPICAL at 17:40

## 2021-01-01 RX ADMIN — CHLORHEXIDINE GLUCONATE 1 APPLICATION(S): 213 SOLUTION TOPICAL at 08:07

## 2021-01-01 RX ADMIN — Medication 50 MILLILITER(S): at 17:19

## 2021-01-01 RX ADMIN — CEFEPIME 100 MILLIGRAM(S): 1 INJECTION, POWDER, FOR SOLUTION INTRAMUSCULAR; INTRAVENOUS at 05:09

## 2021-01-01 RX ADMIN — FAMOTIDINE 20 MILLIGRAM(S): 10 INJECTION INTRAVENOUS at 13:16

## 2021-01-01 RX ADMIN — SENNA PLUS 15 MILLILITER(S): 8.6 TABLET ORAL at 22:06

## 2021-01-01 RX ADMIN — Medication 300 MILLIGRAM(S): at 13:07

## 2021-01-01 RX ADMIN — Medication 100 MILLIGRAM(S): at 11:20

## 2021-01-01 RX ADMIN — Medication 2.93 MICROGRAM(S)/KG/MIN: at 08:44

## 2021-01-01 RX ADMIN — MIDAZOLAM HYDROCHLORIDE 2 MILLIGRAM(S): 1 INJECTION, SOLUTION INTRAMUSCULAR; INTRAVENOUS at 02:00

## 2021-01-01 RX ADMIN — ONDANSETRON 8 MILLIGRAM(S): 8 TABLET, FILM COATED ORAL at 13:15

## 2021-01-01 RX ADMIN — Medication 100 MILLIGRAM(S): at 13:19

## 2021-01-01 RX ADMIN — POLYETHYLENE GLYCOL 3350 17 GRAM(S): 17 POWDER, FOR SOLUTION ORAL at 13:15

## 2021-01-01 RX ADMIN — SEVELAMER CARBONATE 1200 MILLIGRAM(S): 2400 POWDER, FOR SUSPENSION ORAL at 18:23

## 2021-01-01 RX ADMIN — Medication 1300 MILLIGRAM(S): at 05:09

## 2021-01-01 RX ADMIN — Medication 1 APPLICATION(S): at 18:36

## 2021-01-01 RX ADMIN — FAMOTIDINE 20 MILLIGRAM(S): 10 INJECTION INTRAVENOUS at 12:54

## 2021-01-01 RX ADMIN — Medication 250 MILLIGRAM(S): at 21:24

## 2021-01-01 RX ADMIN — CHLORHEXIDINE GLUCONATE 15 MILLILITER(S): 213 SOLUTION TOPICAL at 06:04

## 2021-01-01 RX ADMIN — SODIUM ZIRCONIUM CYCLOSILICATE 5 GRAM(S): 10 POWDER, FOR SUSPENSION ORAL at 13:11

## 2021-01-01 RX ADMIN — CHLORHEXIDINE GLUCONATE 15 MILLILITER(S): 213 SOLUTION TOPICAL at 05:44

## 2021-01-01 RX ADMIN — CHLORHEXIDINE GLUCONATE 15 MILLILITER(S): 213 SOLUTION TOPICAL at 19:00

## 2021-01-01 RX ADMIN — SEVELAMER CARBONATE 1200 MILLIGRAM(S): 2400 POWDER, FOR SUSPENSION ORAL at 17:42

## 2021-01-01 RX ADMIN — SEVELAMER CARBONATE 1200 MILLIGRAM(S): 2400 POWDER, FOR SUSPENSION ORAL at 19:46

## 2021-01-01 RX ADMIN — SODIUM CHLORIDE 1000 MILLILITER(S): 9 INJECTION INTRAMUSCULAR; INTRAVENOUS; SUBCUTANEOUS at 18:29

## 2021-01-01 RX ADMIN — Medication 2.93 MICROGRAM(S)/KG/MIN: at 22:21

## 2021-01-01 RX ADMIN — SODIUM CHLORIDE 1000 MILLILITER(S): 9 INJECTION INTRAMUSCULAR; INTRAVENOUS; SUBCUTANEOUS at 16:29

## 2021-01-01 RX ADMIN — Medication 40 MILLIEQUIVALENT(S): at 05:52

## 2021-01-01 RX ADMIN — Medication 50 MILLIGRAM(S): at 12:59

## 2021-01-01 RX ADMIN — Medication 2.93 MICROGRAM(S)/KG/MIN: at 20:30

## 2021-01-01 RX ADMIN — Medication 1 TABLET(S): at 11:22

## 2021-01-01 RX ADMIN — Medication 1300 MILLIGRAM(S): at 22:52

## 2021-01-01 RX ADMIN — SODIUM CHLORIDE 100 MILLILITER(S): 9 INJECTION, SOLUTION INTRAVENOUS at 11:46

## 2021-01-01 RX ADMIN — Medication 200 GRAM(S): at 14:17

## 2021-01-01 RX ADMIN — POLYETHYLENE GLYCOL 3350 17 GRAM(S): 17 POWDER, FOR SOLUTION ORAL at 11:19

## 2021-01-01 RX ADMIN — Medication 100 MILLIGRAM(S): at 11:38

## 2021-01-01 RX ADMIN — SODIUM CHLORIDE 2000 MILLILITER(S): 9 INJECTION, SOLUTION INTRAVENOUS at 03:22

## 2021-01-01 RX ADMIN — PROPOFOL 11.3 MICROGRAM(S)/KG/MIN: 10 INJECTION, EMULSION INTRAVENOUS at 07:49

## 2021-01-01 RX ADMIN — Medication 200 GRAM(S): at 22:40

## 2021-01-01 RX ADMIN — Medication 1300 MILLIGRAM(S): at 21:14

## 2021-01-01 RX ADMIN — Medication 100 MILLIGRAM(S): at 21:36

## 2021-01-01 RX ADMIN — CHLORHEXIDINE GLUCONATE 15 MILLILITER(S): 213 SOLUTION TOPICAL at 05:11

## 2021-01-01 RX ADMIN — HUMAN INSULIN 6 UNIT(S): 100 INJECTION, SUSPENSION SUBCUTANEOUS at 07:09

## 2021-01-01 RX ADMIN — Medication 2.93 MICROGRAM(S)/KG/MIN: at 11:23

## 2021-01-01 RX ADMIN — CHLORHEXIDINE GLUCONATE 15 MILLILITER(S): 213 SOLUTION TOPICAL at 05:16

## 2021-01-01 RX ADMIN — SODIUM CHLORIDE 150 MILLILITER(S): 9 INJECTION INTRAMUSCULAR; INTRAVENOUS; SUBCUTANEOUS at 09:17

## 2021-01-01 RX ADMIN — MIDODRINE HYDROCHLORIDE 20 MILLIGRAM(S): 2.5 TABLET ORAL at 21:25

## 2021-01-01 RX ADMIN — LEVALBUTEROL 0.63 MILLIGRAM(S): 1.25 SOLUTION, CONCENTRATE RESPIRATORY (INHALATION) at 00:02

## 2021-01-01 RX ADMIN — PIPERACILLIN AND TAZOBACTAM 25 GRAM(S): 4; .5 INJECTION, POWDER, LYOPHILIZED, FOR SOLUTION INTRAVENOUS at 15:50

## 2021-01-01 RX ADMIN — Medication 100 MILLIEQUIVALENT(S): at 20:19

## 2021-01-01 RX ADMIN — Medication 50 MILLILITER(S): at 18:43

## 2021-01-01 RX ADMIN — Medication 1 APPLICATION(S): at 17:27

## 2021-01-01 RX ADMIN — Medication 1 APPLICATION(S): at 17:03

## 2021-01-01 RX ADMIN — POLYETHYLENE GLYCOL 3350 17 GRAM(S): 17 POWDER, FOR SOLUTION ORAL at 18:43

## 2021-01-01 RX ADMIN — CEFEPIME 100 MILLIGRAM(S): 1 INJECTION, POWDER, FOR SOLUTION INTRAMUSCULAR; INTRAVENOUS at 13:19

## 2021-01-01 RX ADMIN — ONDANSETRON 8 MILLIGRAM(S): 8 TABLET, FILM COATED ORAL at 14:00

## 2021-01-01 RX ADMIN — Medication 25 MILLIGRAM(S): at 11:21

## 2021-01-01 RX ADMIN — MIDODRINE HYDROCHLORIDE 10 MILLIGRAM(S): 2.5 TABLET ORAL at 22:10

## 2021-01-01 RX ADMIN — PIPERACILLIN AND TAZOBACTAM 25 GRAM(S): 4; .5 INJECTION, POWDER, LYOPHILIZED, FOR SOLUTION INTRAVENOUS at 05:18

## 2021-01-01 RX ADMIN — SEVELAMER CARBONATE 1200 MILLIGRAM(S): 2400 POWDER, FOR SUSPENSION ORAL at 17:46

## 2021-01-01 RX ADMIN — SODIUM CHLORIDE 100 MILLILITER(S): 9 INJECTION, SOLUTION INTRAVENOUS at 18:08

## 2021-01-01 RX ADMIN — CHLORHEXIDINE GLUCONATE 1 APPLICATION(S): 213 SOLUTION TOPICAL at 05:48

## 2021-01-01 RX ADMIN — Medication 4: at 17:06

## 2021-01-01 RX ADMIN — MIDODRINE HYDROCHLORIDE 10 MILLIGRAM(S): 2.5 TABLET ORAL at 14:30

## 2021-01-01 RX ADMIN — Medication 2.93 MICROGRAM(S)/KG/MIN: at 21:14

## 2021-01-01 RX ADMIN — Medication 1 TABLET(S): at 17:54

## 2021-01-01 RX ADMIN — HEPARIN SODIUM 5000 UNIT(S): 5000 INJECTION INTRAVENOUS; SUBCUTANEOUS at 21:43

## 2021-01-01 RX ADMIN — ENOXAPARIN SODIUM 40 MILLIGRAM(S): 100 INJECTION SUBCUTANEOUS at 13:33

## 2021-01-01 RX ADMIN — PIPERACILLIN AND TAZOBACTAM 25 GRAM(S): 4; .5 INJECTION, POWDER, LYOPHILIZED, FOR SOLUTION INTRAVENOUS at 05:05

## 2021-01-01 RX ADMIN — FAMOTIDINE 20 MILLIGRAM(S): 10 INJECTION INTRAVENOUS at 10:18

## 2021-01-01 RX ADMIN — INSULIN HUMAN 10 UNIT(S): 100 INJECTION, SOLUTION SUBCUTANEOUS at 03:52

## 2021-01-01 RX ADMIN — RASBURICASE 104 MILLIGRAM(S): KIT at 05:29

## 2021-01-01 RX ADMIN — Medication 50 MILLIEQUIVALENT(S): at 05:10

## 2021-01-01 RX ADMIN — AMLODIPINE BESYLATE 5 MILLIGRAM(S): 2.5 TABLET ORAL at 05:35

## 2021-01-01 RX ADMIN — Medication 2.93 MICROGRAM(S)/KG/MIN: at 08:49

## 2021-01-01 RX ADMIN — Medication 1 APPLICATION(S): at 06:22

## 2021-01-01 RX ADMIN — PROPOFOL 11.3 MICROGRAM(S)/KG/MIN: 10 INJECTION, EMULSION INTRAVENOUS at 22:45

## 2021-01-01 RX ADMIN — FAMOTIDINE 20 MILLIGRAM(S): 10 INJECTION INTRAVENOUS at 05:34

## 2021-01-01 RX ADMIN — HEPARIN SODIUM 5000 UNIT(S): 5000 INJECTION INTRAVENOUS; SUBCUTANEOUS at 09:58

## 2021-01-01 RX ADMIN — SEVELAMER CARBONATE 1200 MILLIGRAM(S): 2400 POWDER, FOR SUSPENSION ORAL at 13:01

## 2021-01-01 RX ADMIN — CHLORHEXIDINE GLUCONATE 1 APPLICATION(S): 213 SOLUTION TOPICAL at 12:00

## 2021-01-01 RX ADMIN — PIPERACILLIN AND TAZOBACTAM 25 GRAM(S): 4; .5 INJECTION, POWDER, LYOPHILIZED, FOR SOLUTION INTRAVENOUS at 14:21

## 2021-01-01 RX ADMIN — Medication 120 MILLIGRAM(S): at 08:19

## 2021-01-01 RX ADMIN — VASOPRESSIN 2.4 UNIT(S)/MIN: 20 INJECTION INTRAVENOUS at 19:14

## 2021-01-01 RX ADMIN — CHLORHEXIDINE GLUCONATE 15 MILLILITER(S): 213 SOLUTION TOPICAL at 18:10

## 2021-01-01 RX ADMIN — AMLODIPINE BESYLATE 5 MILLIGRAM(S): 2.5 TABLET ORAL at 05:18

## 2021-01-01 RX ADMIN — Medication 40 MILLIEQUIVALENT(S): at 12:18

## 2021-01-01 RX ADMIN — Medication 120 MILLIGRAM(S): at 05:27

## 2021-01-01 RX ADMIN — Medication 1300 MILLIGRAM(S): at 22:45

## 2021-01-01 RX ADMIN — Medication 400 MILLIGRAM(S): at 17:11

## 2021-01-01 RX ADMIN — POLYETHYLENE GLYCOL 3350 17 GRAM(S): 17 POWDER, FOR SOLUTION ORAL at 13:14

## 2021-01-01 RX ADMIN — ENOXAPARIN SODIUM 40 MILLIGRAM(S): 100 INJECTION SUBCUTANEOUS at 11:29

## 2021-01-01 RX ADMIN — PHENYLEPHRINE HYDROCHLORIDE 1.17 MICROGRAM(S)/KG/MIN: 10 INJECTION INTRAVENOUS at 19:13

## 2021-01-01 RX ADMIN — Medication 50 MILLILITER(S): at 18:20

## 2021-01-01 RX ADMIN — MIDODRINE HYDROCHLORIDE 20 MILLIGRAM(S): 2.5 TABLET ORAL at 22:52

## 2021-01-01 RX ADMIN — PROPOFOL 19.2 MICROGRAM(S)/KG/MIN: 10 INJECTION, EMULSION INTRAVENOUS at 19:20

## 2021-01-01 RX ADMIN — CEFEPIME 100 MILLIGRAM(S): 1 INJECTION, POWDER, FOR SOLUTION INTRAMUSCULAR; INTRAVENOUS at 13:32

## 2021-01-01 RX ADMIN — DEXMEDETOMIDINE HYDROCHLORIDE IN 0.9% SODIUM CHLORIDE 7.83 MICROGRAM(S)/KG/HR: 4 INJECTION INTRAVENOUS at 19:47

## 2021-01-01 RX ADMIN — Medication 100 MILLIGRAM(S): at 11:34

## 2021-01-01 RX ADMIN — Medication 300 MICROGRAM(S): at 12:58

## 2021-01-01 RX ADMIN — MIDODRINE HYDROCHLORIDE 10 MILLIGRAM(S): 2.5 TABLET ORAL at 06:22

## 2021-01-01 RX ADMIN — CHLORHEXIDINE GLUCONATE 1 APPLICATION(S): 213 SOLUTION TOPICAL at 05:23

## 2021-01-01 RX ADMIN — MIDODRINE HYDROCHLORIDE 20 MILLIGRAM(S): 2.5 TABLET ORAL at 21:45

## 2021-01-01 RX ADMIN — Medication 300 MILLIGRAM(S): at 21:31

## 2021-01-01 RX ADMIN — Medication 200 GRAM(S): at 11:49

## 2021-01-01 RX ADMIN — Medication 1 TABLET(S): at 14:20

## 2021-01-01 RX ADMIN — DEXMEDETOMIDINE HYDROCHLORIDE IN 0.9% SODIUM CHLORIDE 7.83 MICROGRAM(S)/KG/HR: 4 INJECTION INTRAVENOUS at 22:45

## 2021-01-01 RX ADMIN — Medication 300 MILLIGRAM(S): at 19:02

## 2021-01-01 RX ADMIN — HEPARIN SODIUM 5000 UNIT(S): 5000 INJECTION INTRAVENOUS; SUBCUTANEOUS at 02:41

## 2021-01-01 RX ADMIN — SODIUM ZIRCONIUM CYCLOSILICATE 5 GRAM(S): 10 POWDER, FOR SUSPENSION ORAL at 21:25

## 2021-01-01 RX ADMIN — Medication 1 TABLET(S): at 15:00

## 2021-01-01 RX ADMIN — Medication 20 MILLIEQUIVALENT(S): at 06:11

## 2021-01-01 RX ADMIN — Medication 4: at 00:37

## 2021-01-01 RX ADMIN — MORPHINE SULFATE 1 MILLIGRAM(S): 50 CAPSULE, EXTENDED RELEASE ORAL at 22:48

## 2021-01-01 RX ADMIN — Medication 100 MILLIGRAM(S): at 12:33

## 2021-01-01 RX ADMIN — CHLORHEXIDINE GLUCONATE 1 APPLICATION(S): 213 SOLUTION TOPICAL at 06:23

## 2021-01-01 RX ADMIN — CEFEPIME 100 MILLIGRAM(S): 1 INJECTION, POWDER, FOR SOLUTION INTRAMUSCULAR; INTRAVENOUS at 22:56

## 2021-01-01 RX ADMIN — CEFEPIME 100 MILLIGRAM(S): 1 INJECTION, POWDER, FOR SOLUTION INTRAMUSCULAR; INTRAVENOUS at 21:18

## 2021-01-01 RX ADMIN — Medication 300 MILLIGRAM(S): at 18:32

## 2021-01-01 RX ADMIN — Medication 50 MILLIGRAM(S): at 13:20

## 2021-01-01 RX ADMIN — VASOPRESSIN 2.4 UNIT(S)/MIN: 20 INJECTION INTRAVENOUS at 22:49

## 2021-01-01 RX ADMIN — CHLORHEXIDINE GLUCONATE 15 MILLILITER(S): 213 SOLUTION TOPICAL at 17:50

## 2021-01-01 RX ADMIN — Medication 1 TABLET(S): at 11:09

## 2021-01-01 RX ADMIN — FENTANYL CITRATE 50 MICROGRAM(S): 50 INJECTION INTRAVENOUS at 11:00

## 2021-01-01 RX ADMIN — MIDODRINE HYDROCHLORIDE 20 MILLIGRAM(S): 2.5 TABLET ORAL at 15:21

## 2021-01-01 RX ADMIN — Medication 10 MILLIGRAM(S): at 15:11

## 2021-01-01 RX ADMIN — Medication 81 MILLIGRAM(S): at 11:46

## 2021-01-01 RX ADMIN — Medication 2.93 MICROGRAM(S)/KG/MIN: at 19:20

## 2021-01-01 RX ADMIN — CHLORHEXIDINE GLUCONATE 1 APPLICATION(S): 213 SOLUTION TOPICAL at 05:05

## 2021-01-01 RX ADMIN — Medication 100 GRAM(S): at 11:48

## 2021-01-01 RX ADMIN — CHLORHEXIDINE GLUCONATE 15 MILLILITER(S): 213 SOLUTION TOPICAL at 17:07

## 2021-01-01 RX ADMIN — Medication 2.93 MICROGRAM(S)/KG/MIN: at 14:34

## 2021-01-01 RX ADMIN — Medication 100 MILLIEQUIVALENT(S): at 20:04

## 2021-01-01 RX ADMIN — Medication 100 MILLIGRAM(S): at 13:29

## 2021-01-01 RX ADMIN — MIDODRINE HYDROCHLORIDE 10 MILLIGRAM(S): 2.5 TABLET ORAL at 22:53

## 2021-01-01 RX ADMIN — MEROPENEM 100 MILLIGRAM(S): 1 INJECTION INTRAVENOUS at 21:08

## 2021-01-01 RX ADMIN — DEXMEDETOMIDINE HYDROCHLORIDE IN 0.9% SODIUM CHLORIDE 7.83 MICROGRAM(S)/KG/HR: 4 INJECTION INTRAVENOUS at 08:10

## 2021-01-01 RX ADMIN — Medication 2.93 MICROGRAM(S)/KG/MIN: at 07:32

## 2021-01-01 RX ADMIN — Medication 2.93 MICROGRAM(S)/KG/MIN: at 22:49

## 2021-01-01 RX ADMIN — PIPERACILLIN AND TAZOBACTAM 25 GRAM(S): 4; .5 INJECTION, POWDER, LYOPHILIZED, FOR SOLUTION INTRAVENOUS at 09:28

## 2021-01-01 RX ADMIN — FAMOTIDINE 20 MILLIGRAM(S): 10 INJECTION INTRAVENOUS at 16:35

## 2021-01-01 RX ADMIN — CHLORHEXIDINE GLUCONATE 1 APPLICATION(S): 213 SOLUTION TOPICAL at 07:24

## 2021-01-01 RX ADMIN — Medication 50 MILLILITER(S): at 00:11

## 2021-01-01 RX ADMIN — Medication 100 MILLIGRAM(S): at 13:15

## 2021-01-01 RX ADMIN — MORPHINE SULFATE 4 MILLIGRAM(S): 50 CAPSULE, EXTENDED RELEASE ORAL at 16:29

## 2021-01-01 RX ADMIN — PHENYLEPHRINE HYDROCHLORIDE 6 MICROGRAM(S)/KG/MIN: 10 INJECTION INTRAVENOUS at 01:40

## 2021-01-01 RX ADMIN — Medication 1 TABLET(S): at 13:33

## 2021-01-01 RX ADMIN — HEPARIN SODIUM 5000 UNIT(S): 5000 INJECTION INTRAVENOUS; SUBCUTANEOUS at 11:07

## 2021-01-01 RX ADMIN — PROPOFOL 19.2 MICROGRAM(S)/KG/MIN: 10 INJECTION, EMULSION INTRAVENOUS at 07:45

## 2021-01-01 RX ADMIN — FOSAPREPITANT DIMEGLUMINE 465 MILLIGRAM(S): 150 INJECTION, POWDER, LYOPHILIZED, FOR SOLUTION INTRAVENOUS at 11:06

## 2021-01-01 RX ADMIN — Medication 4000 MILLILITER(S): at 13:12

## 2021-01-01 RX ADMIN — FAMOTIDINE 20 MILLIGRAM(S): 10 INJECTION INTRAVENOUS at 13:05

## 2021-01-01 RX ADMIN — Medication 50 MILLILITER(S): at 19:40

## 2021-01-01 RX ADMIN — CEFEPIME 100 MILLIGRAM(S): 1 INJECTION, POWDER, FOR SOLUTION INTRAMUSCULAR; INTRAVENOUS at 21:55

## 2021-01-01 RX ADMIN — CHLORHEXIDINE GLUCONATE 15 MILLILITER(S): 213 SOLUTION TOPICAL at 18:40

## 2021-01-01 RX ADMIN — CHLORHEXIDINE GLUCONATE 15 MILLILITER(S): 213 SOLUTION TOPICAL at 17:36

## 2021-01-01 RX ADMIN — FAMOTIDINE 20 MILLIGRAM(S): 10 INJECTION INTRAVENOUS at 05:54

## 2021-01-01 RX ADMIN — CHLORHEXIDINE GLUCONATE 15 MILLILITER(S): 213 SOLUTION TOPICAL at 06:23

## 2021-01-01 RX ADMIN — PHENYLEPHRINE HYDROCHLORIDE 6 MICROGRAM(S)/KG/MIN: 10 INJECTION INTRAVENOUS at 19:56

## 2021-01-01 RX ADMIN — Medication 40 MILLIEQUIVALENT(S): at 18:26

## 2021-01-01 RX ADMIN — CHLORHEXIDINE GLUCONATE 1 APPLICATION(S): 213 SOLUTION TOPICAL at 12:48

## 2021-01-01 RX ADMIN — Medication 200 GRAM(S): at 06:03

## 2021-01-01 RX ADMIN — Medication 50 MILLIEQUIVALENT(S): at 10:50

## 2021-01-01 RX ADMIN — FAMOTIDINE 20 MILLIGRAM(S): 10 INJECTION INTRAVENOUS at 17:42

## 2021-01-01 RX ADMIN — Medication 200 GRAM(S): at 10:27

## 2021-01-01 RX ADMIN — Medication 10 MILLIGRAM(S): at 18:36

## 2021-01-01 RX ADMIN — POLYETHYLENE GLYCOL 3350 17 GRAM(S): 17 POWDER, FOR SOLUTION ORAL at 19:49

## 2021-01-01 RX ADMIN — Medication 50 MILLIEQUIVALENT(S): at 18:10

## 2021-01-01 RX ADMIN — Medication 2: at 05:45

## 2021-01-01 RX ADMIN — Medication 650 MILLIGRAM(S): at 19:10

## 2021-01-01 RX ADMIN — HEPARIN SODIUM 5000 UNIT(S): 5000 INJECTION INTRAVENOUS; SUBCUTANEOUS at 06:23

## 2021-01-01 RX ADMIN — SENNA PLUS 15 MILLILITER(S): 8.6 TABLET ORAL at 21:53

## 2021-01-01 RX ADMIN — Medication 250 MILLIGRAM(S): at 17:11

## 2021-01-01 RX ADMIN — Medication 1300 MILLIGRAM(S): at 05:06

## 2021-01-01 RX ADMIN — POLYETHYLENE GLYCOL 3350 17 GRAM(S): 17 POWDER, FOR SOLUTION ORAL at 05:38

## 2021-01-01 RX ADMIN — Medication 1 TABLET(S): at 11:46

## 2021-01-01 RX ADMIN — CHLORHEXIDINE GLUCONATE 15 MILLILITER(S): 213 SOLUTION TOPICAL at 05:47

## 2021-01-01 RX ADMIN — Medication 650 MILLIGRAM(S): at 16:39

## 2021-01-01 RX ADMIN — SENNA PLUS 15 MILLILITER(S): 8.6 TABLET ORAL at 22:46

## 2021-01-01 RX ADMIN — Medication 250 MILLIGRAM(S): at 12:10

## 2021-01-01 RX ADMIN — Medication 10 MILLIGRAM(S): at 05:14

## 2021-01-01 RX ADMIN — Medication 1 TABLET(S): at 13:16

## 2021-01-01 RX ADMIN — AMLODIPINE BESYLATE 5 MILLIGRAM(S): 2.5 TABLET ORAL at 06:07

## 2021-01-01 RX ADMIN — SODIUM ZIRCONIUM CYCLOSILICATE 5 GRAM(S): 10 POWDER, FOR SUSPENSION ORAL at 06:23

## 2021-01-01 RX ADMIN — Medication 120 MILLIGRAM(S): at 05:14

## 2021-01-01 RX ADMIN — SENNA PLUS 15 MILLILITER(S): 8.6 TABLET ORAL at 22:20

## 2021-01-01 RX ADMIN — DEXMEDETOMIDINE HYDROCHLORIDE IN 0.9% SODIUM CHLORIDE 7.83 MICROGRAM(S)/KG/HR: 4 INJECTION INTRAVENOUS at 22:36

## 2021-01-01 RX ADMIN — FAMOTIDINE 20 MILLIGRAM(S): 10 INJECTION INTRAVENOUS at 11:29

## 2021-01-01 RX ADMIN — Medication 1300 MILLIGRAM(S): at 06:22

## 2021-01-01 RX ADMIN — FENTANYL CITRATE 100 MICROGRAM(S): 50 INJECTION INTRAVENOUS at 09:52

## 2021-01-01 RX ADMIN — Medication 50 MILLILITER(S): at 17:50

## 2021-01-01 RX ADMIN — Medication 1 PACKET(S): at 18:23

## 2021-01-01 RX ADMIN — DEXMEDETOMIDINE HYDROCHLORIDE IN 0.9% SODIUM CHLORIDE 7.83 MICROGRAM(S)/KG/HR: 4 INJECTION INTRAVENOUS at 22:49

## 2021-01-01 RX ADMIN — CEFEPIME 100 MILLIGRAM(S): 1 INJECTION, POWDER, FOR SOLUTION INTRAMUSCULAR; INTRAVENOUS at 21:19

## 2021-01-01 RX ADMIN — Medication 81 MILLIGRAM(S): at 12:31

## 2021-01-01 RX ADMIN — HEPARIN SODIUM 5000 UNIT(S): 5000 INJECTION INTRAVENOUS; SUBCUTANEOUS at 17:37

## 2021-01-01 RX ADMIN — Medication 1000 MILLIGRAM(S): at 13:55

## 2021-01-01 RX ADMIN — FENTANYL CITRATE 100 MICROGRAM(S): 50 INJECTION INTRAVENOUS at 16:00

## 2021-01-01 RX ADMIN — Medication 50 MILLILITER(S): at 03:53

## 2021-01-01 RX ADMIN — CHLORHEXIDINE GLUCONATE 1 APPLICATION(S): 213 SOLUTION TOPICAL at 07:45

## 2021-01-01 RX ADMIN — PIPERACILLIN AND TAZOBACTAM 25 GRAM(S): 4; .5 INJECTION, POWDER, LYOPHILIZED, FOR SOLUTION INTRAVENOUS at 05:23

## 2021-01-01 RX ADMIN — Medication 1300 MILLIGRAM(S): at 05:38

## 2021-01-01 RX ADMIN — MIDODRINE HYDROCHLORIDE 20 MILLIGRAM(S): 2.5 TABLET ORAL at 23:42

## 2021-01-01 RX ADMIN — Medication 250 MILLIGRAM(S): at 18:35

## 2021-01-01 RX ADMIN — Medication 40 MILLIEQUIVALENT(S): at 12:45

## 2021-01-01 RX ADMIN — FAMOTIDINE 20 MILLIGRAM(S): 10 INJECTION INTRAVENOUS at 11:22

## 2021-01-01 RX ADMIN — Medication 50 MILLIEQUIVALENT(S): at 17:28

## 2021-01-01 RX ADMIN — Medication 100 MILLIEQUIVALENT(S): at 18:00

## 2021-01-01 RX ADMIN — Medication 1 APPLICATION(S): at 17:54

## 2021-01-01 RX ADMIN — Medication 100 MILLIEQUIVALENT(S): at 17:48

## 2021-01-01 RX ADMIN — PHENYLEPHRINE HYDROCHLORIDE 1.17 MICROGRAM(S)/KG/MIN: 10 INJECTION INTRAVENOUS at 09:00

## 2021-01-01 RX ADMIN — DEXMEDETOMIDINE HYDROCHLORIDE IN 0.9% SODIUM CHLORIDE 7.83 MICROGRAM(S)/KG/HR: 4 INJECTION INTRAVENOUS at 07:47

## 2021-01-01 RX ADMIN — POLYETHYLENE GLYCOL 3350 17 GRAM(S): 17 POWDER, FOR SOLUTION ORAL at 13:07

## 2021-01-01 RX ADMIN — SEVELAMER CARBONATE 1200 MILLIGRAM(S): 2400 POWDER, FOR SUSPENSION ORAL at 17:07

## 2021-01-01 RX ADMIN — Medication 50 GRAM(S): at 06:19

## 2021-01-01 RX ADMIN — Medication 100 MILLIEQUIVALENT(S): at 09:46

## 2021-01-01 RX ADMIN — SEVELAMER CARBONATE 1200 MILLIGRAM(S): 2400 POWDER, FOR SUSPENSION ORAL at 17:03

## 2021-01-01 RX ADMIN — Medication 200 GRAM(S): at 05:40

## 2021-01-01 RX ADMIN — FENTANYL CITRATE 12.8 MICROGRAM(S)/KG/HR: 50 INJECTION INTRAVENOUS at 08:49

## 2021-01-01 RX ADMIN — SEVELAMER CARBONATE 1200 MILLIGRAM(S): 2400 POWDER, FOR SUSPENSION ORAL at 12:18

## 2021-01-01 RX ADMIN — Medication 1300 MILLIGRAM(S): at 21:25

## 2021-01-01 RX ADMIN — Medication 5 MILLIGRAM(S): at 11:08

## 2021-01-01 RX ADMIN — Medication 1300 MILLIGRAM(S): at 06:12

## 2021-01-01 RX ADMIN — FENTANYL CITRATE 100 MICROGRAM(S): 50 INJECTION INTRAVENOUS at 15:00

## 2021-01-01 RX ADMIN — Medication 100 MILLIGRAM(S): at 13:13

## 2021-01-01 RX ADMIN — HEPARIN SODIUM 5000 UNIT(S): 5000 INJECTION INTRAVENOUS; SUBCUTANEOUS at 10:35

## 2021-01-01 RX ADMIN — SODIUM ZIRCONIUM CYCLOSILICATE 10 GRAM(S): 10 POWDER, FOR SUSPENSION ORAL at 00:39

## 2021-01-01 RX ADMIN — Medication 300 MILLIGRAM(S): at 17:38

## 2021-01-01 RX ADMIN — Medication 1 PACKET(S): at 18:22

## 2021-01-01 RX ADMIN — ONDANSETRON 8 MILLIGRAM(S): 8 TABLET, FILM COATED ORAL at 05:42

## 2021-01-01 RX ADMIN — HEPARIN SODIUM 5000 UNIT(S): 5000 INJECTION INTRAVENOUS; SUBCUTANEOUS at 20:24

## 2021-01-01 RX ADMIN — POLYETHYLENE GLYCOL 3350 17 GRAM(S): 17 POWDER, FOR SOLUTION ORAL at 17:03

## 2021-01-01 RX ADMIN — DEXMEDETOMIDINE HYDROCHLORIDE IN 0.9% SODIUM CHLORIDE 7.83 MICROGRAM(S)/KG/HR: 4 INJECTION INTRAVENOUS at 11:23

## 2021-01-01 RX ADMIN — VASOPRESSIN 2.4 UNIT(S)/MIN: 20 INJECTION INTRAVENOUS at 09:01

## 2021-01-01 RX ADMIN — HEPARIN SODIUM 5000 UNIT(S): 5000 INJECTION INTRAVENOUS; SUBCUTANEOUS at 10:01

## 2021-01-01 RX ADMIN — CHLORHEXIDINE GLUCONATE 15 MILLILITER(S): 213 SOLUTION TOPICAL at 18:33

## 2021-01-01 RX ADMIN — ENOXAPARIN SODIUM 40 MILLIGRAM(S): 100 INJECTION SUBCUTANEOUS at 11:46

## 2021-01-01 RX ADMIN — Medication 250 MILLIGRAM(S): at 22:49

## 2021-01-01 RX ADMIN — SODIUM CHLORIDE 100 MILLILITER(S): 9 INJECTION, SOLUTION INTRAVENOUS at 14:28

## 2021-01-01 RX ADMIN — CHLORHEXIDINE GLUCONATE 1 APPLICATION(S): 213 SOLUTION TOPICAL at 06:26

## 2021-01-01 RX ADMIN — Medication 1 PACKET(S): at 13:42

## 2021-01-01 RX ADMIN — HUMAN INSULIN 6 UNIT(S): 100 INJECTION, SUSPENSION SUBCUTANEOUS at 13:18

## 2021-01-01 RX ADMIN — Medication 1300 MILLIGRAM(S): at 22:53

## 2021-01-01 RX ADMIN — Medication 100 MILLIEQUIVALENT(S): at 10:46

## 2021-01-01 RX ADMIN — FENTANYL CITRATE 12.8 MICROGRAM(S)/KG/HR: 50 INJECTION INTRAVENOUS at 08:50

## 2021-01-01 RX ADMIN — Medication 2.93 MICROGRAM(S)/KG/MIN: at 20:55

## 2021-01-01 RX ADMIN — HEPARIN SODIUM 5000 UNIT(S): 5000 INJECTION INTRAVENOUS; SUBCUTANEOUS at 17:47

## 2021-01-01 RX ADMIN — FENTANYL CITRATE 100 MICROGRAM(S): 50 INJECTION INTRAVENOUS at 16:44

## 2021-01-01 RX ADMIN — CHLORHEXIDINE GLUCONATE 1 APPLICATION(S): 213 SOLUTION TOPICAL at 07:01

## 2021-01-01 RX ADMIN — HUMAN INSULIN 6 UNIT(S): 100 INJECTION, SUSPENSION SUBCUTANEOUS at 11:06

## 2021-01-01 RX ADMIN — FENTANYL CITRATE 100 MICROGRAM(S): 50 INJECTION INTRAVENOUS at 16:30

## 2021-01-01 RX ADMIN — MIDODRINE HYDROCHLORIDE 20 MILLIGRAM(S): 2.5 TABLET ORAL at 13:14

## 2021-01-01 RX ADMIN — Medication 650 MILLIGRAM(S): at 15:45

## 2021-01-01 RX ADMIN — FENTANYL CITRATE 50 MICROGRAM(S): 50 INJECTION INTRAVENOUS at 05:48

## 2021-01-01 RX ADMIN — Medication 2.93 MICROGRAM(S)/KG/MIN: at 08:01

## 2021-01-01 RX ADMIN — SEVELAMER CARBONATE 1200 MILLIGRAM(S): 2400 POWDER, FOR SUSPENSION ORAL at 21:54

## 2021-01-01 RX ADMIN — VASOPRESSIN 2.4 UNIT(S)/MIN: 20 INJECTION INTRAVENOUS at 20:55

## 2021-01-01 RX ADMIN — Medication 100 MILLIGRAM(S): at 14:06

## 2021-01-01 RX ADMIN — Medication 50 MILLILITER(S): at 00:44

## 2021-01-01 RX ADMIN — HUMAN INSULIN 6 UNIT(S): 100 INJECTION, SUSPENSION SUBCUTANEOUS at 11:22

## 2021-01-01 RX ADMIN — Medication 400 MILLIGRAM(S): at 01:17

## 2021-01-01 RX ADMIN — SEVELAMER CARBONATE 1200 MILLIGRAM(S): 2400 POWDER, FOR SUSPENSION ORAL at 18:38

## 2021-01-01 RX ADMIN — DEXMEDETOMIDINE HYDROCHLORIDE IN 0.9% SODIUM CHLORIDE 7.83 MICROGRAM(S)/KG/HR: 4 INJECTION INTRAVENOUS at 22:23

## 2021-01-01 RX ADMIN — Medication 50 MILLIEQUIVALENT(S): at 05:41

## 2021-01-01 RX ADMIN — Medication 81 MILLIGRAM(S): at 12:55

## 2021-01-01 RX ADMIN — MIDODRINE HYDROCHLORIDE 20 MILLIGRAM(S): 2.5 TABLET ORAL at 05:06

## 2021-01-01 RX ADMIN — CHLORHEXIDINE GLUCONATE 15 MILLILITER(S): 213 SOLUTION TOPICAL at 18:32

## 2021-01-01 RX ADMIN — SENNA PLUS 15 MILLILITER(S): 8.6 TABLET ORAL at 22:53

## 2021-01-01 RX ADMIN — DEXMEDETOMIDINE HYDROCHLORIDE IN 0.9% SODIUM CHLORIDE 7.83 MICROGRAM(S)/KG/HR: 4 INJECTION INTRAVENOUS at 07:25

## 2021-01-01 RX ADMIN — Medication 400 MILLIGRAM(S): at 12:34

## 2021-01-01 RX ADMIN — ONDANSETRON 8 MILLIGRAM(S): 8 TABLET, FILM COATED ORAL at 22:52

## 2021-01-01 RX ADMIN — Medication 4: at 17:50

## 2021-01-01 RX ADMIN — Medication 100 GRAM(S): at 14:02

## 2021-01-01 RX ADMIN — PROPOFOL 19.2 MICROGRAM(S)/KG/MIN: 10 INJECTION, EMULSION INTRAVENOUS at 19:55

## 2021-01-01 RX ADMIN — Medication 1300 MILLIGRAM(S): at 18:43

## 2021-01-01 RX ADMIN — Medication 650 MILLIGRAM(S): at 10:32

## 2021-01-01 RX ADMIN — MIDODRINE HYDROCHLORIDE 20 MILLIGRAM(S): 2.5 TABLET ORAL at 06:18

## 2021-01-01 RX ADMIN — Medication 1300 MILLIGRAM(S): at 17:06

## 2021-01-01 RX ADMIN — CEFEPIME 100 MILLIGRAM(S): 1 INJECTION, POWDER, FOR SOLUTION INTRAMUSCULAR; INTRAVENOUS at 21:42

## 2021-01-01 RX ADMIN — SEVELAMER CARBONATE 1200 MILLIGRAM(S): 2400 POWDER, FOR SUSPENSION ORAL at 09:20

## 2021-01-01 RX ADMIN — MIDODRINE HYDROCHLORIDE 20 MILLIGRAM(S): 2.5 TABLET ORAL at 05:09

## 2021-01-01 RX ADMIN — Medication 100 MILLIGRAM(S): at 12:17

## 2021-01-01 RX ADMIN — CHLORHEXIDINE GLUCONATE 1 APPLICATION(S): 213 SOLUTION TOPICAL at 05:49

## 2021-01-01 RX ADMIN — SEVELAMER CARBONATE 1200 MILLIGRAM(S): 2400 POWDER, FOR SUSPENSION ORAL at 16:31

## 2021-01-01 RX ADMIN — Medication 110 MILLIGRAM(S): at 23:26

## 2021-01-01 RX ADMIN — HEPARIN SODIUM 5000 UNIT(S): 5000 INJECTION INTRAVENOUS; SUBCUTANEOUS at 18:39

## 2021-01-01 RX ADMIN — MIDODRINE HYDROCHLORIDE 20 MILLIGRAM(S): 2.5 TABLET ORAL at 05:47

## 2021-01-01 RX ADMIN — HEPARIN SODIUM 5000 UNIT(S): 5000 INJECTION INTRAVENOUS; SUBCUTANEOUS at 01:48

## 2021-01-01 RX ADMIN — Medication 100 MILLIGRAM(S): at 05:11

## 2021-01-01 RX ADMIN — SEVELAMER CARBONATE 1200 MILLIGRAM(S): 2400 POWDER, FOR SUSPENSION ORAL at 12:15

## 2021-01-01 RX ADMIN — Medication 100 MILLIEQUIVALENT(S): at 08:40

## 2021-01-01 RX ADMIN — Medication 1 APPLICATION(S): at 06:17

## 2021-01-01 RX ADMIN — Medication 50 MILLIGRAM(S): at 13:01

## 2021-01-01 RX ADMIN — ENOXAPARIN SODIUM 40 MILLIGRAM(S): 100 INJECTION SUBCUTANEOUS at 10:21

## 2021-01-01 RX ADMIN — Medication 60 MILLIGRAM(S): at 01:16

## 2021-01-01 RX ADMIN — PROPOFOL 19.2 MICROGRAM(S)/KG/MIN: 10 INJECTION, EMULSION INTRAVENOUS at 17:38

## 2021-01-01 RX ADMIN — IVERMECTIN 12 MILLIGRAM(S): 3 TABLET ORAL at 17:13

## 2021-01-01 RX ADMIN — FENTANYL CITRATE 100 MICROGRAM(S): 50 INJECTION INTRAVENOUS at 09:15

## 2021-01-01 RX ADMIN — SEVELAMER CARBONATE 1200 MILLIGRAM(S): 2400 POWDER, FOR SUSPENSION ORAL at 10:06

## 2021-01-01 RX ADMIN — Medication 50 MILLILITER(S): at 14:44

## 2021-01-01 RX ADMIN — MIDAZOLAM HYDROCHLORIDE 8 MILLIGRAM(S): 1 INJECTION, SOLUTION INTRAMUSCULAR; INTRAVENOUS at 16:37

## 2021-01-01 RX ADMIN — CEFEPIME 100 MILLIGRAM(S): 1 INJECTION, POWDER, FOR SOLUTION INTRAMUSCULAR; INTRAVENOUS at 09:18

## 2021-01-01 RX ADMIN — Medication 50 MILLIEQUIVALENT(S): at 09:19

## 2021-01-01 RX ADMIN — Medication 1 APPLICATION(S): at 18:29

## 2021-01-01 RX ADMIN — Medication 1 APPLICATION(S): at 17:40

## 2021-01-01 RX ADMIN — POLYETHYLENE GLYCOL 3350 17 GRAM(S): 17 POWDER, FOR SOLUTION ORAL at 11:28

## 2021-01-01 RX ADMIN — CHLORHEXIDINE GLUCONATE 15 MILLILITER(S): 213 SOLUTION TOPICAL at 17:12

## 2021-01-01 RX ADMIN — FAMOTIDINE 20 MILLIGRAM(S): 10 INJECTION INTRAVENOUS at 11:09

## 2021-01-01 RX ADMIN — LACTULOSE 200 GRAM(S): 10 SOLUTION ORAL at 19:11

## 2021-01-01 RX ADMIN — Medication 1 APPLICATION(S): at 06:04

## 2021-01-01 RX ADMIN — Medication 400 MILLIGRAM(S): at 10:36

## 2021-01-01 RX ADMIN — CEFEPIME 100 MILLIGRAM(S): 1 INJECTION, POWDER, FOR SOLUTION INTRAMUSCULAR; INTRAVENOUS at 22:19

## 2021-01-01 RX ADMIN — ONDANSETRON 4 MILLIGRAM(S): 8 TABLET, FILM COATED ORAL at 17:51

## 2021-01-01 RX ADMIN — MIDAZOLAM HYDROCHLORIDE 2 MILLIGRAM(S): 1 INJECTION, SOLUTION INTRAMUSCULAR; INTRAVENOUS at 20:45

## 2021-01-01 RX ADMIN — SEVELAMER CARBONATE 1200 MILLIGRAM(S): 2400 POWDER, FOR SUSPENSION ORAL at 16:46

## 2021-01-01 RX ADMIN — Medication 250 MILLIGRAM(S): at 09:16

## 2021-01-01 RX ADMIN — Medication 1300 MILLIGRAM(S): at 21:39

## 2021-01-01 RX ADMIN — PROPOFOL 19.2 MICROGRAM(S)/KG/MIN: 10 INJECTION, EMULSION INTRAVENOUS at 07:31

## 2021-01-01 RX ADMIN — CHLORHEXIDINE GLUCONATE 15 MILLILITER(S): 213 SOLUTION TOPICAL at 17:02

## 2021-01-01 RX ADMIN — Medication 300 MICROGRAM(S): at 22:40

## 2021-01-01 RX ADMIN — CHLORHEXIDINE GLUCONATE 15 MILLILITER(S): 213 SOLUTION TOPICAL at 05:18

## 2021-01-01 RX ADMIN — Medication 1000 MILLIGRAM(S): at 18:59

## 2021-01-01 RX ADMIN — CHLORHEXIDINE GLUCONATE 15 MILLILITER(S): 213 SOLUTION TOPICAL at 18:01

## 2021-01-01 RX ADMIN — CEFEPIME 100 MILLIGRAM(S): 1 INJECTION, POWDER, FOR SOLUTION INTRAMUSCULAR; INTRAVENOUS at 16:39

## 2021-01-01 RX ADMIN — PIPERACILLIN AND TAZOBACTAM 200 GRAM(S): 4; .5 INJECTION, POWDER, LYOPHILIZED, FOR SOLUTION INTRAVENOUS at 18:35

## 2021-01-01 RX ADMIN — SEVELAMER CARBONATE 1200 MILLIGRAM(S): 2400 POWDER, FOR SUSPENSION ORAL at 07:04

## 2021-01-01 RX ADMIN — CHLORHEXIDINE GLUCONATE 15 MILLILITER(S): 213 SOLUTION TOPICAL at 17:38

## 2021-01-01 RX ADMIN — FENTANYL CITRATE 12.8 MICROGRAM(S)/KG/HR: 50 INJECTION INTRAVENOUS at 22:57

## 2021-01-01 RX ADMIN — MORPHINE SULFATE 4 MILLIGRAM(S): 50 CAPSULE, EXTENDED RELEASE ORAL at 16:45

## 2021-01-01 RX ADMIN — CHLORHEXIDINE GLUCONATE 1 APPLICATION(S): 213 SOLUTION TOPICAL at 05:12

## 2021-01-01 RX ADMIN — CHLORHEXIDINE GLUCONATE 15 MILLILITER(S): 213 SOLUTION TOPICAL at 06:21

## 2021-01-01 RX ADMIN — Medication 120 MILLIGRAM(S): at 06:28

## 2021-01-01 RX ADMIN — Medication 50 MILLIEQUIVALENT(S): at 06:00

## 2021-01-01 RX ADMIN — POLYETHYLENE GLYCOL 3350 17 GRAM(S): 17 POWDER, FOR SOLUTION ORAL at 12:59

## 2021-01-01 RX ADMIN — PHENYLEPHRINE HYDROCHLORIDE 1.17 MICROGRAM(S)/KG/MIN: 10 INJECTION INTRAVENOUS at 08:44

## 2021-01-01 RX ADMIN — CHLORHEXIDINE GLUCONATE 1 APPLICATION(S): 213 SOLUTION TOPICAL at 06:03

## 2021-01-01 RX ADMIN — Medication 40 MILLIGRAM(S): at 12:18

## 2021-01-01 RX ADMIN — Medication 100 MILLIEQUIVALENT(S): at 06:23

## 2021-01-01 RX ADMIN — Medication 2: at 17:48

## 2021-01-01 RX ADMIN — SODIUM ZIRCONIUM CYCLOSILICATE 10 GRAM(S): 10 POWDER, FOR SUSPENSION ORAL at 02:01

## 2021-01-01 RX ADMIN — Medication 400 MILLIGRAM(S): at 21:25

## 2021-01-01 RX ADMIN — Medication 960 MILLIGRAM(S): at 00:50

## 2021-01-01 RX ADMIN — RASBURICASE 104 MILLIGRAM(S): KIT at 18:09

## 2021-01-01 RX ADMIN — MIDODRINE HYDROCHLORIDE 20 MILLIGRAM(S): 2.5 TABLET ORAL at 13:32

## 2021-01-01 RX ADMIN — INSULIN HUMAN 5 UNIT(S): 100 INJECTION, SOLUTION SUBCUTANEOUS at 19:46

## 2021-01-01 RX ADMIN — POLYETHYLENE GLYCOL 3350 17 GRAM(S): 17 POWDER, FOR SOLUTION ORAL at 11:57

## 2021-01-01 RX ADMIN — CEFEPIME 100 MILLIGRAM(S): 1 INJECTION, POWDER, FOR SOLUTION INTRAMUSCULAR; INTRAVENOUS at 12:47

## 2021-01-01 RX ADMIN — Medication 100 MILLIGRAM(S): at 05:47

## 2021-01-01 RX ADMIN — CEFEPIME 100 MILLIGRAM(S): 1 INJECTION, POWDER, FOR SOLUTION INTRAMUSCULAR; INTRAVENOUS at 16:57

## 2021-01-01 RX ADMIN — Medication 2: at 18:35

## 2021-01-01 RX ADMIN — SENNA PLUS 15 MILLILITER(S): 8.6 TABLET ORAL at 22:56

## 2021-01-01 RX ADMIN — DEXMEDETOMIDINE HYDROCHLORIDE IN 0.9% SODIUM CHLORIDE 7.83 MICROGRAM(S)/KG/HR: 4 INJECTION INTRAVENOUS at 08:16

## 2021-01-01 RX ADMIN — Medication 1 APPLICATION(S): at 05:03

## 2021-01-01 RX ADMIN — PROPOFOL 11.3 MICROGRAM(S)/KG/MIN: 10 INJECTION, EMULSION INTRAVENOUS at 22:24

## 2021-01-01 RX ADMIN — Medication 100 MILLIEQUIVALENT(S): at 15:44

## 2021-01-01 RX ADMIN — Medication 100 MILLIGRAM(S): at 13:23

## 2021-01-01 RX ADMIN — SEVELAMER CARBONATE 1200 MILLIGRAM(S): 2400 POWDER, FOR SUSPENSION ORAL at 08:00

## 2021-01-01 RX ADMIN — CEFEPIME 100 MILLIGRAM(S): 1 INJECTION, POWDER, FOR SOLUTION INTRAMUSCULAR; INTRAVENOUS at 15:52

## 2021-01-01 RX ADMIN — Medication 250 MILLIGRAM(S): at 11:28

## 2021-01-01 RX ADMIN — Medication 100 MILLIGRAM(S): at 12:59

## 2021-01-01 RX ADMIN — Medication 50 MILLILITER(S): at 11:12

## 2021-01-01 RX ADMIN — SEVELAMER CARBONATE 1200 MILLIGRAM(S): 2400 POWDER, FOR SUSPENSION ORAL at 06:26

## 2021-01-01 RX ADMIN — Medication 100 MILLIEQUIVALENT(S): at 08:01

## 2021-01-01 RX ADMIN — Medication 100 MILLIEQUIVALENT(S): at 19:54

## 2021-01-01 RX ADMIN — Medication 2.93 MICROGRAM(S)/KG/MIN: at 08:16

## 2021-01-01 RX ADMIN — CHLORHEXIDINE GLUCONATE 1 APPLICATION(S): 213 SOLUTION TOPICAL at 06:49

## 2021-01-01 RX ADMIN — Medication 2: at 23:51

## 2021-01-01 RX ADMIN — Medication 2.93 MICROGRAM(S)/KG/MIN: at 09:01

## 2021-01-01 RX ADMIN — PIPERACILLIN AND TAZOBACTAM 25 GRAM(S): 4; .5 INJECTION, POWDER, LYOPHILIZED, FOR SOLUTION INTRAVENOUS at 23:11

## 2021-01-01 RX ADMIN — HUMAN INSULIN 6 UNIT(S): 100 INJECTION, SUSPENSION SUBCUTANEOUS at 00:37

## 2021-01-01 RX ADMIN — CHLORHEXIDINE GLUCONATE 15 MILLILITER(S): 213 SOLUTION TOPICAL at 18:41

## 2021-01-01 RX ADMIN — Medication 2.93 MICROGRAM(S)/KG/MIN: at 22:57

## 2021-01-01 RX ADMIN — Medication 300 MICROGRAM(S): at 12:32

## 2021-01-01 RX ADMIN — SENNA PLUS 15 MILLILITER(S): 8.6 TABLET ORAL at 22:14

## 2021-01-01 RX ADMIN — Medication 2.93 MICROGRAM(S)/KG/MIN: at 19:47

## 2021-01-01 RX ADMIN — FAMOTIDINE 20 MILLIGRAM(S): 10 INJECTION INTRAVENOUS at 14:59

## 2021-01-01 RX ADMIN — MIDODRINE HYDROCHLORIDE 20 MILLIGRAM(S): 2.5 TABLET ORAL at 21:07

## 2021-01-01 RX ADMIN — Medication 100 MILLIGRAM(S): at 13:06

## 2021-01-01 RX ADMIN — SEVELAMER CARBONATE 1200 MILLIGRAM(S): 2400 POWDER, FOR SUSPENSION ORAL at 19:02

## 2021-01-01 RX ADMIN — HEPARIN SODIUM 5000 UNIT(S): 5000 INJECTION INTRAVENOUS; SUBCUTANEOUS at 05:34

## 2021-01-01 RX ADMIN — SEVELAMER CARBONATE 1200 MILLIGRAM(S): 2400 POWDER, FOR SUSPENSION ORAL at 08:44

## 2021-01-01 RX ADMIN — ONDANSETRON 4 MILLIGRAM(S): 8 TABLET, FILM COATED ORAL at 21:44

## 2021-01-01 RX ADMIN — CHLORHEXIDINE GLUCONATE 15 MILLILITER(S): 213 SOLUTION TOPICAL at 17:19

## 2021-01-01 RX ADMIN — ONDANSETRON 4 MILLIGRAM(S): 8 TABLET, FILM COATED ORAL at 16:29

## 2021-01-01 RX ADMIN — Medication 50 MILLIGRAM(S): at 13:00

## 2021-01-01 RX ADMIN — Medication 1 APPLICATION(S): at 06:35

## 2021-01-01 RX ADMIN — HEPARIN SODIUM 5000 UNIT(S): 5000 INJECTION INTRAVENOUS; SUBCUTANEOUS at 14:44

## 2021-01-01 RX ADMIN — Medication 100 MILLIGRAM(S): at 21:16

## 2021-01-01 RX ADMIN — Medication 110 MILLIGRAM(S): at 10:47

## 2021-01-01 RX ADMIN — MIDODRINE HYDROCHLORIDE 20 MILLIGRAM(S): 2.5 TABLET ORAL at 14:23

## 2021-01-01 RX ADMIN — CHLORHEXIDINE GLUCONATE 1 APPLICATION(S): 213 SOLUTION TOPICAL at 05:44

## 2021-01-01 RX ADMIN — HEPARIN SODIUM 5000 UNIT(S): 5000 INJECTION INTRAVENOUS; SUBCUTANEOUS at 05:21

## 2021-01-01 RX ADMIN — Medication 100 MILLIGRAM(S): at 12:56

## 2021-01-01 RX ADMIN — SEVELAMER CARBONATE 1200 MILLIGRAM(S): 2400 POWDER, FOR SUSPENSION ORAL at 13:16

## 2021-01-01 RX ADMIN — AMLODIPINE BESYLATE 5 MILLIGRAM(S): 2.5 TABLET ORAL at 05:42

## 2021-01-01 RX ADMIN — MIDODRINE HYDROCHLORIDE 20 MILLIGRAM(S): 2.5 TABLET ORAL at 05:37

## 2021-01-01 RX ADMIN — SEVELAMER CARBONATE 1200 MILLIGRAM(S): 2400 POWDER, FOR SUSPENSION ORAL at 13:15

## 2021-01-01 RX ADMIN — BUMETANIDE 15 MG/HR: 0.25 INJECTION INTRAMUSCULAR; INTRAVENOUS at 20:30

## 2021-01-01 RX ADMIN — Medication 300 MICROGRAM(S): at 14:03

## 2021-01-01 RX ADMIN — Medication 50 MILLILITER(S): at 05:12

## 2021-01-01 RX ADMIN — SEVELAMER CARBONATE 1200 MILLIGRAM(S): 2400 POWDER, FOR SUSPENSION ORAL at 08:08

## 2021-01-01 RX ADMIN — LACTULOSE 10 GRAM(S): 10 SOLUTION ORAL at 03:54

## 2021-01-01 RX ADMIN — CEFEPIME 100 MILLIGRAM(S): 1 INJECTION, POWDER, FOR SOLUTION INTRAMUSCULAR; INTRAVENOUS at 09:17

## 2021-01-01 RX ADMIN — HUMAN INSULIN 6 UNIT(S): 100 INJECTION, SUSPENSION SUBCUTANEOUS at 17:06

## 2021-01-01 RX ADMIN — Medication 100 MILLIEQUIVALENT(S): at 19:12

## 2021-01-01 RX ADMIN — Medication 300 MICROGRAM(S): at 16:13

## 2021-01-01 RX ADMIN — DEXMEDETOMIDINE HYDROCHLORIDE IN 0.9% SODIUM CHLORIDE 7.83 MICROGRAM(S)/KG/HR: 4 INJECTION INTRAVENOUS at 08:01

## 2021-01-01 RX ADMIN — Medication 200 GRAM(S): at 09:19

## 2021-01-01 RX ADMIN — CHLORHEXIDINE GLUCONATE 1 APPLICATION(S): 213 SOLUTION TOPICAL at 14:04

## 2021-01-01 RX ADMIN — CHLORHEXIDINE GLUCONATE 15 MILLILITER(S): 213 SOLUTION TOPICAL at 06:35

## 2021-01-01 RX ADMIN — SENNA PLUS 15 MILLILITER(S): 8.6 TABLET ORAL at 22:52

## 2021-01-01 RX ADMIN — DEXMEDETOMIDINE HYDROCHLORIDE IN 0.9% SODIUM CHLORIDE 7.83 MICROGRAM(S)/KG/HR: 4 INJECTION INTRAVENOUS at 23:43

## 2021-01-01 RX ADMIN — HUMAN INSULIN 6 UNIT(S): 100 INJECTION, SUSPENSION SUBCUTANEOUS at 00:28

## 2021-01-01 RX ADMIN — PIPERACILLIN AND TAZOBACTAM 25 GRAM(S): 4; .5 INJECTION, POWDER, LYOPHILIZED, FOR SOLUTION INTRAVENOUS at 15:01

## 2021-01-01 RX ADMIN — ONDANSETRON 8 MILLIGRAM(S): 8 TABLET, FILM COATED ORAL at 21:55

## 2021-01-01 RX ADMIN — CEFEPIME 100 MILLIGRAM(S): 1 INJECTION, POWDER, FOR SOLUTION INTRAMUSCULAR; INTRAVENOUS at 10:31

## 2021-01-01 RX ADMIN — DOXORUBICIN HYDROCHLORIDE 41.67 MILLIGRAM(S): 2 INJECTION, SOLUTION INTRAVENOUS at 12:32

## 2021-01-01 RX ADMIN — Medication 2.93 MICROGRAM(S)/KG/MIN: at 07:33

## 2021-01-01 RX ADMIN — Medication 250 MILLIGRAM(S): at 06:49

## 2021-01-01 RX ADMIN — Medication 2.93 MICROGRAM(S)/KG/MIN: at 08:18

## 2021-07-25 NOTE — ED PROVIDER NOTE - OBJECTIVE STATEMENT
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 Diffuse abdominal pain, constant. Denies any fever/chills. +Nausea Denies vomit. Constipation w "very small BM" +flatus. Taking alleve w min relief. no cp or sob. Doesn't follow up with GYN  Not adrenal mass

## 2021-07-25 NOTE — ED PROVIDER NOTE - PHYSICAL EXAMINATION
Dr Solis  mmm non icteric. no juandice  No resp distress. able to speak in full and clear sentences. no wheeze, rales or stridor.  soft distended tender diffusely no rebound or guarding. no cvat   no pedal edema. no calf tenderness. normal pulses bilateral feet. Dr Solis  mmm non icteric. no juandice  No resp distress. able to speak in full and clear sentences. no wheeze, rales or stridor.  soft distended tender diffusely no rebound or guarding. no cvat   no pedal edema. no calf tenderness. normal pulses bilateral feet.    Pelvic exam: Normal, no adnexal tenderness.

## 2021-07-25 NOTE — CHART NOTE - NSCHARTNOTEFT_GEN_A_CORE
66-year-old Female with PMH of HTN and recent diagnosed ? ovarian mass at OSH resents to [facility] with abdominal pain, and nausea. Nephrology consultation requested for elevated Scr. On admission, Scr elevated at 7.12 with serum potassium of 5.9 and SCO2 of 20. No previous labs available. Of note, pt. was recently admitted at OSh for ovarian mass with ascites. During her stay , she was found to have L kidney HDN and had a L renal stent placed. She was discharged on 7/16/21 for outpatient follow up with gynecology.      A& P:   Pt. with PURVI, hyperkalemia.  -Check Renal sonogram to rule out obstruction. Get Urine electrolytes , UA and Spot TP/Cr done.   -Strict I/O s charting.  -Medical management for hyperkalemia.  -Urology consult to ensure patency of L renal stent.  -Sepsis work up.    Full consult note to follow.  D/w Dr. Leigh (attending on call)

## 2021-07-25 NOTE — ED ADULT NURSE NOTE - OBJECTIVE STATEMENT
patient alert ox4 came in c/o severe abdominal pain and was d/c from [place] on 16th and continue to have severe abdominal pain. as per patient she has a mass in her abdomen and had renal stets. patient also says her belly is distended and she is not moving her bowels. breathing even and unlabored. labs done as ordered. awaiting results

## 2021-07-25 NOTE — ED ADULT TRIAGE NOTE - CHIEF COMPLAINT QUOTE
pt c/o abd pain  pt was just d/sekou from [facility] and DX with Adrenal Mass / pt is to be followed up by out pt .  pt abd distended and and having pain / unable to eat and or move bowels

## 2021-07-25 NOTE — ED PROVIDER NOTE - PROGRESS NOTE DETAILS
pain improved. EKG done, renal consulted. pt states she is urinating well. Doesn't have prior cr from OSH< denies hx of renal disease.  will change ct to noncontrast. pain controlled DW radiology no obvious malignancy in ovaries, may be limited to no IV contrast.  pending bmp repeat and to be admitted. for PURVI.

## 2021-07-25 NOTE — ED PROVIDER NOTE - NS ED ROS FT
Constitutional: no fevers, chills  HEENT: no cough, rhinorrhea  Cardiac: no chest pain, palpitations  Respiratory: no SOB  GI: abd pain, nausea, no vomiting, bloody or dark stools  : no dysuria, frequency, or hematuria  MSK: no joint pain  Skin: no rashes  Neuro: no headache, change in vision, weakness  Psych: negative

## 2021-07-26 NOTE — CONSULT NOTE ADULT - SUBJECTIVE AND OBJECTIVE BOX
HPI    66 year old female with PMHx of HTN, pre-DM, and ovarian mass s/p left ureteral stent placement for hydronephrosis 2/2 extrinsic compression at OSH on 7/15. She presents to [facility] with abdominal pain, decreased PO intake and nausea. She was found to have elevated creatinine to 7.12 and K 5.9. Urology consulted for possible obstructive uropathy contributing to PURVI.   CT scan demonstrates no hydronephrosis bilaterally. Left ureteral stent with proximal coil in collecting system, distal coil in the bladder.   Bladder decompressed around carnes balloon.     The patient has diffuse abdominal pain. Bilateral lower back pain. She reports that she hasn't eaten well since she was discharged from OSH.   PAST MEDICAL & SURGICAL HISTORY:  Hypertension    Ovarian mass    Hypercholesteremia        MEDICATIONS  (STANDING):  polyethylene glycol 3350 17 Gram(s) Oral two times a day    MEDICATIONS  (PRN):      FAMILY HISTORY:  FHx: hypertension (Mother)    FH: diabetes mellitus (Mother)        Allergies    penicillins (Rash)    Intolerances        SOCIAL HISTORY:    REVIEW OF SYSTEMS:   Otherwise negative as stated in HPI    Physical Exam  Vital signs  T(C): 36.8 (21 @ 09:15), Max: 37.6 (21 @ 23:19)  HR: 104 (21 @ 09:15)  BP: 138/84 (21 @ 09:15)  SpO2: 98% (21 @ 09:15)  Wt(kg): --    Output      Gen:  NAD    Pulm:  No respiratory distress  	  CV:  RRR    GI:  S/ND/NT    :  carnes secured with clear/tea colored urine. No CVA tenderness        LABS:       @ 09:37    WBC 9.95  / Hct 37.9  / SCr 7.06      @ 23:30    WBC --    / Hct --    / SCr 7.57         137  |  98  |  51<H>  ----------------------------<  110<H>  6.4<HH>   |  19<L>  |  7.06<H>    Ca    10.0      2021 09:37  Phos  5.7       Mg     3.00         TPro  7.4  /  Alb  3.6  /  TBili  0.3  /  DBili  x   /  AST  83<H>  /  ALT  61<H>  /  AlkPhos  94  07-26      Urinalysis Basic - ( 2021 06:47 )    Color: Light Orange / Appearance: Slightly Turbid / S.023 / pH: x  Gluc: x / Ketone: Trace  / Bili: Negative / Urobili: <2 mg/dL   Blood: x / Protein: 100 mg/dL / Nitrite: Negative   Leuk Esterase: Moderate / RBC: many /HPF / WBC >10 /HPF   Sq Epi: x / Non Sq Epi: 5-10 /HPF / Bacteria: Negative        Urine Cx:  Blood Cx:    RADIOLOGY:

## 2021-07-26 NOTE — H&P ADULT - NSHPREVIEWOFSYSTEMS_GEN_ALL_CORE
REVIEW OF SYSTEMS:    CONSTITUTIONAL: No weakness, fevers or chills  EYES: PEERLA  ENT: No visual changes;  No vertigo or throat pain   NECK: No pain or stiffness  RESPIRATORY: No cough, wheezing, hemoptysis; No shortness of breath  CARDIOVASCULAR: No chest pain or palpitations  GASTROINTESTINAL: No abdominal or epigastric pain. No nausea, vomiting, or hematemesis; No diarrhea or constipation. No melena or hematochezia.  GENITOURINARY: No dysuria, frequency or hematuria  NEUROLOGICAL: No numbness or weakness  SKIN: No itching, rashes  Psych: No anxiety. No depression REVIEW OF SYSTEMS:    CONSTITUTIONAL: No weakness, fevers or chills  EYES: PEERLA  ENT: No visual changes;  No vertigo or throat pain   NECK: No pain or stiffness  RESPIRATORY: No cough, wheezing, hemoptysis; No shortness of breath  CARDIOVASCULAR: No chest pain or palpitations  GASTROINTESTINAL: + Abdominal pain + Nausea + Constipation. No vomiting, or hematemesis; No diarrhea. No melena or hematochezia.  GENITOURINARY: No dysuria, frequency or hematuria  NEUROLOGICAL: No numbness or weakness  SKIN: No itching, rashes  Psych: No anxiety. No depression

## 2021-07-26 NOTE — H&P ADULT - NSICDXFAMILYHX_GEN_ALL_CORE_FT
FAMILY HISTORY:  Mother  Still living? Unknown  FH: diabetes mellitus, Age at diagnosis: Age Unknown  FHx: hypertension, Age at diagnosis: Age Unknown

## 2021-07-26 NOTE — H&P ADULT - PROBLEM SELECTOR PLAN 5
Monitor blood glucose - Monitor blood glucose DVT prophylaxis  Diet: DASH/Renal restrictions  Dispo: Awaiting Nephro recs

## 2021-07-26 NOTE — CONSULT NOTE ADULT - ASSESSMENT
66 year old female with PMHx of HTN, pre-DM, and ovarian mass s/p left ureteral stent placement for hydronephrosis 2/2 extrinsic compression at OSH on 7/15 now with Cr elevated to 7.     - CT reviewed: left ureteral stent appears to be in place without hydronephrosis. Bladder collapsed around carnes balloon.   - Patient with about 500 cc of clear urine in bag     Recommendations to follow  66 year old female with PMHx of HTN, pre-DM, and ovarian mass s/p left ureteral stent placement for hydronephrosis 2/2 extrinsic compression at OSH on 7/15 now with Cr elevated to 7.     - CT reviewed: left ureteral stent appears to be in place without hydronephrosis. Bladder collapsed around carnes balloon.   - Patient with about 500 cc of clear urine in bag   - Less likely post renal/obstructive etiology    Recommend:   - renal duplex US   - Explore prerenal/ATN   - If not improving with medical management, consider IR consult for left nephrostomy     Plan d/w Dr. Ontiveros

## 2021-07-26 NOTE — PHARMACOTHERAPY INTERVENTION NOTE - COMMENTS
Medication history is complete and was verified with the patient, patient's son and [facility] ([phone]). Medication list updated in Outpatient Medication Record (OMR). Please call spectra [phone] if you have any questions.

## 2021-07-26 NOTE — H&P ADULT - ATTENDING COMMENTS
66 yr old woman PMH HTN, Pre-Diabetes, HLD, recent admission to [facility] for abdominal pain and found to have ovarian mass, abdominal ascites, left hydroureteronephrosis s/p renal stent placement  on 7/15/21 and dced 7/16/21 p/w worsening diffuse abdominal pain associated with nausea. Found to have severe PURVI with hyperkalemia and moderate abdominopelvic ascites.    Patient in NAD on NC  Lungs CTA b/l  Abd firm, distended, tenderness diffusely with no guarding   with pink tinged urine about 500cc  trace pitting edema LE b/l    CT A/P with no hydronephrosis, mod volume ascites, no masses mentioned however limited without IV contrast. Discussed with radiology  Hyperkalemia not improving with medical management, awaiting Jerrod and dialysis   Started on sodium bicarb infusion  F/U renal US  Hold home diuretics  Patient was supposed to followup with Dr. Lenz for oncology at [facility] but states she has not followed up yet and she would rather followup at [facility] and her son lives near [facility]  Appreciate renal and urology recs

## 2021-07-26 NOTE — CONSULT NOTE ADULT - SUBJECTIVE AND OBJECTIVE BOX
[facility] DIVISION OF KIDNEY DISEASES AND HYPERTENSION -- [phone]  -- INITIAL CONSULT NOTE  --------------------------------------------------------------------------------  HPI: 66-year-old Female with PMH of HTN, pre-DM and recently diagnosed ? ovarian mass at OSH presents to [facility] with abdominal pain, poor oral intake and nausea. Nephrology consultation requested for elevated Scr. On admission, Scr elevated at 7.12 with serum potassium of 5.9 and SCO2 of 20. No previous labs available in the system. Of note, pt. was recently admitted at OSH for abdominal pain, found to have adnexal mass with ascites. During her stay , she was found to have L kidney HDN and had a L renal stent placement on 7/15/21. She was discharged on 7/16/21 for outpatient follow up with gynecology. Her Scr was 0.8 on 7/16/21. She reported poor oral intake since discharge. Also endorse of having decreased urination. Denies fever, chills, SOB, CP, diarrhea or constipation or dizziness.    PAST HISTORY  --------------------------------------------------------------------------------  PAST MEDICAL & SURGICAL HISTORY:    FAMILY HISTORY:    PAST SOCIAL HISTORY:    ALLERGIES & MEDICATIONS  --------------------------------------------------------------------------------  Allergies    Allergy Status Unknown    Intolerances    Standing Inpatient Medications  ALBUTerol    0.083% 10 milliGRAM(s) Nebulizer once  calcium gluconate IVPB 1 Gram(s) IV Intermittent once  dextrose 50% Injectable 50 milliLiter(s) IV Push once  insulin regular  human recombinant 10 Unit(s) IV Push once  sodium polystyrene sulfonate Suspension 30 Gram(s) Oral once    PRN Inpatient Medications    REVIEW OF SYSTEMS  --------------------------------------------------------------------------------  Gen: See HPI  Respiratory: No dyspnea  CV: No chest pain  GI: No abdominal pain  : No dysuria  MSK: No  edema  Neuro: No dizziness    All other systems were reviewed and are negative, except as noted.    VITALS/PHYSICAL EXAM  --------------------------------------------------------------------------------  T(C): 37.2 (07-26-21 @ 06:35), Max: 37.6 (07-25-21 @ 23:19)  HR: 106 (07-26-21 @ 06:35) (106 - 115)  BP: 140/90 (07-26-21 @ 06:35) (140/90 - 155/87)  RR: 25 (07-26-21 @ 06:35) (16 - 25)  SpO2: 97% (07-26-21 @ 06:35) (97% - 100%)  Wt(kg): --    Physical Exam:  	Gen: NAD, appears calm  	HEENT: MMM, anicteric  	Pulm: CTA B/L  	CV: S1S2+  	Abd: Soft, +BS   	Ext: No LE edema B/L  	Neuro: Awake  	Skin: Warm and dry  	Vascular access: peripheral IV canula    LABS/STUDIES  --------------------------------------------------------------------------------              11.5   10.67 >-----------<  466      [07-25-21 @ 17:12]              37.0     139  |  100  |  51  ----------------------------<  99      [07-25-21 @ 23:30]  5.5   |  19  |  7.57        Ca     9.7     [07-25-21 @ 23:30]    TPro  7.1  /  Alb  3.3  /  TBili  0.3  /  DBili  x   /  AST  62  /  ALT  50  /  AlkPhos  88  [07-25-21 @ 17:12]    Creatinine Trend:  SCr 7.57 [07-25 @ 23:30]  SCr 7.12 [07-25 @ 17:12]    Urinalysis - [07-26-21 @ 06:47]      Color Light Orange / Appearance Slightly Turbid / SG 1.023 / pH 6.0      Gluc Negative / Ketone Trace  / Bili Negative / Urobili <2 mg/dL       Blood Large / Protein 100 mg/dL / Leuk Est Moderate / Nitrite Negative      RBC many / WBC >10 / Hyaline few / Gran  / Sq Epi  / Non Sq Epi 5-10 / Bacteria Negative

## 2021-07-26 NOTE — H&P ADULT - ASSESSMENT
67yo F hx of HTN, Pre-Diabetes, HL, recent admission to OSH ([facility]) for ovarian mass, abdominal ascites, left hydroureteronephrosis s/p renal stent placement for left hydronephrosis was discharged on 7-16-21 for outpatient gynonc followup now presenting with 1 month of constant diffuse abdominal pain associated with nausea without vomiting. Found to have PURVI with hyperkalemia

## 2021-07-26 NOTE — H&P ADULT - PROBLEM SELECTOR PLAN 1
- No History of ESRD. Recommendation: Pt. with PURVI in the setting of B/l adnexal mass with ascites and Hydronephrosis. Most likely 2/2 obstructive etiology.  - On admission, Scr was elevated to 7.12 on 7/25/21. Scr was at baseline around 0.8 on 6/16/21. CT abdo/pelvis done on 7/25/21 showed moderate ascites and B/l pleural effusion and L ureteral stent.   - UA done on 7/25/21 showed moderate LEC with blood seen.   - Urology consult for decompression of urinary system.   - Creatinine 7.12 --> 7.57. BUN 48-->5.1  - Monitor labs and urine output.   - Avoid any potential nephrotoxins. Dose medications as per eGFR. Nephro on board - No History of ESRD. Recommendation: Pt. with PURVI in the setting of B/l adnexal mass with ascites and Hydronephrosis. Most likely 2/2 obstructive etiology.  - On admission, Scr was elevated to 7.12 on 7/25/21. Scr was at baseline around 0.8 on 6/16/21. CT abdo/pelvis done on 7/25/21 showed moderate ascites and B/l pleural effusion and L ureteral stent.   - UA done on 7/25/21 showed moderate LEC with blood seen.   - Urology consult for decompression of urinary system.   - Creatinine 7.12 --> 7.57. BUN 48-->5.1  - Monitor labs and urine output. Strict I's and Os  - Avoid any potential nephrotoxins. Dose medications as per eGFR. Nephro on board

## 2021-07-26 NOTE — CHART NOTE - NSCHARTNOTEFT_GEN_A_CORE
Dr. Keri Oscar MD  Internal Medicine, PGY-3  Pager # [phone] ([facility]) / [phone] ([facility])                                          PRE-INTERVENTIONAL RADIOLOGY PROCEDURE NOTE      Patient Age: 66    Patient Gender: F    Procedure: Non-tunneled dialysis catheter    Diagnosis/Indication: Hyperkalemia, fluid overload, urgent dialysis for PURVI    Interventional Radiology Attending Physician: Dr. Alvarez    Ordering Attending Physician: Dr. Shah    Pertinent Medical History: Suspected ovarian mass, pre-DM, HTN, HLD    Pertinent labs:                      11.9   9.95  )-----------( 461      ( 26 Jul 2021 09:37 )             37.9       07-26    137  |  98  |  51<H>  ----------------------------<  110<H>  6.4<HH>   |  19<L>  |  7.06<H>    Ca    10.0      26 Jul 2021 09:37  Phos  5.7     07-26  Mg     3.00     07-26    TPro  7.4  /  Alb  3.6  /  TBili  0.3  /  DBili  x   /  AST  83<H>  /  ALT  61<H>  /  AlkPhos  94  07-26    Coags and T&S pending            Patient and Family Aware ? Yes

## 2021-07-26 NOTE — H&P ADULT - NSHPSOCIALHISTORY_GEN_ALL_CORE
Marital Status:  (   )    (   ) Single    (   )    (  )   Lives with: (  ) alone  (  ) children   (  ) spouse   (  ) parents  (  ) other  Recent Travel: No recent travel  Occupation:  Mobility:   Funcational status:  Substance Use (street drugs): ( x ) never used  (  ) other:  Tobacco Usage:  ( x  ) never smoked   (   ) former smoker   (   ) current smoker  (     ) pack year  Alcohol Usage: None Marital Status:  (   )    (x) Single    (   )    (  )   Lives with: (x) alone  (  ) children   (  ) spouse   (  ) parents  (  ) other  Recent Travel: No recent travel  Mobility: Does ADLs and IADLs by self  Funcational status: Full functional   Substance Use (street drugs): ( x ) never used  (  ) other:  Tobacco Usage:  ( x  ) never smoked   (   ) former smoker   (   ) current smoker  (     ) pack year  Alcohol Usage: None Marital Status:  (   )    (x) Single    (   )    (  )   Lives with: (x) alone  (  ) children   (  ) spouse   (  ) parents  (  ) other  Recent Travel: No recent travel  Mobility: Does ADLs and IADLs by self  Functional status: Full functional   Substance Use (street drugs): ( x ) never used  (  ) other:  Tobacco Usage:  ( x  ) never smoked   (   ) former smoker   (   ) current smoker  (     ) pack year  Alcohol Usage: None

## 2021-07-26 NOTE — H&P ADULT - NSHPLABSRESULTS_GEN_ALL_CORE
.  LABS:                         11.5   10.67 )-----------( 466      ( 2021 17:12 )             37.0         139  |  100  |  51<H>  ----------------------------<  99  5.5<H>   |  19<L>  |  7.57<H>    Ca    9.7      2021 23:30    TPro  7.1  /  Alb  3.3  /  TBili  0.3  /  DBili  x   /  AST  62<H>  /  ALT  50<H>  /  AlkPhos  88        Urinalysis Basic - ( 2021 06:47 )    Color: Light Orange / Appearance: Slightly Turbid / S.023 / pH: x  Gluc: x / Ketone: Trace  / Bili: Negative / Urobili: <2 mg/dL   Blood: x / Protein: 100 mg/dL / Nitrite: Negative   Leuk Esterase: Moderate / RBC: many /HPF / WBC >10 /HPF   Sq Epi: x / Non Sq Epi: 5-10 /HPF / Bacteria: Negative            RADIOLOGY, EKG & ADDITIONAL TESTS: Reviewed. .  LABS:                         11.5   10.67 )-----------( 466      ( 2021 17:12 )             37.0     07-    139  |  100  |  51<H>  ----------------------------<  99  5.5<H>   |  19<L>  |  7.57<H>    Ca    9.7      2021 23:30    TPro  7.1  /  Alb  3.3  /  TBili  0.3  /  DBili  x   /  AST  62<H>  /  ALT  50<H>  /  AlkPhos  88  -      Urinalysis Basic - ( 2021 06:47 )    Color: Light Orange / Appearance: Slightly Turbid / S.023 / pH: x  Gluc: x / Ketone: Trace  / Bili: Negative / Urobili: <2 mg/dL   Blood: x / Protein: 100 mg/dL / Nitrite: Negative   Leuk Esterase: Moderate / RBC: many /HPF / WBC >10 /HPF   Sq Epi: x / Non Sq Epi: 5-10 /HPF / Bacteria: Negative            RADIOLOGY, EKG & ADDITIONAL TESTS: Reviewed.    FINDINGS:  LOWER CHEST: Bilateral mild pleural effusions, right greater than left.    Evaluation of the solid visceral organs and vasculature is limited without the administration of intravenous contrast.    LIVER: Within normal limits.  BILE DUCTS: Normal caliber.  GALLBLADDER: Within normal limits.  SPLEEN: Within normal limits.  PANCREAS: Within normal limits.  ADRENALS: Within normal limits.  KIDNEYS/URETERS: Left ureteral stent. No hydronephrosis or obstructing stone.      BLADDER: Decompressed with Charles present.  REPRODUCTIVE ORGANS: Uterus and adnexa within normal limits.    BOWEL: The stomach is incompletely distended. Mildly thickened loops of small bowel in the midabdomen suggest nonspecific enteritis. No bowel obstruction. Appendix is normal.Diverticulosis.  PERITONEUM: Moderate volume of abdominal ascites. Reticulation of the intra-abdominal fat in the left upper quadrant and mid abdomen is likely related to lymphangitic drainage of fluid as opposed to carcinomatosis.  VESSELS: Within normal limits.  RETROPERITONEUM/LYMPH NODES: No lymphadenopathy.  ABDOMINAL WALL: Within normal limits.  BONES: Degenerative changes.    IMPRESSION:    Mild nonspecific enteritis involving small bowel loops in the central abdomen.    Moderate volume of abdominopelvic ascites.  Bilateral pleural effusions, right greater than left. .  LABS:                         11.5   10.67 )-----------( 466      ( 2021 17:12 )             37.0     07-    139  |  100  |  51<H>  ----------------------------<  99  5.5<H>   |  19<L>  |  7.57<H>    Ca    9.7      2021 23:30    TPro  7.1  /  Alb  3.3  /  TBili  0.3  /  DBili  x   /  AST  62<H>  /  ALT  50<H>  /  AlkPhos  88        Urinalysis Basic - ( 2021 06:47 )    Color: Light Orange / Appearance: Slightly Turbid / S.023 / pH: x  Gluc: x / Ketone: Trace  / Bili: Negative / Urobili: <2 mg/dL   Blood: x / Protein: 100 mg/dL / Nitrite: Negative   Leuk Esterase: Moderate / RBC: many /HPF / WBC >10 /HPF   Sq Epi: x / Non Sq Epi: 5-10 /HPF / Bacteria: Negative            RADIOLOGY, EKG & ADDITIONAL TESTS: Reviewed.    FINDINGS:  LOWER CHEST: Bilateral mild pleural effusions, right greater than left.    Evaluation of the solid visceral organs and vasculature is limited without the administration of intravenous contrast.    LIVER: Within normal limits.  BILE DUCTS: Normal caliber.  GALLBLADDER: Within normal limits.  SPLEEN: Within normal limits.  PANCREAS: Within normal limits.  ADRENALS: Within normal limits.  KIDNEYS/URETERS: Left ureteral stent. No hydronephrosis or obstructing stone.      BLADDER: Decompressed with Charles present.  REPRODUCTIVE ORGANS: Uterus and adnexa within normal limits.    BOWEL: The stomach is incompletely distended. Mildly thickened loops of small bowel in the midabdomen suggest nonspecific enteritis. No bowel obstruction. Appendix is normal.Diverticulosis.  PERITONEUM: Moderate volume of abdominal ascites. Reticulation of the intra-abdominal fat in the left upper quadrant and mid abdomen is likely related to lymphangitic drainage of fluid as opposed to carcinomatosis.  VESSELS: Within normal limits.  RETROPERITONEUM/LYMPH NODES: No lymphadenopathy.  ABDOMINAL WALL: Within normal limits.  BONES: Degenerative changes.    IMPRESSION:    Mild nonspecific enteritis involving small bowel loops in the central abdomen.    Moderate volume of abdominopelvic ascites.  Bilateral pleural effusions, right greater than left.    EKG personally reviewed: sinus tachycardia @103bpm, peaked T waves

## 2021-07-26 NOTE — H&P ADULT - PROBLEM SELECTOR PLAN 3
- In the setting of pleural effusion with history of Ovarian mass. 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   - Pleuracentesis  - Consult Gynecology for recs - In the setting of pleural effusion with history of Ovarian mass. Malignant vs Benign? Consider Meigs syndrome.   - Pleuracentesis

## 2021-07-26 NOTE — H&P ADULT - PROBLEM SELECTOR PLAN 2
- Likely 2/2 PURVI in the setting of hydronephrosis s/p renal stent   - On admission potassium 5.9-->5.5  - Calcium gluconate for cardio protection  - Insulin/Glucose  - Kayexalate  - May require dialysis should medical management fail.   - Nephro on board - Likely 2/2 PURVI in the setting of hydronephrosis s/p renal stent   - On admission potassium 5.9-->5.5  Start medical management with:         - Calcium gluconate for cardio protection        - Insulin/Glucose        - Lokelma        - May require dialysis should medical management fail.         - Nephro on board

## 2021-07-26 NOTE — H&P ADULT - PROBLEM SELECTOR PLAN 4
- Continue home meds - On HCTZ-Triamterene   - Continue home meds - On HCTZ-Triamterene. Will hold for now due to worsening Hyperkalemia

## 2021-07-26 NOTE — H&P ADULT - NSHPPHYSICALEXAM_GEN_ALL_CORE
VITALS:   T(C): 37.2 (07-26-21 @ 06:35), Max: 37.6 (07-25-21 @ 23:19)  HR: 106 (07-26-21 @ 06:35) (106 - 115)  BP: 140/90 (07-26-21 @ 06:35) (140/90 - 155/87)  RR: 25 (07-26-21 @ 06:35) (16 - 25)  SpO2: 97% (07-26-21 @ 06:35) (97% - 100%)    GENERAL: NAD, lying in bed comfortably  HEAD:  Atraumatic, normocephalic  EYES: EOMI, PERRLA, conjunctiva and sclera clear  ENT: Moist mucous membranes  NECK: Supple, no JVD  HEART: Regular rate and rhythm, no murmurs, rubs, or gallops  LUNGS: Unlabored respirations.  Clear to auscultation bilaterally, no crackles, wheezing, or rhonchi  ABDOMEN: Soft, nontender, nondistended, +BS  EXTREMITIES: 2+ peripheral pulses bilaterally. No clubbing, cyanosis, or edema  NERVOUS SYSTEM:  A&Ox3, no focal deficits   SKIN: No rashes or lesions  Psych: Normal. normal behavior. normal speech VITALS:   T(C): 37.2 (07-26-21 @ 06:35), Max: 37.6 (07-25-21 @ 23:19)  HR: 106 (07-26-21 @ 06:35) (106 - 115)  BP: 140/90 (07-26-21 @ 06:35) (140/90 - 155/87)  RR: 25 (07-26-21 @ 06:35) (16 - 25)  SpO2: 97% (07-26-21 @ 06:35) (97% - 100%)    GENERAL: NAD, lying in bed comfortably  HEAD:  Atraumatic, normocephalic  EYES: EOMI, PERRLA, conjunctiva and sclera clear  ENT: Moist mucous membranes  NECK: Supple, no JVD  HEART: Regular rate and rhythm, no murmurs, rubs, or gallops  LUNGS: Unlabored respirations.  Clear to auscultation bilaterally, no crackles, wheezing, or rhonchi  ABDOMEN: Firm and diffusely tender abdomen with Hypoactive bowel sounds. No rebound tenderness. No appreciable fluid wave. No palpable   EXTREMITIES: 2+ peripheral pulses bilaterally. No clubbing, cyanosis, or edema  NERVOUS SYSTEM:  A&Ox3, no focal deficits   SKIN: No rashes or lesions  Psych: Normal. normal behavior. normal speech VITALS:   T(C): 37.2 (07-26-21 @ 06:35), Max: 37.6 (07-25-21 @ 23:19)  HR: 106 (07-26-21 @ 06:35) (106 - 115)  BP: 140/90 (07-26-21 @ 06:35) (140/90 - 155/87)  RR: 25 (07-26-21 @ 06:35) (16 - 25)  SpO2: 97% (07-26-21 @ 06:35) (97% - 100%)    GENERAL: NAD, lying in bed comfortably, on NC  HEAD:  Atraumatic, normocephalic  EYES: EOMI, PERRLA, conjunctiva and sclera clear  ENT: Moist mucous membranes  NECK: Supple, no JVD  HEART: Regular rate and rhythm, no murmurs, rubs, or gallops  LUNGS: Unlabored respirations.  Clear to auscultation bilaterally, no crackles, wheezing, or rhonchi  ABDOMEN: Firm and diffusely tender abdomen with Hypoactive bowel sounds. No rebound tenderness. No appreciable fluid wave. No palpable masses  : + carnes with yellow/pink tinged urine  EXTREMITIES: 2+ peripheral pulses bilaterally. No clubbing, cyanosis, or edema  NERVOUS SYSTEM:  A&Ox3, no focal deficits   SKIN: No rashes or lesions  Psych: Normal. normal behavior. normal speech

## 2021-07-26 NOTE — H&P ADULT - HISTORY OF PRESENT ILLNESS
67yo F hx of HTN, Pre-Diabetes, HL, recent admission to OSH ([facility]) for ovarian mass, abdominal ascites s/p renal stent placement for left hydronephrosis was discharged on 7-16-21 for outpatient gyn followup. Presenting with 1 month of constant diffuse abdominal pain associated with nausea without vomiting. Pain is described as dull and radiates to the back. No particular exacerbating factors but she states she cannot sleep on her sides or stomach due to pain. She denies any fever/chills, chest pain, SOB, palpitations. She initially presented to her PCP and was advised to go to the ED. There US abd and CT abd/pelvis where obtained and she was diagnosed with hydronephrosis and an ovarian mass and referred to [facility] for further management. LBM was yesterday with small hard stools and she is passing gas. She has been taking Aleve with minimal relief. She does not follow up with outpatient GYN.     In ED CT adb and pelvis showed B/L pleural effusion, rt greater than left  67yo F hx of HTN, Pre-Diabetes, HL, recent admission to OSH ([facility]) for ovarian mass, abdominal ascites s/p renal stent placement for left hydronephrosis was discharged on 7-16-21 for outpatient gyn followup. Presenting with 1 month of constant diffuse abdominal pain associated with nausea without vomiting. Pain is described as dull and radiates to the back. No particular exacerbating factors but she states she cannot sleep on her sides or stomach due to pain. She denies any fever/chills, chest pain, SOB, palpitations. She initially presented to her PCP and was advised to go to the ED. There US abd and CT abd/pelvis where obtained and she was diagnosed with hydronephrosis and an ovarian mass and referred to [facility] for further management. LBM was yesterday with small hard stools and she is passing gas. She has been taking Aleve with minimal relief. She does not follow up with outpatient GYN.     In ED CT adb and pelvis showed B/L pleural effusion, rt greater than left and moderate abdominal ascites. On adminssion found to be Hyperkalemic 5.5 with creatinine on 7.12/BUN 48. Urinalysis with large blood and moderate leukocyte esterase, negative for bacteria 65yo F hx of HTN, Pre-Diabetes, HL, recent admission to OSH ([facility]) for ovarian mass, abdominal ascites, left hydroureteronephrosis s/p renal stent placement for left hydronephrosis was discharged on 7-16-21 for outpatient gynonc followup now presenting with 1 month of constant diffuse abdominal pain associated with nausea without vomiting. Pain is described as dull and radiates to the back. No particular exacerbating factors but she states she cannot sleep on her sides or stomach due to pain. She denies any fever/chills, chest pain, SOB, palpitations. She initially presented to her PCP and was advised to go to the ED. There US abd and CT abd/pelvis where obtained and she was diagnosed with hydronephrosis and an ovarian mass and referred to [facility] for further management. LBM was yesterday with small hard stools and she is passing gas. She has been taking Aleve with minimal relief. She does not follow up with outpatient GYN.     In ED CT adb and pelvis showed B/L pleural effusion, rt greater than left and moderate abdominal ascites. On admission found to be Hyperkalemic 5.5 with creatinine on 7.12/BUN 48. Urinalysis with large blood and moderate leukocyte esterase, negative for bacteria

## 2021-07-27 NOTE — PROGRESS NOTE ADULT - PROBLEM SELECTOR PLAN 2
- Likely 2/2 PURVI in the setting of hydronephrosis s/p renal stent   - S/P HD on 7/27. Had to be terminated 1.5 hours in due to tachycardia HR 130s-140s  - S/P medical management with:         - Calcium gluconate for cardio protection        - Bicarb infusion        - Insulin/Glucose        - Lokelma        - Nephro on board.

## 2021-07-27 NOTE — PROGRESS NOTE ADULT - SUBJECTIVE AND OBJECTIVE BOX
Overnight patient received HD. HD had to be terminated due to elevated heart rate. Total duration for about 1.5 hours. S/P HD she denied chest pain, SOB, leg pain/swelling, fever/chills, numbness and tingling, dizziness, and lightheadedness. Patient resting comfortable in bed and expresses no new concerns this morning.     MEDICATIONS  (STANDING):  aspirin enteric coated 81 milliGRAM(s) Oral daily  chlorhexidine 4% Liquid 1 Application(s) Topical <User Schedule>  multivitamin 1 Tablet(s) Oral daily  polyethylene glycol 3350 17 Gram(s) Oral two times a day  sodium bicarbonate  Infusion 0.238 mEq/kG/Hr (100 mL/Hr) IV Continuous <Continuous>  sodium zirconium cyclosilicate 10 Gram(s) Oral every 8 hours    MEDICATIONS  (PRN):  sodium chloride 0.9% lock flush 10 milliLiter(s) IV Push every 1 hour PRN Pre/post blood products, medications, blood draw, and to maintain line patency      VITALS:   T(C): 36.5 (21 @ 05:15), Max: 37.1 (21 @ 19:38)  HR: 119 (21 @ 05:15) (104 - 133)  BP: 158/102 (21 @ 05:15) (138/84 - 158/102)  RR: 19 (21 @ 05:15) (18 - 20)  SpO2: 97% (21 @ 05:15) (96% - 98%)    GENERAL: NAD, lying in bed comfortably  HEAD:  Atraumatic, normocephalic  EYES: EOMI, PERRLA, conjunctiva and sclera clear  ENT: Moist mucous membranes  NECK: Supple, no JVD  HEART: Regular rate and rhythm, no murmurs, rubs, or gallops  LUNGS: Unlabored respirations.  Clear to auscultation bilaterally, no crackles, wheezing, or rhonchi  ABDOMEN: Soft, nontender, nondistended, +BS  EXTREMITIES: 2+ peripheral pulses bilaterally. No clubbing, cyanosis, or edema  NERVOUS SYSTEM:  A&Ox3, no focal deficits   SKIN: No rashes or lesions  Psych: Normal. normal behavior. normal speech      .  LABS:                         11.6   13.76 )-----------( 324      ( 2021 03:11 )             36.9         135  |  93<L>  |  41<H>  ----------------------------<  166<H>  5.5<H>   |  24  |  6.55<H>    Ca    9.5      2021 03:11  Phos  5.1       Mg     2.70         TPro  6.8  /  Alb  3.4  /  TBili  0.4  /  DBili  x   /  AST  80<H>  /  ALT  65<H>  /  AlkPhos  93      PT/INR - ( 2021 03:11 )   PT: 13.0 sec;   INR: 1.14 ratio         PTT - ( 2021 03:11 )  PTT:29.0 sec  Urinalysis Basic - ( 2021 06:47 )    Color: Light Orange / Appearance: Slightly Turbid / S.023 / pH: x  Gluc: x / Ketone: Trace  / Bili: Negative / Urobili: <2 mg/dL   Blood: x / Protein: 100 mg/dL / Nitrite: Negative   Leuk Esterase: Moderate / RBC: many /HPF / WBC >10 /HPF   Sq Epi: x / Non Sq Epi: 5-10 /HPF / Bacteria: Negative      Serum Pro-Brain Natriuretic Peptide: 61 pg/mL ( @ 03:11)    Lactate, Blood: 2.4 mmol/L ( @ 23:15)      RADIOLOGY, EKG & ADDITIONAL TESTS: Reviewed.

## 2021-07-27 NOTE — PROGRESS NOTE ADULT - ASSESSMENT
66 yr old woman PMH HTN, Pre-Diabetes, HLD, recent admission to [facility] for abdominal pain and found to have malignant ovarian mass with abdominal ascites, left hydroureteronephrosis s/p renal stent placement  on 7/15/21 and dced 7/16/21 p/w worsening diffuse abdominal pain associated with nausea. Found to have severe PURVI with hyperkalemia and moderate abdominopelvic ascites with persistent sinus tachycardia c/b acute hypoxic respiratory failure

## 2021-07-27 NOTE — PROGRESS NOTE ADULT - PROBLEM SELECTOR PLAN 4
- Abd firm, distended, tenderness diffusely with no guarding  - Pleural effusion on CT abdomen and Pelvis, right > left with history of Ovarian mass. Malignant vs Benign? Consider Meigs syndrome.   - Ascites of unknown etiology. Could be malignant fluid in the setting of ovarian mass  - Pending pleuracentesis.

## 2021-07-27 NOTE — PROGRESS NOTE ADULT - PROBLEM SELECTOR PLAN 2
Pt with hyperkalemia in the setting of PURVI. On admission, serum potassium was elevated to 5.9 on 7/25/21. Received medical management in ED. Repeat serum potassium was 5.5. Continue medical management. Started  on lokelma 10 mg q8 on 6/26/21. recommend continuing lokelma for now. low potassium diet. get ionized calcium levels done. Monitor serum potassium daily.       If you have any questions, please feel free to contact me  Jose David Mcbride  Nephrology Fellow  [phone]  (After 5pm or on weekends please page the on-call fellow).

## 2021-07-27 NOTE — PROGRESS NOTE ADULT - SUBJECTIVE AND OBJECTIVE BOX
[facility] DIVISION OF KIDNEY DISEASES AND HYPERTENSION -- FOLLOW UP NOTE  --------------------------------------------------------------------------------  HPI: 66-year-old Female with PMH of HTN, pre-DM and recently diagnosed ? ovarian mass at OSH presents to [facility] with abdominal pain, poor oral intake and nausea. Nephrology consultation requested for elevated Scr. On admission, Scr elevated at 7.12 with serum potassium of 5.9 and SCO2 of 20. No previous labs available in the system. Of note, pt. was recently admitted at OSH for abdominal pain, found to have adnexal mass with ascites. During her stay , she was found to have L kidney HDN and had a L renal stent placement on 7/15/21. She was discharged on 7/16/21 for outpatient follow up with gynecology. Her Scr was 0.8 on 7/16/21. She reported poor oral intake since discharge. Also endorse of having decreased urination. Scr increased to 7.54 with hyperkalemia of 5.5 and SCO2 of 21 on 7/26/21. HD consent obtained. Received HD treatment on 7/26/21.     Pt. seen and examined at bedside. Appears resting comfortably. Denies fever, chills, SOB, CP, diarrhea or constipation or dizziness.    PAST HISTORY  --------------------------------------------------------------------------------  No significant changes to PMH, PSH, FHx, SHx, unless otherwise noted    ALLERGIES & MEDICATIONS  --------------------------------------------------------------------------------  Allergies    penicillins (Rash)    Intolerances    Standing Inpatient Medications  aspirin enteric coated 81 milliGRAM(s) Oral daily  chlorhexidine 4% Liquid 1 Application(s) Topical <User Schedule>  lidocaine 1% Injectable 10 milliLiter(s) Local Injection once  multivitamin 1 Tablet(s) Oral daily  polyethylene glycol 3350 17 Gram(s) Oral two times a day  sodium bicarbonate  Infusion 0.238 mEq/kG/Hr IV Continuous <Continuous>  sodium zirconium cyclosilicate 10 Gram(s) Oral every 8 hours    PRN Inpatient Medications  sodium chloride 0.9% lock flush 10 milliLiter(s) IV Push every 1 hour PRN    REVIEW OF SYSTEMS  --------------------------------------------------------------------------------  Gen: No fevers   Respiratory: No dyspnea  CV: No chest pain  GI: No abdominal pain  : No dysuria  MSK: No  edema  Neuro: no dizziness     VITALS/PHYSICAL EXAM  --------------------------------------------------------------------------------  T(C): 36.5 (07-27-21 @ 05:15), Max: 37.1 (07-26-21 @ 19:38)  HR: 119 (07-27-21 @ 05:15) (113 - 133)  BP: 158/102 (07-27-21 @ 05:15) (140/91 - 158/102)  RR: 19 (07-27-21 @ 05:15) (18 - 20)  SpO2: 97% (07-27-21 @ 05:15) (96% - 97%)  Wt(kg): --  Height (cm): 160 (07-26-21 @ 19:38)  Weight (kg): 63 (07-26-21 @ 15:22)  BMI (kg/m2): 24.6 (07-26-21 @ 19:38)  BSA (m2): 1.66 (07-26-21 @ 19:38)    07-26-21 @ 07:01  -  07-27-21 @ 07:00  --------------------------------------------------------  IN: 400 mL / OUT: 457 mL / NET: -57 mL    07-27-21 @ 07:01  -  07-27-21 @ 09:45  --------------------------------------------------------  IN: 200 mL / OUT: 0 mL / NET: 200 mL    Physical Exam:  	Gen: NAD, appears calm  	HEENT: MMM, anicteric  	Pulm: CTA B/L  	CV: S1S2+  	Abd: Soft, +BS   	Ext: No LE edema B/L  	Neuro: Awake  	Skin: Warm and dry  	Vascular access: peripheral IV canula, Right IJ tunneled HD catheter +    LABS/STUDIES  --------------------------------------------------------------------------------              11.6   13.76 >-----------<  324      [07-27-21 @ 03:11]              36.9     135  |  93  |  41  ----------------------------<  166      [07-27-21 @ 03:11]  5.5   |  24  |  6.55        Ca     9.5     [07-27-21 @ 03:11]      Mg     2.70     [07-27-21 @ 03:11]      Phos  5.1     [07-27-21 @ 03:11]    TPro  6.8  /  Alb  3.4  /  TBili  0.4  /  DBili  x   /  AST  80  /  ALT  65  /  AlkPhos  93  [07-27-21 @ 03:11]    PT/INR: PT 13.0 , INR 1.14       [07-27-21 @ 03:11]  PTT: 29.0       [07-27-21 @ 03:11]    Creatinine Trend:  SCr 6.55 [07-27 @ 03:11]  SCr 7.26 [07-26 @ 23:15]  SCr 7.54 [07-26 @ 15:51]  SCr 7.06 [07-26 @ 09:37]  SCr 7.57 [07-25 @ 23:30]    Urinalysis - [07-26-21 @ 12:56]      Color  / Appearance  / SG  / pH 5.5      Gluc  / Ketone   / Bili  / Urobili        Blood  / Protein  / Leuk Est  / Nitrite       RBC  / WBC  / Hyaline  / Gran  / Sq Epi  / Non Sq Epi  / Bacteria     Urine Creatinine 214      [07-26-21 @ 10:08]  Urine Sodium 67      [07-26-21 @ 10:08]  Urine Urea Nitrogen 642.0      [07-26-21 @ 10:08]  Urine Potassium 63.3      [07-26-21 @ 10:08]  Urine Chloride 39      [07-26-21 @ 10:08]  Urine Osmolality 553      [07-26-21 @ 10:09]

## 2021-07-27 NOTE — PROGRESS NOTE ADULT - PROBLEM SELECTOR PLAN 1
- No History of ESRD. Pt with PURVI in the setting of B/l adnexal mass with ascites and Hydronephrosis. Obstructive/compressive etiology ruled out as CT with no evidence of obstruction and urology also thinks unlikely.   - Consider workup for other etiology of PURVI such as intrinsic, autoimmune, medication induced, infectious causes.  - On admission, Scr was elevated to 7.12 on 7/25/21. Baseline 0.8 on 6/16/21. Trending down to 6.55  - CT abdo/pelvis done on 7/25/21 showed moderate ascites and B/l pleural effusion and L ureteral stent.   - UA done on 7/25/21 showed moderate LEC with blood seen.   - Creatinine 6.55. BUN 5.1--> 4.1  - Monitor labs and urine output. Strict I's and Os  - Avoid any potential nephrotoxins. Dose medications as per eGFR. Nephro on board.

## 2021-07-27 NOTE — PROGRESS NOTE ADULT - PROBLEM SELECTOR PLAN 3
- Onset of stable sinus tachycardia 130s-140s during HD.   - No CP. No SOB  - BP meds held yesterday due to Hyperkalemia  - Coreg if persistently tachycardic - Onset of stable sinus tachycardia 130s-140s during HD.   - No CP. No SOB  - BP meds held yesterday due to Hyperkalemia

## 2021-07-27 NOTE — PROGRESS NOTE ADULT - SUBJECTIVE AND OBJECTIVE BOX
Interval Events:    No acute events overnight  Unable to tolerate HD due to tachycardia.  Lactate, Cr, and K slightly down this AM.    O: Vital Signs Last 24 Hrs  T(C): 37.1 (2021 11:06), Max: 37.1 (2021 19:38)  T(F): 98.7 (2021 11:06), Max: 98.7 (2021 19:38)  HR: 112 (2021 11:06) (112 - 133)  BP: 161/95 (2021 11:06) (140/91 - 161/95)  BP(mean): --  RR: 19 (2021 11:06) (18 - 20)  SpO2: 90% (2021 11:06) (90% - 97%)      2021 07:01  -  2021 07:00  --------------------------------------------------------  IN:    Other (mL): 400 mL  Total IN: 400 mL    OUT:    Indwelling Catheter - Urethral (mL): 200 mL    Other (mL): 257 mL  Total OUT: 457 mL    Total NET: -57 mL      2021 07:01  -  2021 12:58  --------------------------------------------------------  IN:    Oral Fluid: 240 mL    Sodium Bicarbonate: 350 mL  Total IN: 590 mL    OUT:  Total OUT: 0 mL    Total NET: 590 mL          Physical Exam:    Gen: In no acute distress  Resp: No additional work of breathing   GI: Soft, non-tender, non-distended, with normoactive bowel sounds.  No masses.  MSK: Moves all extremities equally  Skin: No rashes    Labs:                        11.6   13.76 )-----------( 324      ( 2021 03:11 )             36.9     2021 09:48    136    |  91     |  42     ----------------------------<  178    5.4     |  26     |  6.49     Ca    9.2        2021 09:48  Phos  5.1       2021 03:11  Mg     2.70      2021 03:11    TPro  6.8    /  Alb  3.4    /  TBili  0.4    /  DBili  x      /  AST  80     /  ALT  65     /  AlkPhos  93     2021 03:11    PT/INR - ( 2021 03:11 )   PT: 13.0 sec;   INR: 1.14 ratio         PTT - ( 2021 03:11 )  PTT:29.0 sec  CAPILLARY BLOOD GLUCOSE      POCT Blood Glucose.: 134 mg/dL (2021 19:46)        LIVER FUNCTIONS - ( 2021 03:11 )  Alb: 3.4 g/dL / Pro: 6.8 g/dL / ALK PHOS: 93 U/L / ALT: 65 U/L / AST: 80 U/L / GGT: x             Urinalysis Basic - ( 2021 06:47 )    Color: Light Orange / Appearance: Slightly Turbid / S.023 / pH: x  Gluc: x / Ketone: Trace  / Bili: Negative / Urobili: <2 mg/dL   Blood: x / Protein: 100 mg/dL / Nitrite: Negative   Leuk Esterase: Moderate / RBC: many /HPF / WBC >10 /HPF   Sq Epi: x / Non Sq Epi: 5-10 /HPF / Bacteria: Negative        MEDICATIONS  (STANDING):  aspirin enteric coated 81 milliGRAM(s) Oral daily  chlorhexidine 4% Liquid 1 Application(s) Topical <User Schedule>  heparin   Injectable 5000 Unit(s) SubCutaneous every 8 hours  multivitamin 1 Tablet(s) Oral daily  polyethylene glycol 3350 17 Gram(s) Oral two times a day  sodium zirconium cyclosilicate 10 Gram(s) Oral every 8 hours    MEDICATIONS  (PRN):  sodium chloride 0.9% lock flush 10 milliLiter(s) IV Push every 1 hour PRN Pre/post blood products, medications, blood draw, and to maintain line patency

## 2021-07-27 NOTE — PROGRESS NOTE ADULT - PROBLEM SELECTOR PLAN 1
Pt. with PURVI in the setting of B/l adnexal mass with ascites and HDN. On admission, Scr was elevated to 7.12 on 7/25/21. Scr was at baseline around 0.8 on 6/16/21. CT abdo/pelvis done on 7/25/21 showed moderate ascites and B/l pleural effusion and L ureteral stent. UA done on 7/25/21 showed moderate LEC with blood seen. Started on bicarb infusion for hyperkalemia and metabolic acidosis. HD consent obtained. IR placed Right IJ HD catheter on 7/26/21. Pt. received HD treatment on 7/26/21. Tolerated well.  Monitor labs and urine output. Avoid any potential nephrotoxins. Dose medications as per eGFR. Pt. with PURVI in the setting of B/l adnexal mass with ascites and HDN. On admission, Scr was elevated to 7.12 on 7/25/21. Scr was at baseline around 0.8 on 6/16/21. CT abdo/pelvis done on 7/25/21 showed moderate ascites and B/l pleural effusion and L ureteral stent. UA done on 7/25/21 showed moderate LEC with blood seen. Started on bicarb infusion for hyperkalemia and metabolic acidosis. HD consent obtained. IR placed Right IJ HD catheter on 7/26/21. Pt. received HD treatment on 7/26/21. Tolerated well.  Recommend Renal scan with lasix today. Monitor labs and urine output. Avoid any potential nephrotoxins. Dose medications as per eGFR. Pt. with PURVI in the setting of B/l adnexal mass with ascites and HDN. On admission, Scr was elevated to 7.12 on 7/25/21. Scr was at baseline around 0.8 on 6/16/21. CT abdo/pelvis done on 7/25/21 showed moderate ascites and B/l pleural effusion and L ureteral stent. UA done on 7/25/21 showed moderate LEC with blood seen. Started on bicarb infusion for hyperkalemia and metabolic acidosis. HD consent obtained. IR placed Right IJ HD catheter on 7/26/21. Pt. received HD treatment on 7/26/21. Tolerated well. BNP done on 6/27/21 is low at 67. Recommend Renal scan with Lasix today. Start IVF NS @ 100cc/hr. Monitor labs and urine output. Avoid any potential nephrotoxins. Dose medications as per eGFR.

## 2021-07-27 NOTE — PROGRESS NOTE ADULT - ASSESSMENT
66 year old female with PMHx of HTN, pre-DM, and ovarian mass s/p left ureteral stent placement for hydronephrosis 2/2 extrinsic compression at OSH on 7/15 now with Cr elevated to 7.     - CT reviewed: left ureteral stent appears to be in place without hydronephrosis. Bladder collapsed around carnes balloon.   - Strict I+O's  - renal duplex US   - Explore prerenal/ATN   - If not improving with medical management, consider IR consult for left nephrostomy

## 2021-07-28 NOTE — CONSULT NOTE ADULT - PROBLEM SELECTOR RECOMMENDATION 2
on admission presented with firm, distended, diffusely tender abdomen  - Pleural effusion on CT abdomen and Pelvis, right > left with history of Ovarian mass. Malignant vs Benign? Consider Meigs syndrome.   - Ascites of unknown etiology in setting of transaminitis. Could be malignant fluid in the setting of ovarian mass  - Paracentesis performed 7/27 produced 60mLs of serous fluid. Gram stain negative, negative for SBP on cytology. Pending  fungal cultures.   - Management as per primary medical team
Pt with hyperkalemia in the setting of PURVI. On admission, serum potassium was elevated to 5.9 on 7/25/21. Received medical management in ED. Repeat serum potassium was 5.5. Continue medical management. Start lokelma 10 mg q8. low potassium diet. get ionized calcium levels done. Monitor serum potassium daily.       If you have any questions, please feel free to contact me  Jose David Mcbride  Nephrology Fellow  [phone]  (After 5pm or on weekends please page the on-call fellow).

## 2021-07-28 NOTE — PROGRESS NOTE ADULT - SUBJECTIVE AND OBJECTIVE BOX
Interval Events:    No acute events overnight  Cr improving  Good uop    O: Vital Signs Last 24 Hrs  T(C): 36.7 (28 Jul 2021 11:02), Max: 37.2 (27 Jul 2021 21:42)  T(F): 98 (28 Jul 2021 11:02), Max: 98.9 (27 Jul 2021 21:42)  HR: 113 (28 Jul 2021 11:04) (99 - 113)  BP: 153/103 (28 Jul 2021 11:04) (142/95 - 153/103)  BP(mean): --  RR: 18 (28 Jul 2021 11:04) (17 - 18)  SpO2: 95% (28 Jul 2021 11:04) (93% - 97%)      27 Jul 2021 07:01  -  28 Jul 2021 07:00  --------------------------------------------------------  IN:    Lactated Ringers: 100 mL    Oral Fluid: 530 mL    Sodium Bicarbonate: 350 mL  Total IN: 980 mL    OUT:    Indwelling Catheter - Urethral (mL): 825 mL  Total OUT: 825 mL    Total NET: 155 mL      28 Jul 2021 07:01  -  28 Jul 2021 11:50  --------------------------------------------------------  IN:    Oral Fluid: 320 mL  Total IN: 320 mL    OUT:  Total OUT: 0 mL    Total NET: 320 mL          Physical Exam:    Gen: Well-developed, well-nourished in no acute distress  Resp: No additional work of breathing   GI: Soft, non-tender, non-distended, with normoactive bowel sounds.  No masses.  MSK: Moves all extremities equally  Skin: No rashes    Labs:                        10.8   7.48  )-----------( 285      ( 28 Jul 2021 07:01 )             33.5     28 Jul 2021 07:01    138    |  94     |  29     ----------------------------<  126    4.1     |  24     |  2.36     Ca    9.4        28 Jul 2021 07:01  Phos  3.5       28 Jul 2021 07:01  Mg     2.20      28 Jul 2021 07:01    TPro  6.5    /  Alb  3.2    /  TBili  0.4    /  DBili  <0.2   /  AST  74     /  ALT  59     /  AlkPhos  81     28 Jul 2021 07:16    PT/INR - ( 27 Jul 2021 03:11 )   PT: 13.0 sec;   INR: 1.14 ratio         PTT - ( 27 Jul 2021 03:11 )  PTT:29.0 sec  CAPILLARY BLOOD GLUCOSE            LIVER FUNCTIONS - ( 28 Jul 2021 07:16 )  Alb: 3.2 g/dL / Pro: 6.5 g/dL / ALK PHOS: 81 U/L / ALT: 59 U/L / AST: 74 U/L / GGT: x             Culture - Fungal, Body Fluid (collected 27 Jul 2021 18:35)  Source: .Body Fluid Peritoneal Fluid  Preliminary Report (28 Jul 2021 06:34):    Testing in progress    Culture - Body Fluid with Gram Stain (collected 27 Jul 2021 18:35)  Source: .Body Fluid Peritoneal Fluid  Gram Stain (27 Jul 2021 23:07):    No polymorphonuclear leukocytes seen    No organisms seen    by cytocentrifuge          MEDICATIONS  (STANDING):  amLODIPine   Tablet 5 milliGRAM(s) Oral daily  aspirin enteric coated 81 milliGRAM(s) Oral daily  chlorhexidine 4% Liquid 1 Application(s) Topical <User Schedule>  heparin   Injectable 5000 Unit(s) SubCutaneous every 8 hours  lactated ringers. 1000 milliLiter(s) (100 mL/Hr) IV Continuous <Continuous>  multivitamin 1 Tablet(s) Oral daily  polyethylene glycol 3350 17 Gram(s) Oral two times a day    MEDICATIONS  (PRN):  acetaminophen   Tablet .. 650 milliGRAM(s) Oral every 6 hours PRN Mild Pain (1 - 3), Moderate Pain (4 - 6)  sodium chloride 0.9% lock flush 10 milliLiter(s) IV Push every 1 hour PRN Pre/post blood products, medications, blood draw, and to maintain line patency

## 2021-07-28 NOTE — PROGRESS NOTE ADULT - PROBLEM SELECTOR PLAN 3
- Likely 2/2 PURVI in the setting of previous hx hydronephrosis s/p renal stent   - S/P HD on 7/27. Had to be terminated 1.5 hours in due to tachycardia HR 130s-140s  - S/P medical management with:         - Calcium gluconate for cardio protection, Bicarb infusion, Insulin/Glucose, Lokelma, Nephro on board.  - Currently on Lokelma 10mg q8h  - Most recent BMP shows resolution with K+ 4.5 - Likely 2/2 PURVI in the setting of previous hx hydronephrosis s/p renal stent   - S/P HD on 7/27. Had to be terminated 1.5 hours in due to tachycardia HR 130s-140s  - S/P medical management with:         - Calcium gluconate for cardio protection, Bicarb infusion, Insulin/Glucose, Lokelma, Nephro on board.  - Currently on Lokelma 10mg q8h. Will discontinue  - Most recent BMP shows resolution with K+ 4.5

## 2021-07-28 NOTE — PROGRESS NOTE ADULT - PROBLEM SELECTOR PLAN 2
Pt with hyperkalemia in the setting of PURVI. On admission, serum potassium was elevated to 5.9 on 7/25/21. Received medical management in ED. Received lokelma which was discontinued on 7/28/21. serum potassium is 4.0 today. Continue medical management. Monitor serum potassium daily.       If you have any questions, please feel free to contact me  Jose David Mcbride  Nephrology Fellow  [phone]  (After 5pm or on weekends please page the on-call fellow).

## 2021-07-28 NOTE — GOALS OF CARE CONVERSATION - ADVANCED CARE PLANNING - WHAT MATTERS MOST
The family wants patient to get better and they want everything possible to be done during treatment duration

## 2021-07-28 NOTE — CONSULT NOTE ADULT - ASSESSMENT
66 year old female with PURVI, Ascites, recently found ovarian mass, and encounter for palliative care for complex decision making related to goals of care and assistance with pain and symptom management.

## 2021-07-28 NOTE — PROGRESS NOTE ADULT - ASSESSMENT
66 yr old woman PMH HTN, Pre-Diabetes, HLD, recent admission to [facility] for abdominal pain and found to have malignant ovarian mass with abdominal ascites, left hydroureteronephrosis s/p renal stent placement  on 7/15/21 and dced 7/16/21 p/w worsening diffuse abdominal pain associated with nausea. Found to have severe PURVI with hyperkalemia and moderate abdominopelvic ascites with persistent sinus tachycardia c/b acute hypoxic respiratory failure

## 2021-07-28 NOTE — CONSULT NOTE ADULT - SUBJECTIVE AND OBJECTIVE BOX
HPI:  65yo F hx of HTN, Pre-Diabetes, HL, recent admission to OSH ([facility]) for ovarian mass, abdominal ascites, left hydroureteronephrosis s/p renal stent placement for left hydronephrosis was discharged on 7-16-21 for outpatient gynonc followup now presenting with 1 month of constant diffuse abdominal pain associated with nausea without vomiting. Pain is described as dull and radiates to the back. No particular exacerbating factors but she states she cannot sleep on her sides or stomach due to pain. She denies any fever/chills, chest pain, SOB, palpitations. She initially presented to her PCP and was advised to go to the ED. There US abd and CT abd/pelvis where obtained and she was diagnosed with hydronephrosis and an ovarian mass and referred to [facility] for further management. LBM was yesterday with small hard stools and she is passing gas. She has been taking Aleve with minimal relief. She does not follow up with outpatient GYN.     In ED CT adb and pelvis showed B/L pleural effusion, rt greater than left and moderate abdominal ascites. On admission found to be Hyperkalemic 5.5 with creatinine on 7.12/BUN 48. Urinalysis with large blood and moderate leukocyte esterase, negative for bacteria (26 Jul 2021 08:14)    PERTINENT PM/SXH:   Hypertension    Ovarian mass    Hypercholesteremia      S/P ureteral stent placement      FAMILY HISTORY:  FHx: hypertension (Mother)    FH: diabetes mellitus (Mother)      ITEMS NOT CHECKED ARE NOT PRESENT    SOCIAL HISTORY:   Significant other/partner[ ]  Children[x ]  Oriental orthodox/Spirituality: Denominational   Substance hx:  [ ]   Tobacco hx:  [ ]   Alcohol hx: [ ]   Home Opioid hx:  [ ] I-Stop Reference No:[id number]     Living Situation: [x ]Home  [ ]Long term care  [ ]Rehab [ ]Other    ADVANCE DIRECTIVES:    DNR  MOLST  [ ]  Living Will  [ ]   DECISION MAKER(s):  [ ] Health Care Proxy(s)  [x ] Surrogate(s)  [ ] Guardian           Name(s): Phone Number(s):  Jose Camargo [phone]  Yuan Camargo [phone]  BASELINE (I)ADL(s) (prior to admission):  [place]: [x ]Total  [ ] Moderate [ ]Dependent    Allergies    penicillins (Rash)    Intolerances    MEDICATIONS  (STANDING):  albuterol/ipratropium for Nebulization 3 milliLiter(s) Nebulizer every 6 hours  amLODIPine   Tablet 5 milliGRAM(s) Oral daily  aspirin enteric coated 81 milliGRAM(s) Oral daily  chlorhexidine 4% Liquid 1 Application(s) Topical <User Schedule>  heparin   Injectable 5000 Unit(s) SubCutaneous every 8 hours  lactated ringers. 1000 milliLiter(s) (100 mL/Hr) IV Continuous <Continuous>  multivitamin 1 Tablet(s) Oral daily  polyethylene glycol 3350 17 Gram(s) Oral two times a day    MEDICATIONS  (PRN):  acetaminophen   Tablet .. 650 milliGRAM(s) Oral every 6 hours PRN Mild Pain (1 - 3), Moderate Pain (4 - 6)  sodium chloride 0.9% lock flush 10 milliLiter(s) IV Push every 1 hour PRN Pre/post blood products, medications, blood draw, and to maintain line patency    PRESENT SYMPTOMS: [ ]Unable to obtain due to poor mentation   Source if other than patient:  [ ]Family   [ ]Team     Pain: [ ]yes [x ]no, pt denies any pain on discomfort on exam   QOL impact -   Location -                    Aggravating factors -  Quality -  Radiation -  Timing-  Severity (0-10 scale):  Minimal acceptable level (0-10 scale):     PAIN AD Score:     [email or web address]/[email or web address] (press ctrl +  left click to view)    Dyspnea:                           [ ]Mild [ ]Moderate [ ]Severe  Anxiety:                             [ ]Mild [ ]Moderate [ ]Severe  Fatigue:                             [ ]Mild [ ]Moderate [ ]Severe  Nausea:                             [ ]Mild [ ]Moderate [ ]Severe  Loss of appetite:              [ ]Mild [ ]Moderate [ ]Severe  Constipation:                    [ ]Mild [ ]Moderate [ ]Severe    Other Symptoms:  [x ]All other review of systems negative     Palliative Performance Status Version 2:   80 %    http://npcrc.org/files/news/palliative_performance_scale_ppsv2.pdf  PHYSICAL EXAM:  Vital Signs Last 24 Hrs  T(C): 36.7 (28 Jul 2021 11:02), Max: 37.2 (27 Jul 2021 21:42)  T(F): 98 (28 Jul 2021 11:02), Max: 98.9 (27 Jul 2021 21:42)  HR: 113 (28 Jul 2021 11:04) (99 - 113)  BP: 153/103 (28 Jul 2021 11:04) (142/95 - 153/103)  BP(mean): --  RR: 18 (28 Jul 2021 11:04) (17 - 18)  SpO2: 95% (28 Jul 2021 11:04) (93% - 97%)     I&O's Summary    27 Jul 2021 07:01  -  28 Jul 2021 07:00  --------------------------------------------------------  IN: 980 mL / OUT: 825 mL / NET: 155 mL    28 Jul 2021 07:01  -  28 Jul 2021 16:32  --------------------------------------------------------  IN: 520 mL / OUT: 350 mL / NET: 170 mL      GENERAL:  [x ]Alert  [x ]Oriented x 4  [ ]Lethargic  [ ]Cachexia  [ ]Unarousable  [x ]Verbal  [ ]Non-Verbal  Behavioral:   [ ] Anxiety  [ ] Delirium [ ] Agitation [ ] Other  HEENT:  [x ]Normal   [ ]Dry mouth   [ ]ET Tube/Trach  [ ]Oral lesions  PULMONARY:   [x ]Clear [ ]Tachypnea  [ ]Audible excessive secretions   [ ]Rhonchi        [ ]Right [ ]Left [ ]Bilateral  [ ]Crackles        [ ]Right [ ]Left [ ]Bilateral  [ ]Wheezing     [ ]Right [ ]Left [ Bilateral  [ ]Diminished breath sounds [ ]right [ ]left [ ]bilateral  CARDIOVASCULAR:    [ ]Regular [ ]Irregular [x ]Tachy  [ ]Crow [ ]Murmur [ ]Other  GASTROINTESTINAL:  [x ]Soft  [x ]Distended   [ ]+BS  [x ]Non tender [ ]Tender  [ ]PEG [ ]OGT/ NGT  Last BM:   GENITOURINARY:  [x ]Normal [ ] Incontinent   [ ]Oliguria/Anuria   [ ]Charles  MUSCULOSKELETAL:   [x ]Normal   [ ]Weakness  [ ]Bed/Wheelchair bound [ ]Edema  NEUROLOGIC:   [x ]No focal deficits  [ ]Cognitive impairment  [ ]Dysphagia [ ]Dysarthria [ ]Paresis [ ]Other   SKIN:   [x ]Normal    [ ]Rash  [ ]Pressure ulcer(s)       Present on admission [ ]y [ ]n    CRITICAL CARE:  [ ] Shock Present  [ ]Septic [ ]Cardiogenic [ ]Neurologic [ ]Hypovolemic  [ ]  Vasopressors [ ]  Inotropes   [ ]Respiratory failure present [ ]Mechanical ventilation [ ]Non-invasive ventilatory support [ ]High flow  [ ]Acute  [ ]Chronic [ ]Hypoxic  [ ]Hypercarbic [ ]Other  [ ]Other organ failure     LABS:                        10.8   7.48  )-----------( 285      ( 28 Jul 2021 07:01 )             33.5   07-28    138  |  94<L>  |  29<H>  ----------------------------<  126<H>  4.1   |  24  |  2.36<H>    Ca    9.4      28 Jul 2021 07:01  Phos  3.5     07-28  Mg     2.20     07-28    TPro  6.5  /  Alb  3.2<L>  /  TBili  0.4  /  DBili  <0.2  /  AST  74<H>  /  ALT  59<H>  /  AlkPhos  81  07-28  PT/INR - ( 27 Jul 2021 03:11 )   PT: 13.0 sec;   INR: 1.14 ratio         PTT - ( 27 Jul 2021 03:11 )  PTT:29.0 sec      RADIOLOGY & ADDITIONAL STUDIES:  < from: CT Abdomen and Pelvis No Cont (07.25.21 @ 19:55) >  IMPRESSION:    Mild nonspecific enteritis involving small bowel loops in the central abdomen.    Moderate volume of abdominopelvic ascites.  Bilateral pleural effusions, right greater than left.    < end of copied text >      < from: NM Pulmonary Ventilation/Perfusion Scan (07.28.21 @ 13:29) >  IMPRESSION: Abnormal ventilation/perfusion lung scan. Low probability of pulmonary embolus.    --- End of Report ---    < end of copied text >    < from: Xray Chest 1 View- PORTABLE-Urgent (Xray Chest 1 View- PORTABLE-Urgent .) (07.27.21 @ 17:24) >  IMPRESSION:    Bilateral pleural effusions right greater than left with compressive atelectasis at the right lung base.    --- End of Report ---    PROTEIN CALORIE MALNUTRITION PRESENT: [ ]mild [ ]moderate [ ]severe [ ]underweight [ ]morbid obesity  https://www.andeal.org/vault/2440/web/files/ONC/Table_Clinical%20Characteristics%20to%20Document%20Malnutrition-White%20JV%20et%20al%202012.pdf    Height (cm): 160 (07-26-21 @ 19:38)  Weight (kg): 63 (07-26-21 @ 15:22)  BMI (kg/m2): 24.6 (07-26-21 @ 19:38)    [ ]PPSV2 < or = to 30% [ ]significant weight loss  [ ]poor nutritional intake  [ ]anasarca      [ ]Artificial Nutrition      REFERRALS:   [ ]Chaplaincy  [ ]Hospice  [ ]Child Life  [x ]Social Work  [ ]Case management [ ]Holistic Therapy     Goals of Care Document:

## 2021-07-28 NOTE — GOALS OF CARE CONVERSATION - ADVANCED CARE PLANNING - CONVERSATION DETAILS
Discusses patients medical condition and prognosis with patient and both of her sons. They understand that she is in critical condtion and has multiple medical problems are being addressed during this hospital stay, all of which may eventually lead to poor outcome. Discussed goals of care including DNR/DNI and patient and sons. Explained risks vs benefits of CPR and sedation and intubation. Patient, and her sons Yuan and Jose expressed their understanding and agree to make patient FULL CODE.    Fill in proxy is Jose Camargo : Tel [phone]

## 2021-07-28 NOTE — PROGRESS NOTE ADULT - PROBLEM SELECTOR PLAN 1
No History of ESRD. Pt with PURVI in the setting of B/l adnexal mass with ascites. Obstructive/compressive etiology ruled out as CT with no evidence of obstruction and urology also thinks unlikely.   - CT abdo/pelvis done on 7/25/21 showed moderate ascites and B/l pleural effusion and L ureteral stent with no hydronephrosis.   - On admission, Scr was elevated to 7.12 on 7/25/21. Baseline 0.8 on 6/16/21. Trending down to 3.74  - UA done on 7/25/21 showed moderate LEC with blood seen.  - Consider workup for other etiology of PURVI such as intrinsic, autoimmune, medication induced, infectious causes.  - Renal Scan with Lasix pending  - Could be intrinsic PURVI based on FeUrea   - Monitor labs and urine output. Strict I's and Os  - Avoid any potential nephrotoxins. Dose medications as per eGFR. Nephro on board.

## 2021-07-28 NOTE — PROGRESS NOTE ADULT - PROBLEM SELECTOR PLAN 5
- BP has remained elevated since admission. Currently 153/99  - BP meds HCTZ-Triamterene initially held due to Hyperkalemia. - BP has remained elevated since admission. Currently 153/99  - BP meds HCTZ-Triamterene initially held due to Hyperkalemia.  - Will start amlodipine 5mg

## 2021-07-28 NOTE — CONSULT NOTE ADULT - PROBLEM SELECTOR RECOMMENDATION 3
Recently found ovarian mass on Previous CT scan at OSH  - In the setting of Ascites and Plueral effusion differential for overall presentation includes Meigs Syndrome  - Will need out patient follow up with OBGYN. Patient states she has not established oncologic care with Dr. Tutu Lenz([phone]) GYN-Onc at [facility]. Patient now states she would rather followup at [facility] since her son lives near [facility]. PCP: Dr. Elmo Hutchison: [phone]

## 2021-07-28 NOTE — PROGRESS NOTE ADULT - PROBLEM SELECTOR PLAN 4
Initially presentation with firm, distended, diffusely tender abdomen with no guarding  - Pleural effusion on CT abdomen and Pelvis, right > left with history of Ovarian mass. Malignant vs Benign? Consider Meigs syndrome.   - Ascites of unknown etiology in setting of transaminitis. Could be malignant fluid in the setting of ovarian mass  - LFTs 80/65. Will repeat LFTs  - Paracentesis performed 7/27 produced 60mLs of serous fluid. Gram stain negative, negative for SBP on cytology. Pending  fungal cultures. Initially presentation with firm, distended, diffusely tender abdomen with no guarding  - Pleural effusion on CT abdomen and Pelvis, right > left with history of Ovarian mass. Malignant vs Benign? Consider Meigs syndrome.   - Ascites of unknown etiology in setting of transaminitis. Could be malignant fluid in the setting of ovarian mass  - LFTs 80/65. Hepatitis panel negative. Will repeat LFTs  - Paracentesis performed 7/27 produced 60mLs of serous fluid. Gram stain negative, negative for SBP on cytology. Pending  fungal cultures.

## 2021-07-28 NOTE — PROGRESS NOTE ADULT - SUBJECTIVE AND OBJECTIVE BOX
Patient remained tachy overnight. She also experienced some SOB when she was tried off O2. She was seen and examined at bedside this morning and still endorses abd pain and fullness. Denies any nausea/vomiting/diarrhea, headache, shortness of breath, chest pain/palpitations.       MEDICATIONS  (STANDING):  albuterol/ipratropium for Nebulization 3 milliLiter(s) Nebulizer every 6 hours  amLODIPine   Tablet 5 milliGRAM(s) Oral daily  aspirin enteric coated 81 milliGRAM(s) Oral daily  chlorhexidine 4% Liquid 1 Application(s) Topical <User Schedule>  heparin   Injectable 5000 Unit(s) SubCutaneous every 8 hours  lactated ringers. 1000 milliLiter(s) (100 mL/Hr) IV Continuous <Continuous>  multivitamin 1 Tablet(s) Oral daily    MEDICATIONS  (PRN):  acetaminophen   Tablet .. 650 milliGRAM(s) Oral every 6 hours PRN Mild Pain (1 - 3), Moderate Pain (4 - 6)  polyethylene glycol 3350 17 Gram(s) Oral daily PRN Constipation  sodium chloride 0.9% lock flush 10 milliLiter(s) IV Push every 1 hour PRN Pre/post blood products, medications, blood draw, and to maintain line patency    VITALS:   T(C): 36.7 (07-28-21 @ 11:02), Max: 37.2 (07-27-21 @ 21:42)  HR: 113 (07-28-21 @ 11:04) (99 - 113)  BP: 153/103 (07-28-21 @ 11:04) (142/95 - 153/103)  RR: 18 (07-28-21 @ 11:04) (17 - 18)  SpO2: 95% (07-28-21 @ 11:04) (93% - 97%)    GENERAL: NAD, lying in bed comfortably  HEAD:  Atraumatic, normocephalic  EYES: EOMI, PERRLA, conjunctiva and sclera clear  HEART: Tachycardic, no murmurs, rubs, or gallops  LUNGS: Unlabored respirations.  Clear to auscultation bilaterally, no crackles, wheezing, or rhonchi  ABDOMEN: Firm distended abdomen, with diffuse tenderness to palpation. No rebound or guarding. No CVA tenderness  EXTREMITIES: 2+ peripheral pulses bilaterally. No clubbing, cyanosis  SKIN: No rashes or lesions  Psych: Normal. normal behavior. normal speech        LABS, RADIOLOGY, EKG & ADDITIONAL TESTS: Reviewed.  Patient remained tachy overnight. She also experienced some SOB when she was tried off O2. She was seen and examined at bedside this morning and still endorses abd pain and fullness. Denies any nausea/vomiting/diarrhea, headache, shortness of breath, chest pain/palpitations.       MEDICATIONS  (STANDING):  albuterol/ipratropium for Nebulization 3 milliLiter(s) Nebulizer every 6 hours  amLODIPine   Tablet 5 milliGRAM(s) Oral daily  aspirin enteric coated 81 milliGRAM(s) Oral daily  chlorhexidine 4% Liquid 1 Application(s) Topical <User Schedule>  heparin   Injectable 5000 Unit(s) SubCutaneous every 8 hours  lactated ringers. 1000 milliLiter(s) (100 mL/Hr) IV Continuous <Continuous>  multivitamin 1 Tablet(s) Oral daily    MEDICATIONS  (PRN):  acetaminophen   Tablet .. 650 milliGRAM(s) Oral every 6 hours PRN Mild Pain (1 - 3), Moderate Pain (4 - 6)  polyethylene glycol 3350 17 Gram(s) Oral daily PRN Constipation  sodium chloride 0.9% lock flush 10 milliLiter(s) IV Push every 1 hour PRN Pre/post blood products, medications, blood draw, and to maintain line patency    VITALS:   T(C): 36.7 (07-28-21 @ 11:02), Max: 37.2 (07-27-21 @ 21:42)  HR: 113 (07-28-21 @ 11:04) (99 - 113)  BP: 153/103 (07-28-21 @ 11:04) (142/95 - 153/103)  RR: 18 (07-28-21 @ 11:04) (17 - 18)  SpO2: 95% (07-28-21 @ 11:04) (93% - 97%)    GENERAL: NAD, lying in bed comfortably  HEAD:  Atraumatic, normocephalic  EYES: EOMI, PERRLA, conjunctiva and sclera clear  HEART: Tachycardic, no murmurs, rubs, or gallops  LUNGS: Unlabored respirations.  Clear to auscultation bilaterally, no crackles, wheezing, or rhonchi  ABDOMEN: Firm distended abdomen, with diffuse tenderness to palpation. No rebound or guarding. No CVA tenderness  EXTREMITIES: 2+ peripheral pulses bilaterally. No clubbing, cyanosis  SKIN: No rashes or lesions  Psych: Normal. normal behavior. normal speech      .  LABS:                         10.8   7.48  )-----------( 285      ( 28 Jul 2021 07:01 )             33.5     07-28    138  |  94<L>  |  29<H>  ----------------------------<  126<H>  4.1   |  24  |  2.36<H>    Ca    9.4      28 Jul 2021 07:01  Phos  3.5     07-28  Mg     2.20     07-28    TPro  6.5  /  Alb  3.2<L>  /  TBili  0.4  /  DBili  <0.2  /  AST  74<H>  /  ALT  59<H>  /  AlkPhos  81  07-28    PT/INR - ( 27 Jul 2021 03:11 )   PT: 13.0 sec;   INR: 1.14 ratio         PTT - ( 27 Jul 2021 03:11 )  PTT:29.0 sec          RADIOLOGY, EKG & ADDITIONAL TESTS: Reviewed.

## 2021-07-28 NOTE — CONSULT NOTE ADULT - PROBLEM SELECTOR RECOMMENDATION 4
Palliative care consulted for complex decision making related to goals of care and assistance with pain/ symptom management. On exam pt denies any pain or symptoms to manage. Pt asked for us to reach out to her son's. Spoke to pts denia Bolden to introduce our services. At this time pt is a full code and pt and family are interested in all treatments that may be available to the pt. Will continue to follow for ongoing goc.

## 2021-07-28 NOTE — PROGRESS NOTE ADULT - PROBLEM SELECTOR PLAN 1
Pt. with PURVI in the setting of B/l adnexal mass with ascites and HDN. On admission, Scr was elevated to 7.12 on 7/25/21. Scr was at baseline around 0.8 on 6/16/21. CT abdo/pelvis done on 7/25/21 showed moderate ascites and B/l pleural effusion and L ureteral stent. UA done on 7/25/21 showed moderate LEC with blood seen. Started on bicarb infusion for hyperkalemia and metabolic acidosis. HD consent obtained. IR placed Right IJ HD catheter on 7/26/21. Pt. received HD treatment on 7/26/21. Tolerated well. BNP done on 6/27/21 was low at 67. Underwent Renal scan with Lasix on 7/27/21. Received IVF on 7/27/21. Scr is elevated/stable at 2.36 today. Pt. is non-oliguric, UOP ~ 825 cc in last 24 hr. Recommend continuing IVF. Monitor labs and urine output. Avoid any potential nephrotoxins. Dose medications as per eGFR.

## 2021-07-28 NOTE — PROGRESS NOTE ADULT - PROBLEM SELECTOR PLAN 2
- Onset of stable sinus tachycardia 130s-140s during HD. Now trending 110s-120s  - Trace pitting edema  - Associated with SOB with SPO2 87% on 1L NC and 1 instance of sharp CP. Concerning for PE in the setting of hypercoagulable state 2/2 ovarian malignancy  - Venous doppler with patent vessels, no evidence of PE. Unable to undergo CTA w/contrast due to superimposed PURVI.   - V/Q scan pending to R/O PE  - BP has remained elevated since admission. Currently 153/99  - BP meds initially held yesterday due to Hyperkalemia.

## 2021-07-28 NOTE — PROGRESS NOTE ADULT - SUBJECTIVE AND OBJECTIVE BOX
[facility] DIVISION OF KIDNEY DISEASES AND HYPERTENSION -- FOLLOW UP NOTE  --------------------------------------------------------------------------------  HPI: 66-year-old Female with PMH of HTN, pre-DM and recently diagnosed ? ovarian mass at OSH presents to [facility] with abdominal pain, poor oral intake and nausea. Nephrology consultation requested for elevated Scr. On admission, Scr elevated at 7.12 with serum potassium of 5.9 and SCO2 of 20. No previous labs available in the system. Of note, pt. was recently admitted at OSH for abdominal pain, found to have adnexal mass with ascites. During her stay , she was found to have L kidney HDN and had a L renal stent placement on 7/15/21. She was discharged on 7/16/21 for outpatient follow up with gynecology. Her Scr was 0.8 on 7/16/21. She reported poor oral intake since discharge. Also endorses of having decreased urination. Scr increased to 7.54 with hyperkalemia of 5.5 and SCO2 of 21 on 7/26/21. HD consent obtained. Received HD treatment on 7/26/21. Pt received IVF for volume depletion on 7/27/21. Scr is elevated/stable at 2.36 today. pt. is non-oliguric, UOP ~ 825 cc in last 24 hr.    Pt. seen and examined at bedside. Appears resting comfortably. Denies fever, chills, SOB, CP, diarrhea or constipation or dizziness.    PAST HISTORY  --------------------------------------------------------------------------------  No significant changes to PMH, PSH, FHx, SHx, unless otherwise noted    ALLERGIES & MEDICATIONS  --------------------------------------------------------------------------------  Allergies    penicillins (Rash)    Intolerances    Standing Inpatient Medications  amLODIPine   Tablet 5 milliGRAM(s) Oral daily  aspirin enteric coated 81 milliGRAM(s) Oral daily  chlorhexidine 4% Liquid 1 Application(s) Topical <User Schedule>  heparin   Injectable 5000 Unit(s) SubCutaneous every 8 hours  lactated ringers. 1000 milliLiter(s) IV Continuous <Continuous>  multivitamin 1 Tablet(s) Oral daily  polyethylene glycol 3350 17 Gram(s) Oral two times a day    PRN Inpatient Medications  acetaminophen   Tablet .. 650 milliGRAM(s) Oral every 6 hours PRN  sodium chloride 0.9% lock flush 10 milliLiter(s) IV Push every 1 hour PRN    REVIEW OF SYSTEMS  --------------------------------------------------------------------------------  Gen: No fevers   Respiratory: No dyspnea  CV: No chest pain  GI: No abdominal pain  : No dysuria  MSK: No  edema  Neuro: no dizziness     VITALS/PHYSICAL EXAM  --------------------------------------------------------------------------------  T(C): 37.1 (07-28-21 @ 05:22), Max: 37.2 (07-27-21 @ 21:42)  HR: 110 (07-28-21 @ 07:15) (99 - 112)  BP: 153/99 (07-28-21 @ 05:22) (142/95 - 161/95)  RR: 17 (07-28-21 @ 05:22) (17 - 19)  SpO2: 97% (07-28-21 @ 05:22) (90% - 97%)  Wt(kg): --  Height (cm): 160 (07-26-21 @ 19:38)  Weight (kg): 63 (07-26-21 @ 15:22)  BMI (kg/m2): 24.6 (07-26-21 @ 19:38)  BSA (m2): 1.66 (07-26-21 @ 19:38)    07-27-21 @ 07:01  -  07-28-21 @ 07:00  --------------------------------------------------------  IN: 980 mL / OUT: 825 mL / NET: 155 mL    07-28-21 @ 07:01  -  07-28-21 @ 10:41  --------------------------------------------------------  IN: 320 mL / OUT: 0 mL / NET: 320 mL    Physical Exam:  	Gen: NAD, appears calm  	HEENT: MMM  	Pulm: CTA B/L  	CV: S1S2  	Abd: Soft, +BS   	Ext: No LE edema B/L  	Neuro: Awake  	Skin: Warm and dry  	Vascular access: Right IJ HD catheter +    LABS/STUDIES  --------------------------------------------------------------------------------              10.8   7.48  >-----------<  285      [07-28-21 @ 07:01]              33.5     138  |  94  |  29  ----------------------------<  126      [07-28-21 @ 07:01]  4.1   |  24  |  2.36        Ca     9.4     [07-28-21 @ 07:01]      Mg     2.20     [07-28-21 @ 07:01]      Phos  3.5     [07-28-21 @ 07:01]    TPro  6.5  /  Alb  3.2  /  TBili  0.4  /  DBili  <0.2  /  AST  74  /  ALT  59  /  AlkPhos  81  [07-28-21 @ 07:16]    PT/INR: PT 13.0 , INR 1.14       [07-27-21 @ 03:11]  PTT: 29.0       [07-27-21 @ 03:11]    Creatinine Trend:  SCr 2.36 [07-28 @ 07:01]  SCr 3.74 [07-27 @ 19:26]  SCr 6.49 [07-27 @ 09:48]  SCr 6.55 [07-27 @ 03:11]  SCr 7.26 [07-26 @ 23:15]

## 2021-07-28 NOTE — PROGRESS NOTE ADULT - ASSESSMENT
66 year old female with PMHx of HTN, pre-DM, and ovarian mass s/p left ureteral stent placement for hydronephrosis 2/2 extrinsic compression at OSH on 7/15 now with Cr elevated to 7.     - CT reviewed: left ureteral stent appears to be in place without hydronephrosis. Bladder collapsed around carnes balloon.   - Strict I+O's  - renal duplex US   - Explore prerenal/ATN   - CR improving, continue to trend

## 2021-07-29 NOTE — PROGRESS NOTE ADULT - PROBLEM SELECTOR PLAN 4
- No History of ESRD. Pt with PURVI in the setting of B/l adnexal mass with ascites. Obstructive/compressive etiology ruled out as CT with no evidence of obstruction and urology also thinks unlikely.   - CT abdo/pelvis done on 7/25/21 showed moderate ascites and B/l pleural effusion and L ureteral stent with no hydronephrosis.   - On admission, Scr was elevated to 7.12 on 7/25/21. Baseline 0.8 on 6/16/21. Cr now WNL  - UA done on 7/25/21 showed moderate LEC with blood seen.  - Consider workup for other etiology of PURVI such as intrinsic, autoimmune, medication induced, infectious causes.  - Could be intrinsic PURVI based on FeUrea   - Monitor labs and urine output. Strict I's and Os  - Avoid any potential nephrotoxins. Dose medications as per eGFR. Nephro on board - Now resolved most recent Cr. 0.95  - No History of ESRD. Pt with PURVI in the setting of B/l adnexal mass with ascites. Obstructive/compressive etiology ruled out as CT with no evidence of obstruction and urology also thinks unlikely.   - CT abdo/pelvis done on 7/25/21 showed moderate ascites and B/l pleural effusion and L ureteral stent with no hydronephrosis.   - On admission, Scr was elevated to 7.12 on 7/25/21. Baseline 0.8 on 6/16/21.   - UA done on 7/25/21 showed moderate LEC with blood seen.  - Could be intrinsic PURVI based on FeUrea   - Monitor labs and urine output. Strict I's and Os  - Avoid any potential nephrotoxins. Dose medications as per eGFR. Nephro on board

## 2021-07-29 NOTE — PROGRESS NOTE ADULT - ASSESSMENT
66 year old female with PMHx of HTN, pre-DM, and ovarian mass s/p left ureteral stent placement for hydronephrosis 2/2 extrinsic compression at OSH on 7/15 now with Cr elevated to 7.     - CT reviewed: left ureteral stent appears to be in place without hydronephrosis. Bladder collapsed around carnes balloon.   - Strict I+O's  - renal duplex US   - Explore prerenal/ATN   - CR improving, continue to trend  - patient can follow-up as outpatient  - Please reach out to urology with any additional questions.    - D/w Dr. Ontiveros

## 2021-07-29 NOTE — PROGRESS NOTE ADULT - PROBLEM SELECTOR PLAN 3
per nephrology: Pt. with PURVI in the setting of B/l adnexal mass with ascites. On admission, Scr was elevated to 7.12 on 7/25/21. Scr was at baseline around 0.8 on 6/16/21. CT abdo/pelvis done on 7/25/21 showed moderate ascites and B/l pleural effusion and L ureteral stent. UA done on 7/25/21 showed moderate LEC with blood seen. Started on bicarb infusion for hyperkalemia and metabolic acidosis. HD consent obtained. IR placed Right IJ HD catheter on 7/26/21. Pt. received HD treatment on 7/26/21. Tolerated well. BNP done on 6/27/21 was low at 67. Underwent Renal scan with Lasix on 7/27/21. Received IVF on 7/27/21. Scr is elevated/stable at 2.36 today. Pt. is non-oliguric, UOP ~ 825 cc in last 24 hr. Recommend continuing IVF. Monitor labs and urine output. Avoid any potential nephrotoxins. Dose medications as per eGFR.

## 2021-07-29 NOTE — PROGRESS NOTE ADULT - PROBLEM SELECTOR PLAN 4
on admission presented with firm, distended, diffusely tender abdomen  - Pleural effusion on CT abdomen and Pelvis, right > left with history of Ovarian mass. Malignant vs Benign? Consider Meigs syndrome.   - Ascites of unknown etiology in setting of transaminitis. Could be malignant fluid in the setting of ovarian mass  - Paracentesis performed 7/27 produced 60mLs of serous fluid. Gram stain negative, negative for SBP on cytology. Pending  fungal cultures.   - Management as per primary medical team.

## 2021-07-29 NOTE — PROGRESS NOTE ADULT - PROBLEM SELECTOR PLAN 1
No History of ESRD. Pt with PURVI in the setting of B/l adnexal mass with ascites. Obstructive/compressive etiology ruled out as CT with no evidence of obstruction and urology also thinks unlikely.   - CT abdo/pelvis done on 7/25/21 showed moderate ascites and B/l pleural effusion and L ureteral stent with no hydronephrosis.   - UA done on 7/25/21 showed moderate LEC with blood seen.  - On admission, Scr was elevated to 7.12 on 7/25/21. Baseline 0.8 on 6/16/21. Creatinine now WNL at 0.95  - Consider workup for other etiology of PURVI such as intrinsic, autoimmune, medication induced, infectious causes.  - Could be intrinsic PURVI based on FeUrea   - Monitor labs and urine output. Strict I's and Os  - Avoid any potential nephrotoxins. Dose medications as per eGFR. Nephro on board. - Onset of stable sinus tachycardia HR fluctuating between 120-140  - Trace pitting edema  - Associated with SOB with SPO2 87% on 1L NC and 1 instance of sharp CP. SOB most likely secondary to pleural effusion. Was concerning for PE in the setting of hypercoagulable state 2/2 ovarian malignancy. Venous doppler with patent vessels, no evidence of PE. Unable to undergo CTA w/contrast due to superimposed PURVI. Opted for V/Q scan which resulted low probability of PE.

## 2021-07-29 NOTE — PROGRESS NOTE ADULT - PROBLEM SELECTOR PLAN 3
- Initial presentation with firm, distended, diffusely tender abdomen with no guarding  - Pleural effusion on CT abdomen and Pelvis, right > left with history of Ovarian mass. Malignant vs Benign? Consider Meigs syndrome.   - Ascites of unknown etiology in setting of transaminitis. Could be malignant fluid in the setting of ovarian mass  - LFTs 80/65. Hepatitis panel negative. Will repeat LFTs  - Paracentesis performed 7/27 produced 60mLs of serous fluid. Gram stain negative, negative for SBP on cytology. Pending  fungal cultures.

## 2021-07-29 NOTE — PROGRESS NOTE ADULT - PROBLEM SELECTOR PLAN 1
pt reports she had some abdominal pain today   recommended the use of low dose morphine pt declined, she prefers Tylenol  c/w Tylenol 650mg PO q6h PRN

## 2021-07-29 NOTE — PROGRESS NOTE ADULT - PROBLEM SELECTOR PLAN 5
Recently found ovarian mass on Previous CT scan at OSH  - In the setting of Ascites and Plueral effusion differential for overall presentation includes Meigs Syndrome  - Will need out patient follow up with OBGYN. Patient states she has not established oncologic care with Dr. Tutu Lenz([phone]) GYN-Onc at [facility]. Patient now states she would rather followup at [facility] since her son lives near [facility]. PCP: Dr. Elom Hutchison: [phone].

## 2021-07-29 NOTE — PROGRESS NOTE ADULT - PROBLEM SELECTOR PLAN 2
- Multiple episodes of NBNB vomiting associated with nausea over the last 12 hours. Associated w/epigastric abdominal pain. No diarrhea, no hematochezia, no melena. No fever  - C/W zofran 4mg q6 prn  - NP for now.  - Monitor I's & O's.

## 2021-07-29 NOTE — PROGRESS NOTE ADULT - PROBLEM SELECTOR PLAN 5
- Now resolved. Most recent 3.9  - Likely 2/2 PURVI in the setting of previous hx hydronephrosis s/p renal stent   - S/P HD on 7/27. Had to be terminated 1.5 hours in due to tachycardia HR 130s-140s  - S/P medical management with:         - Calcium gluconate for cardio protection, Bicarb infusion, Insulin/Glucose, Lokelma, Nephro on board.  - HD no longer necessary. Will take out Jerrod

## 2021-07-29 NOTE — PROGRESS NOTE ADULT - PROBLEM SELECTOR PLAN 1
Pt. with PURVI in the setting of B/l adnexal mass with ascites and HDN. On admission, Scr was elevated to 7.12 on 7/25/21. Scr was at baseline around 0.8 on 6/16/21. CT abdo/pelvis done on 7/25/21 showed moderate ascites and B/l pleural effusion and L ureteral stent. UA done on 7/25/21 showed moderate LEC with blood seen. Started on bicarb infusion for hyperkalemia and metabolic acidosis. HD consent obtained. IR placed Right IJ HD catheter on 7/26/21. Pt. received HD treatment on 7/26/21. Tolerated well. BNP done on 6/27/21 was low at 67. Received IVF on 7/27/21. Scr is at baseline of 0.93 today. Pt. is non-oliguric. Recommend continuing IVF, encourage PO intake. Monitor labs and urine output. Avoid any potential nephrotoxins. Dose medications as per eGFR.

## 2021-07-29 NOTE — PROGRESS NOTE ADULT - ASSESSMENT
66 yr old woman PMH HTN, Pre-Diabetes, HLD, recent admission to [facility] for abdominal pain and found to have malignant ovarian mass with abdominal ascites, left hydroureteronephrosis s/p renal stent placement  on 7/15/21 and dced 7/16/21 p/w worsening diffuse abdominal pain associated with nausea. Found to have severe PURVI with hyperkalemia and moderate abdominopelvic ascites with persistent sinus tachycardia c/b acute hypoxic respiratory failure, now with recurring nausea and NBNB vomiting.

## 2021-07-29 NOTE — PROGRESS NOTE ADULT - SUBJECTIVE AND OBJECTIVE BOX
[facility] DIVISION OF KIDNEY DISEASES AND HYPERTENSION -- FOLLOW UP NOTE  --------------------------------------------------------------------------------  HPI: 66-year-old Female with PMH of HTN, pre-DM and recently diagnosed ? ovarian mass at OSH presents to [facility] with abdominal pain, poor oral intake and nausea. Nephrology consultation requested for elevated Scr. On admission, Scr elevated at 7.12 with serum potassium of 5.9 and SCO2 of 20. No previous labs available in the system. Of note, pt. was recently admitted at OSH for abdominal pain, found to have adnexal mass with ascites. During her stay , she was found to have L kidney HDN and had a L renal stent placement on 7/15/21. She was discharged on 7/16/21 for outpatient follow up with gynecology. Her Scr was 0.8 on 7/16/21. She reported poor oral intake since discharge. Also endorses of having decreased urination. Scr increased to 7.54 with hyperkalemia of 5.5 and SCO2 of 21 on 7/26/21. HD consent obtained. Received HD treatment on 7/26/21. Pt received IVF for volume depletion on 7/27/21. Scr is at baseline of 0.93 today. pt. is non-oliguric, UOP ~ 350 cc in last 24 hr.    Pt. seen and examined at bedside. Appears resting comfortably. Denies fever, chills, SOB, CP, diarrhea or constipation or dizziness.    PAST HISTORY  --------------------------------------------------------------------------------  No significant changes to PMH, PSH, FHx, SHx, unless otherwise noted    ALLERGIES & MEDICATIONS  --------------------------------------------------------------------------------  Allergies    penicillins (Rash)    Intolerances    Standing Inpatient Medications  albuterol/ipratropium for Nebulization 3 milliLiter(s) Nebulizer every 6 hours  amLODIPine   Tablet 5 milliGRAM(s) Oral daily  aspirin enteric coated 81 milliGRAM(s) Oral daily  chlorhexidine 4% Liquid 1 Application(s) Topical <User Schedule>  famotidine Injectable 20 milliGRAM(s) IV Push every 48 hours  heparin   Injectable 5000 Unit(s) SubCutaneous every 8 hours  lactated ringers. 1000 milliLiter(s) IV Continuous <Continuous>  multivitamin 1 Tablet(s) Oral daily    PRN Inpatient Medications  acetaminophen   Tablet .. 650 milliGRAM(s) Oral every 6 hours PRN  ondansetron Injectable 4 milliGRAM(s) IV Push every 6 hours PRN  polyethylene glycol 3350 17 Gram(s) Oral daily PRN  sodium chloride 0.9% lock flush 10 milliLiter(s) IV Push every 1 hour PRN    REVIEW OF SYSTEMS  --------------------------------------------------------------------------------  Gen: No fevers   Respiratory: No dyspnea  CV: No chest pain  GI: No abdominal pain  : No dysuria  MSK: No  edema  Neuro: no dizziness     VITALS/PHYSICAL EXAM  --------------------------------------------------------------------------------  T(C): 36.6 (07-29-21 @ 05:43), Max: 36.7 (07-28-21 @ 11:02)  HR: 110 (07-29-21 @ 08:20) (110 - 124)  BP: 158/88 (07-29-21 @ 08:20) (151/96 - 175/114)  RR: 17 (07-29-21 @ 05:43) (17 - 18)  SpO2: 97% (07-29-21 @ 05:43) (95% - 97%)  Wt(kg): --    07-28-21 @ 07:01  -  07-29-21 @ 07:00  --------------------------------------------------------  IN: 920 mL / OUT: 350 mL / NET: 570 mL    Physical Exam:  	Gen: NAD, appears calm  	HEENT: MMM  	Pulm: CTA B/L  	CV: S1S2  	Abd: Soft, +BS   	Ext: No LE edema B/L  	Neuro: Awake  	Skin: Warm and dry  	Vascular access: Right IJ HD catheter +    LABS/STUDIES  --------------------------------------------------------------------------------              10.8   7.48  >-----------<  285      [07-28-21 @ 07:01]              33.5     138  |  93  |  22  ----------------------------<  142      [07-29-21 @ 07:45]  3.9   |  26  |  0.93        Ca     10.0     [07-29-21 @ 07:45]      Mg     2.20     [07-28-21 @ 07:01]      Phos  3.5     [07-28-21 @ 07:01]    TPro  6.5  /  Alb  3.2  /  TBili  0.4  /  DBili  <0.2  /  AST  74  /  ALT  59  /  AlkPhos  81  [07-28-21 @ 07:16]    Creatinine Trend:  SCr 0.93 [07-29 @ 07:45]  SCr 2.36 [07-28 @ 07:01]  SCr 3.74 [07-27 @ 19:26]  SCr 6.49 [07-27 @ 09:48]  SCr 6.55 [07-27 @ 03:11]

## 2021-07-29 NOTE — PROGRESS NOTE ADULT - SUBJECTIVE AND OBJECTIVE BOX
Interval Events:    No acute events overnight    O: Vital Signs Last 24 Hrs  T(C): 36.6 (29 Jul 2021 05:43), Max: 36.7 (28 Jul 2021 11:02)  T(F): 97.8 (29 Jul 2021 05:43), Max: 98 (28 Jul 2021 11:02)  HR: 110 (29 Jul 2021 08:20) (110 - 124)  BP: 158/88 (29 Jul 2021 08:20) (151/96 - 175/114)  BP(mean): --  RR: 17 (29 Jul 2021 05:43) (17 - 18)  SpO2: 97% (29 Jul 2021 05:43) (95% - 97%)      28 Jul 2021 07:01  -  29 Jul 2021 07:00  --------------------------------------------------------  IN:    IV PiggyBack: 400 mL    Oral Fluid: 520 mL  Total IN: 920 mL    OUT:    Indwelling Catheter - Urethral (mL): 350 mL  Total OUT: 350 mL    Total NET: 570 mL          Physical Exam:    Gen: Well-developed, well-nourished in no acute distress  Resp: No additional work of breathing   GI: Soft, non-tender, non-distended, with normoactive bowel sounds.  No masses.  MSK: Moves all extremities equally  Skin: No rashes    Labs:                        10.8   7.48  )-----------( 285      ( 28 Jul 2021 07:01 )             33.5     29 Jul 2021 07:45    138    |  93     |  22     ----------------------------<  142    3.9     |  26     |  0.93     Ca    10.0       29 Jul 2021 07:45  Phos  3.5       28 Jul 2021 07:01  Mg     2.20      28 Jul 2021 07:01    TPro  6.5    /  Alb  3.2    /  TBili  0.4    /  DBili  <0.2   /  AST  74     /  ALT  59     /  AlkPhos  81     28 Jul 2021 07:16      CAPILLARY BLOOD GLUCOSE            LIVER FUNCTIONS - ( 28 Jul 2021 07:16 )  Alb: 3.2 g/dL / Pro: 6.5 g/dL / ALK PHOS: 81 U/L / ALT: 59 U/L / AST: 74 U/L / GGT: x             Culture - Fungal, Body Fluid (collected 27 Jul 2021 18:35)  Source: .Body Fluid Peritoneal Fluid  Preliminary Report (28 Jul 2021 06:34):    Testing in progress    Culture - Body Fluid with Gram Stain (collected 27 Jul 2021 18:35)  Source: .Body Fluid Peritoneal Fluid  Gram Stain (27 Jul 2021 23:07):    No polymorphonuclear leukocytes seen    No organisms seen    by cytocentrifuge  Preliminary Report (28 Jul 2021 18:07):    No growth          MEDICATIONS  (STANDING):  albuterol/ipratropium for Nebulization 3 milliLiter(s) Nebulizer every 6 hours  amLODIPine   Tablet 5 milliGRAM(s) Oral daily  aspirin enteric coated 81 milliGRAM(s) Oral daily  chlorhexidine 4% Liquid 1 Application(s) Topical <User Schedule>  dextrose 5% + lactated ringers. 1000 milliLiter(s) (100 mL/Hr) IV Continuous <Continuous>  famotidine Injectable 20 milliGRAM(s) IV Push every 12 hours  heparin   Injectable 5000 Unit(s) SubCutaneous every 8 hours  multivitamin 1 Tablet(s) Oral daily    MEDICATIONS  (PRN):  acetaminophen   Tablet .. 650 milliGRAM(s) Oral every 6 hours PRN Mild Pain (1 - 3), Moderate Pain (4 - 6)  ondansetron Injectable 4 milliGRAM(s) IV Push every 6 hours PRN Nausea and/or Vomiting  polyethylene glycol 3350 17 Gram(s) Oral daily PRN Constipation  sodium chloride 0.9% lock flush 10 milliLiter(s) IV Push every 1 hour PRN Pre/post blood products, medications, blood draw, and to maintain line patency

## 2021-07-29 NOTE — PROGRESS NOTE ADULT - PROBLEM SELECTOR PLAN 2
Pt with hyperkalemia in the setting of PURVI. On admission, serum potassium was elevated to 5.9 on 7/25/21. Received medical management in ED. Received lokelma which was discontinued on 7/28/21. serum potassium is 3.9 today. Continue medical management. Monitor serum potassium daily.       If you have any questions, please feel free to contact me  Jose David Mcbride  Nephrology Fellow  [phone]  (After 5pm or on weekends please page the on-call fellow).

## 2021-07-29 NOTE — PROGRESS NOTE ADULT - SUBJECTIVE AND OBJECTIVE BOX
Multiple episodes of vomiting last night. Improved with zofran    MEDICATIONS  (STANDING):  albuterol/ipratropium for Nebulization 3 milliLiter(s) Nebulizer every 6 hours  amLODIPine   Tablet 5 milliGRAM(s) Oral daily  aspirin enteric coated 81 milliGRAM(s) Oral daily  chlorhexidine 4% Liquid 1 Application(s) Topical <User Schedule>  famotidine Injectable 20 milliGRAM(s) IV Push every 48 hours  heparin   Injectable 5000 Unit(s) SubCutaneous every 8 hours  lactated ringers. 1000 milliLiter(s) (100 mL/Hr) IV Continuous <Continuous>  multivitamin 1 Tablet(s) Oral daily    MEDICATIONS  (PRN):  acetaminophen   Tablet .. 650 milliGRAM(s) Oral every 6 hours PRN Mild Pain (1 - 3), Moderate Pain (4 - 6)  ondansetron Injectable 4 milliGRAM(s) IV Push every 6 hours PRN Nausea and/or Vomiting  polyethylene glycol 3350 17 Gram(s) Oral daily PRN Constipation  sodium chloride 0.9% lock flush 10 milliLiter(s) IV Push every 1 hour PRN Pre/post blood products, medications, blood draw, and to maintain line patency    VITALS:   T(C): 36.6 (07-29-21 @ 05:43), Max: 36.7 (07-28-21 @ 11:02)  HR: 124 (07-29-21 @ 05:43) (113 - 124)  BP: 151/96 (07-29-21 @ 05:43) (151/96 - 175/114)  RR: 17 (07-29-21 @ 05:43) (17 - 18)  SpO2: 97% (07-29-21 @ 05:43) (95% - 97%)    GENERAL: NAD, lying in bed comfortably  HEAD:  Atraumatic, normocephalic  EYES: EOMI, PERRLA, conjunctiva and sclera clear  ENT: Moist mucous membranes  NECK: Supple, no JVD  HEART: Regular rate and rhythm, no murmurs, rubs, or gallops  LUNGS: Unlabored respirations.  Clear to auscultation bilaterally, no crackles, wheezing, or rhonchi  ABDOMEN: Soft, nontender, nondistended, +BS  EXTREMITIES: 2+ peripheral pulses bilaterally. No clubbing, cyanosis, or edema  NERVOUS SYSTEM:  A&Ox3, no focal deficits   SKIN: No rashes or lesions  Psych: Normal. normal behavior. normal speech    .  LABS:                         10.8   7.48  )-----------( 285      ( 28 Jul 2021 07:01 )             33.5     07-28    138  |  94<L>  |  29<H>  ----------------------------<  126<H>  4.1   |  24  |  2.36<H>    Ca    9.4      28 Jul 2021 07:01  Phos  3.5     07-28  Mg     2.20     07-28    TPro  6.5  /  Alb  3.2<L>  /  TBili  0.4  /  DBili  <0.2  /  AST  74<H>  /  ALT  59<H>  /  AlkPhos  81  07-28      RADIOLOGY, EKG & ADDITIONAL TESTS: Reviewed.      Multiple episodes of vomiting last night. Improved with zofran    MEDICATIONS  (STANDING):  albuterol/ipratropium for Nebulization 3 milliLiter(s) Nebulizer every 6 hours  amLODIPine   Tablet 5 milliGRAM(s) Oral daily  aspirin enteric coated 81 milliGRAM(s) Oral daily  chlorhexidine 4% Liquid 1 Application(s) Topical <User Schedule>  famotidine Injectable 20 milliGRAM(s) IV Push every 48 hours  heparin   Injectable 5000 Unit(s) SubCutaneous every 8 hours  lactated ringers. 1000 milliLiter(s) (100 mL/Hr) IV Continuous <Continuous>  multivitamin 1 Tablet(s) Oral daily    MEDICATIONS  (PRN):  acetaminophen   Tablet .. 650 milliGRAM(s) Oral every 6 hours PRN Mild Pain (1 - 3), Moderate Pain (4 - 6)  ondansetron Injectable 4 milliGRAM(s) IV Push every 6 hours PRN Nausea and/or Vomiting  polyethylene glycol 3350 17 Gram(s) Oral daily PRN Constipation  sodium chloride 0.9% lock flush 10 milliLiter(s) IV Push every 1 hour PRN Pre/post blood products, medications, blood draw, and to maintain line patency    VITALS:   T(C): 36.6 (07-29-21 @ 05:43), Max: 36.7 (07-28-21 @ 11:02)  HR: 124 (07-29-21 @ 05:43) (113 - 124)  BP: 151/96 (07-29-21 @ 05:43) (151/96 - 175/114)  RR: 17 (07-29-21 @ 05:43) (17 - 18)  SpO2: 97% (07-29-21 @ 05:43) (95% - 97%)    GENERAL: NAD, lying in bed comfortably  HEAD:  Atraumatic, normocephalic  EYES: EOMI, PERRLA, conjunctiva and sclera clear  ENT: Moist mucous membranes  NECK: Supple, no JVD  HEART: Tachycardic. No murmurs. no gallops.   LUNGS: Right sided crackles at the base. No wheezing no rales. No rhonchi.   ABDOMEN: Soft, nontender, nondistended, +BS  EXTREMITIES: 2+ peripheral pulses bilaterally. No clubbing, cyanosis, or edema  NERVOUS SYSTEM:  A&Ox3, no focal deficits   LINES: Shiley in place. Minimal dried blood present. No surrounding erythema. Charles in place with some pinkish-reddish blood present in tube.   Psych: Normal. normal behavior. normal speech    LABS:                         10.8   7.48  )-----------( 285      ( 28 Jul 2021 07:01 )             33.5     07-28    138  |  94<L>  |  29<H>  ----------------------------<  126<H>  4.1   |  24  |  2.36<H>    Ca    9.4      28 Jul 2021 07:01  Phos  3.5     07-28  Mg     2.20     07-28    TPro  6.5  /  Alb  3.2<L>  /  TBili  0.4  /  DBili  <0.2  /  AST  74<H>  /  ALT  59<H>  /  AlkPhos  81  07-28      RADIOLOGY, EKG & ADDITIONAL TESTS: Reviewed.      Multiple episodes of nausea and NBNB vomiting last night. Initially improved on zofran, however she is still vomiting as of this morning. No fever, chills, diarrhea, hematochezia, and melena    MEDICATIONS  (STANDING):  albuterol/ipratropium for Nebulization 3 milliLiter(s) Nebulizer every 6 hours  amLODIPine   Tablet 5 milliGRAM(s) Oral daily  aspirin enteric coated 81 milliGRAM(s) Oral daily  chlorhexidine 4% Liquid 1 Application(s) Topical <User Schedule>  famotidine Injectable 20 milliGRAM(s) IV Push every 48 hours  heparin   Injectable 5000 Unit(s) SubCutaneous every 8 hours  lactated ringers. 1000 milliLiter(s) (100 mL/Hr) IV Continuous <Continuous>  multivitamin 1 Tablet(s) Oral daily    MEDICATIONS  (PRN):  acetaminophen   Tablet .. 650 milliGRAM(s) Oral every 6 hours PRN Mild Pain (1 - 3), Moderate Pain (4 - 6)  ondansetron Injectable 4 milliGRAM(s) IV Push every 6 hours PRN Nausea and/or Vomiting  polyethylene glycol 3350 17 Gram(s) Oral daily PRN Constipation  sodium chloride 0.9% lock flush 10 milliLiter(s) IV Push every 1 hour PRN Pre/post blood products, medications, blood draw, and to maintain line patency    VITALS:   T(C): 36.6 (07-29-21 @ 05:43), Max: 36.7 (07-28-21 @ 11:02)  HR: 124 (07-29-21 @ 05:43) (113 - 124)  BP: 151/96 (07-29-21 @ 05:43) (151/96 - 175/114)  RR: 17 (07-29-21 @ 05:43) (17 - 18)  SpO2: 97% (07-29-21 @ 05:43) (95% - 97%)    GENERAL: NAD, lying in bed comfortably  HEAD:  Atraumatic, normocephalic  EYES: EOMI, PERRLA, conjunctiva and sclera clear  ENT: Moist mucous membranes  NECK: Supple, no JVD  HEART: Tachycardic. No murmurs. no gallops.   LUNGS: Right sided crackles at the base. No wheezing no rales. No rhonchi.   ABDOMEN: Soft, nontender, nondistended, +BS  EXTREMITIES: 2+ peripheral pulses bilaterally. No clubbing, cyanosis, or edema  NERVOUS SYSTEM:  A&Ox3, no focal deficits   LINES: Shiley in place. Minimal dried blood present. No surrounding erythema. Charles in place with some pinkish-reddish blood present in tube.   Psych: Normal. normal behavior. normal speech    LABS:                         10.8   7.48  )-----------( 285      ( 28 Jul 2021 07:01 )             33.5     07-28    138  |  94<L>  |  29<H>  ----------------------------<  126<H>  4.1   |  24  |  2.36<H>    Ca    9.4      28 Jul 2021 07:01  Phos  3.5     07-28  Mg     2.20     07-28    TPro  6.5  /  Alb  3.2<L>  /  TBili  0.4  /  DBili  <0.2  /  AST  74<H>  /  ALT  59<H>  /  AlkPhos  81  07-28      RADIOLOGY, EKG & ADDITIONAL TESTS: Reviewed.      Multiple episodes of nausea and NBNB vomiting last night. Initially improved on zofran, however she is still vomiting as of this morning. No fever, chills, diarrhea, hematochezia, and melena    MEDICATIONS  (STANDING):  albuterol/ipratropium for Nebulization 3 milliLiter(s) Nebulizer every 6 hours  amLODIPine   Tablet 5 milliGRAM(s) Oral daily  aspirin enteric coated 81 milliGRAM(s) Oral daily  chlorhexidine 4% Liquid 1 Application(s) Topical <User Schedule>  famotidine Injectable 20 milliGRAM(s) IV Push every 48 hours  heparin   Injectable 5000 Unit(s) SubCutaneous every 8 hours  lactated ringers. 1000 milliLiter(s) (100 mL/Hr) IV Continuous <Continuous>  multivitamin 1 Tablet(s) Oral daily    MEDICATIONS  (PRN):  acetaminophen   Tablet .. 650 milliGRAM(s) Oral every 6 hours PRN Mild Pain (1 - 3), Moderate Pain (4 - 6)  ondansetron Injectable 4 milliGRAM(s) IV Push every 6 hours PRN Nausea and/or Vomiting  polyethylene glycol 3350 17 Gram(s) Oral daily PRN Constipation  sodium chloride 0.9% lock flush 10 milliLiter(s) IV Push every 1 hour PRN Pre/post blood products, medications, blood draw, and to maintain line patency    VITALS:   T(C): 36.6 (07-29-21 @ 05:43), Max: 36.7 (07-28-21 @ 11:02)  HR: 124 (07-29-21 @ 05:43) (113 - 124)  BP: 151/96 (07-29-21 @ 05:43) (151/96 - 175/114)  RR: 17 (07-29-21 @ 05:43) (17 - 18)  SpO2: 97% (07-29-21 @ 05:43) (95% - 97%)    GENERAL: NAD, lying in bed comfortably  HEAD:  Atraumatic, normocephalic  EYES: EOMI, PERRLA, conjunctiva and sclera clear  ENT: Moist mucous membranes  NECK: Supple, no JVD  HEART: Tachycardic. No murmurs. no gallops.   LUNGS: Right sided crackles at the base. No wheezing no rales. No rhonchi.   ABDOMEN: Distended and firm abdomen with epigastric tenderness. No rebound. No guarding  EXTREMITIES: 2+ peripheral pulses bilaterally. No clubbing, cyanosis, or edema  NERVOUS SYSTEM:  A&Ox3, no focal deficits   LINES: Shiley in place. Minimal dried blood present. No surrounding erythema. Charles in place with some pinkish-reddish blood present in tube.   Psych: Normal. normal behavior. normal speech    LABS:                         10.8   7.48  )-----------( 285      ( 28 Jul 2021 07:01 )             33.5     07-28    138  |  94<L>  |  29<H>  ----------------------------<  126<H>  4.1   |  24  |  2.36<H>    Ca    9.4      28 Jul 2021 07:01  Phos  3.5     07-28  Mg     2.20     07-28    TPro  6.5  /  Alb  3.2<L>  /  TBili  0.4  /  DBili  <0.2  /  AST  74<H>  /  ALT  59<H>  /  AlkPhos  81  07-28      RADIOLOGY, EKG & ADDITIONAL TESTS: Reviewed.

## 2021-07-30 NOTE — PROGRESS NOTE ADULT - SUBJECTIVE AND OBJECTIVE BOX
[facility] DIVISION OF KIDNEY DISEASES AND HYPERTENSION -- FOLLOW UP NOTE  --------------------------------------------------------------------------------  HPI: 66-year-old Female with PMH of HTN, pre-DM and recently diagnosed ? ovarian mass at OSH presents to [facility] with abdominal pain, poor oral intake and nausea. Nephrology consultation requested for elevated Scr. On admission, Scr elevated at 7.12 with serum potassium of 5.9 and SCO2 of 20. No previous labs available in the system. Of note, pt. was recently admitted at OSH for abdominal pain, found to have adnexal mass with ascites. During her stay , she was found to have L kidney HDN and had a L renal stent placement on 7/15/21. She was discharged on 7/16/21 for outpatient follow up with gynecology. Her Scr was 0.8 on 7/16/21. She reported poor oral intake since discharge. Also endorses of having decreased urination. Scr increased to 7.54 with hyperkalemia of 5.5 and SCO2 of 21 on 7/26/21. HD consent obtained. Received HD treatment on 7/26/21. Pt received IVF for volume depletion on 7/27/21. Scr is at baseline of 0.80 today. pt. is non-oliguric, UOP ~ 2500 cc in last 24 hr.    Pt. seen and examined at bedside. Appears resting comfortably. Denies fever, chills, SOB, CP, diarrhea or constipation or dizziness.    PAST HISTORY  --------------------------------------------------------------------------------  No significant changes to PMH, PSH, FHx, SHx, unless otherwise noted    ALLERGIES & MEDICATIONS  --------------------------------------------------------------------------------  Allergies    penicillins (Rash)    Intolerances    Standing Inpatient Medications  amLODIPine   Tablet 5 milliGRAM(s) Oral daily  aspirin enteric coated 81 milliGRAM(s) Oral daily  chlorhexidine 4% Liquid 1 Application(s) Topical <User Schedule>  famotidine Injectable 20 milliGRAM(s) IV Push every 12 hours  heparin   Injectable 5000 Unit(s) SubCutaneous every 8 hours  multivitamin 1 Tablet(s) Oral daily    PRN Inpatient Medications  acetaminophen   Tablet .. 650 milliGRAM(s) Oral every 6 hours PRN  levalbuterol Inhalation 0.63 milliGRAM(s) Inhalation every 6 hours PRN  ondansetron Injectable 4 milliGRAM(s) IV Push every 6 hours PRN  polyethylene glycol 3350 17 Gram(s) Oral daily PRN  sodium chloride 0.9% lock flush 10 milliLiter(s) IV Push every 1 hour PRN    REVIEW OF SYSTEMS  --------------------------------------------------------------------------------  Gen: No fevers   Respiratory: No dyspnea  CV: No chest pain  GI: No abdominal pain  : No dysuria  MSK: No  edema  Neuro: no dizziness     VITALS/PHYSICAL EXAM  --------------------------------------------------------------------------------  T(C): 36.9 (07-30-21 @ 09:00), Max: 36.9 (07-30-21 @ 09:00)  HR: 117 (07-30-21 @ 09:00) (111 - 131)  BP: 154/95 (07-30-21 @ 09:00) (142/93 - 160/100)  RR: 18 (07-30-21 @ 09:00) (17 - 20)  SpO2: 94% (07-30-21 @ 09:00) (84% - 96%)  Wt(kg): --    07-29-21 @ 07:01  -  07-30-21 @ 07:00  --------------------------------------------------------  IN: 50 mL / OUT: 2500 mL / NET: -2450 mL    Physical Exam:  	Gen: NAD, appears calm  	HEENT: MMM, anicteric  	Pulm: CTA B/L  	CV: S1S2  	Abd: Soft, distended, +BS   	Ext: No LE edema B/L  	Neuro: Awake  	Skin: Warm and dry  	Vascular access: peripheral IV canula    LABS/STUDIES  --------------------------------------------------------------------------------              11.3   9.03  >-----------<  287      [07-29-21 @ 11:12]              35.4     141  |  97  |  19  ----------------------------<  107      [07-30-21 @ 07:05]  3.6   |  28  |  0.80        Ca     9.8     [07-30-21 @ 07:05]    Creatinine Trend:  SCr 0.80 [07-30 @ 07:05]  SCr 0.93 [07-29 @ 07:45]  SCr 2.36 [07-28 @ 07:01]  SCr 3.74 [07-27 @ 19:26]  SCr 6.49 [07-27 @ 09:48]

## 2021-07-30 NOTE — PROGRESS NOTE ADULT - PROBLEM SELECTOR PLAN 3
- Ovarian mass found on Previous CT Abd/Pelvis with contrast at [facility]. Results showing Ascitis, bowel wall thickening. fat stranding of greater omentum and bilateral adnexal masses  - Concern for omental metastasis  - In the setting of Ascites and Plueral effusion  - Records from OSH shows B/L ovarian masses  - Work up at [place] included markers CA-125 which was elevated. CEA, CA-19-9, AFP, and inhibin did not result before patient was discharged on 7/16  - Will need out patient follow up with OBGYN. Appointment scheduling in [facility] in progress.  - Patient states she has not established oncologic care with Dr. Tutu Lenz([phone]) GYN-Onc at [facility]. Patient now states she would rather followup at [facility] since her son lives near [facility]. PCP: Dr. Elmo Hutchison: [phone].

## 2021-07-30 NOTE — CONSULT NOTE ADULT - SUBJECTIVE AND OBJECTIVE BOX
CHIEF COMPLAINT:  abdominal pain       HPI:      Mary Stafford is a 65yo F w/ HTN, Pre-Diabetes, HL, recent admission to [facility] ([facility]) for ovarian mass, abdominal ascites, and left hydroureteronephrosis s/p renal stent placement for left hydronephrosis.  Per chart review, she was discharged on 7-16-21 for outpatient gynonc follow-up and presented to [facility] 7/26/21 for approximately 1 month of constant diffuse abdominal pain associated with nausea and vomiting.  Pulmonology has been consulted for thoracentesis of bilateral pleural effusion (R greater than left) detected on CXR on 7/27/21.     Patient seen and examined by pulmonary team 7/30.  After evaluation of acceptable pocket of fluid for thoracentesis, patient was explained the risks/benefits of the procedure as well as alternatives and opted to not have thoracentesis performed at this time.  She otherwise had no specific concerns and denied dyspnea or cough.        PAST MEDICAL & SURGICAL HISTORY:  Hypertension    Ovarian mass    Hypercholesteremia    S/P ureteral stent placement        FAMILY HISTORY:  FHx: hypertension (Mother)    FH: diabetes mellitus (Mother)        SOCIAL HISTORY:  Smoking: denies     Allergies    penicillins (Rash)    Intolerances        HOME MEDICATIONS:  Home Medications:  Aspirin Enteric Coated 81 mg oral delayed release tablet: 1 tab(s) orally once a day (26 Jul 2021 12:02)  ibuprofen 200 mg oral tablet: 2 tab(s) orally 2 times a day, As Needed for Pain (26 Jul 2021 12:02)  Multiple Vitamins oral tablet: 1 tab(s) orally once a day (26 Jul 2021 12:02)  triamterene-hydrochlorothiazide 37.5 mg-25 mg oral capsule: 1 cap(s) orally once a day (26 Jul 2021 12:02)      REVIEW OF SYSTEMS:  Constitutional: [x ] negative [ ] fevers [ ] chills [ ] weight loss [ ] weight gain  HEENT: [x ] negative [ ] dry eyes [ ] eye irritation [ ] postnasal drip [ ] nasal congestion  CV: [x ] negative  [ ] chest pain [ ] orthopnea [ ] palpitations [ ] murmur  Resp: [ x] negative [ ] cough [ ] shortness of breath [ ] dyspnea [ ] wheezing [ ] sputum [ ] hemoptysis  GI: [ ] negative [ ] nausea [ ] vomiting [ ] diarrhea [ ] constipation [ x] abd pain [ ] dysphagia   : [ x] negative [ ] dysuria [ ] nocturia [ ] hematuria [ ] increased urinary frequency  Musculoskeletal: [x ] negative [ ] back pain [ ] myalgias [ ] arthralgias [ ] fracture  Skin: [x ] negative [ ] rash [ ] itch  Neurological: [x ] negative [ ] headache [ ] dizziness [ ] syncope [ ] weakness [ ] numbness  Psychiatric: [x ] negative [ ] anxiety [ ] depression  Endocrine: [ x] negative [ ] diabetes [ ] thyroid problem  Hematologic/Lymphatic: [x ] negative [ ] anemia [ ] bleeding problem  Allergic/Immunologic: [ x] negative [ ] itchy eyes [ ] nasal discharge [ ] hives [ ] angioedema  [ ] All other systems negative  [ ] Unable to assess ROS because ________    OBJECTIVE:  ICU Vital Signs Last 24 Hrs  T(C): 36.7 (30 Jul 2021 17:00), Max: 36.9 (30 Jul 2021 09:00)  T(F): 98 (30 Jul 2021 17:00), Max: 98.4 (30 Jul 2021 09:00)  HR: 117 (30 Jul 2021 17:00) (111 - 121)  BP: 159/81 (30 Jul 2021 17:00) (135/90 - 159/81)  BP(mean): --  ABP: --  ABP(mean): --  RR: 18 (30 Jul 2021 17:00) (17 - 18)  SpO2: 94% (30 Jul 2021 17:00) (94% - 96%)        07-29 @ 07:01 - 07-30 @ 07:00  --------------------------------------------------------  IN: 50 mL / OUT: 2500 mL / NET: -2450 mL    07-30 @ 07:01 - 07-30 @ 17:22  --------------------------------------------------------  IN: 100 mL / OUT: 500 mL / NET: -400 mL      CAPILLARY BLOOD GLUCOSE          PHYSICAL EXAM:  General: no acute distress  Respiratory: lungs clear bilaterally, no focal wheezing, crackles   Cardiovascular:  regular rate and rhythm, no rubs, gallops murmurs   Abdomen:  soft, non-distended   Extremities: no significant edema   Skin: no rashes, cyanosis    Neurological: no gross/focal deficits appreciated   Psychiatry: appropriate     LINES:     HOSPITAL MEDICATIONS:  Standing Meds:  amLODIPine   Tablet 5 milliGRAM(s) Oral daily  aspirin enteric coated 81 milliGRAM(s) Oral daily  chlorhexidine 4% Liquid 1 Application(s) Topical <User Schedule>  famotidine    Tablet 20 milliGRAM(s) Oral two times a day  multivitamin 1 Tablet(s) Oral daily      PRN Meds:  acetaminophen   Tablet .. 650 milliGRAM(s) Oral every 6 hours PRN  levalbuterol Inhalation 0.63 milliGRAM(s) Inhalation every 6 hours PRN  ondansetron Injectable 4 milliGRAM(s) IV Push every 6 hours PRN  polyethylene glycol 3350 17 Gram(s) Oral daily PRN  sodium chloride 0.9% lock flush 10 milliLiter(s) IV Push every 1 hour PRN      LABS:                        11.3   9.03  )-----------( 287      ( 29 Jul 2021 11:12 )             35.4     Hgb Trend: 11.3<--, 10.8<--, 11.6<--, 11.9<--, 11.5<--  07-30    141  |  97<L>  |  19  ----------------------------<  107<H>  3.6   |  28  |  0.80    Ca    9.8      30 Jul 2021 07:05      Creatinine Trend: 0.80<--, 0.93<--, 2.36<--, 3.74<--, 6.49<--, 6.55<--            MICROBIOLOGY:     Culture - Fungal, Body Fluid (collected 27 Jul 2021 18:35)  Source: .Body Fluid Peritoneal Fluid  Preliminary Report (28 Jul 2021 06:34):    Testing in progress    Culture - Body Fluid with Gram Stain (collected 27 Jul 2021 18:35)  Source: .Body Fluid Peritoneal Fluid  Gram Stain (27 Jul 2021 23:07):    No polymorphonuclear leukocytes seen    No organisms seen    by cytocentrifuge  Preliminary Report (28 Jul 2021 18:07):    No growth        RADIOLOGY:  [ ] Reviewed and interpreted by me    PULMONARY FUNCTION TESTS:    EKG:

## 2021-07-30 NOTE — PROGRESS NOTE ADULT - PROBLEM SELECTOR PLAN 2
Pt with hyperkalemia in the setting of PURVI. On admission, serum potassium was elevated to 5.9 on 7/25/21. Received medical management in ED. Received lokelma which was discontinued on 7/28/21. serum potassium is 3.6 today. Continue medical management. Monitor serum potassium daily.       If you have any questions, please feel free to contact me  Jose David Mcbride  Nephrology Fellow  [phone]  (After 5pm or on weekends please page the on-call fellow).

## 2021-07-30 NOTE — CHART NOTE - NSCHARTNOTEFT_GEN_A_CORE
Pt. seen and examined at bedside.   Denies pain, fever, chills, SOB, CP, diarrhea, nausea, vomiting, constipation or dizziness.  c/w with current regimen    please page [phone] for any questions, concerns, or uncontrolled symptoms

## 2021-07-30 NOTE — PROGRESS NOTE ADULT - PROBLEM SELECTOR PLAN 2
Onset of stable sinus tachycardia HR fluctuating between 120-140  - Trace pitting edema  - Associated with SOB with SPO2 87% on 1L NC and 1 instance of sharp CP. SOB most likely secondary to pleural effusion. Was concerning for PE in the setting of hypercoagulable state 2/2 ovarian malignancy. Venous doppler with patent vessels, no evidence of PE. Unable to undergo CTA w/contrast due to superimposed PURVI. Opted for V/Q scan which resulted low probability of PE. - Onset of stable sinus tachycardia HR now fluctuating between 110-130.  - TSH within normal limit.  - Associated with SOB with SPO2 87% on 1L NC and 1 instance of sharp CP. SOB most likely secondary to pleural effusion. Was concerning for PE in the setting of hypercoagulable state 2/2 ovarian malignancy. Venous doppler with patent vessels, no evidence of PE. Unable to undergo CTA w/contrast due to superimposed PURVI. Opted for V/Q scan which resulted low probability of PE.  - - Onset of stable sinus tachycardia HR now fluctuating between 110-130. Most likely multifactorial as a result of abdominal ascites and pleural effusion  - TSH within normal limit.  - Associated with SOB with SPO2 87% on 1L NC and 1 instance of sharp CP. SOB most likely secondary to pleural effusion. Was concerning for PE in the setting of hypercoagulable state 2/2 ovarian malignancy. Venous doppler with patent vessels, no evidence of PE. Unable to undergo CTA w/contrast due to superimposed PURVI. Opted for V/Q scan which resulted low probability of PE. - Onset of stable sinus tachycardia HR now fluctuating between 110-130. Most likely multifactorial as a result of abdominal ascites and pleural effusion  - TSH within normal limit.  - Associated with SOB with SPO2 87% on 1L NC and 1 instance of sharp CP. SOB most likely secondary to pleural effusion. Was concerning for PE in the setting of hypercoagulable state 2/2 ovarian malignancy. Venous doppler with patent vessels, no evidence of PE. Unable to undergo CTA w/contrast due to superimposed PUVRI. Opted for V/Q scan which resulted low probability of PE.  - TTE too assess baseline cardiac function

## 2021-07-30 NOTE — PROGRESS NOTE ADULT - ASSESSMENT
66 yr old woman PMH HTN, Pre-Diabetes, HLD, recent admission to [facility] for abdominal pain and found to have malignant ovarian mass with abdominal ascites, left hydroureteronephrosis s/p renal stent placement  on 7/15/21 and dced 7/16/21 p/w worsening diffuse abdominal pain associated with nausea. Found to have severe PURVI with hyperkalemia and moderate abdominopelvic ascites with persistent sinus tachycardia c/b acute hypoxic respiratory failure, now with recurring nausea and NBNB vomiting.

## 2021-07-30 NOTE — PROGRESS NOTE ADULT - PROBLEM SELECTOR PLAN 1
- On 2L NC with SPO2 90-94  - Attempted weaning to 1L and room air resulting in increased work of breathing and desaturation to 87% - On 2L NC with SPO2 90-94  - Attempted weaning to 1L and room air resulting in increased work of breathing and desaturation to 87%  - CXR 7/27 with Bilateral pleural effusions right greater than left with compressive atelectasis at the right lung base. - On 2L NC with SPO2 90-94  - Attempted weaning to 1L and room air resulting in increased work of breathing and desaturation to 87%  - CXR 7/27 with worsening Bilateral pleural effusions right greater than left with compressive atelectasis at the right lung base.  - Will benefit from Pulmonology evaluation for possible pleuracentesis - On 2L NC with SPO2 90-94  - Attempted weaning to 1L and room air resulting in increased work of breathing and desaturation to 87%  - CXR 7/27 with worsening Bilateral pleural effusions right greater than left with compressive atelectasis at the right lung base.  - Bedside POCUS with significant pleural effusion, right greater than left.  - Will benefit from Pulmonology evaluation for possible pleuracentesis

## 2021-07-30 NOTE — CONSULT NOTE ADULT - ASSESSMENT
Mary Stafford is a 65yo F w/ HTN, Pre-Diabetes, HL, recent admission to OSH ([facility]) for ovarian mass, abdominal ascites, and left hydroureteronephrosis s/p renal stent placement for left hydronephrosis.  Per chart review, she was discharged on 7-16-21 for outpatient gynonc follow-up and presented to [facility] 7/26/21 for approximately 1 month of constant diffuse abdominal pain associated with nausea and vomiting.  Pulmonology has been consulted for thoracentesis of bilateral pleural effusion (R greater than left) detected on CXR on 7/27/21.     #Bilateral Pleural effusions, R>L  - patient w/adequate fluid pocket for thoracentesis, although politely declining thoracentesis at this time    Recommendations  - patient w/adequate fluid pocket for thoracentesis, please re-consult pulm if patient amenable to procedure     Patient seen and discussed w/Dr. Padmini Schroeder PGY4  [id number] Mary Stafford is a 67yo F w/ HTN, Pre-Diabetes, HL, recent admission to OSH ([facility]) for ovarian mass, abdominal ascites, and left hydroureteronephrosis s/p renal stent placement for left hydronephrosis.  Per chart review, she was discharged on 7-16-21 for outpatient gynonc follow-up and presented to [facility] 7/26/21 for approximately 1 month of constant diffuse abdominal pain associated with nausea and vomiting.  Pulmonology has been consulted for thoracentesis of bilateral pleural effusion (R greater than left) detected on CXR on 7/27/21.     #Bilateral Pleural effusions, R>L  - patient w/adequate fluid pocket for thoracentesis, although politely declining thoracentesis at this time  - Risk and benefits explained to patient, patient understood, all questions answered     Recommendations  - patient w/adequate fluid pocket for thoracentesis, please re-consult pulm if patient amenable to procedure     Patient seen and discussed w/Dr. Padmini Schroeder PGY4  [id number]

## 2021-07-30 NOTE — PROGRESS NOTE ADULT - PROBLEM SELECTOR PLAN 1
Pt. with PURVI in the setting of B/l adnexal mass with ascites and HDN. On admission, Scr was elevated to 7.12 on 7/25/21. Scr was at baseline around 0.8 on 6/16/21. CT abdo/pelvis done on 7/25/21 showed moderate ascites and B/l pleural effusion and L ureteral stent. UA done on 7/25/21 showed moderate LEC with blood seen. Started on bicarb infusion for hyperkalemia and metabolic acidosis. HD consent obtained. IR placed Right IJ HD catheter on 7/26/21. Pt. received HD treatment on 7/26/21. Tolerated well. BNP done on 6/27/21 was low at 67. Received IVF on 7/27/21. Scr is at baseline of 0.80 today. Pt. is non-oliguric. Recommend continuing IVF, encourage PO intake. Monitor labs and urine output. Avoid any potential nephrotoxins. Dose medications as per eGFR.

## 2021-07-30 NOTE — PROGRESS NOTE ADULT - PROBLEM SELECTOR PLAN 5
Initial presentation with firm, distended, diffusely tender abdomen with no guarding  - Pleural effusion on CT abdomen and Pelvis, right > left with history of Ovarian mass with possible mets  - Ascites of unknown etiology in setting of transaminitis. Could be malignant fluid in the setting of ovarian mass  - LFTs 80/65. Hepatitis panel negative. Will repeat LFTs  - Paracentesis performed 7/27 produced 60mLs of serous fluid. Gram stain negative, negative for SBP on cytology. Pending  fungal cultures.

## 2021-07-30 NOTE — PROGRESS NOTE ADULT - SUBJECTIVE AND OBJECTIVE BOX
Patient was seen and examined at bedside this morning. Denies any nausea/vomiting/diarrhea, headache, shortness of breath, abdominal pain or chest pain/palpitations. Patient responding appropriately to questions and able to make needs known. Vital signs/imaging/telemetry events reviewed.      MEDICATIONS  (STANDING):  amLODIPine   Tablet 5 milliGRAM(s) Oral daily  aspirin enteric coated 81 milliGRAM(s) Oral daily  chlorhexidine 4% Liquid 1 Application(s) Topical <User Schedule>  famotidine Injectable 20 milliGRAM(s) IV Push every 12 hours  heparin   Injectable 5000 Unit(s) SubCutaneous every 8 hours  multivitamin 1 Tablet(s) Oral daily    MEDICATIONS  (PRN):  acetaminophen   Tablet .. 650 milliGRAM(s) Oral every 6 hours PRN Mild Pain (1 - 3), Moderate Pain (4 - 6)  levalbuterol Inhalation 0.63 milliGRAM(s) Inhalation every 6 hours PRN shortness of breath  ondansetron Injectable 4 milliGRAM(s) IV Push every 6 hours PRN Nausea and/or Vomiting  polyethylene glycol 3350 17 Gram(s) Oral daily PRN Constipation  sodium chloride 0.9% lock flush 10 milliLiter(s) IV Push every 1 hour PRN Pre/post blood products, medications, blood draw, and to maintain line patency      VITALS:   T(C): 36.8 (07-30-21 @ 05:00), Max: 36.8 (07-29-21 @ 11:26)  HR: 121 (07-30-21 @ 05:00) (111 - 131)  BP: 142/93 (07-30-21 @ 05:00) (142/93 - 160/100)  RR: 18 (07-30-21 @ 05:00) (17 - 20)  SpO2: 96% (07-30-21 @ 05:00) (84% - 96%)    GENERAL: NAD, lying in bed comfortably  HEAD:  Atraumatic, normocephalic  EYES: EOMI, PERRLA, conjunctiva and sclera clear  ENT: Moist mucous membranes  NECK: Supple, no JVD  HEART: Regular rate and rhythm, no murmurs, rubs, or gallops  LUNGS: Unlabored respirations.  Clear to auscultation bilaterally, no crackles, wheezing, or rhonchi  ABDOMEN: Soft, nontender, nondistended, +BS  EXTREMITIES: 2+ peripheral pulses bilaterally. No clubbing, cyanosis, or edema  NERVOUS SYSTEM:  A&Ox3, no focal deficits   SKIN: No rashes or lesions  Psych: Normal. normal behavior. normal speech Patient was seen and examined at bedside this morning. Denies any nausea/vomiting/diarrhea, headache, shortness of breath, abdominal pain or chest pain/palpitations. Patient responding appropriately to questions and able to make needs known. Vital signs/imaging/telemetry events reviewed.      MEDICATIONS  (STANDING):  amLODIPine   Tablet 5 milliGRAM(s) Oral daily  aspirin enteric coated 81 milliGRAM(s) Oral daily  chlorhexidine 4% Liquid 1 Application(s) Topical <User Schedule>  famotidine Injectable 20 milliGRAM(s) IV Push every 12 hours  heparin   Injectable 5000 Unit(s) SubCutaneous every 8 hours  multivitamin 1 Tablet(s) Oral daily    MEDICATIONS  (PRN):  acetaminophen   Tablet .. 650 milliGRAM(s) Oral every 6 hours PRN Mild Pain (1 - 3), Moderate Pain (4 - 6)  levalbuterol Inhalation 0.63 milliGRAM(s) Inhalation every 6 hours PRN shortness of breath  ondansetron Injectable 4 milliGRAM(s) IV Push every 6 hours PRN Nausea and/or Vomiting  polyethylene glycol 3350 17 Gram(s) Oral daily PRN Constipation  sodium chloride 0.9% lock flush 10 milliLiter(s) IV Push every 1 hour PRN Pre/post blood products, medications, blood draw, and to maintain line patency      VITALS:   T(C): 36.8 (07-30-21 @ 05:00), Max: 36.8 (07-29-21 @ 11:26)  HR: 121 (07-30-21 @ 05:00) (111 - 131)  BP: 142/93 (07-30-21 @ 05:00) (142/93 - 160/100)  RR: 18 (07-30-21 @ 05:00) (17 - 20)  SpO2: 96% (07-30-21 @ 05:00) (84% - 96%)    GENERAL: NAD, lying in bed comfortably  EYES: EOMI, PERRLA, conjunctiva and sclera clear  HEART: Regular rate and rhythm, no murmurs, rubs, or gallops  LUNGS: B/L crackles. Worse on the right lower lobe. No wheeze.  ABDOMEN: Somewhat firm abdomen with distention. Bowel sounds present. No appreciable mass  EXTREMITIES: Trace Pitting edema present. Good capillary refill  NERVOUS SYSTEM:  A&Ox3, no focal deficits   SKIN: No rashes or lesions  Psych: Normal. normal behavior. normal speech Patient was seen and examined at bedside this morning. Denies any nausea/vomiting/diarrhea, headache, shortness of breath, abdominal pain or chest pain/palpitations. Patient responding appropriately to questions and able to make needs known. Vital signs/imaging/telemetry events reviewed.      MEDICATIONS  (STANDING):  amLODIPine   Tablet 5 milliGRAM(s) Oral daily  aspirin enteric coated 81 milliGRAM(s) Oral daily  chlorhexidine 4% Liquid 1 Application(s) Topical <User Schedule>  famotidine Injectable 20 milliGRAM(s) IV Push every 12 hours  heparin   Injectable 5000 Unit(s) SubCutaneous every 8 hours  multivitamin 1 Tablet(s) Oral daily    MEDICATIONS  (PRN):  acetaminophen   Tablet .. 650 milliGRAM(s) Oral every 6 hours PRN Mild Pain (1 - 3), Moderate Pain (4 - 6)  levalbuterol Inhalation 0.63 milliGRAM(s) Inhalation every 6 hours PRN shortness of breath  ondansetron Injectable 4 milliGRAM(s) IV Push every 6 hours PRN Nausea and/or Vomiting  polyethylene glycol 3350 17 Gram(s) Oral daily PRN Constipation  sodium chloride 0.9% lock flush 10 milliLiter(s) IV Push every 1 hour PRN Pre/post blood products, medications, blood draw, and to maintain line patency      VITALS:   T(C): 36.8 (07-30-21 @ 05:00), Max: 36.8 (07-29-21 @ 11:26)  HR: 121 (07-30-21 @ 05:00) (111 - 131)  BP: 142/93 (07-30-21 @ 05:00) (142/93 - 160/100)  RR: 18 (07-30-21 @ 05:00) (17 - 20)  SpO2: 96% (07-30-21 @ 05:00) (84% - 96%)    GENERAL: NAD, lying in bed comfortably  EYES: EOMI, PERRLA, conjunctiva and sclera clear  HEART: Regular rate and rhythm, no murmurs, rubs, or gallops  LUNGS: B/L crackles. Worse on the right lower lobe. No wheeze.  ABDOMEN: Somewhat firm abdomen with distention. Bowel sounds present. No appreciable mass  EXTREMITIES: Trace Pitting edema present. Good capillary refill  NERVOUS SYSTEM:  A&Ox3, no focal deficits   SKIN: No rashes or lesions  Psych: Normal. normal behavior. normal speech    .  LABS:                         11.3   9.03  )-----------( 287      ( 29 Jul 2021 11:12 )             35.4     07-30    141  |  97<L>  |  19  ----------------------------<  107<H>  3.6   |  28  |  0.80    Ca    9.8      30 Jul 2021 07:05          Serum Pro-Brain Natriuretic Peptide: 158 pg/mL (07-30 @ 07:12)        RADIOLOGY, EKG & ADDITIONAL TESTS: Reviewed.  Patient was seen and examined at bedside this morning. Denies any nausea/vomiting/diarrhea, headache, shortness of breath, abdominal pain or chest pain/palpitations. Patient responding appropriately to questions and able to make needs known. Vital signs/imaging/telemetry events reviewed.      REVIEW OF SYSTEMS:    CONSTITUTIONAL: No weakness, fevers or chills  RESPIRATORY: No cough, wheezing, hemoptysis  CARDIOVASCULAR: No chest pain or palpitations  GASTROINTESTINAL: No abdominal or epigastric pain. No nausea, vomiting, or hematemesis;  GENITOURINARY: No dysuria, frequency or hematuria  Psych: No anxiety. No depression    MEDICATIONS  (STANDING):  amLODIPine   Tablet 5 milliGRAM(s) Oral daily  aspirin enteric coated 81 milliGRAM(s) Oral daily  chlorhexidine 4% Liquid 1 Application(s) Topical <User Schedule>  famotidine Injectable 20 milliGRAM(s) IV Push every 12 hours  heparin   Injectable 5000 Unit(s) SubCutaneous every 8 hours  multivitamin 1 Tablet(s) Oral daily    MEDICATIONS  (PRN):  acetaminophen   Tablet .. 650 milliGRAM(s) Oral every 6 hours PRN Mild Pain (1 - 3), Moderate Pain (4 - 6)  levalbuterol Inhalation 0.63 milliGRAM(s) Inhalation every 6 hours PRN shortness of breath  ondansetron Injectable 4 milliGRAM(s) IV Push every 6 hours PRN Nausea and/or Vomiting  polyethylene glycol 3350 17 Gram(s) Oral daily PRN Constipation  sodium chloride 0.9% lock flush 10 milliLiter(s) IV Push every 1 hour PRN Pre/post blood products, medications, blood draw, and to maintain line patency      VITALS:   T(C): 36.8 (07-30-21 @ 05:00), Max: 36.8 (07-29-21 @ 11:26)  HR: 121 (07-30-21 @ 05:00) (111 - 131)  BP: 142/93 (07-30-21 @ 05:00) (142/93 - 160/100)  RR: 18 (07-30-21 @ 05:00) (17 - 20)  SpO2: 96% (07-30-21 @ 05:00) (84% - 96%)    GENERAL: NAD, lying in bed comfortably  EYES: EOMI, PERRLA, conjunctiva and sclera clear  HEART: Regular rate and rhythm, no murmurs, rubs, or gallops  LUNGS: B/L crackles. Worse on the right lower lobe. No wheeze.  ABDOMEN: Somewhat firm abdomen with distention. Bowel sounds present. No appreciable mass  EXTREMITIES: Trace Pitting edema present. Good capillary refill  NERVOUS SYSTEM:  A&Ox3, no focal deficits   SKIN: No rashes or lesions  Psych: Normal. normal behavior. normal speech    .  LABS:                         11.3   9.03  )-----------( 287      ( 29 Jul 2021 11:12 )             35.4     07-30    141  |  97<L>  |  19  ----------------------------<  107<H>  3.6   |  28  |  0.80    Ca    9.8      30 Jul 2021 07:05          Serum Pro-Brain Natriuretic Peptide: 158 pg/mL (07-30 @ 07:12)        RADIOLOGY, EKG & ADDITIONAL TESTS: Reviewed.

## 2021-07-31 NOTE — PROGRESS NOTE ADULT - PROBLEM SELECTOR PLAN 2
- Onset of stable sinus tachycardia HR now fluctuating between 110-130. Most likely multifactorial as a result of abdominal ascites and pleural effusion  - TSH within normal limit.  - Associated with SOB with SPO2 87% on 1L NC and 1 instance of sharp CP. SOB most likely secondary to pleural effusion. Was concerning for PE in the setting of hypercoagulable state 2/2 ovarian malignancy. Venous doppler with patent vessels, no evidence of PE. Unable to undergo CTA w/contrast due to superimposed PURVI. Opted for V/Q scan which resulted low probability of PE.  - TTE Pending

## 2021-07-31 NOTE — PROGRESS NOTE ADULT - PROBLEM SELECTOR PLAN 4
- Ovarian mass found on Previous CT Abd/Pelvis with contrast at [facility]. Results showing Ascitis, bowel wall thickening. fat stranding of greater omentum and bilateral adnexal masses  - Concern for omental metastasis  - In the setting of Ascites and Plueral effusion  - Records from OSH shows B/L ovarian masses  - Work up at [facility] included markers CA-125 which was elevated. CEA, CA-19-9, AFP, and inhibin did not result before patient was discharged on 7/16  - Will need out patient follow up with OBGYN. Appointment scheduling in [facility] in progress.  - Patient states she has not established oncologic care with Dr. Tutu Lenz([phone]) GYN-Onc at [facility]. Patient now states she would rather followup at [facility] since her son lives near [facility]. PCP: Dr. Elmo Hutchison: [phone]. - Ovarian mass found on Previous CT Abd/Pelvis with contrast at [facility]. Results showing Ascitis, bowel wall thickening. 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 Recommend out patient Gyn but patient never followed up after discharge.  - Work up at [facility] included markers CA-125 which was elevated. CEA, CA-19-9, AFP, and inhibin did not result before patient was discharged on 7/16  - Will need out patient follow up with OBGYN. Appointment scheduling in [facility] in progress.  - Patient states she has not established oncologic care with Dr. Tutu Lenz([phone]) GYN-Onc at [facility]. Patient now states she would rather followup at [facility] since her son lives near [facility]. PCP: Dr. Elmo Hutchison: [phone].

## 2021-07-31 NOTE — PHYSICAL THERAPY INITIAL EVALUATION ADULT - ADDITIONAL COMMENTS
Pt lives with brother in a private house, no steps to enter. Patient reports being independent in ADLs and ambulation prior to admission.    Patient was left sitting at the edge of the bed, all lines/tubes intact and call bell within reach, RN aware

## 2021-07-31 NOTE — PHYSICAL THERAPY INITIAL EVALUATION ADULT - GAIT DISTANCE, PT EVAL
Per RN Pam L, pt to ambulate on 2L O2 via nasal cannula. SpO2 decreased to 88% while ambulating on 2L O2. Immediately post ambulation, SpO2 decreased to 86% at lowest. O2 titrated up to 3L per RN Pam L. SpO2 improved to 94% within 1 minute./100 feet

## 2021-07-31 NOTE — PROGRESS NOTE ADULT - PROBLEM SELECTOR PLAN 5
- Home BP meds HCTZ-Triamterene initially held due to Hyperkalemia.  - BP improved -155 over 24 hrs. DBP < 100  - C/w amlodipine 5mg. - Home BP meds HCTZ-Triamterene initially held due to Hyperkalemia.  - BP improved. Last /81  - C/w amlodipine 5mg.

## 2021-07-31 NOTE — PHYSICAL THERAPY INITIAL EVALUATION ADULT - PERTINENT HX OF CURRENT PROBLEM, REHAB EVAL
Patient is a 66 year old female admitted to [facility] with 1 month of constant diffuse abdominal pain associated with nausea without vomiting. PMH: HTN, Pre-Diabetes, HL, recent admission to OSH ([facility]) for ovarian mass, abdominal ascites, left hydroureteronephrosis s/p renal stent placement for left hydronephrosis.

## 2021-07-31 NOTE — PROGRESS NOTE ADULT - PROBLEM SELECTOR PLAN 1
- On 2L NC with SPO2 90-94  - CXR 7/27 with worsening Bilateral pleural effusions right greater than left with compressive atelectasis at the right lung base.  - Bedside POCUS with significant pleural effusion, right greater than left.  - per pulm patient w/adequate fluid pocket for thoracentesis, although politely declined thoracentesis  - Will attempt wean off O2 after thoracentesis

## 2021-07-31 NOTE — PROGRESS NOTE ADULT - SUBJECTIVE AND OBJECTIVE BOX
Patient was seen and examined at bedside this morning. Denies any nausea/vomiting/diarrhea, headache, shortness of breath, abdominal pain or chest pain/palpitations. Patient responding appropriately to questions and able to make needs known. Vital signs/imaging/telemetry events reviewed.      REVIEW OF SYSTEMS:    CONSTITUTIONAL: No weakness, fevers or chills  EYES: PEERLA  RESPIRATORY: No cough, wheezing, hemoptysis; + shortness of breath  CARDIOVASCULAR: No chest pain or palpitations  GASTROINTESTINAL: + abdominal. No nausea, vomiting, or hematemesis; No diarrhea or constipation. No melena or hematochezia.  GENITOURINARY: No dysuria, frequency or hematuria  Psych: No anxiety. No depression    MEDICATIONS  (STANDING):  amLODIPine   Tablet 5 milliGRAM(s) Oral daily  aspirin enteric coated 81 milliGRAM(s) Oral daily  chlorhexidine 4% Liquid 1 Application(s) Topical <User Schedule>  famotidine    Tablet 20 milliGRAM(s) Oral two times a day  multivitamin 1 Tablet(s) Oral daily    MEDICATIONS  (PRN):  acetaminophen   Tablet .. 650 milliGRAM(s) Oral every 6 hours PRN Mild Pain (1 - 3), Moderate Pain (4 - 6)  levalbuterol Inhalation 0.63 milliGRAM(s) Inhalation every 6 hours PRN shortness of breath  ondansetron Injectable 4 milliGRAM(s) IV Push every 6 hours PRN Nausea and/or Vomiting  polyethylene glycol 3350 17 Gram(s) Oral daily PRN Constipation  sodium chloride 0.9% lock flush 10 milliLiter(s) IV Push every 1 hour PRN Pre/post blood products, medications, blood draw, and to maintain line patency    VITALS:   T(C): 36.9 (07-31-21 @ 06:03), Max: 37.3 (07-30-21 @ 21:46)  HR: 105 (07-31-21 @ 06:03) (105 - 118)  BP: 145/85 (07-31-21 @ 06:03) (135/90 - 159/81)  RR: 18 (07-31-21 @ 06:03) (17 - 18)  SpO2: 96% (07-31-21 @ 06:03) (94% - 96%)    GENERAL: NAD, lying in bed comfortably  HEAD:  Atraumatic, normocephalic  EYES: EOMI, PERRLA, conjunctiva and sclera clear  ENT: Moist mucous membranes  NECK: Supple, no JVD  HEART: Regular rate and rhythm, no murmurs, rubs, or gallops  LUNGS: Unlabored respirations.  Clear to auscultation bilaterally, no crackles, wheezing, or rhonchi  ABDOMEN: Soft, nontender, nondistended, +BS  EXTREMITIES: 2+ peripheral pulses bilaterally. No clubbing, cyanosis, or edema  NERVOUS SYSTEM:  A&Ox3, no focal deficits   SKIN: No rashes or lesions  Psych: Normal. normal behavior. normal speech    .  LABS:                         11.3   9.03  )-----------( 287      ( 29 Jul 2021 11:12 )             35.4     07-30    141  |  97<L>  |  19  ----------------------------<  107<H>  3.6   |  28  |  0.80    Ca    9.8      30 Jul 2021 07:05                RADIOLOGY, EKG & ADDITIONAL TESTS: Reviewed.

## 2021-07-31 NOTE — PROGRESS NOTE ADULT - ASSESSMENT
66 yr old woman PMH HTN, Pre-Diabetes, HLD, recent admission to [facility] for abdominal pain and found to have malignant ovarian mass with abdominal ascites, left hydroureteronephrosis s/p renal stent placement  on 7/15/21 and dced 7/16/21 p/w worsening diffuse abdominal pain associated with nausea. Found to have severe PURVI with hyperkalemia, now resolved, and moderate abdominopelvic ascites with persistent sinus tachycardia c/b acute hypoxic respiratory failure and requiring thoracentesis for moderate pleural effusion

## 2021-07-31 NOTE — PROGRESS NOTE ADULT - PROBLEM SELECTOR PLAN 3
Initial presentation with firm, distended, diffusely tender abdomen with no guarding  - Pleural effusion on CT abdomen and Pelvis, right > left with history of Ovarian mass with possible mets  - Ascites of unknown etiology in setting of transaminitis. Could be malignant fluid in the setting of ovarian mass  - Initial LFTs 80/65. Hepatitis panel negative. Down trended  - Paracentesis performed 7/27 produced 60mLs of serous fluid. Gram stain negative, negative for SBP on cytology. Pending  fungal cultures.

## 2021-08-01 NOTE — CONSULT NOTE ADULT - ASSESSMENT
65yo F presented with flank/back pain, abdominal bloating, and recent weight loss x 1 month s/p renal stent placement for left hydronephrosis at [facility]. Pt admitted for PURVI with hyperkalemia and hospital course c/b ARDS with pleural effusions. CTAP notable for moderate ascites and B/l pleural effusion and L ureteral stent. Ovaries and uterus wnl. However, previous imaging at [facility] notable for bilateral soft tissue prominence and adnexal nodularity with increased soft tissue prominence mild thickening and enhancement of the peritoneal lining at the posterior cul-de-sac.  elevated to 252. Physical exam notable for abdominal distension with no focal findings on pelvic exam. Differential diagnosis broad at this time but includes primary peritoneal cancer vs GI rimary vs infectious/ inflammatory process. Low suspicion for infections/inflammatory process as WBC wnl and pt afebrile.     -f/u paracentesis cytology results from 7/27  -f/u thoracentesis results for planned procedure tomorrow.   -request that the primary team obtain CD of CT images from [facility] to better access findings    Pt seen with and discussed with Dr. Hall

## 2021-08-01 NOTE — PROGRESS NOTE ADULT - PROBLEM SELECTOR PLAN 1
- On 2L NC with SPO2 90-94  - CXR 7/27 with worsening Bilateral pleural effusions right greater than left with compressive atelectasis at the right lung base.  - Bedside POCUS with significant pleural effusion, right greater than left.  - per pulm patient w/adequate fluid pocket for thoracentesis, although politely declined thoracentesis  - Will attempt wean off O2 after thoracentesis - On 2L NC with SPO2 90-94  - CXR 7/27 with worsening Bilateral pleural effusions right greater than left with compressive atelectasis at the right lung base.  - Bedside POCUS with significant pleural effusion, right greater than left.  - per pulm patient w/adequate fluid pocket for thoracentesis - plan for tomorrow  - Will attempt wean off O2 after thoracentesis

## 2021-08-01 NOTE — PROGRESS NOTE ADULT - PROBLEM SELECTOR PLAN 5
- Home BP meds HCTZ-Triamterene initially held due to Hyperkalemia.  - BP improved. Last /81  - C/w amlodipine 5mg.

## 2021-08-01 NOTE — PROGRESS NOTE ADULT - ASSESSMENT
66 yr old woman PMH HTN, Pre-Diabetes, HLD, recent admission to [facility] for abdominal pain and found to have malignant ovarian mass with abdominal ascites, left hydroureteronephrosis s/p renal stent placement  on 7/15/21 and dced 7/16/21 p/w worsening diffuse abdominal pain associated with nausea. Found to have severe PURVI with hyperkalemia, now resolved, and moderate abdominopelvic ascites with persistent sinus tachycardia c/b acute hypoxic respiratory failure and requiring thoracentesis for moderate pleural effusion

## 2021-08-01 NOTE — CONSULT NOTE ADULT - PROBLEM SELECTOR RECOMMENDATION 9
Pt. with PURVI in the setting of B/l adnexal mass with ascites and HDN. On admission, Scr was elevated to 7.12 on 7/25/21. Scr was at baseline around 0.8 on 6/16/21. CT abdo/pelvis done on 7/25/21 showed moderate ascites and B/l pleural effusion and L ureteral stent. UA done on 7/25/21 showed moderate LEC with blood seen. Pt. likely with obstructive etiology. Consider urology consult for decompression of urinary system. Monitor labs and urine output. Avoid any potential nephrotoxins. Dose medications as per eGFR.
Gyn Oncology Fellow Addendum    Pt seen and evaluated at bedside.  Agree with above assessment.    -Ascites and bilateral pleural effusions in setting of weight loss, fatigue c/f malignancy.  -Although outside imaging report w/ possible L adnexal nodularity, report does not note definitive mass. CT A/P repeated here w/o any e/o adnexal mass, LAD, metastatic disease.  Clinical presentation suspicious for malignancy, possible primary peritoneal vs ovarian.  -Expedite cytopathology from prior paracentesis  -Agree with thoracentesis for both therapeutic and diagnostic purposes- send and expedite cytopathology results  -Once diagnosis confirmed can make definitive treatment recommendation, primary debulking vs. NACT and interval debulking  -Pt reports colonoscopy 2017 and normal prior pap, obtain reports. Recommend mammogram  -Rest of care per primary team    Poonam Hall MD
per nephrology: Pt. with PURVI in the setting of B/l adnexal mass with ascites. On admission, Scr was elevated to 7.12 on 7/25/21. Scr was at baseline around 0.8 on 6/16/21. CT abdo/pelvis done on 7/25/21 showed moderate ascites and B/l pleural effusion and L ureteral stent. UA done on 7/25/21 showed moderate LEC with blood seen. Started on bicarb infusion for hyperkalemia and metabolic acidosis. HD consent obtained. IR placed Right IJ HD catheter on 7/26/21. Pt. received HD treatment on 7/26/21. Tolerated well. BNP done on 6/27/21 was low at 67. Underwent Renal scan with Lasix on 7/27/21. Received IVF on 7/27/21. Scr is elevated/stable at 2.36 today. Pt. is non-oliguric, UOP ~ 825 cc in last 24 hr. Recommend continuing IVF. Monitor labs and urine output. Avoid any potential nephrotoxins. Dose medications as per eGFR.

## 2021-08-01 NOTE — PROGRESS NOTE ADULT - PROBLEM SELECTOR PLAN 4
- Ovarian mass found on Previous CT Abd/Pelvis with contrast at [facility]. Results showing Ascitis, bowel wall thickening. 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 Recommend out patient Gyn but patient never followed up after discharge.  - Work up at [place] included markers CA-125 which was elevated. CEA, CA-19-9, AFP, and inhibin did not result before patient was discharged on 7/16  - Will need out patient follow up with OBGYN. Appointment scheduling in [facility] in progress.  - Patient states she has not established oncologic care with Dr. Tutu Lenz([phone]) GYN-Onc at [facility]. Patient now states she would rather followup at [facility] since her son lives near [facility]. PCP: Dr. Elmo Hutchison: [phone]. - Ovarian mass found on Previous CT Abd/Pelvis with contrast at [facility]. Results showing Ascites, bowel wall thickening. 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 Recommend out patient Gyn but patient never followed up after discharge.  - Work up at [place] included markers CA-125 which was elevated. CEA, CA-19-9, AFP, and inhibin did not result before patient was discharged on 7/16  - Will need out patient follow up with OBGYN. Appointment scheduling in [facility] in progress.  - Patient states she has not established oncologic care with Dr. Tutu Lenz([phone]) GYN-Onc at [facility]. Patient now states she would rather followup at [facility] since her son lives near [facility]. PCP: Dr. Elmo Hutchison: [phone].

## 2021-08-01 NOTE — CONSULT NOTE ADULT - SUBJECTIVE AND OBJECTIVE BOX
HPI:  67yo F presented to the ED with flank/back pain, abdominal bloating, and nausea x 1 month in the setting of a recent admission to [facility] (discharged on ) for ovarian mass, abdominal ascites, left hydroureteronephrosis s/p renal stent placement for left hydronephrosis. CTAP on time of presentation to [facility] ED showed bilateral mild pleural effusions, right greater than left, and ascities. No hydronephrosis. Uterus and ovaries wnl. Pt was found to have severe PURVI with hyperkalemia and admitted for management.      While in the hospital pt underwent a paracentesis of ascites () with results showing 60mLs of serous fluid. Gram stain negative, negative for SBP on cytology. Pending  fungal cultures. Hospital course  complicated by ARDS and tachycardia of unknown etiology.     As per chart review, previous CT Abd/Pelvis with contrast at [facility] showed ascites bowel wall thickening. fat stranding of greater omentum and bilateral adnexal masses.  elevated to 252. , CEA, Inhibin wnl.     On admission, pain was described as dull flank pain that radiates to the back bilaterally. Worse R>L. Pain worse with standing.       Denies/Complains of post menopausal vaginal bleeding/spotting. (Patient notes that bleeding started on X. Patient bled X pads in 24 hours. Soaking through X pads. Passing X clots. Patient denies h/o fibroids, adenomyosis or history of heavy VB and AUB. Patient denies chest pain, palpitations, shortness of breath, dizziness, lightheadedness, weakness, and feeling faint.)    She complains of/Denies increased distention, bloating, early satiety, fullness and constipation.    Family hx: denies family history of ovarian, uterine, cervical, breast, colon cancer.     POb: G P, h/o /LTCS, h/o SAB, MAB, ectopic pregnancy, TOP s/p DVC, LSC salpigectomy, DVC, c/b    PGyn: Menopause at age X. Denies h/o AUB, fibroids, ovarian cysts, PID, GC/CL, HIV, Hepatitis, abnormal PAPs, infertility, GYN surgery, menstrual history, number of sexual partners    PMH: Denies (asthma, HTN, thyroid disorder, DM), h/o hospitalizations    PSH:     Meds:    All:    Social: denies history smoking cigarettes, alcohol dependence, illicit drug use    Functional status: (advanced directives, dementia, ability to care for self)    ROS: HPI:  67yo F presented to the ED with flank/back pain, abdominal bloating, and nausea x 1 month in the setting of a recent admission to [facility] (712-7/16) for possible ovarian mass, abdominal ascites, left hydroureteronephrosis s/p renal stent placement for left hydronephrosis. CTAP on time of presentation to [facility] ED showed bilateral mild pleural effusions, right greater than left, and ascities. No hydronephrosis. Uterus and ovaries wnl. Pt was found to have severe PURVI with hyperkalemia and admitted for management.      While in the hospital pt underwent a paracentesis of ascites (7/27) with results showing 60mLs of serous fluid. Gram stain negative, negative for SBP on cytology. Pending  fungal cultures. Hospital course  complicated by ARDS and tachycardia of unknown etiology.     As per chart review, previous CT Abd/Pelvis with contrast at [facility] showed ascites,  bilateral soft tissue prominence and adnexal nodularity with increased soft tissue promience, mild thickening and enhancement of the peritoneal lining at the posterior cul-de-sac.    elevated to 252. , CEA, Inhibin wnl.     On admission, pain was described as dull flank pain that radiates to the back bilaterally. Worse R>L. Pain worse with standing. Denies post menopausal vaginal bleeding/spotting. Patient denies h/o fibroids, adenomyosis or history of heavy VB and AUB. Patient denies chest pain, palpitations, dizziness, lightheadedness,  Endorses shortness of breath, weakness, and fatigue    She endorse early satiety, fullness and a 15-20 pound weight loss over the past 4-5 weeks.     Family hx: denies family history of ovarian, uterine, cervical, breast, colon cancer.     POb: NSVDx2    PGyn: Menopause at age 54. Denies h/o AUB, fibroids, ovarian cysts, STDS, abnormal PAPs, infertility    PMH: Denies (asthma, HTN, thyroid disorder, DM), h/o hospitalizations    PSH: denies    Meds: ASA    All: olvin    Social: denies history smoking cigarettes, alcohol dependence, illicit drug use. Lives with brother.       Physical Exam:   General: moderate distress, sitting in tripod position.   Neck: supple, full ROM, no lymphadenopathy  Breath: no nodules/lumps or discharge noted bilaterally  CV: RR S1S2  Lungs: + bilateral crackles. 2L NS  Back: No CVA tenderness  Abd: Tense, distended, nontender  Vaginal: External labia wnl.  Bimanual exam with no cervical motion tenderness, uterus wnl, adnexa non palpable b/l.  Cervix closed.   Speculum Exam: No active bleeding from os  Rectum: smooth RV septum  Ext: + pitting edema to the knee               HPI:  65yo F presented to the ED with flank/back pain, abdominal bloating, and nausea x 1 month in the setting of a recent admission to [facility] (712-7/16) for possible ovarian mass, abdominal ascites, left hydroureteronephrosis s/p renal stent placement for left hydronephrosis. CTAP on time of presentation to [facility] ED showed bilateral mild pleural effusions, right greater than left, and ascities. No hydronephrosis. Uterus and ovaries wnl. Pt was found to have severe PURVI with hyperkalemia and admitted for management.      While in the hospital pt underwent a paracentesis of ascites (7/27) with results showing 60mLs of serous fluid. Gram stain negative, negative for SBP on cytology. Pending  fungal cultures. Hospital course  complicated by ARDS and tachycardia of unknown etiology.     As per chart review, previous CT Abd/Pelvis with contrast at [facility] showed ascites,  bilateral soft tissue prominence and adnexal nodularity with increased soft tissue promience, mild thickening and enhancement of the peritoneal lining at the posterior cul-de-sac.    elevated to 252. , CEA, Inhibin wnl.     On admission, pain was described as dull flank pain that radiates to the back bilaterally. Worse R>L. Pain worse with standing. Denies post menopausal vaginal bleeding/spotting. Patient denies h/o fibroids, adenomyosis or history of heavy VB and AUB. Patient denies chest pain, palpitations, dizziness, lightheadedness,  Endorses shortness of breath, weakness, and fatigue    She endorse early satiety, fullness and a 15-20 pound weight loss over the past 4-5 weeks.     HCM: Last Pap: 2016, wnl  Last Mammogram: Unsure  Last Colonoscopy: 2017    Family hx: denies family history of ovarian, uterine, cervical, breast, colon cancer.     POb: NSVDx2    PGyn: Menopause at age 54. Denies h/o AUB, fibroids, ovarian cysts, STDS, abnormal PAPs, infertility    PMH: Denies (asthma, HTN, thyroid disorder, DM), h/o hospitalizations    PSH: denies    Meds: ASA    All: olvin    Social: denies history smoking cigarettes, alcohol dependence, illicit drug use. Lives with brother.       Physical Exam:   General: moderate distress, sitting in tripod position.   Neck: supple, full ROM, no lymphadenopathy  Breath: no nodules/lumps or discharge noted bilaterally  CV: RR S1S2  Lungs: + bilateral crackles. 2L NS  Back: No CVA tenderness  Abd: Tense, distended, nontender  Vaginal: External labia wnl.  Bimanual exam with no cervical motion tenderness, uterus wnl, adnexa non palpable b/l.  Cervix closed.   Speculum Exam: No active bleeding from os  Rectum: smooth RV septum  Ext: + pitting edema to the knee               HPI:  65yo F presented to the ED with flank/back pain, abdominal bloating, and nausea x 1 month in the setting of a recent admission to [facility] (712-7/16) for possible ovarian mass, abdominal ascites, left hydroureteronephrosis s/p renal stent placement for left hydronephrosis. CTAP on time of presentation to [facility] ED showed bilateral mild pleural effusions, right greater than left, and ascities. No hydronephrosis. Uterus and ovaries wnl. Pt was found to have severe PURVI with hyperkalemia and admitted for management.      While in the hospital pt underwent a paracentesis of ascites (7/27) with results showing 60mLs of serous fluid. Gram stain negative, negative for SBP on cytology. Pending  fungal cultures. Hospital course  complicated by ARDS and tachycardia of unknown etiology.     As per chart review, previous CT Abd/Pelvis with contrast at [facility] showed ascites,  bilateral soft tissue prominence and adnexal nodularity with increased soft tissue promience, mild thickening and enhancement of the peritoneal lining at the posterior cul-de-sac.    elevated to 252. , CEA, Inhibin wnl.     On admission, pain was described as dull flank pain that radiates to the back bilaterally. Worse R>L. Pain worse with standing. Denies post menopausal vaginal bleeding/spotting. Patient denies h/o fibroids, adenomyosis or history of heavy VB and AUB. Patient denies chest pain, palpitations, dizziness, lightheadedness,  Endorses shortness of breath, weakness, and fatigue    She endorse early satiety, fullness and a 15-20 pound weight loss over the past 4-5 weeks.     HCM: Last Pap: 2016, wnl  Last Mammogram: Unsure  Last Colonoscopy: 2017    Family hx: denies family history of ovarian, uterine, cervical, breast, colon cancer.     POb: NSVDx2    PGyn: Menopause at age 54. Denies h/o AUB, fibroids, ovarian cysts, STDS, abnormal PAPs, infertility    PMH: Denies (asthma, HTN, thyroid disorder, DM), h/o hospitalizations    PSH: denies    Meds: ASA    All: olvin    Social: denies history smoking cigarettes, alcohol dependence, illicit drug use. Lives with brother.       Physical Exam:   General: moderate distress, sitting in tripod position.   Neck: supple, full ROM, no lymphadenopathy  Breast: no nodules/lumps or discharge noted bilaterally  CV: RR S1S2  Lungs: + bilateral crackles. 2L NS  Back: No CVA tenderness  Abd: Tense, distended, nontender, +fluid wave, +shifting dullness  Vaginal: External labia wnl.  Bimanual exam with no cervical motion tenderness, smooth closed small cervix. No vaginal nodularity. Uterus small and mobile, only partially palpable due to ascites. No palpable adnexal masses.   Speculum Exam: No active bleeding from os  Rectum: smooth RV septum w/o nodularity or tenderness and mobile  Ext: + pitting edema to the knee

## 2021-08-01 NOTE — PROGRESS NOTE ADULT - SUBJECTIVE AND OBJECTIVE BOX
*******************************************************  Anand Holbrook MD PGY-2  Internal Medicine  [employer] on [employer]  Pager [facility]) [phone] / [phone]  *******************************************************      SUBJECTIVE / OVERNIGHT EVENTS:  - No acute events overnight. Patient seen and evaluated at bedside.  - She reports stable symptoms, minimal SOB and abdominal discomfort      ROS:   Denies fevers/chills, headache, SOB at rest, cough, chest pain, palpitations, abdominal pain, nausea/vomiting/diarrhea/constipation, melena/hematochezia, dysuria, and hematuria    ------------------------------------------------------------------------------------------------------------  MEDICATIONS  (STANDING):  amLODIPine   Tablet 5 milliGRAM(s) Oral daily  aspirin enteric coated 81 milliGRAM(s) Oral daily  chlorhexidine 4% Liquid 1 Application(s) Topical <User Schedule>  famotidine    Tablet 20 milliGRAM(s) Oral two times a day  multivitamin 1 Tablet(s) Oral daily    MEDICATIONS  (PRN):  acetaminophen   Tablet .. 650 milliGRAM(s) Oral every 6 hours PRN Mild Pain (1 - 3), Moderate Pain (4 - 6)  levalbuterol Inhalation 0.63 milliGRAM(s) Inhalation every 6 hours PRN shortness of breath  ondansetron Injectable 4 milliGRAM(s) IV Push every 6 hours PRN Nausea and/or Vomiting  polyethylene glycol 3350 17 Gram(s) Oral daily PRN Constipation  sodium chloride 0.9% lock flush 10 milliLiter(s) IV Push every 1 hour PRN Pre/post blood products, medications, blood draw, and to maintain line patency    ------------------------------------------------------------------------------------------------------------  OBJECTIVE DATA:    CAPILLARY BLOOD GLUCOSE        I&O's Summary    01 Aug 2021 07:01  -  01 Aug 2021 16:50  --------------------------------------------------------  IN: 240 mL / OUT: 500 mL / NET: -260 mL      Daily     Daily   Weight (kg): 63 (07-26-21 @ 15:22)  ------------------------------------------------------------------------------------------------------------    PHYSICAL EXAM:  T(F): 98.6, Max: 98.7 (08-01-21 @ 01:00)  HR: 114 (106 - 118)  BP: 129/75 (129/75 - 142/81)  RR: 19 (16 - 19)  SpO2: 96% (94% - 99%)    CONSTITUTIONAL:   NAD, well-developed  HEENT:   NCAT, EOMI, PERRLA, no scleral icterus, MMM  RESPIRATORY/CHEST:   decreased BS at bases  CARDIO:   Regular rate, S1/S2, no MRG; No JVD  VASCULAR:   No lower extremity edema; Peripheral pulses are 2+ bilaterally; Capillary refill brisk  ABDOMEN:   mostly soft although palpable mass in lower abdomen, mildly TTP. +BS  MUSCULOSKELETAL:   no joint swelling or tenderness to palpation, full strength all extremities.  EXTREMITIES:   hands/feet are warm, and without cyanosis or clubbing  SKIN:   no visible rashes, pallor, diaphoresis, or jaundice  NEURO:   No focal deficits; moving all extremities  PSYCH:   A+O to person, place, and time; affect appropriate; cooperative    ------------------------------------------------------------------------------------------------------------  LABS:    07-31    143  |  98  |  16  ----------------------------<  86  3.8   |  29  |  0.74    Ca    9.6      31 Jul 2021 07:30                  RADIOLOGY & ADDITIONAL TESTS:  Results Reviewed:     Imaging Reviewed:  Electrocardiogram Reviewed:      COORDINATION OF CARE:  Care discussed with consultants/other providers and notes reviewed [Y]  ------------------------------------------------------------------------------------------------------------

## 2021-08-02 NOTE — PROGRESS NOTE ADULT - PROBLEM SELECTOR PLAN 1
On 2L NC with SPO2 90-94  - CXR 7/27 with worsening Bilateral pleural effusions right greater than left with compressive atelectasis at the right lung base.  - Bedside POCUS with significant pleural effusion, right greater than left.  - Per pulm patient w/adequate fluid pocket for thoracentesis - plan for today  - Will attempt wean off O2 after thoracentesis  - Will order Morphine 1mg IVP q3h PRN for dyspnea  - Management as per primary medical team

## 2021-08-02 NOTE — PROGRESS NOTE ADULT - ASSESSMENT
66 yr old woman PMH HTN, Pre-Diabetes, HLD, recent admission to [facility] for abdominal pain and found to have malignant ovarian mass with abdominal ascites, left hydroureteronephrosis s/p renal stent placement  on 7/15/21 and dced 7/16/21 p/w worsening diffuse abdominal pain associated with nausea. Found to have severe PURVI with hyperkalemia, now resolved, and moderate abdominopelvic ascites with persistent sinus tachycardia c/b acute hypoxic respiratory failure and requiring thoracentesis for moderate pleural effusion

## 2021-08-02 NOTE — DIETITIAN NUTRITION RISK NOTIFICATION - TREATMENT: THE FOLLOWING DIET HAS BEEN RECOMMENDED
Diet, Regular:   Low Sodium  Supplement Feeding Modality:  Oral  Ensure Enlive Cans or Servings Per Day:  1       Frequency:  Two Times a day (08-02-21 @ 15:50) [Pending Verification By Attending]  Diet, DASH/TLC:   Sodium & Cholesterol Restricted (07-30-21 @ 09:27) [Active]

## 2021-08-02 NOTE — PROGRESS NOTE ADULT - PROBLEM SELECTOR PLAN 3
Initial presentation with firm, distended, diffusely tender abdomen with no guarding  - Pleural effusion on CT abdomen and Pelvis, right > left  - Ascites of unknown etiology in setting of transaminitis. Could be malignant fluid in the setting of ovarian mass  - Paracentesis performed 7/27 produced 60mLs of serous fluid. Gram stain negative, negative for SBP on cytology. Pending  fungal cultures, and cytology.

## 2021-08-02 NOTE — PROCEDURE NOTE - NSPROCDETAILS_GEN_ALL_CORE
location identified, draped/prepped, sterile technique used, needle inserted/introduced/catheter inserted over needle/ultrasound assessment of effusion (localization)
location identified, sterile technique used, catheter introduced, fluid drained

## 2021-08-02 NOTE — CONSULT NOTE ADULT - SUBJECTIVE AND OBJECTIVE BOX
CHIEF COMPLAINT:  abdominal pain       Interval events  - patient seen and examined on 8/2, patient denied significant SOB    PAST MEDICAL & SURGICAL HISTORY:  Hypertension    Ovarian mass    Hypercholesteremia    S/P ureteral stent placement        FAMILY HISTORY:  FHx: hypertension (Mother)    FH: diabetes mellitus (Mother)        SOCIAL HISTORY:  Smoking: denies     Allergies    penicillins (Rash)    Intolerances        HOME MEDICATIONS:  Home Medications:  Aspirin Enteric Coated 81 mg oral delayed release tablet: 1 tab(s) orally once a day (26 Jul 2021 12:02)  ibuprofen 200 mg oral tablet: 2 tab(s) orally 2 times a day, As Needed for Pain (26 Jul 2021 12:02)  Multiple Vitamins oral tablet: 1 tab(s) orally once a day (26 Jul 2021 12:02)  triamterene-hydrochlorothiazide 37.5 mg-25 mg oral capsule: 1 cap(s) orally once a day (26 Jul 2021 12:02)      REVIEW OF SYSTEMS:  Constitutional: [x ] negative [ ] fevers [ ] chills [ ] weight loss [ ] weight gain  HEENT: [x ] negative [ ] dry eyes [ ] eye irritation [ ] postnasal drip [ ] nasal congestion  CV: [x ] negative  [ ] chest pain [ ] orthopnea [ ] palpitations [ ] murmur  Resp: [ x] negative [ ] cough [ ] shortness of breath [ ] dyspnea [ ] wheezing [ ] sputum [ ] hemoptysis  GI: [ ] negative [ ] nausea [ ] vomiting [ ] diarrhea [ ] constipation [ x] abd pain [ ] dysphagia   : [ x] negative [ ] dysuria [ ] nocturia [ ] hematuria [ ] increased urinary frequency  Musculoskeletal: [x ] negative [ ] back pain [ ] myalgias [ ] arthralgias [ ] fracture  Skin: [x ] negative [ ] rash [ ] itch  Neurological: [x ] negative [ ] headache [ ] dizziness [ ] syncope [ ] weakness [ ] numbness  Psychiatric: [x ] negative [ ] anxiety [ ] depression  Endocrine: [ x] negative [ ] diabetes [ ] thyroid problem  Hematologic/Lymphatic: [x ] negative [ ] anemia [ ] bleeding problem  Allergic/Immunologic: [ x] negative [ ] itchy eyes [ ] nasal discharge [ ] hives [ ] angioedema  [ ] All other systems negative  [ ] Unable to assess ROS because ________    OBJECTIVE:  ICU Vital Signs Last 24 Hrs  T(C): 36.7 (30 Jul 2021 17:00), Max: 36.9 (30 Jul 2021 09:00)  T(F): 98 (30 Jul 2021 17:00), Max: 98.4 (30 Jul 2021 09:00)  HR: 117 (30 Jul 2021 17:00) (111 - 121)  BP: 159/81 (30 Jul 2021 17:00) (135/90 - 159/81)  BP(mean): --  ABP: --  ABP(mean): --  RR: 18 (30 Jul 2021 17:00) (17 - 18)  SpO2: 94% (30 Jul 2021 17:00) (94% - 96%)        07-29 @ 07:01  -  07-30 @ 07:00  --------------------------------------------------------  IN: 50 mL / OUT: 2500 mL / NET: -2450 mL    07-30 @ 07:01  -  07-30 @ 17:22  --------------------------------------------------------  IN: 100 mL / OUT: 500 mL / NET: -400 mL      CAPILLARY BLOOD GLUCOSE          PHYSICAL EXAM:  General: no acute distress  Respiratory: lungs clear bilaterally, no focal wheezing, crackles, tachypnea   Cardiovascular:  regular rate and rhythm, no rubs, gallops murmurs   Abdomen:  soft, non-distended   Extremities: no significant edema   Skin: no rashes, cyanosis    Neurological: no gross/focal deficits appreciated   Psychiatry: appropriate     LINES:     HOSPITAL MEDICATIONS:  Standing Meds:  amLODIPine   Tablet 5 milliGRAM(s) Oral daily  aspirin enteric coated 81 milliGRAM(s) Oral daily  chlorhexidine 4% Liquid 1 Application(s) Topical <User Schedule>  famotidine    Tablet 20 milliGRAM(s) Oral two times a day  multivitamin 1 Tablet(s) Oral daily      PRN Meds:  acetaminophen   Tablet .. 650 milliGRAM(s) Oral every 6 hours PRN  levalbuterol Inhalation 0.63 milliGRAM(s) Inhalation every 6 hours PRN  ondansetron Injectable 4 milliGRAM(s) IV Push every 6 hours PRN  polyethylene glycol 3350 17 Gram(s) Oral daily PRN  sodium chloride 0.9% lock flush 10 milliLiter(s) IV Push every 1 hour PRN      LABS:                        11.3   9.03  )-----------( 287      ( 29 Jul 2021 11:12 )             35.4     Hgb Trend: 11.3<--, 10.8<--, 11.6<--, 11.9<--, 11.5<--  07-30    141  |  97<L>  |  19  ----------------------------<  107<H>  3.6   |  28  |  0.80    Ca    9.8      30 Jul 2021 07:05      Creatinine Trend: 0.80<--, 0.93<--, 2.36<--, 3.74<--, 6.49<--, 6.55<--            MICROBIOLOGY:     Culture - Fungal, Body Fluid (collected 27 Jul 2021 18:35)  Source: .Body Fluid Peritoneal Fluid  Preliminary Report (28 Jul 2021 06:34):    Testing in progress    Culture - Body Fluid with Gram Stain (collected 27 Jul 2021 18:35)  Source: .Body Fluid Peritoneal Fluid  Gram Stain (27 Jul 2021 23:07):    No polymorphonuclear leukocytes seen    No organisms seen    by cytocentrifuge  Preliminary Report (28 Jul 2021 18:07):    No growth        RADIOLOGY:  [ ] Reviewed and interpreted by me    PULMONARY FUNCTION TESTS:    EKG:

## 2021-08-02 NOTE — PROGRESS NOTE ADULT - SUBJECTIVE AND OBJECTIVE BOX
R4 GYN ONC Progress Note    S: Pt seen and examined at bedside. No overnight events. Pt with stable symptoms overnight. Tolerating regular diet. Comfortable on 2L nc.     MEDICATIONS  (STANDING):  amLODIPine   Tablet 5 milliGRAM(s) Oral daily  aspirin enteric coated 81 milliGRAM(s) Oral daily  famotidine    Tablet 20 milliGRAM(s) Oral two times a day  multivitamin 1 Tablet(s) Oral daily    MEDICATIONS  (PRN):  acetaminophen   Tablet .. 650 milliGRAM(s) Oral every 6 hours PRN Mild Pain (1 - 3), Moderate Pain (4 - 6)  levalbuterol Inhalation 0.63 milliGRAM(s) Inhalation every 6 hours PRN shortness of breath  ondansetron Injectable 4 milliGRAM(s) IV Push every 6 hours PRN Nausea and/or Vomiting  polyethylene glycol 3350 17 Gram(s) Oral daily PRN Constipation  sodium chloride 0.9% lock flush 10 milliLiter(s) IV Push every 1 hour PRN Pre/post blood products, medications, blood draw, and to maintain line patency      Vital Signs Last 24 Hrs  T(C): 37.2 (02 Aug 2021 05:00), Max: 37.2 (02 Aug 2021 05:00)  T(F): 98.9 (02 Aug 2021 05:00), Max: 98.9 (02 Aug 2021 05:00)  HR: 114 (02 Aug 2021 05:00) (114 - 125)  BP: 140/89 (02 Aug 2021 05:00) (127/73 - 140/89)  BP(mean): --  RR: 18 (02 Aug 2021 05:00) (18 - 19)  SpO2: 96% (02 Aug 2021 05:00) (93% - 99%)    08-01 @ 07:01  -  08-02 @ 05:48  --------------------------------------------------------  IN: 460 mL / OUT: 500 mL / NET: -40 mL        PHYSICAL EXAM:  General: mild respiratory distress, sitting in tripod position.   CV: tachycardia, regular rhythm  Lungs: bilateral crackles, on 2L nc  Abd: tense, moderately distended, nontender, +fluid wave, +shifting dullness  Ext: + pitting edema to the b/l knees    Labs, additional tests:    07-31    143  |  98  |  16  ----------------------------<  86  3.8   |  29  |  0.74    Ca    9.6      31 Jul 2021 07:30

## 2021-08-02 NOTE — PROGRESS NOTE ADULT - PROBLEM SELECTOR PLAN 4
- Initial presentation with firm, distended, diffusely tender abdomen with no guarding  - Pleural effusion on CT abdomen and Pelvis, right > left  - Ascites of unknown etiology in setting of transaminitis. Could be malignant fluid in the setting of ovarian mass  - Initial LFTs 80/65. Hepatitis panel negative. Down trended  - Paracentesis performed 7/27 produced 60mLs of serous fluid. Gram stain negative, negative for SBP on cytology. Pending  fungal cultures, and cytology

## 2021-08-02 NOTE — PROCEDURAL SAFETY CHECKLIST WITH OR WITHOUT SEDATION - NSPOSTCOMMENTFT_GEN_ALL_CORE
pt remain on unit post procedure. oxygen 2 liter remain in placed. pt denies pain at this time. chest x-ray ordered by MD. will continue to monitor.

## 2021-08-02 NOTE — PROGRESS NOTE ADULT - SUBJECTIVE AND OBJECTIVE BOX
PROGRESS NOTE:   Authored by Keanu Willard MD  Pager:  [phone]    Patient is a 66y old  Female who presents with a chief complaint of Abdominal Pain (02 Aug 2021 05:48)      SUBJECTIVE / OVERNIGHT EVENTS:    ADDITIONAL REVIEW OF SYSTEMS:    MEDICATIONS  (STANDING):  amLODIPine   Tablet 5 milliGRAM(s) Oral daily  aspirin enteric coated 81 milliGRAM(s) Oral daily  famotidine    Tablet 20 milliGRAM(s) Oral two times a day  multivitamin 1 Tablet(s) Oral daily    MEDICATIONS  (PRN):  acetaminophen   Tablet .. 650 milliGRAM(s) Oral every 6 hours PRN Mild Pain (1 - 3), Moderate Pain (4 - 6)  levalbuterol Inhalation 0.63 milliGRAM(s) Inhalation every 6 hours PRN shortness of breath  ondansetron Injectable 4 milliGRAM(s) IV Push every 6 hours PRN Nausea and/or Vomiting  polyethylene glycol 3350 17 Gram(s) Oral daily PRN Constipation  sodium chloride 0.9% lock flush 10 milliLiter(s) IV Push every 1 hour PRN Pre/post blood products, medications, blood draw, and to maintain line patency      CAPILLARY BLOOD GLUCOSE        I&O's Summary    01 Aug 2021 07:01  -  02 Aug 2021 07:00  --------------------------------------------------------  IN: 460 mL / OUT: 500 mL / NET: -40 mL        PHYSICAL EXAM:  Vital Signs Last 24 Hrs  T(C): 37.2 (02 Aug 2021 05:00), Max: 37.2 (02 Aug 2021 05:00)  T(F): 98.9 (02 Aug 2021 05:00), Max: 98.9 (02 Aug 2021 05:00)  HR: 114 (02 Aug 2021 05:00) (114 - 125)  BP: 140/89 (02 Aug 2021 05:00) (127/73 - 140/89)  BP(mean): --  RR: 18 (02 Aug 2021 05:00) (18 - 19)  SpO2: 96% (02 Aug 2021 05:00) (93% - 99%)    GENERAL: No acute distress, well-developed  HEAD:  Atraumatic, Normocephalic  EYES: EOMI, PERRLA, conjunctiva and sclera clear  NECK: Supple, no lymphadenopathy, no JVD  CHEST/LUNG: CTAB; No wheezes, rales, or rhonchi  HEART: Regular rate and rhythm; No murmurs, rubs, or gallops  ABDOMEN: Soft, non-tender, non-distended; normal bowel sounds, no organomegaly  EXTREMITIES:  2+ peripheral pulses b/l, No clubbing, cyanosis, or edema  NEUROLOGY: A&O x 3, no focal deficits  SKIN: No rashes or lesions    LABS:                      RADIOLOGY & ADDITIONAL TESTS:  Results Reviewed:   Imaging Personally Reviewed:  Electrocardiogram Personally Reviewed:    COORDINATION OF CARE:  Care Discussed with Consultants/Other Providers [Y/N]:  Prior or Outpatient Records Reviewed [Y/N]:   PROGRESS NOTE:   Authored by Keanu Willard MD  Pager:  [phone]    Patient is a 66y old  Female who presents with a chief complaint of Abdominal Pain (02 Aug 2021 05:48)      SUBJECTIVE / OVERNIGHT EVENTS:  NAEO. Pt states she is still feeling slightly short of breath when not on oxygen, though it is unchanged from the last few days. Tolerating diet well.    REVIEW OF SYSTEMS:    CONSTITUTIONAL: No weakness, fevers or chills  RESPIRATORY: No cough, wheezing, hemoptysis  CARDIOVASCULAR: No chest pain or palpitations  GASTROINTESTINAL: No abdominal or epigastric pain. No nausea, vomiting, or hematemesis; No diarrhea or constipation. No melena or hematochezia.  SKIN: No itching, rashes      MEDICATIONS  (STANDING):  amLODIPine   Tablet 5 milliGRAM(s) Oral daily  aspirin enteric coated 81 milliGRAM(s) Oral daily  famotidine    Tablet 20 milliGRAM(s) Oral two times a day  multivitamin 1 Tablet(s) Oral daily    MEDICATIONS  (PRN):  acetaminophen   Tablet .. 650 milliGRAM(s) Oral every 6 hours PRN Mild Pain (1 - 3), Moderate Pain (4 - 6)  levalbuterol Inhalation 0.63 milliGRAM(s) Inhalation every 6 hours PRN shortness of breath  ondansetron Injectable 4 milliGRAM(s) IV Push every 6 hours PRN Nausea and/or Vomiting  polyethylene glycol 3350 17 Gram(s) Oral daily PRN Constipation  sodium chloride 0.9% lock flush 10 milliLiter(s) IV Push every 1 hour PRN Pre/post blood products, medications, blood draw, and to maintain line patency      CAPILLARY BLOOD GLUCOSE        I&O's Summary    01 Aug 2021 07:01  -  02 Aug 2021 07:00  --------------------------------------------------------  IN: 460 mL / OUT: 500 mL / NET: -40 mL        PHYSICAL EXAM:  Vital Signs Last 24 Hrs  T(C): 37.2 (02 Aug 2021 05:00), Max: 37.2 (02 Aug 2021 05:00)  T(F): 98.9 (02 Aug 2021 05:00), Max: 98.9 (02 Aug 2021 05:00)  HR: 114 (02 Aug 2021 05:00) (114 - 125)  BP: 140/89 (02 Aug 2021 05:00) (127/73 - 140/89)  BP(mean): --  RR: 18 (02 Aug 2021 05:00) (18 - 19)  SpO2: 96% (02 Aug 2021 05:00) (93% - 99%)    GENERAL: No acute distress, well-developed  HEAD:  Atraumatic, Normocephalic  EYES: EOMI, PERRLA, conjunctiva and sclera clear  NECK: Supple, no lymphadenopathy, no JVD  CHEST/LUNG: CTAB; No wheezes, rales, or rhonchi  HEART: Regular rate and rhythm; No murmurs, rubs, or gallops  ABDOMEN: Soft, non-tender, non-distended; normal bowel sounds, no organomegaly  EXTREMITIES:  2+ peripheral pulses b/l, No clubbing, cyanosis, or edema  NEUROLOGY: A&O x 3, no focal deficits  SKIN: No rashes or lesions    LABS:                      RADIOLOGY & ADDITIONAL TESTS:  Results Reviewed:   Imaging Personally Reviewed:  Electrocardiogram Personally Reviewed:    COORDINATION OF CARE:  Care Discussed with Consultants/Other Providers [Y/N]:  Prior or Outpatient Records Reviewed [Y/N]:   PROGRESS NOTE:   Authored by Keanu Willard MD  Pager:  [phone]    Patient is a 66y old  Female who presents with a chief complaint of Abdominal Pain (02 Aug 2021 05:48)      SUBJECTIVE / OVERNIGHT EVENTS:  NAEO. Pt states she is still feeling slightly short of breath when not on oxygen, though it is unchanged from the last few days. Tolerating diet well.    REVIEW OF SYSTEMS:    CONSTITUTIONAL: No weakness, fevers or chills  RESPIRATORY: No cough, wheezing, hemoptysis  CARDIOVASCULAR: No chest pain or palpitations  GASTROINTESTINAL: No abdominal or epigastric pain. No nausea, vomiting, or hematemesis; No diarrhea or constipation. No melena or hematochezia.  SKIN: No itching, rashes      MEDICATIONS  (STANDING):  amLODIPine   Tablet 5 milliGRAM(s) Oral daily  aspirin enteric coated 81 milliGRAM(s) Oral daily  famotidine    Tablet 20 milliGRAM(s) Oral two times a day  multivitamin 1 Tablet(s) Oral daily    MEDICATIONS  (PRN):  acetaminophen   Tablet .. 650 milliGRAM(s) Oral every 6 hours PRN Mild Pain (1 - 3), Moderate Pain (4 - 6)  levalbuterol Inhalation 0.63 milliGRAM(s) Inhalation every 6 hours PRN shortness of breath  ondansetron Injectable 4 milliGRAM(s) IV Push every 6 hours PRN Nausea and/or Vomiting  polyethylene glycol 3350 17 Gram(s) Oral daily PRN Constipation  sodium chloride 0.9% lock flush 10 milliLiter(s) IV Push every 1 hour PRN Pre/post blood products, medications, blood draw, and to maintain line patency      CAPILLARY BLOOD GLUCOSE        I&O's Summary    01 Aug 2021 07:01  -  02 Aug 2021 07:00  --------------------------------------------------------  IN: 460 mL / OUT: 500 mL / NET: -40 mL        PHYSICAL EXAM:  Vital Signs Last 24 Hrs  T(C): 37.2 (02 Aug 2021 05:00), Max: 37.2 (02 Aug 2021 05:00)  T(F): 98.9 (02 Aug 2021 05:00), Max: 98.9 (02 Aug 2021 05:00)  HR: 114 (02 Aug 2021 05:00) (114 - 125)  BP: 140/89 (02 Aug 2021 05:00) (127/73 - 140/89)  BP(mean): --  RR: 18 (02 Aug 2021 05:00) (18 - 19)  SpO2: 96% (02 Aug 2021 05:00) (93% - 99%)    GENERAL: No acute distress, well-developed, on NC  HEAD:  Atraumatic, Normocephalic  EYES: EOMI, PERRLA, conjunctiva and sclera clear  NECK: Supple, no lymphadenopathy, no JVD  CHEST/LUNG: CTAB; No wheezes, rales, or rhonchi  HEART: Regular rate and rhythm; No murmurs, rubs, or gallops  ABDOMEN: Soft, non-tender, distended; normal bowel sounds, no organomegaly  EXTREMITIES:  2+ peripheral pulses b/l, No clubbing, cyanosis, or edema  NEUROLOGY: A&O x 3, no focal deficits  SKIN: No rashes or lesions    LABS:                      RADIOLOGY & ADDITIONAL TESTS:  Results Reviewed:   Imaging Personally Reviewed:  Electrocardiogram Personally Reviewed:    COORDINATION OF CARE:  Care Discussed with Consultants/Other Providers [Y/N]:  Prior or Outpatient Records Reviewed [Y/N]:

## 2021-08-02 NOTE — CONSULT NOTE ADULT - ATTENDING COMMENTS
pt s/p right side thoracentesis, feeling less dyspneic. Exudative pleural fluid concerning for malignancy. f/u cytopath, and fluid cultures.
Agree with assessment and plan.    CT scan reviewed  Recommend renal US duplex to evaluate renal vein thrombosis.    Patient has significant abdominal ascites, abdomen pain, and elevated serum lactate.  Consider surgery evaluation also abdominal compartment syndrome    CT demonstrated no hydronephrosis bilaterally. Etiology unlikely to improve with bilateral ureteral stenting. If not improving with medical management, consider IR consult for left nephrostomy tube draining.    Andrew Ontiveros MD  [address] [place] [address]  [address]  [place] [address]  [phone]
65 y/o F with PMH as above here with abdominal pain  Noted to have pleural effusion (R>L) on imaging  Risks and benefits explained to patient re: thoracentesis  Patient deferred tap at this time  Will sign off, please reconsult if patient is amenable  Rest of plan as above.
Lethargic but conversant and oriented.  Extensive conversations with pt, son MIGUEL regarding need for temporary HD  1..  Renal failure--likely ARF which may be substantial obstructive in nature.  Unclear pelvic process resulting in hydronephrosis requiring stent.  HD initiation and will consider percutaneous nephrostomy.  Volume optimization.  Check PO4, VitD, PTH  2.  hyperkalemia--NaHCO3, lokelma.  Calcium supplement for ionized <1.1    discussed with med team
Chart, labs & imaging reviewed.  In light of pleural effusions and respiratory status, would be better candidate for neoadjuvant chemotherapy followed by interval cytoreductive surgery.  Expedite cytopath result as patient may benefit from first cycle of carboplatin/paclitaxel as an inpatient due to symptomatic bilateral pleural effusions/ascites.  Will continue to follow with you.
Pt seen with NP  Agree with above plan    67yo F with ovarian mass  Consulted for GOC  Pt and family want to pursue DMT, plan would be for outpatient oncology f/u

## 2021-08-02 NOTE — PROGRESS NOTE ADULT - PROBLEM SELECTOR PLAN 2
- On 2L NC with SPO2 90-94  - CXR 7/27 with worsening Bilateral pleural effusions right greater than left with compressive atelectasis at the right lung base.  - Bedside POCUS with significant pleural effusion, right greater than left.  - Per pulm patient w/adequate fluid pocket for thoracentesis - plan for today  - Will attempt wean off O2 after thoracentesis. - Onset of stable sinus tachycardia HR now fluctuating between 110-130. Most likely multifactorial as a result of abdominal ascites and pleural effusion  - TSH within normal limit.  - Associated with SOB with SPO2 87% on 1L NC and 1 instance of sharp CP. SOB most likely secondary to pleural effusion. Was concerning for PE in the setting of hypercoagulable state 2/2 ovarian malignancy. Venous doppler with patent vessels, no evidence of PE. Unable to undergo CTA w/contrast due to superimposed PURVI. Opted for V/Q scan which resulted low probability of PE.  - TTE Pending.

## 2021-08-02 NOTE — PROGRESS NOTE ADULT - ASSESSMENT
A/P: 65y/o s/p recent admission to [facility] with concern for ovarian malignancy admitted with PURVI, hyperkalemia, persistent sinus tachycardia, and acute hypoxic respiratory distress in the setting of bilateral pleural effusions. CT A/P notable for moderate ascites and B/l pleural effusion and L ureteral stent. Ovaries and uterus wnl. However, previous imaging at [facility] notable for bilateral soft tissue prominence and adnexal nodularity with increased soft tissue prominence mild thickening and enhancement of the peritoneal lining at the posterior cul-de-sac.  elevated to 252. Awaiting cytopathology results.    - f/u paracentesis cytology results from 7/27  - f/u thoracentesis results from today  - Request that the primary team obtain CD of CT images from [place] to better access findings    MARYBETH Villanueva PGY4

## 2021-08-02 NOTE — DIETITIAN INITIAL EVALUATION ADULT. - OTHER INFO
67 y/o female admitted with abdominopelvic ascites now dx with malignant ovarian ca. Visited with pt to obtain nutrition hx. Pt said that her abdominal pain has lessened and her oral intake fluctuates between ~50-75% depending on whether she has some nausea related to her abdominal pain or not. No N/V at present. Last BM 7/30. She denies food allergies or issues with chewing/swallowing. Food preferences taken and menu alternatives discussed. Given that pt has suboptimal oral intake with higher estimated nutrition needs due to metastatic disease, suggest changing diet to Low Sodium with Ensure Enlive 2x daily (700 angela and 40 gm protein) to optimize oral intake and nutrition. She said her weight has been stable PTA despite her poor oral intake. Suspect abdominal ascites had been masking possible weight loss as pt had not eaten well for at least a month. Since she has 1+ L/R leg edema, recommend daily weights taken. Pt presents at moderate risk for malnutrition based on current mild extremity edema coupled with <75% of estimated nutrition needs met over 1 mo PTA. Encourage and monitor oral intake, especially of nutrition supplement. RDN services to remain available as needed.

## 2021-08-02 NOTE — PROGRESS NOTE ADULT - PROBLEM SELECTOR PLAN 3
- Ovarian mass found on Previous CT Abd/Pelvis with contrast at [facility]. Results showing Ascites, bowel wall thickening. fat stranding of greater omentum and bilateral adnexal masses  - Concern for omental metastasis, in setting of Ascites and Plueral effusion  -  elevated to 252. CEA, CA-19-9, AFP, and inhibin did not result before patient was discharged on 7/16  - Patient was supposed to establish oncologic care with Dr. Tutu Lenz([phone]) GYN-Onc at [facility] after discharge from [place]. Coordinated with [facility] and, and inpatient GYN-ONC now on board. Appreciate recs.  - PCP: Dr. Elmo Hutchison: [phone]. - Ovarian mass found on Previous CT Abd/Pelvis with contrast at [facility]. Results showing Ascites, bowel wall thickening. fat stranding of greater omentum and bilateral adnexal masses  - Concern for omental metastasis, in setting of Ascites and Plueral effusion  -  elevated to 252.  <2 and CEA <1.  AFP, and inhibin did not result before patient was discharged on 7/16 from [place]  - Patient was supposed to establish oncologic care with Dr. Tutu Lenz([phone]) GYN-Onc at [facility] after discharge from [place]. Coordinated with [facility] and, and inpatient GYN-ONC now on board. Appreciate recs.  - PCP: Dr. Elmo Hutchison: [phone].

## 2021-08-02 NOTE — CONSULT NOTE ADULT - CONSULT REASON
PURVI s/p left ureteral stent
PURVI
Pleural effusion
Complex decision making related to goals of care and assistance with pain and symptom management
Pleural Effusion
suspected ovarian cancer

## 2021-08-02 NOTE — PROGRESS NOTE ADULT - PROBLEM SELECTOR PLAN 1
- Onset of stable sinus tachycardia HR now fluctuating between 110-130. Most likely multifactorial as a result of abdominal ascites and pleural effusion  - TSH within normal limit.  - Associated with SOB with SPO2 87% on 1L NC and 1 instance of sharp CP. SOB most likely secondary to pleural effusion. Was concerning for PE in the setting of hypercoagulable state 2/2 ovarian malignancy. Venous doppler with patent vessels, no evidence of PE. Unable to undergo CTA w/contrast due to superimposed PURVI. Opted for V/Q scan which resulted low probability of PE.  - TTE Pending. - On 2L NC with SPO2 90-94  - CXR 7/27 with worsening Bilateral pleural effusions right greater than left with compressive atelectasis at the right lung base.  - Bedside POCUS with significant pleural effusion, right greater than left.  - Per pulm patient w/adequate fluid pocket for thoracentesis - plan for today  - Will attempt wean off O2 after thoracentesis.

## 2021-08-02 NOTE — CONSULT NOTE ADULT - ASSESSMENT
Mary Stafford is a 65yo F w/ HTN, Pre-Diabetes, HL, recent admission to OSH ([facility]) for ovarian mass, abdominal ascites, and left hydroureteronephrosis s/p renal stent placement for left hydronephrosis.  Per chart review, she was discharged on 7-16-21 for outpatient gynonc follow-up and presented to [facility] 7/26/21 for approximately 1 month of constant diffuse abdominal pain associated with nausea and vomiting.  Pulmonology has been consulted for thoracentesis of bilateral pleural effusion (R greater than left) detected on CXR on 7/27/21.     #Bilateral Pleural effusions, R>L  - patient s/p R sided thoracentesis 8/2/21  - will plan for thoracentesis today (8/2/21)    Recommendations  - pulm to follow up on fluid studies and cytopathology from thoracentesis     Patient seen and discussed w/Dr. Steve Schroeder PGY4  [id number] Mary Stafford is a 65yo F w/ HTN, Pre-Diabetes, HL, recent admission to OSH ([facility]) for ovarian mass, abdominal ascites, and left hydroureteronephrosis s/p renal stent placement for left hydronephrosis.  Per chart review, she was discharged on 7-16-21 for outpatient gynonc follow-up and presented to [facility] 7/26/21 for approximately 1 month of constant diffuse abdominal pain associated with nausea and vomiting.  Pulmonology has been consulted for thoracentesis of bilateral pleural effusion (R greater than left) detected on CXR on 7/27/21.     #Bilateral Pleural effusions, R>L  - patient s/p R sided thoracentesis 8/2/21    Recommendations  - pulm to follow up on fluid studies and cytopathology from thoracentesis     Patient seen and discussed w/Dr. Steve Schroeder PGY4  [id number]

## 2021-08-02 NOTE — DIETITIAN INITIAL EVALUATION ADULT. - PERTINENT MEDS FT
MEDICATIONS  (STANDING):  amLODIPine   Tablet 5 milliGRAM(s) Oral daily  aspirin enteric coated 81 milliGRAM(s) Oral daily  famotidine    Tablet 20 milliGRAM(s) Oral two times a day  multivitamin 1 Tablet(s) Oral daily  potassium phosphate / sodium phosphate Powder (PHOS-NaK) 1 Packet(s) Oral four times a day with meals

## 2021-08-02 NOTE — CONSULT NOTE ADULT - CONSULT REQUESTED DATE/TIME
02-Aug-2021 20:09
01-Aug-2021 05:52
30-Jul-2021 17:22
26-Jul-2021 12:41
25-Jul-2021 19:23
28-Jul-2021 16:31

## 2021-08-02 NOTE — PROCEDURE NOTE - ADDITIONAL PROCEDURE DETAILS
R-sided thoracentesis performed w/approximately 900 ml of serosanguineous fluid obtained.  Patient reported minimal pain and tolerated procedure well.  Pleural fluid studies sent and are pending.
I was consulted for a diagnostic and therapeutic paracentesis; patient did not have a significant amount of fluid to warrant a therapeutic paracentesis (much of her abdomen is due to the mass rather than fluid). I completed a diagnostic tap and notified the primary team regarding the sample and it was left at bedside.

## 2021-08-02 NOTE — CHART NOTE - NSCHARTNOTEFT_GEN_A_CORE
Called to patient's room by nursing for tachypnea. Patient was mildly tachypneic to low 20s but did not look to have increased work of breathing. Decreased lung sounds at bases consistent with previous documentation, otherwise normal lung exam. Denies chest pain, SOB. Only pain was around site of her thoracocentesis. Saturating 95% on 3L NC. Called to patient's room by nursing for tachypnea. Patient was mildly tachypneic to low 20s but did not look to have increased work of breathing. Decreased lung sounds at bases consistent with previous documentation, otherwise normal lung exam. Denies chest pain, SOB. Only pain was around site of her thoracocentesis. Saturating 95% on 3L NC. Will obtain VBG. Called to patient's room by nursing for tachypnea. Patient was mildly tachypneic to low 20s but did not look to have increased work of breathing. Decreased lung sounds at bases consistent with previous documentation, otherwise normal lung exam. Denies chest pain, SOB. Only pain was around site of her thoracocentesis. Saturating 95% on 3L NC. Will obtain VBG and CXR.

## 2021-08-02 NOTE — PROGRESS NOTE ADULT - SUBJECTIVE AND OBJECTIVE BOX
SUBJECTIVE AND OBJECTIVE: reports she has no pain on exam but has worsening PE, will add recs if symptoms arise   INTERVAL HPI/OVERNIGHT EVENTS:     DNR on chart:   Allergies    penicillins (Rash)    Intolerances    MEDICATIONS  (STANDING):  amLODIPine   Tablet 5 milliGRAM(s) Oral daily  aspirin enteric coated 81 milliGRAM(s) Oral daily  famotidine    Tablet 20 milliGRAM(s) Oral two times a day  multivitamin 1 Tablet(s) Oral daily  potassium phosphate / sodium phosphate Powder (PHOS-NaK) 1 Packet(s) Oral four times a day with meals    MEDICATIONS  (PRN):  acetaminophen   Tablet .. 650 milliGRAM(s) Oral every 6 hours PRN Mild Pain (1 - 3), Moderate Pain (4 - 6)  levalbuterol Inhalation 0.63 milliGRAM(s) Inhalation every 6 hours PRN shortness of breath  ondansetron Injectable 4 milliGRAM(s) IV Push every 6 hours PRN Nausea and/or Vomiting  polyethylene glycol 3350 17 Gram(s) Oral daily PRN Constipation  sodium chloride 0.9% lock flush 10 milliLiter(s) IV Push every 1 hour PRN Pre/post blood products, medications, blood draw, and to maintain line patency      ITEMS UNCHECKED ARE NOT PRESENT    PRESENT SYMPTOMS: [ ]Unable to obtain due to poor mentation   Source if other than patient:  [ ]Family   [ ]Team     Pain:  [ ]yes [x ]no, denies   QOL impact -   Location -                     Aggravating factors -   Quality -   Radiation -  Timing- intermittent  Severity (0-10 scale):   Minimal acceptable level (0-10 scale):     Dyspnea:                           [ ]Mild [ ]Moderate [ ]Severe  Anxiety:                             [ ]Mild [ ]Moderate [ ]Severe  Fatigue:                             [ ]Mild [ ]Moderate [ ]Severe  Nausea:                             [ ]Mild [ ]Moderate [ ]Severe  Loss of appetite:              [ ]Mild [ ]Moderate [ ]Severe  Constipation:                    [ ]Mild [ ]Moderate [ ]Severe    PAIN AD Score:	  http://geriatrictoolkit.[employer].edu/cog/painad.pdf (Ctrl + left click to view)    Other Symptoms:  [x ]All other review of systems negative     Palliative Performance Status Version 2:  70%      http://npcrc.org/files/news/palliative_performance_scale_ppsv2.pdf    PHYSICAL EXAM:  Vital Signs Last 24 Hrs  T(C): 37 (02 Aug 2021 13:00), Max: 37.2 (02 Aug 2021 05:00)  T(F): 98.6 (02 Aug 2021 13:00), Max: 98.9 (02 Aug 2021 05:00)  HR: 117 (02 Aug 2021 13:00) (114 - 125)  BP: 137/85 (02 Aug 2021 13:00) (124/85 - 140/89)  BP(mean): --  RR: 20 (02 Aug 2021 13:00) (18 - 20)  SpO2: 95% (02 Aug 2021 13:00) (93% - 96%)    I&O's Summary    01 Aug 2021 07:01  -  02 Aug 2021 07:00  --------------------------------------------------------  IN: 460 mL / OUT: 500 mL / NET: -40 mL       GENERAL:  [x ]Alert  [x ]Oriented x 4  [ ]Lethargic  [ ]Cachexia  [ ]Unarousable  [x ]Verbal  [ ]Non-Verbal  Behavioral:   [ ] Anxiety  [ ] Delirium [ ] Agitation [ ] Other  HEENT:  [x ]Normal   [ ]Dry mouth   [ ]ET Tube/Trach  [ ]Oral lesions  PULMONARY:   [x ]Clear [ ]Tachypnea  [ ]Audible excessive secretions   [ ]Rhonchi        [ ]Right [ ]Left [ ]Bilateral  [ ]Crackles        [ ]Right [ ]Left [ ]Bilateral  [ ]Wheezing     [ ]Right [ ]Left [ Bilateral  [ ]Diminished breath sounds [ ]right [ ]left [x ]bilateral  CARDIOVASCULAR:    [ ]Regular [ ]Irregular [x ]Tachy  [ ]Crow [ ]Murmur [ ]Other  GASTROINTESTINAL:  [x ]Soft  [x ]Distended   [ ]+BS  [ ]Non tender [ ]Tender  [ ]PEG [ ]OGT/ NGT  Last BM: 8/2  GENITOURINARY:  [x ]Normal [ ] Incontinent   [ ]Oliguria/Anuria   [ ]Charles  MUSCULOSKELETAL:   [x ]Normal   [ ]Weakness  [ ]Bed/Wheelchair bound [ ]Edema  NEUROLOGIC:   [x ]No focal deficits  [ ]Cognitive impairment  [ ]Dysphagia [ ]Dysarthria [ ]Paresis [ ]Other   SKIN:   [ ]Normal    [ ]Rash  [ ]Pressure ulcer(s)       Present on admission [ ]y [ ]n   [x] Ecchymotic     CRITICAL CARE:  [ ]Shock Present  [ ]Septic [ ]Cardiogenic [ ]Neurologic [ ]Hypovolemic  [ ]Vasopressors [ ]Inotropes  [ ]Respiratory failure present [ ]Mechanical Ventilation [ ]Non-invasive ventilatory support [ ]High-Flow  [ ]Acute  [ ]Chronic [ ]Hypoxic  [ ]Hypercarbic [ ]Other  [ ]Other organ failure     LABS:                          10.9   10.12 )-----------( 372      ( 02 Aug 2021 09:08 )             34.4              08-02    141  |  97<L>  |  14  ----------------------------<  99  3.7   |  28  |  0.76    Ca    9.5      02 Aug 2021 09:08  Phos  2.4     08-02  Mg     1.70     08-02    PT/INR - ( 02 Aug 2021 09:08 )   PT: 13.4 sec;   INR: 1.18 ratio         PTT - ( 02 Aug 2021 09:08 )  PTT:24.3 sec    RADIOLOGY & ADDITIONAL STUDIES: reviewed     Protein Calorie Malnutrition Present: [ ]mild [ ]moderate [ ]severe [ ]underweight [ ]morbid obesity  https://www.andeal.org/vault/2440/web/files/ONC/Table_Clinical%20Characteristics%20to%20Document%20Malnutrition-White%20JV%20et%20al%202012.pdf    Height (cm): 160 (07-26-21 @ 19:38)  Weight (kg): 63 (07-26-21 @ 15:22)  BMI (kg/m2): 24.6 (07-26-21 @ 19:38)    [ ]PPSV2 < or = 30%  [ ]significant weight loss [ ]poor nutritional intake [ ]anasarca    [ ]Artificial Nutrition    REFERRALS:   [ ]Chaplaincy  [ ]Hospice  [ ]Child Life  [x ]Social Work  [ ]Case management [ ]Holistic Therapy     Goals of Care Document:

## 2021-08-03 NOTE — PROGRESS NOTE ADULT - PROBLEM SELECTOR PLAN 7
Palliative care consulted for complex decision making related to goals of care and assistance with pain/ symptom management. Recommendations have been made. Pt remains a full code. Will continue to follow for ongoing goc.
Palliative care consulted for complex decision making related to goals of care and assistance with pain/ symptom management. Recommendations have been made. Pt remains a full code. Will continue to follow for ongoing goc.
Plan: DVT prophylaxis: SQH  Diet: DASH/Renal restrictions  Dispo: Appreciate Nephro and uro recs. Awaiting palliative consult due to critically ill patient and complex GOC
DVT prophylaxis: None for right now  Diet: DASH/Renal restrictions  Dispo: Appreciate Nephro and uro recs.
- BP has remained elevated since admission.  - BP meds HCTZ-Triamterene initially held due to Hyperkalemia.  - C/w amlodipine 5mg.

## 2021-08-03 NOTE — PROGRESS NOTE ADULT - PROBLEM SELECTOR PLAN 3
- Ovarian mass found on Previous CT Abd/Pelvis with contrast at [facility]. Results showing Ascites, bowel wall thickening. fat stranding of greater omentum and bilateral adnexal masses  - Concern for omental metastasis, in setting of Ascites and Plueral effusion  -  elevated to 252.  <2 and CEA <1.  AFP, and inhibin did not result before patient was discharged on 7/16 from [facility]  - Patient was supposed to establish oncologic care with Dr. Tutu Lenz([phone]) GYN-Onc at [facility] after discharge from [facility]. Coordinated with [facility] and, and inpatient GYN-ONC now on board. Appreciate recs.  - PCP: Dr. Elmo Hutchison: [phone].

## 2021-08-03 NOTE — PROGRESS NOTE ADULT - PROBLEM SELECTOR PLAN 1
On 2L NC with SPO2 90-94  - CXR 7/27 with worsening Bilateral pleural effusions right greater than left with compressive atelectasis at the right lung base.  - Bedside POCUS with significant pleural effusion, right greater than left.  - s/p thoracentesis 8/2  - Morphine d/c as pt does not want to take morphine   - Management as per primary medical team

## 2021-08-03 NOTE — PROVIDER CONTACT NOTE (OTHER) - RECOMMENDATIONS
see pt
Notify MD.
OK to give IV zofran, monitor BP and HR closely
will continue to monitor
Given Non-Rebreather mask 100 % and monitor patient closely.
see pt
Given Non-Rebreather mask 100 % and monitor patient closely.
OK to reschedule BP meds, continue to monitor BP and HR
see pt
will continue to monitor

## 2021-08-03 NOTE — PROVIDER CONTACT NOTE (OTHER) - ASSESSMENT
No distress noted. All other VSS. Stable on 2L NC. Asymptomatic.
Pt awake and alert, denies pain, temp of 97.5, having intermittent periods of vomiting
patient is A&Ox4, on 2L NC, no chest pain noted. Vs as noted
pt alert and responsive, denies SOB and chest discomfort, On O2 inhalation
Pt awake and alert, denies chest pain, having episodes of vomiting
pt alert and responsive, denies SOB and chest discomfort, On O2 inhalation
patient is A&Ox4, patient placed on 2L NC, Vs as noted
pt alert and responsive, denies SOB and chest discomfort, On O2 inhalation
Patient denied chest pain or palpitations
Patient denied chest pain or palpitations

## 2021-08-03 NOTE — PROVIDER CONTACT NOTE (OTHER) - NAME OF MD/NP/PA/DO NOTIFIED:
Fellow Nephrologist Jignesh Villalba
[facility] Mer[facility] Pak
Dr Pak [id number]
Dr Pak [id number]
[facility] Mer[facility] NGUYEN
[facility]
MD Anand Holbrook
Dr Pak [id number]
Provider Merrick Mercado

## 2021-08-03 NOTE — PROGRESS NOTE ADULT - PROBLEM SELECTOR PLAN 4
- Initial presentation with firm, distended, diffusely tender abdomen with no guarding  - Pleural effusion on CT abdomen and Pelvis, right > left  - Ascites of unknown etiology in setting of transaminitis. Could be malignant fluid in the setting of ovarian mass  - Initial LFTs 80/65. Hepatitis panel negative. Down trended  - Paracentesis performed 7/27 produced 60mLs of serous fluid. Gram stain negative, negative for SBP on cytology. Pending  fungal cultures. Cytology negative for Malignant cells but concerning for Lymphoproliferative disorder. SPEP/UPEP ordered to R/O MM - Initial presentation with firm, distended, diffusely tender abdomen with no guarding  - Pleural effusion on CT abdomen and Pelvis, right > left  - Ascites of unknown etiology in setting of transaminitis. Could be malignant fluid in the setting of ovarian mass  - Initial LFTs 80/65. Hepatitis panel negative. Down trended  - Paracentesis performed 7/27 produced 60mLs of serous fluid. Gram stain negative, negative for SBP on cytology. Pending  fungal cultures. Cytology negative for Malignant cells but concerning for Lymphoproliferative disorder. SPEP/UPEP ordered

## 2021-08-03 NOTE — PROGRESS NOTE ADULT - PROBLEM SELECTOR PROBLEM 7
Encounter for palliative care
Encounter for palliative care
Prophylactic measure
Prophylactic measure
Hyperkalemia
Hypertension

## 2021-08-03 NOTE — PROGRESS NOTE ADULT - PROBLEM SELECTOR PLAN 1
On 2L NC with SPO2 90-94  - CXR 7/27 with worsening Bilateral pleural effusions right greater than left with compressive atelectasis at the right lung base.  - Bedside POCUS with significant pleural effusion, right greater than left.  - S/P Thoracentesis with output of 900ccs of serosanguinous fluid  - Post thoracentesis X-ray shows decreased, effusions in the B/L lungs R>L and improved aeration on right  - Will attempt wean to wean off O2 today. If cannot wean, she will likely require home O2. On 2L NC with SPO2 90-94  - CXR 7/27 with worsening Bilateral pleural effusions right greater than left with compressive atelectasis at the right lung base.  - Bedside POCUS with significant pleural effusion, right greater than left.  - S/P Thoracentesis on 8/2 with output of 900ccs of serosanguinous fluid  - Post thoracentesis X-ray shows decreased, effusions in the B/L lungs R>L and improved aeration on right  - Will attempt wean to wean off O2 today. If cannot wean, she will likely require home O2.

## 2021-08-03 NOTE — PROGRESS NOTE ADULT - SUBJECTIVE AND OBJECTIVE BOX
PROGRESS NOTE:   Authored by Keanu Willard MD  Pager:  [phone]    Patient is a 66y old  Female who presents with a chief complaint of Abdominal Pain (02 Aug 2021 20:12)      SUBJECTIVE / OVERNIGHT EVENTS:    ADDITIONAL REVIEW OF SYSTEMS:    MEDICATIONS  (STANDING):  amLODIPine   Tablet 5 milliGRAM(s) Oral daily  aspirin enteric coated 81 milliGRAM(s) Oral daily  famotidine    Tablet 20 milliGRAM(s) Oral two times a day  multivitamin 1 Tablet(s) Oral daily    MEDICATIONS  (PRN):  acetaminophen   Tablet .. 650 milliGRAM(s) Oral every 6 hours PRN Mild Pain (1 - 3), Moderate Pain (4 - 6)  levalbuterol Inhalation 0.63 milliGRAM(s) Inhalation every 6 hours PRN shortness of breath  morphine  - Injectable 1 milliGRAM(s) IV Push every 3 hours PRN dyspnea/ RR > 20  ondansetron Injectable 4 milliGRAM(s) IV Push every 6 hours PRN Nausea and/or Vomiting  polyethylene glycol 3350 17 Gram(s) Oral daily PRN Constipation  sodium chloride 0.9% lock flush 10 milliLiter(s) IV Push every 1 hour PRN Pre/post blood products, medications, blood draw, and to maintain line patency      CAPILLARY BLOOD GLUCOSE        I&O's Summary    02 Aug 2021 07:01  -  03 Aug 2021 07:00  --------------------------------------------------------  IN: 600 mL / OUT: 0 mL / NET: 600 mL        PHYSICAL EXAM:  Vital Signs Last 24 Hrs  T(C): 36.8 (03 Aug 2021 05:30), Max: 37.2 (02 Aug 2021 09:00)  T(F): 98.2 (03 Aug 2021 05:30), Max: 98.9 (02 Aug 2021 09:00)  HR: 117 (03 Aug 2021 05:30) (115 - 119)  BP: 126/76 (03 Aug 2021 05:30) (124/85 - 143/85)  BP(mean): --  RR: 27 (03 Aug 2021 05:30) (19 - 28)  SpO2: 94% (03 Aug 2021 05:30) (93% - 98%)    GENERAL: No acute distress, well-developed  HEAD:  Atraumatic, Normocephalic  EYES: EOMI, PERRLA, conjunctiva and sclera clear  NECK: Supple, no lymphadenopathy, no JVD  CHEST/LUNG: CTAB; No wheezes, rales, or rhonchi  HEART: Regular rate and rhythm; No murmurs, rubs, or gallops  ABDOMEN: Soft, non-tender, non-distended; normal bowel sounds, no organomegaly  EXTREMITIES:  2+ peripheral pulses b/l, No clubbing, cyanosis, or edema  NEUROLOGY: A&O x 3, no focal deficits  SKIN: No rashes or lesions    LABS:                        11.0   11.59 )-----------( 399      ( 03 Aug 2021 07:01 )             35.0     08-02    141  |  97<L>  |  14  ----------------------------<  99  3.7   |  28  |  0.76    Ca    9.5      02 Aug 2021 09:08  Phos  2.4     08-02  Mg     1.70     08-02      PT/INR - ( 02 Aug 2021 09:08 )   PT: 13.4 sec;   INR: 1.18 ratio         PTT - ( 02 Aug 2021 09:08 )  PTT:24.3 sec          Culture - Body Fluid with Gram Stain (collected 03 Aug 2021 00:48)  Source: .Body Fluid Pleural Fluid  Gram Stain (03 Aug 2021 02:44):    polymorphonuclear leukocytes seen    No organisms seen    by cytocentrifuge        RADIOLOGY & ADDITIONAL TESTS:  Results Reviewed:   Imaging Personally Reviewed:  Electrocardiogram Personally Reviewed:     PROGRESS NOTE:   Authored by Keanu Willard MD  Pager:  [phone]    Patient is a 66y old  Female who presents with a chief complaint of Abdominal Pain (02 Aug 2021 20:12)      SUBJECTIVE / OVERNIGHT EVENTS:  Pt had episode    ADDITIONAL REVIEW OF SYSTEMS:    MEDICATIONS  (STANDING):  amLODIPine   Tablet 5 milliGRAM(s) Oral daily  aspirin enteric coated 81 milliGRAM(s) Oral daily  famotidine    Tablet 20 milliGRAM(s) Oral two times a day  multivitamin 1 Tablet(s) Oral daily    MEDICATIONS  (PRN):  acetaminophen   Tablet .. 650 milliGRAM(s) Oral every 6 hours PRN Mild Pain (1 - 3), Moderate Pain (4 - 6)  levalbuterol Inhalation 0.63 milliGRAM(s) Inhalation every 6 hours PRN shortness of breath  morphine  - Injectable 1 milliGRAM(s) IV Push every 3 hours PRN dyspnea/ RR > 20  ondansetron Injectable 4 milliGRAM(s) IV Push every 6 hours PRN Nausea and/or Vomiting  polyethylene glycol 3350 17 Gram(s) Oral daily PRN Constipation  sodium chloride 0.9% lock flush 10 milliLiter(s) IV Push every 1 hour PRN Pre/post blood products, medications, blood draw, and to maintain line patency      CAPILLARY BLOOD GLUCOSE        I&O's Summary    02 Aug 2021 07:01  -  03 Aug 2021 07:00  --------------------------------------------------------  IN: 600 mL / OUT: 0 mL / NET: 600 mL        PHYSICAL EXAM:  Vital Signs Last 24 Hrs  T(C): 36.8 (03 Aug 2021 05:30), Max: 37.2 (02 Aug 2021 09:00)  T(F): 98.2 (03 Aug 2021 05:30), Max: 98.9 (02 Aug 2021 09:00)  HR: 117 (03 Aug 2021 05:30) (115 - 119)  BP: 126/76 (03 Aug 2021 05:30) (124/85 - 143/85)  BP(mean): --  RR: 27 (03 Aug 2021 05:30) (19 - 28)  SpO2: 94% (03 Aug 2021 05:30) (93% - 98%)    GENERAL: No acute distress, well-developed  HEAD:  Atraumatic, Normocephalic  EYES: EOMI, PERRLA, conjunctiva and sclera clear  NECK: Supple, no lymphadenopathy, no JVD  CHEST/LUNG: CTAB; No wheezes, rales, or rhonchi  HEART: Regular rate and rhythm; No murmurs, rubs, or gallops  ABDOMEN: Soft, non-tender, non-distended; normal bowel sounds, no organomegaly  EXTREMITIES:  2+ peripheral pulses b/l, No clubbing, cyanosis, or edema  NEUROLOGY: A&O x 3, no focal deficits  SKIN: No rashes or lesions    LABS:                        11.0   11.59 )-----------( 399      ( 03 Aug 2021 07:01 )             35.0     08-02    141  |  97<L>  |  14  ----------------------------<  99  3.7   |  28  |  0.76    Ca    9.5      02 Aug 2021 09:08  Phos  2.4     08-02  Mg     1.70     08-02      PT/INR - ( 02 Aug 2021 09:08 )   PT: 13.4 sec;   INR: 1.18 ratio         PTT - ( 02 Aug 2021 09:08 )  PTT:24.3 sec          Culture - Body Fluid with Gram Stain (collected 03 Aug 2021 00:48)  Source: .Body Fluid Pleural Fluid  Gram Stain (03 Aug 2021 02:44):    polymorphonuclear leukocytes seen    No organisms seen    by cytocentrifuge        RADIOLOGY & ADDITIONAL TESTS:  Results Reviewed:   Imaging Personally Reviewed:  Electrocardiogram Personally Reviewed:     PROGRESS NOTE:   Authored by Keanu Willard MD  Pager:  [phone]    Patient is a 66y old  Female who presents with a chief complaint of Abdominal Pain (02 Aug 2021 20:12)      SUBJECTIVE / OVERNIGHT EVENTS:  Pt had episode of tachypnea last night, which has since improved. She denies difficulty breathing while on oxygen. Patient was seen and examined at bedside this morning. Denies any nausea/vomiting/diarrhea, headache, or chest pain/palpitations.     ADDITIONAL REVIEW OF SYSTEMS:  REVIEW OF SYSTEMS:    CONSTITUTIONAL: No weakness, fevers or chills  RESPIRATORY: No cough, wheezing, hemoptysis  CARDIOVASCULAR: No chest pain or palpitations  GASTROINTESTINAL: +Abdominal pain. No nausea, vomiting, or hematemesis; No diarrhea or constipation. No melena or hematochezia.  SKIN: No itching, rashes      MEDICATIONS  (STANDING):  amLODIPine   Tablet 5 milliGRAM(s) Oral daily  aspirin enteric coated 81 milliGRAM(s) Oral daily  famotidine    Tablet 20 milliGRAM(s) Oral two times a day  multivitamin 1 Tablet(s) Oral daily    MEDICATIONS  (PRN):  acetaminophen   Tablet .. 650 milliGRAM(s) Oral every 6 hours PRN Mild Pain (1 - 3), Moderate Pain (4 - 6)  levalbuterol Inhalation 0.63 milliGRAM(s) Inhalation every 6 hours PRN shortness of breath  morphine  - Injectable 1 milliGRAM(s) IV Push every 3 hours PRN dyspnea/ RR > 20  ondansetron Injectable 4 milliGRAM(s) IV Push every 6 hours PRN Nausea and/or Vomiting  polyethylene glycol 3350 17 Gram(s) Oral daily PRN Constipation  sodium chloride 0.9% lock flush 10 milliLiter(s) IV Push every 1 hour PRN Pre/post blood products, medications, blood draw, and to maintain line patency      CAPILLARY BLOOD GLUCOSE        I&O's Summary    02 Aug 2021 07:01  -  03 Aug 2021 07:00  --------------------------------------------------------  IN: 600 mL / OUT: 0 mL / NET: 600 mL        PHYSICAL EXAM:  Vital Signs Last 24 Hrs  T(C): 36.8 (03 Aug 2021 05:30), Max: 37.2 (02 Aug 2021 09:00)  T(F): 98.2 (03 Aug 2021 05:30), Max: 98.9 (02 Aug 2021 09:00)  HR: 117 (03 Aug 2021 05:30) (115 - 119)  BP: 126/76 (03 Aug 2021 05:30) (124/85 - 143/85)  BP(mean): --  RR: 27 (03 Aug 2021 05:30) (19 - 28)  SpO2: 94% (03 Aug 2021 05:30) (93% - 98%)    GENERAL: No acute distress, well-developed  HEAD:  Atraumatic, Normocephalic  EYES: EOMI, PERRLA, conjunctiva and sclera clear  NECK: Supple, no lymphadenopathy, no JVD  CHEST/LUNG: CTAB; Decreased breath sounds at the right base. No wheeze. No rales. No rhonchi  HEART: Regular rate and rhythm; No murmurs, rubs, or gallops  ABDOMEN: Mildly firm with some distention. Non tender, normal bowel sounds, no organomegaly  EXTREMITIES:  2+ peripheral pulses b/l, No clubbing, cyanosis, or edema  NEUROLOGY: A&O x 3, no focal deficits  SKIN: No rashes or lesions    LABS:                        11.0   11.59 )-----------( 399      ( 03 Aug 2021 07:01 )             35.0     08-02    141  |  97<L>  |  14  ----------------------------<  99  3.7   |  28  |  0.76    Ca    9.5      02 Aug 2021 09:08  Phos  2.4     08-02  Mg     1.70     08-02      PT/INR - ( 02 Aug 2021 09:08 )   PT: 13.4 sec;   INR: 1.18 ratio         PTT - ( 02 Aug 2021 09:08 )  PTT:24.3 sec          Culture - Body Fluid with Gram Stain (collected 03 Aug 2021 00:48)  Source: .Body Fluid Pleural Fluid  Gram Stain (03 Aug 2021 02:44):    polymorphonuclear leukocytes seen    No organisms seen    by cytocentrifuge        RADIOLOGY & ADDITIONAL TESTS:  Results Reviewed:   Imaging Personally Reviewed:

## 2021-08-03 NOTE — PROGRESS NOTE ADULT - SUBJECTIVE AND OBJECTIVE BOX
CHIEF COMPLAINT:  abdominal pain       Interval events  - patient seen and examined this AM, was tachypneic on exam   - patient s/p R-sided thoracentesis 8/2,  post thoracentesis CXR not concerning for pneumothorax    PAST MEDICAL & SURGICAL HISTORY:  Hypertension    Ovarian mass    Hypercholesteremia    S/P ureteral stent placement        FAMILY HISTORY:  FHx: hypertension (Mother)    FH: diabetes mellitus (Mother)        SOCIAL HISTORY:  Smoking: denies     Allergies    penicillins (Rash)    Intolerances        HOME MEDICATIONS:  Home Medications:  Aspirin Enteric Coated 81 mg oral delayed release tablet: 1 tab(s) orally once a day (26 Jul 2021 12:02)  ibuprofen 200 mg oral tablet: 2 tab(s) orally 2 times a day, As Needed for Pain (26 Jul 2021 12:02)  Multiple Vitamins oral tablet: 1 tab(s) orally once a day (26 Jul 2021 12:02)  triamterene-hydrochlorothiazide 37.5 mg-25 mg oral capsule: 1 cap(s) orally once a day (26 Jul 2021 12:02)      REVIEW OF SYSTEMS:  Constitutional: [x ] negative [ ] fevers [ ] chills [ ] weight loss [ ] weight gain  HEENT: [x ] negative [ ] dry eyes [ ] eye irritation [ ] postnasal drip [ ] nasal congestion  CV: [x ] negative  [ ] chest pain [ ] orthopnea [ ] palpitations [ ] murmur  Resp: [ x] negative [ ] cough [ ] shortness of breath [ ] dyspnea [ ] wheezing [ ] sputum [ ] hemoptysis  GI: [ ] negative [ ] nausea [ ] vomiting [ ] diarrhea [ ] constipation [ x] abd pain [ ] dysphagia   : [ x] negative [ ] dysuria [ ] nocturia [ ] hematuria [ ] increased urinary frequency  Musculoskeletal: [x ] negative [ ] back pain [ ] myalgias [ ] arthralgias [ ] fracture  Skin: [x ] negative [ ] rash [ ] itch  Neurological: [x ] negative [ ] headache [ ] dizziness [ ] syncope [ ] weakness [ ] numbness  Psychiatric: [x ] negative [ ] anxiety [ ] depression  Endocrine: [ x] negative [ ] diabetes [ ] thyroid problem  Hematologic/Lymphatic: [x ] negative [ ] anemia [ ] bleeding problem  Allergic/Immunologic: [ x] negative [ ] itchy eyes [ ] nasal discharge [ ] hives [ ] angioedema  [ ] All other systems negative  [ ] Unable to assess ROS because ________    OBJECTIVE:  ICU Vital Signs Last 24 Hrs  T(C): 36.7 (30 Jul 2021 17:00), Max: 36.9 (30 Jul 2021 09:00)  T(F): 98 (30 Jul 2021 17:00), Max: 98.4 (30 Jul 2021 09:00)  HR: 117 (30 Jul 2021 17:00) (111 - 121)  BP: 159/81 (30 Jul 2021 17:00) (135/90 - 159/81)  BP(mean): --  ABP: --  ABP(mean): --  RR: 18 (30 Jul 2021 17:00) (17 - 18)  SpO2: 94% (30 Jul 2021 17:00) (94% - 96%)        07-29 @ 07:01  -  07-30 @ 07:00  --------------------------------------------------------  IN: 50 mL / OUT: 2500 mL / NET: -2450 mL    07-30 @ 07:01 - 07-30 @ 17:22  --------------------------------------------------------  IN: 100 mL / OUT: 500 mL / NET: -400 mL      CAPILLARY BLOOD GLUCOSE          PHYSICAL EXAM:  General: no acute distress  Respiratory: lungs clear bilaterally, no focal wheezing, crackles, tachypnea   Cardiovascular:  regular rate and rhythm, no rubs, gallops murmurs   Abdomen:  soft, non-distended   Extremities: no significant edema   Skin: no rashes, cyanosis    Neurological: no gross/focal deficits appreciated   Psychiatry: appropriate     LINES:     HOSPITAL MEDICATIONS:  Standing Meds:  amLODIPine   Tablet 5 milliGRAM(s) Oral daily  aspirin enteric coated 81 milliGRAM(s) Oral daily  chlorhexidine 4% Liquid 1 Application(s) Topical <User Schedule>  famotidine    Tablet 20 milliGRAM(s) Oral two times a day  multivitamin 1 Tablet(s) Oral daily      PRN Meds:  acetaminophen   Tablet .. 650 milliGRAM(s) Oral every 6 hours PRN  levalbuterol Inhalation 0.63 milliGRAM(s) Inhalation every 6 hours PRN  ondansetron Injectable 4 milliGRAM(s) IV Push every 6 hours PRN  polyethylene glycol 3350 17 Gram(s) Oral daily PRN  sodium chloride 0.9% lock flush 10 milliLiter(s) IV Push every 1 hour PRN      LABS:                        11.3   9.03  )-----------( 287      ( 29 Jul 2021 11:12 )             35.4     Hgb Trend: 11.3<--, 10.8<--, 11.6<--, 11.9<--, 11.5<--  07-30    141  |  97<L>  |  19  ----------------------------<  107<H>  3.6   |  28  |  0.80    Ca    9.8      30 Jul 2021 07:05      Creatinine Trend: 0.80<--, 0.93<--, 2.36<--, 3.74<--, 6.49<--, 6.55<--            MICROBIOLOGY:     Culture - Fungal, Body Fluid (collected 27 Jul 2021 18:35)  Source: .Body Fluid Peritoneal Fluid  Preliminary Report (28 Jul 2021 06:34):    Testing in progress    Culture - Body Fluid with Gram Stain (collected 27 Jul 2021 18:35)  Source: .Body Fluid Peritoneal Fluid  Gram Stain (27 Jul 2021 23:07):    No polymorphonuclear leukocytes seen    No organisms seen    by cytocentrifuge  Preliminary Report (28 Jul 2021 18:07):    No growth        RADIOLOGY:  [ ] Reviewed and interpreted by me    PULMONARY FUNCTION TESTS:    EKG:

## 2021-08-03 NOTE — PROVIDER CONTACT NOTE (OTHER) - BACKGROUND
patient was admitted due to ascites
patient was admitted due to ascites
Ascites, Hypercholesteremia, Ovarian mass, HTN, Hyperkalemia
Pt admitted with ovarian mass, abdominal ascites, L hydrouteronephrosis, s/p thoracentesis today
patient was admitted due to ascites
Pt admitted with ovarian mass, abdominal ascites, L hydrouteronephrosis, s/p thoracentesis today
Pt admitted with ovarian mass, abdominal ascites, L hydrouteronephrosis, s/p thoracentesis today
Ascites, Hypercholesteremia, Ovarian mass, HTN, Hyperkalemia

## 2021-08-03 NOTE — PROGRESS NOTE ADULT - PROBLEM SELECTOR PLAN 5
- DVT ppx held pending Thoracentesis  - Diet DASH/TLC  - Disposition: Appreciate Gyn-Onc recs. - lovenox  - Diet DASH/TLC  - Disposition: Appreciate Gyn-Onc recs.

## 2021-08-03 NOTE — PROVIDER CONTACT NOTE (OTHER) - ACTION/TREATMENT ORDERED:
kept pt on O2 inhalation, Kept HOB elevated. no new orders made. will monitor
None
None
will continue to monitor, elevate legs
MD notified. No new orders. Will continue to monitor.
OK to reschedule BP meds, continue to monitor BP and HR
OK to give IV zofran, monitor BP and HR closely
pt agreed to take morphine, seen by MD, kept pt on O2 inhalation, Kept HOB elevated. awaiting respiratoryfor treatment. will continue to monitor
pt refusing morphine at this time, will reeducate pt on morphine to help with SOB, kept pt on O2 inhalation, Kept HOB elevated. will call respiratory for treatment. will continue to monitor
will continue to monitor

## 2021-08-03 NOTE — PROGRESS NOTE ADULT - SUBJECTIVE AND OBJECTIVE BOX
SUBJECTIVE AND OBJECTIVE: reports she has no pain on exam  INTERVAL HPI/OVERNIGHT EVENTS: n/a     DNR on chart:   Allergies    penicillins (Rash)    Intolerances    MEDICATIONS  (STANDING):  amLODIPine   Tablet 5 milliGRAM(s) Oral daily  aspirin enteric coated 81 milliGRAM(s) Oral daily  famotidine    Tablet 20 milliGRAM(s) Oral two times a day  multivitamin 1 Tablet(s) Oral daily    MEDICATIONS  (PRN):  acetaminophen    Suspension .. 650 milliGRAM(s) Oral every 6 hours PRN Mild Pain (1 - 3), Moderate Pain (4 - 6)  levalbuterol Inhalation 0.63 milliGRAM(s) Inhalation every 6 hours PRN shortness of breath  ondansetron Injectable 4 milliGRAM(s) IV Push every 6 hours PRN Nausea and/or Vomiting  polyethylene glycol 3350 17 Gram(s) Oral daily PRN Constipation  sodium chloride 0.9% lock flush 10 milliLiter(s) IV Push every 1 hour PRN Pre/post blood products, medications, blood draw, and to maintain line patency    ITEMS UNCHECKED ARE NOT PRESENT    PRESENT SYMPTOMS: [ ]Unable to obtain due to poor mentation   Source if other than patient:  [ ]Family   [ ]Team     Pain:  [ ]yes [x ]no, denies   QOL impact -   Location -                     Aggravating factors -   Quality -   Radiation -  Timing- intermittent  Severity (0-10 scale):   Minimal acceptable level (0-10 scale):     Dyspnea:                           [ ]Mild [ ]Moderate [ ]Severe  Anxiety:                             [ ]Mild [ ]Moderate [ ]Severe  Fatigue:                             [ ]Mild [ ]Moderate [ ]Severe  Nausea:                             [ ]Mild [ ]Moderate [ ]Severe  Loss of appetite:              [ ]Mild [ ]Moderate [ ]Severe  Constipation:                    [ ]Mild [ ]Moderate [ ]Severe    PAIN AD Score:	  http://geriatrictoolkit.[place].[facility]/cog/painad.pdf (Ctrl + left click to view)    Other Symptoms:  [x ]All other review of systems negative     Palliative Performance Status Version 2:  60%      http://npcrc.org/files/news/palliative_performance_scale_ppsv2.pdf    PHYSICAL EXAM:  Vital Signs Last 24 Hrs  T(C): 36.6 (03 Aug 2021 09:30), Max: 36.8 (02 Aug 2021 17:00)  T(F): 97.8 (03 Aug 2021 09:30), Max: 98.2 (02 Aug 2021 17:00)  HR: 120 (03 Aug 2021 13:03) (115 - 120)  BP: 143/85 (03 Aug 2021 09:30) (126/76 - 143/85)  BP(mean): --  RR: 21 (03 Aug 2021 13:03) (19 - 28)  SpO2: 89% (03 Aug 2021 13:03) (89% - 98%)    I&O's Summary    02 Aug 2021 07:01  -  03 Aug 2021 07:00  --------------------------------------------------------  IN: 600 mL / OUT: 0 mL / NET: 600 mL       GENERAL:  [x ]Alert  [x ]Oriented x 4  [ ]Lethargic  [ ]Cachexia  [ ]Unarousable  [x ]Verbal  [ ]Non-Verbal  Behavioral:   [ ] Anxiety  [ ] Delirium [ ] Agitation [ ] Other  HEENT:  [x ]Normal   [ ]Dry mouth   [ ]ET Tube/Trach  [ ]Oral lesions  PULMONARY:   [x ]Clear [ ]Tachypnea  [ ]Audible excessive secretions   [ ]Rhonchi        [ ]Right [ ]Left [ ]Bilateral  [ ]Crackles        [ ]Right [ ]Left [ ]Bilateral  [ ]Wheezing     [ ]Right [ ]Left [ Bilateral  [ ]Diminished breath sounds [ ]right [ ]left [x ]bilateral  CARDIOVASCULAR:    [ ]Regular [ ]Irregular [x ]Tachy  [ ]Crow [ ]Murmur [ ]Other  GASTROINTESTINAL:  [x ]Soft  [x ]Distended   [ ]+BS  [ ]Non tender [ ]Tender  [ ]PEG [ ]OGT/ NGT  Last BM: 8/2  GENITOURINARY:  [x ]Normal [ ] Incontinent   [ ]Oliguria/Anuria   [ ]Charles  MUSCULOSKELETAL:   [x ]Normal   [ ]Weakness  [ ]Bed/Wheelchair bound [ ]Edema  NEUROLOGIC:   [x ]No focal deficits  [ ]Cognitive impairment  [ ]Dysphagia [ ]Dysarthria [ ]Paresis [ ]Other   SKIN:   [ ]Normal    [ ]Rash  [ ]Pressure ulcer(s)       Present on admission [ ]y [ ]n   [x] Ecchymotic     CRITICAL CARE:  [ ]Shock Present  [ ]Septic [ ]Cardiogenic [ ]Neurologic [ ]Hypovolemic  [ ]Vasopressors [ ]Inotropes  [ ]Respiratory failure present [ ]Mechanical Ventilation [ ]Non-invasive ventilatory support [ ]High-Flow  [ ]Acute  [ ]Chronic [ ]Hypoxic  [ ]Hypercarbic [ ]Other  [ ]Other organ failure     LABS:                          11.0   11.59 )-----------( 399      ( 03 Aug 2021 07:01 )             35.0   08-03    138  |  96<L>  |  18  ----------------------------<  107<H>  3.9   |  26  |  0.85    Ca    9.2      03 Aug 2021 07:01  Phos  2.4     08-02  Mg     1.70     08-02    TPro  6.3  /  Alb  x   /  TBili  x   /  DBili  x   /  AST  x   /  ALT  x   /  AlkPhos  x   08-03  PT/INR - ( 02 Aug 2021 09:08 )   PT: 13.4 sec;   INR: 1.18 ratio         PTT - ( 02 Aug 2021 09:08 )  PTT:24.3 sec    RADIOLOGY & ADDITIONAL STUDIES: reviewed   < from: Xray Chest 1 View- PORTABLE-Urgent (Xray Chest 1 View- PORTABLE-Urgent .) (08.02.21 @ 17:39) >  IMPRESSION:  Pleural effusions as noted.    Thank you for the courtesy of this referral.    < end of copied text >    Protein Calorie Malnutrition Present: [ ]mild [ ]moderate [ ]severe [ ]underweight [ ]morbid obesity  https://www.andeal.org/vault/2440/web/files/ONC/Table_Clinical%20Characteristics%20to%20Document%20Malnutrition-White%20JV%20et%20al%202012.pdf    Height (cm): 160 (07-26-21 @ 19:38)  Weight (kg): 63 (07-26-21 @ 15:22)  BMI (kg/m2): 24.6 (07-26-21 @ 19:38)    [ ]PPSV2 < or = 30%  [ ]significant weight loss [ ]poor nutritional intake [ ]anasarca    [ ]Artificial Nutrition    REFERRALS:   [ ]Chaplaincy  [ ]Hospice  [ ]Child Life  [x ]Social Work  [ ]Case management [ ]Holistic Therapy     Goals of Care Document:

## 2021-08-03 NOTE — PROGRESS NOTE ADULT - PROBLEM SELECTOR PLAN 6
Ovarian mass found on Previous CT Abd/Pelvis with contrast at [facility]. Results showing Ascites, bowel wall thickening. 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 Tutu Lenz ([phone]) GYN-Onc at [facility] after discharge from [place]. Coordinated with [facility] and, and inpatient GYN-ONC now on board. Appreciate recs.  - PCP: Dr. Elmo Hutchison: [phone].
- Ovarian mass found on Previous CT scan at OSH  - Will need out patient follow up with OBGYN
Ovarian mass found on Previous CT Abd/Pelvis with contrast at [facility]. Results showing Ascites, bowel wall thickening. fat stranding of greater omentum and bilateral adnexal masses  - Concern for omental metastasis, in setting of Ascites and Pleural effusion  -  patient was discharged on 7/16 from [place]  - Patient was supposed to establish oncologic care with Dr. Tutu Lenz ([phone]) GYN-Onc at [facility] after discharge from [place]. Coordinated with [facility] and, and inpatient GYN-ONC now on board. Appreciate recs.  - PCP: Dr. Elmo Hutchison: [phone].
Palliative care consulted for complex decision making related to goals of care and assistance with pain/ symptom management. Recommendations have been made. Pt remains a full code. Will continue to follow for ongoing goc.
- Ovarian mass found on Previous CT scan at OSH  - In the setting of Ascites and Plueral effusion differential for overall presentation includes Meigs Syndrome  - Will need out patient follow up with OBGYN. Patient states she has not established oncologic care with Dr. Tutu Lenz([phone]) GYN-Onc at [facility]. Patient now states she would rather followup at [facility] since her son lives near [facility] PCP: Dr. Elmo Hutchison: [phone]
Ovarian mass found on Previous CT scan at OSH  - In the setting of Ascites and Plueral effusion differential for overall presentation includes Meigs Syndrome  - Will need out patient follow up with OBGYN. Appointment scheduling in [facility] in progress.  - Patient states she has not established oncologic care with Dr. Tutu Lenz([phone]) GYN-Onc at [facility]. Patient now states she would rather followup at [facility] since her son lives near [facility]. PCP: Dr. Elmo Hutchison: [phone].  - Out patient records pending
- BP has remained elevated since admission.  - BP meds HCTZ-Triamterene initially held due to Hyperkalemia.  - C/w amlodipine 5mg.

## 2021-08-03 NOTE — PROVIDER CONTACT NOTE (OTHER) - DATE AND TIME:
27-Jul-2021 00:15
02-Aug-2021 23:30
29-Jul-2021 05:45
29-Jul-2021 11:33
27-Jul-2021 00:25
01-Aug-2021 17:15
02-Aug-2021 22:00
03-Aug-2021 05:45
28-Jul-2021 21:15
29-Jul-2021 11:25

## 2021-08-03 NOTE — PROGRESS NOTE ADULT - PROBLEM SELECTOR PLAN 2
Onset of stable sinus tachycardia HR now fluctuating between 110-130. Most likely multifactorial as a result of abdominal ascites and pleural effusion  - TSH within normal limit.  - Associated with SOB with SPO2 87% on 1L NC and 1 instance of sharp CP. SOB most likely secondary to pleural effusion. Was concerning for PE in the setting of hypercoagulable state 2/2 ovarian malignancy. Venous doppler with patent vessels, no evidence of PE. Unable to undergo CTA w/contrast due to superimposed PURVI. Opted for V/Q scan which resulted low probability of PE.  - TTE Pending.

## 2021-08-03 NOTE — PROGRESS NOTE ADULT - ASSESSMENT
66 yr old woman PMH HTN, Pre-Diabetes, HLD, recent admission to [facility] for abdominal pain and found to have findings highly suspicious for malignant ovarian mass with abdominal ascites (diagnosed by tumor markers and imaging), left hydroureteronephrosis s/p renal stent placement  on 7/15/21 and dced 7/16/21 p/w worsening diffuse abdominal pain associated with nausea. Found to have severe PURVI with hyperkalemia and moderate abdominopelvic ascites with persistent sinus tachycardia c/b acute hypoxic respiratory failure in setting of b/l pleural effusions R>L. Now with resolved hyperkalemia and PURVI, s/p paracentesis on 7/27, s/p thoracentesis on 8/2 with 900cc of serosanguinous drainage.

## 2021-08-03 NOTE — PROGRESS NOTE ADULT - ASSESSMENT
Mary Stafford is a 65yo F w/ HTN, Pre-Diabetes, HL, recent admission to OSH ([facility]) for ovarian mass, abdominal ascites, and left hydroureteronephrosis s/p renal stent placement for left hydronephrosis.  Per chart review, she was discharged on 7-16-21 for outpatient gynonc follow-up and presented to [facility] 7/26/21 for approximately 1 month of constant diffuse abdominal pain associated with nausea and vomiting.  Pulmonology has been consulted for thoracentesis of bilateral pleural effusion (R greater than left) detected on CXR on 7/27/21.     #Bilateral Pleural effusions, R>L  - patient s/p R sided thoracentesis 8/2/21  - fluid studies consistent w/exudate, gram stain negative, cytopathology pending     #Tachypnea  - DVT/PE should remain on differential given likely ovarian malignancy  - suggesting LE dopplers as below     Recommendations  - please obtain LE dopplers   - pulm to follow up on cytopathology from thoracentesis     Patient seen and discussed w/Dr. Steve Schroeder PGY4  [id number] Mary Stafford is a 65yo F w/ HTN, Pre-Diabetes, HL, recent admission to OSH ([facility]) for ovarian mass, abdominal ascites, and left hydroureteronephrosis s/p renal stent placement for left hydronephrosis.  Per chart review, she was discharged on 7-16-21 for outpatient gynonc follow-up and presented to [facility] 7/26/21 for approximately 1 month of constant diffuse abdominal pain associated with nausea and vomiting.  Pulmonology has been consulted for thoracentesis of bilateral pleural effusion (R greater than left) detected on CXR on 7/27/21.     #Bilateral Pleural effusions, R>L  - patient s/p R sided thoracentesis 8/2/21  - fluid studies consistent w/exudate, gram stain negative, cytopathology pending     Recommendations  - pulm to follow up on cytopathology from thoracentesis     Patient seen and discussed w/Dr. Steve Schroeder PGY4  [id number]

## 2021-08-04 NOTE — PROGRESS NOTE ADULT - ASSESSMENT
Mary Stafford is a 65yo F w/ HTN, Pre-Diabetes, HL, recent admission to OSH ([facility]) for ovarian mass, abdominal ascites, and left hydroureteronephrosis s/p renal stent placement for left hydronephrosis.  Per chart review, she was discharged on 7-16-21 for outpatient gynonc follow-up and presented to [facility] 7/26/21 for approximately 1 month of constant diffuse abdominal pain associated with nausea and vomiting.  Pulmonology has been consulted for thoracentesis of bilateral pleural effusion (R greater than left) detected on CXR on 7/27/21.     #Bilateral Pleural effusions, R>L  - patient s/p R sided thoracentesis 8/2/21    Recommendations  - pulm to follow up on fluid studies and cytopathology from thoracentesis     Patient seen and discussed w/Dr. Steve Schroeder PGY4  [id number] Mary Stafford is a 67yo F w/ HTN, Pre-Diabetes, HL, recent admission to OS ([facility]) for ovarian mass, abdominal ascites, and left hydroureteronephrosis s/p renal stent placement for left hydronephrosis.  Per chart review, she was discharged on 7-16-21 for outpatient gynonc follow-up and presented to [facility] 7/26/21 for approximately 1 month of constant diffuse abdominal pain associated with nausea and vomiting.  Pulmonology has been consulted for thoracentesis of bilateral pleural effusion (R greater than left) detected on CXR on 7/27/21.       Mary Stafford is a 67yo F w/ HTN, Pre-Diabetes, HL, recent admission to OS ([facility]) for ovarian mass, abdominal ascites, and left hydroureteronephrosis s/p renal stent placement for left hydronephrosis.  Per chart review, she was discharged on 7-16-21 for outpatient gynonc follow-up and presented to [facility] 7/26/21 for approximately 1 month of constant diffuse abdominal pain associated with nausea and vomiting.  Pulmonology has been consulted for thoracentesis of bilateral pleural effusion (R greater than left) detected on CXR on 7/27/21.     #Bilateral Pleural effusions, R>L  - patient s/p R sided thoracentesis 8/2/21  - fluid studies consistent w/exudate, gram stain negative, cytopathology pending     Recommendations  - pulm to follow up on cytopathology from thoracentesis     Patient seen and discussed w/Dr. Steve Schroeder PGY4  [id number]     Mary Stafford is a 65yo F w/ HTN, Pre-Diabetes, HL, recent admission to OSH ([facility]) for ovarian mass, abdominal ascites, and left hydroureteronephrosis s/p renal stent placement for left hydronephrosis.  Per chart review, she was discharged on 7-16-21 for outpatient gynonc follow-up and presented to [facility] 7/26/21 for approximately 1 month of constant diffuse abdominal pain associated with nausea and vomiting.  Pulmonology has been consulted for thoracentesis which was performed (R-sided) on 8/2/21      #Bilateral Pleural effusions, R>L  - patient s/p R sided thoracentesis 8/2/21  - fluid studies consistent w/exudate, gram stain negative, cytopathology pending     #Hypoxia  - likely atelectasis 2/2 to pulmonary effusions contributing, but possibly also related to pulmonary vascular congestion noted on CXR in context of possible diastolic dysfunction given increased LV wall thickness on TTE performed 8/3/21    Recommendations  - pulm to follow up on cytopathology from thoracentesis   -  home O2 evaluation given patient's persistent O2 requirements  - would consider diuresis given some evidence to suggest diastolic dysfunction/pulmonary edema as above   - please arrange for outpatient pulm follow up on discharge     Patient seen and discussed w/Dr. Steve Schroeder PGY4  [id number]     Mary Stafford is a 65yo F w/ HTN, Pre-Diabetes, HL, recent admission to OS ([facility]) for ovarian mass, abdominal ascites, and left hydroureteronephrosis s/p renal stent placement for left hydronephrosis.  Per chart review, she was discharged on 7-16-21 for outpatient gynonc follow-up and presented to [facility] 7/26/21 for approximately 1 month of constant diffuse abdominal pain associated with nausea and vomiting.  Pulmonology has been consulted for thoracentesis which was performed (R-sided) on 8/2/21      #Bilateral Pleural effusions, R>L  - patient s/p R sided thoracentesis 8/2/21  - fluid studies consistent w/exudate, gram stain negative, cytopathology pending     #Hypoxia  - likely atelectasis 2/2 to pulmonary effusions contributing, but possibly also related to pulmonary vascular congestion noted on CXR in context of possible diastolic dysfunction given increased LV wall thickness on TTE performed 8/3/21    Recommendations  - pulm to follow up on cytopathology from thoracentesis   -  home O2 evaluation given patient's persistent O2 requirements  - would consider diuresis given some evidence to suggest diastolic dysfunction/pulmonary edema as above   - please arrange for outpatient pulm follow up on discharge     Pulmonology to sign off, please do not hesitate to re-contact with further questions    Patient seen and discussed w/Dr. Steve Schroeder PGY4  [phone]

## 2021-08-04 NOTE — DISCHARGE NOTE PROVIDER - HOSPITAL COURSE
HISTORY OF PRESENTING ILLNESS:  67yo F hx of HTN, Pre-Diabetes, HL, recent admission to OSH ([facility]) for ovarian mass, abdominal ascites, left hydroureteronephrosis s/p renal stent placement for left hydronephrosis was discharged on 7-16-21 for outpatient gynonc followup now presenting with 1 month of constant diffuse abdominal pain associated with nausea without vomiting. Pain is described as dull and radiates to the back. No particular exacerbating factors but she states she cannot sleep on her sides or stomach due to pain. She denies any fever/chills, chest pain, SOB, palpitations. She initially presented to her PCP and was advised to go to the ED. There US abd and CT abd/pelvis where obtained and she was diagnosed with hydronephrosis and an ovarian mass and referred to [facility] for further management. LBM was yesterday with small hard stools and she is passing gas. She has been taking Aleve with minimal relief. She does not follow up with outpatient GYN.     In ED CT adb and pelvis showed B/L pleural effusion, rt greater than left and moderate abdominal ascites. On admission found to be Hyperkalemic 5.5 with creatinine on 7.12/BUN 48. Urinalysis with large blood and moderate leukocyte esterase, negative for bacteria.    HOSPITAL COURSE:  Pt was Admitted on 2L NC for abdominal pain and distention and was found to have severe PURVI with hyperkalemia and ascites. Nephro on boarded and uro consulted. No stent obstruction per uro. serial BMP with worsening creat and potassium while on medical management for hyper K+. Paracentesis was performed for diagnostic purposes, however not enough fluid was removed for therapeutic effect. Shiley was inserted by IR for dialysis without any complications. Patient later Received HD overnight and potassium slightly improved. She became Tachy the next day 130-140s after 1.5hrs of HD which had to discontinued. Tachycardia sustained at 110-119 s/p HD with elevated BP.  Attempted to wean off NC but she remained hypoxic and later had one episode of CP which resolved spontaneously. Symptoms were concerning for PE so she underwent venous Duplex and VQ scan, both of which came back low probability of DVT and PE. She remained full code during her entire admission and her BP was controlled with amlodipine. Due to her persistent tachycardia, duoneb was changed to xopenex. TSH was ordered in search of etiology of tachycardia, however that resulted normal. She also experienced intractable Nausea/Vomiting for 24 hours after her VQ scan; symptoms resolved with Zofran 4mg q6 PRN, famotidine 20mg, and 1L LR/D5. Patient later had one episode of hematuria and dysuria with Carnes in, which was most likely traumatic from ambulation. Void trial was later successful and carnes was removed. During her admission she was evaluated by Gyn-Onc for her ovarian malignancy that was noted at OSH on CT. However, no intervention was done during her stay. Because she continued to be Hypoxic, lung ultrasound was performed and showed worsening pleural effusion and pulmonology was consulted for thoracentesis which resulted in 900ccs of serous fluid. Cytology results resulted no malignant cells in ascitic fluid but there was Lymphocytosis present, concerning for lymphoproliferative disorder. Protein electrophoresis and flow cytometry came back negative. TTE was done to thoroughly evaluate tachycardia which noted calcified aortic valve but normal opening and no other structural abnormalities. During her admission multiple attempts were made to wean patient off of O2 but were unsuccessful and she was discharged with home oxygen with outpatient followup with Pulmonology and Gynecology for B/L ovarian masses   HISTORY OF PRESENTING ILLNESS:  67yo F hx of HTN, Pre-Diabetes, HL, recent admission to OSH ([facility]) for ovarian mass, abdominal ascites, left hydroureteronephrosis s/p renal stent placement for left hydronephrosis was discharged on 7-16-21 for outpatient gynonc followup now presenting with 1 month of constant diffuse abdominal pain associated with nausea without vomiting. Pain is described as dull and radiates to the back. No particular exacerbating factors but she states she cannot sleep on her sides or stomach due to pain. She denies any fever/chills, chest pain, SOB, palpitations. She initially presented to her PCP and was advised to go to the ED. There US abd and CT abd/pelvis where obtained and she was diagnosed with hydronephrosis and an ovarian mass and referred to [facility] for further management. LBM was yesterday with small hard stools and she is passing gas. She has been taking Aleve with minimal relief. She does not follow up with outpatient GYN.     In ED CT adb and pelvis showed B/L pleural effusion, rt greater than left and moderate abdominal ascites. On admission found to be Hyperkalemic 5.5 with creatinine on 7.12/BUN 48. Urinalysis with large blood and moderate leukocyte esterase, negative for bacteria.    HOSPITAL COURSE:  Pt was Admitted on 2L NC for abdominal pain and distention and was found to have severe PURVI with hyperkalemia and ascites. Nephro on boarded and uro consulted. No stent obstruction per uro. serial BMP with worsening creat and potassium while on medical management for hyper K+. Paracentesis was performed for diagnostic purposes, however not enough fluid was removed for therapeutic effect. Shiley was inserted by IR for dialysis without any complications. Patient later Received HD overnight and potassium slightly improved. She became Tachy the next day 130-140s after 1.5hrs of HD which had to discontinued. Tachycardia sustained at 110-119 s/p HD with elevated BP.  Attempted to wean off NC but she remained hypoxic and later had one episode of CP which resolved spontaneously. Symptoms were concerning for PE so she underwent venous Duplex and VQ scan, both of which came back low probability of DVT and PE. She remained full code during her entire admission and her BP was controlled with amlodipine. Due to her persistent tachycardia, duoneb was changed to xopenex. TSH was ordered in search of etiology of tachycardia, however that resulted normal. She also experienced intractable Nausea/Vomiting for 24 hours after her VQ scan; symptoms resolved with Zofran 4mg q6 PRN, famotidine 20mg, and 1L LR/D5. Patient later had one episode of hematuria and dysuria with Carnes in, which was most likely traumatic from ambulation. Void trial was later successful and carnes was removed. During her admission she was evaluated by Gyn-Onc for her ovarian malignancy that was noted at OSH on CT. However, no intervention was done during her stay. Because she continued to be Hypoxic, lung ultrasound was performed and showed worsening pleural effusion and pulmonology was consulted for thoracentesis which resulted in 900ccs of serous fluid. Cytology results resulted no malignant cells in ascitic fluid but there was Lymphocytosis present, concerning for lymphoproliferative disorder. Protein electrophoresis and flow cytometry came back negative. TTE was done to thoroughly evaluate tachycardia which noted calcified aortic valve but normal opening and no other structural abnormalities. During her admission multiple attempts were made to wean patient off of O2 but were unsuccessful and she was discharged with home oxygen with outpatient followup with Pulmonology and Gynecology for B/L ovarian masses   66 yr old woman PMH HTN, Pre-Diabetes, HLD, recent admission to [facility] for abdominal pain and found to have findings highly suspicious for malignant ovarian mass with abdominal ascites (diagnosed by tumor markers and imaging), left hydroureteronephrosis s/p renal stent placement  on 7/15/21 and dced 7/16/21 p/w worsening diffuse abdominal pain associated with nausea. Found to have severe PURVI with hyperkalemia and moderate abdominopelvic ascites with persistent sinus tachycardia c/b acute hypoxic respiratory failure in setting of b/l pleural effusions R>L, PE low probability on V/Q scan. Now with resolved hyperkalemia and PURVI after IVF and 1 brief session of HD via Shiley which was subsequently removed. She had paracentesis on 7/27 with minimal ascites removed and was nondiagnostic, s/p thoracentesis on 8/2 with 900cc of serosanguinous drainage.  Cytology results resulted no malignant cells in ascitic fluid but there was Lymphocytosis present, concerning for lymphoproliferative disorder. Protein electrophoresis and flow cytometry came back negative. TTE was done to thoroughly evaluate tachycardia which noted calcified aortic valve but normal opening and no other structural abnormalities. Cytopathology on pleural effusion was still pending at time of discharge. She will be discharged on home O2.    During her admission she was evaluated by Gyn-Onc for her ?ovarian malignancy that was noted at OSH on CT but no recommendations were made for a tissue biopsy. Patient was recommended to followup outpatient with Pulmonology and Gyn-Onc.

## 2021-08-04 NOTE — PROGRESS NOTE ADULT - PROBLEM SELECTOR PLAN 5
- Diet DASH/TLC  - Disposition: Okay to discharge home. Sons very much involved in patients care. Will need outpatient Gyn-Onc

## 2021-08-04 NOTE — PROGRESS NOTE ADULT - PROVIDER SPECIALTY LIST ADULT
Gyn Onc
Internal Medicine
Nephrology
Internal Medicine
Pulmonology
Urology
Pulmonology
Pulmonology
Urology
Internal Medicine
Urology
Nephrology
Nephrology
Internal Medicine
Palliative Care
Nephrology
Palliative Care
Internal Medicine
Palliative Care
Internal Medicine
Internal Medicine

## 2021-08-04 NOTE — DISCHARGE NOTE PROVIDER - CARE PROVIDERS DIRECT ADDRESSES
[email or web address]

## 2021-08-04 NOTE — CHART NOTE - NSCHARTNOTEFT_GEN_A_CORE
Oxygenation test performed yesterday on and off of 2L NC    Resting on Room Air: 91% SPO2  Exercise (Walking) on room air: 84% SPO2  Exercise on 2L O2: 95% SPO2 Oxygenation test performed 08/03/2021     Resting on Room Air: 91% SPO2  Exercise (Walking) on room air: 84% SPO2  Exercise on 2L O2: 95% SPO2 Pt with history of pleural effusion c/p acute hypoxic respiratory failure. S/P thoracentesis with 900cc of serous fluid obtained. Without resolution of hypoxic symptoms and still desaturating on room air. High probability that pleural fluid will occur as accumulation is most likely due to malignant effusion 2/2 B/L ovarian masses. If, in the short term, breathing does eventually improve after Thoracentesis, there is likely low that accumulation of fluid will lead to decompensation and subsequent hypoxia again.     Oxygenation test performed 08/03/2021     Resting on Room Air: 91% SPO2  Exercise (Walking) on room air: 84% SPO2  Exercise on 2L O2: 95% SPO2 Pt with history of pleural effusion c/p acute hypoxic respiratory failure. S/P thoracentesis with 900cc of serous fluid obtained. Without resolution of hypoxic symptoms and still desaturating on room air. High probability that pleural fluid will occur as accumulation is most likely due to malignant effusion 2/2 B/L ovarian masses. If, in the short term, breathing does eventually improve after Thoracentesis, there is likelihood that reaccumulation of fluid will lead to decompensation and subsequent hypoxia again.     Oxygenation test performed 08/03/2021     Resting on Room Air: 91% SPO2  Exercise (Walking) on room air: 84% SPO2  Exercise on 2L O2: 95% SPO2

## 2021-08-04 NOTE — PROGRESS NOTE ADULT - PROBLEM SELECTOR PROBLEM 3
Ascites
Ovarian mass
Ascites
Ascites
Hyperkalemia
Ovarian mass
Ovarian mass
Ascites
Ascites
Ovarian mass
Tachycardia
Acute kidney injury

## 2021-08-04 NOTE — PROGRESS NOTE ADULT - NUTRITIONAL ASSESSMENT
This patient has been assessed with a concern for Malnutrition and has been determined to have a diagnosis/diagnoses of Moderate protein-calorie malnutrition.    This patient is being managed with:   Diet Regular-  Low Sodium  Supplement Feeding Modality:  Oral  Ensure Enlive Cans or Servings Per Day:  1       Frequency:  Two Times a day  Entered: Aug  2 2021  3:49PM    
This patient has been assessed with a concern for Malnutrition and has been determined to have a diagnosis/diagnoses of Moderate protein-calorie malnutrition.    This patient is being managed with:   Diet Soft-  Low Sodium  Supplement Feeding Modality:  Oral  Ensure Enlive Cans or Servings Per Day:  3       Frequency:  Daily  Entered: Aug  3 2021  9:50AM

## 2021-08-04 NOTE — PROGRESS NOTE ADULT - PROBLEM SELECTOR PROBLEM 5
Ovarian mass
Ascites
Hypertension
Hypertension
Acute kidney injury
Acute kidney injury
Hypertension
Hypertension
Prophylactic measure
Prophylactic measure
Hyperkalemia
Prophylactic measure

## 2021-08-04 NOTE — PROGRESS NOTE ADULT - PROBLEM SELECTOR PLAN 4
- Initial presentation with firm, distended, diffusely tender abdomen with no guarding  - Pleural effusion on CT abdomen and Pelvis, right > left  - Ascites of unknown etiology in setting of transaminitis. Could be malignant fluid in the setting of ovarian mass  - Initial LFTs 80/65. Hepatitis panel negative. Down trended  - Paracentesis performed 7/27 produced 60mLs of serous fluid. Gram stain negative, negative for SBP on cytology. Pending  fungal cultures. Cytology negative for Malignant cells but was concerning for Lymphoproliferative disorder.  - Flow cytometry and protein electrophoresis both negative for any significant abnormalities

## 2021-08-04 NOTE — DISCHARGE NOTE NURSING/CASE MANAGEMENT/SOCIAL WORK - PATIENT PORTAL LINK FT
You can access the FollowMyHealth Patient Portal offered by [facility] by registering at the following website: [email or web address]. By joining [employer]’s FollowMyHealth portal, you will also be able to view your health information using other applications (apps) compatible with our system.

## 2021-08-04 NOTE — PROGRESS NOTE ADULT - PROBLEM SELECTOR PLAN 1
- On 2L NC with SPO2 90-94  - CXR 7/27 with worsening Bilateral pleural effusions right greater than left with compressive atelectasis at the right lung base, and Bedside POCUS with significant pleural effusion, right greater than left.  - Now S/P Thoracentesis on 8/2 with output of 900ccs of serosanguinous fluid  - Post thoracentesis X-ray shows decreased, effusions in the B/L lungs R>L and improved aeration on right  - Unsuccessful at weaning off 02 with desaturation to 84% on walking. Will go home with home O2

## 2021-08-04 NOTE — PROGRESS NOTE ADULT - PROBLEM SELECTOR PLAN 3
- Ovarian mass found on Previous CT Abd/Pelvis with contrast at [facility]. Results showing Ascites, bowel wall thickening. fat stranding of greater omentum and bilateral adnexal masses  - Concern for omental metastasis, in setting of Ascites and Plueral effusion  -  elevated to 252.  <2 and CEA <1.  AFP, and inhibin did not result before patient was discharged on 7/16 from [place]  - Patient was supposed to establish oncologic care with Dr. Tutu Lenz([phone]) GYN-Onc at [facility] after discharge from [place]. Gyn-Onc on board while patient in hospital. Will follow up as Outpatient Gyn-Onc.  - PCP: Dr. Elmo Hutchison: [phone].

## 2021-08-04 NOTE — PROGRESS NOTE ADULT - NSICDXPILOT_GEN_ALL_CORE
[place]

## 2021-08-04 NOTE — DISCHARGE NOTE PROVIDER - CARE PROVIDER_API CALL
Bernadette Wilson)  Critical Care Medicine; Internal Medicine; Pulmonary Disease; Sleep Medicine  [address] [facility] [address] [address]  [place] [address]  Phone: [phone]  Fax: [phone]  Follow Up Time:     JOLENE EATON  [id number]  [address] [place] [address]  Phone: ()-  Fax: ()-  Follow Up Time:    Bernadette Wilson)  Critical Care Medicine; Internal Medicine; Pulmonary Disease; Sleep Medicine  [address] [facility] [facility] [address]  [place] [address]  Phone: [phone]  Fax: [phone]  Follow Up Time:     JOLENE EATON  [id number]  [address] [place] [address]  Phone: ()-  Fax: ()-  Follow Up Time:     Noah Arreguin)  Clinical Informatics; Critical Care Medicine; Internal Medicine; Nephrology  [address] [place] [address]  Phone: [phone]  Fax: [phone]  Follow Up Time: 1 month

## 2021-08-04 NOTE — DISCHARGE NOTE PROVIDER - NSDCCPCAREPLAN_GEN_ALL_CORE_FT
PRINCIPAL DISCHARGE DIAGNOSIS  Diagnosis: PURVI (acute kidney injury)  Assessment and Plan of Treatment: You have been diagnosed with acute kidney injury. This means that your kidneys are not working properly. When both kidneys are healthy, they help filter out fluid and waste from the blood and body. Acute kidney injury has many causes. These include urinary blockages, infection, lack of enough blood supply, and medicines that can injure kidneys. In some cases, acute kidney injury is short-term (temporary). This type lasts several days to a few months. This is because the kidney can repair itself. But acute kidney injury can also result in chronic kidney disease or end stage renal failure. In your case, we helped your kidneys recover by ensuring proper hydration and dialysis to control potassium build up. Eventually, based on the laboratory tests we ordered during your admisson, your kidneys fully recovered, we believe it is safe for you to be discharged. Call your healthcare provider right away if you have any of these: Signs of bladder infection, such as urinating more often, burning or pain when you pee, pain above your pubic bone, blood in your urine, or trouble starting your urine stream, Fever above 100.4° F ( 38°C ), Chills, Muscle aches, night sweats, very little or no urine output, swelling of your hands, legs, or feet, back pain, abdominal pain, extreme tiredness      SECONDARY DISCHARGE DIAGNOSES  Diagnosis: Acute respiratory failure with hypoxia  Assessment and Plan of Treatment: During your admission you needed oxygen via a nasal cannula because you were not getting enough oxygen into your blood. We believed this was as a result of multiple factors inluding fluid in your lungs and abdomen. The Pulmonologists attempted to remove a large amount of the fluid in your lungs to help your breathing, and we were successful in doing so. However, some fluid remained in your lungs because some areas were inaccessible and your breathing did not improve without supplemental oxygen. We believe you are stable enough for discharge with supplemental oxygen, and we want u to follow up with the pulmonologists after you are discharged for further management and recommendations.     PRINCIPAL DISCHARGE DIAGNOSIS  Diagnosis: PURVI (acute kidney injury)  Assessment and Plan of Treatment: You came in with kidney injury. When both kidneys are healthy, they help filter out fluid and waste from the blood and body.  In your case, we helped your kidneys recover by ensuring proper hydration and you received one session of dialysis to control potassium build up. Over time, your kidneys fully recovered. Avoid medications that can potentially harm your kidneys further such as Advil, Aleve, Motrin. Continue to drink enough water to remain hydrated. Call your healthcare provider right away if you have any of these: Signs of bladder infection, such as urinating more often, burning or pain when you pee, pain above your pubic bone, blood in your urine, or trouble starting your urine stream, Fever above 100.4° F ( 38°C ), Chills, Muscle aches, night sweats, very little or no urine output, swelling of your hands, legs, or feet, back pain, abdominal pain, extreme tiredness.      SECONDARY DISCHARGE DIAGNOSES  Diagnosis: Acute respiratory failure with hypoxia  Assessment and Plan of Treatment: During your admission you needed oxygen via a nasal cannula because you were not getting enough oxygen into your blood. We believed this was as a result of multiple factors inluding fluid in your lungs and abdomen. The Pulmonologists attempted to remove a large amount of the fluid in your lungs to help your breathing, and we were successful in doing so. However, some fluid remained in your lungs because some areas were inaccessible and your breathing did not improve without supplemental oxygen. We believe you are stable enough for discharge with supplemental oxygen, and we want u to follow up with the pulmonologists  for the test results on the fluid they removed to confirm if it is cancerous.     PRINCIPAL DISCHARGE DIAGNOSIS  Diagnosis: PURVI (acute kidney injury)  Assessment and Plan of Treatment: You came in with kidney injury. When both kidneys are healthy, they help filter out fluid and waste from the blood and body.  In your case, we helped your kidneys recover by ensuring proper hydration and you received one session of dialysis to control potassium build up. Over time, your kidneys fully recovered. Avoid medications that can potentially harm your kidneys further such as Advil, Aleve, Motrin. Continue to drink enough water to remain hydrated. Call your healthcare provider right away if you have any of these: Signs of bladder infection, such as urinating more often, burning or pain when you pee, pain above your pubic bone, blood in your urine, or trouble starting your urine stream, Fever above 100.4° F ( 38°C ), Chills, Muscle aches, night sweats, very little or no urine output, swelling of your hands, legs, or feet, back pain, abdominal pain, extreme tiredness.  You may follow up with the Kidney doctor's office as below to further assess for your kidney function.      SECONDARY DISCHARGE DIAGNOSES  Diagnosis: Acute respiratory failure with hypoxia  Assessment and Plan of Treatment: During your admission you needed oxygen via a nasal cannula because you were not getting enough oxygen into your blood. We believed this was as a result of multiple factors inluding fluid in your lungs and abdomen. The Pulmonologists attempted to remove a large amount of the fluid in your lungs to help your breathing, and we were successful in doing so. However, some fluid remained in your lungs because some areas were inaccessible and your breathing did not improve without supplemental oxygen. We believe you are stable enough for discharge with supplemental oxygen, and we want u to follow up with the pulmonologists  for the test results on the fluid they removed to confirm if it is cancerous.    Diagnosis: Ovarian mass  Assessment and Plan of Treatment: You had a suspected ovarian mass from the imaging done at [facility]. We performed some fluid analysis from the abdominal fluid, which was not revealing. The fluid analyisis from the lung fluid will take a few days. Please follow up with the lung doctor for the results of the fluid analysis, and also follow up with the GYN oncology to discuss about further workup as needed.

## 2021-08-04 NOTE — DISCHARGE NOTE PROVIDER - NSDCCPTREATMENT_GEN_ALL_CORE_FT
PRINCIPAL PROCEDURE  Procedure: Thoracentesis  Findings and Treatment: Thoracentesis is a procedure to remove fluid or air from around the lungs. A needle is put through the chest wall into the pleural space. The pleural space is the thin gap between the pleura of the lung and of the inner chest wall. The pleura is a double layer of membranes that surrounds the lungs. Inside the space is a small amount of fluid. The fluid prevents the pleura from rubbing together when you breathe. Excess fluid in the pleural space is called pleural effusion. When this happens, it’s harder to breathe because the lungs can’t inflate fully. This can cause shortness of breath and pain. These symptoms may be worse with physical activity.  The fluid obtained from your lungs was sent to the laboratory to be analyzed. Sometimes we might be able to tell the source of this fluid based on the results of the analysis. You will need to follow up with the Pulmonologists to discuss the results of these tests.

## 2021-08-04 NOTE — PROGRESS NOTE ADULT - SUBJECTIVE AND OBJECTIVE BOX
PROGRESS NOTE:   Authored by Keanu Willard MD  Pager:  [phone]    Patient is a 66y old  Female who presents with a chief complaint of Abdominal Pain (03 Aug 2021 18:04)      SUBJECTIVE / OVERNIGHT EVENTS:    ADDITIONAL REVIEW OF SYSTEMS:    MEDICATIONS  (STANDING):  amLODIPine   Tablet 5 milliGRAM(s) Oral daily  aspirin enteric coated 81 milliGRAM(s) Oral daily  enoxaparin Injectable 40 milliGRAM(s) SubCutaneous daily  famotidine    Tablet 20 milliGRAM(s) Oral daily  multivitamin 1 Tablet(s) Oral daily    MEDICATIONS  (PRN):  acetaminophen    Suspension .. 650 milliGRAM(s) Oral every 6 hours PRN Mild Pain (1 - 3), Moderate Pain (4 - 6)  levalbuterol Inhalation 0.63 milliGRAM(s) Inhalation every 6 hours PRN shortness of breath  ondansetron Injectable 4 milliGRAM(s) IV Push every 6 hours PRN Nausea and/or Vomiting  polyethylene glycol 3350 17 Gram(s) Oral daily PRN Constipation      CAPILLARY BLOOD GLUCOSE        I&O's Summary    03 Aug 2021 07:01  -  04 Aug 2021 07:00  --------------------------------------------------------  IN: 950 mL / OUT: 0 mL / NET: 950 mL        PHYSICAL EXAM:  Vital Signs Last 24 Hrs  T(C): 36.4 (04 Aug 2021 06:30), Max: 36.8 (03 Aug 2021 17:07)  T(F): 97.5 (04 Aug 2021 06:30), Max: 98.2 (03 Aug 2021 17:07)  HR: 115 (04 Aug 2021 06:30) (113 - 128)  BP: 121/79 (04 Aug 2021 06:30) (121/79 - 143/85)  BP(mean): --  RR: 20 (04 Aug 2021 06:30) (20 - 22)  SpO2: 96% (04 Aug 2021 06:30) (89% - 96%)    GENERAL: No acute distress, well-developed  HEAD:  Atraumatic, Normocephalic  EYES: EOMI, PERRLA, conjunctiva and sclera clear  NECK: Supple, no lymphadenopathy, no JVD  CHEST/LUNG: CTAB; No wheezes, rales, or rhonchi  HEART: Regular rate and rhythm; No murmurs, rubs, or gallops  ABDOMEN: Soft, non-tender, non-distended; normal bowel sounds, no organomegaly  EXTREMITIES:  2+ peripheral pulses b/l, No clubbing, cyanosis, or edema  NEUROLOGY: A&O x 3, no focal deficits  SKIN: No rashes or lesions    LABS:                        11.0   11.59 )-----------( 399      ( 03 Aug 2021 07:01 )             35.0     08-03    138  |  96<L>  |  18  ----------------------------<  107<H>  3.9   |  26  |  0.85    Ca    9.2      03 Aug 2021 07:01  Phos  2.4     08-02  Mg     1.70     08-02    TPro  6.3  /  Alb  x   /  TBili  x   /  DBili  x   /  AST  x   /  ALT  x   /  AlkPhos  x   08-03    PT/INR - ( 02 Aug 2021 09:08 )   PT: 13.4 sec;   INR: 1.18 ratio         PTT - ( 02 Aug 2021 09:08 )  PTT:24.3 sec          Culture - Fungal, Body Fluid (collected 03 Aug 2021 00:48)  Source: .Body Fluid Pleural Fluid  Preliminary Report (03 Aug 2021 08:34):    Testing in progress    Culture - Body Fluid with Gram Stain (collected 03 Aug 2021 00:48)  Source: .Body Fluid Pleural Fluid  Gram Stain (03 Aug 2021 02:44):    polymorphonuclear leukocytes seen    No organisms seen    by cytocentrifuge  Preliminary Report (03 Aug 2021 19:12):    No growth        RADIOLOGY & ADDITIONAL TESTS:  Results Reviewed:   Imaging Personally Reviewed:  Electrocardiogram Personally Reviewed:    COORDINATION OF CARE:  Care Discussed with Consultants/Other Providers [Y/N]:  Prior or Outpatient Records Reviewed [Y/N]:

## 2021-08-04 NOTE — DISCHARGE NOTE PROVIDER - NSDCMRMEDTOKEN_GEN_ALL_CORE_FT
Aspirin Enteric Coated 81 mg oral delayed release tablet: 1 tab(s) orally once a day  ibuprofen 200 mg oral tablet: 2 tab(s) orally 2 times a day, As Needed for Pain  Multiple Vitamins oral tablet: 1 tab(s) orally once a day  triamterene-hydrochlorothiazide 37.5 mg-25 mg oral capsule: 1 cap(s) orally once a day   amLODIPine 5 mg oral tablet: 1 tab(s) orally once a day  Aspirin Enteric Coated 81 mg oral delayed release tablet: 1 tab(s) orally once a day  famotidine 20 mg oral tablet: 1 tab(s) orally once a day  Multiple Vitamins oral tablet: 1 tab(s) orally once a day  polyethylene glycol 3350 oral powder for reconstitution: 17 gram(s) orally once a day, As needed, Constipation

## 2021-08-04 NOTE — PROGRESS NOTE ADULT - ATTENDING SUPERVISION STATEMENT
Fellow
Resident
Resident
Fellow
Resident
Resident
Fellow
Resident
ACP
Resident
ACP
ACP
Resident

## 2021-08-04 NOTE — DISCHARGE NOTE PROVIDER - NSDCFUADDAPPT_GEN_ALL_CORE_FT
Please followup with your PCP: Dr. Elmo Hutchison: [phone] within 1 week of discharge   Please followup with lung specialist, Dr. Wilson to followup the test results on the fluid that was removed from your lungs within 1 week.  Please followup at the cancer center, call to make an appointment to discuss further testing required and treatment moving forward as soon as possible. Please followup with your PCP: Dr. Elmo Hutchison: [phone] within 1 week of discharge  Please followup with lung specialist, Dr. Wilson to followup the test results on the fluid that was removed from your lungs within 1 week. The clinic will reach out to your for scheduling, but if you don't hear back by Friday, please call the pulmonology clinic to schedule a follow up appointment.   Please followup at the cancer center, call to make an appointment to discuss further testing required and treatment moving forward as soon as possible.

## 2021-08-04 NOTE — DISCHARGE NOTE PROVIDER - NSFOLLOWUPCLINICS_GEN_ALL_ED_FT
[facility] Pulmonolgy and Sleep Medicine  Pulmonology  [address]  [place] [address]  Phone: [phone]  Fax:     [facility]  Hematology/Oncology  [address] [place] [address]  Phone: [phone]  Fax:

## 2021-08-04 NOTE — DISCHARGE NOTE PROVIDER - PROVIDER TOKENS
PROVIDER:[TOKEN:[[id number]]],PROVIDER:[TOKEN:[[id number]]] PROVIDER:[TOKEN:[[id number]]],PROVIDER:[TOKEN:[[id number]]],PROVIDER:[TOKEN:[[id number]],FOLLOWUP:[1 month]]

## 2021-08-04 NOTE — PROGRESS NOTE ADULT - SUBJECTIVE AND OBJECTIVE BOX
CHIEF COMPLAINT:  abdominal pain       Interval events  - patient seen and examined on 8/2, patient denied significant SOB    PAST MEDICAL & SURGICAL HISTORY:  Hypertension    Ovarian mass    Hypercholesteremia    S/P ureteral stent placement        FAMILY HISTORY:  FHx: hypertension (Mother)    FH: diabetes mellitus (Mother)        SOCIAL HISTORY:  Smoking: denies     Allergies    penicillins (Rash)    Intolerances        HOME MEDICATIONS:  Home Medications:  Aspirin Enteric Coated 81 mg oral delayed release tablet: 1 tab(s) orally once a day (26 Jul 2021 12:02)  ibuprofen 200 mg oral tablet: 2 tab(s) orally 2 times a day, As Needed for Pain (26 Jul 2021 12:02)  Multiple Vitamins oral tablet: 1 tab(s) orally once a day (26 Jul 2021 12:02)  triamterene-hydrochlorothiazide 37.5 mg-25 mg oral capsule: 1 cap(s) orally once a day (26 Jul 2021 12:02)      REVIEW OF SYSTEMS:  Constitutional: [x ] negative [ ] fevers [ ] chills [ ] weight loss [ ] weight gain  HEENT: [x ] negative [ ] dry eyes [ ] eye irritation [ ] postnasal drip [ ] nasal congestion  CV: [x ] negative  [ ] chest pain [ ] orthopnea [ ] palpitations [ ] murmur  Resp: [ x] negative [ ] cough [ ] shortness of breath [ ] dyspnea [ ] wheezing [ ] sputum [ ] hemoptysis  GI: [ ] negative [ ] nausea [ ] vomiting [ ] diarrhea [ ] constipation [ x] abd pain [ ] dysphagia   : [ x] negative [ ] dysuria [ ] nocturia [ ] hematuria [ ] increased urinary frequency  Musculoskeletal: [x ] negative [ ] back pain [ ] myalgias [ ] arthralgias [ ] fracture  Skin: [x ] negative [ ] rash [ ] itch  Neurological: [x ] negative [ ] headache [ ] dizziness [ ] syncope [ ] weakness [ ] numbness  Psychiatric: [x ] negative [ ] anxiety [ ] depression  Endocrine: [ x] negative [ ] diabetes [ ] thyroid problem  Hematologic/Lymphatic: [x ] negative [ ] anemia [ ] bleeding problem  Allergic/Immunologic: [ x] negative [ ] itchy eyes [ ] nasal discharge [ ] hives [ ] angioedema  [ ] All other systems negative  [ ] Unable to assess ROS because ________    OBJECTIVE:  ICU Vital Signs Last 24 Hrs  T(C): 36.7 (30 Jul 2021 17:00), Max: 36.9 (30 Jul 2021 09:00)  T(F): 98 (30 Jul 2021 17:00), Max: 98.4 (30 Jul 2021 09:00)  HR: 117 (30 Jul 2021 17:00) (111 - 121)  BP: 159/81 (30 Jul 2021 17:00) (135/90 - 159/81)  BP(mean): --  ABP: --  ABP(mean): --  RR: 18 (30 Jul 2021 17:00) (17 - 18)  SpO2: 94% (30 Jul 2021 17:00) (94% - 96%)        07-29 @ 07:01  -  07-30 @ 07:00  --------------------------------------------------------  IN: 50 mL / OUT: 2500 mL / NET: -2450 mL    07-30 @ 07:01  -  07-30 @ 17:22  --------------------------------------------------------  IN: 100 mL / OUT: 500 mL / NET: -400 mL      CAPILLARY BLOOD GLUCOSE          PHYSICAL EXAM:  General: no acute distress  Respiratory: lungs clear bilaterally, no focal wheezing, crackles, tachypnea   Cardiovascular:  regular rate and rhythm, no rubs, gallops murmurs   Abdomen:  soft, non-distended   Extremities: no significant edema   Skin: no rashes, cyanosis    Neurological: no gross/focal deficits appreciated   Psychiatry: appropriate     LINES:     HOSPITAL MEDICATIONS:  Standing Meds:  amLODIPine   Tablet 5 milliGRAM(s) Oral daily  aspirin enteric coated 81 milliGRAM(s) Oral daily  chlorhexidine 4% Liquid 1 Application(s) Topical <User Schedule>  famotidine    Tablet 20 milliGRAM(s) Oral two times a day  multivitamin 1 Tablet(s) Oral daily      PRN Meds:  acetaminophen   Tablet .. 650 milliGRAM(s) Oral every 6 hours PRN  levalbuterol Inhalation 0.63 milliGRAM(s) Inhalation every 6 hours PRN  ondansetron Injectable 4 milliGRAM(s) IV Push every 6 hours PRN  polyethylene glycol 3350 17 Gram(s) Oral daily PRN  sodium chloride 0.9% lock flush 10 milliLiter(s) IV Push every 1 hour PRN      LABS:                        11.3   9.03  )-----------( 287      ( 29 Jul 2021 11:12 )             35.4     Hgb Trend: 11.3<--, 10.8<--, 11.6<--, 11.9<--, 11.5<--  07-30    141  |  97<L>  |  19  ----------------------------<  107<H>  3.6   |  28  |  0.80    Ca    9.8      30 Jul 2021 07:05      Creatinine Trend: 0.80<--, 0.93<--, 2.36<--, 3.74<--, 6.49<--, 6.55<--            MICROBIOLOGY:     Culture - Fungal, Body Fluid (collected 27 Jul 2021 18:35)  Source: .Body Fluid Peritoneal Fluid  Preliminary Report (28 Jul 2021 06:34):    Testing in progress    Culture - Body Fluid with Gram Stain (collected 27 Jul 2021 18:35)  Source: .Body Fluid Peritoneal Fluid  Gram Stain (27 Jul 2021 23:07):    No polymorphonuclear leukocytes seen    No organisms seen    by cytocentrifuge  Preliminary Report (28 Jul 2021 18:07):    No growth        RADIOLOGY:  [ ] Reviewed and interpreted by me    PULMONARY FUNCTION TESTS:    EKG:   CHIEF COMPLAINT:  abdominal pain       Interval events  - patient seen, reports some improvement in dyspnea, still requiring 2 L NC to maintain O2 sats     PAST MEDICAL & SURGICAL HISTORY:  Hypertension    Ovarian mass    Hypercholesteremia    S/P ureteral stent placement        FAMILY HISTORY:  FHx: hypertension (Mother)    FH: diabetes mellitus (Mother)        SOCIAL HISTORY:  Smoking: denies     Allergies    penicillins (Rash)    Intolerances        HOME MEDICATIONS:  Home Medications:  Aspirin Enteric Coated 81 mg oral delayed release tablet: 1 tab(s) orally once a day (26 Jul 2021 12:02)  ibuprofen 200 mg oral tablet: 2 tab(s) orally 2 times a day, As Needed for Pain (26 Jul 2021 12:02)  Multiple Vitamins oral tablet: 1 tab(s) orally once a day (26 Jul 2021 12:02)  triamterene-hydrochlorothiazide 37.5 mg-25 mg oral capsule: 1 cap(s) orally once a day (26 Jul 2021 12:02)      REVIEW OF SYSTEMS:  Constitutional: [x ] negative [ ] fevers [ ] chills [ ] weight loss [ ] weight gain  HEENT: [x ] negative [ ] dry eyes [ ] eye irritation [ ] postnasal drip [ ] nasal congestion  CV: [x ] negative  [ ] chest pain [ ] orthopnea [ ] palpitations [ ] murmur  Resp: [ x] negative [ ] cough [ ] shortness of breath [ ] dyspnea [ ] wheezing [ ] sputum [ ] hemoptysis  GI: [ ] negative [ ] nausea [ ] vomiting [ ] diarrhea [ ] constipation [ x] abd pain [ ] dysphagia   : [ x] negative [ ] dysuria [ ] nocturia [ ] hematuria [ ] increased urinary frequency  Musculoskeletal: [x ] negative [ ] back pain [ ] myalgias [ ] arthralgias [ ] fracture  Skin: [x ] negative [ ] rash [ ] itch  Neurological: [x ] negative [ ] headache [ ] dizziness [ ] syncope [ ] weakness [ ] numbness  Psychiatric: [x ] negative [ ] anxiety [ ] depression  Endocrine: [ x] negative [ ] diabetes [ ] thyroid problem  Hematologic/Lymphatic: [x ] negative [ ] anemia [ ] bleeding problem  Allergic/Immunologic: [ x] negative [ ] itchy eyes [ ] nasal discharge [ ] hives [ ] angioedema  [ ] All other systems negative  [ ] Unable to assess ROS because ________    OBJECTIVE:  ICU Vital Signs Last 24 Hrs  T(C): 36.7 (30 Jul 2021 17:00), Max: 36.9 (30 Jul 2021 09:00)  T(F): 98 (30 Jul 2021 17:00), Max: 98.4 (30 Jul 2021 09:00)  HR: 117 (30 Jul 2021 17:00) (111 - 121)  BP: 159/81 (30 Jul 2021 17:00) (135/90 - 159/81)  BP(mean): --  ABP: --  ABP(mean): --  RR: 18 (30 Jul 2021 17:00) (17 - 18)  SpO2: 94% (30 Jul 2021 17:00) (94% - 96%)        07-29 @ 07:01  -  07-30 @ 07:00  --------------------------------------------------------  IN: 50 mL / OUT: 2500 mL / NET: -2450 mL    07-30 @ 07:01  -  07-30 @ 17:22  --------------------------------------------------------  IN: 100 mL / OUT: 500 mL / NET: -400 mL      CAPILLARY BLOOD GLUCOSE          PHYSICAL EXAM:  General: no acute distress  Respiratory: lungs clear bilaterally, no focal wheezing, crackles, tachypnea   Cardiovascular:  regular rate and rhythm, no rubs, gallops murmurs   Abdomen:  soft, non-distended   Extremities: no significant edema   Skin: no rashes, cyanosis    Neurological: no gross/focal deficits appreciated   Psychiatry: appropriate     LINES:     HOSPITAL MEDICATIONS:  Standing Meds:  amLODIPine   Tablet 5 milliGRAM(s) Oral daily  aspirin enteric coated 81 milliGRAM(s) Oral daily  chlorhexidine 4% Liquid 1 Application(s) Topical <User Schedule>  famotidine    Tablet 20 milliGRAM(s) Oral two times a day  multivitamin 1 Tablet(s) Oral daily      PRN Meds:  acetaminophen   Tablet .. 650 milliGRAM(s) Oral every 6 hours PRN  levalbuterol Inhalation 0.63 milliGRAM(s) Inhalation every 6 hours PRN  ondansetron Injectable 4 milliGRAM(s) IV Push every 6 hours PRN  polyethylene glycol 3350 17 Gram(s) Oral daily PRN  sodium chloride 0.9% lock flush 10 milliLiter(s) IV Push every 1 hour PRN      LABS:                        11.3   9.03  )-----------( 287      ( 29 Jul 2021 11:12 )             35.4     Hgb Trend: 11.3<--, 10.8<--, 11.6<--, 11.9<--, 11.5<--  07-30    141  |  97<L>  |  19  ----------------------------<  107<H>  3.6   |  28  |  0.80    Ca    9.8      30 Jul 2021 07:05      Creatinine Trend: 0.80<--, 0.93<--, 2.36<--, 3.74<--, 6.49<--, 6.55<--            MICROBIOLOGY:     Culture - Fungal, Body Fluid (collected 27 Jul 2021 18:35)  Source: .Body Fluid Peritoneal Fluid  Preliminary Report (28 Jul 2021 06:34):    Testing in progress    Culture - Body Fluid with Gram Stain (collected 27 Jul 2021 18:35)  Source: .Body Fluid Peritoneal Fluid  Gram Stain (27 Jul 2021 23:07):    No polymorphonuclear leukocytes seen    No organisms seen    by cytocentrifuge  Preliminary Report (28 Jul 2021 18:07):    No growth        RADIOLOGY:  [ ] Reviewed and interpreted by me    PULMONARY FUNCTION TESTS:    EKG:

## 2021-08-04 NOTE — PROGRESS NOTE ADULT - ASSESSMENT
66 yr old woman PMH HTN, Pre-Diabetes, HLD, recent admission to [facility] for abdominal pain and found to have findings highly suspicious for malignant ovarian mass with abdominal ascites (diagnosed by tumor markers and imaging), left hydroureteronephrosis s/p renal stent placement  on 7/15/21 and dced 7/16/21 p/w worsening diffuse abdominal pain associated with nausea. Found to have severe PURVI with hyperkalemia and moderate abdominopelvic ascites with persistent sinus tachycardia c/b acute hypoxic respiratory failure in setting of b/l pleural effusions R>L. Now with resolved hyperkalemia and PURVI, s/p paracentesis on 7/27, s/p thoracentesis on 8/2 with 900cc of serosanguinous drainage.

## 2021-08-04 NOTE — PROGRESS NOTE ADULT - ATTENDING COMMENTS
pt with ovarian mass.abdominal ascites, left hydroureteronephrosis s/p left renal stent. with bilateral pleural effusions, right > left. s/p diagnostic and therapeutic thoracentesis today; concern for malignancy, tolerated procedure. drained 900 serosanguineous fluid. wean O2 as tolerated to keep O2 > 92%. f/u pleural analyses.f/u CXR post thoracentesis.
66 yr old woman PMH HTN, Pre-Diabetes, HLD, recent admission to [facility] for abdominal pain and found to have findings highly suspicious for malignant ovarian mass with abdominal ascites (diagnosed by tumor markers and imaging), left hydroureteronephrosis s/p renal stent placement  on 7/15/21 and dced 7/16/21 p/w worsening diffuse abdominal pain associated with nausea. Found to have severe PURVI with hyperkalemia and moderate abdominopelvic ascites with persistent sinus tachycardia c/b acute hypoxic respiratory failure in setting of b/l pleural effusions R>L. Now with resolved hyperkalemia and PURVI, s/p paracentesis on 7/27, s/p thoracentesis on 8/2 with 900cc of serosanguinous drainage.    Patient in NAD on NC, appears comfortable  tachycardic  decreased BS at bases R>L, some improved aeration on right  Abd slightly softer, distended, nontender  2-3 + pitting edema LE b/l    Patient reports soreness at site of thoracentesis and requests Tylenol solution and does not want morphine anymore. Palliative care at bedside  F/u pleural fluid cytopathology from thoracentesis on 8/2, effusion is exudative in nature  TTE pending  Home O2 to be set up. Check O2 sat on RA at rest and ambulation  7/27 ascitic fluid cytopath with atypical lymphocytes, negative for malignant epithelial cells, ? lymphoproliferative disorder.   F/u SPEP, flow cytometry. GYN-Onc to followup, ? need for tissue biopsy  CA-125 elevated at 346, CEA~4.0, CA 19-9 <2  PCP: Dr. Elmo Hutchison: [phone]. Will update prior to DC  PT: home, no needs. Patient requesting shower chair, commode and home attendant. CM aware  DC planning for tmw if no further workup needed as inpatient from GynOn
Patient seen and examined. Agree with assessment and plan.     Patient with ovarian mass s/p left ureteral stent placement for hydronephrosis 2/2 extrinsic compression at OSH on 7/15 now with Cr elevated to 7.     - CT reviewed: left ureteral stent appears to be in place without hydronephrosis. Etiology unclear. There is evidence of significant ascites.    - If not improving with medical management, consider IR consult for left nephrostomy
Agree with assessment and plan.
Patient seen and examined. Agree with assessment and plan.    Etiology of renal failure unlikely due to ureteral obstruction given presence of left ureteral stent and absence of bilateral hydronephrosis.    Consider prerenal and intrinsic renal pathology    Urine output improved after paracentesis of 60 cc of fluid
pt had episodes of tachypnea last night, possibly pain related? no PTX on CXR. O2 requirement the same, pt endorces feeling slightly improved dyspnea after thoracentesis. will f/u pleural fluids testing. negative DVT study bilateral on 7/27 and low probability of PE on V/Q scan.
agree with above. please arrange for home O2. cytopath still pending. if pt to be discharged soon, should f/u with pulmonary clinic within 2-3 weeks to monitor pleural effusion. signing off, please reconsult as needed.
No new complaints  1.  ARF--HD followed by aggressive volume supplementation.  Dramatic improvement.  NON oliguric, no HD required.    2.  Hyperkalemia--improved.  Binder >5.5    discussed with med team
No complaints.  Feeling improved  1.  ARF--infection, volume related.  Initial HD but full recovery with robust volume resuscitation.   2.  Hyperkalemia--resolved.  No binder required
No new complaints  1.  ARF--volume optimization with complete resolution.  Assess intake, calorie count and may require more stable delivery of fluids on chronic basis.  Please reconsult if require additional input
Much more conversant and alert.   and son Yuan in attendance  1.  ARF--low BNP, tachycardia and equivocal urine lytes.  AGGRESSIVE volume repletion and will dialyze/UF aggressively as needed.  Would have very high threshold for any radiocontrast study.    2.  Hyperkalemia-  alkaline volume, binder  3.  Volume overload--peripheral edema.  NO evidence for central congestion    discussed with med team
66 yr old woman PMH HTN, Pre-Diabetes, HLD, recent admission to [facility] for abdominal pain and found to have findings highly suspicious for malignant ovarian mass with abdominal ascites (diagnosed by tumor markers and imaging), left hydroureteronephrosis s/p renal stent placement  on 7/15/21 and dced 7/16/21 p/w worsening diffuse abdominal pain associated with nausea. Found to have severe PURVI with hyperkalemia and moderate abdominopelvic ascites with persistent sinus tachycardia c/b acute hypoxic respiratory failure in setting of b/l pleural effusions R>L. Now with resolved hyperkalemia and PURVI.    Patient in NAD on NC, appears comfortable  tachycardic  decreased BS at bases R>L  Abd firm, distended  2-3 + pitting edema LE b/l    Reports SOB with ambulation  Agreeable to thoracentesis now, pulmonary to followup in AM  Will need home O2 set up. Desats to 88% on 2L with ambulation but improved with 3L NC  F/U TTE  Paracentesis attempted on 7/27 with only 60cc removed, SBP negative on fluid cytology, cytopath pending  Gyn-Onc consulted, awaiting cytopath. Currently without tissue diagnosis of ovarian cancer. Check CA-125, CEA~4.0, CA 19-9  PCP: Dr. Elmo Hutchison: [phone]. Will update prior to DC  PT: home, no needs
66 yr old woman PMH HTN, Pre-Diabetes, HLD, recent admission to [facility] for abdominal pain and found to have findings highly suspicious for malignant ovarian mass with abdominal ascites (diagnosed by tumor markers and imaging), left hydroureteronephrosis s/p renal stent placement  on 7/15/21 and dced 7/16/21 p/w worsening diffuse abdominal pain associated with nausea. Found to have severe PURVI with hyperkalemia and moderate abdominopelvic ascites with persistent sinus tachycardia c/b acute hypoxic respiratory failure in setting of b/l pleural effusions R>L. Now with resolved hyperkalemia and PURVI, s/p paracentesis on 7/27, s/p thoracentesis on 8/2 with 900cc of serosanguinous drainage.    Patient in NAD on NC, appears comfortable, speaking in full sentences    Denies pain or SOB.   F/u pleural fluid cytopathology from thoracentesis on 8/2, effusion is exudative in nature  TTE with preserved EF, concentric LV wall remodeling  7/27 ascitic fluid cytopath with atypical lymphocytes, negative for malignant epithelial cells, ? lymphoproliferative disorder.  Flow cytometry with no diagnostic abnormalities   PCP: Dr. Elmo Hutchison: [phone]. Called and left PCP message for callback  PT: home, no needs. Home O2 set up by CM  Stable for DC home with home O2. Stressed importance of followup with PCP, pulmonary and cancer center. DC time: 34 min
66 yr old woman PMH HTN, Pre-Diabetes, HLD, recent admission to [facility] for abdominal pain and found to have malignant ovarian mass with abdominal ascites, left hydroureteronephrosis s/p renal stent placement  on 7/15/21 and dced 7/16/21 p/w worsening diffuse abdominal pain associated with nausea. Found to have severe PURVI with hyperkalemia and moderate abdominopelvic ascites with persistent sinus tachycardia c/b acute hypoxic respiratory failure. Now with resolved hyperkalemia and PURVI.    Patient in NAD on NC, saturating 94% on RA when NC removed  tachycardic  Lungs CTA b/l, right sided shiley C/D/I  Abd firm, distended  No carnes in place  +2 pitting edema LE b/l    Tele with persistent sinus tachycardia highest to 140s  S/p shiley on 7/26 with 1st session of HD on 7/26 c/b tachycardia to 140s. PURVI now resolved, plan to remove shiley and attempt TOV  DC renal/lasix scan  Start pepcid 20mg IV BID. Recent CT Abd with nonspecific enteritis  Paracentesis attempted on 7/27 with only 60cc removed, SBP negative on fluid cytology  V/Q scan with low probability of PE. CXR with b/l pleural effusions right greater than left with compressive atelectasis at the right lung base which is likely contributing to hypoxia. LE US negative for DVT b/l. C/w incentive spirometry, nebs (switch duonebs to xopenex given tachycardia)  C/w Norvasc 5mg qd for hypertension.  Patient states she has not established oncologic care with Dr. Tutu Lenz([phone]) GYN-Onc at [facility]. Patient now states she would rather followup at [facility] since her son lives near [facility]. PCP: Dr. Elmo Hutchison: [phone]  Obtain collateral records from [facility] in terms of malignancy workup and previous CT A/P with IVC if possible
66 yr old woman PMH HTN, Pre-Diabetes, HLD, recent admission to [facility] for abdominal pain and found to have findings highly suspicious for malignant ovarian mass with abdominal ascites (diagnosed by tumor markers and imaging), left hydroureteronephrosis s/p renal stent placement  on 7/15/21 and dced 7/16/21 p/w worsening diffuse abdominal pain associated with nausea. Found to have severe PURVI with hyperkalemia and moderate abdominopelvic ascites with persistent sinus tachycardia c/b acute hypoxic respiratory failure in setting of b/l pleural effusions R>L. Now with resolved hyperkalemia and PURVI.    Patient in NAD on NC  tachycardic  decreased BS at bases R>L  Abd firm, distended  2-3 + pitting edema LE b/l    Patient refusing thoracentesis initially and wants to discuss further with her son first  Will need home O2 set up. Check ambulatory O2 sat on 2L NC and RA  F/U TTE  Paracentesis attempted on 7/27 with only 60cc removed, SBP negative on fluid cytology, cytopath pending  Will reach out to GYN-ONC, awaiting cytopath. Currently without tissue diagnosis of ovarian cancer. Check CA-125, CEA, CA 19-9  Team reached out to [facility] to set patient up with followup appt. PCP: Dr. Elmo Hutchison: [phone]. Will update prior to DC
66 yr old woman PMH HTN, Pre-Diabetes, HLD, recent admission to [facility] for abdominal pain and found to have findings highly suspicious for malignant ovarian mass with abdominal ascites (diagnosed by tumor markers and imaging), left hydroureteronephrosis s/p renal stent placement  on 7/15/21 and dced 7/16/21 p/w worsening diffuse abdominal pain associated with nausea. Found to have severe PURVI with hyperkalemia and moderate abdominopelvic ascites with persistent sinus tachycardia c/b acute hypoxic respiratory failure in setting of b/l pleural effusions R>L. Now with resolved hyperkalemia and PURVI.    Patient in NAD on NC, appears comfortable  tachycardic  decreased BS at bases R>L  Abd firm, distended, nontender  2-3 + pitting edema LE b/l    Thoracentesis today, f/u fluid cytology  Will need home O2 set up if malignant pleural effusion as this will likely reaccumulate after tap. Recheck O2 requirements after thoracentesis  F/U TTE  Paracentesis attempted on 7/27 with only 60cc removed, SBP negative on fluid cytology, cytopath with atypical lymphocytes, negative for malignant epithelial cells, ? lymphoproliferative disorder. Check SPEP, flow cytometry. GYN-Onc to followup, ? need for tissue biopsy  CA-125 elevtaed at 346, CEA~4.0, CA 19-9 <2  Called Dr. Lenz, Gyn Onc at [facility], awaiting callback.  PCP: Dr. Elmo Hutchison: [phone]. Will update prior to DC  PT: home, no needs
Pt seen with NP  Agree with above plan    67yo F with ovarian mass  Consulted for GOC  Pt and family want to pursue DMT, plan would be for outpatient oncology f/u
gyn ca awaiting final path.  now with sob due to pleural effusion and ascites.  agree to low dose morphine 1mg iv prn sob.  zofran iv prn nausea improved.
66 yr old woman PMH HTN, Pre-Diabetes, HLD, recent admission to [facility] for abdominal pain and found to have malignant ovarian mass with abdominal ascites, left hydroureteronephrosis s/p renal stent placement  on 7/15/21 and dced 7/16/21 p/w worsening diffuse abdominal pain associated with nausea. Found to have severe PURVI with hyperkalemia and moderate abdominopelvic ascites with persistent sinus tachycardia c/b acute hypoxic respiratory failure.    Patient in NAD on NC  tachycardic  Lungs CTA b/l, right sided shiley C/D/I  Abd firm, distended, tenderness diffusely with no guarding  + carnes with minimal yellow urine  trace pitting edema LE b/l    S/p shiley on 7/26 with 1st session of HD on 7/26 c/b tachycardia to 140s  Persistently tachycardic and O2 sat 88% on RA with severe PURVI, high concern for an acute ischemic injury from a thromboembolic phenomenon given her hypercoagulable state. V/Q scan and LE US ordered. Holding off further contrast studies per renal  Hyperkalemia slightly improved s/p HD on 7/26, s/p sodium bicarb infusion. Holding home diuretics  F/U renal US and renal/lasix scan  Paracentesis attempted on 7/27 with only 60cc removed, SBP negative on fluid cytology  Patient states she has not established oncologic care with Dr. Tutu Lenz([phone]) GYN-Onc at [facility]. Patient now states she would rather followup at [facility] since her son lives near [facility]. PCP: Dr. Elmo Hutchison: [phone]  Obtain Palliative care consult given complex decision making in critically ill patient
concern for gyn malignancy.  awaiting final path.  refused morphine.  would start tylenol atc.  goal is home with follow up with [facility] for dmt once diagnosis make.  answered questions to family at bedside
66 yr old woman PMH HTN, Pre-Diabetes, HLD, recent admission to [facility] for abdominal pain and found to have malignant ovarian mass with abdominal ascites, left hydroureteronephrosis s/p renal stent placement  on 7/15/21 and dced 7/16/21 p/w worsening diffuse abdominal pain associated with nausea. Found to have severe PURVI with hyperkalemia and moderate abdominopelvic ascites with persistent sinus tachycardia c/b acute hypoxic respiratory failure. Now with resolved hyperkalemia and PURVI.    Patient in NAD on NC  tachycardic  decreased BS at bases R>L, shiley no longer present, overlying dressing C/D/I  Abd firm, distended  No carnes in place  2-3 + pitting edema LE b/l    Tele with persistent sinus tachycardia   V/Q scan with low probability of PE. CXR with b/l pleural effusions right greater than left with compressive atelectasis at the right lung base which is likely contributing to hypoxia. LE US negative for DVT b/l.   Bedside POCUS revealed significant pleural effusion R>L, pulm consulted for possible thoracentesis, hold off further diuresis  Check TTE  Pepcid 20mg PO BID. Recent CT Abd with nonspecific enteritis. No further episodes of N/V, advance diet as tolerated  Paracentesis attempted on 7/27 with only 60cc removed, SBP negative on fluid cytology  Team reached out to [facility] to set patient up with followup appt. PCP: Dr. Elmo Hutchison: [phone]. Will update prior to DC
66 yr old woman PMH HTN, Pre-Diabetes, HLD, recent admission to [facility] for abdominal pain and found to have malignant ovarian mass with abdominal ascites, left hydroureteronephrosis s/p renal stent placement  on 7/15/21 and dced 7/16/21 p/w worsening diffuse abdominal pain associated with nausea. Found to have severe PURVI with hyperkalemia and moderate abdominopelvic ascites with persistent sinus tachycardia c/b acute hypoxic respiratory failure. Now with resolved hyperkalemia and improving PURVI.    Patient in NAD on NC  tachycardic  Lungs CTA b/l, right sided shiley C/D/I  Abd firm, distended  + carnes with yellow urine  +2 pitting edema LE b/l    S/p shiley on 7/26 with 1st session of HD on 7/26 c/b tachycardia to 140s. Cr now improved to 2s s/p IVF and with improved urine output  If Cr downtrending/stable by 7/29, plan to remove Shiley  F/U renal US and renal/lasix scan  Paracentesis attempted on 7/27 with only 60cc removed, SBP negative on fluid cytology  V/Q scan with low probability of PE. CXR with b/ll pleural effusions right greater than left with compressive atelectasis at the right lung base which is likely contributing to hypoxia. LE US negative for DVT b/l. Start incentive spirometry, nebs  Start Norvasc 5mg qd for hypertension.  Patient states she has not established oncologic care with Dr. Tutu Lenz([phone]) GYN-Onc at [facility]. Patient now states she would rather followup at [facility] since her son lives near [facility]. PCP: Dr. Elmo Hutchison: [phone]  Obtain collateral records from [facility] in terms of malignancy workup and previous CT A/P with IVC

## 2021-08-04 NOTE — DISCHARGE NOTE NURSING/CASE MANAGEMENT/SOCIAL WORK - NSDCFUADDAPPT_GEN_ALL_CORE_FT
Please followup with your PCP: Dr. Elmo Hutchison: [phone] within 1 week of discharge  Please followup with lung specialist, Dr. Wilson to followup the test results on the fluid that was removed from your lungs within 1 week. The clinic will reach out to your for scheduling, but if you don't hear back by Friday, please call the pulmonology clinic to schedule a follow up appointment.   Please followup at the cancer center, call to make an appointment to discuss further testing required and treatment moving forward as soon as possible.

## 2021-08-04 NOTE — PROGRESS NOTE ADULT - PROBLEM SELECTOR PLAN 2
- Presented with sinus tachycardia of unknown etiology. HR now fluctuating between 110-120. Most likely multifactorial  - TSH within normal limit.  - Associated with SOB with SPO2 87% on 1L NC and 1 instance of sharp CP. SOB most likely secondary to pleural effusion. Was concerning for PE in the setting of hypercoagulable state 2/2 ovarian malignancy. Venous doppler with patent vessels, no evidence of PE. Unable to undergo CTA w/contrast due to superimposed PURVI. Opted for V/Q scan which resulted low probability of PE.  - TTE read as calcified trileaflet aortic valve with normal opening. Increased relative wall thickness with normal left ventricular mass index, consistent with concentric left  ventricular remodeling. Hyperdynamic left ventricle. Normal right ventricular size and function. Ascites seen.

## 2021-08-05 PROBLEM — I10 ESSENTIAL (PRIMARY) HYPERTENSION: Chronic | Status: ACTIVE | Noted: 2021-01-01

## 2021-08-05 PROBLEM — E78.00 PURE HYPERCHOLESTEROLEMIA, UNSPECIFIED: Chronic | Status: ACTIVE | Noted: 2021-01-01

## 2021-08-05 PROBLEM — N83.8 OTHER NONINFLAMMATORY DISORDERS OF OVARY, FALLOPIAN TUBE AND BROAD LIGAMENT: Chronic | Status: ACTIVE | Noted: 2021-01-01

## 2021-08-06 PROBLEM — R00.0 TACHYCARDIA: Status: ACTIVE | Noted: 2021-01-01

## 2021-08-06 PROBLEM — R19.00 PELVIC MASS: Status: ACTIVE | Noted: 2021-01-01

## 2021-08-06 PROBLEM — Z00.00 ENCOUNTER FOR PREVENTIVE HEALTH EXAMINATION: Status: ACTIVE | Noted: 2021-01-01

## 2021-08-06 PROBLEM — R18.8 ASCITES: Status: ACTIVE | Noted: 2021-01-01

## 2021-08-06 PROBLEM — R10.9 ABDOMINAL PAIN: Status: ACTIVE | Noted: 2021-01-01

## 2021-08-06 PROBLEM — N13.30 HYDRONEPHROSIS, LEFT: Status: ACTIVE | Noted: 2021-01-01

## 2021-08-06 PROBLEM — R73.03 PREDIABETES: Status: ACTIVE | Noted: 2021-01-01

## 2021-08-06 PROBLEM — I10 HYPERTENSION: Status: ACTIVE | Noted: 2021-01-01

## 2021-08-06 PROBLEM — J90 PLEURAL EFFUSION: Status: ACTIVE | Noted: 2021-01-01

## 2021-08-06 PROBLEM — N28.9 RENAL INSUFFICIENCY: Status: ACTIVE | Noted: 2021-01-01

## 2021-08-06 PROBLEM — E78.5 HYPERLIPIDEMIA: Status: ACTIVE | Noted: 2021-01-01

## 2021-08-06 NOTE — CHART NOTE - NSCHARTNOTEFT_GEN_A_CORE
Called pt to discuss recent hospitalization course and determine if she desires to continue GYNONC care with our providers or at [facility]. No answer, left voicemail for clinic.    Beronica Bradford MD  OBGYN PGY3

## 2021-08-11 NOTE — ED PROVIDER NOTE - ATTENDING CONTRIBUTION TO CARE
66F recent hospital admission due to Renal insufficiency, had thorocentesis, has shiley placed for HD.  Has ovarian CA, had renal stent for L hydro.  Pt having Progressive LE edema, SOB, incr abd distension.  Not on AC.  On NC now but still SOB.  EKG SR at 109 no kasi nos td no twi.   qtc 428.  Overall pt appears more volume overloaded with abd distension and LE edema bilat.  Given Ov CA and worsening SOB would check CTA – see my above note for events.  Pt placed on biPAP with improvement of SOB.  MICU eval given new biPAP.  Rx Lasix.  Admit – pt will likely need thora- or para- centesis as inpatient to help with SOB.    VS:  tachypnea, tachycardia; hypoxia impv with oxygen    GEN - mod resp distress  - impv with biPAP;   A+O x3   HEAD - NC/AT     ENT - PEERL, EOMI, mucous membranes    moist , no discharge      NECK: Neck supple, non-tender without lymphadenopathy, no masses, no JVD  PULM - decr BS bilat,  symmetric breath sounds  COR -  normal heart sounds    ABD - , (+)Distended, nontender, soft,  BACK - no CVA tenderness, nontender spine     EXTREMS - (+)1-2 LE edema, no deformity, warm and well perfused    SKIN - no rash    or bruising      NEUROLOGIC - alert, face symmetric, speech fluent, sensation nl, motor no focal deficit.  Upon my evaluation, this patient had a high probability of imminent or life-threatening deterioration due to hypoxic respiratory failure, which required my direct attention, intervention, and personal management.  The patient has a  medical condition that impairs one or more vital organ systems.  Frequent personal assessment and adjustment of medical interventions was performed.      I have personally provided 45 minutes of critical care time exclusive of time spent on separately billable procedures. Time includes review of laboratory data, radiology results, discussion with consultants, patient and family; monitoring for potential decompensation, as well as time spent retrieving data and reviewing the chart and documenting the visit. Interventions were performed as documented above.

## 2021-08-11 NOTE — ED ADULT NURSE REASSESSMENT NOTE - NS ED NURSE REASSESS COMMENT FT1
Facilitator RN: assisted patient onto bedpan, patient voided. continues on cardiac monitor. on nasal cannula 2.5L. Stretcher in lowest position, wheels locked, appropriate side rails in place, call bell in reach.

## 2021-08-11 NOTE — ED PROVIDER NOTE - PROGRESS NOTE DETAILS
DD ED ATTG:  pt advised to get CTA eval for PE given OV CA, tachycardia, hypoxia.  Pt at CT absolutely refused it, saying that's how she ended up in renal failure previously.  Pt told her Cr is OK now and it would be acceptable to get it pt still refuse.  Pt placed on biPAP with improvement of SOB and MICU consulted.  Given edema will rx lasix. Archie:  more comfortable on BiPAP. s/p lasix x1. appreciate MICU eval. not a candidate at this time. to admit to medicine

## 2021-08-11 NOTE — ED PROVIDER NOTE - OBJECTIVE STATEMENT
66 yr old woman PMH HTN, Pre-Diabetes, HLD, recent findings on CT concerning for malignant ovarian mass with abdominal ascites (diagnosed by tumor markers and imaging), left hydroureteronephrosis s/p renal stent placement  (7/15/21) and recent admission to [facility] for renal failure requiring new dialysis (access subsequently removed) s/p thoracentesis presents with SOB    Patient was complaining of progressive b/l LE edema as well as shortness of breath. Denies immobility, but has been moving around less the day of ED visit.

## 2021-08-11 NOTE — ED ADULT NURSE NOTE - NSIMPLEMENTINTERV_GEN_ALL_ED
Implemented All Universal Safety Interventions:  [place] to call system. Call bell, personal items and telephone within reach. Instruct patient to call for assistance. Room bathroom lighting operational. Non-slip footwear when patient is off stretcher. Physically safe environment: no spills, clutter or unnecessary equipment. Stretcher in lowest position, wheels locked, appropriate side rails in place.

## 2021-08-11 NOTE — ED ADULT NURSE NOTE - OBJECTIVE STATEMENT
Pt AOX4 c/o c/o swelling in feet and fluid accumulation in abdomen; pt was d/c'd from this hospital x 1 week ago s/p acute kidney injury, and required thoracentesis and hemodialysis; pt rec'd dialysis through a chest wall shylie that was removed prior to discharge; pt arrives on home O2 (2.5L); pt also was told she has an Ovarian mass. Abdomen noticeably distended and firm, pt denies pain, pt grunting with each breath, sat 97% on 2.5 L NC; Labs drawn and sent.

## 2021-08-11 NOTE — ED PROVIDER NOTE - CLINICAL SUMMARY MEDICAL DECISION MAKING FREE TEXT BOX
66 yr old woman PMH HTN, Pre-Diabetes, HLD, recent findings on CT concerning for malignant ovarian mass with abdominal ascites (diagnosed by tumor markers and imaging), left hydroureteronephrosis s/p renal stent placement  (7/15/21) and recent admission to [facility] for renal failure requiring new dialysis (access subsequently removed) s/p thoracentesis presents with SOB  -presenting with fluid overload on nasal cannula  -to start with basic labs and CXR  -likely requires admission for fluid overload +/- renal failure?  -eval for need of dialysis  -likely needs repeat thoracentesis +- thoracentesis 66 yr old woman PMH HTN, Pre-Diabetes, HLD, recent findings on CT concerning for malignant ovarian mass with abdominal ascites (diagnosed by tumor markers and imaging), left hydroureteronephrosis s/p renal stent placement  (7/15/21) and recent admission to [facility] for renal failure requiring new dialysis (access subsequently removed) s/p thoracentesis presents with SOB  -presenting with fluid overload on nasal cannula  -to start with basic labs and CXR  -requires admission for fluid overload   -likely needs repeat thoracentesis +/- paracentesis  -not a MICU candidate at this time

## 2021-08-11 NOTE — ED PROVIDER NOTE - NS ED ATTENDING STATEMENT MOD
Yes
I have personally provided the amount of critical care time documented below concurrently with the resident/fellow.  This time excludes time spent on separate procedures and time spent teaching. I have reviewed the resident’s / fellow’s documentation and I agree with the history, exam, and assessment and plan of care.

## 2021-08-11 NOTE — ED PROVIDER NOTE - PHYSICAL EXAMINATION
VITALS:     Vital Signs Last 24 Hrs  T(C): 36.6 (11 Aug 2021 19:29), Max: 36.6 (11 Aug 2021 19:29)  T(F): 97.9 (11 Aug 2021 19:29), Max: 97.9 (11 Aug 2021 19:29)  HR: 110 (11 Aug 2021 19:29) (110 - 115)  BP: 133/83 (11 Aug 2021 19:29) (100/54 - 133/83)  RR: 28 (11 Aug 2021 19:29) (27 - 28)  SpO2: 98% (11 Aug 2021 19:29) (94% - 98%)    PHYSICAL Exam  GENERAL: in moderate distress. on nasal cannula  HEAD:  Atraumatic, Normocephalic  EYES: EOMI, PERRLA, conjunctiva and sclera clear  ENT: Moist mucous membranes  NECK: Supple  CHEST/LUNG: decreased breath sounds bilateral lung bases  HEART: Regular rate and rhythm; No murmurs, rubs, or gallops  ABDOMEN: distension noted. no abdominal tenderness  EXTREMITIES:  b/l LE pitting edema. dependent edema also noted. 2+ Peripheral Pulses  NERVOUS SYSTEM:  Alert & Oriented X3, speech clear. No deficits   MSK: FROM all 4 extremities, full and equal strength  SKIN: No rashes or lesions

## 2021-08-11 NOTE — ED ADULT TRIAGE NOTE - CHIEF COMPLAINT QUOTE
p/t  discharged from hospital one week ago treated for acute renal failure, back today with sob swelling to BLE and chest pain

## 2021-08-12 NOTE — PROGRESS NOTE ADULT - ASSESSMENT
66-year-old female with PMH significant for HTN, HLD, recently discovered ovarian mass suspicious for malignancy (7/2021), L hydroureteronephrosis s/p renal stent (7/15/21), and recent hospitalization ([facility] 7/26-8/4) for acute renal failure (likely obstructive) requiring urgent HD, ascites s/p therapeutic paracentesis (7/27/21), and pleural effusion s/p R thoracentesis (8/2/21) showing lymphocytosis but no malignant cells admitted for acute hypercarbic respiratory failure due to pleural effusions most likely caused by ovarian malignancy a/w volume overload with ascites and b/l LE edema.

## 2021-08-12 NOTE — PROGRESS NOTE ADULT - PROBLEM SELECTOR PLAN 7
Pt was recently switched from HCTZ-triamterene to amlodipine during recent hospitalization   - holding amlodipine in setting of LE edema  - Monitor BP after procedures and diuretics  - If remains hypertensive, will consider non-amlodipine anti-HTN medications

## 2021-08-12 NOTE — H&P ADULT - PROBLEM SELECTOR PLAN 8
Pt with reported history of HLD but not on statin.   -Lipid profile in AM   -Unclear why patient is on ASA- clarify with PMD in AM - hold for now in case patient requires IR procedure

## 2021-08-12 NOTE — CONSULT NOTE ADULT - SUBJECTIVE AND OBJECTIVE BOX
[facility]  Internal Medicine, PGY-1  [id number]    HPI:  66-year-old female with PMH significant for HTN, HLD, recently discovered ovarian mass suspicious for malignancy (7/2021), and L hydroureteronephrosis s/p renal stent (7/15/21) presenting with BLE edema and worsening abdominal distension and SOB. Pt recently hospitalized for acute renal failure requiring HD, ascitics s/p therapeutic paracentesis and pleural effusion s/p R thoracentesis (showed lymphocytes but no malignant cells). Pt was discharged on 8/4 but started developing BLE edema with abdominal distention and SOB.  Pt recently was diagnosed with an ovarian mass at [place] and was supposed top f/u gyn-onc at [facility] but has not seen them yet.     In the ED, vitals T 97-99, -117, //90, RR 26 - 30 (sat 94-96% on NC). Labs significant for elevated WBC of 13.4 with neutrophil predominance, VBG 7.45/50/44/35, Lactate 2.7. CXR shows moderate R-sided and small L-sided pleural effusions.  Pt given IV lasix 40mg x 1 and placed on BiPAP in ED    PAST MEDICAL & SURGICAL HISTORY:  Hypertension    Ovarian mass    Hypercholesteremia    S/P ureteral stent placement        FAMILY HISTORY:  FHx: hypertension (Mother)    FH: diabetes mellitus (Mother)        SOCIAL HISTORY:  Smoking: none   EtOH Use: no   Occupation: previously worked as [employer]  Exposures: none  Recent Travel: none     Allergies    penicillins (Rash)    Intolerances        HOME MEDICATIONS:    REVIEW OF SYSTEMS:  Constitutional: Positive generalized weakness. No fevers or chills. No weight loss.  Eyes: No itching or discharge from the eyes  ENT: No ear pain. No ear discharge. No nasal congestion. No post nasal drip. No epistaxis. No throat pain. No sore throat. No difficulty swallowing.   CV: Positive BLE edema No chest pain. No palpitations. No lightheadedness or dizziness.   Resp: Positive SOB. No orthopnea. No wheezing. No cough. No stridor. No sputum production. No chest pain with respiration.  GI: Positive abdominal distention. No nausea. No vomiting. No diarrhea.  MSK: No joint pain or pain in any extremities  Neurological: No gross motor weakness. No sensory changes.    [ ] All other systems negative  [ ] Unable to assess ROS because ________    OBJECTIVE:  ICU Vital Signs Last 24 Hrs  T(C): 37.2 (12 Aug 2021 13:02), Max: 37.3 (12 Aug 2021 06:55)  T(F): 98.9 (12 Aug 2021 13:02), Max: 99.2 (12 Aug 2021 06:55)  HR: 115 (12 Aug 2021 13:02) (108 - 125)  BP: 110/72 (12 Aug 2021 13:02) (100/54 - 156/90)  BP(mean): --  ABP: --  ABP(mean): --  RR: 19 (12 Aug 2021 13:02) (19 - 30)  SpO2: 97% (12 Aug 2021 13:02) (94% - 99%)        CAPILLARY BLOOD GLUCOSE          PHYSICAL EXAM:  General: Awake, alert, oriented X 3.   HEENT: Atraumatic, normocephalic.                  No tonsillar or pharyngeal exudates.  Lymph Nodes: No palpable lymphadenopathy  Neck: No JVD. No carotid bruit.   Respiratory: Mild bilateral basilar crackles. Normal chest expansion                         Normal percussion                         Normal and equal air entry                         No wheeze, rhonchi or rales.  Cardiovascular: S1 S2 normal. No murmurs, rubs or gallops.   Abdomen: Soft, non-tender, non-distended. No organomegaly.  Extremities: 1+ B/l LE edema. Warm to touch. Peripheral pulse palpable   Skin: No rashes or skin lesions  Neurological: Non-focal  Psychiatry: Appropriate mood and affect.    HOSPITAL MEDICATIONS:  MEDICATIONS  (STANDING):  enoxaparin Injectable 40 milliGRAM(s) SubCutaneous daily  famotidine    Tablet 20 milliGRAM(s) Oral daily  multivitamin 1 Tablet(s) Oral daily    MEDICATIONS  (PRN):  acetaminophen   Tablet .. 650 milliGRAM(s) Oral every 6 hours PRN Temp greater or equal to 38.5C (101.3F), Mild Pain (1 - 3)  aluminum hydroxide/magnesium hydroxide/simethicone Suspension 30 milliLiter(s) Oral every 4 hours PRN Dyspepsia  melatonin 3 milliGRAM(s) Oral at bedtime PRN Insomnia      LABS:                        10.5   17.11 )-----------( 403      ( 12 Aug 2021 07:04 )             33.9     08-12    143  |  97<L>  |  23  ----------------------------<  96  4.3   |  25  |  0.99    Ca    9.3      12 Aug 2021 07:04  Phos  3.3     08-12  Mg     2.20     08-12    TPro  7.1  /  Alb  3.1<L>  /  TBili  0.4  /  DBili  x   /  AST  55<H>  /  ALT  32  /  AlkPhos  118  08-11    PT/INR - ( 11 Aug 2021 20:00 )   PT: 12.8 sec;   INR: 1.12 ratio         PTT - ( 11 Aug 2021 20:00 )  PTT:26.2 sec      Venous Blood Gas:  08-12 @ 07:04  7.48/45/48/34/82.6  VBG Lactate: 2.2  Venous Blood Gas:  08-11 @ 20:00  7.45/50/44/35/73.1  VBG Lactate: 2.7      MICROBIOLOGY:     RADIOLOGY:  [ ] Reviewed and interpreted by me    < from: Xray Chest 2 Views PA/Lat (08.11.21 @ 21:26) >  IMPRESSION:    Moderate right and small left-sided pleural effusion, increased in size since comparison study dated 8/3/2021.    --- End of Report ---    < end of copied text >      < from: CT Abdomen and Pelvis No Cont (07.25.21 @ 19:55) >  IMPRESSION:    Mild nonspecific enteritis involving small bowel loops in the central abdomen.    Moderate volume of abdominopelvic ascites.  Bilateral pleural effusions, right greater than left.    --- End of Report ---    < end of copied text >

## 2021-08-12 NOTE — H&P ADULT - ATTENDING COMMENTS
Pt seen and examined. I discussed the case with Dr. Diaz and agree. 67 y/o woman with HTN, DM, HLD, likely malignant ovarian mass c/b abd ascites/pleural effusion s/p recent paracentesis, thoracentesis and 1 HD session via [place] due to acute renal failure (now resolved) presents back with SOB, edema, abd distension found in the ER with increased work of breathing requiring bipap. Pt with crackles b/l, abd distended but soft, b/l LE 2+ pitting edema. Imaging showing increased b/l pleural effusions. Renal function overall stable from discharge. Suspect volume overload related malignancy causing pt's respiratory failure. MICU consulted, not candidate for ICU. Pt s/p IV lasix, will need IR consult today for repeat thoracentesis and paracentesis. Obtain abd US to evaluate ascites prior to para. Wean off bipap as tolerated. Pt seen and examined. I discussed the case with Dr. Diaz and agree. 67 y/o woman with HTN, DM, HLD, likely malignant ovarian mass c/b abd ascites/pleural effusion s/p recent paracentesis, thoracentesis and 1 HD session via [place] due to acute renal failure (now resolved) presents back with SOB, edema, abd distension found in the ER with increased work of breathing requiring bipap. Pt with crackles b/l, abd distended but soft, b/l LE 2+ pitting edema. Imaging showing increased b/l pleural effusions. Renal function overall stable from discharge. Suspect volume overload related malignancy causing pt's respiratory failure. MICU consulted, not candidate for ICU. Pt s/p IV lasix, will need IR and pulm consult today for repeat thoracentesis and paracentesis. Obtain abd US to evaluate ascites prior to para. Wean off bipap as tolerated.

## 2021-08-12 NOTE — CONSULT NOTE ADULT - ASSESSMENT
66-year-old female with PMH significant for HTN, HLD, recently discovered ovarian mass suspicious for malignancy, L hydroureteronephrosis s/p renal stent, and recent hospitalization to [facility] for acute renal failure (likely obstructive) requiring urgent HD, ascites s/p therapeutic paracentesis, and pleural effusion s/p R thoracentesis showing lymphocytosis but no malignant cells admitted for acute hypoxic respiratory failure with imaging showing bilateral pleural effusion     #Acute hypoxic respiratory failure  -Pt p/w increasing SOB and WOB, with tachycardia   -Pt currently on BiPAP with improvement in SOB and WOB  -S/p IV Lasix 40mg x 1 in ED with minimal improvement in SOB but BLE swelling has improved   -CXR showed bilateral pleural effusion, right greater than the left   -Pt refused CT PE because she had a bad reaction to contrast the last time     Recommendation:   -Pulm will plan for thoracentesis for bilateral pleural effusion later today   -Given that patient p/w bilateral pleural effusion, ascites and BLE edema, could consider starting patient on diuretics   -Recommend BLE doppler to evaluate for DVT since PE cant be r/o with sudden increase in WOB, SOB and tachycardia to 110s  -Recommend weaning patient off BiPAP as tolerated     Case discussed w/ Dr. Funmilayo Epstein, PGY-1     66-year-old female with PMH significant for HTN, HLD, recently discovered ovarian mass suspicious for malignancy, L hydroureteronephrosis s/p renal stent, and recent hospitalization to [facility] for acute renal failure (likely obstructive) requiring urgent HD, ascites s/p therapeutic paracentesis, and pleural effusion s/p R thoracentesis showing lymphocytosis but no malignant cells admitted for acute hypoxic respiratory failure with imaging showing bilateral pleural effusion     #Acute hypoxic respiratory failure  -Pt p/w increasing SOB and WOB, with tachycardia and elevated WBC  -Pt currently on BiPAP with improvement in SOB and WOB  -S/p IV Lasix 40mg x 1 in ED with minimal improvement in SOB but BLE swelling has improved   -CXR showed bilateral pleural effusion, right greater than the left   -Pt refused CT PE because she had a bad reaction to contrast the last time     Recommendation:   -Pulm will plan for thoracentesis for bilateral pleural effusion later today   -Given that patient p/w bilateral pleural effusion, ascites and BLE edema, could consider starting patient on diuretics   -Recommend BLE doppler to evaluate for DVT since PE cant be r/o with sudden increase in WOB, SOB and tachycardia to 110s  -Recommend weaning patient off BiPAP as tolerated   -Low threshold to start antibiotics for possible sepsis, continue monitoring WBC     Case discussed w/ Dr. Funmilayo Epstein, PGY-1     66-year-old female with PMH significant for HTN, HLD, recently discovered ovarian mass suspicious for malignancy, L hydroureteronephrosis s/p renal stent, and recent hospitalization to [facility] for acute renal failure (likely obstructive) requiring urgent HD, ascites s/p therapeutic paracentesis, and pleural effusion s/p R thoracentesis showing lymphocytosis but no malignant cells admitted for acute hypoxic respiratory failure with imaging showing bilateral pleural effusion     #Acute hypoxic respiratory failure  -Pt p/w increasing SOB and WOB, with tachycardia and elevated WBC  -Pt currently on BiPAP with improvement in SOB and WOB  -S/p IV Lasix 40mg x 1 in ED with minimal improvement in SOB but BLE swelling has improved   -CXR showed bilateral pleural effusion, right greater than the left   -Pt refused CT PE because she had a bad reaction to contrast the last time     Recommendation:   -Pulm will plan for thoracentesis for bilateral pleural effusion tomorrow  -Given that patient p/w bilateral pleural effusion, ascites and BLE edema, could consider starting patient on diuretics   -Recommend BLE doppler to evaluate for DVT since PE cant be r/o with sudden increase in WOB, SOB and tachycardia to 110s  -Recommend weaning patient off BiPAP as tolerated   -Low threshold to start antibiotics for possible sepsis, continue monitoring WBC     Case discussed w/ Dr. Funmilayo Epstein, PGY-1

## 2021-08-12 NOTE — ED ADULT NURSE REASSESSMENT NOTE - NS ED NURSE REASSESS COMMENT FT1
pt has no complaints at this time admitting team at bedside evaluating pt . Pt with call bell in reach.

## 2021-08-12 NOTE — H&P ADULT - NSHPSOCIALHISTORY_GEN_ALL_CORE
Lives at home with brother. Prior to recent hospitalization, was independent with ADL/IADLs. 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 Denies tobacco, EtOH, illicit drug use.

## 2021-08-12 NOTE — PROGRESS NOTE ADULT - PROBLEM SELECTOR PLAN 4
WBC 13.4 on presentation. Pt afebrile without s/s of active infection. Likely reactive in setting of volume overload and likely malignancy.   -Continue to monitor for now, check diagnostic tap of para

## 2021-08-12 NOTE — PROGRESS NOTE ADULT - SUBJECTIVE AND OBJECTIVE BOX
Subjective:    INTERVAL HPI/OVERNIGHT EVENTS:   -Pulm consulted- will see pt, Procedure team consulted-Paracentesis too small to tap.   -Pt improved on BIPAP, she says she is breathing much better with BiPAP.  -Pt denies abdominal pain, never had leg swelling before.   -Denies CP, N/V/D, Fever, Chills.    Telemetry:    T(F): 98 (08-12-21 @ 08:51), Max: 99.2 (08-12-21 @ 06:55)  HR: 120 (08-12-21 @ 08:51) (108 - 120)  BP: 140/82 (08-12-21 @ 08:51) (100/54 - 156/90)  RR: 26 (08-12-21 @ 08:51) (20 - 30)  SpO2: 98% (08-12-21 @ 08:51) (94% - 99%)  I&O's Summary        Medications:  acetaminophen   Tablet .. 650 milliGRAM(s) Oral every 6 hours PRN  aluminum hydroxide/magnesium hydroxide/simethicone Suspension 30 milliLiter(s) Oral every 4 hours PRN  amLODIPine   Tablet 5 milliGRAM(s) Oral daily  enoxaparin Injectable 40 milliGRAM(s) SubCutaneous daily  famotidine    Tablet 20 milliGRAM(s) Oral daily  melatonin 3 milliGRAM(s) Oral at bedtime PRN  multivitamin 1 Tablet(s) Oral daily      PHYSICAL EXAM:  GENERAL: Patient laying in bed, in no acute distress.   HEAD:  Normocephalic, Atraumatic.  EYES: EOMI, PERRLA, conjunctiva and sclera clear  NECK: Supple, No JVD, Normal thyroid, no enlarged nodes  NERVOUS SYSTEM:  Alert & Awake. No gross motor deficits.   CHEST/LUNG: CTAB. No rales, rhonchi, or wheezes appreciated  HEART: Normal S1S2,  Regular rate and rhythm, No murmurs, rubs, or gallops appreciated.  ABDOMEN: Soft, Nontender, Nondistended; Normal bowel sounds.  EXTREMITIES: 2+ Peripheral Pulses, No clubbing, cyanosis, or edema  LYMPH: No lymphadenopathy noted  SKIN: No rashes or lesions    Notable Labs:    Labs:                          10.5   17.11 )-----------( 403      ( 12 Aug 2021 07:04 )             33.9     08-12    143  |  97<L>  |  23  ----------------------------<  96  4.3   |  25  |  0.99    Ca    9.3      12 Aug 2021 07:04  Phos  3.3     08-12  Mg     2.20     08-12    TPro  7.1  /  Alb  3.1<L>  /  TBili  0.4  /  DBili  x   /  AST  55<H>  /  ALT  32  /  AlkPhos  118  08-11    LIVER FUNCTIONS - ( 11 Aug 2021 20:00 )  Alb: 3.1 g/dL / Pro: 7.1 g/dL / ALK PHOS: 118 U/L / ALT: 32 U/L / AST: 55 U/L / GGT: x           PT/INR - ( 11 Aug 2021 20:00 )   PT: 12.8 sec;   INR: 1.12 ratio         PTT - ( 11 Aug 2021 20:00 )  PTT:26.2 sec  CAPILLARY BLOOD GLUCOSE                    Micro:      RADIOLOGY & ADDITIONAL TESTS:    EKG:      Echo:      Xray:      US/CT/MRI:   Subjective:    INTERVAL HPI/OVERNIGHT EVENTS:   -Pulm consulted- will see pt, Procedure team consulted-Paracentesis too small to tap.   -Pt improved on BIPAP, she says she is breathing much better with BiPAP.  -Pt denies abdominal pain, never had leg swelling before.   -Denies CP, N/V/D, Fever, Chills.    T(F): 98 (08-12-21 @ 08:51), Max: 99.2 (08-12-21 @ 06:55)  HR: 120 (08-12-21 @ 08:51) (108 - 120)  BP: 140/82 (08-12-21 @ 08:51) (100/54 - 156/90)  RR: 26 (08-12-21 @ 08:51) (20 - 30)  SpO2: 98% (08-12-21 @ 08:51) (94% - 99%)  I&O's Summary    Medications:  acetaminophen   Tablet .. 650 milliGRAM(s) Oral every 6 hours PRN  aluminum hydroxide/magnesium hydroxide/simethicone Suspension 30 milliLiter(s) Oral every 4 hours PRN  amLODIPine   Tablet 5 milliGRAM(s) Oral daily  enoxaparin Injectable 40 milliGRAM(s) SubCutaneous daily  famotidine    Tablet 20 milliGRAM(s) Oral daily  melatonin 3 milliGRAM(s) Oral at bedtime PRN  multivitamin 1 Tablet(s) Oral daily    PHYSICAL EXAM:  GENERAL: Patient laying in bed, in no acute distress.   HEAD:  Normocephalic, Atraumatic.  EYES: EOMI, PERRLA, conjunctiva and sclera clear  NECK: Supple, No JVD, Normal thyroid, no enlarged nodes appreciated  NERVOUS SYSTEM:  Alert & Awake. No gross motor deficits.   CHEST/LUNG: Mild bibasilar crackles b/l.   HEART: Normal S1S2,  Tachycardic rate and reg rhythm, No murmurs, rubs, or gallops appreciated.  ABDOMEN: Distended, Nontender; Normal bowel sounds.  EXTREMITIES: 1+ B/l LE edema, nontender. No clubbing, cyanosis  LYMPH: No lymphadenopathy noted  SKIN: No rashes or lesions    Notable Labs:      Labs:                          10.5   17.11 )-----------( 403      ( 12 Aug 2021 07:04 )             33.9     08-12    143  |  97<L>  |  23  ----------------------------<  96  4.3   |  25  |  0.99    Ca    9.3      12 Aug 2021 07:04  Phos  3.3     08-12  Mg     2.20     08-12    TPro  7.1  /  Alb  3.1<L>  /  TBili  0.4  /  DBili  x   /  AST  55<H>  /  ALT  32  /  AlkPhos  118  08-11    LIVER FUNCTIONS - ( 11 Aug 2021 20:00 )  Alb: 3.1 g/dL / Pro: 7.1 g/dL / ALK PHOS: 118 U/L / ALT: 32 U/L / AST: 55 U/L / GGT: x           PT/INR - ( 11 Aug 2021 20:00 )   PT: 12.8 sec;   INR: 1.12 ratio         PTT - ( 11 Aug 2021 20:00 )  PTT:26.2 sec  CAPILLARY BLOOD GLUCOSE    Blood Gas Profile - Venous (08.12.21 @ 07:04)    pH, Venous: 7.48    pCO2, Venous: 45 mmHg    pO2, Venous: 48 mmHg    HCO3, Venous: 34 mmol/L    Base Excess, Venous: 8.9 mmol/L    Oxygen Saturation, Venous: 82.6 %    Total CO2, Venous: 34.9 mmol/L    Blood Gas Venous - Lactate (08.12.21 @ 07:04)    Blood Gas Venous - Lactate: 2.2: Elevated lactate. Consider ordering follow-up lactate to trend. mmol/L    RADIOLOGY & ADDITIONAL TESTS:    Echo:  < from: Transthoracic Echocardiogram (08.03.21 @ 14:07) >  EF 56%  CONCLUSIONS:  1. Calcified trileaflet aortic valve with normal opening.  2. Increased relative wall thickness with normal left  ventricular mass index, consistent with concentric left  ventricular remodeling.  3. Hyperdynamic left ventricle.  4. Normal right ventricular size and function.  5. Ascites seen.    < end of copied text >    Xray:  < from: Xray Chest 2 Views PA/Lat (08.11.21 @ 21:26) >  IMPRESSION:    Moderate right and small left-sided pleural effusion, increased in size since comparison study dated 8/3/2021.    < end of copied text >    US/CT/MRI:  CT from last admission:  < from: CT Abdomen and Pelvis No Cont (07.25.21 @ 19:55) >  IMPRESSION:    Mild nonspecific enteritis involving small bowel loops in the central abdomen.    Moderate volume of abdominopelvic ascites.  Bilateral pleural effusions, right greater than left.    < end of copied text >

## 2021-08-12 NOTE — PROGRESS NOTE ADULT - PROBLEM SELECTOR PLAN 6
Pt found to have ovarian mass on CT Abd/Pelvis during recently hospitalization at [facility] in early July 2021. After discharge, patient was meant to follow up with Gyn-Onc Dr. Tutu Lenz ([phone]) at [facility]. She has an upcoming appointment on 8/13.   -Documentation from recent hospitalization showed CA-125 elevated to 252.  <2 and CEA <1 per records from [place]. C/f malignancy.   -Pt will require outpt follow up with Gyn-onc.

## 2021-08-12 NOTE — ED ADULT NURSE REASSESSMENT NOTE - NS ED NURSE REASSESS COMMENT FT1
pt received on bipap dyspnea noted with movement, o2 sat 98 %. pt on cardiac monitor sinus tachycardia noted . Pt denies chest pain, pt with abdominal distention. Pt awaiting floor bed . Pt awaiting to go over to u/s team 4 paged and called awaitng call back. Pt given call bell in her hand to call if she needs assistance.

## 2021-08-12 NOTE — PROGRESS NOTE ADULT - PROBLEM SELECTOR PLAN 5
Pt tachycardic to 110's on admission. In the setting of SOB and low pO2's, pt was meant to have a CTA Chest in the ED to rule out PE, however, she refused due to adverse reaction with recent contrast study, patient states that she received contrast at previous hospitalization and was vomiting all night.   - Tachycardia likely in the setting of pleural effusion. During recent hospitalization, she was tachycardic and it improved after thoracentesis. She had BLE duplex scans and a V/Q scan, both without evidence of DVT/PE.   - ECG shows sinus tachycardia   - Continue to monitor   - Plan for thoracentesis as above

## 2021-08-12 NOTE — H&P ADULT - ASSESSMENT
66-year-old female with PMH significant for HTN, HLD, recently discovered ovarian mass suspicious for malignancy (7/2021), L hydroureteronephrosis s/p renal stent (7/15/21), and recent hospitalization ([facility] 7/26-8/4) for acute renal failure (likely obstructive) requiring urgent HD, ascites s/p therapeutic paracentesis (7/27/21), and pleural effusion s/p R thoracentesis (8/2/21) showing lymphocytosis but no malignant cells. Now presenting with BLE edema and worsening abdominal distension and SOB. A/w volume overload, likely in the setting of suspected malignant ovarian mass.

## 2021-08-12 NOTE — H&P ADULT - PROBLEM SELECTOR PLAN 3
Pt presenting with new BLE edema, progressively worsening over last week since discharge from recent hospitalization. Cardiac, renal, and hepatic etiology less likely as patient with recent TTE, which did not show HF and her renal/hepatic function appear normal on labs and recent imaging. More likely in the setting of either lymphedema in the setting of new ovarian mass OR medication-related as patient was recently started on amlodipine.   -M/r TTE (8/3): LVEF 56% with hyperdynamic LV.  -CT AP (7/25): Reticulation of the intra-abdominal fat in the left upper quadrant and mid abdomen is likely related to lymphangitic drainage of fluid as opposed to carcinomatosis.  -S/p IV Lasix 40mg qday   -Strict I/Os  -Monitor BLE edema after paracentesis, as this could improve lymphatic flow with reduced obstruction  -Can consider alternative antihypertensive agent to amlodipine

## 2021-08-12 NOTE — CONSULT NOTE ADULT - ATTENDING COMMENTS
66-year-old female with PMH significant for HTN,  L hydroureteronephrosis s/p renal stent, possible ovarian CA admitted w/ SOB. Pt currently on bipap and reports is comfortable. Denies infectious sx. POCUS shows that pt has b/l small-mod effusions. This may be contributing to her dyspnea but unclear if this alone is her primary problem. Needs further w/u for her ascites and possible malignancy as the underlying etiology for her condition. COnt bipap tongith and prn daytime for Sob, transition to NC as tolerated.

## 2021-08-12 NOTE — ED ADULT NURSE REASSESSMENT NOTE - NS ED NURSE REASSESS COMMENT FT1
Pt afebrile via oral temp. pt sinus tachycardic on monitor. remains on bipap.  Pt denies chest pain  , pt intermittently sleeping. Pt awaiting floor transport.

## 2021-08-12 NOTE — PROGRESS NOTE ADULT - PROBLEM SELECTOR PLAN 8
Pt with reported history of HLD but not on statin.   - Lipid profile in AM   - Unclear why patient is on ASA- can clarify with PMD in AM - hold for now in case patient requires procedure

## 2021-08-12 NOTE — PROGRESS NOTE ADULT - ATTENDING COMMENTS
66F HTN, Ovarian CA - no Bx - c/b ascites, Lt hydronephrosis s/p stent 7/2021, recent PURVI on CKD s/p HD, pleural effusion s/p thoracentesis p/w acute respiratory failure with hypercarbia due to pleural effusion due to fluid overload. Recent TTE - nml LVFx. Currently requiring BiPAP for respiration. Pulm consult for therapeutic thoracentesis. D/C Norvasc.  Ascite - trace amount on POCUS; will obtain official abdominal U/S.

## 2021-08-12 NOTE — H&P ADULT - NSHPLABSRESULTS_GEN_ALL_CORE
Labs:                      10.5   13.40 )-----------( 392      ( 11 Aug 2021 20:00 )             33.8       08-11    143  |  98  |  21  ----------------------------<  104<H>  4.3   |  29  |  1.02    Ca    9.6      11 Aug 2021 20:00    TPro  7.1  /  Alb  3.1<L>  /  TBili  0.4  /  DBili  x   /  AST  55<H>  /  ALT  32  /  AlkPhos  118  08-11          PT/INR - ( 11 Aug 2021 20:00 )   PT: 12.8 sec;   INR: 1.12 ratio    PTT - ( 11 Aug 2021 20:00 )  PTT:26.2 sec      Culture Results:   No growth (08-03 @ 00:48)  Culture Results:   No growth at 5 days (08-02 @ 21:25)  Culture Results:   No growth (07-27 @ 18:35)  Culture Results:   No growth at 5 days (07-27 @ 16:14)    < from: Xray Chest 2 Views PA/Lat (08.11.21 @ 21:26) >    IMPRESSION:    Moderate right and small left-sided pleural effusion, increased in size since comparison study dated 8/3/2021.    < end of copied text > Labs:                      10.5   13.40 )-----------( 392      ( 11 Aug 2021 20:00 )             33.8       08-11    143  |  98  |  21  ----------------------------<  104<H>  4.3   |  29  |  1.02    Ca    9.6      11 Aug 2021 20:00    TPro  7.1  /  Alb  3.1<L>  /  TBili  0.4  /  DBili  x   /  AST  55<H>  /  ALT  32  /  AlkPhos  118  08-11          PT/INR - ( 11 Aug 2021 20:00 )   PT: 12.8 sec;   INR: 1.12 ratio    PTT - ( 11 Aug 2021 20:00 )  PTT:26.2 sec      Culture Results:   No growth (08-03 @ 00:48)  Culture Results:   No growth at 5 days (08-02 @ 21:25)  Culture Results:   No growth (07-27 @ 18:35)  Culture Results:   No growth at 5 days (07-27 @ 16:14)    < from: Xray Chest 2 Views PA/Lat (08.11.21 @ 21:26) >    IMPRESSION:    Moderate right and small left-sided pleural effusion, increased in size since comparison study dated 8/3/2021.    < end of copied text >    EKG: sinus tachycardia, twi III labs and cxr reviewed by me    Labs:                      10.5   13.40 )-----------( 392      ( 11 Aug 2021 20:00 )             33.8       08-11    143  |  98  |  21  ----------------------------<  104<H>  4.3   |  29  |  1.02    Ca    9.6      11 Aug 2021 20:00    TPro  7.1  /  Alb  3.1<L>  /  TBili  0.4  /  DBili  x   /  AST  55<H>  /  ALT  32  /  AlkPhos  118  08-11          PT/INR - ( 11 Aug 2021 20:00 )   PT: 12.8 sec;   INR: 1.12 ratio    PTT - ( 11 Aug 2021 20:00 )  PTT:26.2 sec      Culture Results:   No growth (08-03 @ 00:48)  Culture Results:   No growth at 5 days (08-02 @ 21:25)  Culture Results:   No growth (07-27 @ 18:35)  Culture Results:   No growth at 5 days (07-27 @ 16:14)    < from: Xray Chest 2 Views PA/Lat (08.11.21 @ 21:26) >    IMPRESSION:    Moderate right and small left-sided pleural effusion, increased in size since comparison study dated 8/3/2021.    < end of copied text >    EKG: sinus tachycardia, twi III

## 2021-08-12 NOTE — H&P ADULT - PROBLEM SELECTOR PLAN 1
Pt presenting with progressively worsening SOB x 1 week since recent discharge. Pt satting 94-96% on 3L NC, with increased WOB. Placed on BiPAP with improvement in WOB.   -CXR shows moderate right-sided and small left-sided pleural effusions.   -C/w BiPAP overnight, wean as tolerated in AM   -Pulm consult for thoracentesis in AM and procedure team consult for paracentesis as ascites likely contributing to patient's WOB  -Continuous pulse ox monitoring   -S/p IV Lasix 40mg x 1 in ED with minimal improvement in sx

## 2021-08-12 NOTE — H&P ADULT - PROBLEM SELECTOR PLAN 6
Pt found to have ovarian mass on CT Abd/Pelvis during recently hospitalization at [facility] in early July 2021. After discharge, patient was meant to follow up with Gyn-Onc Dr. Tutu Lenz ([phone]) at [facility]. She has an upcoming appointment on 8/13.   -Documentation from recent hospitalization showed CA-125 elevated to 252.  <2 and CEA <1 per records from [place]. C/f malignancy.   -Pt will require outpt follow up with Gyn-onc.

## 2021-08-12 NOTE — H&P ADULT - PROBLEM SELECTOR PLAN 4
WBC 13.4 on presentation. Pt afebrile without s/s of active infection. Likely reactive in setting of volume overload and likely malignancy.   -Continue to monitor for now WBC 13.4 on presentation. Pt afebrile without s/s of active infection. Likely reactive in setting of volume overload and likely malignancy.   -Continue to monitor for now, check diagnostic tap of para

## 2021-08-12 NOTE — H&P ADULT - PROBLEM SELECTOR PLAN 2
Pt p/w worsening abdominal distension and SOB x 1 week. Found to have ascites during recent hospitalization and underwent paracentesis on 7/27, cytology showed abnormal lymphocytes but no malignant cells. Likely in the setting of suspected malignant ovarian mass.   -CT AP (7/25): Moderate volume of abdominal ascites. Reticulation of the intra-abdominal fat in the left upper quadrant and mid abdomen is likely related to lymphangitic drainage of fluid as opposed to carcinomatosis.  -RUQ US to evaluate for good window for paracentesis   -Procedure team consult in AM for diagnostic and therapeutic paracentesis Pt p/w worsening abdominal distension and SOB x 1 week. Found to have ascites during recent hospitalization and underwent paracentesis on 7/27, cytology showed abnormal lymphocytes but no malignant cells. Likely in the setting of suspected malignant ovarian mass.   -CT AP (7/25): Moderate volume of abdominal ascites. Reticulation of the intra-abdominal fat in the left upper quadrant and mid abdomen is likely related to lymphangitic drainage of fluid as opposed to carcinomatosis.  -complete abd US to evaluate for good window for paracentesis   -Procedure team consult in AM for diagnostic and therapeutic paracentesis

## 2021-08-12 NOTE — ED ADULT NURSE REASSESSMENT NOTE - NS ED NURSE REASSESS COMMENT FT1
Patient A&Ox4, resting in stretcher, respirations even and unlabored, patient reports improvement in condition. Continues on BiPAP at this time. Stretcher in lowest position, wheels locked, appropriate side rails in place, call bell in reach.

## 2021-08-12 NOTE — H&P ADULT - HISTORY OF PRESENT ILLNESS
66-year-old female with PMH significant for HTN, HLD, recently discovered ovarian mass suspicious for malignancy (7/2021), and L hydroureteronephrosis s/p renal stent (7/15/21) presenting with BLE edema and worsening abdominal distension and SOB. Of note, pt with recent hospitalization ([facility] 7/26-8/4) for acute renal failure (likely obstructive) requiring urgent HD, ascites s/p therapeutic paracentesis (7/27/21), and pleural effusion s/p R thoracentesis (8/2/21) showing lymphocytosis but no malignant cells. Patient states she was discharged to follow up with gyn-onc at [facility] as previously scheduled after her discharge from [place], where her ovarian mass was discovered earlier in July. However, when she went home she developed BLE edema, worsening over the past few days along with worsening abdominal distension and SOB. Patient denies fevers/chills, lightheadedness/dizziness, cough, CP, palpitations, cough, n/v/d, abdominal pain, LE pain.     In the ED, vitals T 97-99, -117, //90, RR 26 - 30 (satting 94-96% on NC). Labs significant for WBC 13.4, proBNP 34. VBG 7.45/50/44/35, Lactate 2.7. RVP/COVID PCR neg. CXR shows moderate R-sided and small L-sided pleural effusions. Pt received IV lasix 40mg x 1 in ED. Placed on BiPAP for increased WOB. MICU consulted, however not a candidate. 66-year-old female with PMH significant for HTN, HLD, recently discovered ovarian mass suspicious for malignancy (7/2021), and L hydroureteronephrosis s/p renal stent (7/15/21) presenting with BLE edema and worsening abdominal distension and SOB. Of note, pt with recent hospitalization ([facility] 7/26-8/4) for acute renal failure (likely obstructive vs MONO) requiring urgent HD, ascites s/p therapeutic paracentesis (7/27/21), and pleural effusion s/p R thoracentesis (8/2/21) showing lymphocytosis but no malignant cells. Patient states she was discharged to follow up with gyn-onc at [facility] as previously scheduled after her discharge from [place], where her ovarian mass was discovered earlier in July. However, when she went home she developed BLE edema, worsening over the past few days along with worsening abdominal distension and SOB. Patient denies fevers/chills, lightheadedness/dizziness, cough, CP, palpitations, cough, n/v/d, abdominal pain, LE pain.     In the ED, vitals T 97-99, -117, //90, RR 26 - 30 (satting 94-96% on NC). Labs significant for WBC 13.4, proBNP 34. VBG 7.45/50/44/35, Lactate 2.7. RVP/COVID PCR neg. CXR shows moderate R-sided and small L-sided pleural effusions. Pt received IV lasix 40mg x 1 in ED. Placed on BiPAP for increased WOB. MICU consulted, however not a candidate.

## 2021-08-12 NOTE — PROGRESS NOTE ADULT - PROBLEM SELECTOR PLAN 2
Pt p/w worsening abdominal distension and SOB x 1 week. Found to have ascites during recent hospitalization and underwent paracentesis on 7/27, cytology showed abnormal lymphocytes but no malignant cells. Likely in the setting of suspected malignant ovarian mass.   CT AP (7/25): Moderate volume of abdominal ascites. Reticulation of the intra-abdominal fat in the left upper quadrant and mid abdomen is likely related to lymphangitic drainage of fluid as opposed to carcinomatosis.  - Limited abd US to evaluate for ascites  - procedure team: ascites likely too small for paracentesis

## 2021-08-12 NOTE — H&P ADULT - PROBLEM SELECTOR PLAN 5
Pt tachycardic to 110's on admission. In the setting of SOB and low pO2's, pt was meant to have a CTA Chest in the ED to rule out PE, however, she refused due to adverse reaction with recent contrast study, patient states that she received contrast at previous hospitalization and was vomiting all night.   -Tachycardia likely in the setting of pleural effusion. During recent hospitalization, she was tachycardic and it improved after thoracentesis. She had BLE duplex scans and a V/Q scan, both without evidence of DVT/PE.   -ECG shows sinus tachycardia   -Continue to monitor   -Plan for thoracentesis as above

## 2021-08-12 NOTE — H&P ADULT - PROBLEM SELECTOR PLAN 7
Pt was recently switched from HCTZ-triamterene to amlodipine during recent hospitalization   -In setting of new BLE edema, can consider holding amlodipine if edema does not improve after paracentesis   -C/w home amlodipine 5mg qday for now

## 2021-08-12 NOTE — H&P ADULT - NSHPPHYSICALEXAM_GEN_ALL_CORE
Vital Signs Last 24 Hrs  T(C): 37.2 (11 Aug 2021 22:27), Max: 37.2 (11 Aug 2021 22:27)  T(F): 99 (11 Aug 2021 22:27), Max: 99 (11 Aug 2021 22:27)  HR: 115 (12 Aug 2021 03:17) (110 - 117)  BP: 119/70 (12 Aug 2021 00:39) (100/54 - 156/90)  RR: 26 (12 Aug 2021 00:39) (26 - 30)  SpO2: 96% (12 Aug 2021 03:17) (94% - 99%)    CONSTITUTIONAL: Frail appearing female sitting up in stretcher with BiPAP in place   EYES: No conjunctival or scleral injection, non-icteric; PERRLA and symmetric  NECK: Trachea midline without palpable neck mass  RESPIRATORY: lungs CTAB without wheeze/rhonchi/rales.   CARDIOVASCULAR: +S1S2, RRR, no M/G/R; pedal pulses full and symmetric; 2+ pitting edema to knees bilaterally   GASTROINTESTINAL: No palpable masses +BS throughout; Firm, distended, diffusely tender abdomen with no guarding, no fluid wave   MUSCULOSKELETAL: no digital clubbing or cyanosis; normal strength and tone of extremities  SKIN: No rashes or ulcers noted; no subcutaneous nodules or induration palpable  NEUROLOGIC: CN II-XII intact; normal reflexes in upper and lower extremities; sensation intact in LEs b/l to light touch  PSYCHIATRIC: A+O x 3; mood and affect appropriate; appropriate insight and judgment Vital Signs Last 24 Hrs  T(C): 37.2 (11 Aug 2021 22:27), Max: 37.2 (11 Aug 2021 22:27)  T(F): 99 (11 Aug 2021 22:27), Max: 99 (11 Aug 2021 22:27)  HR: 115 (12 Aug 2021 03:17) (110 - 117)  BP: 119/70 (12 Aug 2021 00:39) (100/54 - 156/90)  RR: 26 (12 Aug 2021 00:39) (26 - 30)  SpO2: 96% (12 Aug 2021 03:17) (94% - 99%)    CONSTITUTIONAL: Frail appearing female sitting up in stretcher with BiPAP in place   EYES: No conjunctival or scleral injection, non-icteric; PERRLA and symmetric  NECK: Trachea midline without palpable neck mass  RESPIRATORY: lungs CTAB without wheeze/rhonchi/rales.   CARDIOVASCULAR: +S1S2, RRR, no M/G/R; pedal pulses full and symmetric; 2+ pitting edema to knees bilaterally   GASTROINTESTINAL: No palpable masses +BS throughout; Firm, distended, diffusely mildly tender abdomen to deep palpation with no guarding, no fluid wave   MUSCULOSKELETAL: no digital clubbing or cyanosis; normal strength and tone of extremities  SKIN: No rashes or ulcers noted; no subcutaneous nodules or induration palpable  NEUROLOGIC: CN II-XII intact; normal reflexes in upper and lower extremities; sensation intact in LEs b/l to light touch  PSYCHIATRIC: A+O x 3; mood and affect appropriate; appropriate insight and judgment

## 2021-08-12 NOTE — H&P ADULT - NSHPREVIEWOFSYSTEMS_GEN_ALL_CORE
Review of Systems:     Constitutional: No fever, chills +generalized weakness +decreased appetite +weight loss     Eyes: No blindness, no eye pain    ENT: No throat pain, no rhinorrhea     Neck: No pain or stiffness     Respiratory: No cough, +shortness of breath     Cardiovascular: No chest pain, no palpitations +BLE edema     Gastrointestinal: No abdominal or epigastric pain. No nausea, vomiting, or diarrhea +abdominal distension/discomfort     Genitourinary: No dysuria, no change in frequency, no hematuria     Neurological: No numbness or weakness      Skin: No itching, burning, rashes, or lesions     Psych: No depression or anxiety

## 2021-08-12 NOTE — PROGRESS NOTE ADULT - PROBLEM SELECTOR PLAN 1
Pt presenting with progressively worsening SOB x 1 week since recent discharge. Pt satting 94-96% on 3L NC, with increased WOB. Placed on BiPAP with improvement in WOB.   CXR shows moderate right-sided and small left-sided pleural effusions. Possibly exacerbated by ascites.  S/p IV Lasix 40mg x 1 in ED with minimal improvement in sx  - Pulm consulted for thoracentesis which may alleviate her dyspnea  - LE doppler to assess for possible PE, although less likely reason for acute resp failure (patient had bad reaction to CT contrast)  - C/w BiPAP monitor patient post thoracentesis  - Wean patient off biPAP  - Continuous pulse ox monitoring

## 2021-08-12 NOTE — PROGRESS NOTE ADULT - PROBLEM SELECTOR PLAN 3
Pt presenting with new BLE edema, progressively worsening over last week since discharge from recent hospitalization. Cardiac, renal, and hepatic etiology less likely as patient with recent TTE, which did not show HF and her renal/hepatic function appear normal on labs and recent imaging. More likely in the setting of either lymphedema in the setting of new ovarian mass OR medication-related as patient was recently started on amlodipine.   M/r TTE (8/3): LVEF 56% with hyperdynamic LV.  CT AP (7/25): Reticulation of the intra-abdominal fat in the left upper quadrant and mid abdomen is likely related to lymphangitic drainage of fluid as opposed to carcinomatosis.  - Keep IV Lasix 40mg qd, monitor response, titrate as necessary   - Strict I/Os  - Monitor BLE edema after inteventions and diuretics  - Amlodipine held in setting of B/l LE edema Pt presenting with new BLE edema, progressively worsening over last week since discharge from recent hospitalization. Cardiac, renal, and hepatic etiology less likely as patient with recent TTE, which did not show HF and her renal/hepatic function appear normal on labs and recent imaging. More likely in the setting of either lymphedema in the setting of new ovarian mass OR medication-related as patient was recently started on amlodipine.   M/r TTE (8/3): LVEF 56% with hyperdynamic LV.  CT AP (7/25): Reticulation of the intra-abdominal fat in the left upper quadrant and mid abdomen is likely related to lymphangitic drainage of fluid as opposed to carcinomatosis.  - Consider IV Lasix 40mg qd post procedure; monitor response, titrate as necessary   - Strict I/Os  - Monitor BLE edema after inteventions and diuretics  - Amlodipine held in setting of B/l LE edema

## 2021-08-12 NOTE — CHART NOTE - NSCHARTNOTEFT_GEN_A_CORE
67 yo woman with suspected malignant ovarian CA, ascites s/p paracentesis 7/27, Pleff s/p r thoracentesis 8/2, s/p L renal stent for obstruction 7/13, recent admission during which she required HD for acute renal failure (?2/2 contrast), which has resolved.  Now presenting with increased SOB, LE edema, and abd distension.  For increased WOB she was placed on BiPAP.  No hypercapnia, mild hypoxia requiring low flow O2.  CXR with pleff R>>L.  Creat 1.0.  Makes urine.  s/p Lasix in ED  BiPAP 10/5 40%  Vt 380  RR 30  O2 sat 98%  Pt awake, alert, states she is still SOB but improved  SOB and increased WOB 2/2 reaccumulation of pleff and ascites and general vol O/L. Recent TTE showed near nl LVsF.  She may remain on BiPAP as needed while therapy to relieve vol O/L undertaken.  Will likely require repeat thora and para.  She does not require MICU level of care and may go to med floor on BiPAP.  D/W ED team

## 2021-08-13 NOTE — PROGRESS NOTE ADULT - ATTENDING COMMENTS
66-year-old female with PMH significant for HTN,  L hydroureteronephrosis s/p renal stent, possible ovarian CA admitted w/ SOB. Pt currently on NC from Bipap and is comfortable.  Pt had 1200cc removed on R thoracentesis. Pt reports sob is better post procedure. f/u cxr post tap. will send for cytology and cx and cell count. Given overall picture concerning that she has underlying malignancy causing her condition and needs further investigation. If cont to have rapid and recurrent fluid accumulation may need pleural catheter as palliative measure to alleviate her dyspnea. Diuresis as per primary team. f/u CT chest imaging post tap.

## 2021-08-13 NOTE — PROGRESS NOTE ADULT - SUBJECTIVE AND OBJECTIVE BOX
CHIEF COMPLAINT: sob    Interval Events: Patient seen and examined at bedside. No acute events overnight. Patient was just taken off BiPAP upon evaluation today, and states she is feeling better off BiPAP. Bedside thoracentesis performed today.     REVIEW OF SYSTEMS:  Constitutional: [X ] negative [ ] fevers [ ] chills [ ] weight loss [ ] weight gain  HEENT: [ X] negative [ ] dry eyes [ ] eye irritation [ ] postnasal drip [ ] nasal congestion  CV: [X ] negative  [ ] chest pain [ ] orthopnea [ ] palpitations [ ] murmur  Resp: [ ] negative [ ] cough [X ] shortness of breath [ ] dyspnea [ ] wheezing [ ] sputum [ ] hemoptysis  GI: [ ] negative [ ] nausea [ ] vomiting [ ] diarrhea [ ] constipation [X ] abd pain [ ] dysphagia   : [X ] negative [ ] dysuria [ ] nocturia [ ] hematuria [ ] increased urinary frequency  Musculoskeletal: [ ] negative [ ] back pain [ ] myalgias [ ] arthralgias [ ] fracture  Skin: [ ] negative [ ] rash [ ] itch  Neurological: [ ] negative [ ] headache [ ] dizziness [ ] syncope [ ] weakness [ ] numbness  Psychiatric: [ ] negative [ ] anxiety [ ] depression  Endocrine: [ ] negative [ ] diabetes [ ] thyroid problem  Hematologic/Lymphatic: [ ] negative [ ] anemia [ ] bleeding problem  Allergic/Immunologic: [ ] negative [ ] itchy eyes [ ] nasal discharge [ ] hives [ ] angioedema  [ ] All other systems negative  [ ] Unable to assess ROS because ________    OBJECTIVE:  ICU Vital Signs Last 24 Hrs  T(C): 37.3 (13 Aug 2021 14:23), Max: 37.3 (13 Aug 2021 14:23)  T(F): 99.2 (13 Aug 2021 14:23), Max: 99.2 (13 Aug 2021 14:23)  HR: 115 (13 Aug 2021 15:16) (115 - 140)  BP: 152/89 (13 Aug 2021 14:23) (138/85 - 172/91)  BP(mean): --  ABP: --  ABP(mean): --  RR: 20 (13 Aug 2021 15:00) (20 - 26)  SpO2: 96% (13 Aug 2021 15:16) (96% - 100%)        08-13 @ 07:01  -  08-13 @ 16:45  --------------------------------------------------------  IN: 0 mL / OUT: 1000 mL / NET: -1000 mL      CAPILLARY BLOOD GLUCOSE          PHYSICAL EXAM:  General: thin female, laying in bed, NAD  HEENT: EOMI, no scleral icterus  Neck: supple  Respiratory: decreased breath sounds at the bilateral bases  Cardiovascular: S1/S2, RRR, no murmurs  Abdomen: soft, distended, not tender to palpation  Extremities: non pitting edema, L?R  Skin: no rashes noted  Neurological: AxOx3  Psychiatry: normal mood and affect    HOSPITAL MEDICATIONS:  enoxaparin Injectable 40 milliGRAM(s) SubCutaneous daily      furosemide   Injectable 40 milliGRAM(s) IV Push daily        acetaminophen   Tablet .. 650 milliGRAM(s) Oral every 6 hours PRN  melatonin 3 milliGRAM(s) Oral at bedtime PRN    aluminum hydroxide/magnesium hydroxide/simethicone Suspension 30 milliLiter(s) Oral every 4 hours PRN  famotidine    Tablet 20 milliGRAM(s) Oral daily        multivitamin 1 Tablet(s) Oral daily            LABS:                        10.6   19.22 )-----------( 408      ( 13 Aug 2021 08:51 )             33.9     Hgb Trend: 10.6<--, 10.5<--, 10.5<--  08-13    141  |  98  |  27<H>  ----------------------------<  107<H>  3.8   |  22  |  0.86    Ca    9.5      13 Aug 2021 08:51  Phos  3.1     08-13  Mg     2.30     08-13    TPro  6.9  /  Alb  3.1<L>  /  TBili  0.4  /  DBili  x   /  AST  38<H>  /  ALT  27  /  AlkPhos  129<H>  08-13    Creatinine Trend: 0.86<--, 0.99<--, 1.02<--, 0.85<--, 0.76<--, 0.74<--  PT/INR - ( 11 Aug 2021 20:00 )   PT: 12.8 sec;   INR: 1.12 ratio         PTT - ( 11 Aug 2021 20:00 )  PTT:26.2 sec      Venous Blood Gas:  08-13 @ 08:51  7.46/37/104/26/98.5  VBG Lactate: 3.4  Venous Blood Gas:  08-12 @ 07:04  7.48/45/48/34/82.6  VBG Lactate: 2.2  Venous Blood Gas:  08-11 @ 20:00  7.45/50/44/35/73.1  VBG Lactate: 2.7

## 2021-08-13 NOTE — PROGRESS NOTE ADULT - PROBLEM SELECTOR PLAN 4
WBC 13.4 on presentation. Pt afebrile without s/s of active infection. Likely reactive in setting of volume overload and likely malignancy.   -Continue to monitor for now, check diagnostic tap of para WBC 13.4--> 17--> 19  Pt afebrile without s/s of active infection. Likely reactive in setting of volume overload and likely malignancy.   -CT Chest non contrast.  -Monitor CBC  -Continue to monitor for now, check diagnostic tap of thora and ?para

## 2021-08-13 NOTE — PROGRESS NOTE ADULT - ASSESSMENT
66F with PMH significant for HTN, HLD, recently discovered ovarian mass suspicious for malignancy, L hydroureteronephrosis s/p renal stent, and recent hospitalization to [facility] for acute renal failure (likely obstructive) requiring urgent HD, ascites s/p therapeutic paracentesis, and pleural effusion s/p R thoracentesis showing lymphocytosis but no malignant cells admitted for acute hypoxic respiratory failure with imaging showing bilateral pleural effusion.    #Acute hypoxic respiratory failure  -Pt p/w increasing SOB and WOB, with tachycardia and elevated WBC; on BiPAP overnight now on NC  - Bedside POCUS showing bilateral pleural effusions--> R>L  - s/p IV Lasix 40mg x 1 in ED, now on standing lasix 40mg  -Pt refused CT PE because she had a bad reaction to contrast the last time     Recommendation:   - s/p thoracentesis with 1.2 L fluid removal on the right side--> pending CXR to ensure no pneumothorax  - follow up cell count, culture and cytology  - previous pleural fluid showed atypical lymphoids (similar finding in ascitic fluid)  - recommend gyn-onc eval inpatient as patient with recurrent pleural effusions in < 2 weeks; will likely continue to reaccumulate unless underlying process is elucidated    Case discussed w/ Dr. Mello 66F with PMH significant for HTN, HLD, recently discovered ovarian mass suspicious for malignancy, L hydroureteronephrosis s/p renal stent, and recent hospitalization to [facility] for acute renal failure (likely obstructive) requiring urgent HD, ascites s/p therapeutic paracentesis, and pleural effusion s/p R thoracentesis showing lymphocytosis but no malignant cells admitted for acute hypoxic respiratory failure with imaging showing bilateral pleural effusion.    #Acute hypoxic respiratory failure  -Pt p/w increasing SOB and WOB, with tachycardia and elevated WBC; on BiPAP overnight now on NC  - Bedside POCUS showing bilateral pleural effusions--> R>L  - s/p IV Lasix 40mg x 1 in ED, now on standing lasix 40mg  -Pt refused CT PE because she had a bad reaction to contrast the last time     Recommendation:   - s/p thoracentesis with 1.2 L fluid removal on the right side--> f/u post tap CXR    - follow up cell count, culture and cytology  - previous pleural fluid showed atypical lymphoids (similar finding in ascitic fluid)  - recommend gyn-onc eval inpatient as patient with recurrent pleural effusions in < 2 weeks; will likely continue to reaccumulate unless underlying process is elucidated    Case discussed w/ Dr. Mello

## 2021-08-13 NOTE — PROGRESS NOTE ADULT - PROBLEM SELECTOR PLAN 5
Pt tachycardic to 110's on admission. In the setting of SOB and low pO2's, pt was meant to have a CTA Chest in the ED to rule out PE, however, she refused due to adverse reaction with recent contrast study, patient states that she received contrast at previous hospitalization and was vomiting all night.   - Tachycardia likely in the setting of pleural effusion. During recent hospitalization, she was tachycardic and it improved after thoracentesis. She had BLE duplex scans and a V/Q scan, both without evidence of DVT/PE.   - ECG shows sinus tachycardia   - Continue to monitor   - Plan for thoracentesis as above Sinus tachycardia on EKG. RBBB.  Pt tachycardic to 110's on admission. In the setting of SOB and low pO2's, pt was meant to have a CTA Chest in the ED to rule out PE, however, she refused due to adverse reaction with recent contrast study, patient states that she received contrast at previous hospitalization and was vomiting all night.   - Tachycardia likely in the setting of resp distress. During recent hospitalization, she was tachycardic and it improved after thoracentesis. She had BLE duplex scans and a V/Q scan, both without evidence of DVT/PE. Repeat LE doppler negative for DVT  - ECG shows sinus tachycardia   - Continue to monitor

## 2021-08-13 NOTE — PROGRESS NOTE ADULT - ASSESSMENT
66-year-old female with PMH significant for HTN, HLD, recently discovered ovarian mass suspicious for malignancy (7/2021), L hydroureteronephrosis s/p renal stent (7/15/21), and recent hospitalization ([facility] 7/26-8/4) for acute renal failure (likely obstructive) requiring urgent HD, ascites s/p therapeutic paracentesis (7/27/21), and pleural effusion s/p R thoracentesis (8/2/21) showing lymphocytosis but no malignant cells admitted for acute hypercarbic respiratory failure due to pleural effusions most likely caused by ovarian malignancy a/w volume overload with ascites and b/l LE edema.

## 2021-08-13 NOTE — PROGRESS NOTE ADULT - PROBLEM SELECTOR PLAN 2
Pt p/w worsening abdominal distension and SOB x 1 week. Found to have ascites during recent hospitalization and underwent paracentesis on 7/27, cytology showed abnormal lymphocytes but no malignant cells. Likely in the setting of suspected malignant ovarian mass.   CT AP (7/25): Moderate volume of abdominal ascites. Reticulation of the intra-abdominal fat in the left upper quadrant and mid abdomen is likely related to lymphangitic drainage of fluid as opposed to carcinomatosis.  - Limited abd US to evaluate for ascites  - procedure team: ascites likely too small for paracentesis Pt p/w worsening abdominal distension and SOB x 1 week. Found to have ascites during recent hospitalization and underwent paracentesis on 7/27, cytology showed abnormal lymphocytes but no malignant cells. Likely in the setting of suspected malignant ovarian mass.   CT AP (7/25): Moderate volume of abdominal ascites. Reticulation of the intra-abdominal fat in the left upper quadrant and mid abdomen is likely related to lymphangitic drainage of fluid as opposed to carcinomatosis.  Limited abd US 8/12: Moderate ascites deppest pocket in RUQ  - procedure team: ascites likely too small for paracentesis  - c/w diuresis

## 2021-08-13 NOTE — PROGRESS NOTE ADULT - PROBLEM SELECTOR PLAN 1
Pt presenting with progressively worsening SOB x 1 week since recent discharge. Pt satting 94-96% on 3L NC, with increased WOB. Placed on BiPAP with improvement in WOB.   CXR shows moderate right-sided and small left-sided pleural effusions. Possibly exacerbated by ascites.  S/p IV Lasix 40mg x 1 in ED with minimal improvement in sx  - Pulm consulted for thoracentesis which may alleviate her dyspnea  - LE doppler to assess for possible PE, although less likely reason for acute resp failure (patient had bad reaction to CT contrast)  - C/w BiPAP monitor patient post thoracentesis  - Wean patient off biPAP  - Continuous pulse ox monitoring Now s/p thoracentesis removing 1.2L. much improved, patient satting 97% on 2L NC  Pt presenting with progressively worsening SOB x 1 week since recent discharge. Pt satting 94-96% on 3L NC, with increased WOB. Placed on BiPAP with improvement in WOB for ~28h.   CXR shows moderate right-sided and small left-sided pleural effusions. Possibly exacerbated by ascites.  LE Doppler negative for DVT (patient had possible poor reaction to contrast CT in past)  - IV Lasix 40mg qd  - Pulm onboard, recs appreciated. Thoracentesis appreciated  - F/u thoracentesis studies  - Continuous pulse ox monitoring

## 2021-08-13 NOTE — PROGRESS NOTE ADULT - PROBLEM SELECTOR PLAN 6
Pt found to have ovarian mass on CT Abd/Pelvis during recently hospitalization at [facility] in early July 2021. After discharge, patient was meant to follow up with Gyn-Onc Dr. Tutu Lenz ([phone]) at [facility]. She has an upcoming appointment on 8/13.   -Documentation from recent hospitalization showed CA-125 elevated to 252.  <2 and CEA <1 per records from [place]. C/f malignancy.   -Pt will require outpt follow up with Gyn-onc. Pt found to have ovarian mass on CT Abd/Pelvis during recently hospitalization at [facility] in early July 2021. After discharge, patient was meant to follow up with Gyn-Onc Dr. Tutu Lenz ([phone]) at [facility]. She has an upcoming appointment on 8/13.   -Documentation from recent hospitalization showed CA-125 elevated to 252.  <2 and CEA <1 per records from [place]. C/f malignancy.   - Pt will require outpt follow up with Gyn-onc.  - Consider inpatient gyn-onc evaluation

## 2021-08-13 NOTE — PROGRESS NOTE ADULT - SUBJECTIVE AND OBJECTIVE BOX
Subjective:        INTERVAL HPI/OVERNIGHT EVENTS:   No acute events overnight  Afebrile, hemodynamically stable     Telemetry:    T(F): 99.2 (08-13-21 @ 14:23), Max: 99.2 (08-13-21 @ 14:23)  HR: 115 (08-13-21 @ 14:23) (115 - 140)  BP: 152/89 (08-13-21 @ 14:23) (138/85 - 172/91)  RR: 22 (08-13-21 @ 14:23) (20 - 26)  SpO2: 97% (08-13-21 @ 14:23) (96% - 100%)  I&O's Summary    13 Aug 2021 07:01  -  13 Aug 2021 15:23  --------------------------------------------------------  IN: 0 mL / OUT: 1000 mL / NET: -1000 mL      Weight (kg): 64 (08-12-21 @ 14:32)    Medications:  acetaminophen   Tablet .. 650 milliGRAM(s) Oral every 6 hours PRN  aluminum hydroxide/magnesium hydroxide/simethicone Suspension 30 milliLiter(s) Oral every 4 hours PRN  enoxaparin Injectable 40 milliGRAM(s) SubCutaneous daily  famotidine    Tablet 20 milliGRAM(s) Oral daily  furosemide   Injectable 40 milliGRAM(s) IV Push daily  melatonin 3 milliGRAM(s) Oral at bedtime PRN  multivitamin 1 Tablet(s) Oral daily      PHYSICAL EXAM:  GENERAL: Patient laying in bed, in no acute distress.   HEAD:  Normocephalic, Atraumatic.  EYES: EOMI, PERRLA, conjunctiva and sclera clear  NECK: Supple, No JVD, Normal thyroid, no enlarged nodes  NERVOUS SYSTEM:  Alert & Awake. No gross motor deficits.   CHEST/LUNG: CTAB. No rales, rhonchi, or wheezes appreciated  HEART: Normal S1S2,  Regular rate and rhythm, No murmurs, rubs, or gallops appreciated.  ABDOMEN: Soft, Nontender, Nondistended; Normal bowel sounds.  EXTREMITIES: 2+ Peripheral Pulses, No clubbing, cyanosis, or edema  LYMPH: No lymphadenopathy noted  SKIN: No rashes or lesions    Notable Labs:    Labs:                          10.6   19.22 )-----------( 408      ( 13 Aug 2021 08:51 )             33.9     08-13    141  |  98  |  27<H>  ----------------------------<  107<H>  3.8   |  22  |  0.86    Ca    9.5      13 Aug 2021 08:51  Phos  3.1     08-13  Mg     2.30     08-13    TPro  6.9  /  Alb  3.1<L>  /  TBili  0.4  /  DBili  x   /  AST  38<H>  /  ALT  27  /  AlkPhos  129<H>  08-13    LIVER FUNCTIONS - ( 13 Aug 2021 08:51 )  Alb: 3.1 g/dL / Pro: 6.9 g/dL / ALK PHOS: 129 U/L / ALT: 27 U/L / AST: 38 U/L / GGT: x           PT/INR - ( 11 Aug 2021 20:00 )   PT: 12.8 sec;   INR: 1.12 ratio         PTT - ( 11 Aug 2021 20:00 )  PTT:26.2 sec  CAPILLARY BLOOD GLUCOSE                    Micro:      RADIOLOGY & ADDITIONAL TESTS:    EKG:      Echo:      Xray:      US/CT/MRI:   Subjective:    INTERVAL HPI/OVERNIGHT EVENTS:   - Patient on BiPAP overnight. Trial off around 2pm. Pulm thoracentesis around then as well. Patient in less resp. distress just complaining of discomfort from mask and mouth dryness.   -Patient denies CP, Fever, Chills, N/V/D.    T(F): 99.2 (08-13-21 @ 14:23), Max: 99.2 (08-13-21 @ 14:23)  HR: 115 (08-13-21 @ 14:23) (115 - 140)  BP: 152/89 (08-13-21 @ 14:23) (138/85 - 172/91)  RR: 22 (08-13-21 @ 14:23) (20 - 26)  SpO2: 97% (08-13-21 @ 14:23) (96% - 100%)  I&O's Summary    13 Aug 2021 07:01  -  13 Aug 2021 15:23  --------------------------------------------------------  IN: 0 mL / OUT: 1000 mL / NET: -1000 mL    Weight (kg): 64 (08-12-21 @ 14:32)    Medications:  acetaminophen   Tablet .. 650 milliGRAM(s) Oral every 6 hours PRN  aluminum hydroxide/magnesium hydroxide/simethicone Suspension 30 milliLiter(s) Oral every 4 hours PRN  enoxaparin Injectable 40 milliGRAM(s) SubCutaneous daily  famotidine    Tablet 20 milliGRAM(s) Oral daily  furosemide   Injectable 40 milliGRAM(s) IV Push daily  melatonin 3 milliGRAM(s) Oral at bedtime PRN  multivitamin 1 Tablet(s) Oral daily    PHYSICAL EXAM:  GENERAL: Patient laying in bed, in no acute distress.   HEAD:  Normocephalic, Atraumatic.  EYES: EOMI, PERRLA, conjunctiva and sclera clear  NECK: Supple, No JVD, Normal thyroid, no enlarged nodes  NERVOUS SYSTEM:  Alert & Awake. No gross motor deficits.   CHEST/LUNG: Slight increased work of breathing, accessory muscle use, much improved. Difficult to hear breath sounds bc BiPAP but otherwise CTAB. No rales, rhonchi, or wheezes appreciated  HEART: Normal S1S2,  Tachy rate and rhythm, No murmurs, rubs, or gallops appreciated.  ABDOMEN: Distended, slightly improved from prior exam. Nontender; Normal bowel sounds.  EXTREMITIES: Trace LE edema on left. Nontender.   LYMPH: No lymphadenopathy noted  SKIN: No rashes or lesions    Notable Labs:  WBC 13--> 17--> 19    Labs:                          10.6   19.22 )-----------( 408      ( 13 Aug 2021 08:51 )             33.9     08-13    141  |  98  |  27<H>  ----------------------------<  107<H>  3.8   |  22  |  0.86    Ca    9.5      13 Aug 2021 08:51  Phos  3.1     08-13  Mg     2.30     08-13    TPro  6.9  /  Alb  3.1<L>  /  TBili  0.4  /  DBili  x   /  AST  38<H>  /  ALT  27  /  AlkPhos  129<H>  08-13    LIVER FUNCTIONS - ( 13 Aug 2021 08:51 )  Alb: 3.1 g/dL / Pro: 6.9 g/dL / ALK PHOS: 129 U/L / ALT: 27 U/L / AST: 38 U/L / GGT: x           PT/INR - ( 11 Aug 2021 20:00 )   PT: 12.8 sec;   INR: 1.12 ratio         PTT - ( 11 Aug 2021 20:00 )  PTT:26.2 sec  CAPILLARY BLOOD GLUCOSE    Blood Gas Profile - Venous (08.13.21 @ 08:51)    pH, Venous: 7.46    pCO2, Venous: 37 mmHg    pO2, Venous: 104 mmHg    HCO3, Venous: 26 mmol/L    Base Excess, Venous: 2.5 mmol/L    Oxygen Saturation, Venous: 98.5 %    Total CO2, Venous: 27.4 mmol/L    Blood Gas Venous - Lactate (08.13.21 @ 08:51)    Blood Gas Venous - Lactate: 3.4: Elevated lactate. Consider ordering follow-up lactate to trend. mmol/L    RADIOLOGY & ADDITIONAL TESTS:    NNI   Subjective:    INTERVAL HPI/OVERNIGHT EVENTS:   - Patient on BiPAP overnight. Trial off around 2pm. Pulm thoracentesis around then as well. Patient in less resp. distress just complaining of discomfort from mask and mouth dryness.   -Patient denies CP, Fever, Chills, N/V/D.    T(F): 99.2 (08-13-21 @ 14:23), Max: 99.2 (08-13-21 @ 14:23)  HR: 115 (08-13-21 @ 14:23) (115 - 140)  BP: 152/89 (08-13-21 @ 14:23) (138/85 - 172/91)  RR: 22 (08-13-21 @ 14:23) (20 - 26)  SpO2: 97% (08-13-21 @ 14:23) (96% - 100%)  I&O's Summary    13 Aug 2021 07:01  -  13 Aug 2021 15:23  --------------------------------------------------------  IN: 0 mL / OUT: 1000 mL / NET: -1000 mL    Weight (kg): 64 (08-12-21 @ 14:32)    Medications:  acetaminophen   Tablet .. 650 milliGRAM(s) Oral every 6 hours PRN  aluminum hydroxide/magnesium hydroxide/simethicone Suspension 30 milliLiter(s) Oral every 4 hours PRN  enoxaparin Injectable 40 milliGRAM(s) SubCutaneous daily  famotidine    Tablet 20 milliGRAM(s) Oral daily  furosemide   Injectable 40 milliGRAM(s) IV Push daily  melatonin 3 milliGRAM(s) Oral at bedtime PRN  multivitamin 1 Tablet(s) Oral daily    PHYSICAL EXAM:  GENERAL: Patient laying in bed, in no acute distress.   HEAD:  Normocephalic, Atraumatic.  EYES: EOMI, PERRLA, conjunctiva and sclera clear  NECK: Supple, No JVD, Normal thyroid, no enlarged nodes  NERVOUS SYSTEM:  Alert & Awake. No gross motor deficits.   CHEST/LUNG: Slight increased work of breathing, accessory muscle use, much improved. Difficult to hear breath sounds bc BiPAP but otherwise CTAB. No rales, rhonchi, or wheezes appreciated  HEART: Normal S1S2,  Tachy rate and rhythm, No murmurs, rubs, or gallops appreciated.  ABDOMEN: Distended, slightly improved from prior exam. Nontender; Normal bowel sounds.  EXTREMITIES: Trace LE edema on left. Nontender.   LYMPH: No lymphadenopathy noted  SKIN: No rashes or lesions    Notable Labs:  WBC 13--> 17--> 19    Labs:                          10.6   19.22 )-----------( 408      ( 13 Aug 2021 08:51 )             33.9     08-13    141  |  98  |  27<H>  ----------------------------<  107<H>  3.8   |  22  |  0.86    Ca    9.5      13 Aug 2021 08:51  Phos  3.1     08-13  Mg     2.30     08-13    TPro  6.9  /  Alb  3.1<L>  /  TBili  0.4  /  DBili  x   /  AST  38<H>  /  ALT  27  /  AlkPhos  129<H>  08-13    LIVER FUNCTIONS - ( 13 Aug 2021 08:51 )  Alb: 3.1 g/dL / Pro: 6.9 g/dL / ALK PHOS: 129 U/L / ALT: 27 U/L / AST: 38 U/L / GGT: x           PT/INR - ( 11 Aug 2021 20:00 )   PT: 12.8 sec;   INR: 1.12 ratio         PTT - ( 11 Aug 2021 20:00 )  PTT:26.2 sec  CAPILLARY BLOOD GLUCOSE    Blood Gas Profile - Venous (08.13.21 @ 08:51)    pH, Venous: 7.46    pCO2, Venous: 37 mmHg    pO2, Venous: 104 mmHg    HCO3, Venous: 26 mmol/L    Base Excess, Venous: 2.5 mmol/L    Oxygen Saturation, Venous: 98.5 %    Total CO2, Venous: 27.4 mmol/L    Blood Gas Venous - Lactate (08.13.21 @ 08:51)    Blood Gas Venous - Lactate: 3.4: Elevated lactate. Consider ordering follow-up lactate to trend. mmol/L    RADIOLOGY & ADDITIONAL TESTS:  < from: US Duplex Venous Lower Ext Complete, Bilateral (08.12.21 @ 14:45) >  IMPRESSION:  No evidence of deep venous thrombosis in either lower extremity.    < end of copied text >    < from: US Abdomen Limited (08.12.21 @ 14:42) >  IMPRESSION:    Moderate volume ascites, largest pocket in the right upper quadrant.    < end of copied text >

## 2021-08-13 NOTE — PROGRESS NOTE ADULT - PROBLEM SELECTOR PLAN 3
Pt presenting with new BLE edema, progressively worsening over last week since discharge from recent hospitalization. Cardiac, renal, and hepatic etiology less likely as patient with recent TTE, which did not show HF and her renal/hepatic function appear normal on labs and recent imaging. More likely in the setting of either lymphedema in the setting of new ovarian mass OR medication-related as patient was recently started on amlodipine.   M/r TTE (8/3): LVEF 56% with hyperdynamic LV.  CT AP (7/25): Reticulation of the intra-abdominal fat in the left upper quadrant and mid abdomen is likely related to lymphangitic drainage of fluid as opposed to carcinomatosis.  - Consider IV Lasix 40mg qd post procedure; monitor response, titrate as necessary   - Strict I/Os  - Monitor BLE edema after inteventions and diuretics  - Amlodipine held in setting of B/l LE edema Pt presenting with new BLE edema, progressively worsening over last week since discharge from recent hospitalization. Cardiac, renal, and hepatic etiology less likely as patient with recent TTE, which did not show HF and her renal/hepatic function appear normal on labs and recent imaging. More likely in the setting of either lymphedema in the setting of new ovarian mass OR medication-related as patient was recently started on amlodipine.   M/r TTE (8/3): LVEF 56% with hyperdynamic LV.  CT AP (7/25): Reticulation of the intra-abdominal fat in the left upper quadrant and mid abdomen is likely related to lymphangitic drainage of fluid as opposed to carcinomatosis.  - c/w IV Lasix 40mg qd, titrate as necessary   - Strict I/Os  - Amlodipine held in setting of B/l LE edema

## 2021-08-13 NOTE — PROGRESS NOTE ADULT - ATTENDING COMMENTS
66F HTN, Ovarian CA - no Bx - c/b ascites, Lt hydronephrosis s/p stent 7/2021, recent PURVI on CKD s/p HD, pleural effusion s/p thoracentesis p/w acute respiratory failure with hypercarbia due to pleural effusion due to fluid overload. Recent TTE - nml LVFx. Initially requiring BiPAP for respiration. s/p therapeutic thoracentesis today and pt weaned to NC. Repeat CXR. Obtain CT chest non con.  Ascites - trace amount on POCUS; will obtain official abdominal U/S.

## 2021-08-14 NOTE — PROGRESS NOTE ADULT - ATTENDING COMMENTS
66F HTN, Ovarian CA - no Bx - c/b ascites, Lt hydronephrosis s/p stent 7/2021, recent PURVI on CKD s/p HD, pleural effusion s/p thoracentesis p/w acute respiratory failure with hypercarbia due to pleural effusion due to fluid overload. Recent TTE - nml LVFx. Initially requiring BiPAP for respiration. s/p therapeutic thoracentesis on8/13 and pt weaned to NC. Obtain CT chest non con.  Ascites - trace amount on POCUS; will obtain official abdominal U/S.

## 2021-08-14 NOTE — PROGRESS NOTE ADULT - PROBLEM SELECTOR PLAN 5
Sinus tachycardia on EKG. RBBB.  Pt tachycardic to 110's on admission. In the setting of SOB and low pO2's, pt was meant to have a CTA Chest in the ED to rule out PE, however, she refused due to adverse reaction with recent contrast study, patient states that she received contrast at previous hospitalization and was vomiting all night.   - Tachycardia likely in the setting of resp distress. During recent hospitalization, she was tachycardic and it improved after thoracentesis. She had BLE duplex scans and a V/Q scan, both without evidence of DVT/PE.   - Repeat LE doppler was negative for DVT  - ECG shows sinus tachycardia   - Continue to monitor

## 2021-08-14 NOTE — PROGRESS NOTE ADULT - PROBLEM SELECTOR PLAN 4
WBC now downtrending s/p thora. Most likely reactive  Unlikely SBP as patient improving post thoracentesis and nontender abdomen w improving ascites  Pt afebrile without s/s of active infection. Likely reactive in setting of volume overload and likely malignancy.   - f/u CT Chest non contrast  -Monitor CBC  -Continue to monitor for now

## 2021-08-14 NOTE — PROGRESS NOTE ADULT - PROBLEM SELECTOR PLAN 2
Pt p/w worsening abdominal distension and SOB x 1 week. Found to have ascites during recent hospitalization and underwent paracentesis on 7/27, cytology showed abnormal lymphocytes but no malignant cells. Likely in the setting of suspected malignant ovarian mass.   CT AP (7/25): Moderate volume of abdominal ascites. Reticulation of the intra-abdominal fat in the left upper quadrant and mid abdomen is likely related to lymphangitic drainage of fluid as opposed to carcinomatosis.  Limited abd US 8/12: Moderate ascites deppest pocket in RUQ  Now much improved on exam  - procedure team: ascites likely too small for paracentesis  - c/w diuresis

## 2021-08-14 NOTE — PROGRESS NOTE ADULT - PROBLEM SELECTOR PLAN 1
Pt initiailly presented with progressively worsening SOB x 1 week since recent discharge. Pt satting 94-96% on 3L NC, with increased WOB. Placed on BiPAP with improvement in WOB for ~28h.   Presented with Acute Hypercarbic Resp Failure, now much improved  Now s/p thoracentesis removing 1.2L, patient satting 97-99% on 2L NC  Sat 94% on RA on trial on 8/14  Patient sent on 2L home oxygen since last admission.  CXR showed moderate right-sided and small left-sided pleural effusions. Possibly exacerbated by ascites.  LE Doppler negative for DVT (patient had possible poor reaction to contrast CT in past)  VBG 8/14 AM w pH 7.50, pCO2 35, Bicarb 27  - Monitor VBG  - Monitor without lasix, can add if necessary  - Pulm onboard, recs appreciated. Thoracentesis appreciated  - F/u thoracentesis studies  - frequent pulse ox monitoring

## 2021-08-14 NOTE — PROGRESS NOTE ADULT - PROBLEM SELECTOR PLAN 6
Pt found to have ovarian mass on CT Abd/Pelvis during recently hospitalization at [facility] in early July 2021. After discharge, patient was meant to follow up with Gyn-Onc Dr. Tutu Lenz ([phone]) at [facility]. She has an upcoming appointment on 8/13.   Thoracentesis fluid studies with many cells, Large, abnormal immature looking mononuclear cells, many with cauliflower. Many RBCs  nuclei. (few in clusters)  - Consult GYN Onc given repeat episodes of respiratory failure within 2 weeks as per pulm  - Radiology addendum to read of CTAP shows Mild peritoneal thickening and nodularity.  Bilateral adnexal soft tissue prominence.  Mild left hydronephrosis.  - Documentation from recent hospitalization showed CA-125 elevated to 252.  <2 and CEA <1 per records from [place]. C/f malignancy.   - Pt will require outpt follow up with Gyn-onc.

## 2021-08-14 NOTE — PROGRESS NOTE ADULT - PROBLEM SELECTOR PLAN 3
Pt presenting with new BLE edema, progressively worsening over last week since discharge from recent hospitalization. Cardiac, renal, and hepatic etiology less likely as patient with recent TTE, which did not show HF and her renal/hepatic function appear normal on labs and recent imaging. More likely in the setting of either lymphedema in the setting of new ovarian mass OR medication-related as patient was recently started on amlodipine.   M/r TTE (8/3): LVEF 56% with hyperdynamic LV.  CT AP (7/25): Reticulation of the intra-abdominal fat in the left upper quadrant and mid abdomen is likely related to lymphangitic drainage of fluid as opposed to carcinomatosis.  - monitor off lasix, can resume IV 40 lasix qd if needed  - Strict I/Os  - Amlodipine held in setting of B/l LE edema

## 2021-08-14 NOTE — PROGRESS NOTE ADULT - ASSESSMENT
66-year-old female with PMH significant for HTN, HLD, recently discovered ovarian mass suspicious for malignancy (7/2021), L hydroureteronephrosis s/p renal stent (7/15/21), and recent hospitalization ([facility] 7/26-8/4) for acute renal failure (likely obstructive) requiring urgent HD, ascites s/p therapeutic paracentesis (7/27/21), and pleural effusion s/p R thoracentesis (8/2/21) showing lymphocytosis but no malignant cells admitted for acute hypercarbic respiratory failure due to pleural effusions most likely caused by ovarian malignancy a/w volume overload with ascites and b/l LE edema.

## 2021-08-14 NOTE — PROGRESS NOTE ADULT - SUBJECTIVE AND OBJECTIVE BOX
Subjective:    INTERVAL HPI/OVERNIGHT EVENTS:   No acute events overnight  - Patient on NC 2-3L on, satting well  - Patient reports her breathing is much improved from when she arrived  - No pain at thoracentesis site  - Patient Denies Fevers, Chills, Headache, CP, Palpitations, Abdominal Pain, Dysuria, N/V/D  - Patient having normal bowel movements and normal urination    T(F): 97.8 (08-14-21 @ 10:30), Max: 99.2 (08-13-21 @ 14:23)  HR: 121 (08-14-21 @ 10:30) (110 - 121)  BP: 129/82 (08-14-21 @ 10:30) (127/77 - 152/89)  RR: 20 (08-14-21 @ 10:30) (20 - 22)  SpO2: 96% (08-14-21 @ 10:30) (95% - 98%)  I&O's Summary    13 Aug 2021 07:01  -  14 Aug 2021 07:00  --------------------------------------------------------  IN: 0 mL / OUT: 1000 mL / NET: -1000 mL      Weight (kg): 64 (08-12-21 @ 14:32)    Medications:  acetaminophen   Tablet .. 650 milliGRAM(s) Oral every 6 hours PRN  aluminum hydroxide/magnesium hydroxide/simethicone Suspension 30 milliLiter(s) Oral every 4 hours PRN  enoxaparin Injectable 40 milliGRAM(s) SubCutaneous daily  famotidine    Tablet 20 milliGRAM(s) Oral daily  melatonin 3 milliGRAM(s) Oral at bedtime PRN  multivitamin 1 Tablet(s) Oral daily    PHYSICAL EXAM:  GENERAL: Patient sitting in bed, in no acute distress. Breathing slightly fast RR ~26-28 on NC.   HEAD:  Normocephalic, Atraumatic.  EYES: EOMI, PERRLA, conjunctiva and sclera clear  NECK: Supple, No JVD, Normal thyroid, no enlarged nodes  NERVOUS SYSTEM:  Alert & Awake. No gross motor deficits.   CHEST/LUNG: Slight increased work of breathing, accessory muscle use, much improved. Difficult to hear breath sounds bc BiPAP but otherwise CTAB. No rales, rhonchi, or wheezes appreciated  HEART: Normal S1S2,  Tachy rate and rhythm, No murmurs, rubs, or gallops appreciated.  ABDOMEN: Distended, slightly improved from prior exam. Nontender; Normal bowel sounds.  EXTREMITIES: Can no longer appreciate LE edema.    LYMPH: No lymphadenopathy noted  SKIN: Thoracentesis site on R side of back with bandage with some blood and yellow fluid, not saturated. nontender.     Notable Labs:    Labs:                          10.8   14.90 )-----------( 380      ( 14 Aug 2021 08:22 )             34.0     08-14    141  |  97<L>  |  29<H>  ----------------------------<  153<H>  4.2   |  27  |  0.81    Ca    9.2      14 Aug 2021 08:22  Phos  2.1     08-14  Mg     2.30     08-14    TPro  6.9  /  Alb  3.1<L>  /  TBili  0.4  /  DBili  x   /  AST  38<H>  /  ALT  27  /  AlkPhos  129<H>  08-13    LIVER FUNCTIONS - ( 13 Aug 2021 08:51 )  Alb: 3.1 g/dL / Pro: 6.9 g/dL / ALK PHOS: 129 U/L / ALT: 27 U/L / AST: 38 U/L / GGT: x           Blood Gas Profile - Venous (08.14.21 @ 08:22)    pH, Venous: 7.59    pCO2, Venous: 35 mmHg    pO2, Venous: 190 mmHg    HCO3, Venous: 34 mmol/L    Base Excess, Venous: 11.2 mmol/L    Oxygen Saturation, Venous: 99.3 %    Total CO2, Venous: 34.7 mmol/L    Blood Gas Venous - Lactate (08.14.21 @ 08:22)    Blood Gas Venous - Lactate: 3.5: Elevated lactate. Consider ordering follow-up lactate to trend. mmol/L    Micro:    Culture - Body Fluid with Gram Stain (collected 08-14-21 @ 00:46)  Source: .Body Fluid Thoracentesis Fluid  Gram Stain (08-14-21 @ 03:28):    polymorphonuclear leukocytes seen    No organisms seen    by cytocentrifuge    Cell Count, Body Fluid (08.13.21 @ 19:07)    Other Body Cells: 81: Large, abnormal immature looking mononuclear cells, many with cauliflower  nuclei. (few in clusters)  Slide previously reviewed by pathologist. %    Mesothelial Cells - Body Fluid: 0 %    Eosinophil Count - Body Fluid: 0 %    Monocyte/Macrophage Count - Body Fluid: 0 %    Fluid Segmented Granulocytes: 2 %    Fluid Type: Thoracentesis Fluid    Body Fluid Appearance: Slightly Cloudy    BF Lymphocytes: 17 %    Atypical Lymphocytes - Body Fluid: 0 %    Tube Type: Screw Top    Color - Body Fluid: Yellow    Total Nucleated Cell Count, Body Fluid: 5996 cells/uL    Total RBC Count: 9000: Red Cell count correlates with the number and proportion of cells on  cytospin preparation. cells/uL    Total Cells Counted, Body Fluid: 100 Cells    RADIOLOGY & ADDITIONAL TESTS:    Xray:  CXR post thora, 8/13: No pneumothorax

## 2021-08-15 NOTE — PHYSICAL THERAPY INITIAL EVALUATION ADULT - ADDITIONAL COMMENTS
Pt lives in house with brother. Pt was independent in functional activities prior to admission without use of assistive device. no steps to negotiate.     Pt left seated in bedside chair, lines and tubes intact, NAD. RN present and aware of session. +call bell.

## 2021-08-15 NOTE — PROGRESS NOTE ADULT - PROBLEM SELECTOR PLAN 7
Pt was recently switched from HCTZ-triamterene to amlodipine during recent hospitalization   - holding amlodipine in setting of LE edema  - Monitor BP after procedures and diuretics  - If remains hypertensive, will consider non-amlodipine anti-HTN medications Pt found to have ovarian mass on CT Abd/Pelvis during recently hospitalization at [facility] in early July 2021. After discharge, patient was meant to follow up with Gyn-Onc Dr. Tutu Lenz ([phone]) at [facility]. She has an upcoming appointment on 8/13.   Thoracentesis fluid studies with many cells, Large, abnormal immature looking mononuclear cells, many with cauliflower. Many RBCs  nuclei. (few in clusters)  - Consult GYN Onc (8/15) given repeat episodes of respiratory failure within 2 weeks as per pulm  - Radiology addendum to read of CTAP shows Mild peritoneal thickening and nodularity.  Bilateral adnexal soft tissue prominence.  Mild left hydronephrosis.  - Documentation from recent hospitalization showed CA-125 elevated to 252.  <2 and CEA <1 per records from [place]. C/f malignancy.   - Pt will require outpt follow up with Gyn-onc.

## 2021-08-15 NOTE — PROGRESS NOTE ADULT - PROBLEM SELECTOR PLAN 4
WBC now downtrending s/p thora. Most likely reactive  Unlikely SBP as patient improving post thoracentesis and nontender abdomen w improving ascites  Pt afebrile without s/s of active infection. Likely reactive in setting of volume overload and likely malignancy.   - f/u CT Chest non contrast  -Monitor CBC  -Continue to monitor for now Pt presenting with new BLE edema, progressively worsening over last week since discharge from recent hospitalization. Cardiac, renal, and hepatic etiology less likely as patient with recent TTE, which did not show HF and her renal/hepatic function appear normal on labs and recent imaging. More likely in the setting of either lymphedema in the setting of new ovarian mass OR medication-related as patient was recently started on amlodipine.   M/r TTE (8/3): LVEF 56% with hyperdynamic LV.  CT AP (7/25): Reticulation of the intra-abdominal fat in the left upper quadrant and mid abdomen is likely related to lymphangitic drainage of fluid as opposed to carcinomatosis.  - monitor off lasix, can resume IV 40 lasix qd if needed  - Strict I/Os  - Amlodipine held in setting of B/l LE edema

## 2021-08-15 NOTE — PROGRESS NOTE ADULT - ATTENDING COMMENTS
66F HTN, Ovarian CA - no Bx - c/b ascites, Lt hydronephrosis s/p stent 7/2021, recent PURVI on CKD s/p HD, pleural effusion s/p thoracentesis p/w acute respiratory failure with hypercarbia due to pleural effusion due to fluid overload. Recent TTE - nml LVFx. Initially requiring BiPAP for respiration. s/p therapeutic thoracentesis on 8/13 and pt weaned to NC. CT chest showing multifocal PNA- started Vanc/cefepime.  Ascites - trace amount on POCUS. Gyn onc consulted for ovarian mass - likely malignant and metastatic-> ascites noted and enlarged LNs

## 2021-08-15 NOTE — CONSULT NOTE ADULT - PROBLEM SELECTOR RECOMMENDATION 9
GYN ONC Fellow Addendum:    Pt previously seen and examined by me.  Agree with resident assessment above. Pt representing w/ ascites and b/l pleural effusions, now w/ PNA.     Still requires tissue diagnosis of malignancy to determine next steps of treatment. There is no adnexal mass on imaging (both outside report and scan here at [facility] do not show mass). Prior CT A/P  was re read now to show enhanced appearance of ovaries but not enlarged and no mass.  No other pelvic pathology and no pelvic/paraaortic lymphadenopathy. CT chest now definitively qualifies peritoneal carcinomatosis in upper abdomen as well as several enlarged thoracic LNs (supraclavicular, mediastinal, internal mammary).  I reviewed imaging today w/ radiology to compare scans and they agree the upper abdominal process is more consistent with peritoneal carcinomatosis but is only seen in upper abdomen    Although pt has ascites and pleural effusions and possible enhanced appearance of ovaries- clinical presentation is not consistent with gyn malignancy (only upper abdominal disease, no pelvic/paraaortic lymphadenopathy, thoracic lymphadenopathy) pt requires a definitive biopsy to confirm a diagnosis.  She will likely continue to reaccumulate ascites/pleural effusions if these are 2/2 malignancy until she is treated. Recommend consult IR for CT guided biopsy of lymph nodes or peritoneal carcinomatosis to expedite diagnosis.  If repeating paracentesis or thoracocentesis, need to send a large volume of ascitic fluid to pathology cytology for adequate assessment. Pt is not currently candidate for surgical diagnosis in OR given active PNA.    After tissue diagnosis, recommend scheduling of outpt follow up/treatment prior to d/c as care coordination is a barrier for this pt.    Please contact gyn oncology team if questions arise    Poonam Hall MD

## 2021-08-15 NOTE — CONSULT NOTE ADULT - SUBJECTIVE AND OBJECTIVE BOX
GYN ONC Consult Note    HPI:  66-year-old female with PMH significant for HTN, HLD, recently discovered ovarian mass suspicious for malignancy (7/2021), and L hydroureteronephrosis s/p renal stent (7/15/21) presenting with BLE edema and worsening abdominal distension and SOB. Of note, pt with recent hospitalization ([facility] 7/26-8/4) for acute renal failure (likely obstructive vs MONO) requiring urgent HD, ascites s/p therapeutic paracentesis (7/27/21), and pleural effusion s/p R thoracentesis (8/2/21) showing lymphocytosis but no malignant cells. Patient states she was discharged to follow up with gyn-onc at [facility] as previously scheduled after her discharge from [place], where her ovarian mass was discovered earlier in July. However, when she went home she developed BLE edema, worsening over the past few days along with worsening abdominal distension and SOB. Patient denies fevers/chills, lightheadedness/dizziness, cough, CP, palpitations, cough, n/v/d, abdominal pain, LE pain.     In the ED, vitals T 97-99, -117, //90, RR 26 - 30 (satting 94-96% on NC). Labs significant for WBC 13.4, proBNP 34. VBG 7.45/50/44/35, Lactate 2.7. RVP/COVID PCR neg. CXR shows moderate R-sided and small L-sided pleural effusions. Pt received IV lasix 40mg x 1 in ED. Placed on BiPAP for increased WOB. MICU consulted, however not a candidate. (12 Aug 2021 04:16)    Pt seen and evaluated at bedside. Pt was sitting up in a chair on 2L NC endorsing minimal abdominal pain and mild pain down b/l lower extremities. Pt denies fevers, CP, SOB, N/V, dysuria. Pt is voiding spontaneously, passing gas, having bowel movements and tolerating PO. Pt ambulating without difficulty.     HCM: Last Pap: 2016, wnl  Last Mammogram: Unsure  Last Colonoscopy: 2017    Family hx: denies family history of ovarian, uterine, cervical, breast, colon cancer.     POb: NSVDx2    PGyn: Menopause at age 54. Denies h/o AUB, fibroids, ovarian cysts, STDS, abnormal PAPs, infertility    PMH: Denies (asthma, HTN, thyroid disorder, DM), h/o hospitalizations    PSH: denies    Meds: ASA    All: PCN (rash)    Social: denies history smoking cigarettes, alcohol dependence, illicit drug use. Lives with brother.     REVIEW OF SYSTEMS  General: denies fevers, chills, tiredness  Skin/Breast: denies breast pain  Respiratory and Thorax: denies shortness of breath, denies cough  Cardiovascular: denies chest pain and denies palpitations  Gastrointestinal: +abdominal pain, denies nausea/ vomiting	  Genitourinary: denies dysuria, increased urinary frequency, urgency	  Constitutional, Cardiovascular, Respiratory, Gastrointestinal, Genitourinary, Musculoskeletal and Integumentary review of systems are normal except as noted. 	    Vital Signs Last 24 Hrs  T(C): 37.2 (15 Aug 2021 13:20), Max: 37.2 (14 Aug 2021 18:34)  T(F): 98.9 (15 Aug 2021 13:20), Max: 98.9 (14 Aug 2021 18:34)  HR: 114 (15 Aug 2021 13:20) (107 - 117)  BP: 114/76 (15 Aug 2021 13:20) (114/76 - 141/88)  BP(mean): --  RR: 20 (15 Aug 2021 13:20) (20 - 20)  SpO2: 95% (15 Aug 2021 13:20) (95% - 99%)    Physical Exam:   General: moderate distress, sitting in tripod position.   Neck: supple, full ROM, no lymphadenopathy  CV: RR S1S2  Lungs: + bilateral crackles. 2L NC  Back: No CVA tenderness  Abd: Tense, distended, nontender, +fluid wave, +shifting dullness    Deferred invasive examination: Noted below examination by Dr. Villarreal and Dr. Hall on 8/1  Breast: no nodules/lumps or discharge noted bilaterally  Vaginal: External labia wnl.  Bimanual exam with no cervical motion tenderness, smooth closed small cervix. No vaginal nodularity. Uterus small and mobile, only partially palpable due to ascites. No palpable adnexal masses.   Speculum Exam: No active bleeding from os  Rectum: smooth RV septum w/o nodularity or tenderness and mobile  Ext: + pitting edema to the knee      MEDICATIONS  (STANDING):  cefepime   IVPB      cefepime   IVPB 2000 milliGRAM(s) IV Intermittent every 8 hours  enoxaparin Injectable 40 milliGRAM(s) SubCutaneous daily  famotidine    Tablet 20 milliGRAM(s) Oral daily  multivitamin 1 Tablet(s) Oral daily  vancomycin  IVPB      vancomycin  IVPB 1000 milliGRAM(s) IV Intermittent every 12 hours    MEDICATIONS  (PRN):  acetaminophen   Tablet .. 650 milliGRAM(s) Oral every 6 hours PRN Temp greater or equal to 38.5C (101.3F), Mild Pain (1 - 3)  aluminum hydroxide/magnesium hydroxide/simethicone Suspension 30 milliLiter(s) Oral every 4 hours PRN Dyspepsia  melatonin 3 milliGRAM(s) Oral at bedtime PRN Insomnia        LABS:                        10.7   16.20 )-----------( 411      ( 15 Aug 2021 07:48 )             33.6     08-15    136  |  95<L>  |  25<H>  ----------------------------<  117<H>  4.0   |  30  |  0.72    Ca    9.4      15 Aug 2021 07:48  Phos  2.2     08-15  Mg     2.30     08-15        IMAGING:     < from: CT Chest No Cont (08.14.21 @ 16:38) >    IMPRESSION:    Patchy groundglass and consolidative opacities within the right lung may represent multifocal pneumonia.    Small-moderate layering pleural effusions and partial atelectasis of bilateral lower lobes.    Enlarged supraclavicular, mediastinal and internal mammary lymph nodes likely metastatic    < end of copied text >      A/P  67yo F presented with flank/back pain, abdominal bloating, and recent weight loss x 1 month s/p renal stent placement for left hydronephrosis at [facility]. Pt previously admitted for PURVI with hyperkalemia and hospital course c/b ARDS with pleural effusions. CTAP notable for moderate ascites and B/l pleural effusion and L ureteral stent. Ovaries and uterus wnl. However, previous imaging at [facility] notable for bilateral soft tissue prominence and adnexal nodularity with increased soft tissue prominence mild thickening and enhancement of the peritoneal lining at the posterior cul-de-sac.  elevated to 252. Pt is s/p  thoracentesis (8/2/21) showing lymphocytosis but no malignant cells admitted for acute hypercarbic respiratory failure due to pleural effusions most likely caused by ovarian malignancy a/w volume overload with ascites and b/l LE edema. Physical exam notable for abdominal distension with no focal findings on pelvic exam. Differential diagnosis broad at this time but includes primary peritoneal cancer vs GI primary vs infectious/ inflammatory process.      - c/w symptomatic management and treatment of pneumonia per primary team  - recommend tissue diagnose   - not a candidate for diagnostic laparoscopy/surgical management at this time    Pt discussed with Dr. Hall, to be discussed with GYN ONC attending  To be seen by GYN ONC team   Jolie Gary PGY2 GYN ONC Consult Note    HPI:  66-year-old female with PMH significant for HTN, HLD, recently discovered ovarian mass suspicious for malignancy (7/2021), and L hydroureteronephrosis s/p renal stent (7/15/21) presenting with BLE edema and worsening abdominal distension and SOB. Of note, pt with recent hospitalization ([facility] 7/26-8/4) for acute renal failure (likely obstructive vs MONO) requiring urgent HD, ascites s/p therapeutic paracentesis (7/27/21), and pleural effusion s/p R thoracentesis (8/2/21) showing lymphocytosis but no malignant cells. Patient states she was discharged to follow up with gyn-onc at [facility] as previously scheduled after her discharge from [place], where her ovarian mass was discovered earlier in July. However, when she went home she developed BLE edema, worsening over the past few days along with worsening abdominal distension and SOB. Patient denies fevers/chills, lightheadedness/dizziness, cough, CP, palpitations, cough, n/v/d, abdominal pain, LE pain.     In the ED, vitals T 97-99, -117, //90, RR 26 - 30 (satting 94-96% on NC). Labs significant for WBC 13.4, proBNP 34. VBG 7.45/50/44/35, Lactate 2.7. RVP/COVID PCR neg. CXR shows moderate R-sided and small L-sided pleural effusions. Pt received IV lasix 40mg x 1 in ED. Placed on BiPAP for increased WOB. MICU consulted, however not a candidate. (12 Aug 2021 04:16)    Pt seen and evaluated at bedside. She stated that she lives with her brother and her son,  (Phone #[phone]), became concerned and told her to come to the hospital. Pt was sitting up in a chair on 2L NC endorsing minimal abdominal pain and mild pain down b/l lower extremities. Pt denies fevers, CP, SOB, N/V, dysuria. Pt is voiding spontaneously, passing gas, having bowel movements and tolerating PO. Pt ambulating without difficulty.     HCM: Last Pap: 2016, wnl  Last Mammogram: Unsure  Last Colonoscopy: 2017    Family hx: denies family history of ovarian, uterine, cervical, breast, colon cancer.     POb: NSVDx2    PGyn: Menopause at age 54. Denies h/o AUB, fibroids, ovarian cysts, STDS, abnormal PAPs, infertility    PMH: Denies (asthma, HTN, thyroid disorder, DM), h/o hospitalizations    PSH: denies    Meds: ASA    All: PCN (rash)    Social: denies history smoking cigarettes, alcohol dependence, illicit drug use. Lives with brother.     REVIEW OF SYSTEMS  General: denies fevers, chills, tiredness  Skin/Breast: denies breast pain  Respiratory and Thorax: denies shortness of breath, denies cough  Cardiovascular: denies chest pain and denies palpitations  Gastrointestinal: +abdominal pain, denies nausea/ vomiting	  Genitourinary: denies dysuria, increased urinary frequency, urgency	  Constitutional, Cardiovascular, Respiratory, Gastrointestinal, Genitourinary, Musculoskeletal and Integumentary review of systems are normal except as noted. 	    Vital Signs Last 24 Hrs  T(C): 37.2 (15 Aug 2021 13:20), Max: 37.2 (14 Aug 2021 18:34)  T(F): 98.9 (15 Aug 2021 13:20), Max: 98.9 (14 Aug 2021 18:34)  HR: 114 (15 Aug 2021 13:20) (107 - 117)  BP: 114/76 (15 Aug 2021 13:20) (114/76 - 141/88)  BP(mean): --  RR: 20 (15 Aug 2021 13:20) (20 - 20)  SpO2: 95% (15 Aug 2021 13:20) (95% - 99%)    Physical Exam:   General: moderate distress, sitting in tripod position.   Neck: supple, full ROM, no lymphadenopathy  CV: RR S1S2  Lungs: + bilateral crackles. 2L NC  Back: No CVA tenderness  Abd: Tense, distended, nontender, +fluid wave, +shifting dullness    Deferred invasive examination: Noted below examination by Dr. Villarreal and Dr. Hall on 8/1  Breast: no nodules/lumps or discharge noted bilaterally  Vaginal: External labia wnl.  Bimanual exam with no cervical motion tenderness, smooth closed small cervix. No vaginal nodularity. Uterus small and mobile, only partially palpable due to ascites. No palpable adnexal masses.   Speculum Exam: No active bleeding from os  Rectum: smooth RV septum w/o nodularity or tenderness and mobile  Ext: + pitting edema to the knee      MEDICATIONS  (STANDING):  cefepime   IVPB      cefepime   IVPB 2000 milliGRAM(s) IV Intermittent every 8 hours  enoxaparin Injectable 40 milliGRAM(s) SubCutaneous daily  famotidine    Tablet 20 milliGRAM(s) Oral daily  multivitamin 1 Tablet(s) Oral daily  vancomycin  IVPB      vancomycin  IVPB 1000 milliGRAM(s) IV Intermittent every 12 hours    MEDICATIONS  (PRN):  acetaminophen   Tablet .. 650 milliGRAM(s) Oral every 6 hours PRN Temp greater or equal to 38.5C (101.3F), Mild Pain (1 - 3)  aluminum hydroxide/magnesium hydroxide/simethicone Suspension 30 milliLiter(s) Oral every 4 hours PRN Dyspepsia  melatonin 3 milliGRAM(s) Oral at bedtime PRN Insomnia        LABS:                        10.7   16.20 )-----------( 411      ( 15 Aug 2021 07:48 )             33.6     08-15    136  |  95<L>  |  25<H>  ----------------------------<  117<H>  4.0   |  30  |  0.72    Ca    9.4      15 Aug 2021 07:48  Phos  2.2     08-15  Mg     2.30     08-15        IMAGING:     < from: CT Chest No Cont (08.14.21 @ 16:38) >    IMPRESSION:    Patchy groundglass and consolidative opacities within the right lung may represent multifocal pneumonia.    Small-moderate layering pleural effusions and partial atelectasis of bilateral lower lobes.    Enlarged supraclavicular, mediastinal and internal mammary lymph nodes likely metastatic    < end of copied text >      A/P  67yo F presented with flank/back pain, abdominal bloating, and recent weight loss x 1 month s/p renal stent placement for left hydronephrosis at [facility]. Pt previously admitted for PURVI with hyperkalemia and hospital course c/b ARDS with pleural effusions. CTAP notable for moderate ascites and B/l pleural effusion and L ureteral stent. Ovaries and uterus wnl. However, previous imaging at [facility] notable for bilateral soft tissue prominence and adnexal nodularity with increased soft tissue prominence mild thickening and enhancement of the peritoneal lining at the posterior cul-de-sac.  elevated to 252. Pt is s/p  thoracentesis (8/2/21) showing lymphocytosis but no malignant cells admitted for acute hypercarbic respiratory failure due to pleural effusions most likely caused by ovarian malignancy a/w volume overload with ascites and b/l LE edema. Physical exam notable for abdominal distension with no focal findings on pelvic exam. Differential diagnosis broad at this time but includes primary peritoneal cancer vs GI primary vs infectious/ inflammatory process.      - c/w symptomatic management and treatment of pneumonia per primary team  - recommend tissue diagnose   - not a candidate for diagnostic laparoscopy/surgical management at this time    Pt discussed with Dr. Hall, to be discussed with GYN ONC attending  To be seen by GYN ONC team   Jolie Gary PGY2 GYN ONC Consult Note    HPI:  66-year-old female with PMH significant for HTN, HLD, w/ recent hospitalization for ascites and b/l pleural effusions c/f malignancy, and L hydroureteronephrosis s/p renal stent (7/15/21) presenting again with BLE edema and worsening abdominal distension and SOB. Of note, pt with recent hospitalization ([facility] 7/26-8/4) for acute renal failure (likely obstructive vs MONO) requiring urgent HD, ascites s/p therapeutic paracentesis (7/27/21), and pleural effusion s/p R thoracentesis (8/2/21) showing lymphocytosis but no malignant cells. Pt currently admitted for worsening SOB, found again to have ascites and bilateral pleural effusions w/ respiratory failure and now sepsis 2/2 PNA.    Gyn oncology was consulted during last hospitalization b/c pt originally presented to [facility] for complaint SOB, abdominal pain/distension. At that time a CT A/P was performed showing ovarian nodularity but no definitive ovarian mass as well as peritoneal nodularity.  These images were requested but not received during hospitalization.  Recommendation from gyn onc team at the time was to send ascitic fluid and pleural effusion fluid for cytology.  Result showed no malignant cells (unclear if adequate volume sent for cytology). Mammogram and CT chest also recommended. Plan was for outpt follow up w/ gyn oncology, pt was unable to be contacted to make appointment.     Patient denies fevers/chills, lightheadedness/dizziness, cough, CP, palpitations, cough, n/v/d, abdominal pain, LE pain.     In the ED, vitals T 97-99, -117, //90, RR 26 - 30 (satting 94-96% on NC). Labs significant for WBC 13.4, proBNP 34. VBG 7.45/50/44/35, Lactate 2.7. RVP/COVID PCR neg. CXR shows moderate R-sided and small L-sided pleural effusions. Pt received IV lasix 40mg x 1 in ED. Placed on BiPAP for increased WOB. MICU consulted, however not a candidate. (12 Aug 2021 04:16)    Pt seen and evaluated at bedside. She stated that she lives with her brother and her son, Liam (Phone #[phone]), became concerned and told her to come to the hospital. Pt was sitting up in a chair on 2L NC endorsing minimal abdominal pain and mild pain down b/l lower extremities. Pt denies fevers, CP, SOB, N/V, dysuria. Pt is voiding spontaneously, passing gas, having bowel movements and tolerating PO. Pt ambulating without difficulty.     HCM: Last Pap: 2016, wnl  Last Mammogram: Unsure  Last Colonoscopy: 2017    Family hx: denies family history of ovarian, uterine, cervical, breast, colon cancer.     POb: NSVDx2    PGyn: Menopause at age 54. Denies h/o AUB, fibroids, ovarian cysts, STDS, abnormal PAPs, infertility    PMH: Denies (asthma, HTN, thyroid disorder, DM), h/o hospitalizations    PSH: denies    Meds: ASA    All: PCN (rash)    Social: denies history smoking cigarettes, alcohol dependence, illicit drug use. Lives with brother.     REVIEW OF SYSTEMS  General: denies fevers, chills, tiredness  Skin/Breast: denies breast pain  Respiratory and Thorax: denies shortness of breath, denies cough  Cardiovascular: denies chest pain and denies palpitations  Gastrointestinal: +abdominal pain, denies nausea/ vomiting	  Genitourinary: denies dysuria, increased urinary frequency, urgency	  Constitutional, Cardiovascular, Respiratory, Gastrointestinal, Genitourinary, Musculoskeletal and Integumentary review of systems are normal except as noted. 	    Vital Signs Last 24 Hrs  T(C): 37.2 (15 Aug 2021 13:20), Max: 37.2 (14 Aug 2021 18:34)  T(F): 98.9 (15 Aug 2021 13:20), Max: 98.9 (14 Aug 2021 18:34)  HR: 114 (15 Aug 2021 13:20) (107 - 117)  BP: 114/76 (15 Aug 2021 13:20) (114/76 - 141/88)  BP(mean): --  RR: 20 (15 Aug 2021 13:20) (20 - 20)  SpO2: 95% (15 Aug 2021 13:20) (95% - 99%)    Physical Exam:   General: moderate distress, sitting in tripod position.   Neck: supple, full ROM, no lymphadenopathy  CV: RR S1S2  Lungs: + bilateral crackles. 2L NC  Back: No CVA tenderness  Abd: Tense, distended, nontender, +fluid wave, +shifting dullness    Deferred invasive examination: Noted below examination by Dr. Villarreal and Dr. Hall on 8/1  Breast: no nodules/lumps or discharge noted bilaterally  Vaginal: External labia wnl.  Bimanual exam with no cervical motion tenderness, smooth closed small cervix. No vaginal nodularity. Uterus small and mobile, only partially palpable due to ascites. No palpable adnexal masses.   Speculum Exam: No active bleeding from os  Rectum: smooth RV septum w/o nodularity or tenderness and mobile  Ext: + pitting edema to the knee      MEDICATIONS  (STANDING):  cefepime   IVPB      cefepime   IVPB 2000 milliGRAM(s) IV Intermittent every 8 hours  enoxaparin Injectable 40 milliGRAM(s) SubCutaneous daily  famotidine    Tablet 20 milliGRAM(s) Oral daily  multivitamin 1 Tablet(s) Oral daily  vancomycin  IVPB      vancomycin  IVPB 1000 milliGRAM(s) IV Intermittent every 12 hours    MEDICATIONS  (PRN):  acetaminophen   Tablet .. 650 milliGRAM(s) Oral every 6 hours PRN Temp greater or equal to 38.5C (101.3F), Mild Pain (1 - 3)  aluminum hydroxide/magnesium hydroxide/simethicone Suspension 30 milliLiter(s) Oral every 4 hours PRN Dyspepsia  melatonin 3 milliGRAM(s) Oral at bedtime PRN Insomnia        LABS:                        10.7   16.20 )-----------( 411      ( 15 Aug 2021 07:48 )             33.6     08-15    136  |  95<L>  |  25<H>  ----------------------------<  117<H>  4.0   |  30  |  0.72    Ca    9.4      15 Aug 2021 07:48  Phos  2.2     08-15  Mg     2.30     08-15        IMAGING:     < from: CT Chest No Cont (08.14.21 @ 16:38) >    IMPRESSION:    Patchy groundglass and consolidative opacities within the right lung may represent multifocal pneumonia.    Small-moderate layering pleural effusions and partial atelectasis of bilateral lower lobes.    Enlarged supraclavicular, mediastinal and internal mammary lymph nodes likely metastatic    < end of copied text >      A/P  67yo F presented with flank/back pain, abdominal bloating, and recent weight loss x 1 month s/p renal stent placement for left hydronephrosis at [facility]. Pt previously admitted for PURVI with hyperkalemia and hospital course c/b ARDS with pleural effusions. CTAP notable for moderate ascites and B/l pleural effusion and L ureteral stent. Ovaries and uterus wnl. However, previous imaging at [facility] notable for bilateral soft tissue prominence and adnexal nodularity with increased soft tissue prominence mild thickening and enhancement of the peritoneal lining at the posterior cul-de-sac.  elevated to 252. Pt is s/p  thoracentesis (8/2/21) showing lymphocytosis but no malignant cells admitted for acute hypercarbic respiratory failure due to pleural effusions most likely caused by ovarian malignancy a/w volume overload with ascites and b/l LE edema. Physical exam notable for abdominal distension with no focal findings on pelvic exam. Differential diagnosis broad at this time but includes primary peritoneal cancer vs GI primary vs infectious/ inflammatory process.      - c/w symptomatic management and treatment of pneumonia per primary team  - recommend tissue diagnosis  - not a candidate for diagnostic laparoscopy/surgical management at this time    Pt discussed with Dr. Hall Gyn Oncology fellow  To be seen by GYN ONC team   Jolie Gary PGY2

## 2021-08-15 NOTE — DISCHARGE NOTE PROVIDER - NSDCCPCAREPLAN_GEN_ALL_CORE_FT
PRINCIPAL DISCHARGE DIAGNOSIS  Diagnosis: Pleural effusion  Assessment and Plan of Treatment: There was fluid around your lungs, most likely related to the ovarian mass originally seen in the hospital in [place]. THe pulmonologists drained around 1.2L of fluid from the R side. You initially were in respiratory distress and needed the BiPAP machine mask to breathe for about 24h. This improved your breathing, and after the procedure which removed the fluid, your breathing became much better and only required 2L nasal cannular for have an oxygen saturation of 98%. Please seek emergency medical attention if you have trouble breathing or feel tired of breathing. Please follow up with pulmonology and GYN oncology after discharge.      SECONDARY DISCHARGE DIAGNOSES  Diagnosis: Ascites  Assessment and Plan of Treatment: There is some fluid in your belly causing distension, it improved over the admission with the medications that increase urination.    Diagnosis: Multifocal pneumonia  Assessment and Plan of Treatment: A CT scan of your chest showed possible pneumonia in multiple levels of your lungs, for this you were started on antibiotics.    Diagnosis: Lower extremity edema  Assessment and Plan of Treatment: You had some fluid in your legs when you arrived, this quickly resolved after a few days of the diuretic medication which increases your urination. IT was likely due to your body holding onto too much water, which is likely related to the ovarian mass.

## 2021-08-15 NOTE — PHYSICAL THERAPY INITIAL EVALUATION ADULT - PERTINENT HX OF CURRENT PROBLEM, REHAB EVAL
66-year-old female admitted for acute hypercarbic respiratory failure due to pleural effusions most likely caused by ovarian malignancy associated with volume overload with ascites and BLE edema.

## 2021-08-15 NOTE — DISCHARGE NOTE PROVIDER - HOSPITAL COURSE
66-year-old female with PMH significant for HTN, HLD, recently discovered ovarian mass suspicious for malignancy (7/2021), and L hydroureteronephrosis s/p renal stent (7/15/21) presenting with BLE edema and worsening abdominal distension and SOB. Of note, pt with recent hospitalization (J 7/26-8/4) for acute renal failure (likely obstructive vs MONO) requiring urgent HD, ascites s/p therapeutic paracentesis (7/27/21), and pleural effusion s/p R thoracentesis (8/2/21) showing lymphocytosis but no malignant cells. Patient states she was discharged to follow up with gyn-onc at [facility] as previously scheduled after her discharge from [place], where her ovarian mass was discovered earlier in July. However, when she went home she developed BLE edema, worsening over the past few days along with worsening abdominal distension and SOB. Patient denies fevers/chills, lightheadedness/dizziness, cough, CP, palpitations, cough, n/v/d, abdominal pain, LE pain.     In the ED, vitals T 97-99, -117, //90, RR 26 - 30 (satting 94-96% on NC). Labs significant for WBC 13.4, proBNP 34. VBG 7.45/50/44/35, Lactate 2.7. RVP/COVID PCR neg. CXR shows moderate R-sided and small L-sided pleural effusions. Pt received IV lasix 40mg x 1 in ED. Placed on BiPAP for increased WOB. MICU consulted, however not a candidate.    CXR showed b/l pleural effusions R > L.     Patient came in needed BIPAP for acute hypercarbic resp failure. 24h later, patient was weaned off of BIPAP onto NC, at same time had R thoracentesis by pulmonology removing 1.2L of fluid which showed Many immature cells as well as RBCs. Post thoracentesis, patient O2 sat well on 2L NC (to ~97-99%), but had resp alkalosis and increased Resp rate to ~26-28. 8/14 CT chest done for leukocytosis, and found possible multifocal pneumonia. Patient started on empiric cefepime then.

## 2021-08-15 NOTE — PROGRESS NOTE ADULT - PROBLEM SELECTOR PLAN 1
Pt initiailly presented with progressively worsening SOB x 1 week since recent discharge. Pt satting 94-96% on 3L NC, with increased WOB. Placed on BiPAP with improvement in WOB for ~28h.   Presented with Acute Hypercarbic Resp Failure, now much improved  Now s/p thoracentesis removing 1.2L, patient satting 97-99% on 2L NC  Sat 94% on RA on trial on 8/14  Patient sent on 2L home oxygen since last admission.  CXR showed moderate right-sided and small left-sided pleural effusions. Possibly exacerbated by ascites.  LE Doppler negative for DVT (patient had possible poor reaction to contrast CT in past)  VBG 8/14 AM w pH 7.50, pCO2 35, Bicarb 27  - Monitor VBG  - Monitor without lasix, can add if necessary  - Pulm onboard, recs appreciated. Thoracentesis appreciated  - F/u thoracentesis studies  - frequent pulse ox monitoring R Lung Multifocal PNA noted on CT chest, meets severe sepsis criteria  -Improving  -currently on Cefepime and Vanc (8/14- )  -f/u MRSA swab, if neg can dc vanc  -rapid clinical improvement s.p thoracentesis, anticipate short course of abx with po levaquin

## 2021-08-15 NOTE — PROGRESS NOTE ADULT - PROBLEM SELECTOR PLAN 5
Sinus tachycardia on EKG. RBBB.  Pt tachycardic to 110's on admission. In the setting of SOB and low pO2's, pt was meant to have a CTA Chest in the ED to rule out PE, however, she refused due to adverse reaction with recent contrast study, patient states that she received contrast at previous hospitalization and was vomiting all night.   - Tachycardia likely in the setting of resp distress. During recent hospitalization, she was tachycardic and it improved after thoracentesis. She had BLE duplex scans and a V/Q scan, both without evidence of DVT/PE.   - Repeat LE doppler was negative for DVT  - ECG shows sinus tachycardia   - Continue to monitor WBC now downtrending s/p thora. possibly reactive  Unlikely SBP as patient improving post thoracentesis and nontender abdomen w improving ascites  Pt afebrile without s/s of active infection. Likely reactive in setting of volume overload and likely malignancy.   - CT Chest non contrast  -Monitor CBC  -Continue to monitor for now

## 2021-08-15 NOTE — DISCHARGE NOTE PROVIDER - NSDCCPTREATMENT_GEN_ALL_CORE_FT
PRINCIPAL PROCEDURE  Procedure: Right thoracentesis  Findings and Treatment: The pulmonologists removed 1.2 Liters of fluid from around your right lung using a needle. A chest xray after the procedure showed that the procedure went well, and the procedure helped you feel better.

## 2021-08-15 NOTE — PROGRESS NOTE ADULT - PROBLEM SELECTOR PLAN 3
Pt presenting with new BLE edema, progressively worsening over last week since discharge from recent hospitalization. Cardiac, renal, and hepatic etiology less likely as patient with recent TTE, which did not show HF and her renal/hepatic function appear normal on labs and recent imaging. More likely in the setting of either lymphedema in the setting of new ovarian mass OR medication-related as patient was recently started on amlodipine.   M/r TTE (8/3): LVEF 56% with hyperdynamic LV.  CT AP (7/25): Reticulation of the intra-abdominal fat in the left upper quadrant and mid abdomen is likely related to lymphangitic drainage of fluid as opposed to carcinomatosis.  - monitor off lasix, can resume IV 40 lasix qd if needed  - Strict I/Os  - Amlodipine held in setting of B/l LE edema Pt p/w worsening abdominal distension and SOB x 1 week. Found to have ascites during recent hospitalization and underwent paracentesis on 7/27, cytology showed abnormal lymphocytes but no malignant cells. Likely in the setting of suspected malignant ovarian mass.   CT AP (7/25): Moderate volume of abdominal ascites. Reticulation of the intra-abdominal fat in the left upper quadrant and mid abdomen is likely related to lymphangitic drainage of fluid as opposed to carcinomatosis.  Limited abd US 8/12: Moderate ascites deppest pocket in RUQ  Now much improved on exam  - procedure team: ascites likely too small for paracentesis  - diuresis PRN

## 2021-08-15 NOTE — PROGRESS NOTE ADULT - PROBLEM SELECTOR PLAN 9
-DVT ppx: lovenox   -Diet: NPO while on BiPAP  -Dispo: pending clinical improvement Pt with reported history of HLD but not on statin.   - Lipid profile in AM   - Unclear why patient is on ASA- can clarify with PMD in AM - hold for now in case patient requires procedure

## 2021-08-15 NOTE — DISCHARGE NOTE PROVIDER - NSDCMRMEDTOKEN_GEN_ALL_CORE_FT
amLODIPine 5 mg oral tablet: 1 tab(s) orally once a day  Aspirin Enteric Coated 81 mg oral delayed release tablet: 1 tab(s) orally once a day  famotidine 20 mg oral tablet: 1 tab(s) orally once a day  Multiple Vitamins oral tablet: 1 tab(s) orally once a day  polyethylene glycol 3350 oral powder for reconstitution: 17 gram(s) orally once a day, As needed, Constipation  Pulse Oximeter: Pulse Oximeter  1 unit  Dx: Hypoxia; Pleural effusion  ICD 10: R09.02; J90  Shower Chair: Shower chair  1 unit  Dx: Difficulty walking  ICD 10: R26.9

## 2021-08-15 NOTE — PROGRESS NOTE ADULT - SUBJECTIVE AND OBJECTIVE BOX
Dina Thompson (Noah) PGY-2  Pager: NS) [phone]/ (FLV) [phone]    Patient is a 66y old  Female who presents with a chief complaint of Worsening volume overload (15 Aug 2021 00:55)      SUBJECTIVE / OVERNIGHT EVENTS:  No acute events over night. Denies any fevers/chills, headache, CP, SOB, abd pain, N/V/D, constipation, or leg swelling.       MEDICATIONS  (STANDING):  cefepime   IVPB      cefepime   IVPB 2000 milliGRAM(s) IV Intermittent every 8 hours  enoxaparin Injectable 40 milliGRAM(s) SubCutaneous daily  famotidine    Tablet 20 milliGRAM(s) Oral daily  multivitamin 1 Tablet(s) Oral daily  vancomycin  IVPB      vancomycin  IVPB 1000 milliGRAM(s) IV Intermittent every 12 hours    MEDICATIONS  (PRN):  acetaminophen   Tablet .. 650 milliGRAM(s) Oral every 6 hours PRN Temp greater or equal to 38.5C (101.3F), Mild Pain (1 - 3)  aluminum hydroxide/magnesium hydroxide/simethicone Suspension 30 milliLiter(s) Oral every 4 hours PRN Dyspepsia  melatonin 3 milliGRAM(s) Oral at bedtime PRN Insomnia          OBJECTIVE:  Vital Signs Last 24 Hrs  T(C): 36.7 (15 Aug 2021 01:39), Max: 37.2 (14 Aug 2021 18:34)  T(F): 98 (15 Aug 2021 01:39), Max: 98.9 (14 Aug 2021 18:34)  HR: 110 (15 Aug 2021 01:39) (110 - 121)  BP: 122/76 (15 Aug 2021 01:39) (122/76 - 141/88)  BP(mean): --  RR: 20 (15 Aug 2021 01:39) (20 - 20)  SpO2: 99% (15 Aug 2021 01:39) (96% - 99%)  PHYSICAL EXAM:  GENERAL: NAD, well-developed  HEAD:  Atraumatic, Normocephalic  EYES: conjunctiva and sclera clear  NECK: Supple, No JVD  CHEST/LUNG: Clear to auscultation bilaterally; No wheeze  HEART: Regular rate and rhythm; No murmurs, rubs, or gallops  ABDOMEN: Soft, Nontender, Nondistended; Bowel sounds present  EXTREMITIES:  No clubbing, cyanosis, or edema  PSYCH: AAOx3  NEUROLOGY: non-focal  SKIN: No rashes or lesions    CAPILLARY BLOOD GLUCOSE        I&O's Summary    13 Aug 2021 07:01  -  14 Aug 2021 07:00  --------------------------------------------------------  IN: 0 mL / OUT: 1000 mL / NET: -1000 mL    14 Aug 2021 07:01  -  15 Aug 2021 06:51  --------------------------------------------------------  IN: 518 mL / OUT: 100 mL / NET: 418 mL              LABS:                        10.8   14.90 )-----------( 380      ( 14 Aug 2021 08:22 )             34.0     08-14    141  |  97<L>  |  29<H>  ----------------------------<  153<H>  4.2   |  27  |  0.81    Ca    9.2      14 Aug 2021 08:22  Phos  2.1     08-14  Mg     2.30     08-14    TPro  6.9  /  Alb  3.1<L>  /  TBili  0.4  /  DBili  x   /  AST  38<H>  /  ALT  27  /  AlkPhos  129<H>  08-13                Culture - Body Fluid with Gram Stain (collected 14 Aug 2021 00:46)  Source: .Body Fluid Thoracentesis Fluid  Gram Stain (14 Aug 2021 03:28):    polymorphonuclear leukocytes seen    No organisms seen    by cytocentrifuge  Preliminary Report (14 Aug 2021 20:55):    No growth        RADIOLOGY & ADDITIONAL TESTS:       Dina Thompson (Noah) PGY-2  Pager: NS) [phone]/ (MLT) [phone]    Patient is a 66y old  Female who presents with a chief complaint of Worsening volume overload (15 Aug 2021 00:55)      SUBJECTIVE / OVERNIGHT EVENTS:  No acute events over night.   on 2L NC, reports feeling well. No SOB/CP      MEDICATIONS  (STANDING):  cefepime   IVPB      cefepime   IVPB 2000 milliGRAM(s) IV Intermittent every 8 hours  enoxaparin Injectable 40 milliGRAM(s) SubCutaneous daily  famotidine    Tablet 20 milliGRAM(s) Oral daily  multivitamin 1 Tablet(s) Oral daily  vancomycin  IVPB      vancomycin  IVPB 1000 milliGRAM(s) IV Intermittent every 12 hours    MEDICATIONS  (PRN):  acetaminophen   Tablet .. 650 milliGRAM(s) Oral every 6 hours PRN Temp greater or equal to 38.5C (101.3F), Mild Pain (1 - 3)  aluminum hydroxide/magnesium hydroxide/simethicone Suspension 30 milliLiter(s) Oral every 4 hours PRN Dyspepsia  melatonin 3 milliGRAM(s) Oral at bedtime PRN Insomnia          OBJECTIVE:  Vital Signs Last 24 Hrs  T(C): 36.7 (15 Aug 2021 01:39), Max: 37.2 (14 Aug 2021 18:34)  T(F): 98 (15 Aug 2021 01:39), Max: 98.9 (14 Aug 2021 18:34)  HR: 110 (15 Aug 2021 01:39) (110 - 121)  BP: 122/76 (15 Aug 2021 01:39) (122/76 - 141/88)  BP(mean): --  RR: 20 (15 Aug 2021 01:39) (20 - 20)  SpO2: 99% (15 Aug 2021 01:39) (96% - 99%)  PHYSICAL EXAM:  GENERAL: NAD, cachetic, on 2L NC  EYES: conjunctiva and sclera clear  NECK: Supple, No JVD  CHEST/LUNG: Clear to auscultation bilaterally; No wheeze  HEART: tachy  ABDOMEN: Soft, Nontender, Nondistended; Bowel sounds present  EXTREMITIES:  No clubbing, cyanosis, or edema  PSYCH: AAOx3  NEUROLOGY: non-focal  SKIN: No rashes or lesions    CAPILLARY BLOOD GLUCOSE        I&O's Summary    13 Aug 2021 07:01  -  14 Aug 2021 07:00  --------------------------------------------------------  IN: 0 mL / OUT: 1000 mL / NET: -1000 mL    14 Aug 2021 07:01  -  15 Aug 2021 06:51  --------------------------------------------------------  IN: 518 mL / OUT: 100 mL / NET: 418 mL              LABS:                        10.8   14.90 )-----------( 380      ( 14 Aug 2021 08:22 )             34.0     08-14    141  |  97<L>  |  29<H>  ----------------------------<  153<H>  4.2   |  27  |  0.81    Ca    9.2      14 Aug 2021 08:22  Phos  2.1     08-14  Mg     2.30     08-14    TPro  6.9  /  Alb  3.1<L>  /  TBili  0.4  /  DBili  x   /  AST  38<H>  /  ALT  27  /  AlkPhos  129<H>  08-13                Culture - Body Fluid with Gram Stain (collected 14 Aug 2021 00:46)  Source: .Body Fluid Thoracentesis Fluid  Gram Stain (14 Aug 2021 03:28):    polymorphonuclear leukocytes seen    No organisms seen    by cytocentrifuge  Preliminary Report (14 Aug 2021 20:55):    No growth        RADIOLOGY & ADDITIONAL TESTS:

## 2021-08-15 NOTE — PHYSICAL THERAPY INITIAL EVALUATION ADULT - IMPAIRMENTS CONTRIBUTING TO GAIT DEVIATIONS, PT EVAL
General Surgery Week 1 Survey      Responses   Facility patient discharged from?  [facility]   Does the patient have one of the following disease processes/diagnoses(primary or secondary)?  General Surgery   Is there a successful TCM telephone encounter documented?  No   Week 1 attempt successful?  No   Rescheduled  Revoked   Revoke  Decline to participate [NO ANSWER, LEFT VM]          Kyleigh Guthrie LPN   impaired balance/decreased strength

## 2021-08-15 NOTE — PROGRESS NOTE ADULT - PROBLEM SELECTOR PLAN 8
Pt with reported history of HLD but not on statin.   - Lipid profile in AM   - Unclear why patient is on ASA- can clarify with PMD in AM - hold for now in case patient requires procedure Pt was recently switched from HCTZ-triamterene to amlodipine during recent hospitalization   - holding amlodipine in setting of LE edema  - Monitor BP after procedures and diuretics  - If remains hypertensive, will consider non-amlodipine anti-HTN medications

## 2021-08-15 NOTE — PROGRESS NOTE ADULT - PROBLEM SELECTOR PLAN 6
Pt found to have ovarian mass on CT Abd/Pelvis during recently hospitalization at [facility] in early July 2021. After discharge, patient was meant to follow up with Gyn-Onc Dr. Tutu Lenz ([phone]) at [facility]. She has an upcoming appointment on 8/13.   Thoracentesis fluid studies with many cells, Large, abnormal immature looking mononuclear cells, many with cauliflower. Many RBCs  nuclei. (few in clusters)  - Consult GYN Onc given repeat episodes of respiratory failure within 2 weeks as per pulm  - Radiology addendum to read of CTAP shows Mild peritoneal thickening and nodularity.  Bilateral adnexal soft tissue prominence.  Mild left hydronephrosis.  - Documentation from recent hospitalization showed CA-125 elevated to 252.  <2 and CEA <1 per records from [facility]. C/f malignancy.   - Pt will require outpt follow up with Gyn-onc. Sinus tachycardia on EKG. RBBB.  Pt tachycardic to 110's on admission. In the setting of SOB and low pO2's, pt was meant to have a CTA Chest in the ED to rule out PE, however, she refused due to adverse reaction with recent contrast study, patient states that she received contrast at previous hospitalization and was vomiting all night.   - Tachycardia likely in the setting of resp distress. During recent hospitalization, she was tachycardic and it improved after thoracentesis. She had BLE duplex scans and a V/Q scan, both without evidence of DVT/PE.   - Repeat LE doppler was negative for DVT  - ECG shows sinus tachycardia   - Continue to monitor

## 2021-08-15 NOTE — DISCHARGE NOTE PROVIDER - NSFOLLOWUPCLINICS_GEN_ALL_ED_FT
[facility] Pulmonolgy and Sleep Medicine  Pulmonology  [address] [place] [address], [place] [address]  Phone: [phone]  Fax:

## 2021-08-15 NOTE — PROGRESS NOTE ADULT - PROBLEM SELECTOR PLAN 2
Pt p/w worsening abdominal distension and SOB x 1 week. Found to have ascites during recent hospitalization and underwent paracentesis on 7/27, cytology showed abnormal lymphocytes but no malignant cells. Likely in the setting of suspected malignant ovarian mass.   CT AP (7/25): Moderate volume of abdominal ascites. Reticulation of the intra-abdominal fat in the left upper quadrant and mid abdomen is likely related to lymphangitic drainage of fluid as opposed to carcinomatosis.  Limited abd US 8/12: Moderate ascites deppest pocket in RUQ  Now much improved on exam  - procedure team: ascites likely too small for paracentesis  - c/w diuresis Pt initiailly presented with progressively worsening SOB x 1 week since recent discharge. Pt satting 94-96% on 3L NC, with increased WOB. Placed on BiPAP with improvement in WOB for ~28h.   Presented with Acute Hypercarbic Resp Failure, now much improved  Now s/p thoracentesis removing 1.2L, patient satting 97-99% on 2L NC  Sat 94% on RA on trial on 8/14  Patient sent on 2L home oxygen since last admission.  CXR showed moderate right-sided and small left-sided pleural effusions. Possibly exacerbated by ascites.  LE Doppler negative for DVT (patient had possible poor reaction to contrast CT in past)  - Monitor without lasix, can add if necessary  - Pulm onboard, recs appreciated.   - F/u thoracentesis studies  - frequent pulse ox monitoring

## 2021-08-16 NOTE — PROGRESS NOTE ADULT - PROBLEM SELECTOR PLAN 5
WBC now downtrending s/p thora. possibly reactive  Unlikely SBP as patient improving post thoracentesis and nontender abdomen w improving ascites  Pt afebrile without s/s of active infection. Likely reactive in setting of volume overload and likely malignancy.   - CT Chest non contrast  -Monitor CBC  -Continue to monitor for now WBC now downtrending s/p thora. possibly reactive  Unlikely SBP as patient improving post thoracentesis and nontender abdomen w improving ascites  Pt afebrile without s/s of active infection. Likely reactive in setting of volume overload and likely malignancy.   -Monitor CBC  -Continue to monitor for now

## 2021-08-16 NOTE — PROGRESS NOTE ADULT - PROBLEM SELECTOR PLAN 3
Pt p/w worsening abdominal distension and SOB x 1 week. Found to have ascites during recent hospitalization and underwent paracentesis on 7/27, cytology showed abnormal lymphocytes but no malignant cells. Likely in the setting of suspected malignant ovarian mass.   CT AP (7/25): Moderate volume of abdominal ascites. Reticulation of the intra-abdominal fat in the left upper quadrant and mid abdomen is likely related to lymphangitic drainage of fluid as opposed to carcinomatosis.  Limited abd US 8/12: Moderate ascites deppest pocket in RUQ  Now much improved on exam  - procedure team: ascites likely too small for paracentesis  - diuresis PRN Pt p/w worsening abdominal distension and SOB x 1 week. Found to have ascites during recent hospitalization and underwent paracentesis on 7/27, cytology showed abnormal lymphocytes but no malignant cells. Likely in the setting of suspected malignant ovarian mass.   CT AP (7/25): Moderate volume of abdominal ascites. Reticulation of the intra-abdominal fat in the left upper quadrant and mid abdomen is likely related to lymphangitic drainage of fluid as opposed to carcinomatosis.  Limited abd US 8/12: Moderate ascites deepest pocket in RUQ  Now much improved on exam  - procedure team: ascites likely too small for paracentesis  - diuresis PRN

## 2021-08-16 NOTE — PROGRESS NOTE ADULT - ATTENDING COMMENTS
66F HTN, Ovarian CA - no Bx - c/b ascites, Lt hydronephrosis s/p stent 7/2021, recent PURVI on CKD s/p HD, pleural effusion s/p thoracentesis p/w acute respiratory failure with hypercarbia due to pleural effusion due to fluid overload. Recent TTE - nml LVFx. Initially requiring BiPAP for respiration. s/p therapeutic thoracentesis on 8/13 and pt weaned to NC. CT chest showing multifocal PNA- started Vanc/cefepime.  Ascites - trace amount on POCUS. Gyn onc consulted for ovarian mass - needs tissue diagnosis. IR consulted for biopsy.     Plan discussed with patient and HS

## 2021-08-16 NOTE — PROGRESS NOTE ADULT - SUBJECTIVE AND OBJECTIVE BOX
CHIEF COMPLAINT:    Interval Events:    REVIEW OF SYSTEMS:  Constitutional: [ ] negative [ ] fevers [ ] chills [ ] weight loss [ ] weight gain  HEENT: [ ] negative [ ] dry eyes [ ] eye irritation [ ] postnasal drip [ ] nasal congestion  CV: [ ] negative  [ ] chest pain [ ] orthopnea [ ] palpitations [ ] murmur  Resp: [ ] negative [ ] cough [ ] shortness of breath [ ] dyspnea [ ] wheezing [ ] sputum [ ] hemoptysis  GI: [ ] negative [ ] nausea [ ] vomiting [ ] diarrhea [ ] constipation [ ] abd pain [ ] dysphagia   : [ ] negative [ ] dysuria [ ] nocturia [ ] hematuria [ ] increased urinary frequency  Musculoskeletal: [ ] negative [ ] back pain [ ] myalgias [ ] arthralgias [ ] fracture  Skin: [ ] negative [ ] rash [ ] itch  Neurological: [ ] negative [ ] headache [ ] dizziness [ ] syncope [ ] weakness [ ] numbness  Psychiatric: [ ] negative [ ] anxiety [ ] depression  Endocrine: [ ] negative [ ] diabetes [ ] thyroid problem  Hematologic/Lymphatic: [ ] negative [ ] anemia [ ] bleeding problem  Allergic/Immunologic: [ ] negative [ ] itchy eyes [ ] nasal discharge [ ] hives [ ] angioedema  [ ] All other systems negative  [ ] Unable to assess ROS because ________    OBJECTIVE:  ICU Vital Signs Last 24 Hrs  T(C): 36.4 (16 Aug 2021 13:20), Max: 37.2 (15 Aug 2021 22:46)  T(F): 97.6 (16 Aug 2021 13:20), Max: 98.9 (15 Aug 2021 22:46)  HR: 110 (16 Aug 2021 13:20) (96 - 116)  BP: 117/76 (16 Aug 2021 13:20) (111/73 - 123/87)  BP(mean): --  ABP: --  ABP(mean): --  RR: 19 (16 Aug 2021 13:20) (19 - 25)  SpO2: 100% (16 Aug 2021 13:20) (94% - 100%)        08-15 @ 07:01  -  08-16 @ 07:00  --------------------------------------------------------  IN: 472 mL / OUT: 300 mL / NET: 172 mL      CAPILLARY BLOOD GLUCOSE          PHYSICAL EXAM:  General:   HEENT:   Lymph Nodes:  Neck:   Respiratory:   Cardiovascular:   Abdomen:   Extremities:   Skin:   Neurological:  Psychiatry:    HOSPITAL MEDICATIONS:  MEDICATIONS  (STANDING):  cefepime   IVPB      cefepime   IVPB 2000 milliGRAM(s) IV Intermittent every 8 hours  enoxaparin Injectable 40 milliGRAM(s) SubCutaneous daily  famotidine    Tablet 20 milliGRAM(s) Oral daily  multivitamin 1 Tablet(s) Oral daily  vancomycin  IVPB      vancomycin  IVPB 1000 milliGRAM(s) IV Intermittent every 12 hours    MEDICATIONS  (PRN):  acetaminophen   Tablet .. 650 milliGRAM(s) Oral every 6 hours PRN Temp greater or equal to 38.5C (101.3F), Mild Pain (1 - 3)  aluminum hydroxide/magnesium hydroxide/simethicone Suspension 30 milliLiter(s) Oral every 4 hours PRN Dyspepsia  melatonin 3 milliGRAM(s) Oral at bedtime PRN Insomnia      LABS:                        10.7   14.09 )-----------( 389      ( 16 Aug 2021 07:41 )             34.1     Hgb Trend: 10.7<--, 10.7<--, 10.8<--, 10.6<--, 10.5<--  08-16    137  |  96<L>  |  24<H>  ----------------------------<  119<H>  4.2   |  29  |  0.76    Ca    9.2      16 Aug 2021 07:41  Phos  2.8     08-16  Mg     2.30     08-16      Creatinine Trend: 0.76<--, 0.72<--, 0.81<--, 0.86<--, 0.99<--, 1.02<--        Venous Blood Gas:  08-16 @ 07:41  7.44/50/61/34/92.4  VBG Lactate: 2.2  Venous Blood Gas:  08-15 @ 07:49  7.43/56/37/37/65.5  VBG Lactate: 2.3      MICROBIOLOGY:     Culture - Nose (collected 15 Aug 2021 14:42)  Source: .Nose  Preliminary Report (16 Aug 2021 16:04):    No Staphylococcus aureus isolated to date    Culture - Body Fluid with Gram Stain (collected 14 Aug 2021 00:46)  Source: .Body Fluid Thoracentesis Fluid  Gram Stain (14 Aug 2021 03:28):    polymorphonuclear leukocytes seen    No organisms seen    by cytocentrifuge  Preliminary Report (14 Aug 2021 20:55):    No growth        RADIOLOGY:  [ ] Reviewed and interpreted by me    PULMONARY FUNCTION TESTS:    EKG: CHIEF COMPLAINT: sob    Interval Events: Patient seen and examined at bedside. No acute events overnight. Patient reports her SOB has improved since she came into the hospital but she is frustrated that we dont have her underlying diagnosis.       REVIEW OF SYSTEMS:  Constitutional: [X ] negative [ ] fevers [ ] chills [ ] weight loss [ ] weight gain  HEENT: [X ] negative [ ] dry eyes [ ] eye irritation [ ] postnasal drip [ ] nasal congestion  CV: [ X] negative  [ ] chest pain [ ] orthopnea [ ] palpitations [ ] murmur  Resp: [ ] negative [ ] cough [ X] shortness of breath [ ] dyspnea [ ] wheezing [ ] sputum [ ] hemoptysis  GI: [ ] negative [ ] nausea [ ] vomiting [ ] diarrhea [ ] constipation [X ] abd pain [ ] dysphagia   : [ ] negative [ ] dysuria [ ] nocturia [ ] hematuria [ ] increased urinary frequency  Musculoskeletal: [ ] negative [ ] back pain [ ] myalgias [ ] arthralgias [ ] fracture  Skin: [ X] negative [ ] rash [ ] itch  Neurological: [X ] negative [ ] headache [ ] dizziness [ ] syncope [ ] weakness [ ] numbness  Psychiatric: [X ] negative [ ] anxiety [ ] depression  Endocrine: [ ] negative [ ] diabetes [ ] thyroid problem  Hematologic/Lymphatic: [ ] negative [ ] anemia [ ] bleeding problem  Allergic/Immunologic: [ ] negative [ ] itchy eyes [ ] nasal discharge [ ] hives [ ] angioedema  [ ] All other systems negative  [ ] Unable to assess ROS because ________    OBJECTIVE:  ICU Vital Signs Last 24 Hrs  T(C): 36.4 (16 Aug 2021 13:20), Max: 37.2 (15 Aug 2021 22:46)  T(F): 97.6 (16 Aug 2021 13:20), Max: 98.9 (15 Aug 2021 22:46)  HR: 110 (16 Aug 2021 13:20) (96 - 116)  BP: 117/76 (16 Aug 2021 13:20) (111/73 - 123/87)  BP(mean): --  ABP: --  ABP(mean): --  RR: 19 (16 Aug 2021 13:20) (19 - 25)  SpO2: 100% (16 Aug 2021 13:20) (94% - 100%)        08-15 @ 07:01  -  08-16 @ 07:00  --------------------------------------------------------  IN: 472 mL / OUT: 300 mL / NET: 172 mL      CAPILLARY BLOOD GLUCOSE          PHYSICAL EXAM:  General: ill appearing, cachectic female. sitting in chair  HEENT: no scleral icterus  Lymph Nodes: no palpable LAD in neck  Respiratory: decreased breath sounds at the bilateral bases  Cardiovascular: S1/S2, RRR  Abdomen: soft, distended, non tender  Extremities: 1+ edema bilaterally  Skin: no rashes noted  Neurological: AxOx3  Psychiatry: normal  mood    HOSPITAL MEDICATIONS:  MEDICATIONS  (STANDING):  cefepime   IVPB      cefepime   IVPB 2000 milliGRAM(s) IV Intermittent every 8 hours  enoxaparin Injectable 40 milliGRAM(s) SubCutaneous daily  famotidine    Tablet 20 milliGRAM(s) Oral daily  multivitamin 1 Tablet(s) Oral daily  vancomycin  IVPB      vancomycin  IVPB 1000 milliGRAM(s) IV Intermittent every 12 hours    MEDICATIONS  (PRN):  acetaminophen   Tablet .. 650 milliGRAM(s) Oral every 6 hours PRN Temp greater or equal to 38.5C (101.3F), Mild Pain (1 - 3)  aluminum hydroxide/magnesium hydroxide/simethicone Suspension 30 milliLiter(s) Oral every 4 hours PRN Dyspepsia  melatonin 3 milliGRAM(s) Oral at bedtime PRN Insomnia      LABS:                        10.7   14.09 )-----------( 389      ( 16 Aug 2021 07:41 )             34.1     Hgb Trend: 10.7<--, 10.7<--, 10.8<--, 10.6<--, 10.5<--  08-16    137  |  96<L>  |  24<H>  ----------------------------<  119<H>  4.2   |  29  |  0.76    Ca    9.2      16 Aug 2021 07:41  Phos  2.8     08-16  Mg     2.30     08-16      Creatinine Trend: 0.76<--, 0.72<--, 0.81<--, 0.86<--, 0.99<--, 1.02<--        Venous Blood Gas:  08-16 @ 07:41  7.44/50/61/34/92.4  VBG Lactate: 2.2  Venous Blood Gas:  08-15 @ 07:49  7.43/56/37/37/65.5  VBG Lactate: 2.3      MICROBIOLOGY:     Culture - Nose (collected 15 Aug 2021 14:42)  Source: .Nose  Preliminary Report (16 Aug 2021 16:04):    No Staphylococcus aureus isolated to date    Culture - Body Fluid with Gram Stain (collected 14 Aug 2021 00:46)  Source: .Body Fluid Thoracentesis Fluid  Gram Stain (14 Aug 2021 03:28):    polymorphonuclear leukocytes seen    No organisms seen    by cytocentrifuge  Preliminary Report (14 Aug 2021 20:55):    No growth

## 2021-08-16 NOTE — PROGRESS NOTE ADULT - ASSESSMENT
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

## 2021-08-16 NOTE — PROGRESS NOTE ADULT - PROBLEM SELECTOR PLAN 7
Pt found to have ovarian mass on CT Abd/Pelvis during recently hospitalization at [facility] in early July 2021. After discharge, patient was meant to follow up with Gyn-Onc Dr. Tutu Lenz ([phone]) at [facility]. She has an upcoming appointment on 8/13.   Thoracentesis fluid studies with many cells, Large, abnormal immature looking mononuclear cells, many with cauliflower. Many RBCs  nuclei. (few in clusters)  - Consult GYN Onc (8/15) given repeat episodes of respiratory failure within 2 weeks as per pulm  - Radiology addendum to read of CTAP shows Mild peritoneal thickening and nodularity.  Bilateral adnexal soft tissue prominence.  Mild left hydronephrosis.  - Documentation from recent hospitalization showed CA-125 elevated to 252.  <2 and CEA <1 per records from [place]. C/f malignancy.   - Pt will require outpt follow up with Gyn-onc. Pt found to have ovarian mass on CT Abd/Pelvis during recently hospitalization at [facility] in early July 2021. After discharge, patient was meant to follow up with Gyn-Onc Dr. Tutu Lenz ([phone]) at [facility]. She has an upcoming appointment on 8/13.   Thoracentesis fluid studies with many cells, Large, abnormal immature looking mononuclear cells, many with cauliflower. Many RBCs nuclei (few in clusters).   - GYN-Onc; recs appreciated    =GYN-Onc recommends IR bx to confirm dx; currently not a surgical candidate.   - Radiology addendum to read of CTAP shows Mild peritoneal thickening and nodularity.  Bilateral adnexal soft tissue prominence. Mild left hydronephrosis.  - Documentation from recent hospitalization showed CA-125 elevated to 252.  <2 and CEA <1 per records from [place]. C/f malignancy.   - Pt will require outpt follow up with Gyn-onc.  - IR consulted for bx

## 2021-08-16 NOTE — PROGRESS NOTE ADULT - ASSESSMENT
66F with PMH significant for HTN, HLD, recently discovered ovarian mass suspicious for malignancy, L hydroureteronephrosis s/p renal stent, and recent hospitalization to [facility] for acute renal failure (likely obstructive) requiring urgent HD, ascites s/p therapeutic paracentesis, and pleural effusion s/p R thoracentesis showing lymphocytosis but no malignant cells admitted for acute hypoxic respiratory failure with imaging showing bilateral pleural effusion.    #Acute hypoxic respiratory failure  -Pt p/w increasing SOB and WOB, with tachycardia and elevated WBC--> s/p right thora with improvement in SOB  -Pt refused CT PE because she had a bad reaction to contrast the last time   - CT non con showing supraclavicular and mediastinal LAD    Recommendation:   - s/p thoracentesis with 1.2 L fluid removal on the right side-->  patient with rapid reaccumulation of fluid, if persistent reaccumulation will need to discuss Pleurx vs recurrent thoracenteses   - gram stain and cx negative; pending cytopathology  - previous pleural fluid showed atypical lymphoids (similar finding in ascitic fluid)  - can discuss with IR to obtain biopsy of supraclavicular lymph nodes; if unable to obtain sample, can consider EBUS for mediastinal LAD biopsy    Plan discussed with Dr. Kiser

## 2021-08-16 NOTE — PROGRESS NOTE ADULT - PROBLEM SELECTOR PLAN 2
Pt initiailly presented with progressively worsening SOB x 1 week since recent discharge. Pt satting 94-96% on 3L NC, with increased WOB. Placed on BiPAP with improvement in WOB for ~28h.   Presented with Acute Hypercarbic Resp Failure, now much improved  Now s/p thoracentesis removing 1.2L, patient satting 97-99% on 2L NC  Sat 94% on RA on trial on 8/14  Patient sent on 2L home oxygen since last admission.  CXR showed moderate right-sided and small left-sided pleural effusions. Possibly exacerbated by ascites.  LE Doppler negative for DVT (patient had possible poor reaction to contrast CT in past)  - Monitor without lasix, can add if necessary  - Pulm onboard, recs appreciated.   - F/u thoracentesis studies  - frequent pulse ox monitoring Pt initially presented with progressively worsening SOB x 1 week since recent discharge. Pt satting 94-96% on 3L NC, with increased WOB. Placed on BiPAP with improvement in WOB for ~28h.   Presented with Acute Hypercarbic Resp Failure, now much improved  Now s/p thoracentesis removing 1.2L, patient satting 97-99% on 2L NC  Sat 94% on RA on trial on 8/14  Patient sent on 2L home oxygen since last admission.  CXR showed moderate right-sided and small left-sided pleural effusions. Possibly exacerbated by ascites.  LE Doppler negative for DVT (patient had possible poor reaction to contrast CT in past)  - Monitor without lasix, can add if necessary  - Pulm onboard, recs appreciated.   - F/u thoracentesis studies  - frequent pulse ox monitoring

## 2021-08-16 NOTE — PROGRESS NOTE ADULT - SUBJECTIVE AND OBJECTIVE BOX
PROGRESS NOTE:   Patient is a 66y old  Female who presents with a chief complaint of Worsening volume overload (15 Aug 2021 16:03)    SUBJECTIVE / OVERNIGHT EVENTS:  Per nurse, pt had difficulty breathing o/n requiring oxygen boost from 2L to 3L. Now saturating comfortably at 100%. Per pt, she was not "really feeling out of breath". Pt resting comfortably in bed. Pt endorses mild cough otherwise no complaints.     REVIEW OF SYSTEMS:  CONSTITUTIONAL: No fever, chills, or malaise  HEAD: No HA  EYES: No visual changes or pain   ENT:  No sore throat, hoarseness, or hearing loss  NECK: No pain or stiffness, no swollen glands  RESPIRATORY: +cough, no SOB, wheezing, or hemoptysis; no production of phlegm  CARDIOVASCULAR: No chest pain or palpitations  GASTROINTESTINAL: No abdominal pain. No nausea, vomiting, or hematemesis; No diarrhea or constipation. No melena or hematochezia.  GENITOURINARY: No dysuria, frequency, incontinence or hematuria  NEUROLOGICAL: No numbness or weakness; no incoordination or tremors  MUSCULOSKELETAL: No muscle or joint pain; no swelling of stiffness  SKIN: No itching, rashes      MEDICATIONS  (STANDING):  cefepime   IVPB      cefepime   IVPB 2000 milliGRAM(s) IV Intermittent every 8 hours  enoxaparin Injectable 40 milliGRAM(s) SubCutaneous daily  famotidine    Tablet 20 milliGRAM(s) Oral daily  multivitamin 1 Tablet(s) Oral daily  vancomycin  IVPB      vancomycin  IVPB 1000 milliGRAM(s) IV Intermittent every 12 hours    MEDICATIONS  (PRN):  acetaminophen   Tablet .. 650 milliGRAM(s) Oral every 6 hours PRN Temp greater or equal to 38.5C (101.3F), Mild Pain (1 - 3)  aluminum hydroxide/magnesium hydroxide/simethicone Suspension 30 milliLiter(s) Oral every 4 hours PRN Dyspepsia  melatonin 3 milliGRAM(s) Oral at bedtime PRN Insomnia      I&O's Summary    15 Aug 2021 07:01  -  16 Aug 2021 07:00  --------------------------------------------------------  IN: 472 mL / OUT: 300 mL / NET: 172 mL        PHYSICAL EXAM:  Vital Signs Last 24 Hrs  T(C): 36.8 (16 Aug 2021 05:02), Max: 37.2 (15 Aug 2021 13:20)  T(F): 98.3 (16 Aug 2021 05:02), Max: 98.9 (15 Aug 2021 13:20)  HR: 96 (16 Aug 2021 05:02) (96 - 116)  BP: 123/87 (16 Aug 2021 05:02) (111/73 - 128/77)  BP(mean): --  RR: 20 (16 Aug 2021 05:02) (20 - 25)  SpO2: 100% (16 Aug 2021 05:02) (94% - 100%)    GENERAL: Nl-appearing female in no acute distress  HEENT: Normocephalic; supple neck, no JVD  CARDIAC: RRR, nl S1 and S2, no m/r/g  PULM: CTAB, no wheezes or crackles, no increased WOB  ABDOMEN: Soft, non-tender but distended, BS+  EXTREMITIES:  2+ peripheral pulses, no clubbing, no edema  NERVOUS SYSTEM:  A&Ox3, no focal deficits  MSK: FROM all 4 extremities  SKIN: No rashes or lesions      LABS:                        10.7   14.09 )-----------( 389      ( 16 Aug 2021 07:41 )             34.1     08-16    137  |  96<L>  |  24<H>  ----------------------------<  119<H>  4.2   |  29  |  0.76    Ca    9.2      16 Aug 2021 07:41  Phos  2.8     08-16  Mg     2.30     08-16    Culture - Body Fluid with Gram Stain (collected 14 Aug 2021 00:46)  Source: .Body Fluid Thoracentesis Fluid  Gram Stain (14 Aug 2021 03:28):    polymorphonuclear leukocytes seen    No organisms seen    by cytocentrifuge  Preliminary Report (14 Aug 2021 20:55):    No growth   PROGRESS NOTE:   Patient is a 66y old  Female who presents with a chief complaint of Worsening volume overload (15 Aug 2021 16:03)    SUBJECTIVE / OVERNIGHT EVENTS:  Per nurse, pt had difficulty breathing o/n requiring oxygen boost from 2L to 3L. Now saturating comfortably at 100%. Per pt, she was not "really feeling out of breath". Pt resting comfortably in bed. Pt endorses mild cough otherwise no complaints.     REVIEW OF SYSTEMS:  CONSTITUTIONAL: No fever, chills, or malaise  HEAD: No HA  EYES: No visual changes or pain   ENT:  No sore throat, hoarseness, or hearing loss  NECK: No pain or stiffness, no swollen glands  RESPIRATORY: +cough, no SOB, wheezing, or hemoptysis; no production of phlegm  CARDIOVASCULAR: No chest pain or palpitations  GASTROINTESTINAL: No abdominal pain. No nausea, vomiting, or hematemesis; No diarrhea or constipation. No melena or hematochezia.  GENITOURINARY: No dysuria, frequency, incontinence or hematuria  NEUROLOGICAL: No numbness or weakness; no incoordination or tremors  MUSCULOSKELETAL: No muscle or joint pain; no swelling of stiffness  SKIN: No itching, rashes      MEDICATIONS  (STANDING):  cefepime   IVPB      cefepime   IVPB 2000 milliGRAM(s) IV Intermittent every 8 hours  enoxaparin Injectable 40 milliGRAM(s) SubCutaneous daily  famotidine    Tablet 20 milliGRAM(s) Oral daily  multivitamin 1 Tablet(s) Oral daily  vancomycin  IVPB      vancomycin  IVPB 1000 milliGRAM(s) IV Intermittent every 12 hours    MEDICATIONS  (PRN):  acetaminophen   Tablet .. 650 milliGRAM(s) Oral every 6 hours PRN Temp greater or equal to 38.5C (101.3F), Mild Pain (1 - 3)  aluminum hydroxide/magnesium hydroxide/simethicone Suspension 30 milliLiter(s) Oral every 4 hours PRN Dyspepsia  melatonin 3 milliGRAM(s) Oral at bedtime PRN Insomnia      I&O's Summary    15 Aug 2021 07:01  -  16 Aug 2021 07:00  --------------------------------------------------------  IN: 472 mL / OUT: 300 mL / NET: 172 mL        PHYSICAL EXAM:  Vital Signs Last 24 Hrs  T(C): 36.8 (16 Aug 2021 05:02), Max: 37.2 (15 Aug 2021 13:20)  T(F): 98.3 (16 Aug 2021 05:02), Max: 98.9 (15 Aug 2021 13:20)  HR: 96 (16 Aug 2021 05:02) (96 - 116)  BP: 123/87 (16 Aug 2021 05:02) (111/73 - 128/77)  BP(mean): --  RR: 20 (16 Aug 2021 05:02) (20 - 25)  SpO2: 100% (16 Aug 2021 05:02) (94% - 100%)    GENERAL: Nl-appearing female in no acute distress  HEENT: Normocephalic; supple neck, no JVD  CARDIAC: RRR, nl S1 and S2, no m/r/g  PULM: CTAB, no wheezes or crackles, no increased WOB  ABDOMEN: non-tender but distended, BS+  EXTREMITIES:  2+ peripheral pulses, no clubbing, no edema  NERVOUS SYSTEM:  A&Ox3, no focal deficits  MSK: FROM all 4 extremities  SKIN: No rashes or lesions      LABS:                        10.7   14.09 )-----------( 389      ( 16 Aug 2021 07:41 )             34.1     08-16    137  |  96<L>  |  24<H>  ----------------------------<  119<H>  4.2   |  29  |  0.76    Ca    9.2      16 Aug 2021 07:41  Phos  2.8     08-16  Mg     2.30     08-16    Culture - Body Fluid with Gram Stain (collected 14 Aug 2021 00:46)  Source: .Body Fluid Thoracentesis Fluid  Gram Stain (14 Aug 2021 03:28):    polymorphonuclear leukocytes seen    No organisms seen    by cytocentrifuge  Preliminary Report (14 Aug 2021 20:55):    No growth   PROGRESS NOTE:   Patient is a 66y old  Female who presents with a chief complaint of Worsening volume overload (15 Aug 2021 16:03)    SUBJECTIVE / OVERNIGHT EVENTS:  Per nurse, pt had difficulty breathing o/n requiring oxygen boost from 2L to 3L NC. Now saturating comfortably at 100%. Per pt, she was not "really feeling out of breath". Pt resting comfortably in bed. Pt endorses mild cough otherwise no complaints.     REVIEW OF SYSTEMS:  CONSTITUTIONAL: No fever, chills, or malaise  HEAD: No HA  EYES: No visual changes or pain   ENT:  No sore throat, hoarseness, or hearing loss  NECK: No pain or stiffness, no swollen glands  RESPIRATORY: +cough, no SOB, wheezing, or hemoptysis; no production of phlegm  CARDIOVASCULAR: No chest pain or palpitations  GASTROINTESTINAL: No abdominal pain. No nausea, vomiting, or hematemesis; No diarrhea or constipation. No melena or hematochezia.  GENITOURINARY: No dysuria, frequency, incontinence or hematuria  NEUROLOGICAL: No numbness or weakness; no incoordination or tremors  MUSCULOSKELETAL: No muscle or joint pain; no swelling of stiffness  SKIN: No itching, rashes      MEDICATIONS  (STANDING):  cefepime   IVPB      cefepime   IVPB 2000 milliGRAM(s) IV Intermittent every 8 hours  enoxaparin Injectable 40 milliGRAM(s) SubCutaneous daily  famotidine    Tablet 20 milliGRAM(s) Oral daily  multivitamin 1 Tablet(s) Oral daily  vancomycin  IVPB      vancomycin  IVPB 1000 milliGRAM(s) IV Intermittent every 12 hours    MEDICATIONS  (PRN):  acetaminophen   Tablet .. 650 milliGRAM(s) Oral every 6 hours PRN Temp greater or equal to 38.5C (101.3F), Mild Pain (1 - 3)  aluminum hydroxide/magnesium hydroxide/simethicone Suspension 30 milliLiter(s) Oral every 4 hours PRN Dyspepsia  melatonin 3 milliGRAM(s) Oral at bedtime PRN Insomnia      I&O's Summary    15 Aug 2021 07:01  -  16 Aug 2021 07:00  --------------------------------------------------------  IN: 472 mL / OUT: 300 mL / NET: 172 mL      PHYSICAL EXAM:  Vital Signs Last 24 Hrs  T(C): 36.8 (16 Aug 2021 05:02), Max: 37.2 (15 Aug 2021 13:20)  T(F): 98.3 (16 Aug 2021 05:02), Max: 98.9 (15 Aug 2021 13:20)  HR: 96 (16 Aug 2021 05:02) (96 - 116)  BP: 123/87 (16 Aug 2021 05:02) (111/73 - 128/77)  BP(mean): --  RR: 20 (16 Aug 2021 05:02) (20 - 25)  SpO2: 100% (16 Aug 2021 05:02) (94% - 100%)    GENERAL: Nl-appearing female in no acute distress  HEENT: Normocephalic; supple neck, no JVD  CARDIAC: RRR, nl S1 and S2, no m/r/g  PULM: CTAB, no wheezes or crackles, no increased WOB  ABDOMEN: non-tender but distended, BS+  EXTREMITIES:  2+ peripheral pulses, no clubbing, no edema  NERVOUS SYSTEM:  A&Ox3, no focal deficits  MSK: FROM all 4 extremities  SKIN: No rashes or lesions      LABS:                        10.7   14.09 )-----------( 389      ( 16 Aug 2021 07:41 )             34.1     08-16    137  |  96<L>  |  24<H>  ----------------------------<  119<H>  4.2   |  29  |  0.76    Ca    9.2      16 Aug 2021 07:41  Phos  2.8     08-16  Mg     2.30     08-16    Culture - Body Fluid with Gram Stain (collected 14 Aug 2021 00:46)  Source: .Body Fluid Thoracentesis Fluid  Gram Stain (14 Aug 2021 03:28):    polymorphonuclear leukocytes seen    No organisms seen    by cytocentrifuge  Preliminary Report (14 Aug 2021 20:55):    No growth

## 2021-08-16 NOTE — CONSULT NOTE ADULT - SUBJECTIVE AND OBJECTIVE BOX
Interventional Radiology    Evaluate for Procedure:     HPI: 66y Female with recently discovered ovarian mass suspicious for malignancy who presented with acute respiratory failure secondary to pleural effusions. Patient found to have significant mediastinal lymphadenopathy on chest CT. IR consulted for lymph node biopsy. Patient previously had thoracentesis which did not reveal any malignant cells.    Allergies: penicillins (Rash)    Medications (Abx/Cardiac/Anticoagulation/Blood Products)    cefepime   IVPB: 100 mL/Hr IV Intermittent (08-14 @ 22:19)  cefepime   IVPB: 100 mL/Hr IV Intermittent (08-16 @ 05:04)  enoxaparin Injectable: 40 milliGRAM(s) SubCutaneous (08-16 @ 11:09)  vancomycin  IVPB: 250 mL/Hr IV Intermittent (08-14 @ 22:49)  vancomycin  IVPB: 250 mL/Hr IV Intermittent (08-16 @ 12:10)    Data:    T(C): 36.9  HR: 105  BP: 120/80  RR: 20  SpO2: 100%    -WBC 14.09 / HgB 10.7 / Hct 34.1 / Plt 389  -Na 137 / Cl 96 / BUN 24 / Glucose 119  -K 4.2 / CO2 29 / Cr 0.76  -ALT -- / Alk Phos -- / T.Bili --  -INR 1.12 / PTT 26.2      Radiology:   Chest CT 8/14/21  IMPRESSION:  Patchy groundglass and consolidative opacities within the right lung may represent multifocal pneumonia.  Small-moderate layering pleural effusions and partial atelectasis of bilateral lower lobes.  Enlarged supraclavicular, mediastinal and internal mammary lymph nodes likely metastatic    Assessment/Plan:   66y Female with recently discovered ovarian mass suspicious for malignancy who presented with acute respiratory failure secondary to pleural effusions. Patient found to have significant mediastinal lymphadenopathy on chest CT. IR consulted for lymph node biopsy.   -- Given significant hilar lymphadenopathy on chest CT, endobronchial biopsy would likely be lower risk than percutaneous biopsy. Would recommend f/u with pulmonology to arrange. Will defer percutaneous mediastinal IR biopsy at this time.  -- If there is a need for percutaneous biopsy of an abdominal lesion, please obtain CT abd/pelvis of the abdomen and recontact IR.

## 2021-08-16 NOTE — PROGRESS NOTE ADULT - PROBLEM SELECTOR PLAN 1
R Lung Multifocal PNA noted on CT chest, meets severe sepsis criteria  -Improving  -currently on Cefepime and Vanc (8/14- )  -f/u MRSA swab, if neg can dc vanc  -rapid clinical improvement s.p thoracentesis, anticipate short course of abx with po levaquin R Lung Multifocal PNA noted on CT chest, meets severe sepsis criteria  -Improving  -continue Cefepime and Vanc for 5 day course (8/14- )  -f/u MRSA swab, if neg can dc vanc  -rapid clinical improvement s.p thoracentesis, anticipate short course of abx with po levaquin R Lung Multifocal PNA noted on CT chest, meets severe sepsis criteria  -Improving  -continue Cefepime and Vanc for 5 day course (8/14 - 8/18)  -f/u MRSA swab, if neg can dc vanc  -rapid clinical improvement s.p thoracentesis, anticipate short course of abx with po levaquin

## 2021-08-16 NOTE — PROGRESS NOTE ADULT - ATTENDING COMMENTS
Work up underway for possible metastatic ovarian cancer.  Patient with recurrent effusion s/p right thoracentesis x 2.  Cytology pending. Discuss with IR if supraclavicular node would be amenable for biopsy, otherwise can consider bronchoscopy with EBUS.

## 2021-08-17 NOTE — PROGRESS NOTE ADULT - SUBJECTIVE AND OBJECTIVE BOX
CHIEF COMPLAINT: sob    Interval Events: Patient seen and examined at bedside. No acute events overnight    REVIEW OF SYSTEMS:  Constitutional: [ ] negative [ ] fevers [ ] chills [ ] weight loss [ ] weight gain  HEENT: [ ] negative [ ] dry eyes [ ] eye irritation [ ] postnasal drip [ ] nasal congestion  CV: [ ] negative  [ ] chest pain [ ] orthopnea [ ] palpitations [ ] murmur  Resp: [ ] negative [ ] cough [ ] shortness of breath [ ] dyspnea [ ] wheezing [ ] sputum [ ] hemoptysis  GI: [ ] negative [ ] nausea [ ] vomiting [ ] diarrhea [ ] constipation [ ] abd pain [ ] dysphagia   : [ ] negative [ ] dysuria [ ] nocturia [ ] hematuria [ ] increased urinary frequency  Musculoskeletal: [ ] negative [ ] back pain [ ] myalgias [ ] arthralgias [ ] fracture  Skin: [ ] negative [ ] rash [ ] itch  Neurological: [ ] negative [ ] headache [ ] dizziness [ ] syncope [ ] weakness [ ] numbness  Psychiatric: [ ] negative [ ] anxiety [ ] depression  Endocrine: [ ] negative [ ] diabetes [ ] thyroid problem  Hematologic/Lymphatic: [ ] negative [ ] anemia [ ] bleeding problem  Allergic/Immunologic: [ ] negative [ ] itchy eyes [ ] nasal discharge [ ] hives [ ] angioedema  [ ] All other systems negative  [ ] Unable to assess ROS because ________    OBJECTIVE:  ICU Vital Signs Last 24 Hrs  T(C): 36.3 (17 Aug 2021 12:36), Max: 36.9 (16 Aug 2021 17:00)  T(F): 97.3 (17 Aug 2021 12:36), Max: 98.4 (16 Aug 2021 17:00)  HR: 98 (17 Aug 2021 12:36) (97 - 112)  BP: 121/79 (17 Aug 2021 12:36) (120/82 - 136/75)  BP(mean): --  ABP: --  ABP(mean): --  RR: 18 (17 Aug 2021 12:36) (15 - 19)  SpO2: 97% (17 Aug 2021 12:36) (92% - 100%)        08-16 @ 07:01  -  08-17 @ 07:00  --------------------------------------------------------  IN: 500 mL / OUT: 150 mL / NET: 350 mL      CAPILLARY BLOOD GLUCOSE          PHYSICAL EXAM:  General:   HEENT:   Lymph Nodes:  Neck:   Respiratory:   Cardiovascular:   Abdomen:   Extremities:   Skin:   Neurological:  Psychiatry:    HOSPITAL MEDICATIONS:  enoxaparin Injectable 40 milliGRAM(s) SubCutaneous daily    cefepime   IVPB      cefepime   IVPB 2000 milliGRAM(s) IV Intermittent every 8 hours  vancomycin  IVPB      vancomycin  IVPB 1000 milliGRAM(s) IV Intermittent every 12 hours          acetaminophen   Tablet .. 650 milliGRAM(s) Oral every 6 hours PRN  melatonin 3 milliGRAM(s) Oral at bedtime PRN    aluminum hydroxide/magnesium hydroxide/simethicone Suspension 30 milliLiter(s) Oral every 4 hours PRN  famotidine    Tablet 20 milliGRAM(s) Oral daily        multivitamin 1 Tablet(s) Oral daily            LABS:                        10.3   16.52 )-----------( 388      ( 17 Aug 2021 07:40 )             33.4     Hgb Trend: 10.3<--, 10.7<--, 10.7<--, 10.8<--, 10.6<--  08-17    132<L>  |  92<L>  |  26<H>  ----------------------------<  109<H>  4.4   |  27  |  0.77    Ca    9.3      17 Aug 2021 07:51  Phos  2.4     08-17  Mg     2.00     08-17    TPro  6.2  /  Alb  2.6<L>  /  TBili  0.4  /  DBili  x   /  AST  28  /  ALT  21  /  AlkPhos  110  08-17    Creatinine Trend: 0.77<--, 0.76<--, 0.72<--, 0.81<--, 0.86<--, 0.99<--        Venous Blood Gas:  08-16 @ 07:41  7.44/50/61/34/92.4  VBG Lactate: 2.2      MICROBIOLOGY:     RADIOLOGY:  [ ] Reviewed and interpreted by me    PULMONARY FUNCTION TESTS:    EKG: CHIEF COMPLAINT: sob    Interval Events: Patient seen and examined at bedside. No acute events overnight    REVIEW OF SYSTEMS:  Constitutional: [X ] negative [ ] fevers [ ] chills [ ] weight loss [ ] weight gain  HEENT: [X ] negative [ ] dry eyes [ ] eye irritation [ ] postnasal drip [ ] nasal congestion  CV: [ X] negative  [ ] chest pain [ ] orthopnea [ ] palpitations [ ] murmur  Resp: [X ] negative [ ] cough [ ] shortness of breath [ ] dyspnea [ ] wheezing [ ] sputum [ ] hemoptysis  GI: [ ] negative [ ] nausea [ ] vomiting [ ] diarrhea [ ] constipation [ ] abd pain [ ] dysphagia  [x] abdominal distension  : [X ] negative [ ] dysuria [ ] nocturia [ ] hematuria [ ] increased urinary frequency  Musculoskeletal: [X ] negative [ ] back pain [ ] myalgias [ ] arthralgias [ ] fracture  Skin: [X ] negative [ ] rash [ ] itch  Neurological: [ ] negative [ ] headache [ ] dizziness [ ] syncope [ ] weakness [ ] numbness  Psychiatric: [ ] negative [ ] anxiety [ ] depression  Endocrine: [ ] negative [ ] diabetes [ ] thyroid problem  Hematologic/Lymphatic: [ ] negative [ ] anemia [ ] bleeding problem  Allergic/Immunologic: [ ] negative [ ] itchy eyes [ ] nasal discharge [ ] hives [ ] angioedema  [ ] All other systems negative  [ ] Unable to assess ROS because ________    OBJECTIVE:  ICU Vital Signs Last 24 Hrs  T(C): 36.3 (17 Aug 2021 12:36), Max: 36.9 (16 Aug 2021 17:00)  T(F): 97.3 (17 Aug 2021 12:36), Max: 98.4 (16 Aug 2021 17:00)  HR: 98 (17 Aug 2021 12:36) (97 - 112)  BP: 121/79 (17 Aug 2021 12:36) (120/82 - 136/75)  BP(mean): --  ABP: --  ABP(mean): --  RR: 18 (17 Aug 2021 12:36) (15 - 19)  SpO2: 97% (17 Aug 2021 12:36) (92% - 100%)        08-16 @ 07:01  -  08-17 @ 07:00  --------------------------------------------------------  IN: 500 mL / OUT: 150 mL / NET: 350 mL      CAPILLARY BLOOD GLUCOSE          PHYSICAL EXAM:  General: thin cachectic female, NAD,   HEENT: EOMI  Lymph Nodes: no palpable LAD in the neck  Respiratory: decreased breath sounds at the bases  Cardiovascular: s1/s2, rrr, no murmurs  Abdomen: soft, nontender, non distended  Extremities: no LE edema  Skin: no rashes noted  Neurological: AxOx3  Psychiatry: normal mood and affect    HOSPITAL MEDICATIONS:  enoxaparin Injectable 40 milliGRAM(s) SubCutaneous daily    cefepime   IVPB      cefepime   IVPB 2000 milliGRAM(s) IV Intermittent every 8 hours  vancomycin  IVPB      vancomycin  IVPB 1000 milliGRAM(s) IV Intermittent every 12 hours          acetaminophen   Tablet .. 650 milliGRAM(s) Oral every 6 hours PRN  melatonin 3 milliGRAM(s) Oral at bedtime PRN    aluminum hydroxide/magnesium hydroxide/simethicone Suspension 30 milliLiter(s) Oral every 4 hours PRN  famotidine    Tablet 20 milliGRAM(s) Oral daily        multivitamin 1 Tablet(s) Oral daily            LABS:                        10.3   16.52 )-----------( 388      ( 17 Aug 2021 07:40 )             33.4     Hgb Trend: 10.3<--, 10.7<--, 10.7<--, 10.8<--, 10.6<--  08-17    132<L>  |  92<L>  |  26<H>  ----------------------------<  109<H>  4.4   |  27  |  0.77    Ca    9.3      17 Aug 2021 07:51  Phos  2.4     08-17  Mg     2.00     08-17    TPro  6.2  /  Alb  2.6<L>  /  TBili  0.4  /  DBili  x   /  AST  28  /  ALT  21  /  AlkPhos  110  08-17    Creatinine Trend: 0.77<--, 0.76<--, 0.72<--, 0.81<--, 0.86<--, 0.99<--        Venous Blood Gas:  08-16 @ 07:41  7.44/50/61/34/92.4  VBG Lactate: 2.2      MICROBIOLOGY:     RADIOLOGY:  [X ] Reviewed and interpreted by me

## 2021-08-17 NOTE — PROGRESS NOTE ADULT - PROBLEM SELECTOR PLAN 3
Pt p/w worsening abdominal distension and SOB x 1 week. Found to have ascites during recent hospitalization and underwent paracentesis on 7/27, cytology showed abnormal lymphocytes but no malignant cells. Likely in the setting of suspected malignant ovarian mass.   CT AP (7/25): Moderate volume of abdominal ascites. Reticulation of the intra-abdominal fat in the left upper quadrant and mid abdomen is likely related to lymphangitic drainage of fluid as opposed to carcinomatosis.  Limited abd US 8/12: Moderate ascites deepest pocket in RUQ  Now much improved on exam  - procedure team: ascites likely too small for paracentesis  - diuresis PRN

## 2021-08-17 NOTE — PROGRESS NOTE ADULT - SUBJECTIVE AND OBJECTIVE BOX
PROGRESS NOTE:   Patient is a 66y old  Female who presents with a chief complaint of Worsening volume overload (16 Aug 2021 17:08)    SUBJECTIVE / OVERNIGHT EVENTS:  NAEO. Pt stable as yesterday w/ no new complaints.       ADDITIONAL REVIEW OF SYSTEMS:    MEDICATIONS  (STANDING):  cefepime   IVPB      cefepime   IVPB 2000 milliGRAM(s) IV Intermittent every 8 hours  enoxaparin Injectable 40 milliGRAM(s) SubCutaneous daily  famotidine    Tablet 20 milliGRAM(s) Oral daily  multivitamin 1 Tablet(s) Oral daily  vancomycin  IVPB      vancomycin  IVPB 1000 milliGRAM(s) IV Intermittent every 12 hours    MEDICATIONS  (PRN):  acetaminophen   Tablet .. 650 milliGRAM(s) Oral every 6 hours PRN Temp greater or equal to 38.5C (101.3F), Mild Pain (1 - 3)  aluminum hydroxide/magnesium hydroxide/simethicone Suspension 30 milliLiter(s) Oral every 4 hours PRN Dyspepsia  melatonin 3 milliGRAM(s) Oral at bedtime PRN Insomnia      CAPILLARY BLOOD GLUCOSE        I&O's Summary    16 Aug 2021 07:01  -  17 Aug 2021 07:00  --------------------------------------------------------  IN: 500 mL / OUT: 150 mL / NET: 350 mL        PHYSICAL EXAM:  Vital Signs Last 24 Hrs  T(C): 36.4 (17 Aug 2021 05:00), Max: 36.9 (16 Aug 2021 17:00)  T(F): 97.6 (17 Aug 2021 05:00), Max: 98.4 (16 Aug 2021 17:00)  HR: 100 (17 Aug 2021 07:53) (99 - 112)  BP: 134/99 (17 Aug 2021 05:00) (116/78 - 136/75)  BP(mean): --  RR: 17 (17 Aug 2021 05:00) (17 - 20)  SpO2: 99% (17 Aug 2021 07:53) (98% - 100%)    CONSTITUTIONAL: NAD, well-developed  RESPIRATORY: Normal respiratory effort; lungs are clear to auscultation bilaterally  CARDIOVASCULAR: Regular rate and rhythm, normal S1 and S2, no murmur/rub/gallop; No lower extremity edema; Peripheral pulses are 2+ bilaterally  ABDOMEN: Nontender to palpation, normoactive bowel sounds, no rebound/guarding; No hepatosplenomegaly  MUSCLOSKELETAL: no clubbing or cyanosis of digits; no joint swelling or tenderness to palpation  NEURO: A&Ox3,   PSYCH: affect appropriate; no anxiety or depression    LABS:                        10.3   16.52 )-----------( 388      ( 17 Aug 2021 07:40 )             33.4     08-17    132<L>  |  92<L>  |  26<H>  ----------------------------<  109<H>  4.4   |  27  |  0.77    Ca    9.3      17 Aug 2021 07:51  Phos  2.4     08-17  Mg     2.00     08-17    TPro  6.2  /  Alb  2.6<L>  /  TBili  0.4  /  DBili  x   /  AST  28  /  ALT  21  /  AlkPhos  110  08-17              Culture - Nose (collected 15 Aug 2021 14:42)  Source: .Nose  Preliminary Report (16 Aug 2021 16:04):    No Staphylococcus aureus isolated to date        RADIOLOGY & ADDITIONAL TESTS:  Results Reviewed:   Imaging Personally Reviewed:  Electrocardiogram Personally Reviewed:    COORDINATION OF CARE:  Care Discussed with Consultants/Other Providers [Y/N]:  Prior or Outpatient Records Reviewed [Y/N]:   PROGRESS NOTE:   Patient is a 66y old  Female who presents with a chief complaint of Worsening volume overload (16 Aug 2021 17:08)    SUBJECTIVE / OVERNIGHT EVENTS:  NAEO. Pt stable as yesterday w/ no new complaints.       REVIEW OF SYSTEMS:  CONSTITUTIONAL: No fever, chills, or malaise  HEAD: No HA  EYES: No visual changes or pain   ENT:  No sore throat, hoarseness, or hearing loss  NECK: No pain or stiffness, no swollen glands  RESPIRATORY: +cough, no SOB, wheezing, or hemoptysis; no production of phlegm  CARDIOVASCULAR: No chest pain or palpitations  GASTROINTESTINAL: No abdominal pain. No nausea, vomiting, or hematemesis; No diarrhea or constipation. No melena or hematochezia.  GENITOURINARY: No dysuria, frequency, incontinence or hematuria  NEUROLOGICAL: No numbness or weakness; no incoordination or tremors  MUSCULOSKELETAL: No muscle or joint pain; no swelling of stiffness  SKIN: No itching, rashes      MEDICATIONS  (STANDING):  cefepime   IVPB      cefepime   IVPB 2000 milliGRAM(s) IV Intermittent every 8 hours  enoxaparin Injectable 40 milliGRAM(s) SubCutaneous daily  famotidine    Tablet 20 milliGRAM(s) Oral daily  multivitamin 1 Tablet(s) Oral daily  vancomycin  IVPB      vancomycin  IVPB 1000 milliGRAM(s) IV Intermittent every 12 hours    MEDICATIONS  (PRN):  acetaminophen   Tablet .. 650 milliGRAM(s) Oral every 6 hours PRN Temp greater or equal to 38.5C (101.3F), Mild Pain (1 - 3)  aluminum hydroxide/magnesium hydroxide/simethicone Suspension 30 milliLiter(s) Oral every 4 hours PRN Dyspepsia  melatonin 3 milliGRAM(s) Oral at bedtime PRN Insomnia      I&O's Summary    16 Aug 2021 07:01  -  17 Aug 2021 07:00  --------------------------------------------------------  IN: 500 mL / OUT: 150 mL / NET: 350 mL      PHYSICAL EXAM:  Vital Signs Last 24 Hrs  T(C): 36.4 (17 Aug 2021 05:00), Max: 36.9 (16 Aug 2021 17:00)  T(F): 97.6 (17 Aug 2021 05:00), Max: 98.4 (16 Aug 2021 17:00)  HR: 100 (17 Aug 2021 07:53) (99 - 112)  BP: 134/99 (17 Aug 2021 05:00) (116/78 - 136/75)  BP(mean): --  RR: 17 (17 Aug 2021 05:00) (17 - 20)  SpO2: 99% (17 Aug 2021 07:53) (98% - 100%)    GENERAL: Nl-appearing female in no acute distress  HEENT: Normocephalic; supple neck, no JVD  CARDIAC: RRR, nl S1 and S2, no m/r/g  PULM: CTAB, no wheezes or crackles, no increased WOB  ABDOMEN: non-tender but distended, BS+  EXTREMITIES:  2+ peripheral pulses, no clubbing, no edema  NERVOUS SYSTEM:  A&Ox3, no focal deficits  MSK: FROM all 4 extremities  SKIN: No rashes or lesions      LABS:                        10.3   16.52 )-----------( 388      ( 17 Aug 2021 07:40 )             33.4     08-17    132<L>  |  92<L>  |  26<H>  ----------------------------<  109<H>  4.4   |  27  |  0.77    Ca    9.3      17 Aug 2021 07:51  Phos  2.4     08-17  Mg     2.00     08-17    TPro  6.2  /  Alb  2.6<L>  /  TBili  0.4  /  DBili  x   /  AST  28  /  ALT  21  /  AlkPhos  110  08-17      Culture - Nose (collected 15 Aug 2021 14:42)  Source: .Nose  Preliminary Report (16 Aug 2021 16:04):    No Staphylococcus aureus isolated to date

## 2021-08-17 NOTE — PROGRESS NOTE ADULT - PROBLEM SELECTOR PLAN 1
R Lung Multifocal PNA noted on CT chest, meets severe sepsis criteria  -Improving  -continue Cefepime and Vanc for 5 day course (8/14 - 8/18)  -f/u MRSA swab, if neg can dc vanc  -rapid clinical improvement s.p thoracentesis, anticipate short course of abx with po levaquin

## 2021-08-17 NOTE — PROGRESS NOTE ADULT - PROBLEM SELECTOR PLAN 2
Pt initially presented with progressively worsening SOB x 1 week since recent discharge. Pt satting 94-96% on 3L NC, with increased WOB. Placed on BiPAP with improvement in WOB for ~28h.   Presented with Acute Hypercarbic Resp Failure, now much improved  Now s/p thoracentesis removing 1.2L, patient satting 97-99% on 2L NC  Sat 94% on RA on trial on 8/14  Patient sent on 2L home oxygen since last admission.  CXR showed moderate right-sided and small left-sided pleural effusions. Possibly exacerbated by ascites.  LE Doppler negative for DVT (patient had possible poor reaction to contrast CT in past)  - Monitor without lasix, can add if necessary  - Pulm onboard, recs appreciated.   - F/u thoracentesis studies  - frequent pulse ox monitoring    -ambulatory O2 assessment (while ambulating 92%; sitting/resting 96%) Pt initially presented with progressively worsening SOB x 1 week since recent discharge. Pt satting 94-96% on 3L NC, with increased WOB. Placed on BiPAP with improvement in WOB for ~28h.   Presented with Acute Hypercarbic Resp Failure, now much improved  Patient sent on 2L home oxygen since last admission.  CXR showed moderate right-sided and small left-sided pleural effusions. Possibly exacerbated by ascites.  - ambulatory O2 assessment (while ambulating 92%; sitting/resting 96%)  - Monitor without lasix, can add if necessary  - Pulm onboard, recs appreciated.   - F/u thoracentesis studies  - frequent pulse ox monitoring

## 2021-08-17 NOTE — PROGRESS NOTE ADULT - PROBLEM SELECTOR PLAN 7
Pt found to have ovarian mass on CT Abd/Pelvis during recently hospitalization at [facility] in early July 2021. After discharge, patient was meant to follow up with Gyn-Onc Dr. Tutu Lenz ([phone]) at [facility]. She has an upcoming appointment on 8/13.   Thoracentesis fluid studies with many cells, Large, abnormal immature looking mononuclear cells, many with cauliflower. Many RBCs nuclei (few in clusters).   - GYN-Onc; recs appreciated    =GYN-Onc recommends IR bx to confirm dx; currently not a surgical candidate.   - Radiology addendum to read of CTAP shows Mild peritoneal thickening and nodularity.  Bilateral adnexal soft tissue prominence. Mild left hydronephrosis.  - Documentation from recent hospitalization showed CA-125 elevated to 252.  <2 and CEA <1 per records from [place]. C/f malignancy.   - Pt will require outpt follow up with Gyn-onc.  - IR consulted for bx Pt found to have ovarian mass on CT Abd/Pelvis during recently hospitalization at [facility] in early July 2021. Thoracentesis fluid studies with many cells, Large, abnormal immature looking mononuclear cells, many with cauliflower. Many RBCs nuclei (few in clusters).   - GYN-Onc; recs appreciated    =GYN-Onc recommends IR bx to confirm dx; currently not a surgical candidate.   - IR consult; recs appreciated    =Given significant hilar lymphadenopathy on chest CT, endobronchial biopsy lower risk than percutaneous biopsy. Would recommend f/u with pulmonology to arrange. Will defer percutaneous mediastinal IR biopsy at this time.  - Pulm consult; recs appreciated    =discuss with IR to obtain biopsy of supraclavicular lymph nodes; if unable to obtain sample, can consider EBUS for mediastinal LAD biopsy  - Awaiting path results

## 2021-08-17 NOTE — PROGRESS NOTE ADULT - ASSESSMENT
66F with PMH significant for HTN, HLD, recently discovered ovarian mass suspicious for malignancy, L hydroureteronephrosis s/p renal stent, and recent hospitalization to [facility] for acute renal failure (likely obstructive) requiring urgent HD, ascites s/p therapeutic paracentesis, and pleural effusion s/p R thoracentesis showing lymphocytosis but no malignant cells admitted for acute hypoxic respiratory failure with imaging showing bilateral pleural effusion.    #Acute hypoxic respiratory failure  - Pt p/w increasing SOB and WOB, with tachycardia and elevated WBC--> s/p right thora with improvement in SOB  - Pt refused CT PE because she had a bad reaction to contrast the last time   - CT non con showing supraclavicular and mediastinal LAD    Recommendation:   - s/p thoracentesis with 1.2 L fluid removal on the right side-->  on bedside US today, reaccumulation of right pleural fluid  - will discuss with patient recurrent thora vs pleurx placement  - gram stain and cx negative; cytopathology showing atypical looking lymphoid cells no malignancy noted  - pulmonary witll discuss with IR to obtain biopsy of supraclavicular lymph nodes--> if unable to obtain sample, can consider EBUS for mediastinal LAD biopsy  Discussed with Dr. Kiser

## 2021-08-17 NOTE — PROGRESS NOTE ADULT - ATTENDING COMMENTS
66F HTN, Ovarian CA - no Bx - c/b ascites, Lt hydronephrosis s/p stent 7/2021, recent PURVI on CKD s/p HD, pleural effusion s/p thoracentesis p/w acute respiratory failure with hypercarbia due to pleural effusion due to fluid overload. Recent TTE - nml LVFx. Initially requiring BiPAP for respiration. s/p therapeutic thoracentesis on 8/13 and pt weaned to NC. CT chest showing multifocal PNA- started Vanc/cefepime.  Ascites - trace amount on POCUS. Gyn onc consulted for ovarian mass - needs tissue diagnosis. IR consulted for biopsy however they recommend pulm for EBUS. At this time awaiting cytology results from thora prior to pursuing EBUS.     Plan discussed with patient and HS

## 2021-08-17 NOTE — PROGRESS NOTE ADULT - ATTENDING COMMENTS
Work up underway for possible metastatic ovarian cancer.  Patient with recurrent effusion s/p right thoracentesis x 2.  Cytology shows atypical lymphoid aggregates but nondiagnostic.   POCUS shows reaccumulation of right sided effusion, now moderate in size. Left effusion is also enlarging, also moderate.  Currently patient is comfortable on room air, no need for thoracentesis at this time.  Discuss with IR if supraclavicular node would be amenable for biopsy, otherwise can consider bronchoscopy with EBUS +/- PleurX

## 2021-08-18 NOTE — PROGRESS NOTE ADULT - PROBLEM SELECTOR PLAN 3
Pt initially presented with progressively worsening SOB x 1 week since recent discharge. Pt satting 94-96% on 3L NC, with increased WOB. Placed on BiPAP with improvement in WOB for ~28h.   Presented with Acute Hypercarbic Resp Failure, now much improved  Patient sent on 2L home oxygen since last admission.  CXR showed moderate right-sided and small left-sided pleural effusions. Possibly exacerbated by ascites.  - ambulatory O2 assessment (while ambulating 92%; sitting/resting 96%)  - Monitor without lasix, can add if necessary  - Pulm onboard, recs appreciated.   - F/u thoracentesis studies  - frequent pulse ox monitoring

## 2021-08-18 NOTE — PROGRESS NOTE ADULT - ASSESSMENT
66-year-old female with PMH significant for HTN, HLD, recently discovered ovarian mass suspicious for malignancy (7/2021), L hydroureteronephrosis s/p renal stent (7/15/21), and recent hospitalization ([facility] 7/26-8/4) for acute renal failure (likely obstructive) requiring urgent HD, ascites s/p therapeutic paracentesis (7/27/21), and pleural effusion s/p R thoracentesis (8/2/21) showing lymphocytosis but no malignant cells admitted for acute hypercarbic respiratory failure due to pleural effusions most likely caused by ovarian malignancy a/w volume overload with ascites and b/l LE edema. 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 Pt to be urgently scanned and consult w/ surg onc.

## 2021-08-18 NOTE — PROGRESS NOTE ADULT - SUBJECTIVE AND OBJECTIVE BOX
CHIEF COMPLAINT: sob    Interval Events: Patient seen and examined at bedside. Patient reports she feels the same as yesterday, no better no worse. She is anxious about the upcoming procedures- EBUS and pleurx placement.     REVIEW OF SYSTEMS:  Constitutional: [ X] negative [ ] fevers [ ] chills [ ] weight loss [ ] weight gain  HEENT: [X ] negative [ ] dry eyes [ ] eye irritation [ ] postnasal drip [ ] nasal congestion  CV: [X ] negative  [ ] chest pain [ ] orthopnea [ ] palpitations [ ] murmur  Resp: [ X] negative [ ] cough [ ] shortness of breath [ ] dyspnea [ ] wheezing [ ] sputum [ ] hemoptysis  GI: [ X] negative [ ] nausea [ ] vomiting [ ] diarrhea [ ] constipation [ ] abd pain [ ] dysphagia   : [X ] negative [ ] dysuria [ ] nocturia [ ] hematuria [ ] increased urinary frequency  Musculoskeletal: [ X] negative [ ] back pain [ ] myalgias [ ] arthralgias [ ] fracture  Skin: [ ] negative [ ] rash [ ] itch  Neurological: [ ] negative [ ] headache [ ] dizziness [ ] syncope [ ] weakness [ ] numbness  Psychiatric: [ ] negative [ ] anxiety [ ] depression  Endocrine: [ ] negative [ ] diabetes [ ] thyroid problem  Hematologic/Lymphatic: [ ] negative [ ] anemia [ ] bleeding problem  Allergic/Immunologic: [ ] negative [ ] itchy eyes [ ] nasal discharge [ ] hives [ ] angioedema  [ ] All other systems negative  [ ] Unable to assess ROS because ________    OBJECTIVE:  ICU Vital Signs Last 24 Hrs  T(C): 37 (18 Aug 2021 06:33), Max: 37 (18 Aug 2021 06:33)  T(F): 98.6 (18 Aug 2021 06:33), Max: 98.6 (18 Aug 2021 06:33)  HR: 94 (18 Aug 2021 06:33) (94 - 102)  BP: 142/89 (18 Aug 2021 06:33) (121/79 - 142/89)  BP(mean): --  ABP: --  ABP(mean): --  RR: 17 (18 Aug 2021 06:33) (17 - 18)  SpO2: 97% (18 Aug 2021 10:42) (92% - 98%)        08-17 @ 07:01  -  08-18 @ 07:00  --------------------------------------------------------  IN: 854 mL / OUT: 900 mL / NET: -46 mL      CAPILLARY BLOOD GLUCOSE          PHYSICAL EXAM:  General: thin cachectic female, NAD, sitting in chair, eating breafast, off O2  HEENT: no scleral icterus  Lymph Nodes: no palpable LAD in the neck  Respiratory: decreased breath sounds at the bases  Cardiovascular: s1/s2, rrr, no murmurs  Abdomen: soft, nontender, non distended  Extremities: no LE edema  Skin: no rashes noted  Neurological: AxOx3  Psychiatry: normal mood and affect      HOSPITAL MEDICATIONS:  enoxaparin Injectable 40 milliGRAM(s) SubCutaneous daily    cefepime   IVPB      cefepime   IVPB 2000 milliGRAM(s) IV Intermittent every 8 hours          acetaminophen   Tablet .. 650 milliGRAM(s) Oral every 6 hours PRN  melatonin 3 milliGRAM(s) Oral at bedtime PRN    aluminum hydroxide/magnesium hydroxide/simethicone Suspension 30 milliLiter(s) Oral every 4 hours PRN  famotidine    Tablet 20 milliGRAM(s) Oral daily        multivitamin 1 Tablet(s) Oral daily  potassium phosphate / sodium phosphate Powder (PHOS-NaK) 1 Packet(s) Oral every 6 hours            LABS:                        10.9   17.60 )-----------( 420      ( 18 Aug 2021 07:20 )             33.8     Hgb Trend: 10.9<--, 10.3<--, 10.7<--, 10.7<--, 10.8<--  08-18    131<L>  |  92<L>  |  26<H>  ----------------------------<  112<H>  4.8   |  21<L>  |  0.99    Ca    9.5      18 Aug 2021 07:20  Phos  2.4     08-18  Mg     2.10     08-18    TPro  6.2  /  Alb  2.6<L>  /  TBili  0.4  /  DBili  x   /  AST  28  /  ALT  21  /  AlkPhos  110  08-17    Creatinine Trend: 0.99<--, 0.77<--, 0.76<--, 0.72<--, 0.81<--, 0.86<--

## 2021-08-18 NOTE — CONSULT NOTE ADULT - ATTENDING COMMENTS
Pt requiring lymph node biopsy for further tumor characterization  NPO/IVF, pre op  Supraclavicular node not very prominent but will asses for easily accessible nodes however pt my need endobronchial biopsy.     Anand Farr MD  Acute and Critical Care Surgery    The Acute Care Surgery (B Team) Attending Group Practice:  Dr. Elke Thorne, Dr. Alvaro Busby, Dr. Anand Farr, and Dr. Alfonso Lenz    Urgent issues - spectra [phone] or [id number]  Nonurgent issues - [phone]  Patient appointments or after hours - [phone]

## 2021-08-18 NOTE — PROGRESS NOTE ADULT - SUBJECTIVE AND OBJECTIVE BOX
PROGRESS NOTE:   Patient is a 66y old  Female who presents with a chief complaint of Worsening volume overload (17 Aug 2021 13:20)      SUBJECTIVE / OVERNIGHT EVENTS:    ADDITIONAL REVIEW OF SYSTEMS:    MEDICATIONS  (STANDING):  cefepime   IVPB      cefepime   IVPB 2000 milliGRAM(s) IV Intermittent every 8 hours  enoxaparin Injectable 40 milliGRAM(s) SubCutaneous daily  famotidine    Tablet 20 milliGRAM(s) Oral daily  multivitamin 1 Tablet(s) Oral daily    MEDICATIONS  (PRN):  acetaminophen   Tablet .. 650 milliGRAM(s) Oral every 6 hours PRN Temp greater or equal to 38.5C (101.3F), Mild Pain (1 - 3)  aluminum hydroxide/magnesium hydroxide/simethicone Suspension 30 milliLiter(s) Oral every 4 hours PRN Dyspepsia  melatonin 3 milliGRAM(s) Oral at bedtime PRN Insomnia      CAPILLARY BLOOD GLUCOSE        I&O's Summary    17 Aug 2021 07:01  -  18 Aug 2021 07:00  --------------------------------------------------------  IN: 854 mL / OUT: 900 mL / NET: -46 mL        PHYSICAL EXAM:  Vital Signs Last 24 Hrs  T(C): 37 (18 Aug 2021 06:33), Max: 37 (18 Aug 2021 06:33)  T(F): 98.6 (18 Aug 2021 06:33), Max: 98.6 (18 Aug 2021 06:33)  HR: 94 (18 Aug 2021 06:33) (94 - 102)  BP: 142/89 (18 Aug 2021 06:33) (121/79 - 142/89)  BP(mean): --  RR: 17 (18 Aug 2021 06:33) (17 - 18)  SpO2: 97% (18 Aug 2021 06:59) (92% - 98%)    CONSTITUTIONAL: NAD, well-developed  RESPIRATORY: Normal respiratory effort; lungs are clear to auscultation bilaterally  CARDIOVASCULAR: Regular rate and rhythm, normal S1 and S2, no murmur/rub/gallop; No lower extremity edema; Peripheral pulses are 2+ bilaterally  ABDOMEN: Nontender to palpation, normoactive bowel sounds, no rebound/guarding; No hepatosplenomegaly  MUSCLOSKELETAL: no clubbing or cyanosis of digits; no joint swelling or tenderness to palpation  NEURO: A&Ox3,   PSYCH: affect appropriate; no anxiety or depression    LABS:                        10.9   17.60 )-----------( 420      ( 18 Aug 2021 07:20 )             33.8     08-18    131<L>  |  92<L>  |  26<H>  ----------------------------<  112<H>  4.8   |  21<L>  |  0.99    Ca    9.5      18 Aug 2021 07:20  Phos  2.4     08-18  Mg     2.10     08-18    TPro  6.2  /  Alb  2.6<L>  /  TBili  0.4  /  DBili  x   /  AST  28  /  ALT  21  /  AlkPhos  110  08-17                RADIOLOGY & ADDITIONAL TESTS:  Results Reviewed:   Imaging Personally Reviewed:  Electrocardiogram Personally Reviewed:    COORDINATION OF CARE:  Care Discussed with Consultants/Other Providers [Y/N]:  Prior or Outpatient Records Reviewed [Y/N]:   PROGRESS NOTE:   Patient is a 66y old  Female who presents with a chief complaint of Worsening volume overload (17 Aug 2021 13:20)    SUBJECTIVE / OVERNIGHT EVENTS:  NAEO. Pt stable w/ no complaints.     REVIEW OF SYSTEMS:  CONSTITUTIONAL: No fever, chills, or malaise  HEAD: No HA  EYES: No visual changes or pain   ENT:  No sore throat, hoarseness, or hearing loss  NECK: No pain or stiffness, no swollen glands  RESPIRATORY: +cough, no SOB, wheezing, or hemoptysis; no production of phlegm  CARDIOVASCULAR: No chest pain or palpitations  GASTROINTESTINAL: No abdominal pain. No nausea, vomiting, or hematemesis; No diarrhea or constipation. No melena or hematochezia.  GENITOURINARY: No dysuria, frequency, incontinence or hematuria  NEUROLOGICAL: No numbness or weakness; no incoordination or tremors  MUSCULOSKELETAL: No muscle or joint pain; no swelling of stiffness  SKIN: No itching, rashes    MEDICATIONS  (STANDING):  cefepime   IVPB      cefepime   IVPB 2000 milliGRAM(s) IV Intermittent every 8 hours  enoxaparin Injectable 40 milliGRAM(s) SubCutaneous daily  famotidine    Tablet 20 milliGRAM(s) Oral daily  multivitamin 1 Tablet(s) Oral daily    MEDICATIONS  (PRN):  acetaminophen   Tablet .. 650 milliGRAM(s) Oral every 6 hours PRN Temp greater or equal to 38.5C (101.3F), Mild Pain (1 - 3)  aluminum hydroxide/magnesium hydroxide/simethicone Suspension 30 milliLiter(s) Oral every 4 hours PRN Dyspepsia  melatonin 3 milliGRAM(s) Oral at bedtime PRN Insomnia      I&O's Summary    17 Aug 2021 07:01  -  18 Aug 2021 07:00  --------------------------------------------------------  IN: 854 mL / OUT: 900 mL / NET: -46 mL      PHYSICAL EXAM:  Vital Signs Last 24 Hrs  T(C): 37 (18 Aug 2021 06:33), Max: 37 (18 Aug 2021 06:33)  T(F): 98.6 (18 Aug 2021 06:33), Max: 98.6 (18 Aug 2021 06:33)  HR: 94 (18 Aug 2021 06:33) (94 - 102)  BP: 142/89 (18 Aug 2021 06:33) (121/79 - 142/89)  BP(mean): --  RR: 17 (18 Aug 2021 06:33) (17 - 18)  SpO2: 97% (18 Aug 2021 06:59) (92% - 98%)    GENERAL: Nl-appearing female in no acute distress  HEENT: Normocephalic; supple neck, no JVD  CARDIAC: RRR, nl S1 and S2, no m/r/g  PULM: CTAB, no wheezes or crackles, no increased WOB  ABDOMEN: non-tender but distended, BS+  EXTREMITIES:  2+ peripheral pulses, no clubbing, no edema  NERVOUS SYSTEM:  A&Ox3, no focal deficits  MSK: FROM all 4 extremities  SKIN: No rashes or lesions      LABS:                        10.9   17.60 )-----------( 420      ( 18 Aug 2021 07:20 )             33.8     08-18    131<L>  |  92<L>  |  26<H>  ----------------------------<  112<H>  4.8   |  21<L>  |  0.99    Ca    9.5      18 Aug 2021 07:20  Phos  2.4     08-18  Mg     2.10     08-18    TPro  6.2  /  Alb  2.6<L>  /  TBili  0.4  /  DBili  x   /  AST  28  /  ALT  21  /  AlkPhos  110  08-17

## 2021-08-18 NOTE — CONSULT NOTE ADULT - ATTENDING COMMENTS
Patient seen and examined, chart reviewed, images reviewed. Discussed with hematopathologist, dr Blue for clarification. Needs CT with contrast, including neck, needs core biopsy of LN or excisional biopsy best. Needs CT to assess supraclav nodes. There is a Left inguinal probable pathologic node, but the yield is not optimal in groin, as many may return as reactive.  I agree with Dr Pardo's assessment and plan

## 2021-08-18 NOTE — PROGRESS NOTE ADULT - ATTENDING COMMENTS
Pleural fluid from initial thoracentesis returned has high grade B cell lymphoma.  Discussed with oncology - would prefer excisional biopsy for further characterization of lymph node to help to guide therapy.  Recommend surgical-onc consult.  Clinically stable from a pulmonary perspective.   Will hold off on pleurx at this time as lymphoma can be chemo-responsive and effusions may resolve with initiation of therapy.

## 2021-08-18 NOTE — CONSULT NOTE ADULT - SUBJECTIVE AND OBJECTIVE BOX
HPI: 67 YO F PMH of HTN, HLD and recent hospitalization for asciated and b/l pleural effusions, L hydroureteronephrosis s/p renal stent (7/15) presenting with high grade B Cell lymphoma. Ascitic fluid was tapped earlier this week, by gyn onc, and cytology came back + for lymphoma. Surgery consulted for lymph node biopsy for tissue diagnosis.         PAST MEDICAL & SURGICAL HISTORY:  Hypertension    Ovarian mass    Hypercholesteremia    S/P ureteral stent placement        MEDICATIONS  (STANDING):  cefepime   IVPB      cefepime   IVPB 2000 milliGRAM(s) IV Intermittent every 8 hours  enoxaparin Injectable 40 milliGRAM(s) SubCutaneous daily  famotidine    Tablet 20 milliGRAM(s) Oral daily  multivitamin 1 Tablet(s) Oral daily  potassium phosphate / sodium phosphate Powder (PHOS-NaK) 1 Packet(s) Oral every 6 hours  sodium chloride 0.9% Bolus 250 milliLiter(s) IV Bolus once    MEDICATIONS  (PRN):  acetaminophen   Tablet .. 650 milliGRAM(s) Oral every 6 hours PRN Temp greater or equal to 38.5C (101.3F), Mild Pain (1 - 3)  aluminum hydroxide/magnesium hydroxide/simethicone Suspension 30 milliLiter(s) Oral every 4 hours PRN Dyspepsia  melatonin 3 milliGRAM(s) Oral at bedtime PRN Insomnia      Allergies    penicillins (Rash)    Intolerances        SOCIAL HISTORY:    FAMILY HISTORY:  FHx: hypertension (Mother)    FH: diabetes mellitus (Mother)      Physical Exam:  General: NAD, resting comfortably  Pulmonary: normal resp effort, CTA-B  Cardiovascular: NSR, no murmurs  Abdominal: distended, nontender to palpation, no organomegaly  Extremities: WWP, normal strength, no clubbing/cyanosis/edema  Neuro: A/O x 3, CNs II-XII grossly intact, normal sensation, no focal deficits      Vital Signs Last 24 Hrs  T(C): 36.7 (18 Aug 2021 12:42), Max: 37 (18 Aug 2021 06:33)  T(F): 98 (18 Aug 2021 12:42), Max: 98.6 (18 Aug 2021 06:33)  HR: 115 (18 Aug 2021 12:42) (94 - 115)  BP: 141/83 (18 Aug 2021 12:42) (139/92 - 142/89)  BP(mean): --  RR: 20 (18 Aug 2021 12:42) (17 - 20)  SpO2: 97% (18 Aug 2021 16:01) (96% - 99%)    I&O's Summary    17 Aug 2021 07:01  -  18 Aug 2021 07:00  --------------------------------------------------------  IN: 854 mL / OUT: 900 mL / NET: -46 mL            LABS:                        10.9   17.60 )-----------( 420      ( 18 Aug 2021 07:20 )             33.8     08-18    131<L>  |  92<L>  |  26<H>  ----------------------------<  112<H>  4.8   |  21<L>  |  0.99    Ca    9.5      18 Aug 2021 07:20  Phos  2.4     08-18  Mg     2.10     08-18    TPro  6.2  /  Alb  2.6<L>  /  TBili  0.4  /  DBili  x   /  AST  28  /  ALT  21  /  AlkPhos  110  08-17        CAPILLARY BLOOD GLUCOSE        LIVER FUNCTIONS - ( 17 Aug 2021 07:51 )  Alb: 2.6 g/dL / Pro: 6.2 g/dL / ALK PHOS: 110 U/L / ALT: 21 U/L / AST: 28 U/L / GGT: x             Cultures:      RADIOLOGY & ADDITIONAL STUDIES:    < from: CT Chest No Cont (08.14.21 @ 16:38) >  FINDINGS:    AIRWAYS, LUNGS and PLEURA: Patent central airways. Patchy groundglass and consolidative opacities within the right lung may represent multifocal pneumonia. Small-moderate layering pleural effusions and partial atelectasis of bilateral lower lobes.    MEDIASTINUM AND JOSE: Enlarged supraclavicular, mediastinal and internal mammary lymph nodes likely metastatic for example 1.4 cm short axis right lower paratracheal lymph node and bilateral enlarged internal mammary lymph nodes.    HEART AND VESSELS: Heart size is normal. No pericardial effusion. Thoracic aorta and pulmonary artery normal in diameter.    VISUALIZED UPPER ABDOMEN: Ascites. Peritoneal carcinomatosis. Air within the left collecting system.    CHEST WALL AND BONES: No aggressive osseous lesion.    LOWER NECK: Within normal limits.    IMPRESSION:    Patchy groundglass and consolidative opacities within the right lung may represent multifocal pneumonia.    Small-moderate layering pleural effusions and partial atelectasis of bilateral lower lobes.    Enlarged supraclavicular, mediastinal and internal mammary lymph nodes likely metastatic    --- End of Report ---    < end of copied text >    < from: CT Abdomen and Pelvis No Cont (07.25.21 @ 19:55) >    PROCEDURE:  CT of the Abdomen and Pelvis was performed.  Sagittal and coronal reformats were performed.    FINDINGS:  LOWER CHEST: Bilateral mild pleural effusions, right greater than left.    Evaluation of the solid visceral organs and vasculatureis limited without the administration of intravenous contrast.    LIVER: Within normal limits.  BILE DUCTS: Normal caliber.  GALLBLADDER: Within normal limits.  SPLEEN: Within normal limits.  PANCREAS: Within normal limits.  ADRENALS: Within normal limits.  KIDNEYS/URETERS: Left ureteral stent. No hydronephrosis or obstructing stone.      BLADDER: Decompressed with Charles present.  REPRODUCTIVE ORGANS: Uterus and adnexa within normal limits.    BOWEL: The stomach is incompletely distended. Mildly thickened loops of small bowel in the midabdomen suggest nonspecific enteritis. No bowel obstruction. Appendix is normal.Diverticulosis.  PERITONEUM: Moderate volume of abdominal ascites. Reticulation of the intra-abdominal fat in the left upper quadrant and mid abdomen is likely related to lymphangitic drainage of fluid as opposed to carcinomatosis.  VESSELS: Within normal limits.  RETROPERITONEUM/LYMPH NODES: No lymphadenopathy.  ABDOMINAL WALL: Within normal limits.  BONES: Degenerative changes.    IMPRESSION:    Mild nonspecific enteritis involving small bowel loops in the central abdomen.    Moderate volume of abdominopelvic ascites.  Bilateral pleural effusions, right greater than left.    < end of copied text >          Plan: 67 YO F with recent dx of B Cell lymphoma. Surgery consulted for lymph node biopsy    - to be discussed with attending  - appreciate heme/onc recommendations    B Team Surgery, [id number]           HPI: 67 YO F PMH of HTN, HLD and recent hospitalization for asciated and b/l pleural effusions, L hydroureteronephrosis s/p renal stent (7/15) presenting with high grade B Cell lymphoma. Ascitic fluid was tapped earlier this week, by gyn onc, and cytology came back + for lymphoma. Surgery consulted for lymph node biopsy for tissue diagnosis.         PAST MEDICAL & SURGICAL HISTORY:  Hypertension    Ovarian mass    Hypercholesteremia    S/P ureteral stent placement        MEDICATIONS  (STANDING):  cefepime   IVPB      cefepime   IVPB 2000 milliGRAM(s) IV Intermittent every 8 hours  enoxaparin Injectable 40 milliGRAM(s) SubCutaneous daily  famotidine    Tablet 20 milliGRAM(s) Oral daily  multivitamin 1 Tablet(s) Oral daily  potassium phosphate / sodium phosphate Powder (PHOS-NaK) 1 Packet(s) Oral every 6 hours  sodium chloride 0.9% Bolus 250 milliLiter(s) IV Bolus once    MEDICATIONS  (PRN):  acetaminophen   Tablet .. 650 milliGRAM(s) Oral every 6 hours PRN Temp greater or equal to 38.5C (101.3F), Mild Pain (1 - 3)  aluminum hydroxide/magnesium hydroxide/simethicone Suspension 30 milliLiter(s) Oral every 4 hours PRN Dyspepsia  melatonin 3 milliGRAM(s) Oral at bedtime PRN Insomnia      Allergies    penicillins (Rash)    Intolerances        SOCIAL HISTORY:    FAMILY HISTORY:  FHx: hypertension (Mother)    FH: diabetes mellitus (Mother)      Physical Exam:  General: NAD, resting comfortably  Pulmonary: normal resp effort, CTA-B  Cardiovascular: NSR, no murmurs  Abdominal: distended, nontender to palpation, no organomegaly  Extremities: WWP, normal strength, no clubbing/cyanosis/edema  Neuro: A/O x 3, CNs II-XII grossly intact, normal sensation, no focal deficits      Vital Signs Last 24 Hrs  T(C): 36.7 (18 Aug 2021 12:42), Max: 37 (18 Aug 2021 06:33)  T(F): 98 (18 Aug 2021 12:42), Max: 98.6 (18 Aug 2021 06:33)  HR: 115 (18 Aug 2021 12:42) (94 - 115)  BP: 141/83 (18 Aug 2021 12:42) (139/92 - 142/89)  BP(mean): --  RR: 20 (18 Aug 2021 12:42) (17 - 20)  SpO2: 97% (18 Aug 2021 16:01) (96% - 99%)    I&O's Summary    17 Aug 2021 07:01  -  18 Aug 2021 07:00  --------------------------------------------------------  IN: 854 mL / OUT: 900 mL / NET: -46 mL            LABS:                        10.9   17.60 )-----------( 420      ( 18 Aug 2021 07:20 )             33.8     08-18    131<L>  |  92<L>  |  26<H>  ----------------------------<  112<H>  4.8   |  21<L>  |  0.99    Ca    9.5      18 Aug 2021 07:20  Phos  2.4     08-18  Mg     2.10     08-18    TPro  6.2  /  Alb  2.6<L>  /  TBili  0.4  /  DBili  x   /  AST  28  /  ALT  21  /  AlkPhos  110  08-17        CAPILLARY BLOOD GLUCOSE        LIVER FUNCTIONS - ( 17 Aug 2021 07:51 )  Alb: 2.6 g/dL / Pro: 6.2 g/dL / ALK PHOS: 110 U/L / ALT: 21 U/L / AST: 28 U/L / GGT: x             Cultures:      RADIOLOGY & ADDITIONAL STUDIES:    < from: CT Chest No Cont (08.14.21 @ 16:38) >  FINDINGS:    AIRWAYS, LUNGS and PLEURA: Patent central airways. Patchy groundglass and consolidative opacities within the right lung may represent multifocal pneumonia. Small-moderate layering pleural effusions and partial atelectasis of bilateral lower lobes.    MEDIASTINUM AND JOSE: Enlarged supraclavicular, mediastinal and internal mammary lymph nodes likely metastatic for example 1.4 cm short axis right lower paratracheal lymph node and bilateral enlarged internal mammary lymph nodes.    HEART AND VESSELS: Heart size is normal. No pericardial effusion. Thoracic aorta and pulmonary artery normal in diameter.    VISUALIZED UPPER ABDOMEN: Ascites. Peritoneal carcinomatosis. Air within the left collecting system.    CHEST WALL AND BONES: No aggressive osseous lesion.    LOWER NECK: Within normal limits.    IMPRESSION:    Patchy groundglass and consolidative opacities within the right lung may represent multifocal pneumonia.    Small-moderate layering pleural effusions and partial atelectasis of bilateral lower lobes.    Enlarged supraclavicular, mediastinal and internal mammary lymph nodes likely metastatic    --- End of Report ---    < end of copied text >    < from: CT Abdomen and Pelvis No Cont (07.25.21 @ 19:55) >    PROCEDURE:  CT of the Abdomen and Pelvis was performed.  Sagittal and coronal reformats were performed.    FINDINGS:  LOWER CHEST: Bilateral mild pleural effusions, right greater than left.    Evaluation of the solid visceral organs and vasculatureis limited without the administration of intravenous contrast.    LIVER: Within normal limits.  BILE DUCTS: Normal caliber.  GALLBLADDER: Within normal limits.  SPLEEN: Within normal limits.  PANCREAS: Within normal limits.  ADRENALS: Within normal limits.  KIDNEYS/URETERS: Left ureteral stent. No hydronephrosis or obstructing stone.      BLADDER: Decompressed with Charles present.  REPRODUCTIVE ORGANS: Uterus and adnexa within normal limits.    BOWEL: The stomach is incompletely distended. Mildly thickened loops of small bowel in the midabdomen suggest nonspecific enteritis. No bowel obstruction. Appendix is normal.Diverticulosis.  PERITONEUM: Moderate volume of abdominal ascites. Reticulation of the intra-abdominal fat in the left upper quadrant and mid abdomen is likely related to lymphangitic drainage of fluid as opposed to carcinomatosis.  VESSELS: Within normal limits.  RETROPERITONEUM/LYMPH NODES: No lymphadenopathy.  ABDOMINAL WALL: Within normal limits.  BONES: Degenerative changes.    IMPRESSION:    Mild nonspecific enteritis involving small bowel loops in the central abdomen.    Moderate volume of abdominopelvic ascites.  Bilateral pleural effusions, right greater than left.    < end of copied text >        Plan: 67 YO F with recent dx of B Cell lymphoma. Surgery consulted for lymph node biopsy    - appreciate heme/onc recommendations  - NPO at midnight  - IVF when NPO  - will be added on for lymph node biopsy potentially tomorrow   - please obtain AM labs  - please obtain COVID swab 8/18 for OR    B Team Surgery, [id number]

## 2021-08-18 NOTE — PROGRESS NOTE ADULT - PROBLEM SELECTOR PLAN 9
Pt with reported history of HLD but not on statin.   - Unclear why patient is on ASA- can clarify with PMD in AM - hold for now in case patient requires procedure

## 2021-08-18 NOTE — PROGRESS NOTE ADULT - ASSESSMENT
66F with PMH significant for HTN, HLD, recently discovered ovarian mass suspicious for malignancy, L hydroureteronephrosis s/p renal stent, and recent hospitalization to [facility] for acute renal failure (likely obstructive) requiring urgent HD, ascites s/p therapeutic paracentesis, and pleural effusion s/p R thoracentesis showing lymphocytosis but no malignant cells admitted for acute hypoxic respiratory failure with imaging showing bilateral pleural effusion.    #Acute hypoxic respiratory failure  - Pt p/w increasing SOB and WOB, with tachycardia and elevated WBC--> s/p right thora with improvement in SOB  - Pt refused CT PE because she had a bad reaction to contrast the last time   - CT non con showing supraclavicular and mediastinal LAD    Recommendation:   - s/p thoracentesis with 1.2 L fluid removal on the right side-->  on bedside US today, reaccumulation of right pleural fluid  - pleural fluid gram stain and cx negative; cytopathology showing atypical looking lymphoid cells no malignancy noted  - patient with mediastinal LAD--> planned for EBUS with biopsy tomorrow and pleurx ( if EBUS non diagnostic will do pleuroscopy with biopsy)         66F with PMH significant for HTN, HLD, recently discovered ovarian mass suspicious for malignancy, L hydroureteronephrosis s/p renal stent, and recent hospitalization to [facility] for acute renal failure (likely obstructive) requiring urgent HD, ascites s/p therapeutic paracentesis, and pleural effusion s/p R thoracentesis showing lymphocytosis but no malignant cells admitted for acute hypoxic respiratory failure with imaging showing bilateral pleural effusion.    #Acute hypoxic respiratory failure  - Pt p/w increasing SOB and WOB, with tachycardia and elevated WBC--> s/p right thora with improvement in SOB  - Pt refused CT PE because she had a bad reaction to contrast the last time   - CT non con showing supraclavicular and mediastinal LAD    Recommendation:   - s/p thoracentesis with 1.2 L fluid removal on the right side-->  on bedside US today, reaccumulation of right pleural fluid  - pleural fluid gram stain and cx negative; cytopathology showing atypical looking lymphoid cells no malignancy noted  - patient with mediastinal LAD--> planned for EBUS with biopsy tomorrow and pleurx ( if EBUS non diagnostic will do pleuroscopy with biopsy)  - please obtain COVID Swab STAT, 2 x T&S and NPO after midnight       66F with PMH significant for HTN, HLD, recently discovered ovarian mass suspicious for malignancy, L hydroureteronephrosis s/p renal stent, and recent hospitalization to [facility] for acute renal failure (likely obstructive) requiring urgent HD, ascites s/p therapeutic paracentesis, and pleural effusion s/p R thoracentesis showing lymphocytosis but no malignant cells admitted for acute hypoxic respiratory failure with imaging showing bilateral pleural effusion.    #Acute hypoxic respiratory failure  - Pt p/w increasing SOB and WOB, with tachycardia and elevated WBC--> s/p right thora with improvement in SOB  - Pt refused CT PE because she had a bad reaction to contrast the last time   - CT non con showing supraclavicular and mediastinal LAD    Recommendation:   - s/p thoracentesis with 1.2 L fluid removal on the right side-->  on bedside US today, reaccumulation of right pleural fluid  - pleural fluid gram stain and cx negative; cytopathology showing atypical looking lymphoid cells no malignancy noted  - patient with mediastinal LAD--> planned for EBUS with biopsy tomorrow and pleurx ( if EBUS non diagnostic will do pleuroscopy with biopsy)  - please obtain COVID Swab STAT, 2 x T&S and NPO after midnight; please hold lovenox overnight

## 2021-08-18 NOTE — CONSULT NOTE ADULT - SUBJECTIVE AND OBJECTIVE BOX
Oncology Consult Note    HPI:  66-year-old female with PMH significant for HTN, HLD, recently discovered ovarian mass suspicious for malignancy (7/2021), and L hydroureteronephrosis s/p renal stent (7/15/21) presenting with BLE edema and worsening abdominal distension and SOB. Of note, pt with recent hospitalization ([facility] 7/26-8/4) for acute renal failure (likely obstructive vs MONO) requiring urgent HD, ascites s/p therapeutic paracentesis (7/27/21), and pleural effusion s/p R thoracentesis (8/2/21) showing lymphocytosis but no malignant cells. Patient states she was discharged to follow up with gyn-onc at [facility] as previously scheduled after her discharge from [facility], where her ovarian mass was discovered earlier in July. However, when she went home she developed BLE edema, worsening over the past few days along with worsening abdominal distension and SOB. Patient denies fevers/chills, lightheadedness/dizziness, cough, CP, palpitations, cough, n/v/d, abdominal pain, LE pain.     In the ED, vitals T 97-99, -117, //90, RR 26 - 30 (satting 94-96% on NC). Labs significant for WBC 13.4, proBNP 34. VBG 7.45/50/44/35, Lactate 2.7. RVP/COVID PCR neg. CXR shows moderate R-sided and small L-sided pleural effusions. Pt received IV lasix 40mg x 1 in ED. Placed on BiPAP for increased WOB. MICU consulted, however not a candidate. (12 Aug 2021 04:16)    Patient continues to have shortness of breath and difficulty ambulating secondary to ascites. She has therapeutic thoracentesis on 8/16 which temporarily improved her condition.     She reports being tired of being in hospital and not knowing what the underlying problem is. She reports being frustrated and wanting to go home.     Hematology was consulted because cytopathology from 8/2/2021 pleural fluid (thoracentesis) showed a DLBCL with MYC and BCL6 rearrangements.       Allergies    penicillins (Rash)    Intolerances        MEDICATIONS  (STANDING):  cefepime   IVPB      cefepime   IVPB 2000 milliGRAM(s) IV Intermittent every 8 hours  enoxaparin Injectable 40 milliGRAM(s) SubCutaneous daily  famotidine    Tablet 20 milliGRAM(s) Oral daily  multivitamin 1 Tablet(s) Oral daily  sodium chloride 0.9% Bolus 250 milliLiter(s) IV Bolus once    MEDICATIONS  (PRN):  acetaminophen   Tablet .. 650 milliGRAM(s) Oral every 6 hours PRN Temp greater or equal to 38.5C (101.3F), Mild Pain (1 - 3)  aluminum hydroxide/magnesium hydroxide/simethicone Suspension 30 milliLiter(s) Oral every 4 hours PRN Dyspepsia  melatonin 3 milliGRAM(s) Oral at bedtime PRN Insomnia      PAST MEDICAL & SURGICAL HISTORY:  Hypertension    Ovarian mass    Hypercholesteremia    S/P ureteral stent placement        FAMILY HISTORY:  FHx: hypertension (Mother)    FH: diabetes mellitus (Mother)        SOCIAL HISTORY: No EtOH, no tobacco        T(F): 98 (08-18-21 @ 12:42), Max: 98.6 (08-18-21 @ 06:33)  HR: 115 (08-18-21 @ 12:42)  BP: 141/83 (08-18-21 @ 12:42)  RR: 20 (08-18-21 @ 12:42)  SpO2: 98% (08-18-21 @ 18:48)  Wt(kg): --    GENERAL: NAD, talks in short sentences due to SOB   HEAD:  Atraumatic, Normocephalic  EYES: EOMI, PERRLA, conjunctiva and sclera clear  NECK: Supple, No JVD; calvicular fullness on left  CHEST/LUNG: Clear to auscultation bilaterally; No wheeze  HEART: Regular rate and rhythm; No murmurs, rubs, or gallops  ABDOMEN: Distended, indurated ; tender to touch   EXTREMITIES:  2+ Peripheral Pulses, No clubbing, cyanosis, or edema; right inguinal lymphadenopathy with indurated lymph node  NEUROLOGY: non-focal  SKIN: No rashes or lesions                          10.9   17.60 )-----------( 420      ( 18 Aug 2021 07:20 )             33.8       08-18    131<L>  |  92<L>  |  26<H>  ----------------------------<  112<H>  4.8   |  21<L>  |  0.99    Ca    9.5      18 Aug 2021 07:20  Phos  2.4     08-18  Mg     2.10     08-18    TPro  6.2  /  Alb  2.6<L>  /  TBili  0.4  /  DBili  x   /  AST  28  /  ALT  21  /  AlkPhos  110  08-17      Phosphorus Level, Serum: 2.4 mg/dL (08-18 @ 07:20)  Magnesium, Serum: 2.10 mg/dL (08-18 @ 07:20)    Imaging:     EXAM: CT CHEST      PROCEDURE DATE: Aug 14 2021        INTERPRETATION: .  ACC: [id number]  INDICATION: Abnormal chest radiograph  TECHNIQUE: Unenhanced CT of the chest. Coronal and sagittal images were reconstructed. Maximum intensity projection images were generated.  COMPARISON: No prior chest CT.    FINDINGS:    AIRWAYS, LUNGS and PLEURA: Patent central airways. Patchy groundglass and consolidative opacities within the right lung may represent multifocal pneumonia. Small-moderate layering pleural effusions and partial atelectasis of bilateral lower lobes.    MEDIASTINUM AND JOSE: Enlarged supraclavicular, mediastinal and internal mammary lymph nodes likely metastatic for example 1.4 cm short axis right lower paratracheal lymph node and bilateral enlarged internal mammary lymph nodes.    HEART AND VESSELS: Heart size is normal. No pericardial effusion. Thoracic aorta and pulmonary artery normal in diameter.    VISUALIZED UPPER ABDOMEN: Ascites. Peritoneal carcinomatosis. Air within the left collecting system.    CHEST WALL AND BONES: No aggressive osseous lesion.    LOWER NECK: Within normal limits.    IMPRESSION:    Patchy groundglass and consolidative opacities within the right lung may represent multifocal pneumonia.    Small-moderate layering pleural effusions and partial atelectasis of bilateral lower lobes.    Enlarged supraclavicular, mediastinal and internal mammary lymph nodes likely metastatic    EXAM: US ABDOMEN LIMITED      PROCEDURE DATE: Aug 12 2021        INTERPRETATION: CLINICAL INFORMATION: Ascites check.    COMPARISON: CT abdomen and pelvis 7/25/2021    TECHNIQUE: Limited sonography of the abdomen.    IMPRESSION:    Moderate volume ascites, largest pocket in the right upper quadrant.

## 2021-08-18 NOTE — PROGRESS NOTE ADULT - PROBLEM SELECTOR PLAN 4
CT AP (7/25): Moderate volume of abdominal ascites. Reticulation of the intra-abdominal fat in the left upper quadrant and mid abdomen is likely related to lymphangitic drainage of fluid as opposed to carcinomatosis.  Limited abd US 8/12: Moderate ascites deepest pocket in RUQ  Now much improved on exam  - procedure team: ascites likely too small for paracentesis  - diuresis PRN

## 2021-08-18 NOTE — PROGRESS NOTE ADULT - PROBLEM SELECTOR PLAN 2
R Lung Multifocal PNA noted on CT chest, meets severe sepsis criteria  -Improving  -Finish Cefepime course  -Vanc will be dc'ed today.

## 2021-08-18 NOTE — CONSULT NOTE ADULT - ASSESSMENT
67 yo F with known medical history of HTL, HLD, with recurrent admissions since June for abdominal distention, ascites, ARF, pleural effusions with "ovarian mass" initially admitted for fluid overload. Hematology was consulted because cytopathology from 8/2/2021 pleural fluid (thoracentesis) showed a DLBCL with MYC and BCL6 rearrangements.     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 Given patient has supraclavicular fullness, an excisional biopsy would be recommended by Surg-Onc. If not, then please consult IR to obtain core needle biopsy.   - CT IV contrast of neck, chest abdomen and pelvis ordered; Discussed at length with patient regarding IV contrast as prior experience led to heavy nausea. Recommend zofran 30 minutes prior. Patient has agreed.   - Please obtain prior records for hospitalizations outside of [facility] as well as pathology report for "Ovarian Mass" at [facility]  - Treatment of recurrent effusions, and of lymphoma to be further elaborated on when enough information obtained to understand if single pathology (lymphoma) vs double pathology (ovarian tumor and Lymphoma).  - Please check TLS labs daily - CMP, Phos, Uric acid, LDH. Patients with high grade lymphoma may go into spontaneous TLS      Initial work up for treatment:  - Echo 8/3 with LVEF of 56%  - recommend Hepatitis panel, Hep B panel, HIV testing, HTLV1 and HTLV2 (?Cauliflower cells reported per primary team notes)    Rosemarie Pardo   PGY4 Heme/Onc   [phone]  Please call on call team after 5

## 2021-08-18 NOTE — PROGRESS NOTE ADULT - ATTENDING COMMENTS
66F HTN, Ovarian CA c/b ascites, Lt hydronephrosis s/p stent 7/2021, recent PURVI on CKD s/p HD, pleural effusion s/p thoracentesis p/w acute respiratory failure with hypercarbia due to pleural effusion due to fluid overload. Recent TTE - nml LVFx. Initially requiring BiPAP for respiration. s/p therapeutic thoracentesis on 8/13 and pt weaned to NC. CT chest showing multifocal PNA- started Vanc/cefepime.  Ascites - trace amount on POCUS. Gyn onc consulted for ovarian mass - needs tissue diagnosis. Pleural fluid from 8/2 now with high grade lymphoma. Heme consulted. Primary malignancy likely hematologic at this time, may not need to pursue EBUS now. Will continue discussion with heme/pulm and IR.     Plan discussed with patient and HS

## 2021-08-18 NOTE — PROGRESS NOTE ADULT - PROBLEM SELECTOR PLAN 1
Pt found to have ovarian mass on CT Abd/Pelvis during recently hospitalization at [facility] in early July 2021. Thoracentesis fluid studies with many cells, Large, abnormal immature looking mononuclear cells, many with cauliflower. Many RBCs nuclei (few in clusters).   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

## 2021-08-19 NOTE — PROGRESS NOTE ADULT - PROBLEM SELECTOR PLAN 2
R Lung Multifocal PNA noted on CT chest, meets severe sepsis criteria  -Improving  -Finish Cefepime course R Lung Multifocal PNA noted on CT chest, meets severe sepsis criteria  -Improving  -Finished cefepime course (8/19/21)

## 2021-08-19 NOTE — PROGRESS NOTE ADULT - ASSESSMENT
65 YO F with recent dx of B Cell lymphoma. Surgery consulted for lymph node biopsy    PLAN:  - Given no palpable or radiographic evidence of superficial lymph nodes amenable for open surgical biopsy will cancel OR for surgical biopsy.  - Patient will be a better candidate for an EBUS procedure given location of mediastinal lymph node.  - No further general surgery intervention at this time.    SANA Whitman, PGY-2  [facility]  B Team Surgery  [id number]

## 2021-08-19 NOTE — DIETITIAN INITIAL EVALUATION ADULT. - ORAL INTAKE PTA/DIET HISTORY
Limited interview conducted, pt lethargic at time of visit. RDN assessment (8/2) from recent admission reviewed, during which time pt met criteria for malnutrition.   Pt confirms NKFA. Pt with dentures, prefers not to use them while eating. Per chart, pt lives at home with brothers; prior to recent hospitalization pt was independent with ADLs. Pt with reported decreased PO intake since 7/2021.

## 2021-08-19 NOTE — PROGRESS NOTE ADULT - ATTENDING COMMENTS
66F HTN, Ovarian CA c/b ascites, Lt hydronephrosis s/p stent 7/2021, recent PURVI on CKD s/p HD, pleural effusion s/p thoracentesis p/w acute respiratory failure with hypercarbia due to pleural effusion due to fluid overload. Recent TTE - nml LVFx. Initially requiring BiPAP for respiration. s/p therapeutic thoracentesis on 8/13 and pt weaned to NC. CT chest showing multifocal PNA- started Vanc/cefepime.  Last day of antibiotics today. Ascites - trace amount on POCUS. Gyn onc consulted for ovarian mass - needs tissue diagnosis. Pleural fluid from 8/2 now with high grade lymphoma. Heme consulted. Primary malignancy likely hematologic at this time, may not need to pursue EBUS now. Surgery planning excisional biopsy of lymph node on 8/20/21. Patient without active chest pain today. Satting well on RA. Also repeat EKG on 8/19 with no signs of ST or t wave abnormality. RCRI score of 2. Patient is optimized for planned procedure and may proceed with planned procedure tomorrow. Benefit of procedure outweigh any risk given high grade lymphoma.     Plan discussed with patient and HS

## 2021-08-19 NOTE — CONSULT NOTE ADULT - SUBJECTIVE AND OBJECTIVE BOX
Surgical Oncology Consult Note  Attending: Dr. Miller  Service: D Team Surgery  [id number]    HPI: 67 YO F PMH of HTN, HLD and recent hospitalization for asciated and b/l pleural effusions, L hydroureteronephrosis s/p renal stent (7/15) presenting with high grade B Cell lymphoma. Ascitic fluid was tapped earlier this week, by gyn onc, and cytology came back + for lymphoma. Surgery consulted for lymph node biopsy for tissue diagnosis.     PAST MEDICAL & SURGICAL HISTORY:  Hypertension  Ovarian mass  Hypercholesteremia  S/P ureteral stent placement    ALLERGIES:  penicillins (Rash)    FAMILY HISTORY:  FHx: hypertension (Mother)  FH: diabetes mellitus (Mother)    PHYSICAL EXAM:  General: NAD, resting comfortably  HEENT: NC/AT, EOMI, normal hearing, no oral lesions, no LAD, neck supple  Pulmonary: normal resp effort, CTA-B  Cardiovascular: NSR, no murmurs  Abdominal: soft, ND/NT, no organomegaly  Groin: palpable left inguinal lymph node  Extremities: WWP, normal strength, no clubbing/cyanosis/edema  Pulses: palpable distal pulses    VITAL SIGNS:  Vital Signs Last 24 Hrs  T(C): 36.7 (19 Aug 2021 05:41), Max: 36.7 (18 Aug 2021 12:42)  T(F): 98.1 (19 Aug 2021 05:41), Max: 98.1 (19 Aug 2021 05:41)  HR: 108 (19 Aug 2021 07:41) (104 - 115)  BP: 128/79 (19 Aug 2021 05:41) (128/79 - 141/83)  BP(mean): --  RR: 17 (19 Aug 2021 05:41) (17 - 20)  SpO2: 95% (19 Aug 2021 07:41) (95% - 99%)    I&O's Summary    18 Aug 2021 07:01  -  19 Aug 2021 07:00  --------------------------------------------------------  IN: 0 mL / OUT: 350 mL / NET: -350 mL    LABS:                        10.4   16.63 )-----------( 379      ( 19 Aug 2021 07:58 )             31.9     08-19    130<L>  |  91<L>  |  27<H>  ----------------------------<  104<H>  4.6   |  20<L>  |  1.10    Ca    9.7      19 Aug 2021 07:58  Phos  3.0     08-19  Mg     2.10     08-19    TPro  6.5  /  Alb  2.9<L>  /  TBili  0.4  /  DBili  x   /  AST  58<H>  /  ALT  29  /  AlkPhos  114  08-19    PT/INR - ( 19 Aug 2021 07:58 )   PT: 12.9 sec;   INR: 1.13 ratio      CAPILLARY BLOOD GLUCOSE    LIVER FUNCTIONS - ( 19 Aug 2021 07:58 )  Alb: 2.9 g/dL / Pro: 6.5 g/dL / ALK PHOS: 114 U/L / ALT: 29 U/L / AST: 58 U/L / GGT: x           RADIOLOGY & ADDITIONAL STUDIES:             Surgical Oncology Consult Note  Attending: Dr. Miller  Service: D Team Surgery  [id number]    HPI: 65 YO F PMH of HTN, HLD and recent hospitalization for asciated and b/l pleural effusions, L hydroureteronephrosis s/p renal stent (7/15) presenting with high grade B Cell lymphoma. Ascitic fluid was tapped earlier this week, by gyn onc, and cytology came back + for lymphoma. Surgery consulted for lymph node biopsy for tissue diagnosis.     PAST MEDICAL & SURGICAL HISTORY:  Hypertension  Ovarian mass  Hypercholesteremia  S/P ureteral stent placement    ALLERGIES:  penicillins (Rash)    FAMILY HISTORY:  FHx: hypertension (Mother)  FH: diabetes mellitus (Mother)    PHYSICAL EXAM:  General: NAD, resting comfortably  HEENT: NC/AT, EOMI, normal hearing, no oral lesions, no LAD, neck supple  Pulmonary: normal resp effort, CTA-B  Cardiovascular: NSR, no murmurs  Abdominal: soft, ND/NT, no organomegaly  Groin: palpable left inguinal lymph node with skin teear blister directly over the node  Extremities: WWP, normal strength, no clubbing/cyanosis/edema  Pulses: palpable distal pulses    VITAL SIGNS:  Vital Signs Last 24 Hrs  T(C): 36.7 (19 Aug 2021 05:41), Max: 36.7 (18 Aug 2021 12:42)  T(F): 98.1 (19 Aug 2021 05:41), Max: 98.1 (19 Aug 2021 05:41)  HR: 108 (19 Aug 2021 07:41) (104 - 115)  BP: 128/79 (19 Aug 2021 05:41) (128/79 - 141/83)  BP(mean): --  RR: 17 (19 Aug 2021 05:41) (17 - 20)  SpO2: 95% (19 Aug 2021 07:41) (95% - 99%)    I&O's Summary    18 Aug 2021 07:01  -  19 Aug 2021 07:00  --------------------------------------------------------  IN: 0 mL / OUT: 350 mL / NET: -350 mL    LABS:                        10.4   16.63 )-----------( 379      ( 19 Aug 2021 07:58 )             31.9     08-19    130<L>  |  91<L>  |  27<H>  ----------------------------<  104<H>  4.6   |  20<L>  |  1.10    Ca    9.7      19 Aug 2021 07:58  Phos  3.0     08-19  Mg     2.10     08-19    TPro  6.5  /  Alb  2.9<L>  /  TBili  0.4  /  DBili  x   /  AST  58<H>  /  ALT  29  /  AlkPhos  114  08-19    PT/INR - ( 19 Aug 2021 07:58 )   PT: 12.9 sec;   INR: 1.13 ratio      CAPILLARY BLOOD GLUCOSE    LIVER FUNCTIONS - ( 19 Aug 2021 07:58 )  Alb: 2.9 g/dL / Pro: 6.5 g/dL / ALK PHOS: 114 U/L / ALT: 29 U/L / AST: 58 U/L / GGT: x           RADIOLOGY & ADDITIONAL STUDIES:

## 2021-08-19 NOTE — DIETITIAN INITIAL EVALUATION ADULT. - PERTINENT MEDS FT
allopurinol, cefepime IVPB, famotidine Tablet, lactated ringers IV Continuous, multivitamin, aluminum hydroxide/magnesium hydroxide/simethicone Suspension PRN

## 2021-08-19 NOTE — PROGRESS NOTE ADULT - SUBJECTIVE AND OBJECTIVE BOX
Surgery Progress Note    SUBJECTIVE:  - Patient seen and examined at bedside   - Patient states feeling well  --------------------------------------------------------------------------------------------------  OBJECTIVE:   Physical Exam:  General: AAOx3, NAD, lying comfortably in bed  HEENT: NC/AT  Respiratory: nonlabored breathing  Cardiovascular: RRR, normal S1 and S2, no murmurs or gallops  Abdomen: distended, soft, non-tender  Extremities: WWP, no edema  --------------------------------------------------------------------------------------------------  V/S:  Vital Signs Last 24 Hrs  T(C): 36.7 (19 Aug 2021 05:41), Max: 36.7 (18 Aug 2021 12:42)  T(F): 98.1 (19 Aug 2021 05:41), Max: 98.1 (19 Aug 2021 05:41)  HR: 108 (19 Aug 2021 07:41) (104 - 115)  BP: 128/79 (19 Aug 2021 05:41) (128/79 - 141/83)  BP(mean): --  RR: 17 (19 Aug 2021 05:41) (17 - 20)  SpO2: 95% (19 Aug 2021 07:41) (95% - 99%)    --------------------------------------------------------------------------------------------------  I/Os:    18 Aug 2021 07:01  -  19 Aug 2021 07:00  --------------------------------------------------------  IN:  Total IN: 0 mL    OUT:    Voided (mL): 350 mL  Total OUT: 350 mL    Total NET: -350 mL        --------------------------------------------------------------------------------------------------  LABS:                        10.9   17.60 )-----------( 420      ( 18 Aug 2021 07:20 )             33.8     18 Aug 2021 07:20    131    |  92     |  26     ----------------------------<  112    4.8     |  21     |  0.99     Ca    9.5        18 Aug 2021 07:20  Phos  2.4       18 Aug 2021 07:20  Mg     2.10      18 Aug 2021 07:20    TPro  6.2    /  Alb  2.6    /  TBili  0.4    /  DBili  x      /  AST  28     /  ALT  21     /  AlkPhos  110    17 Aug 2021 07:51      CAPILLARY BLOOD GLUCOSE            LIVER FUNCTIONS - ( 17 Aug 2021 07:51 )  Alb: 2.6 g/dL / Pro: 6.2 g/dL / ALK PHOS: 110 U/L / ALT: 21 U/L / AST: 28 U/L / GGT: x               --------------------------------------------------------------------------------------------------  MEDICATIONS  (STANDING):  cefepime   IVPB      cefepime   IVPB 2000 milliGRAM(s) IV Intermittent every 8 hours  famotidine    Tablet 20 milliGRAM(s) Oral daily  lactated ringers. 500 milliLiter(s) (75 mL/Hr) IV Continuous <Continuous>  multivitamin 1 Tablet(s) Oral daily    MEDICATIONS  (PRN):  acetaminophen   Tablet .. 650 milliGRAM(s) Oral every 6 hours PRN Temp greater or equal to 38.5C (101.3F), Mild Pain (1 - 3)  aluminum hydroxide/magnesium hydroxide/simethicone Suspension 30 milliLiter(s) Oral every 4 hours PRN Dyspepsia  melatonin 3 milliGRAM(s) Oral at bedtime PRN Insomnia    --------------------------------------------------------------------------------------------------

## 2021-08-19 NOTE — DIETITIAN INITIAL EVALUATION ADULT. - ENERGY INTAKE
Pt NPO p MN for procedure. Pt reports tolerating mechanical soft diet well, amenable to trial of Ensure Clear.

## 2021-08-19 NOTE — CONSULT NOTE ADULT - ASSESSMENT
67 YO F with recent dx of B Cell lymphoma. Surgery consulted for lymph node biopsy    PLAN:  - Will coordinate for surgical femoral lymph node biopsy.  - Will update primary team regarding timing of surgery.    Discussed with attending physician Dr. Miller.    SANA Whitman, PGY-2  [facility]  D Team Surgery  [id number] with recent dx of B Cell lymphoma. Surgery consulted for lymph node biopsy    PLAN (updated):  - Excisional biopsy of left inguinal lymph node to be scheduled for 1:30pm tomorrow to aid in treatment plan  - Patient is at high risk for wound healing complications due to malnutrition and initiation of chemotherapy  - NPO at midnight, preop labs, COVID negative from 8/18  - Consent in chart    Discussed with attending physician Dr. Miller.    SANA Whitman, PGY-2  [facility]  D Team Surgery  [id number] with recent dx of B Cell lymphoma. Surgery consulted for lymph node biopsy    PLAN (updated):  - Excisional biopsy of left inguinal lymph node to be scheduled for 1:30pm tomorrow to aid in treatment plan  - Patient is at high risk for wound healing complications due to body habitus (pannus lays directly on surgical site and there is already an approx 2 cm region of skin beakdown in the skin fold)  malnutrition and initiation of chemotherapy  - NPO at midnight, preop labs, COVID negative from 8/18  - Consent in chart    Discussed with attending physician Dr. Miller.    SANA Whitman, PGY-2  [facility]  D Team Surgery  [id number]

## 2021-08-19 NOTE — DIETITIAN INITIAL EVALUATION ADULT. - HEIGHT FOR BMI (CENTIMETERS)
Chief Complaint   Patient presents with   • Right Wrist - Surgical Followup       HISTORY OF PRESENT ILLNESS:  Lacy is a 72 year old female patient who is seen today in follow-up for R ORIF of distal radius, DOS 8/5/19. She is now 4 weeks out. She states she has been doing well overall. She wears removable splint to protect the wrist. Pain is well managed. There are no other concerns.     ALLERGIES: no known allergies.    REVIEW OF SYSTEMS:    No recent chest pain or shortness of breath.    PHYSICAL EXAMINATION:    Constitutional:  Well developed, well nourished, in no acute distress.   Vital Signs:  Pulse 72, resp. rate 14, weight 62.1 kg., Body mass index is 25.04 kg/m².    Extremities:   Right hand has good supination and pronation and good ROM, not full ROM with flexion and extension of R wrist. Wound is almost fully healed. No signs of infection. Skin is intact. Neurovascular status is intact.       Neurologic:  Coordination is intact and symmetric in all four extremities.  Sensation to light touch is intact, equal and symmetric in both upper and lower extremities.  The patient is alert and oriented and has an appropriate mood and affect.     DIAGNOSTIC STUDIES:  XR: Hardware is in place. No bony deformities noted. Volar plate in place. The fracture is in good alignment. Early healing noted.    IMPRESSION:    Status 4 weeks post open reduction internal fixation of right wrist post fall.     PLAN:    Discussed patient's physical exam, pertinent findings, reviewed diagnostic imaging results with patient and discussed treatment plan options.     Patient is progressing well. She is advised to use removable splint to protect the area. She is advised to not submerge hand it water for long periods of time. She is advised to protect the wrist for 3 months and to continue at home therapies to improve ROM.     Patient is advised to follow up in 4 weeks with repeat imaging with Celena Glaser PA-C. AP and lateral of  the right wrist before being seen. Plan for discharge if patient is progressing.     Patient understands and agrees to plan, all questions have been answered.  I have advised the patient to call the office with any questions or concerns.     On 9/3/2019, ILynne scribed the services personally performed by Umair Prajapati MD     The documentation recorded by the scribe accurately and completely reflects the service(s) I personally performed and the decisions made by me.          160

## 2021-08-19 NOTE — PROGRESS NOTE ADULT - SUBJECTIVE AND OBJECTIVE BOX
PROGRESS NOTE:   Patient is a 66y old  Female who presents with a chief complaint of Worsening volume overload (18 Aug 2021 19:19)    SUBJECTIVE / OVERNIGHT EVENTS:    ADDITIONAL REVIEW OF SYSTEMS:    MEDICATIONS  (STANDING):  cefepime   IVPB      cefepime   IVPB 2000 milliGRAM(s) IV Intermittent every 8 hours  famotidine    Tablet 20 milliGRAM(s) Oral daily  lactated ringers. 500 milliLiter(s) (75 mL/Hr) IV Continuous <Continuous>  multivitamin 1 Tablet(s) Oral daily    MEDICATIONS  (PRN):  acetaminophen   Tablet .. 650 milliGRAM(s) Oral every 6 hours PRN Temp greater or equal to 38.5C (101.3F), Mild Pain (1 - 3)  aluminum hydroxide/magnesium hydroxide/simethicone Suspension 30 milliLiter(s) Oral every 4 hours PRN Dyspepsia  melatonin 3 milliGRAM(s) Oral at bedtime PRN Insomnia      CAPILLARY BLOOD GLUCOSE        I&O's Summary    18 Aug 2021 07:01  -  19 Aug 2021 07:00  --------------------------------------------------------  IN: 0 mL / OUT: 350 mL / NET: -350 mL      PHYSICAL EXAM:  Vital Signs Last 24 Hrs  T(C): 36.7 (19 Aug 2021 05:41), Max: 36.7 (18 Aug 2021 12:42)  T(F): 98.1 (19 Aug 2021 05:41), Max: 98.1 (19 Aug 2021 05:41)  HR: 104 (19 Aug 2021 05:41) (104 - 115)  BP: 128/79 (19 Aug 2021 05:41) (128/79 - 141/83)  BP(mean): --  RR: 17 (19 Aug 2021 05:41) (17 - 20)  SpO2: 95% (19 Aug 2021 05:41) (95% - 99%)    CONSTITUTIONAL: NAD, well-developed  RESPIRATORY: Normal respiratory effort; lungs are clear to auscultation bilaterally  CARDIOVASCULAR: Regular rate and rhythm, normal S1 and S2, no murmur/rub/gallop; No lower extremity edema; Peripheral pulses are 2+ bilaterally  ABDOMEN: Nontender to palpation, normoactive bowel sounds, no rebound/guarding; No hepatosplenomegaly  MUSCLOSKELETAL: no clubbing or cyanosis of digits; no joint swelling or tenderness to palpation  NEURO: A&Ox3,   PSYCH: affect appropriate; no anxiety or depression      LABS:                        10.9   17.60 )-----------( 420      ( 18 Aug 2021 07:20 )             33.8     08-18    131<L>  |  92<L>  |  26<H>  ----------------------------<  112<H>  4.8   |  21<L>  |  0.99    Ca    9.5      18 Aug 2021 07:20  Phos  2.4     08-18  Mg     2.10     08-18    TPro  6.2  /  Alb  2.6<L>  /  TBili  0.4  /  DBili  x   /  AST  28  /  ALT  21  /  AlkPhos  110  08-17 PROGRESS NOTE:   Patient is a 66y old  Female who presents with a chief complaint of Worsening volume overload (18 Aug 2021 19:19)    SUBJECTIVE / OVERNIGHT EVENTS:  NAEO. Pt stable as yesterday     REVIEW OF SYSTEMS:  CONSTITUTIONAL: No fever, chills, or malaise  HEAD: No HA  EYES: No visual changes or pain   ENT:  No sore throat, hoarseness, or hearing loss  NECK: No pain or stiffness, no swollen glands  RESPIRATORY: +cough, no SOB, wheezing, or hemoptysis; no production of phlegm  CARDIOVASCULAR: No chest pain or palpitations  GASTROINTESTINAL: No abdominal pain. No nausea, vomiting, or hematemesis; No diarrhea or constipation. No melena or hematochezia.  GENITOURINARY: No dysuria, frequency, incontinence or hematuria  NEUROLOGICAL: No numbness or weakness; no incoordination or tremors  MUSCULOSKELETAL: No muscle or joint pain; no swelling of stiffness  SKIN: No itching, rashes      MEDICATIONS  (STANDING):  cefepime   IVPB      cefepime   IVPB 2000 milliGRAM(s) IV Intermittent every 8 hours  famotidine    Tablet 20 milliGRAM(s) Oral daily  lactated ringers. 500 milliLiter(s) (75 mL/Hr) IV Continuous <Continuous>  multivitamin 1 Tablet(s) Oral daily    MEDICATIONS  (PRN):  acetaminophen   Tablet .. 650 milliGRAM(s) Oral every 6 hours PRN Temp greater or equal to 38.5C (101.3F), Mild Pain (1 - 3)  aluminum hydroxide/magnesium hydroxide/simethicone Suspension 30 milliLiter(s) Oral every 4 hours PRN Dyspepsia  melatonin 3 milliGRAM(s) Oral at bedtime PRN Insomnia      I&O's Summary    18 Aug 2021 07:01  -  19 Aug 2021 07:00  --------------------------------------------------------  IN: 0 mL / OUT: 350 mL / NET: -350 mL      PHYSICAL EXAM:  Vital Signs Last 24 Hrs  T(C): 36.7 (19 Aug 2021 05:41), Max: 36.7 (18 Aug 2021 12:42)  T(F): 98.1 (19 Aug 2021 05:41), Max: 98.1 (19 Aug 2021 05:41)  HR: 104 (19 Aug 2021 05:41) (104 - 115)  BP: 128/79 (19 Aug 2021 05:41) (128/79 - 141/83)  BP(mean): --  RR: 17 (19 Aug 2021 05:41) (17 - 20)  SpO2: 95% (19 Aug 2021 05:41) (95% - 99%)    GENERAL: Nl-appearing female in no acute distress  HEENT: Normocephalic; supple neck, no JVD  CARDIAC: RRR, nl S1 and S2, no m/r/g  PULM: CTAB, no wheezes or crackles, no increased WOB  ABDOMEN: non-tender but distended, BS+  EXTREMITIES:  2+ peripheral pulses, no clubbing, no edema  NERVOUS SYSTEM:  A&Ox3, no focal deficits  MSK: FROM all 4 extremities  SKIN: No rashes or lesions      LABS:                        10.9   17.60 )-----------( 420      ( 18 Aug 2021 07:20 )             33.8     08-18    131<L>  |  92<L>  |  26<H>  ----------------------------<  112<H>  4.8   |  21<L>  |  0.99    Ca    9.5      18 Aug 2021 07:20  Phos  2.4     08-18  Mg     2.10     08-18    TPro  6.2  /  Alb  2.6<L>  /  TBili  0.4  /  DBili  x   /  AST  28  /  ALT  21  /  AlkPhos  110  08-17     PROGRESS NOTE:   Patient is a 66y old  Female who presents with a chief complaint of Worsening volume overload (18 Aug 2021 19:19)    SUBJECTIVE / OVERNIGHT EVENTS:  NAEO. Pt stable as yesterday     REVIEW OF SYSTEMS:  CONSTITUTIONAL: No fever, chills, or malaise  HEAD: No HA  EYES: No visual changes or pain   ENT:  No sore throat, hoarseness, or hearing loss  NECK: No pain or stiffness, no swollen glands  RESPIRATORY: +cough, no SOB, wheezing, or hemoptysis; no production of phlegm  CARDIOVASCULAR: No chest pain or palpitations  GASTROINTESTINAL: No abdominal pain. No nausea, vomiting, or hematemesis; No diarrhea or constipation. No melena or hematochezia.  GENITOURINARY: No dysuria, frequency, incontinence or hematuria  NEUROLOGICAL: No numbness or weakness; no incoordination or tremors  MUSCULOSKELETAL: No muscle or joint pain; no swelling of stiffness  SKIN: No itching, rashes      MEDICATIONS  (STANDING):  cefepime   IVPB      cefepime   IVPB 2000 milliGRAM(s) IV Intermittent every 8 hours  famotidine    Tablet 20 milliGRAM(s) Oral daily  lactated ringers. 500 milliLiter(s) (75 mL/Hr) IV Continuous <Continuous>  multivitamin 1 Tablet(s) Oral daily    MEDICATIONS  (PRN):  acetaminophen   Tablet .. 650 milliGRAM(s) Oral every 6 hours PRN Temp greater or equal to 38.5C (101.3F), Mild Pain (1 - 3)  aluminum hydroxide/magnesium hydroxide/simethicone Suspension 30 milliLiter(s) Oral every 4 hours PRN Dyspepsia  melatonin 3 milliGRAM(s) Oral at bedtime PRN Insomnia      I&O's Summary    18 Aug 2021 07:01  -  19 Aug 2021 07:00  --------------------------------------------------------  IN: 0 mL / OUT: 350 mL / NET: -350 mL      PHYSICAL EXAM:  Vital Signs Last 24 Hrs  T(C): 36.7 (19 Aug 2021 05:41), Max: 36.7 (18 Aug 2021 12:42)  T(F): 98.1 (19 Aug 2021 05:41), Max: 98.1 (19 Aug 2021 05:41)  HR: 104 (19 Aug 2021 05:41) (104 - 115)  BP: 128/79 (19 Aug 2021 05:41) (128/79 - 141/83)  BP(mean): --  RR: 17 (19 Aug 2021 05:41) (17 - 20)  SpO2: 95% (19 Aug 2021 05:41) (95% - 99%)    GENERAL: Nl-appearing female in no acute distress  HEENT: Normocephalic; supple neck, no JVD  CARDIAC: RRR, nl S1 and S2, no m/r/g  PULM: CTAB, no wheezes or crackles, no increased WOB  ABDOMEN: non-tender but distended, BS+  EXTREMITIES:  2+ peripheral pulses, no clubbing, no edema  NERVOUS SYSTEM:  A&Ox3, no focal deficits  MSK: FROM all 4 extremities  SKIN: No rashes or lesions      LABS:                        10.9   17.60 )-----------( 420      ( 18 Aug 2021 07:20 )             33.8     08-18    131<L>  |  92<L>  |  26<H>  ----------------------------<  112<H>  4.8   |  21<L>  |  0.99    Ca    9.5      18 Aug 2021 07:20  Phos  2.4     08-18  Mg     2.10     08-18    TPro  6.2  /  Alb  2.6<L>  /  TBili  0.4  /  DBili  x   /  AST  28  /  ALT  21  /  AlkPhos  110  08-17      IMAGING:  EXAM:  CT NECK SOFT TISSUE IC      PROCEDURE DATE:  Aug 18 2021     INTERPRETATION:  .    CLINICAL INFORMATION: Metastatic B-cell lymphoma. Staging.    TECHNIQUE: Axial sections were obtained from the skull base to the sternum after the administration of intravenous contrast. 90 cc's of Omnipaque 350  was administered and 10 cc's were discarded. Sagittal and Coronal reformations were obtained from the source data.    COMPARISON: None available.    FINDINGS: Review of the cervical lymph node stations demonstrates no cervical lymphadenopathy by strict imaging size criteria.    A reference right-sided level II lymph node measures 1.0 x 0.6 cm (series 3, image 45).  A reference left-sided level II lymph node measures 1.0 x 0.6 cm (series 3, image 45).    The bilateral internal carotid arteries are medialized and take a retropharyngeal course. They extend nearly to the mucosal surface at the level of C1.    The aerodigestive tract appears unremarkable without evidence of masslike or nodular enhancement.    The bilateral parotid and submandibular glands appear unremarkable.    There is a vague 1 cm left-sided thyroid nodule.    The neck vessels are patent.    Limited field-of-view through the intracranial structures demonstrate no abnormalities. The orbital compartments appear unremarkable.    The paranasal sinuses and mastoid air cells are clear.    The mandible and maxilla are intact. The patient is edentulous.    Multilevel cervical spondylosis is seen.    Please refer to thereport for the concurrent dedicated chest CT for findings regarding the thoracic structures inclusive of bilateral pleural effusions.    IMPRESSION: No cervical lymphadenopathy by strict imaging size criteria.    Reference lymph node, as discussed.    Vague 1 cm left-sided thyroid nodule. Recommend ultrasound correlation.    Please refer to the report for the concurrent dedicated chest CT for findings regarding the thoracic structures.        EXAM:  CT ABDOMEN AND PELVIS IC      EXAM:  CT CHEST IC      PROCEDURE DATE:  Aug 18 2021     INTERPRETATION:  CLINICAL INFORMATION: B-cell lymphoma, for staging.    COMPARISON: CT chest dated 8/14/2021. CT abdomen pelvis dated 7/25/2021.    CONTRAST/COMPLICATIONS:  IV Contrast: Omnipaque 350  90 cc administered   10 cc discarded  Oral Contrast: NONE  Complications: None reported at time of study completion    PROCEDURE:  CT of the Chest, Abdomen and Pelvis was performed.  Sagittal and coronal reformats were performed.    FINDINGS:  CHEST:  LUNGS AND LARGE AIRWAYS: Patent central airways. Dependent atelectasis both lower lobes and right middle lobe.  PLEURA: Bilateral moderately large pleural effusions.  VESSELS: Prominent central pulmonary arteries.  HEART: Heart size is normal. No pericardial effusion.  MEDIASTINUM AND JOSE: Prevascular lymph node (3, 79) measures 1.5 x 1.3 cm  Precarinal lymph node measures 1.9 x 1.4 cm.  CHEST WALL AND LOWER NECK: Within normal limits.    ABDOMEN AND PELVIS:  LIVER: Within normal limits.  BILE DUCTS: Normal caliber.  GALLBLADDER: Within normal limits.  SPLEEN: Within normal limits.  PANCREAS: Within normal limits.  ADRENALS: Within normal limits.  KIDNEYS/URETERS: Bilateral moderate hydroureteronephrosis. Left ureteral stent in good position.    BLADDER: Incompletely distended.  REPRODUCTIVE ORGANS: Bilateral enlarged adnexa.    BOWEL: No bowel obstruction. Diffuse small bowel mural thickening, especially in the ileum. Appendix not visualized.  PERITONEUM: Moderately large ascites with peritoneal and omental thickening.  VESSELS: Within normal limits.  RETROPERITONEUM/LYMPH NODES: Enlarged lymph nodes at the root of the mesentery. Reference node (2, 84) measures 3.4 x 2.7 cm. Mildly enlarged para-aortic, iliac and inguinal lymph nodes. Left obturator node (2, 121) measures 2.2 x 1.1 cm.  ABDOMINAL WALL: Bilateral flank edema.  BONES: Within normal limits.    IMPRESSION:  Abdominal, pelvic and mediastinal lymphadenopathy with the largest nodes in the central mesentery.    Ascites and pleural effusions with peritoneal/omental infiltration.    Diffuse enteritis.    Bilateral moderate hydronephrosis.    Bilateral enlarged adnexa. Consider sonographic correlation.

## 2021-08-19 NOTE — CONSULT NOTE ADULT - ATTENDING COMMENTS
Plan for inguinal node biopsy today. Left groin node seems to be best target and heme/ onc team on board with this site. Reiterated risk of wound complications, which if severe could delay/ interrupt her treatment, given location of node with some superficial skin breakdown already in the groin crease and her poor nutritional state. Consent signed.

## 2021-08-19 NOTE — PROGRESS NOTE ADULT - ASSESSMENT
66-year-old female with PMH significant for HTN, HLD, recently discovered ovarian mass suspicious for malignancy (7/2021), L hydroureteronephrosis s/p renal stent (7/15/21), and recent hospitalization ([facility] 7/26-8/4) for acute renal failure (likely obstructive) requiring urgent HD, ascites s/p therapeutic paracentesis (7/27/21), and pleural effusion s/p R thoracentesis (8/2/21) showing lymphocytosis but no malignant cells admitted for acute hypercarbic respiratory failure due to pleural effusions most likely caused by ovarian malignancy a/w volume overload with ascites and b/l LE edema. 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 Pt to be urgently scanned and consult w/ surg onc.  66-year-old female with PMH significant for HTN, HLD, recently discovered ovarian mass suspicious for malignancy (7/2021), L hydroureteronephrosis s/p renal stent (7/15/21), and recent hospitalization ([facility] 7/26-8/4) for acute renal failure (likely obstructive) requiring urgent HD, ascites s/p therapeutic paracentesis (7/27/21), and pleural effusion s/p R thoracentesis (8/2/21) showing lymphocytosis but no malignant cells admitted for acute hypercarbic respiratory failure due to pleural effusions most likely caused by ovarian malignancy a/w volume overload with ascites and b/l LE edema. 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 Staging and surgical excision being planned.

## 2021-08-19 NOTE — PROGRESS NOTE ADULT - PROBLEM SELECTOR PLAN 1
Pt found to have ovarian mass on CT Abd/Pelvis during recently hospitalization at [facility] in early July 2021. Thoracentesis fluid studies with many cells, Large, abnormal immature looking mononuclear cells, many with cauliflower. Many RBCs nuclei (few in clusters).   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    =Decided to cancel surg bx this AM due to Pt found to have ovarian mass on CT Abd/Pelvis during recently hospitalization at [facility] in early July 2021. Thoracentesis fluid studies with many cells, Large, abnormal immature looking mononuclear cells, many with cauliflower. Many RBCs nuclei (few in clusters).   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    =Surgical excisional biopsy scheduled for 8/20/21 at 1:30pm Pt found to have ovarian mass on CT Abd/Pelvis during recently hospitalization at [facility] in early July 2021. Thoracentesis fluid studies with many cells, Large, abnormal immature looking mononuclear cells, many with cauliflower. Many RBCs nuclei (few in clusters).   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    =Surgical excisional biopsy scheduled for 8/20/21 at 1:30pm

## 2021-08-19 NOTE — DIETITIAN INITIAL EVALUATION ADULT. - OTHER INFO
No noted GI distress, last BM 8/15 per flowsheets, no bowel regimen ordered at this time.   Dosing weight (8/12) 141 lbs. Recent chart weight (7/27) 138 lbs.

## 2021-08-19 NOTE — PROGRESS NOTE ADULT - SUBJECTIVE AND OBJECTIVE BOX
CHIEF COMPLAINT: sob    Interval Events: Patient seen and examined at bedside. NAD. Patient has anxiety over her diagnosis of DLBCL. She is also concerned about the potential excisional biopsy.     REVIEW OF SYSTEMS:  Constitutional: [ ] negative [ ] fevers [ ] chills [ ] weight loss [ ] weight gain  HEENT: [ ] negative [ ] dry eyes [ ] eye irritation [ ] postnasal drip [ ] nasal congestion  CV: [ ] negative  [ ] chest pain [ ] orthopnea [ ] palpitations [ ] murmur  Resp: [ ] negative [ ] cough [ ] shortness of breath [ ] dyspnea [ ] wheezing [ ] sputum [ ] hemoptysis  GI: [ ] negative [ ] nausea [ ] vomiting [ ] diarrhea [ ] constipation [ ] abd pain [ ] dysphagia   : [ ] negative [ ] dysuria [ ] nocturia [ ] hematuria [ ] increased urinary frequency  Musculoskeletal: [ ] negative [ ] back pain [ ] myalgias [ ] arthralgias [ ] fracture  Skin: [ ] negative [ ] rash [ ] itch  Neurological: [ ] negative [ ] headache [ ] dizziness [ ] syncope [ ] weakness [ ] numbness  Psychiatric: [ ] negative [ ] anxiety [ ] depression  Endocrine: [ ] negative [ ] diabetes [ ] thyroid problem  Hematologic/Lymphatic: [ ] negative [ ] anemia [ ] bleeding problem  Allergic/Immunologic: [ ] negative [ ] itchy eyes [ ] nasal discharge [ ] hives [ ] angioedema  [ ] All other systems negative  [ ] Unable to assess ROS because ________    OBJECTIVE:  ICU Vital Signs Last 24 Hrs  T(C): 36.7 (19 Aug 2021 05:41), Max: 36.7 (18 Aug 2021 12:42)  T(F): 98.1 (19 Aug 2021 05:41), Max: 98.1 (19 Aug 2021 05:41)  HR: 108 (19 Aug 2021 07:41) (104 - 115)  BP: 128/79 (19 Aug 2021 05:41) (128/79 - 141/83)  BP(mean): --  ABP: --  ABP(mean): --  RR: 17 (19 Aug 2021 05:41) (17 - 20)  SpO2: 95% (19 Aug 2021 07:41) (95% - 99%)        08-18 @ 07:01  -  08-19 @ 07:00  --------------------------------------------------------  IN: 0 mL / OUT: 350 mL / NET: -350 mL      CAPILLARY BLOOD GLUCOSE          PHYSICAL EXAM:  General:   HEENT:   Lymph Nodes:  Neck:   Respiratory:   Cardiovascular:   Abdomen:   Extremities:   Skin:   Neurological:  Psychiatry:    HOSPITAL MEDICATIONS:  MEDICATIONS  (STANDING):  cefepime   IVPB      cefepime   IVPB 2000 milliGRAM(s) IV Intermittent every 8 hours  famotidine    Tablet 20 milliGRAM(s) Oral daily  lactated ringers. 1000 milliLiter(s) (75 mL/Hr) IV Continuous <Continuous>  multivitamin 1 Tablet(s) Oral daily    MEDICATIONS  (PRN):  acetaminophen   Tablet .. 650 milliGRAM(s) Oral every 6 hours PRN Temp greater or equal to 38.5C (101.3F), Mild Pain (1 - 3)  aluminum hydroxide/magnesium hydroxide/simethicone Suspension 30 milliLiter(s) Oral every 4 hours PRN Dyspepsia  melatonin 3 milliGRAM(s) Oral at bedtime PRN Insomnia      LABS:                        10.4   16.63 )-----------( 379      ( 19 Aug 2021 07:58 )             31.9     Hgb Trend: 10.4<--, 10.9<--, 10.3<--, 10.7<--, 10.7<--  08-19    130<L>  |  91<L>  |  27<H>  ----------------------------<  104<H>  4.6   |  20<L>  |  1.10    Ca    9.7      19 Aug 2021 07:58  Phos  3.0     08-19  Mg     2.10     08-19    TPro  6.5  /  Alb  2.9<L>  /  TBili  0.4  /  DBili  x   /  AST  58<H>  /  ALT  29  /  AlkPhos  114  08-19    Creatinine Trend: 1.10<--, 0.99<--, 0.77<--, 0.76<--, 0.72<--, 0.81<--  PT/INR - ( 19 Aug 2021 07:58 )   PT: 12.9 sec;   INR: 1.13 ratio                   MICROBIOLOGY:       RADIOLOGY:  [ ] Reviewed and interpreted by me    PULMONARY FUNCTION TESTS:    EKG:

## 2021-08-19 NOTE — DIETITIAN INITIAL EVALUATION ADULT. - REASON FOR ADMISSION
Per chart, pt is 66 year old female PMHx HTN, HLD, recently discovered ovarian mass suspicious for malignancy (7/2021), L hydroureteronephrosis s/p renal stent (7/15/21), recent hospitalization ([facility] 7/26-8/4/21) for acute renal failure requiring urgent HD, ascites s/p therapeutic paracentesis (7/27/21), pleural effusion s/p R thoracentesis (8/2/21) admitted for acute hypercarbic respiratory failure due to pleural effusions. Cytopathology from 8/2 showing DLBCL. Pending surgical excisional biopsy.

## 2021-08-19 NOTE — DIETITIAN NUTRITION RISK NOTIFICATION - ADDITIONAL COMMENTS/DIETITIAN RECOMMENDATIONS
Please see Dietitian Initial Assessment for complete recommendations.  Angela Araya, FIDELINAN, CDN #[id number]

## 2021-08-19 NOTE — DIETITIAN INITIAL EVALUATION ADULT. - PERTINENT LABORATORY DATA
(8/19) Na 130<L>, BUN 27<H>, Cr 1.10, <H>, K+ 4.6, Phos 3.0, Mg 2.10, Alk Phos 114, ALT/SGPT 29, AST/SGOT 58<H>

## 2021-08-19 NOTE — PROGRESS NOTE ADULT - ATTENDING COMMENTS
Excisional lymph node biopsy pending.  POCUS shows enlarging ascites - would perform therapeutic paracentesis.   Will continue to follow and assess need for thoracentesis.

## 2021-08-19 NOTE — PROGRESS NOTE ADULT - PROBLEM SELECTOR PLAN 3
Pt initially presented with progressively worsening SOB x 1 week since recent discharge. Pt satting 94-96% on 3L NC, with increased WOB. Placed on BiPAP with improvement in WOB for ~28h.   Presented with Acute Hypercarbic Resp Failure, now much improved  Patient sent on 2L home oxygen since last admission.  CXR showed moderate right-sided and small left-sided pleural effusions. Possibly exacerbated by ascites.  - ambulatory O2 assessment (while ambulating 92%; sitting/resting 96%)  - Monitor without lasix, can add if necessary  - Pulm onboard, recs appreciated.   - F/u thoracentesis studies  - frequent pulse ox monitoring Pt initially presented with progressively worsening SOB x 1 week since recent discharge. Pt satting 94-96% on 3L NC, with increased WOB. Placed on BiPAP with improvement in WOB for ~28h.   Presented with Acute Hypercarbic Resp Failure, now much improved  Patient sent on 2L home oxygen since last admission.  CXR showed moderate right-sided and small left-sided pleural effusions. Possibly exacerbated by ascites.  - ambulatory O2 assessment (while ambulating 92%; sitting/resting 96%)  - Monitor without lasix, can add if necessary  - Pulm onboard, recs appreciated.   - frequent pulse ox monitoring

## 2021-08-19 NOTE — CHART NOTE - NSCHARTNOTEFT_GEN_A_CORE
Preop Dx: + L groin lymph node   Surgeon: Dr. Pike  Procedure:    Vital Signs Last 24 Hrs  T(C): 36.7 (19 Aug 2021 05:41), Max: 36.7 (18 Aug 2021 12:42)  T(F): 98.1 (19 Aug 2021 05:41), Max: 98.1 (19 Aug 2021 05:41)  HR: 108 (19 Aug 2021 07:41) (104 - 115)  BP: 128/79 (19 Aug 2021 05:41) (128/79 - 141/83)  BP(mean): --  RR: 17 (19 Aug 2021 05:41) (17 - 20)  SpO2: 95% (19 Aug 2021 07:41) (95% - 99%)                        10.4   16.63 )-----------( 379      ( 19 Aug 2021 07:58 )             31.9     08-19    130<L>  |  91<L>  |  27<H>  ----------------------------<  104<H>  4.6   |  20<L>  |  1.10    Ca    9.7      19 Aug 2021 07:58  Phos  3.0     08-19  Mg     2.10     08-19    TPro  6.5  /  Alb  2.9<L>  /  TBili  0.4  /  DBili  x   /  AST  58<H>  /  ALT  29  /  AlkPhos  114  08-19    PT/INR - ( 19 Aug 2021 07:58 )   PT: 12.9 sec;   INR: 1.13 ratio        EKG:  CXR:   Type and Screen:         A/P: 65 YO F PMH of HTN, HLD, 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     - OR 8/20/2021 for lymph node biopsy with Dr. Pike  - NPO past midnight, except medications  - check BMP, CBC, coags, type & screen preop  - IVF while NPO  - Consent to be obtained  - Please document medical clearance for OR Preop Dx: + L groin lymph node   Surgeon: Dr. Pike  Procedure:    Vital Signs Last 24 Hrs  T(C): 36.7 (19 Aug 2021 05:41), Max: 36.7 (18 Aug 2021 12:42)  T(F): 98.1 (19 Aug 2021 05:41), Max: 98.1 (19 Aug 2021 05:41)  HR: 108 (19 Aug 2021 07:41) (104 - 115)  BP: 128/79 (19 Aug 2021 05:41) (128/79 - 141/83)  BP(mean): --  RR: 17 (19 Aug 2021 05:41) (17 - 20)  SpO2: 95% (19 Aug 2021 07:41) (95% - 99%)                        10.4   16.63 )-----------( 379      ( 19 Aug 2021 07:58 )             31.9     08-19    130<L>  |  91<L>  |  27<H>  ----------------------------<  104<H>  4.6   |  20<L>  |  1.10    Ca    9.7      19 Aug 2021 07:58  Phos  3.0     08-19  Mg     2.10     08-19    TPro  6.5  /  Alb  2.9<L>  /  TBili  0.4  /  DBili  x   /  AST  58<H>  /  ALT  29  /  AlkPhos  114  08-19    PT/INR - ( 19 Aug 2021 07:58 )   PT: 12.9 sec;   INR: 1.13 ratio          A/P: 67 YO F PMH of HTN, HLD, 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     - OR 8/20/2021 for lymph node biopsy with Dr. Pike  - NPO past midnight, except medications/IVF while NPO  - check BMP, CBC, coags, type & screen preop  - Consent to be obtained  - Please document medical clearance for OR Preop Dx: + L groin lymph node   Surgeon: Dr. Pike  Procedure:    Vital Signs Last 24 Hrs  T(C): 36.7 (19 Aug 2021 05:41), Max: 36.7 (18 Aug 2021 12:42)  T(F): 98.1 (19 Aug 2021 05:41), Max: 98.1 (19 Aug 2021 05:41)  HR: 108 (19 Aug 2021 07:41) (104 - 115)  BP: 128/79 (19 Aug 2021 05:41) (128/79 - 141/83)  BP(mean): --  RR: 17 (19 Aug 2021 05:41) (17 - 20)  SpO2: 95% (19 Aug 2021 07:41) (95% - 99%)                        10.4   16.63 )-----------( 379      ( 19 Aug 2021 07:58 )             31.9     08-19    130<L>  |  91<L>  |  27<H>  ----------------------------<  104<H>  4.6   |  20<L>  |  1.10    Ca    9.7      19 Aug 2021 07:58  Phos  3.0     08-19  Mg     2.10     08-19    TPro  6.5  /  Alb  2.9<L>  /  TBili  0.4  /  DBili  x   /  AST  58<H>  /  ALT  29  /  AlkPhos  114  08-19    PT/INR - ( 19 Aug 2021 07:58 )   PT: 12.9 sec;   INR: 1.13 ratio          A/P: 67 YO F PMH of HTN, HLD, 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     - OR 8/20/2021 for lymph node biopsy with Dr. Pike  - NPO past midnight, except medications/IVF while NPO  - check BMP, CBC, coags, type & screen preop  - Consent to be obtained  - Please document medical clearance for OR    D Team Surgery [id number] Preop Dx: + L groin lymph node   Surgeon: Dr. Pike  Procedure: lymph node biopsy    Vital Signs Last 24 Hrs  T(C): 36.7 (19 Aug 2021 05:41), Max: 36.7 (18 Aug 2021 12:42)  T(F): 98.1 (19 Aug 2021 05:41), Max: 98.1 (19 Aug 2021 05:41)  HR: 108 (19 Aug 2021 07:41) (104 - 115)  BP: 128/79 (19 Aug 2021 05:41) (128/79 - 141/83)  BP(mean): --  RR: 17 (19 Aug 2021 05:41) (17 - 20)  SpO2: 95% (19 Aug 2021 07:41) (95% - 99%)                        10.4   16.63 )-----------( 379      ( 19 Aug 2021 07:58 )             31.9     08-19    130<L>  |  91<L>  |  27<H>  ----------------------------<  104<H>  4.6   |  20<L>  |  1.10    Ca    9.7      19 Aug 2021 07:58  Phos  3.0     08-19  Mg     2.10     08-19    TPro  6.5  /  Alb  2.9<L>  /  TBili  0.4  /  DBili  x   /  AST  58<H>  /  ALT  29  /  AlkPhos  114  08-19    PT/INR - ( 19 Aug 2021 07:58 )   PT: 12.9 sec;   INR: 1.13 ratio          A/P: 67 YO F PMH of HTN, HLD, 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     - OR 8/20/2021 for lymph node biopsy with Dr. Pike  - NPO past midnight, except medications/IVF while NPO  - check BMP, CBC, coags, type & screen preop  - Consent to be obtained  - Please document medical clearance for OR    D Team Surgery [id number] Preop Dx: + L groin lymph node   Surgeon: Dr. Pike  Procedure: lymph node biopsy    Vital Signs Last 24 Hrs  T(C): 36.7 (19 Aug 2021 05:41), Max: 36.7 (18 Aug 2021 12:42)  T(F): 98.1 (19 Aug 2021 05:41), Max: 98.1 (19 Aug 2021 05:41)  HR: 108 (19 Aug 2021 07:41) (104 - 115)  BP: 128/79 (19 Aug 2021 05:41) (128/79 - 141/83)  BP(mean): --  RR: 17 (19 Aug 2021 05:41) (17 - 20)  SpO2: 95% (19 Aug 2021 07:41) (95% - 99%)                        10.4   16.63 )-----------( 379      ( 19 Aug 2021 07:58 )             31.9     08-19    130<L>  |  91<L>  |  27<H>  ----------------------------<  104<H>  4.6   |  20<L>  |  1.10    Ca    9.7      19 Aug 2021 07:58  Phos  3.0     08-19  Mg     2.10     08-19    TPro  6.5  /  Alb  2.9<L>  /  TBili  0.4  /  DBili  x   /  AST  58<H>  /  ALT  29  /  AlkPhos  114  08-19    PT/INR - ( 19 Aug 2021 07:58 )   PT: 12.9 sec;   INR: 1.13 ratio      COVID-19 PCR: NotDetec (18 Aug 2021 19:18)  SARS-CoV-2: NotDetec (11 Aug 2021 20:00)  SARS-CoV-2: NotDetec (25 Jul 2021 17:44)            A/P: 65 YO F PMH of HTN, HLD, 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    - OR 8/20/2021 for lymph node biopsy with Dr. Pike  - NPO past midnight, except medications/IVF while NPO  - check BMP, CBC, coags, type & screen preop  - Consent to be obtained  - Please document medical clearance for OR    D Team Surgery [id number]

## 2021-08-19 NOTE — DIETITIAN INITIAL EVALUATION ADULT. - ADD RECOMMEND
3) Encouraged PO intake as tolerated, RDN remains available PRN to obtain food preferences/discuss menu alternatives.                                                                               4) Monitor PO intake, tolerance to nutrition supplement, weight trends, BMs/GI distress, hydration status, skin integrity.

## 2021-08-19 NOTE — PROGRESS NOTE ADULT - ASSESSMENT
66F with PMH significant for HTN, HLD, recently discovered ovarian mass suspicious for malignancy, L hydroureteronephrosis s/p renal stent, and recent hospitalization to [facility] for acute renal failure (likely obstructive) requiring urgent HD, ascites s/p therapeutic paracentesis, and pleural effusion s/p R thoracentesis showing lymphocytosis but no malignant cells admitted for acute hypoxic respiratory failure with imaging showing bilateral pleural effusion.    #Acute hypoxic respiratory failure  - Pt p/w increasing SOB and WOB, with tachycardia and elevated WBC--> s/p right thora with improvement in SOB  - cytopathology from 8/2 showing DLBCL with 2 mutations    Recommendations:  - given patient with DLBCL, she would require excisional biopsy for diagnosis and management  - recommend surgical evaluation of LAD--> if surgery unable to access for excisional biopsy, would recommend IR consult for core need biopsy  - if core need is not diagnostic, can consider pleuroscopy with tissue sample for diagnosis     66F with PMH significant for HTN, HLD, recently discovered ovarian mass suspicious for malignancy, L hydroureteronephrosis s/p renal stent, and recent hospitalization to [facility] for acute renal failure (likely obstructive) requiring urgent HD, ascites s/p therapeutic paracentesis, and pleural effusion s/p R thoracentesis showing lymphocytosis but no malignant cells admitted for acute hypoxic respiratory failure with imaging showing bilateral pleural effusion.    #Acute hypoxic respiratory failure  - Pt p/w increasing SOB and WOB, with tachycardia and elevated WBC--> s/p right thora with improvement in SOB  - cytopathology from 8/2 showing DLBCL with 2 mutations    Recommendations:  - given patient with DLBCL, she would require excisional biopsy for diagnosis and management  - recommend surgical evaluation of LAD--> if surgery unable to access for excisional biopsy, would recommend IR consult for core need biopsy  - if core need is not diagnostic, can consider pleuroscopy with tissue sample for diagnosis  - POCUS with enlarging ascites--> would perform therapeutic paracentesis  - will continue to monitor pleural effusions for need for thoracentesis    Plan discussed with Dr. Kiser

## 2021-08-20 NOTE — PROGRESS NOTE ADULT - PROBLEM SELECTOR PLAN 4
Pt initially presented with progressively worsening SOB x 1 week since recent discharge. Pt satting 94-96% on 3L NC, with increased WOB. Placed on BiPAP with improvement in WOB for ~28h.   Presented with Acute Hypercarbic Resp Failure, now much improved  Patient sent on 2L home oxygen since last admission.  CXR showed moderate right-sided and small left-sided pleural effusions. Possibly exacerbated by ascites.  - ambulatory O2 assessment (while ambulating 92%; sitting/resting 96%)  - Monitor without lasix, can add if necessary  - Pulm onboard, recs appreciated.   - frequent pulse ox monitoring

## 2021-08-20 NOTE — PROGRESS NOTE ADULT - ASSESSMENT
66F with PMH significant for HTN, HLD, recently discovered ovarian mass suspicious for malignancy, L hydroureteronephrosis s/p renal stent, and recent hospitalization to [facility] for acute renal failure (likely obstructive) requiring urgent HD, ascites s/p therapeutic paracentesis, and pleural effusion s/p R thoracentesis showing lymphocytosis but no malignant cells admitted for acute hypoxic respiratory failure with imaging showing bilateral pleural effusion.    #Acute hypoxic respiratory failure  - Pt p/w increasing SOB and WOB, with tachycardia and elevated WBC--> s/p right thora with improvement in SOB  - cytopathology from 8/2 showing DLBCL with 2 mutations    Recommendations:  - pending excisional node biopsy by surgery as cytopath with  DLBCL  - POCUS with enlarging ascites--> would perform therapeutic paracentesis  - will continue to monitor pleural effusions for need for thoracentesis    Plan discussed with Dr. Kiser

## 2021-08-20 NOTE — PROGRESS NOTE ADULT - PROBLEM SELECTOR PLAN 2
CT AP (7/25): Moderate volume of abdominal ascites. Reticulation of the intra-abdominal fat in the left upper quadrant and mid abdomen is likely related to lymphangitic drainage of fluid as opposed to carcinomatosis.  Limited abd US 8/12: Moderate ascites deepest pocket in RUQ  - Initially procedure team consulted for paracentesis but deemed too little to intervene  - Procedure team consulted again 8/20/21 per Pulm recs for therapeutic paracentesis.  - diuresis PRN CT AP (7/25): Moderate volume of abdominal ascites. Reticulation of the intra-abdominal fat in the left upper quadrant and mid abdomen is likely related to lymphangitic drainage of fluid as opposed to carcinomatosis.  Limited abd US 8/12: Moderate ascites deepest pocket in RUQ  - Initially procedure team consulted for paracentesis but deemed too little to intervene  - Procedure team consulted again 8/20/21 and completed therapeutic paracentesis.   - diuresis PRN

## 2021-08-20 NOTE — PROGRESS NOTE ADULT - ATTENDING COMMENTS
66F HTN, Ovarian CA c/b ascites, Lt hydronephrosis s/p stent 7/2021, recent PURVI on CKD s/p HD, pleural effusion s/p thoracentesis p/w acute respiratory failure with hypercarbia due to pleural effusion due to fluid overload. Recent TTE - nml LVFx. Initially requiring BiPAP for respiration. s/p therapeutic thoracentesis on 8/13 and pt weaned to NC. CT chest showing multifocal PNA- s/p 5 day course of antibiotics. Gyn onc consulted for ovarian mass - needs tissue diagnosis. Pleural fluid from 8/2 now with high grade lymphoma. Heme consulted. Primary malignancy likely hematologic at this time, may not need to pursue EBUS now. Surgery planning excisional biopsy of lymph node on 8/20/21. Patient without active chest pain today. Satting well on RA. Also repeat EKG on 8/19 with no signs of ST or t wave abnormality. RCRI score of 2. Patient is optimized for planned procedure and may proceed with planned procedure tomorrow. Benefit of procedure outweigh any risk given high grade lymphoma. S/p therapeutic tap by procedure team today for rapidly advancing ascites. Will f/u further heme and pulm recommendations.     Plan discussed with patient and HS

## 2021-08-20 NOTE — PROGRESS NOTE ADULT - NUTRITIONAL ASSESSMENT
This patient has been assessed with a concern for Malnutrition and has been determined to have a diagnosis/diagnoses of Moderate protein-calorie malnutrition.    This patient is being managed with:   Diet NPO after Midnight-     NPO Start Date: 19-Aug-2021   NPO Start Time: 23:59  Entered: Aug 19 2021  1:00PM    Diet Mechanical Soft-  Entered: Aug 13 2021  4:12PM

## 2021-08-20 NOTE — PROGRESS NOTE ADULT - SUBJECTIVE AND OBJECTIVE BOX
CHIEF COMPLAINT: sob    Interval Events: Patient seen and examined at bedside. Overnight documentation revealed that patient had an episode of shortness of breath, improved when she sat up. On evaluation, patient reports she is feeling well. She states she is more comfortable upright than recumbent.     REVIEW OF SYSTEMS:  Constitutional: [ X] negative [ ] fevers [ ] chills [ ] weight loss [ ] weight gain  HEENT: [ X] negative [ ] dry eyes [ ] eye irritation [ ] postnasal drip [ ] nasal congestion  CV: [X ] negative  [ ] chest pain [ ] orthopnea [ ] palpitations [ ] murmur  Resp: [X ] negative [ ] cough [ ] shortness of breath [ ] dyspnea [ ] wheezing [ ] sputum [ ] hemoptysis  GI: [X ] negative [ ] nausea [ ] vomiting [ ] diarrhea [ ] constipation [ ] abd pain [ ] dysphagia   : [ X] negative [ ] dysuria [ ] nocturia [ ] hematuria [ ] increased urinary frequency  Musculoskeletal: [X ] negative [ ] back pain [ ] myalgias [ ] arthralgias [ ] fracture  Skin: [X ] negative [ ] rash [ ] itch  Neurological: [ ] negative [ ] headache [ ] dizziness [ ] syncope [ ] weakness [ ] numbness  Psychiatric: [ ] negative [ ] anxiety [ ] depression  Endocrine: [ ] negative [ ] diabetes [ ] thyroid problem  Hematologic/Lymphatic: [ ] negative [ ] anemia [ ] bleeding problem  Allergic/Immunologic: [ ] negative [ ] itchy eyes [ ] nasal discharge [ ] hives [ ] angioedema  [ ] All other systems negative  [ ] Unable to assess ROS because ________    OBJECTIVE:  ICU Vital Signs Last 24 Hrs  T(C): 36.7 (20 Aug 2021 16:20), Max: 37.1 (20 Aug 2021 13:20)  T(F): 98.1 (20 Aug 2021 16:20), Max: 98.7 (20 Aug 2021 13:20)  HR: 114 (20 Aug 2021 18:00) (52 - 114)  BP: 117/72 (20 Aug 2021 18:00) (106/71 - 141/79)  BP(mean): 83 (20 Aug 2021 18:00) (74 - 90)  ABP: --  ABP(mean): --  RR: 30 (20 Aug 2021 18:00) (8 - 30)  SpO2: 94% (20 Aug 2021 18:00) (93% - 98%)        08-19 @ 07:01  -  08-20 @ 07:00  --------------------------------------------------------  IN: 620 mL / OUT: 400 mL / NET: 220 mL    08-20 @ 07:01 - 08-20 @ 18:40  --------------------------------------------------------  IN: 0 mL / OUT: 150 mL / NET: -150 mL      CAPILLARY BLOOD GLUCOSE          PHYSICAL EXAM:  General: thin cachectic female, NAD, sitting in chair,  HEENT: no scleral icterus  Lymph Nodes: no palpable LAD in the neck  Respiratory: decreased breath sounds at the bases  Cardiovascular: s1/s2, rrr, no murmurs  Abdomen: soft, nontender, markedly distended  Extremities: no LE edema  Skin: no rashes noted  Neurological: AxOx3  Psychiatry: normal mood and affect    HOSPITAL MEDICATIONS:        allopurinol 300 milliGRAM(s) Oral daily      acetaminophen   Tablet .. 650 milliGRAM(s) Oral every 6 hours PRN  melatonin 3 milliGRAM(s) Oral at bedtime PRN    aluminum hydroxide/magnesium hydroxide/simethicone Suspension 30 milliLiter(s) Oral every 4 hours PRN  famotidine    Tablet 20 milliGRAM(s) Oral daily        lactated ringers. 500 milliLiter(s) IV Continuous <Continuous>  multivitamin 1 Tablet(s) Oral daily            LABS:                        10.1   16.48 )-----------( 357      ( 20 Aug 2021 05:45 )             31.4     Hgb Trend: 10.1<--, 10.4<--, 10.9<--, 10.3<--, 10.7<--  08-20    129<L>  |  95<L>  |  27<H>  ----------------------------<  93  4.5   |  20<L>  |  1.10    Ca    10.0      20 Aug 2021 05:45  Phos  2.6     08-20  Mg     2.00     08-20    TPro  6.5  /  Alb  2.9<L>  /  TBili  0.4  /  DBili  x   /  AST  58<H>  /  ALT  29  /  AlkPhos  114  08-19    Creatinine Trend: 1.10<--, 1.10<--, 0.99<--, 0.77<--, 0.76<--, 0.72<--  PT/INR - ( 20 Aug 2021 05:45 )   PT: 12.5 sec;   INR: 1.10 ratio         PTT - ( 20 Aug 2021 05:45 )  PTT:23.3 sec          MICROBIOLOGY:     RADIOLOGY:  [ ] Reviewed and interpreted by me    PULMONARY FUNCTION TESTS:    EKG:

## 2021-08-20 NOTE — PROGRESS NOTE ADULT - PROBLEM SELECTOR PLAN 10
-DVT ppx: lovenox   -Dispo: pending clinical improvement -DVT ppx: lovenox  -Diet: mechanical soft  -Dispo: pending clinical improvement

## 2021-08-20 NOTE — CHART NOTE - NSCHARTNOTEFT_GEN_A_CORE
Patient seen and evaluated for tachypnea and increased work of breathing. VS: temp 97.7, , /79, RR 22 95% on RA. Patient up and walking, without noticeable dyspnea. Reports trouble breathing; did not wake her up from sleep but more comfortable sleeping upright. On physical exam, patient with slight abdominal breathing, coarse breath sounds, and tight crackles heard at inferior posterior lung bases. 2+ pitting edema in the LEs b/l. Presentation likely due to fluid overload. No need for supplemental oxygen at present, but consider diuresis, though deferring now in s/o worsening renal function.

## 2021-08-20 NOTE — PROGRESS NOTE ADULT - PROBLEM SELECTOR PLAN 3
Resolved; R Lung Multifocal PNA noted on CT chest.  -Finished cefepime course (8/19/21) Na downtrending since yesterday. This Am at 129. Most likely 2/2 SIADH in the setting of malignancy. Pt does not seem hypervolemic or fluid-overloaded. Mental status intact (A&Ox3). No immediate concerns.  -Urine studies to evaluate  -continue to monitor

## 2021-08-20 NOTE — CHART NOTE - NSCHARTNOTEFT_GEN_A_CORE
Surgery Post-Op Note    Pre-Op Dx: enlarged lymph node  Procedure: Excisional biopsy of left inguinal lymph node  Surgeon: Dr. Miller     SUBJECTIVE:  Pt seen and examined at the bedside. Pt w/ no complaints. Denies F/C/N/V. Pain controlled with medication.     OBJECTIVE:  Vital Signs Last 24 Hrs  T(C): 36.7 (20 Aug 2021 19:34), Max: 37.1 (20 Aug 2021 13:20)  T(F): 98.1 (20 Aug 2021 19:34), Max: 98.7 (20 Aug 2021 13:20)  HR: 113 (20 Aug 2021 19:34) (52 - 114)  BP: 137/76 (20 Aug 2021 19:34) (106/71 - 141/79)  BP(mean): 83 (20 Aug 2021 18:00) (74 - 90)  RR: 34 (20 Aug 2021 19:34) (8 - 34)  SpO2: 94% (20 Aug 2021 19:34) (93% - 98%)    Physical Exam:  General: NAD, resting comfortably in bed  Neuro: A/O x 3, no focal deficits  Pulmonary: Nonlabored breathing, no respiratory distress  Cardiovascular: NSR  Extremities: L inguinal excision C/d/I with no swelling.     LABS:                        10.1   16.48 )-----------( 357      ( 20 Aug 2021 05:45 )             31.4     08-20    129<L>  |  95<L>  |  27<H>  ----------------------------<  93  4.5   |  20<L>  |  1.10    Ca    10.0      20 Aug 2021 05:45  Phos  2.6     08-20  Mg     2.00     08-20    TPro  6.5  /  Alb  2.9<L>  /  TBili  0.4  /  DBili  x   /  AST  58<H>  /  ALT  29  /  AlkPhos  114  08-19    PT/INR - ( 20 Aug 2021 05:45 )   PT: 12.5 sec;   INR: 1.10 ratio         PTT - ( 20 Aug 2021 05:45 )  PTT:23.3 sec  CAPILLARY BLOOD GLUCOSE          LIVER FUNCTIONS - ( 19 Aug 2021 07:58 )  Alb: 2.9 g/dL / Pro: 6.5 g/dL / ALK PHOS: 114 U/L / ALT: 29 U/L / AST: 58 U/L / GGT: x               IMAGING:    ASSESSMENT:66y Female now 4hours s/p L inguinal lymph node dissection.     PLAN:  - Pain control  - Encourage IS  - Nausea control PRN  - Monitor vitals  - Diet: per primary team   - Monitor I+Os  - OOB/ Ambulate  - DVT ppx: per primary team     D Team Surgery  [id number]

## 2021-08-20 NOTE — BRIEF OPERATIVE NOTE - NSICDXBRIEFPOSTOP_GEN_ALL_CORE_FT
POST-OP DIAGNOSIS:  Intra-abdominal and pelvic swelling of mass or lump 20-Aug-2021 16:23:37  Xu Thorne R

## 2021-08-20 NOTE — PROGRESS NOTE ADULT - SUBJECTIVE AND OBJECTIVE BOX
PROGRESS NOTE:   Patient is a 66y old  Female who presents with a chief complaint of Worsening volume overload (20 Aug 2021 07:31)    SUBJECTIVE / OVERNIGHT EVENTS:  Per NF, pt was seen and evaluated for tachypnea and increased WOB. When examined pt was stable w/o any immediate concerns. No change in physical exam from prior. This AM, pt found resting comfortably in bed prepared for procedure this afternoon. No complaints.     REVIEW OF SYSTEMS:  CONSTITUTIONAL: No fever, chills, or malaise  HEAD: No HA or trauma  EYES: No visual changes, diplopia, or pain   ENT:  No sore throat, hoarseness, or hearing loss  NECK: No pain or stiffnes, no swollen glands  RESPIRATORY: No SOB, cough, wheezing, or hemoptysis; no production of phlegm  CARDIOVASCULAR: No chest pain or palpitations  GASTROINTESTINAL: No abdominal pain. No nausea, vomiting, or hematemesis; No diarrhea or constipation. No melena or hematochezia.  GENITOURINARY: No dysuria, frequency, incontinence or hematuria  NEUROLOGICAL: No numbness or weakness; no incoordination or tremors  MUSCULOSKELETAL: No muscle or joint pain; no swelling of stiffness  SKIN: No itching, rashes      MEDICATIONS  (STANDING):  allopurinol 300 milliGRAM(s) Oral daily  famotidine    Tablet 20 milliGRAM(s) Oral daily  lactated ringers. 500 milliLiter(s) (75 mL/Hr) IV Continuous <Continuous>  multivitamin 1 Tablet(s) Oral daily    MEDICATIONS  (PRN):  acetaminophen   Tablet .. 650 milliGRAM(s) Oral every 6 hours PRN Temp greater or equal to 38.5C (101.3F), Mild Pain (1 - 3)  aluminum hydroxide/magnesium hydroxide/simethicone Suspension 30 milliLiter(s) Oral every 4 hours PRN Dyspepsia  melatonin 3 milliGRAM(s) Oral at bedtime PRN Insomnia      I&O's Summary    19 Aug 2021 07:01  -  20 Aug 2021 07:00  --------------------------------------------------------  IN: 620 mL / OUT: 400 mL / NET: 220 mL    20 Aug 2021 07:01  -  20 Aug 2021 10:56  --------------------------------------------------------  IN: 0 mL / OUT: 150 mL / NET: -150 mL      PHYSICAL EXAM:  Vital Signs Last 24 Hrs  T(C): 36.5 (20 Aug 2021 05:45), Max: 36.6 (19 Aug 2021 14:10)  T(F): 97.7 (20 Aug 2021 05:45), Max: 97.8 (19 Aug 2021 14:10)  HR: 102 (20 Aug 2021 05:45) (102 - 114)  BP: 134/79 (20 Aug 2021 05:45) (122/69 - 134/79)  BP(mean): --  RR: 22 (20 Aug 2021 05:45) (17 - 22)  SpO2: 95% (20 Aug 2021 05:45) (94% - 95%)    GENERAL: Nl-appearing female in no acute distress  HEENT: Normocephalic; supple neck, no JVD  CARDIAC: RRR, nl S1 and S2, no m/r/g  PULM: mildly coarse breath sounds, no increased WOB  ABDOMEN: non-tender but distended, BS+  EXTREMITIES:  2+ peripheral pulses, no clubbing, no edema  NERVOUS SYSTEM:  A&Ox3, no focal deficits  MSK: FROM all 4 extremities  SKIN: No rashes or lesions      LABS:                        10.1   16.48 )-----------( 357      ( 20 Aug 2021 05:45 )             31.4     08-20    129<L>  |  95<L>  |  27<H>  ----------------------------<  93  4.5   |  20<L>  |  1.10    Ca    10.0      20 Aug 2021 05:45  Phos  2.6     08-20  Mg     2.00     08-20    TPro  6.5  /  Alb  2.9<L>  /  TBili  0.4  /  DBili  x   /  AST  58<H>  /  ALT  29  /  AlkPhos  114  08-19    PT/INR - ( 20 Aug 2021 05:45 )   PT: 12.5 sec;   INR: 1.10 ratio      PTT - ( 20 Aug 2021 05:45 )  PTT:23.3 sec

## 2021-08-20 NOTE — PROGRESS NOTE ADULT - ASSESSMENT
66-year-old female with PMH significant for HTN, HLD, recently discovered ovarian mass suspicious for malignancy (7/2021), L hydroureteronephrosis s/p renal stent (7/15/21), and recent hospitalization ([facility] 7/26-8/4) for acute renal failure (likely obstructive) requiring urgent HD, ascites s/p therapeutic paracentesis (7/27/21), and pleural effusion s/p R thoracentesis (8/2/21) showing lymphocytosis but no malignant cells admitted for acute hypercarbic respiratory failure due to pleural effusions most likely caused by ovarian malignancy a/w volume overload with ascites and b/l LE edema. 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 Staging and surgical excision being planned.

## 2021-08-20 NOTE — PROGRESS NOTE ADULT - PROBLEM SELECTOR PLAN 1
Pt found to have ovarian mass on CT Abd/Pelvis during recently hospitalization at [facility] in early July 2021. Thoracentesis fluid studies with many cells, Large, abnormal immature looking mononuclear cells, many with cauliflower. Many RBCs nuclei (few in clusters).   -8/18/21 = Cytology was amended to reflect that pt has 2-hit mutation of B-cell lymphoma   -Heme/onc consult; recs appreciated    =CT neck - no cervical LAD noted    =C/A/P - abdominal, pelvic, and mediastinal LAD w/ the largest nodes in central mesentery    =f/u Hep panel, HIV, HLTV1 and 2    =f/u TLS labs QD    =continue w/ Allopurinol 300mg QD  -Consult Surg Onc; recs appreciated.    =Surgical excisional biopsy scheduled for 8/20/21 at 1:30pm Pt found to have ovarian mass on CT Abd/Pelvis during recently hospitalization at [facility] in early July 2021. Thoracentesis fluid studies with many cells, Large, abnormal immature looking mononuclear cells, many with cauliflower. Many RBCs nuclei (few in clusters).   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

## 2021-08-20 NOTE — PROCEDURE NOTE - NSPOSTCAREGUIDE_GEN_A_CORE
Verbal/written post procedure instructions were given to patient/caregiver/Instructed patient/caregiver regarding signs and symptoms of infection/Keep the cast/splint/dressing clean and dry
Verbal/written post procedure instructions were given to patient/caregiver/Instructed patient/caregiver to follow-up with primary care physician

## 2021-08-20 NOTE — BRIEF OPERATIVE NOTE - NSICDXBRIEFPREOP_GEN_ALL_CORE_FT
PRE-OP DIAGNOSIS:  Intra-abdominal and pelvic swelling of mass or lump 20-Aug-2021 16:23:18  Xu Thorne R

## 2021-08-20 NOTE — PROGRESS NOTE ADULT - ATTENDING COMMENTS
Patient with ascites increasing in size.  Pleural effusions are stable.  Planned for paracentesis today as well as excisional lymph node biopsy.

## 2021-08-20 NOTE — CHART NOTE - NSCHARTNOTEFT_GEN_A_CORE
Patient seen and evaluated at bedside for tachypnea after coming up from procedure. Patient noted to be tachypneic to 30s, but NAD, comfortable in bed. On physical exam, faint crackles auscultated at posterior inferior lung bases b/l. Pitting edema 2+ in LEs b/l. Patient denied SOB or trouble breathing, CP, pain at surgical site, which was checked and found to be clean and dry. Considering pulmonary edema given post-procedure, worsening of pleural effusions, possible aspiration. Rechecked vitals at time of encounter and O2 sat was 94%, tachy to 117, , afebrile. No need for supplemental O2 at this time; encouraged patient to remain upright in bed and will continue to monitor.

## 2021-08-21 NOTE — PROGRESS NOTE ADULT - ATTENDING COMMENTS
Patient is s/p excisional biopsy yesterday to confirm diagnosis of b cell lymphoma. Also s/p para with 1.6L removed. Today with worsening tachypnea. ABG with hypoxia. Pulm consulted this morning to evaluate for need for thora. Lasix 20IV given for temporizing measure. Bipap starting for increased WOB. Await final pulm recommendations.

## 2021-08-21 NOTE — PROGRESS NOTE ADULT - ASSESSMENT
66-year-old female with PMH significant for HTN, HLD, recently discovered ovarian mass suspicious for malignancy (7/2021), L hydroureteronephrosis s/p renal stent (7/15/21), and recent hospitalization ([facility] 7/26-8/4) for acute renal failure (likely obstructive) requiring urgent HD, ascites s/p therapeutic paracentesis (7/27/21), and pleural effusion s/p R thoracentesis (8/2/21) showing lymphocytosis but no malignant cells admitted for acute hypercarbic respiratory failure due to pleural effusions most likely caused by ovarian malignancy a/w volume overload with ascites and b/l LE edema. 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 Ongoing management being discussed.

## 2021-08-21 NOTE — PROGRESS NOTE ADULT - PROBLEM SELECTOR PLAN 3
Na downtrending since yesterday. This Am at 129. Most likely 2/2 SIADH in the setting of malignancy. Pt does not seem hypervolemic or fluid-overloaded. Mental status intact (A&Ox3). No immediate concerns.  -f/u urine studies  -continue to monitor

## 2021-08-21 NOTE — PROGRESS NOTE ADULT - SUBJECTIVE AND OBJECTIVE BOX
PROGRESS NOTE:   Patient is a 66y old  Female who presents with a chief complaint of Worsening volume overload (21 Aug 2021 09:37)    SUBJECTIVE / OVERNIGHT EVENTS:  Completed surgical excision yesterday afternoon. No immediate complications per PACU notes. Called for tachypneic to 30s around 10:30pm per NF. However, pt was stable. Repeat vitals at tachy at 117, , Oxygen sat at 94%. No intervention at that time. Pt resting by chair this morning. Pt complaining of sore/dry throat. Encouraged pt to hydrate and eat as tolerated. Denies f/c, SOB, cp, chest palpitations, N/V, abdominal pain.     REVIEW OF SYSTEMS:  CONSTITUTIONAL: No fever, chills, or malaise  HEAD: No HA or trauma  EYES: No visual changes, diplopia, or pain   ENT:  No sore throat, hoarseness, or hearing loss  NECK: No pain or stiffness, no swollen glands  RESPIRATORY: No SOB, cough, wheezing, or hemoptysis; no production of phlegm  CARDIOVASCULAR: No chest pain or palpitations  GASTROINTESTINAL: No abdominal pain. No nausea, vomiting, or hematemesis; No diarrhea or constipation. No melena or hematochezia.  GENITOURINARY: No dysuria, frequency, incontinence or hematuria  NEUROLOGICAL: No numbness or weakness; no incoordination or tremors  MUSCULOSKELETAL: No muscle or joint pain; no swelling of stiffness  SKIN: No itching, rashes      MEDICATIONS  (STANDING):  allopurinol 300 milliGRAM(s) Oral daily  famotidine    Tablet 20 milliGRAM(s) Oral daily  multivitamin 1 Tablet(s) Oral daily    MEDICATIONS  (PRN):  acetaminophen   Tablet .. 650 milliGRAM(s) Oral every 6 hours PRN Temp greater or equal to 38.5C (101.3F), Mild Pain (1 - 3)  aluminum hydroxide/magnesium hydroxide/simethicone Suspension 30 milliLiter(s) Oral every 4 hours PRN Dyspepsia  melatonin 3 milliGRAM(s) Oral at bedtime PRN Insomnia      I&O's Summary    20 Aug 2021 07:01  -  21 Aug 2021 07:00  --------------------------------------------------------  IN: 50 mL / OUT: 350 mL / NET: -300 mL      PHYSICAL EXAM:  Vital Signs Last 24 Hrs  T(C): 36.4 (21 Aug 2021 08:33), Max: 37.1 (20 Aug 2021 13:20)  T(F): 97.5 (21 Aug 2021 08:33), Max: 98.7 (20 Aug 2021 13:20)  HR: 126 (21 Aug 2021 08:33) (52 - 126)  BP: 145/79 (21 Aug 2021 08:33) (106/71 - 145/82)  BP(mean): 83 (20 Aug 2021 18:00) (74 - 90)  RR: 24 (21 Aug 2021 08:33) (8 - 34)  SpO2: 100% (21 Aug 2021 08:33) (93% - 100%)    GENERAL: Nl-appearing female in no acute distress  HEENT: Normocephalic; supple neck, no JVD  CARDIAC: RRR, nl S1 and S2, no m/r/g  PULM: mildly coarse breath sounds, no increased WOB  ABDOMEN: non-tender but distended, BS+  EXTREMITIES:  2+ peripheral pulses, no clubbing, no edema  NERVOUS SYSTEM:  A&Ox3, no focal deficits  MSK: FROM all 4 extremities  SKIN: No rashes or lesions    LABS:                        9.7    16.98 )-----------( 352      ( 21 Aug 2021 07:04 )             30.5     08-21    134<L>  |  94<L>  |  27<H>  ----------------------------<  67<L>  4.2   |  17<L>  |  1.24    Ca    9.9      21 Aug 2021 07:00  Phos  2.7     08-21  Mg     2.10     08-21      PT/INR - ( 20 Aug 2021 05:45 )   PT: 12.5 sec;   INR: 1.10 ratio         PTT - ( 20 Aug 2021 05:45 )  PTT:23.3 sec

## 2021-08-21 NOTE — PROGRESS NOTE ADULT - ASSESSMENT
Patient is a 66y old  Female who presents with a chief complaint of Worsening volume overload. Hx of B- cell lymphoma. POD1 felt inguinal lymph node biopsy.     PLAN:   - Continue Excellent care per primary team  - No further surgical care for now.   - Reach back with any concerns.       D team   #[id number].      Patient is a 66y old  Female who presents with a chief complaint of Worsening volume overload. Hx of B- cell lymphoma. POD1 felt inguinal lymph node biopsy.     PLAN:   - Continue Excellent care per primary team  - No further surgical care for now.   - Reach back with any concerns.   - Follow up with Dr Miller in 2 weeks. If still admitted in two weeks please contact us back.        D team   #[id number].

## 2021-08-21 NOTE — PROGRESS NOTE ADULT - NUTRITIONAL ASSESSMENT
This patient has been assessed with a concern for Malnutrition and has been determined to have a diagnosis/diagnoses of Moderate protein-calorie malnutrition.    This patient is being managed with:   Diet Mechanical Soft-  Entered: Aug 21 2021  7:18AM

## 2021-08-21 NOTE — PROGRESS NOTE ADULT - PROBLEM SELECTOR PLAN 1
Pt found to have ovarian mass on CT Abd/Pelvis during recently hospitalization at [facility] in early July 2021. Thoracentesis fluid studies with many cells, Large, abnormal immature looking mononuclear cells, many with cauliflower. Many RBCs nuclei (few in clusters). Found to have B-cell lymphoma. S/p surgical excisional bx on 8/21/21.  -Heme/onc consult; recs appreciated    =f/u Hep panel, HIV, HLTV1 and 2    =f/u TLS labs QD    =continue w/ Allopurinol 300mg QD  -Completed Surg-onc excisional bx on 8/21/21    =f/u bx results

## 2021-08-21 NOTE — PROGRESS NOTE ADULT - ASSESSMENT
66F with PMH significant for HTN, HLD, recently discovered ovarian mass suspicious for malignancy, L hydroureteronephrosis s/p renal stent, and recent hospitalization to [facility] for acute renal failure (likely obstructive) requiring urgent HD, ascites s/p therapeutic paracentesis, and pleural effusion s/p R thoracentesis showing lymphocytosis but no malignant cells admitted for acute hypoxic respiratory failure with imaging showing bilateral pleural effusion. Cytopathology reveals DLBCL.     #Acute hypoxic respiratory failure  - called today as patient patient p/w increasing SOB and WOB  - upon evaluation patient 95% on room air with tachypnea (no retractions noted)  - BMP today shows anion gap metabolic acidosis--> no lactate done with ABG  - patient may be tachypneic for compensating for metabolic acidosis  - Bedside POCUS shows bilateral pleural effusion with compressive atelectasis--> unchanged from prior  - patient is s/p excisional lymph node biopsy and therapeutic paracentesis on 8/20    Recommendations:  - currently patient is not hypoxic, and tachypnea may be compensatory  - please obtain lactate level to evaluate for anion gap metabolic acidosis   - POCUS with persistent pleural effusion- has not changed since prior and at this time poor window for thoracentesis 66F with PMH significant for HTN, HLD, recently discovered ovarian mass suspicious for malignancy, L hydroureteronephrosis s/p renal stent, and recent hospitalization to [facility] for acute renal failure (likely obstructive) requiring urgent HD, ascites s/p therapeutic paracentesis, and pleural effusion s/p R thoracentesis showing lymphocytosis but no malignant cells admitted for acute hypoxic respiratory failure with imaging showing bilateral pleural effusion. Cytopathology reveals DLBCL.     #Acute hypoxic respiratory failure  - called today as patient patient p/w increasing SOB and WOB  - upon evaluation patient 95% on room air with tachypnea (no retractions noted)  - BMP today shows anion gap metabolic acidosis--> no lactate done with ABG  - patient may be tachypneic for compensating for metabolic acidosis  - Bedside POCUS shows bilateral pleural effusion with compressive atelectasis--> unchanged from prior  - patient is s/p excisional lymph node biopsy and therapeutic paracentesis on 8/20    Recommendations:  - currently patient is not hypoxic, and tachypnea may be a compensatory response to her metabolic acidosis  - please obtain lactate level to evaluate for anion gap metabolic acidosis   - POCUS with persistent pleural effusion- has not changed since prior and at this time poor window for thoracentesis

## 2021-08-21 NOTE — PROGRESS NOTE ADULT - PROBLEM SELECTOR PLAN 8
Subjective:      Fernanda Gomez is a 47 y.o. female who presents with Foot Problem ((LT foot injury cement block fell on it 8/3/19). The patient would like to know how active she can be, favoring left foot/toe, not as sensitive as a week ago, impact at site isnt bleeding anymore and healing.) and Toe Injury      Referred by MARGIE Grossman for evaluation of LEFT great toe pain    HPI   LEFT great toe pain  Date of injury, August 4, 2019  Mechanism of injury, inadvertently dropped a cement block on her LEFT foot, not work-related  Sudden sharp pain at the LEFT great toe  No radiation  Improved with rest and immobilization  Worse with movement and applying pressure to the LEFT great toe  Denies any prior issues with the LEFT lower extremity  Taking ibuprofen for pain, last dose was yesterday  Seen at urgent care, had x-rays, placed in a short cam walker boot referred for further evaluation and management    Retired, executive administrative/desk work  Activities include gardening and maintaining/cleaning    Review of Systems   Constitutional: Positive for chills. Negative for fever.        Resolved, last approximately 1 week after injury   Respiratory: Negative for shortness of breath.    Cardiovascular: Negative for chest pain.   Gastrointestinal: Negative for nausea and vomiting.   Neurological: Positive for headaches. Negative for dizziness.        Resolved, history of degenerative cervical spine     PMH:  has no past medical history on file.  MEDS:   Current Outpatient Medications:   •  mupirocin (BACTROBAN) 2 % Ointment, Apply 1 Application to affected area(s) 2 times a day., Disp: 1 Tube, Rfl: 0  ALLERGIES:   Allergies   Allergen Reactions   • Lactose      GI Issue.   • Other Environmental      Pollens.     SURGHX: No past surgical history on file.  SOCHX:  reports that she has never smoked. She has never used smokeless tobacco.  FH: Family history was reviewed, no pertinent findings to report     "Objective:     /80 (BP Location: Left arm, Patient Position: Sitting, BP Cuff Size: Adult)   Pulse 98   Temp 36.9 °C (98.4 °F) (Temporal)   Resp 14   Ht 1.638 m (5' 4.5\")   Wt 75.9 kg (167 lb 5.3 oz)   SpO2 95%   BMI 28.28 kg/m²       Physical Exam     RIGHT ANKLE:  There is NO swelling noted at the ankle  Range of motion intact with dorsiflexion and plantarflexion, inversion and eversion  There is NO tenderness of the ATFL, CF or PTF ligament  There is NO tenderness of the lateral malleolus or medial malleolus  Anterior drawer testing is NEGATIVE  Talar tilt testing is NEGATIVE  The foot and ankle is otherwise neurovascularly intact    RIGHT FOOT:  There is NO swelling noted at the foot  There is NO tenderness at the base of the fifth metatarsal, cuboid, or tarsal navicular  There is NO pain with metatarsal squeeze test    LEFT ANKLE:  There is NO swelling noted at the ankle  Range of motion intact with dorsiflexion and plantarflexion, inversion and eversion  There is NO tenderness of the ATFL, CF or PTF ligament  There is NO tenderness of the lateral malleolus or medial malleolus  Anterior drawer testing is NEGATIVE  Talar tilt testing is NEGATIVE  The foot and ankle is otherwise neurovascularly intact    LEFT FOOT:  There is POSITIVE MILD swelling with ecchymosis noted at the foot   Predominantly at the region of the great toe and forefoot  There is NO tenderness at the base of the fifth metatarsal, cuboid, or tarsal navicular  There is NO pain with metatarsal squeeze test    NEUTRAL stance  Able to ambulate with ANTALGIC gait    Assessment/Plan:     1. Open nondisplaced fracture of distal phalanx of toe of left foot  DX-TOE(S) 2+ LEFT     Date of injury, August 4, 2019  Mechanism of injury, inadvertently dropped a cement block on her LEFT foot, not work-related    REPEAT x-rays performed in the office TODAY (August 13, 2019) demonstrate stable fracture with minimal displacement despite possible " articular involvement    Continue fracture stabilization and short leg cam walker boot  Recommend immobilization for a total of 6 weeks from the date of injury (until on or after September 15, 2019)    Return in about 3 weeks (around 9/3/2019).  For fracture check              8/4/2019 1:36 PM    HISTORY/REASON FOR EXAM:  Left great toe and 2nd toe pain for one day after dropping block on foot.      TECHNIQUE/EXAM DESCRIPTION AND NUMBER OF VIEWS:  3 views of the RIGHT toes.    COMPARISON: None    FINDINGS:  There is a nondisplaced comminuted fracture of the mid portion of the distal phalanx of the great toe with one area suspected extension to the articular surface of the base of the distal phalanx.  No dislocation is present.  The 2nd phalanges are intact.  The remainder the visualized left foot is intact.      Impression       1.  Acute comminuted nondisplaced fracture of the midportion of the distal phalanx of the left great toe with possible extension of the fracture to the articular surface of the base of the distal phalanx.    2.  No dislocation.    3.  No other toe fracture identified.        Interpreted in the office today with the patient    Thank you MARGIE Grossman for allowing me to participate in caring for your patient.   Pt was recently switched from HCTZ-triamterene to amlodipine during recent hospitalization   - holding amlodipine in setting of LE edema  - Monitor BP after procedures and diuretics  - If remains hypertensive, will consider non-amlodipine anti-HTN medications

## 2021-08-21 NOTE — PROGRESS NOTE ADULT - SUBJECTIVE AND OBJECTIVE BOX
Patient is a 66y old  Female who presents with a chief complaint of Worsening volume overload (20 Aug 2021 10:56)      HPI:  66-year-old female with PMH significant for HTN, HLD, recently discovered ovarian mass suspicious for malignancy (7/2021), and L hydroureteronephrosis s/p renal stent (7/15/21) presenting with BLE edema and worsening abdominal distension and SOB. Of note, pt with recent hospitalization ([facility] 7/26-8/4) for acute renal failure (likely obstructive vs MONO) requiring urgent HD, ascites s/p therapeutic paracentesis (7/27/21), and pleural effusion s/p R thoracentesis (8/2/21) showing lymphocytosis but no malignant cells. Patient states she was discharged to follow up with gyn-onc at [facility] as previously scheduled after her discharge from [place], where her ovarian mass was discovered earlier in July. However, when she went home she developed BLE edema, worsening over the past few days along with worsening abdominal distension and SOB. Patient denies fevers/chills, lightheadedness/dizziness, cough, CP, palpitations, cough, n/v/d, abdominal pain, LE pain.     In the ED, vitals T 97-99, -117, //90, RR 26 - 30 (satting 94-96% on NC). Labs significant for WBC 13.4, proBNP 34. VBG 7.45/50/44/35, Lactate 2.7. RVP/COVID PCR neg. CXR shows moderate R-sided and small L-sided pleural effusions. Pt received IV lasix 40mg x 1 in ED. Placed on BiPAP for increased WOB. MICU consulted, however not a candidate. (12 Aug 2021 04:16)      SUBJECTIVE: no acute events overnight. Patient seen this morning with surgery team denies pain, nausea or vomiting. Was able to rest comfortably. Patient answer questions correctly but seems less active than other days.     OBJECTIVES    Vital signs:  ICU Vital Signs Last 24 Hrs  T(C): 36.4 (21 Aug 2021 08:33), Max: 37.1 (20 Aug 2021 13:20)  T(F): 97.5 (21 Aug 2021 08:33), Max: 98.7 (20 Aug 2021 13:20)  HR: 126 (21 Aug 2021 08:33) (52 - 126)  BP: 145/79 (21 Aug 2021 08:33) (106/71 - 145/82)  BP(mean): 83 (20 Aug 2021 18:00) (74 - 90)  ABP: --  ABP(mean): --  RR: 24 (21 Aug 2021 08:33) (8 - 34)  SpO2: 100% (21 Aug 2021 08:33) (93% - 100%)    PHYSICAL EXAM:    GENERAL: Comfortable, no acute distress, less awake than previous days.   NERVOUS SYSTEM: Less alert than previous days & Oriented X3, Motor Strength 5/5 B/L upper and lower extremities  CHEST/LUNG: no evident  SOB  INGUINAL AREA: Incision is covered with dermabond, no signs of hematoma, rush or redness. Healing properly.   GENITOURINARY- Voiding, no palpable bladder  EXTREMITIES:  WEE x4.           LABS:                        9.7    16.98 )-----------( 352      ( 21 Aug 2021 07:04 )             30.5     08-21    134<L>  |  94<L>  |  27<H>  ----------------------------<  67<L>  4.2   |  17<L>  |  1.24    Ca    9.9      21 Aug 2021 07:00  Phos  2.7     08-21  Mg     2.10     08-21      PT/INR - ( 20 Aug 2021 05:45 )   PT: 12.5 sec;   INR: 1.10 ratio         PTT - ( 20 Aug 2021 05:45 )  PTT:23.3 sec            MEDS  acetaminophen   Tablet .. 650 milliGRAM(s) Oral every 6 hours PRN  allopurinol 300 milliGRAM(s) Oral daily  aluminum hydroxide/magnesium hydroxide/simethicone Suspension 30 milliLiter(s) Oral every 4 hours PRN  famotidine    Tablet 20 milliGRAM(s) Oral daily  melatonin 3 milliGRAM(s) Oral at bedtime PRN  multivitamin 1 Tablet(s) Oral daily

## 2021-08-21 NOTE — PROGRESS NOTE ADULT - PROBLEM SELECTOR PLAN 2
CT AP (7/25): Moderate volume of abdominal ascites. Reticulation of the intra-abdominal fat in the left upper quadrant and mid abdomen is likely related to lymphangitic drainage of fluid as opposed to carcinomatosis.  Limited abd US 8/12: Moderate ascites deepest pocket in RUQ  - Initially procedure team consulted for paracentesis but deemed too little to intervene  - Procedure team consulted again 8/20/21 and completed therapeutic paracentesis.   - diuresis PRN

## 2021-08-21 NOTE — PROGRESS NOTE ADULT - PROBLEM SELECTOR PLAN 4
Pt initially presented with progressively worsening SOB x 1 week since recent discharge. Pt satting 94-96% on 3L NC, with increased WOB. Placed on BiPAP with improvement in WOB for ~28h.   Presented with Acute Hypercarbic Resp Failure.  Patient sent on 2L home oxygen since last admission; ambulatory O2 assessment (while ambulating 92%; sitting/resting 96%)  8/21/21 - pt ABG found to have PaO2 70 demonstrating hypoxia, however, when calculating Jacklyn index (333mmHg), only mild concern for ARDS noted. BiPAP placed given discordance between PaO2 and O2 saturation.   - Monitor without lasix, can add if necessary  - Pulm onboard, recs appreciated.   - frequent pulse ox monitoring

## 2021-08-22 NOTE — PROGRESS NOTE ADULT - ATTENDING COMMENTS
Patient is s/p excisional biopsy on 8/20 to confirm diagnosis of b cell lymphoma. Also s/p para with 1.6L removed on 8/20. Now with worsening tachypnea. ABG with hypoxia. Pulm consulted to evaluate for need for thora. Thora not recommended at this time. Bipap if increased WOB. Developed PURVI today. From obstruction? Recent ct with b/l moderate hydro and todau bladder scan with >300 cc urine. Will place carnes. Renal US to evaluate for hydro. Also possibly from MONO given recent contrast given on 8/18. Hold any further lasix today. Elevated wbc but stable and lactate normal, hold off on starting antibiotics as suspicion for infection is low. Plan discussed with RN and Dr. Garcia

## 2021-08-22 NOTE — PROGRESS NOTE ADULT - SUBJECTIVE AND OBJECTIVE BOX
Patient is a 66y old  Female who presents with a chief complaint of Worsening volume overload (22 Aug 2021 07:17)  POD2: left  inguinal lymph node biopsy    SUBJECTIVE: No over night events. Patient seen and examined at bedside by surgical team this morning. Patient denies pain. Patient responsive but less alert than previous days.     OBJECTIVE:    ICU Vital Signs Last 24 Hrs  T(C): 36.4 (22 Aug 2021 06:03), Max: 36.9 (21 Aug 2021 12:45)  T(F): 97.5 (22 Aug 2021 06:03), Max: 98.5 (21 Aug 2021 12:45)  HR: 77 (22 Aug 2021 07:00) (75 - 116)  BP: 131/74 (22 Aug 2021 06:03) (127/85 - 138/74)  BP(mean): --  ABP: --  ABP(mean): --  RR: 20 (22 Aug 2021 06:03) (19 - 20)  SpO2: 99% (22 Aug 2021 07:00) (95% - 100%)      PHYSICAL EXAM:    GENERAL: Comfortable, no acute distress  CHEST/LUNG: Unlabored breathing at that moment.    ABDOMEN: Soft, Nontender, Nondistended; Bowel sounds present  INGUINAL REGION: Surgical incision looks in good conditions. No redness, inflammation or edema. Clean.   EXTREMITIES:  WEE x4  SKIN-no rash    LABS:                        9.7    16.98 )-----------( 352      ( 21 Aug 2021 07:04 )             30.5     08-21    134<L>  |  94<L>  |  27<H>  ----------------------------<  67<L>  4.2   |  17<L>  |  1.24    Ca    9.9      21 Aug 2021 07:00  Phos  2.7     08-21  Mg     2.10     08-21        MEDS  acetaminophen   Tablet .. 650 milliGRAM(s) Oral every 6 hours PRN  allopurinol 300 milliGRAM(s) Oral daily  aluminum hydroxide/magnesium hydroxide/simethicone Suspension 30 milliLiter(s) Oral every 4 hours PRN  famotidine    Tablet 20 milliGRAM(s) Oral daily  melatonin 3 milliGRAM(s) Oral at bedtime PRN  multivitamin 1 Tablet(s) Oral daily

## 2021-08-22 NOTE — PROGRESS NOTE ADULT - ASSESSMENT
Patient is a 66y old  Female who presents with a chief complaint of Worsening volume overload. Hx of B- cell lymphoma. POD2 felt inguinal lymph node biopsy.     PLAN:   - Continue Excellent care per primary team  - No further surgical care for now.   - Reach back with any concerns.   - Follow up with Dr Miller in 2 weeks. If still admitted in two weeks please contact us back.        D team   #[id number].

## 2021-08-22 NOTE — PROGRESS NOTE ADULT - SUBJECTIVE AND OBJECTIVE BOX
Authored by Nkiolay Garcia MD ([phone]) [facility]    SUBJECTIVE / OVERNIGHT EVENTS: NAEON. This am pt is sitting up in bed with NC. Notes she "prefers" the NC to BiPAP and refuses to wear it. Denies HA, cp, SOB.     allopurinol   300 milliGRAM(s) Oral (08-21-21 @ 13:07)    famotidine    Tablet   20 milliGRAM(s) Oral (08-21-21 @ 13:05)    multivitamin   1 Tablet(s) Oral (08-21-21 @ 13:07)    MEDICATIONS  (STANDING):  allopurinol 300 milliGRAM(s) Oral daily  famotidine    Tablet 20 milliGRAM(s) Oral daily  multivitamin 1 Tablet(s) Oral daily    MEDICATIONS  (PRN):  acetaminophen   Tablet .. 650 milliGRAM(s) Oral every 6 hours PRN Temp greater or equal to 38.5C (101.3F), Mild Pain (1 - 3)  aluminum hydroxide/magnesium hydroxide/simethicone Suspension 30 milliLiter(s) Oral every 4 hours PRN Dyspepsia  melatonin 3 milliGRAM(s) Oral at bedtime PRN Insomnia    I&O's Summary    21 Aug 2021 07:01  -  22 Aug 2021 07:00  --------------------------------------------------------  IN: 0 mL / OUT: 100 mL / NET: -100 mL    PHYSICAL EXAM:  Vital Signs Last 24 Hrs  T(C): 36.4 (22 Aug 2021 06:03), Max: 36.9 (21 Aug 2021 12:45)  T(F): 97.5 (22 Aug 2021 06:03), Max: 98.5 (21 Aug 2021 12:45)  HR: 77 (22 Aug 2021 07:00) (75 - 116)  BP: 131/74 (22 Aug 2021 06:03) (127/85 - 138/74)  BP(mean): --  RR: 20 (22 Aug 2021 06:03) (19 - 20)  SpO2: 99% (22 Aug 2021 07:00) (95% - 100%)    GENERAL: No acute distress, well-developed  HEAD:  Atraumatic, Normocephalic  EYES: EOMI, PERRLA, conjunctiva and sclera clear  NECK: Supple, no lymphadenopathy, no JVD  CHEST/LUNG: CTAB; No wheezes, rales, or rhonchi  HEART: Regular rate and rhythm; No murmurs, rubs, or gallops  ABDOMEN: Soft, non-tender, non-distended; normal bowel sounds, no organomegaly  EXTREMITIES:  2+ peripheral pulses b/l, No clubbing, cyanosis, or edema  NEUROLOGY: A&O x 3, no focal deficits  SKIN: No rashes or lesions    LABS:                        10.0   17.55 )-----------( 379      ( 22 Aug 2021 08:51 )             31.8     08-22    133<L>  |  93<L>  |  32<H>  ----------------------------<  105<H>  4.7   |  19<L>  |  1.65<H>    Ca    10.2      22 Aug 2021 08:51  Phos  3.2     08-22  Mg     2.20     08-22

## 2021-08-22 NOTE — PROGRESS NOTE ADULT - ASSESSMENT
66-year-old female with PMH significant for HTN, HLD, recently discovered ovarian mass suspicious for malignancy (7/2021), L hydroureteronephrosis s/p renal stent (7/15/21), and recent hospitalization ([facility] 7/26-8/4) for acute renal failure (likely obstructive) requiring urgent HD, ascites s/p therapeutic paracentesis (7/27/21), and pleural effusion s/p R thoracentesis (8/2/21) showing lymphocytosis but no malignant cells admitted for acute hypercarbic respiratory failure due to pleural effusions most likely caused by ovarian malignancy a/w volume overload with ascites and b/l LE edema. 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 Ongoing management being discussed.

## 2021-08-22 NOTE — PROGRESS NOTE ADULT - PROBLEM SELECTOR PLAN 1
Pt found to have ovarian mass on CT Abd/Pelvis during recently hospitalization at [facility] in early July 2021. Thoracentesis fluid studies with many cells, Large, abnormal immature looking mononuclear cells, many with cauliflower. Many RBCs nuclei (few in clusters). Found to have B-cell lymphoma. S/p surgical excisional bx on 8/21/21.  -Heme/onc consult; recs appreciated    =f/u Hep panel, HIV, HLTV1 and 2    =f/u TLS labs QD    =continue w/ Allopurinol 300mg QD  -Completed Surg-onc excisional bx on 8/21/21    =f/u bx results

## 2021-08-23 NOTE — CONSULT NOTE ADULT - ATTENDING COMMENTS
ATN from MONO and hyperkalemia   a trial of AVF given oliguria despite diuresis   repeat US ... Hydrourter ATN from MONO and hyperkalemia   a trial of AVF given oliguria despite diuresis would help with Hyperkalemia and PURVI  Lokelma for hyperkalemia   repeat US  ...?worsening  Hydroureter

## 2021-08-23 NOTE — PROGRESS NOTE ADULT - ATTENDING COMMENTS
Agree with above.    Moderate to large pleural effusion on the right. Please get labs as per above. Please get lower extremity DVT study.     - Plan for thoracentesis on Tuesday.

## 2021-08-23 NOTE — CONSULT NOTE ADULT - ASSESSMENT
66 year old female with PMHx of HTN, pre-DM, and ovarian mass s/p left ureteral stent placement for hydronephrosis 2/2 extrinsic compression at OSH on 7/15    - PURVI possibly post-renal, however imaging is stable from 8/18 when patient had normal cr.  Would continue to trend Cr for now.  If Cr continues to worsen would consider NT's.  - Appreciate nephrology input  - Continue IVF  - Continue carnes for strict I+O's  - Trend Cr  - D/w Dr. Lenz

## 2021-08-23 NOTE — CONSULT NOTE ADULT - SUBJECTIVE AND OBJECTIVE BOX
[facility]  [phone]  NEPHROLOGY      INITIAL CONSULT NOTE  --------------------------------------------------------------------------------  HPI: 66-year-old female with PMH significant for HTN, HLD, recently discovered ovarian mass suspicious for malignancy (7/2021), L hydroureteronephrosis s/p renal stent (7/15/21), and recent hospitalization ([facility] 7/26-8/4) for acute renal failure (likely obstructive) requiring urgent HD, ascites s/p therapeutic paracentesis (7/27/21), and pleural effusion s/p R thoracentesis (8/2/21) showing lymphocytosis but no malignant cells admitted for acute hypercarbic respiratory failure due to pleural effusions most likely caused by ovarian malignancy a/w volume overload with ascites and b/l LE edema. 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 Now found to have new onset PURVI.         PAST HISTORY  --------------------------------------------------------------------------------  PAST MEDICAL & SURGICAL HISTORY:  Hypertension    Ovarian mass    Hypercholesteremia    S/P ureteral stent placement      FAMILY HISTORY:  FHx: hypertension (Mother)    FH: diabetes mellitus (Mother)      PAST SOCIAL HISTORY:    ALLERGIES & MEDICATIONS  --------------------------------------------------------------------------------  Allergies    penicillins (Rash)    Intolerances      Standing Inpatient Medications  allopurinol 100 milliGRAM(s) Oral daily  cefepime   IVPB 1000 milliGRAM(s) IV Intermittent every 24 hours  metroNIDAZOLE  IVPB 500 milliGRAM(s) IV Intermittent every 8 hours    PRN Inpatient Medications      REVIEW OF SYSTEMS  --------------------------------------------------------------------------------  Gen: No  fevers/chills   Skin: No rashes  Head/Eyes/Ears/Mouth: No headache; Normal hearing;  No sinus pain/discomfort, sore throat  Respiratory: No dyspnea, cough, wheezing, hemoptysis  CV: No chest pain, orthopnea  GI: No abdominal pain, diarrhea, nausea, vomiting, melena, hematochezia  : No dysuria, decrease urination or hesitancy urinating  hematuria, nocturia  MSK: No joint pain/swelling; no back pain  Neuro: No dizziness/lightheadedness, weakness, seizures, numbness, tingling  Heme: No easy bruising or bleeding  Endo: No heat/cold intolerance  Psych: No significant nervousness, anxiety or depression  also with no edema     All other systems were reviewed and are negative, except as noted.    VITALS/PHYSICAL EXAM  --------------------------------------------------------------------------------  T(C): 37.9 (08-23-21 @ 12:00), Max: 37.9 (08-23-21 @ 12:00)  HR: 110 (08-23-21 @ 13:55) (74 - 119)  BP: 131/71 (08-23-21 @ 13:55) (121/63 - 144/74)  RR: 21 (08-23-21 @ 13:55) (20 - 21)  SpO2: 100% (08-23-21 @ 13:55) (91% - 100%)  Wt(kg): --        08-22-21 @ 07:01  -  08-23-21 @ 07:00  --------------------------------------------------------  IN: 450 mL / OUT: 125 mL / NET: 325 mL      Physical Exam:  	Gen: Non toxic comfortable appearing   	no jvd , supple neck,   	Pulm: decrease bs  no rales or ronchi or wheezing  	CV: RRR, S1S2; no rub  	Back: No CVA tenderness; no sacral edema  	Abd: Distended, +BS, soft, nontender  	: No suprapubic tenderness  	UE: Warm, no cyanosis  no clubbing,  no edema; no asterixis  	LE: Warm, no cyanosis  no clubbing, no edema  	Neuro: alert and oriented. speech coherent   	Psych: Normal affect and mood  	Skin: Warm, no decrease skin turgor   	Vascular access:    LABS/STUDIES  --------------------------------------------------------------------------------              9.8    18.87 >-----------<  341      [08-23-21 @ 07:30]              31.2     130  |  93  |  42  ----------------------------<  96      [08-23-21 @ 07:36]  5.5   |  18  |  2.05        Ca     10.8     [08-23-21 @ 07:36]      Mg     2.20     [08-22-21 @ 08:51]      Phos  3.2     [08-22-21 @ 08:51]    TPro  6.3  /  Alb  2.6  /  TBili  0.3  /  DBili  x   /  AST  32  /  ALT  11  /  AlkPhos  111  [08-23-21 @ 07:36]        Uric acid 7.5      [08-23-21 @ 07:30]        [08-23-21 @ 07:30]    Creatinine Trend:  SCr 2.05 [08-23 @ 07:36]  SCr 1.65 [08-22 @ 08:51]  SCr 1.24 [08-21 @ 07:00]  SCr 1.10 [08-20 @ 05:45]  SCr 1.10 [08-19 @ 07:58]    Urinalysis - [08-23-21 @ 11:48]      Color Yellow / Appearance Turbid / SG 1.027 / pH 5.5      Gluc Negative / Ketone Trace  / Bili Negative / Urobili <2 mg/dL       Blood Large / Protein 100 mg/dL / Leuk Est Large / Nitrite Negative      RBC 7 / WBC >50 / Hyaline  / Gran 2 / Sq Epi  / Non Sq Epi  / Bacteria Few      TSH 1.85      [07-29-21 @ 11:12]    HBsAb <3.0      [07-27-21 @ 09:32]  HBsAg Nonreact      [07-27-21 @ 09:32]  HBcAb Nonreact      [07-27-21 @ 09:32]  HCV 0.13, Nonreact      [07-27-21 @ 09:30]    SPEP Interpretation: with acute phase reaction. HOSSEIN Kay MD      [08-03-21 @ 07:01]

## 2021-08-23 NOTE — PROGRESS NOTE ADULT - ASSESSMENT
66F with PMH significant for HTN, HLD, recently discovered ovarian mass suspicious for malignancy, L hydroureteronephrosis s/p renal stent, and recent hospitalization to [facility] for acute renal failure (likely obstructive) requiring urgent HD, ascites s/p therapeutic paracentesis, and pleural effusion s/p R thoracentesis showing lymphocytosis but no malignant cells admitted for acute hypoxic respiratory failure with imaging showing bilateral pleural effusion. Cytopathology reveals DLBCL.     #Acute hypoxic respiratory failure  # DLBCL  # Pleural Effusion  # PURVI  - slight worsening FIO2 requirements and increase WOB although still comfortable at rest. 5L NC. POCUS with enlarged R sided plefs that is moderate to large.   - Please check ABG, and INR. Likely will perform thoracentesis later today or tomorrow   - patient is s/p excisional lymph node biopsy and therapeutic paracentesis on 8/20. Please folllow up resutls.   - Please check LE dopplars for DVT, given etiology for worsening SOB can hold off on epimeric hep gtt. Patient has been on DVT ppx since 8/12.   - Would also assess for possible paracentesis given distended abn which may cause additional sob/whitten with hypoxemia  - Will not pursue Pleurx at this time as effusions from lymphomas are generally very responsive to chemo.     Dae Hyeon Kim [id number]  Pulmonary Critical Care Fellow  Pager [phone]

## 2021-08-23 NOTE — PROGRESS NOTE ADULT - NUTRITIONAL ASSESSMENT
This patient has been assessed with a concern for Malnutrition and has been determined to have a diagnosis/diagnoses of Moderate protein-calorie malnutrition.    This patient is being managed with:   Diet NPO-  Entered: Aug 22 2021  5:09PM

## 2021-08-23 NOTE — PROGRESS NOTE ADULT - ASSESSMENT
66-year-old female with PMH significant for HTN, HLD, recently discovered ovarian mass suspicious for malignancy (7/2021), L hydroureteronephrosis s/p renal stent (7/15/21), and recent hospitalization ([facility] 7/26-8/4) for acute renal failure (likely obstructive) requiring urgent HD, ascites s/p therapeutic paracentesis (7/27/21), and pleural effusion s/p R thoracentesis (8/2/21) showing lymphocytosis but no malignant cells admitted for acute hypercarbic respiratory failure due to pleural effusions most likely caused by ovarian malignancy a/w volume overload with ascites and b/l LE edema. 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 Ongoing management being discussed. Pt progressively worsening clinically.

## 2021-08-23 NOTE — PROGRESS NOTE ADULT - ATTENDING COMMENTS
#Hypoxia - differential include worsening pleural effusion vs PNA vs PE. At this time would discuss with pulm if thora is indicated. Cover with cefepime and flagyl for PNA. If no plans for procedure, would start empiric heparin gtt. Unable to do CTA due to worsening renal function and VQ scan will not be diagnostic given large effusions. TTE to evaluate right heart strain.  #PURVI - carnes placed. Low urine output. Possible differential include b/l hydro vs MONO. At this time avoid fluid given concern for worsening hypoxia,  consult given hydro and renal evaluation  #Hyperkalemia - treat with insulin/d50 and lokelma.   #B cell lymphoma - Patient is s/p excisional biopsy on 8/20 to confirm diagnosis of b cell lymphoma. Await final path  #Obstruction - patient with billious vomiting overnight. CT abd with small bowel thickening. Will discuss with surgery. NPO for now    Plan discussed with HS4

## 2021-08-23 NOTE — PROGRESS NOTE ADULT - ASSESSMENT
66y old  Female who presents with a chief complaint of Worsening volume overload. Hx of B- cell lymphoma. POD2 felt inguinal lymph node biopsy.     PLAN:   - Continue Excellent care per primary team  - No further surgical care for now  - Reach back with any concerns  - Follow up with Dr Miller in 2 weeks. If still admitted in two weeks, will see patient to assess wound healing    D team   #[id number]

## 2021-08-23 NOTE — PROGRESS NOTE ADULT - SUBJECTIVE AND OBJECTIVE BOX
HPI:  POD3 L inguinal lymph node biopsy    24h Events:   - Overnight, CTAP for bilious emesis, r/o obstruction     Subjective:   Patient examined at bedside this AM. Reports no pain at L groin and incision site.     Objective:  Vital Signs  T(C): 36.8 (08-23 @ 06:46), Max: 36.9 (08-22 @ 21:41)  HR: 77 (08-23 @ 07:34) (74 - 119)  BP: 142/78 (08-23 @ 06:46) (111/70 - 144/74)  RR: 20 (08-23 @ 06:46) (20 - 20)  SpO2: 97% (08-23 @ 07:34) (95% - 99%)  08-22-21 @ 07:01  -  08-23-21 @ 07:00  --------------------------------------------------------  IN:  Total IN: 0 mL    OUT:    Indwelling Catheter - Urethral (mL): 125 mL  Total OUT: 125 mL    Total NET: -125 mL    Physical Exam:  General: alert and oriented, NAD  Resp: airway patent, respirations unlabored  CVS: regular rate and rhythm  Abdomen: soft, nontender, nondistended  Groin: surgical incision c/d/i, nontender, no signs of cellulitis  Extremities: no edema  Skin: warm, dry, appropriate color    Labs:                        9.8    18.87 )-----------( 341      ( 23 Aug 2021 07:30 )             31.2   08-22    133<L>  |  93<L>  |  32<H>  ----------------------------<  105<H>  4.7   |  19<L>  |  1.65<H>    Ca    10.2      22 Aug 2021 08:51  Phos  3.2     08-22  Mg     2.20     08-22      CAPILLARY BLOOD GLUCOSE          Microbiology:      Medications:   MEDICATIONS  (STANDING):  allopurinol 100 milliGRAM(s) Oral daily  famotidine    Tablet 20 milliGRAM(s) Oral daily  multivitamin 1 Tablet(s) Oral daily    MEDICATIONS  (PRN):  acetaminophen   Tablet .. 650 milliGRAM(s) Oral every 6 hours PRN Temp greater or equal to 38.5C (101.3F), Mild Pain (1 - 3)  aluminum hydroxide/magnesium hydroxide/simethicone Suspension 30 milliLiter(s) Oral every 4 hours PRN Dyspepsia  melatonin 3 milliGRAM(s) Oral at bedtime PRN Insomnia

## 2021-08-23 NOTE — PROGRESS NOTE ADULT - PROBLEM SELECTOR PLAN 1
Pt initially presented with progressively worsening SOB x 1 week since recent discharge. Pt satting 94-96% on 3L NC, with increased WOB. Placed on BiPAP with improvement in WOB for ~28h.   Presented with Acute Hypercarbic Resp Failure.  Patient sent on 2L home oxygen since last admission; ambulatory O2 assessment (while ambulating 92%; sitting/resting 96%)  8/21/21 - pt ABG found to have PaO2 70 demonstrating hypoxia, however, when calculating Jacklyn index (333mmHg), only mild concern for ARDS noted. BiPAP placed given discordance between PaO2 and O2 saturation. 8/23/21 - pt found to be tachypneic and hypoxic this AM. 91% on 3L NC. Increased to 5L, now 99%. Pt continues to be A&Ox3. Answering all questions appropriately.   - Pulm consulted for possible thoracentesis due to ?worsening pleural effusion; recs appreciated  - Cefepime and flagyl - dysuria noted this AM. WBC is difficult to interpret as pt has consistent leukocytosis due to ongoing malignancy process. Low suspicion for infection but due to pt's ongoing condition. Cautiously providing empiric tx.   - frequent pulse ox monitoring

## 2021-08-23 NOTE — PROGRESS NOTE ADULT - SUBJECTIVE AND OBJECTIVE BOX
PROGRESS NOTE:   Patient is a 66y old  Female who presents with a chief complaint of Worsening volume overload (23 Aug 2021 10:29)      SUBJECTIVE / OVERNIGHT EVENTS:  Pt seen and examined at bedside. Pt tachypneic and hypoxic. On 3LNC, satting at 91%. Pt looks clinically unstable although pt is A&Ox3 and does not complain of anything. Temp at bedside oral 98.8 and rectal 100.2. ~125cc's output over ~16hrs. x1 bilious vomiting o/n per nurse. Denies f/c, SOB, cp, palpitations, abdominal pain, nausea, diarrhea, constipation.       MEDICATIONS  (STANDING):  allopurinol 100 milliGRAM(s) Oral daily  calcium gluconate IVPB 1 Gram(s) IV Intermittent once  cefepime   IVPB 1000 milliGRAM(s) IV Intermittent every 24 hours  dextrose 50% Injectable 50 milliLiter(s) IV Push once  insulin regular  human recombinant 5 Unit(s) SubCutaneous once  metroNIDAZOLE  IVPB 500 milliGRAM(s) IV Intermittent every 8 hours  sodium bicarbonate  Injectable 50 milliEquivalent(s) IV Push once  sodium zirconium cyclosilicate 5 Gram(s) Oral once    MEDICATIONS  (PRN):      CAPILLARY BLOOD GLUCOSE  POCT Blood Glucose.: 102 mg/dL (23 Aug 2021 12:37)      I&O's Summary    22 Aug 2021 07:01  -  23 Aug 2021 07:00  --------------------------------------------------------  IN: 450 mL / OUT: 125 mL / NET: 325 mL        PHYSICAL EXAM:  Vital Signs Last 24 Hrs  T(C): 37.9 (23 Aug 2021 12:00), Max: 37.9 (23 Aug 2021 12:00)  T(F): 100.2 (23 Aug 2021 12:00), Max: 100.2 (23 Aug 2021 12:00)  HR: 107 (23 Aug 2021 12:00) (74 - 119)  BP: 121/63 (23 Aug 2021 12:00) (111/70 - 144/74)  BP(mean): --  RR: 20 (23 Aug 2021 12:00) (20 - 20)  SpO2: 99% (23 Aug 2021 12:00) (91% - 99%)    CONSTITUTIONAL: NAD, well-developed  RESPIRATORY: Normal respiratory effort; lungs are clear to auscultation bilaterally  CARDIOVASCULAR: Regular rate and rhythm, normal S1 and S2, no murmur/rub/gallop; No lower extremity edema; Peripheral pulses are 2+ bilaterally  ABDOMEN: Nontender to palpation, normoactive bowel sounds, no rebound/guarding; No hepatosplenomegaly  MUSCLOSKELETAL: no clubbing or cyanosis of digits; no joint swelling or tenderness to palpation  NEURO: A&Ox3,   PSYCH: affect appropriate; no anxiety or depression    LABS:                        9.8    18.87 )-----------( 341      ( 23 Aug 2021 07:30 )             31.2     08-23    130<L>  |  93<L>  |  42<H>  ----------------------------<  96  5.5<H>   |  18<L>  |  2.05<H>    Ca    10.8<H>      23 Aug 2021 07:36  Phos  3.2     08-22  Mg     2.20     08-22    TPro  6.3  /  Alb  2.6<L>  /  TBili  0.3  /  DBili  x   /  AST  32  /  ALT  11  /  AlkPhos  111  08-23          Urinalysis Basic - ( 23 Aug 2021 11:48 )    Color: Yellow / Appearance: Turbid / S.027 / pH: x  Gluc: x / Ketone: Trace  / Bili: Negative / Urobili: <2 mg/dL   Blood: x / Protein: 100 mg/dL / Nitrite: Negative   Leuk Esterase: Large / RBC: 7 /HPF / WBC >50 /HPF   Sq Epi: x / Non Sq Epi: x / Bacteria: Few          RADIOLOGY & ADDITIONAL TESTS:  Results Reviewed:   Imaging Personally Reviewed:  Electrocardiogram Personally Reviewed:    COORDINATION OF CARE:  Care Discussed with Consultants/Other Providers [Y/N]:  Prior or Outpatient Records Reviewed [Y/N]:   PROGRESS NOTE:   Patient is a 66y old  Female who presents with a chief complaint of Worsening volume overload (23 Aug 2021 10:29)      SUBJECTIVE / OVERNIGHT EVENTS:  Pt seen and examined at bedside. Pt tachypneic and hypoxic. On 3LNC, satting at 90-91%. Pt looks clinically unstable although pt is A&Ox3 and does not complain of anything. O2 increased to 5L. On exam, pt felt warm to touch (Temp at bedside oral 98.8 and rectal 100.2). ~125cc's output over ~16hrs. x1 bilious vomiting o/n per nurse. Denies f/c, SOB, cp, palpitations, abdominal pain, nausea, diarrhea, constipation.       MEDICATIONS  (STANDING):  allopurinol 100 milliGRAM(s) Oral daily  calcium gluconate IVPB 1 Gram(s) IV Intermittent once  cefepime   IVPB 1000 milliGRAM(s) IV Intermittent every 24 hours  dextrose 50% Injectable 50 milliLiter(s) IV Push once  insulin regular  human recombinant 5 Unit(s) SubCutaneous once  metroNIDAZOLE  IVPB 500 milliGRAM(s) IV Intermittent every 8 hours  sodium bicarbonate  Injectable 50 milliEquivalent(s) IV Push once  sodium zirconium cyclosilicate 5 Gram(s) Oral once      MEDICATIONS  (PRN):      CAPILLARY BLOOD GLUCOSE  POCT Blood Glucose.: 102 mg/dL (23 Aug 2021 12:37)      I&O's Summary    22 Aug 2021 07:01  -  23 Aug 2021 07:00  --------------------------------------------------------  IN: 450 mL / OUT: 125 mL / NET: 325 mL      PHYSICAL EXAM:  Vital Signs Last 24 Hrs  T(C): 37.9 (23 Aug 2021 12:00), Max: 37.9 (23 Aug 2021 12:00)  T(F): 100.2 (23 Aug 2021 12:00), Max: 100.2 (23 Aug 2021 12:00)  HR: 107 (23 Aug 2021 12:00) (74 - 119)  BP: 121/63 (23 Aug 2021 12:00) (111/70 - 144/74)  BP(mean): --  RR: 20 (23 Aug 2021 12:00) (20 - 20)  SpO2: 99% (23 Aug 2021 12:00) (91% - 99%)    GENERAL: Nl-appearing female in acute distress; pt appears clinically unstable. Lethargic but responds to all questions  HEENT: Normocephalic; supple neck, no JVD  CARDIAC: RRR, nl S1 and S2, no m/r/g  PULM: Minimal breath sounds on bilateral lower lobes, coarse breath sounds on upper lobes; increased WOB  ABDOMEN: non-tender but distended, BS+  EXTREMITIES:  2+ peripheral pulses, no clubbing, 2+ edema  NERVOUS SYSTEM:  A&Ox3, no focal deficits  MSK: FROM all 4 extremities  SKIN: No rashes or lesions      LABS:                        9.8    18.87 )-----------( 341      ( 23 Aug 2021 07:30 )             31.2     08-23    130<L>  |  93<L>  |  42<H>  ----------------------------<  96  5.5<H>   |  18<L>  |  2.05<H>    Ca    10.8<H>      23 Aug 2021 07:36  Phos  3.2     08-22  Mg     2.20     08-22    TPro  6.3  /  Alb  2.6<L>  /  TBili  0.3  /  DBili  x   /  AST  32  /  ALT  11  /  AlkPhos  111  08-23      Urinalysis Basic - ( 23 Aug 2021 11:48 )  Color: Yellow / Appearance: Turbid / S.027 / pH: x  Gluc: x / Ketone: Trace  / Bili: Negative / Urobili: <2 mg/dL   Blood: x / Protein: 100 mg/dL / Nitrite: Negative   Leuk Esterase: Large / RBC: 7 /HPF / WBC >50 /HPF   Sq Epi: x / Non Sq Epi: x / Bacteria: Few      IMAGING:  EXAM:  CT ABDOMEN AND PELVIS      PROCEDURE DATE:  Aug 22 2021     INTERPRETATION:  CLINICAL INFORMATION: Status post thoracentesis and inguinal lymph node biopsy. Bilious vomiting. Evaluate for bowel obstruction.    COMPARISON: 2021    CONTRAST/COMPLICATIONS:  IV Contrast: None  Oral Contrast: None  Complications: None reported at time of exam    PROCEDURE:  CT of the Abdomen and Pelvis was performed.  Sagittal and coronal reformats were performed.    FINDINGS:  LOWER CHEST: Moderate bilateral pleural effusions with adjacent passive atelectasis.    LIVER: Within normal limits.  BILE DUCTS: Normal caliber.  GALLBLADDER: There is excretion of intravenous contrast in the gallbladder.  SPLEEN: Within normal limits.  PANCREAS: Withinnormal limits.  ADRENALS: Within normal limits.  KIDNEYS/URETERS: Stable bilateral moderate hydronephrosis. Ureteral stent extends from the left renal pelvis into the bladder.    BLADDER: Decompressed with a Charles catheter.  REPRODUCTIVE ORGANS: Redemonstration of bilateral adnexal masses. Uterus within normal limits.    BOWEL: Persistent dilatation of proximal small bowel loops, not significantly changed compared to prior examination. Diffuse wall thickening of the more distal small bowel. No discrete transition point. Appendix is normal.  PERITONEUM: Moderate abdominal pelvic ascites. Peritoneal and omental thickening.  VESSELS: Atherosclerotic calcifications.  RETROPERITONEUM/LYMPH NODES: Redemonstrated gastrohepatic, retroperitoneal, mesenteric, and pelvic lymphadenopathy, unchanged. Reference with mesenteric lymph node measures 2.4 x 2.7 cm.  ABDOMINAL WALL: Mild subcutaneous edema. Postoperative changes in the left inguinal region.  BONES: Within normal limits.    IMPRESSION:  Bilateral adnexal masses and peritoneal carcinomatosis with moderate ascites and extensive abdominal and pelvic lymphadenopathy, unchanged from prior examination.    Wall thickening of the distal small bowel with dilatation of more proximal small bowel loops, not significantly changed from prior exam.

## 2021-08-23 NOTE — PROGRESS NOTE ADULT - PROBLEM SELECTOR PLAN 2
Pt found to have ovarian mass on CT Abd/Pelvis during recently hospitalization at [facility] in early July 2021. Thoracentesis fluid studies with many cells, Large, abnormal immature looking mononuclear cells, many with cauliflower. Many RBCs nuclei (few in clusters). Found to have B-cell lymphoma. S/p surgical excisional bx on 8/21/21.  -Heme/onc consult; recs appreciated    =f/u Hep panel, HIV, HLTV1 and 2    =f/u TLS labs QD    =decreased Allopurinol to 100mg from 300mg QD (8/22/21)    =f/u G6PD in case of treatment of TLS  -Completed Surg-onc excisional bx on 8/21/21    =f/u bx results    =f/u w/ new CT findings - wall thickening of the distal small bowel with dilatation of more proximal small bowel loops    =awaiting recs. Pt found to have ovarian mass on CT Abd/Pelvis during recently hospitalization at [facility] in early July 2021. Thoracentesis fluid studies with many cells, Large, abnormal immature looking mononuclear cells, many with cauliflower. Many RBCs nuclei (few in clusters). Found to have B-cell lymphoma. S/p surgical excisional bx on 8/21/21.  -Heme/onc consult; recs appreciated    =f/u Hep panel, HIV, HLTV1 and 2    =f/u TLS labs QD    =decreased Allopurinol to 100mg from 300mg QD (8/22/21)    =f/u G6PD in case of treatment of TLS    =Started Rasburicase prophylactically for TLS  -Completed Surg-onc excisional bx on 8/21/21    =f/u bx results    =f/u w/ new CT findings - wall thickening of the distal small bowel with dilatation of more proximal small bowel loops    =no immediate concerns; most likely 2/2 ongoing malignancy. Monitor for now.

## 2021-08-23 NOTE — PROGRESS NOTE ADULT - PROBLEM SELECTOR PLAN 3
Pt found to have PURVI (8/22/21) w/ Scr 1.65 from 1.24 the day prior. 8/23/21 Scr is 2.05. Continuing to downtrend. Pt has h/o L hydroureteronephrosis s/p renal stent (7/15/21) and recent hospitalization ([facility] 7/26 - 8/4) for acute renal failure likely obstructive requiring urgent HD. Pt found to be retaining urine (8/22/21) and placed carnes. This AM, pt only urinated ~125cc in the span of 16 hours. Dysuria noted this AM as well.   -CT 8/18/21 - moderate bilateral hydronephrosis noted  -renal consult; recs appreciated.  -urology consult; recs appreciated.   -LR 1L 75cc/hr started Pt found to have PURVI (8/22/21) w/ Scr 1.65 from 1.24 the day prior. 8/23/21 Scr is 2.05. Continuing to downtrend. Pt has h/o L hydroureteronephrosis s/p renal stent (7/15/21) and recent hospitalization ([facility] 7/26 - 8/4) for acute renal failure likely obstructive requiring urgent HD. Pt found to be retaining urine (8/22/21) and placed carnes. This AM, pt only urinated ~125cc in the span of 16 hours. Dysuria noted this AM as well.   -CT 8/18/21 - moderate bilateral hydronephrosis noted  -renal consult; recs appreciated.    =urine studies and 1/2 NS w/ bicarb additive    =continue to trend BUN, Cr.  -urology consult; recs appreciated.     =Trend Cr for now, however, if it worsens, consider NT

## 2021-08-23 NOTE — CONSULT NOTE ADULT - PROBLEM SELECTOR RECOMMENDATION 9
Likely secondary to IV contrast and diuresis on lasix with possible component of urinary retention and hydronephrosis  On admission, BUN and Cr at 23 and 0.99 respectively. Recent creatinine on 8/23, elevated to 2.05  - Continue to monitor BUN and Cr daily  - Strict I/O's, avoid nephrotoxic medications including ACE/ARBs, NSAIDs, hold lasix for time being  - carnes draining minimal amounts (125 cc/24 hrs), per nurse the fluid scanned could have been ascitic fluid  - abdominal CT on 8/18 with IV contrast shows stable B/L mod hydronephrosis  - Order protein to creatinine ratio to r/o intrinsic processes  - order FENa and FEUrea  - f/u UA shows trace ketones, protein 100, large leuk est and blood, granular casts 2  - consider starting IV fluid trial Likely secondary to IV contrast and diuresis on lasix with possible component of urinary retention and hydronephrosis  On admission, BUN and Cr at 23 and 0.99 respectively. Recent creatinine on 8/23, elevated to 2.05  - Continue to monitor BUN and Cr daily  - Strict I/O's, avoid nephrotoxic medications including ACE/ARBs, NSAIDs, hold lasix for time being  - carnes draining minimal amounts (125 cc/24 hrs), per nurse the fluid scanned could have been ascitic fluid  - abdominal CT on 8/18 with IV contrast shows stable B/L mod hydronephrosis  - Order protein to creatinine ratio to r/o intrinsic processes  - order FENa and FEUrea  - f/u UA shows trace ketones, protein 100, large leuk est and blood, granular casts 2  - consider starting IV fluid trial, 1/2NS with bicarb additive

## 2021-08-23 NOTE — PROGRESS NOTE ADULT - SUBJECTIVE AND OBJECTIVE BOX
Interval Events: Patient was seen and examined at bedside. No overnight events.    REVIEW OF SYSTEMS:  Constitutional: [ ] fevers [ ] chills [ ] weight loss [ ] weight gain  CV: [ ] chest pain [ ] orthopnea [ ] palpitations [ ] murmur  Resp: [ ] cough [ ] shortness of breath [ ] dyspnea [ ] wheezing [ ] sputum [ ] hemoptysis  [ ] All other systems negative  [ ] Unable to assess ROS because ________    OBJECTIVE:  ICU Vital Signs Last 24 Hrs  T(C): 37.9 (23 Aug 2021 12:00), Max: 37.9 (23 Aug 2021 12:00)  T(F): 100.2 (23 Aug 2021 12:00), Max: 100.2 (23 Aug 2021 12:00)  HR: 78 (23 Aug 2021 15:33) (74 - 119)  BP: 131/71 (23 Aug 2021 13:55) (121/63 - 144/74)  BP(mean): --  ABP: --  ABP(mean): --  RR: 21 (23 Aug 2021 13:55) (20 - 21)  SpO2: 97% (23 Aug 2021 15:33) (91% - 100%)        08- @ 07:01  -   @ 07:00  --------------------------------------------------------  IN: 450 mL / OUT: 125 mL / NET: 325 mL      CAPILLARY BLOOD GLUCOSE      POCT Blood Glucose.: 116 mg/dL (23 Aug 2021 14:15)      PHYSICAL EXAM:  General: thin cachectic female, NAD, sitting in chair,  HEENT: no scleral icterus  Lymph Nodes: no palpable LAD in the neck  Respiratory: decreased breath sounds at the bases  Cardiovascular: s1/s2, rrr, no murmurs  Abdomen: soft, nontender, markedly distended  Extremities: no LE edema  Skin: no rashes noted  Neurological: AxOx3  Psychiatry: normal mood and affect          HOSPITAL MEDICATIONS:  MEDICATIONS  (STANDING):  allopurinol 100 milliGRAM(s) Oral daily  cefepime   IVPB 1000 milliGRAM(s) IV Intermittent every 24 hours  heparin   Injectable 5000 Unit(s) IV Push once  metroNIDAZOLE  IVPB 500 milliGRAM(s) IV Intermittent every 8 hours  rasburicase IVPB 3 milliGRAM(s) IV Intermittent once  sodium chloride 0.45% 1000 milliLiter(s) (100 mL/Hr) IV Continuous <Continuous>    MEDICATIONS  (PRN):      LABS:                        9.8    18.87 )-----------( 341      ( 23 Aug 2021 07:30 )             31.2     Hgb Trend: 9.8<--, 10.0<--, 9.7<--, 10.1<--, 10.4<--  0823    134<L>  |  95<L>  |  41<H>  ----------------------------<  86  5.4<H>   |  26  |  2.19<H>    Ca    10.8<H>      23 Aug 2021 16:05  Phos  3.2       Mg     2.20         TPro  6.3  /  Alb  2.6<L>  /  TBili  0.3  /  DBili  x   /  AST  32  /  ALT  11  /  AlkPhos  111      Creatinine Trend: 2.19<--, 2.05<--, 1.65<--, 1.24<--, 1.10<--, 1.10<--  PT/INR - ( 23 Aug 2021 16:07 )   PT: 11.7 sec;   INR: 1.03 ratio         PTT - ( 23 Aug 2021 16:07 )  PTT:21.3 sec  Urinalysis Basic - ( 23 Aug 2021 11:48 )    Color: Yellow / Appearance: Turbid / S.027 / pH: x  Gluc: x / Ketone: Trace  / Bili: Negative / Urobili: <2 mg/dL   Blood: x / Protein: 100 mg/dL / Nitrite: Negative   Leuk Esterase: Large / RBC: 7 /HPF / WBC >50 /HPF   Sq Epi: x / Non Sq Epi: x / Bacteria: Few        Venous Blood Gas:   @ 07:30  7.32/49/61/25/91.7  VBG Lactate: 2.5  Venous Blood Gas:   @ 09:10  7.34/40/64/22/93.3  VBG Lactate: 2.8    MICROBIOLOGY:     RADIOLOGY:  [ ] Reviewed and interpreted by me

## 2021-08-23 NOTE — CONSULT NOTE ADULT - ASSESSMENT
66-year-old female with PMH significant for HTN, HLD, recently discovered ovarian mass suspicious for malignancy (7/2021), L hydroureteronephrosis s/p renal stent (7/15/21), and recent hospitalization ([facility] 7/26-8/4) for acute renal failure (likely obstructive) requiring urgent HD, ascites s/p therapeutic paracentesis (7/27/21), and pleural effusion s/p R thoracentesis (8/2/21) showing lymphocytosis but no malignant cells admitted for acute hypercarbic respiratory failure due to pleural effusions most likely caused by ovarian malignancy a/w volume overload with ascites and b/l LE edema. 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 Now found to have PURVI likely contrast-induced nephropathy 66-year-old female with PMH significant for HTN, HLD, recently discovered ovarian mass suspicious for malignancy (7/2021), L hydroureteronephrosis s/p renal stent (7/15/21), and recent hospitalization ([facility] 7/26-8/4) for acute renal failure  requiring urgent HD, ascites s/p therapeutic paracentesis (7/27/21), and pleural effusion s/p R thoracentesis (8/2/21) showing lymphocytosis but no malignant cells admitted for acute hypercarbic respiratory failure due to pleural effusions most likely caused by ovarian malignancy a/w volume overload with ascites and b/l LE edema. 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 Now found to have PURVI likely contrast-induced nephropathy

## 2021-08-23 NOTE — CONSULT NOTE ADULT - SUBJECTIVE AND OBJECTIVE BOX
HPI:  66 year old female with PMHx of HTN, pre-DM, and ovarian mass s/p left ureteral stent placement for hydronephrosis 2/2 extrinsic compression at OSH on 7/15. She presented to [facility] with bilateral lower extremity edema.        O: Vital Signs Last 24 Hrs  T(C): 37.9 (23 Aug 2021 12:00), Max: 37.9 (23 Aug 2021 12:00)  T(F): 100.2 (23 Aug 2021 12:00), Max: 100.2 (23 Aug 2021 12:00)  HR: 110 (23 Aug 2021 13:55) (74 - 119)  BP: 131/71 (23 Aug 2021 13:55) (121/63 - 144/74)  BP(mean): --  RR: 21 (23 Aug 2021 13:55) (20 - 21)  SpO2: 100% (23 Aug 2021 13:55) (91% - 100%)      22 Aug 2021 07:01  -  23 Aug 2021 07:00  --------------------------------------------------------  IN:    Oral Fluid: 450 mL  Total IN: 450 mL    OUT:    Indwelling Catheter - Urethral (mL): 125 mL  Total OUT: 125 mL    Total NET: 325 mL          Physical Exam:    Gen: Well-developed, well-nourished in no acute distress  Resp: No additional work of breathing   GI: Soft, non-tender, non-distended, with normoactive bowel sounds.  No masses.  MSK: Moves all extremities equally  Skin: No rashes    Labs:                        9.8    18.87 )-----------( 341      ( 23 Aug 2021 07:30 )             31.2     23 Aug 2021 07:36    130    |  93     |  42     ----------------------------<  96     5.5     |  18     |  2.05     Ca    10.8       23 Aug 2021 07:36  Phos  3.2       22 Aug 2021 08:51  Mg     2.20      22 Aug 2021 08:51    TPro  6.3    /  Alb  2.6    /  TBili  0.3    /  DBili  x      /  AST  32     /  ALT  11     /  AlkPhos  111    23 Aug 2021 07:36      CAPILLARY BLOOD GLUCOSE      POCT Blood Glucose.: 102 mg/dL (23 Aug 2021 12:37)        LIVER FUNCTIONS - ( 23 Aug 2021 07:36 )  Alb: 2.6 g/dL / Pro: 6.3 g/dL / ALK PHOS: 111 U/L / ALT: 11 U/L / AST: 32 U/L / GGT: x             Urinalysis Basic - ( 23 Aug 2021 11:48 )    Color: Yellow / Appearance: Turbid / S.027 / pH: x  Gluc: x / Ketone: Trace  / Bili: Negative / Urobili: <2 mg/dL   Blood: x / Protein: 100 mg/dL / Nitrite: Negative   Leuk Esterase: Large / RBC: 7 /HPF / WBC >50 /HPF   Sq Epi: x / Non Sq Epi: x / Bacteria: Few        MEDICATIONS  (STANDING):  allopurinol 100 milliGRAM(s) Oral daily  cefepime   IVPB 1000 milliGRAM(s) IV Intermittent every 24 hours  metroNIDAZOLE  IVPB 500 milliGRAM(s) IV Intermittent every 8 hours    MEDICATIONS  (PRN):   HPI:  66 year old female with PMHx of HTN, pre-DM, and ovarian mass s/p left ureteral stent placement for hydronephrosis 2/2 extrinsic compression at OSH on 7/15. She presented to [facility] with bilateral lower extremity edema.  Patient is now s/p paracentesis.      Urology consulted for PURVI in the setting of bilateral hydronephrosis.  Patient has had poor UOP and rising creatinine over the past few days (currently 2.05 from normal baseline).  CT  demonstrated niderate bilateral hydro with stent in place, repeat CT today stable.       O: Vital Signs Last 24 Hrs  T(C): 37.9 (23 Aug 2021 12:00), Max: 37.9 (23 Aug 2021 12:00)  T(F): 100.2 (23 Aug 2021 12:00), Max: 100.2 (23 Aug 2021 12:00)  HR: 110 (23 Aug 2021 13:55) (74 - 119)  BP: 131/71 (23 Aug 2021 13:55) (121/63 - 144/74)  BP(mean): --  RR: 21 (23 Aug 2021 13:55) (20 - 21)  SpO2: 100% (23 Aug 2021 13:55) (91% - 100%)      22 Aug 2021 07:01  -  23 Aug 2021 07:00  --------------------------------------------------------  IN:    Oral Fluid: 450 mL  Total IN: 450 mL    OUT:    Indwelling Catheter - Urethral (mL): 125 mL  Total OUT: 125 mL    Total NET: 325 mL          Physical Exam:    Gen: Well-developed, well-nourished in no acute distress  Resp: No additional work of breathing   GI: Soft, non-tender, non-distended, with normoactive bowel sounds.  No masses.  MSK: Moves all extremities equally  Skin: No rashes    Labs:                        9.8    18.87 )-----------( 341      ( 23 Aug 2021 07:30 )             31.2     23 Aug 2021 07:36    130    |  93     |  42     ----------------------------<  96     5.5     |  18     |  2.05     Ca    10.8       23 Aug 2021 07:36  Phos  3.2       22 Aug 2021 08:51  Mg     2.20      22 Aug 2021 08:51    TPro  6.3    /  Alb  2.6    /  TBili  0.3    /  DBili  x      /  AST  32     /  ALT  11     /  AlkPhos  111    23 Aug 2021 07:36      CAPILLARY BLOOD GLUCOSE      POCT Blood Glucose.: 102 mg/dL (23 Aug 2021 12:37)        LIVER FUNCTIONS - ( 23 Aug 2021 07:36 )  Alb: 2.6 g/dL / Pro: 6.3 g/dL / ALK PHOS: 111 U/L / ALT: 11 U/L / AST: 32 U/L / GGT: x             Urinalysis Basic - ( 23 Aug 2021 11:48 )    Color: Yellow / Appearance: Turbid / S.027 / pH: x  Gluc: x / Ketone: Trace  / Bili: Negative / Urobili: <2 mg/dL   Blood: x / Protein: 100 mg/dL / Nitrite: Negative   Leuk Esterase: Large / RBC: 7 /HPF / WBC >50 /HPF   Sq Epi: x / Non Sq Epi: x / Bacteria: Few        MEDICATIONS  (STANDING):  allopurinol 100 milliGRAM(s) Oral daily  cefepime   IVPB 1000 milliGRAM(s) IV Intermittent every 24 hours  metroNIDAZOLE  IVPB 500 milliGRAM(s) IV Intermittent every 8 hours    MEDICATIONS  (PRN):   HPI:  66 year old female with PMHx of HTN, pre-DM, and ovarian mass s/p left ureteral stent placement for hydronephrosis 2/2 extrinsic compression at OSH on 7/15. She presented to [facility] with bilateral lower extremity edema.  Patient is now s/p paracentesis.      Patient has not followed up with a urologist since initial stent placement.  Urology consulted for PURVI in the setting of bilateral hydronephrosis.  Patient has had poor UOP and rising creatinine over the past few days (currently 2.05 from normal baseline).  CT  demonstrated moderate bilateral hydro with stent in place, repeat CT today stable hydronephrosis.  Catheter placed for retained urine.      O: Vital Signs Last 24 Hrs  T(C): 37.9 (23 Aug 2021 12:00), Max: 37.9 (23 Aug 2021 12:00)  T(F): 100.2 (23 Aug 2021 12:00), Max: 100.2 (23 Aug 2021 12:00)  HR: 110 (23 Aug 2021 13:55) (74 - 119)  BP: 131/71 (23 Aug 2021 13:55) (121/63 - 144/74)  BP(mean): --  RR: 21 (23 Aug 2021 13:55) (20 - 21)  SpO2: 100% (23 Aug 2021 13:55) (91% - 100%)      22 Aug 2021 07:01  -  23 Aug 2021 07:00  --------------------------------------------------------  IN:    Oral Fluid: 450 mL  Total IN: 450 mL    OUT:    Indwelling Catheter - Urethral (mL): 125 mL  Total OUT: 125 mL    Total NET: 325 mL          Physical Exam:    Gen: Ill appearing, lethargic  Resp: No additional work of breathing   GI: Soft, non-tender, non-distended, with normoactive bowel sounds.  No masses.  : carnes in place draining clear urine  MSK: Moves all extremities equally  Skin: No rashes    Labs:                        9.8    18.87 )-----------( 341      ( 23 Aug 2021 07:30 )             31.2     23 Aug 2021 07:36    130    |  93     |  42     ----------------------------<  96     5.5     |  18     |  2.05     Ca    10.8       23 Aug 2021 07:36  Phos  3.2       22 Aug 2021 08:51  Mg     2.20      22 Aug 2021 08:51    TPro  6.3    /  Alb  2.6    /  TBili  0.3    /  DBili  x      /  AST  32     /  ALT  11     /  AlkPhos  111    23 Aug 2021 07:36      CAPILLARY BLOOD GLUCOSE      POCT Blood Glucose.: 102 mg/dL (23 Aug 2021 12:37)        LIVER FUNCTIONS - ( 23 Aug 2021 07:36 )  Alb: 2.6 g/dL / Pro: 6.3 g/dL / ALK PHOS: 111 U/L / ALT: 11 U/L / AST: 32 U/L / GGT: x             Urinalysis Basic - ( 23 Aug 2021 11:48 )    Color: Yellow / Appearance: Turbid / S.027 / pH: x  Gluc: x / Ketone: Trace  / Bili: Negative / Urobili: <2 mg/dL   Blood: x / Protein: 100 mg/dL / Nitrite: Negative   Leuk Esterase: Large / RBC: 7 /HPF / WBC >50 /HPF   Sq Epi: x / Non Sq Epi: x / Bacteria: Few        MEDICATIONS  (STANDING):  allopurinol 100 milliGRAM(s) Oral daily  cefepime   IVPB 1000 milliGRAM(s) IV Intermittent every 24 hours  metroNIDAZOLE  IVPB 500 milliGRAM(s) IV Intermittent every 8 hours    MEDICATIONS  (PRN):

## 2021-08-24 NOTE — PROGRESS NOTE ADULT - SUBJECTIVE AND OBJECTIVE BOX
[facility] DIVISION OF KIDNEY DISEASES AND HYPERTENSION -- FOLLOW UP NOTE  --------------------------------------------------------------------------------  Chief Complaint: B/L edema, ab distention and SOB    24 hour events/subjective: Patient was put on BiPAP this morning and was short of breath. Patient was alert and responsive to commands. Patient's creatinine continues to worsen.         PAST HISTORY  --------------------------------------------------------------------------------  No significant changes to PMH, PSH, FHx, SHx, unless otherwise noted    ALLERGIES & MEDICATIONS  --------------------------------------------------------------------------------  Allergies    penicillins (Rash)    Intolerances      Standing Inpatient Medications  allopurinol 100 milliGRAM(s) Oral daily  cefepime   IVPB 1000 milliGRAM(s) IV Intermittent every 24 hours  metroNIDAZOLE  IVPB 500 milliGRAM(s) IV Intermittent every 8 hours    PRN Inpatient Medications      REVIEW OF SYSTEMS  --------------------------------------------------------------------------------  Gen: No weight changes, fatigue, fevers/chills, weakness  Skin: No rashes  Head/Eyes/Ears/Mouth: No headache; Normal hearing; Normal vision w/o blurriness; No sinus pain/discomfort, sore throat  Respiratory: No dyspnea, cough, wheezing, hemoptysis  CV: No chest pain, PND, orthopnea  GI: No abdominal pain, diarrhea, constipation, nausea, vomiting, melena, hematochezia  : No increased frequency, dysuria, hematuria, nocturia  MSK: No joint pain/swelling; no back pain; no edema  Neuro: No dizziness/lightheadedness, weakness, seizures, numbness, tingling  Heme: No easy bruising or bleeding  Endo: No heat/cold intolerance  Psych: No significant nervousness, anxiety, stress, depression    All other systems were reviewed and are negative, except as noted.    VITALS/PHYSICAL EXAM  --------------------------------------------------------------------------------  T(C): 37.8 (08-24-21 @ 12:35), Max: 37.8 (08-24-21 @ 12:35)  HR: 115 (08-24-21 @ 13:19) (71 - 120)  BP: 100/57 (08-24-21 @ 13:00) (100/57 - 131/71)  RR: 24 (08-24-21 @ 13:00) (20 - 27)  SpO2: 90% (08-24-21 @ 13:19) (90% - 100%)  Wt(kg): --        08-23-21 @ 07:01  -  08-24-21 @ 07:00  --------------------------------------------------------  IN: 0 mL / OUT: 58 mL / NET: -58 mL      Physical Exam:  	Gen: NAD, well-appearing  	HEENT: PERRL, supple neck, clear oropharynx  	Pulm: CTA B/L  	CV: RRR, S1S2; no rub  	Back: No spinal or CVA tenderness; no sacral edema  	Abd: +BS, soft, nontender/nondistended  	: No suprapubic tenderness  	UE: Warm, FROM, no clubbing, intact strength; no edema; no asterixis  	LE: Warm, FROM, no clubbing, intact strength; no edema  	Neuro: No focal deficits, intact gait  	Psych: Normal affect and mood  	Skin: Warm, without rashes  	Vascular access:    LABS/STUDIES  --------------------------------------------------------------------------------              9.3    18.77 >-----------<  322      [08-24-21 @ 08:12]              30.8     135  |  95  |  51  ----------------------------<  88      [08-24-21 @ 08:12]  5.1   |  24  |  2.34        Ca     10.3     [08-24-21 @ 08:12]      Mg     2.50     [08-24-21 @ 08:12]      Phos  5.5     [08-24-21 @ 08:12]    TPro  6.1  /  Alb  2.5  /  TBili  0.3  /  DBili  x   /  AST  33  /  ALT  10  /  AlkPhos  105  [08-24-21 @ 08:12]    PT/INR: PT 11.7 , INR 1.03       [08-23-21 @ 16:07]  PTT: 21.3       [08-23-21 @ 16:07]    Uric acid 2.9      [08-24-21 @ 08:12]        [08-24-21 @ 08:12]    Creatinine Trend:  SCr 2.34 [08-24 @ 08:12]  SCr 2.23 [08-23 @ 23:31]  SCr 2.19 [08-23 @ 16:05]  SCr 2.05 [08-23 @ 07:36]  SCr 1.65 [08-22 @ 08:51]    Urinalysis - [08-23-21 @ 11:48]      Color Yellow / Appearance Turbid / SG 1.027 / pH 5.5      Gluc Negative / Ketone Trace  / Bili Negative / Urobili <2 mg/dL       Blood Large / Protein 100 mg/dL / Leuk Est Large / Nitrite Negative      RBC 7 / WBC >50 / Hyaline  / Gran 2 / Sq Epi  / Non Sq Epi  / Bacteria Few    Urine Creatinine 122      [08-23-21 @ 19:59]  Urine Protein 198      [08-23-21 @ 19:59]  Urine Sodium <20      [08-23-21 @ 19:59]  Urine Urea Nitrogen 454.0      [08-23-21 @ 19:59]  Urine Potassium 77.8      [08-23-21 @ 19:59]  Urine Chloride <20      [08-23-21 @ 19:59]  Urine Osmolality 445      [08-23-21 @ 19:59]    TSH 1.85      [07-29-21 @ 11:12]    HBsAb <3.0      [07-27-21 @ 09:32]  HBsAg Nonreact      [07-27-21 @ 09:32]  HBcAb Nonreact      [07-27-21 @ 09:32]  HCV 0.13, Nonreact      [07-27-21 @ 09:30]    SPEP Interpretation: with acute phase reaction. HOSSEIN Kay MD      [08-03-21 @ 07:01]   [facility] DIVISION OF KIDNEY DISEASES AND HYPERTENSION -- FOLLOW UP NOTE  --------------------------------------------------------------------------------  Chief Complaint: B/L edema, ab distention and SOB    24 hour events/subjective: Patient was put on BiPAP this morning and was short of breath. Patient was alert and responsive to commands. Patient's creatinine continues to worsen.         PAST HISTORY  --------------------------------------------------------------------------------  No significant changes to PMH, PSH, FHx, SHx, unless otherwise noted    ALLERGIES & MEDICATIONS  --------------------------------------------------------------------------------  Allergies    penicillins (Rash)    Intolerances      Standing Inpatient Medications  allopurinol 100 milliGRAM(s) Oral daily  cefepime   IVPB 1000 milliGRAM(s) IV Intermittent every 24 hours  metroNIDAZOLE  IVPB 500 milliGRAM(s) IV Intermittent every 8 hours    PRN Inpatient Medications      REVIEW OF SYSTEMS  unable     VITALS/PHYSICAL EXAM  --------------------------------------------------------------------------------  T(C): 37.8 (08-24-21 @ 12:35), Max: 37.8 (08-24-21 @ 12:35)  HR: 115 (08-24-21 @ 13:19) (71 - 120)  BP: 100/57 (08-24-21 @ 13:00) (100/57 - 131/71)  RR: 24 (08-24-21 @ 13:00) (20 - 27)  SpO2: 90% (08-24-21 @ 13:19) (90% - 100%)  Wt(kg): --        08-23-21 @ 07:01  -  08-24-21 @ 07:00  --------------------------------------------------------  IN: 0 mL / OUT: 58 mL / NET: -58 mL      Physical Exam:  	Gen: on Bipap.   	HEENT: JVD  	Pulm: CTA B/L  	CV: RRR, S1S2; no rub  	Abd: +BS, soft, nontender/nondistended  	: No suprapubic tenderness    	LE: ; no edema  	Neuro: No focal deficits, intact gait  	Psych: Normal affect and mood  	  	    LABS/STUDIES  --------------------------------------------------------------------------------              9.3    18.77 >-----------<  322      [08-24-21 @ 08:12]              30.8     135  |  95  |  51  ----------------------------<  88      [08-24-21 @ 08:12]  5.1   |  24  |  2.34        Ca     10.3     [08-24-21 @ 08:12]      Mg     2.50     [08-24-21 @ 08:12]      Phos  5.5     [08-24-21 @ 08:12]    TPro  6.1  /  Alb  2.5  /  TBili  0.3  /  DBili  x   /  AST  33  /  ALT  10  /  AlkPhos  105  [08-24-21 @ 08:12]    PT/INR: PT 11.7 , INR 1.03       [08-23-21 @ 16:07]  PTT: 21.3       [08-23-21 @ 16:07]    Uric acid 2.9      [08-24-21 @ 08:12]        [08-24-21 @ 08:12]    Creatinine Trend:  SCr 2.34 [08-24 @ 08:12]  SCr 2.23 [08-23 @ 23:31]  SCr 2.19 [08-23 @ 16:05]  SCr 2.05 [08-23 @ 07:36]  SCr 1.65 [08-22 @ 08:51]    Urinalysis - [08-23-21 @ 11:48]      Color Yellow / Appearance Turbid / SG 1.027 / pH 5.5      Gluc Negative / Ketone Trace  / Bili Negative / Urobili <2 mg/dL       Blood Large / Protein 100 mg/dL / Leuk Est Large / Nitrite Negative      RBC 7 / WBC >50 / Hyaline  / Gran 2 / Sq Epi  / Non Sq Epi  / Bacteria Few    Urine Creatinine 122      [08-23-21 @ 19:59]  Urine Protein 198      [08-23-21 @ 19:59]  Urine Sodium <20      [08-23-21 @ 19:59]  Urine Urea Nitrogen 454.0      [08-23-21 @ 19:59]  Urine Potassium 77.8      [08-23-21 @ 19:59]  Urine Chloride <20      [08-23-21 @ 19:59]  Urine Osmolality 445      [08-23-21 @ 19:59]    TSH 1.85      [07-29-21 @ 11:12]    HBsAb <3.0      [07-27-21 @ 09:32]  HBsAg Nonreact      [07-27-21 @ 09:32]  HBcAb Nonreact      [07-27-21 @ 09:32]  HCV 0.13, Nonreact      [07-27-21 @ 09:30]    SPEP Interpretation: with acute phase reaction. HOSSEIN Kay MD      [08-03-21 @ 07:01]

## 2021-08-24 NOTE — PROGRESS NOTE ADULT - SUBJECTIVE AND OBJECTIVE BOX
PROGRESS NOTE:   Patient is a 66y old  Female who presents with a chief complaint of Worsening volume overload (24 Aug 2021 10:00)    SUBJECTIVE / OVERNIGHT EVENTS:  Pt found to be lethargic this AM. Noted to be on BiPAP due to increased WOB per nurse. Pt able to follow commands but not as awake and alert compared to last several days. Pulm service also at bedside attempted to perform thoracentesis however, deemed safer to transfer to MICU for increased WOB for possible intubation. Pt transferred safely to MICU.    REVIEW OF SYSTEMS:  Unable to perform due to pt condition.       MEDICATIONS  (STANDING):  allopurinol 100 milliGRAM(s) Oral daily  cefepime   IVPB 1000 milliGRAM(s) IV Intermittent every 24 hours  metroNIDAZOLE  IVPB 500 milliGRAM(s) IV Intermittent every 8 hours  sodium chloride 0.45% 1000 milliLiter(s) (100 mL/Hr) IV Continuous <Continuous>    CAPILLARY BLOOD GLUCOSE  POCT Blood Glucose.: 116 mg/dL (23 Aug 2021 14:15)      I&O's Summary    23 Aug 2021 07:01  -  24 Aug 2021 07:00  --------------------------------------------------------  IN: 0 mL / OUT: 58 mL / NET: -58 mL      PHYSICAL EXAM:  Vital Signs Last 24 Hrs  T(C): 37.8 (24 Aug 2021 12:35), Max: 37.8 (24 Aug 2021 12:35)  T(F): 100.1 (24 Aug 2021 12:35), Max: 100.1 (24 Aug 2021 12:35)  HR: 120 (24 Aug 2021 12:35) (71 - 120)  BP: 125/70 (24 Aug 2021 12:35) (105/66 - 131/71)  BP(mean): 81 (24 Aug 2021 12:35) (81 - 81)  RR: 27 (24 Aug 2021 12:35) (20 - 27)  SpO2: 96% (24 Aug 2021 12:35) (94% - 100%)    GENERAL: Nl-appearing female in acute distress; pt appears clinically unstable. Lethargic but responds appropriately  HEENT: Normocephalic; supple neck, no JVD  CARDIAC: RRR, nl S1 and S2, no m/r/g  PULM: Minimal breath sounds on bilateral lower lobes, coarse breath sounds on upper lobes; increased WOB; on BiPAP  ABDOMEN: non-tender but distended, BS+  EXTREMITIES:  2+ peripheral pulses, no clubbing, 2+ edema  NERVOUS SYSTEM:  A&Ox3, no focal deficits  MSK: FROM all 4 extremities  SKIN: No rashes or lesions      LABS:                        9.3    18.77 )-----------( 322      ( 24 Aug 2021 08:12 )             30.8     08-24    135  |  95<L>  |  51<H>  ----------------------------<  88  5.1   |  24  |  2.34<H>    Ca    10.3      24 Aug 2021 08:12  Phos  5.5     08-24  Mg     2.50     08-24    TPro  6.1  /  Alb  2.5<L>  /  TBili  0.3  /  DBili  x   /  AST  33<H>  /  ALT  10  /  AlkPhos  105  08-24    PT/INR - ( 23 Aug 2021 16:07 )   PT: 11.7 sec;   INR: 1.03 ratio         PTT - ( 23 Aug 2021 16:07 )  PTT:21.3 sec      Urinalysis Basic - ( 23 Aug 2021 11:48 )    Color: Yellow / Appearance: Turbid / S.027 / pH: x  Gluc: x / Ketone: Trace  / Bili: Negative / Urobili: <2 mg/dL   Blood: x / Protein: 100 mg/dL / Nitrite: Negative   Leuk Esterase: Large / RBC: 7 /HPF / WBC >50 /HPF   Sq Epi: x / Non Sq Epi: x / Bacteria: Few

## 2021-08-24 NOTE — PROGRESS NOTE ADULT - PROBLEM SELECTOR PLAN 1
Problem: PURVI (acute kidney injury). Recommendation: Likely secondary to IV contrast and diuresis on lasix with possible component of urinary retention and hydronephrosis  On admission, BUN and Cr at 23 and 0.99 respectively. Recent creatinine on 8/23, elevated to 2.05  - Continue to monitor BUN and Cr daily  - Strict I/O's, avoid nephrotoxic medications including ACE/ARBs, NSAIDs, hold lasix for time being  - carnes draining minimal amounts (125 cc/24 hrs), per nurse the fluid scanned could have been ascitic fluid  - abdominal CT on 8/18 with IV contrast shows stable B/L mod hydronephrosis  - Order protein to creatinine ratio to r/o intrinsic processes  - order FENa and FEUrea  - f/u UA shows trace ketones, protein 100, large leuk est and blood, granular casts 2  - consider starting IV fluid trial, 1/2NS with bicarb additive.    8/24/21  - completed IV fluid trial  - protein to creatinine ratio elevated to 1.6, consistent with an intrinsic renal process likely secondary to contrast  - continue to trend BUN and creatinine  - may need to consider dialysis if creatinine continues to worsen.

## 2021-08-24 NOTE — PROGRESS NOTE ADULT - ASSESSMENT
66-year-old female with PMH significant for HTN, HLD, recently discovered ovarian mass suspicious for malignancy (7/2021), L hydroureteronephrosis s/p renal stent (7/15/21), and recent hospitalization ([facility] 7/26-8/4) for acute renal failure  requiring urgent HD, ascites s/p therapeutic paracentesis (7/27/21), and pleural effusion s/p R thoracentesis (8/2/21) showing lymphocytosis but no malignant cells admitted for acute hypercarbic respiratory failure due to pleural effusions most likely caused by ovarian malignancy a/w volume overload with ascites and b/l LE edema. 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 Now found to have PURVI likely contrast-induced nephropathy

## 2021-08-24 NOTE — PROGRESS NOTE ADULT - TIME BILLING
patient
Medical management as above, review of results/records, discussion with patient and primary team.
patient care
Medical management as above, review of results/records, discussion with patient and primary team.
patient care
coordinating care with primary team and reviewed the chart.
patient care
patient

## 2021-08-24 NOTE — PROGRESS NOTE ADULT - PROBLEM SELECTOR PLAN 3
Pt found to have PURVI (8/22/21) w/ Scr 1.65. Uptrending 2.05 --> 2.34. Pt found to be retaining urine (8/22/21) and placed carnes. This AM, pt only urinated ~125cc in the span of 16 hours. Dysuria noted this AM as well.   -CT 8/18/21 - moderate bilateral hydronephrosis noted  -renal consult; recs appreciated.    =urine studies and 1/2 NS w/ bicarb additive    =continue to trend BUN, Cr.  -urology consult; recs appreciated.     =Trend Cr for now, however, if it worsens, consider NT

## 2021-08-24 NOTE — PROGRESS NOTE ADULT - ATTENDING COMMENTS
#Combined hypoxic and hypercapnic respiratory failure today - patient more lethargic. VBG with acidosis and hypercapnia. Patient not tolerating bipap. Differential for hypoxica include pleural effusions, PNA, PE. Initial tachypnea likely due to hypoxia and compensatory for metabolic acidosis. Now patient likely tired out and becoming hypercapnic. C/w antibiotics, possible thora per pulm. Check doppler study to r/o DVT. Pulmonary team at bedside. ICU consulted. Patient to be transferred to ICU for further management.   #PURVI - differential include pre-renal vs MONO. S/p IVF with bicarb yesterday. Metabolic acidosis improved today however now with respiratory acidosis. On bipap. Monitor renal function. Renal following.   #B cell lymphoma - heme following. Discussed role for steroids. Heme to advise.     Plan discussed with Pulm attending Dr. Rowe and Renal attending Dr. Lezama. HS4 to discuss developments with family.

## 2021-08-24 NOTE — PROGRESS NOTE ADULT - ATTENDING COMMENTS
Agree with above.    66F with PMH significant for HTN, HLD, recently discovered ovarian mass suspicious for malignancy, L hydroureteronephrosis s/p renal stent, and recent hospitalization to [facility] for acute renal failure (likely obstructive) requiring urgent HD, ascites s/p therapeutic paracentesis, and pleural effusion s/p R thoracentesis showing lymphocytosis but no malignant cells admitted for acute hypoxic respiratory failure with imaging showing bilateral pleural effusion. Cytopathology reveals DLBCL.     Patient was placed on bilevel overnight due to increase work of breathing and when the patient was planned to have her thoracentesis she was noted to be nonresponsive to pain and verbal command. The decision was to abort the thoracentesis as the amount of effusion is not large enough to cause the degree of altered mental status. She was sent to the MICU for further workup of her AMS and metabolic acidosis.     Will continue to follow after she is discharged from the ICU.

## 2021-08-24 NOTE — PROGRESS NOTE ADULT - ASSESSMENT
pending 67 yo F with known medical history of HTL, HLD, with recurrent admissions since June for abdominal distention, ascites, ARF, pleural effusions with "ovarian mass" initially admitted for fluid overload. Hematology was consulted because cytopathology from 8/2/2021 pleural fluid (thoracentesis) showed a DLBCL with MYC and BCL6 rearrangements.     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 Prelim discussed with pathologist over phone; Initial stains consistent with lymphoma; however flow and remaining stains pending. However given recurrent Pleural effusions, and decompensation over the weekend; will start on steroids. Reccomendations for treatment will depend on final path.   - Dexamethsone 20 mg IV daily x 2 days; plan on increasing to 40mg IV daily if toelrates well.   - Given confusion, AMS, would recommend LP with CSF flow and cytopathology as well as MRI brain w/wo contrast to rule out CNS involvement. Although given critical status, may be difficult to obtain.   - Continue with allopurinol for TLS prophylaxis; If renal continues to worsen, will need to dose adjust   - Will require TLS lab monitoring [id number] (LDH, Uric acid, BMP, phos, Mag)     PURVI   - Discussed with nephrology regarding etiology ATN 2/2 Contrast induced nephropathy;   - concern for possible TLS involvement as uric acid was very elevated 8/20 (prompting allopurinol initiation)   - s/p 1 dose rasburicase 8/23; please draw uric acid on Ice as without this causes false lower readings.     Initial work up for treatment:  - Echo 8/3 with LVEF of 56%  - recommend Hepatitis panel, Hep B panel, HIV testing, HTLV1 and HTLV2 (?Cauliflower cells reported per primary team notes)    Rosemarie Pardo   PGY4 Heme/Onc   [phone]  Please call on call team after 5

## 2021-08-24 NOTE — PROGRESS NOTE ADULT - PROBLEM SELECTOR PROBLEM 10
Prophylactic measure
HLD (hyperlipidemia)
HLD (hyperlipidemia)
Prophylactic measure

## 2021-08-24 NOTE — PROGRESS NOTE ADULT - SUBJECTIVE AND OBJECTIVE BOX
Interval Events: Patient was seen and examined at bedside. No overnight events.    REVIEW OF SYSTEMS:  Constitutional: [ ] fevers [ ] chills [ ] weight loss [ ] weight gain  CV: [ ] chest pain [ ] orthopnea [ ] palpitations [ ] murmur  Resp: [ ] cough [ ] shortness of breath [ ] dyspnea [ ] wheezing [ ] sputum [ ] hemoptysis  [ ] All other systems negative  [ ] Unable to assess ROS because ________    OBJECTIVE:  ICU Vital Signs Last 24 Hrs  T(C): 37.8 (24 Aug 2021 12:35), Max: 37.8 (24 Aug 2021 12:35)  T(F): 100.1 (24 Aug 2021 12:35), Max: 100.1 (24 Aug 2021 12:35)  HR: 107 (24 Aug 2021 14:00) (71 - 120)  BP: 83/46 (24 Aug 2021 14:00) (83/46 - 125/70)  BP(mean): 54 (24 Aug 2021 14:00) (54 - 81)  ABP: --  ABP(mean): --  RR: 22 (24 Aug 2021 14:00) (20 - 27)  SpO2: 93% (24 Aug 2021 14:00) (90% - 100%)         @ :  -   @ 07:00  --------------------------------------------------------  IN: 0 mL / OUT: 58 mL / NET: -58 mL     @ 07:  -   @ 14:45  --------------------------------------------------------  IN: 100 mL / OUT: 110 mL / NET: -10 mL      CAPILLARY BLOOD GLUCOSE      POCT Blood Glucose.: 116 mg/dL (23 Aug 2021 14:15)      PHYSICAL EXAM:        HOSPITAL MEDICATIONS:  MEDICATIONS  (STANDING):  allopurinol 100 milliGRAM(s) Oral daily  cefepime   IVPB 1000 milliGRAM(s) IV Intermittent every 24 hours  chlorhexidine 4% Liquid 1 Application(s) Topical <User Schedule>  metroNIDAZOLE  IVPB 500 milliGRAM(s) IV Intermittent every 8 hours    MEDICATIONS  (PRN):      LABS:                        9.1    17.16 )-----------( 323      ( 24 Aug 2021 14:31 )             29.9     Hgb Trend: 9.1<--, 9.3<--, 9.8<--, 10.0<--, 9.7<--  08-24    135  |  95<L>  |  51<H>  ----------------------------<  88  5.1   |  24  |  2.34<H>    Ca    10.3      24 Aug 2021 08:12  Phos  5.5       Mg     2.50         TPro  6.1  /  Alb  2.5<L>  /  TBili  0.3  /  DBili  x   /  AST  33<H>  /  ALT  10  /  AlkPhos  105      Creatinine Trend: 2.34<--, 2.23<--, 2.19<--, 2.05<--, 1.65<--, 1.24<--  PT/INR - ( 23 Aug 2021 16:07 )   PT: 11.7 sec;   INR: 1.03 ratio         PTT - ( 23 Aug 2021 16:07 )  PTT:21.3 sec  Urinalysis Basic - ( 23 Aug 2021 11:48 )    Color: Yellow / Appearance: Turbid / S.027 / pH: x  Gluc: x / Ketone: Trace  / Bili: Negative / Urobili: <2 mg/dL   Blood: x / Protein: 100 mg/dL / Nitrite: Negative   Leuk Esterase: Large / RBC: 7 /HPF / WBC >50 /HPF   Sq Epi: x / Non Sq Epi: x / Bacteria: Few        Venous Blood Gas:   @ 14:31  7.24/64/53/27/83.8  VBG Lactate: 2.1  Venous Blood Gas:   @ 08:12  7.25/64/63/28/92.7  VBG Lactate: 2.2  Venous Blood Gas:   @ 07:30  7.32/49/61/25/91.7  VBG Lactate: 2.5    MICROBIOLOGY:     RADIOLOGY:  [ ] Reviewed and interpreted by me   Interval Events: Patient was seen and examined at bedside. Patient overnight with worsening respiratory distress, hypercapnia and worsening mental status.     Unable to get consent for patient for thoracentesis fluid was small to moderate in size today not likely to be the primary cause of her worsening respiratory status. Also with diffuse blines and hypercapnia Patient also on bipap had very poor mental status and none responsive to sternal rub. MICU consulted and admitted to MICU.     REVIEW OF SYSTEMS:  Constitutional: [ ] fevers [ ] chills [ ] weight loss [ ] weight gain  CV: [ ] chest pain [ ] orthopnea [ ] palpitations [ ] murmur  Resp: [ ] cough [ ] shortness of breath [ ] dyspnea [ ] wheezing [ ] sputum [ ] hemoptysis  [ ] All other systems negative  [x ] Unable to assess ROS because ________    OBJECTIVE:  ICU Vital Signs Last 24 Hrs  T(C): 37.8 (24 Aug 2021 12:35), Max: 37.8 (24 Aug 2021 12:35)  T(F): 100.1 (24 Aug 2021 12:35), Max: 100.1 (24 Aug 2021 12:35)  HR: 107 (24 Aug 2021 14:00) (71 - 120)  BP: 83/46 (24 Aug 2021 14:00) (83/46 - 125/70)  BP(mean): 54 (24 Aug 2021 14:00) (54 - 81)  ABP: --  ABP(mean): --  RR: 22 (24 Aug 2021 14:00) (20 - 27)  SpO2: 93% (24 Aug 2021 14:00) (90% - 100%)         @ : @ 07:00  --------------------------------------------------------  IN: 0 mL / OUT: 58 mL / NET: -58 mL     @ 07: @ 14:45  --------------------------------------------------------  IN: 100 mL / OUT: 110 mL / NET: -10 mL      CAPILLARY BLOOD GLUCOSE      POCT Blood Glucose.: 116 mg/dL (23 Aug 2021 14:15)    PHYSICAL EXAM:  General: thin cachectic female, + respiratory distress, SOB, non-responsive.   HEENT: no scleral icterus  Lymph Nodes: no palpable LAD in the neck  Respiratory: decreased breath sounds at the bases  Cardiovascular: s1/s2, rrr, no murmurs  Abdomen: soft, nontender, markedly distended  Extremities: no LE edema  Skin: no rashes noted  Neurological: AxOx0  Psychiatry: normal mood and affect        HOSPITAL MEDICATIONS:  MEDICATIONS  (STANDING):  allopurinol 100 milliGRAM(s) Oral daily  cefepime   IVPB 1000 milliGRAM(s) IV Intermittent every 24 hours  chlorhexidine 4% Liquid 1 Application(s) Topical <User Schedule>  metroNIDAZOLE  IVPB 500 milliGRAM(s) IV Intermittent every 8 hours    MEDICATIONS  (PRN):      LABS:                        9.1    17.16 )-----------( 323      ( 24 Aug 2021 14:31 )             29.9     Hgb Trend: 9.1<--, 9.3<--, 9.8<--, 10.0<--, 9.7<--  08-24    135  |  95<L>  |  51<H>  ----------------------------<  88  5.1   |  24  |  2.34<H>    Ca    10.3      24 Aug 2021 08:12  Phos  5.5     08-24  Mg     2.50     08-24    TPro  6.1  /  Alb  2.5<L>  /  TBili  0.3  /  DBili  x   /  AST  33<H>  /  ALT  10  /  AlkPhos  105  08-24    Creatinine Trend: 2.34<--, 2.23<--, 2.19<--, 2.05<--, 1.65<--, 1.24<--  PT/INR - ( 23 Aug 2021 16:07 )   PT: 11.7 sec;   INR: 1.03 ratio         PTT - ( 23 Aug 2021 16:07 )  PTT:21.3 sec  Urinalysis Basic - ( 23 Aug 2021 11:48 )    Color: Yellow / Appearance: Turbid / S.027 / pH: x  Gluc: x / Ketone: Trace  / Bili: Negative / Urobili: <2 mg/dL   Blood: x / Protein: 100 mg/dL / Nitrite: Negative   Leuk Esterase: Large / RBC: 7 /HPF / WBC >50 /HPF   Sq Epi: x / Non Sq Epi: x / Bacteria: Few        Venous Blood Gas:   @ 14:31  7.24/64/53/27/83.8  VBG Lactate: 2.1  Venous Blood Gas:   @ 08:12  7.25/64/63/28/92.7  VBG Lactate: 2.2  Venous Blood Gas:   @ 07:30  7.32/49/61/25/91.7  VBG Lactate: 2.5    MICROBIOLOGY:     RADIOLOGY:  [ ] Reviewed and interpreted by me

## 2021-08-24 NOTE — PROGRESS NOTE ADULT - PROBLEM SELECTOR PLAN 11
-DVT ppx: lovenox  -Diet: mechanical soft  -Dispo: pending clinical improvement
-DVT ppx: lovenox  -Diet: mechanical soft  -Dispo: pending clinical improvement

## 2021-08-24 NOTE — PROGRESS NOTE ADULT - SUBJECTIVE AND OBJECTIVE BOX
Hematology Oncology Follow-up    INTERVAL HPI/OVERNIGHT EVENTS:      VITAL SIGNS:  T(F): 98.2 (21 @ 05:08)  HR: 111 (21 @ 07:44)  BP: 111/56 (21 @ 05:08)  RR: 21 (21 @ 05:08)  SpO2: 100% (21 @ 07:44)  Wt(kg): --    21 @ 07:01  -  21 @ 07:00  --------------------------------------------------------  IN: 0 mL / OUT: 58 mL / NET: -58 mL        PHYSICAL EXAM:    Constitutional: AAOx3, NAD  Eyes: PERRL, EOMI, sclera non-icteric  Neck: supple, no masses, no JVD, no lymphadenopathy  Respiratory: CTA b/l, no wheezing, rhonchi, rales, with normal respiratory effort  Cardiovascular: RRR, normal S1S2, no M/R/G  Gastrointestinal: soft, NTND, no masses palpable, BS normal in all four quadrants, no HSM  Extremities:  no edema  MSK: no obvious abnormalities, normal ROM, no lymphadenopathy  Neurological: Grossly intact  Skin: Normal temperature, no rash, no echymoses, no petichiae  Psych: normal affect    MEDICATIONS  (STANDING):  allopurinol 100 milliGRAM(s) Oral daily  cefepime   IVPB 1000 milliGRAM(s) IV Intermittent every 24 hours  metroNIDAZOLE  IVPB 500 milliGRAM(s) IV Intermittent every 8 hours  sodium chloride 0.45% 1000 milliLiter(s) (100 mL/Hr) IV Continuous <Continuous>    MEDICATIONS  (PRN):      penicillins (Rash)      LABS:                        9.3    18.77 )-----------( 322      ( 24 Aug 2021 08:12 )             30.8     08    135  |  95<L>  |  51<H>  ----------------------------<  88  5.1   |  24  |  2.34<H>    Ca    10.3      24 Aug 2021 08:12  Phos  5.5       Mg     2.50         TPro  6.1  /  Alb  2.5<L>  /  TBili  0.3  /  DBili  x   /  AST  33<H>  /  ALT  10  /  AlkPhos  105      PT/INR - ( 23 Aug 2021 16:07 )   PT: 11.7 sec;   INR: 1.03 ratio         PTT - ( 23 Aug 2021 16:07 )  PTT:21.3 sec Lactate Dehydrogenase, Serum: 692 U/L ( @ 08:12)    Urinalysis Basic - ( 23 Aug 2021 11:48 )    Color: Yellow / Appearance: Turbid / S.027 / pH: x  Gluc: x / Ketone: Trace  / Bili: Negative / Urobili: <2 mg/dL   Blood: x / Protein: 100 mg/dL / Nitrite: Negative   Leuk Esterase: Large / RBC: 7 /HPF / WBC >50 /HPF   Sq Epi: x / Non Sq Epi: x / Bacteria: Few              Bilirubin: Negative (21 @ 11:48)      RADIOLOGY & ADDITIONAL TESTS:  Studies reviewed. Hematology Oncology Follow-up    INTERVAL HPI/OVERNIGHT EVENTS:  : over the weekend patient became more confused. Today,. continued to have lethargy but also increase work of breathing. MICU was consulted for hypoxic/hypercapneic resp failure and shortly after admission to MICU required intubation. PAtient is now sedated adn mechanically ventillated     VITAL SIGNS:  T(F): 98.2 (21 @ 05:08)  HR: 111 (21 @ 07:44)  BP: 111/56 (21 @ 05:08)  RR: 21 (21 @ 05:08)  SpO2: 100% (21 @ 07:44)  Wt(kg): --    21 @ 07:01  -  21 @ 07:00  --------------------------------------------------------  IN: 0 mL / OUT: 58 mL / NET: -58 mL        PHYSICAL EXAM:    Constitutional: Sedated, intuabted   Eyes: PERRL, EOMI, sclera non-icteric  Neck: supple, no masses, no JVD, fullness of left supraclavicular area   Respiratory: mechanically ventilalted  Cardiovascular: RRR, normal S1S2, no M/R/G  Gastrointestinal: soft, distended  Extremities:  no edema  Neurological: sedated      MEDICATIONS  (STANDING):  allopurinol 100 milliGRAM(s) Oral daily  cefepime   IVPB 1000 milliGRAM(s) IV Intermittent every 24 hours  metroNIDAZOLE  IVPB 500 milliGRAM(s) IV Intermittent every 8 hours  sodium chloride 0.45% 1000 milliLiter(s) (100 mL/Hr) IV Continuous <Continuous>    MEDICATIONS  (PRN):      penicillins (Rash)      LABS:                        9.3    18.77 )-----------( 322      ( 24 Aug 2021 08:12 )             30.8         135  |  95<L>  |  51<H>  ----------------------------<  88  5.1   |  24  |  2.34<H>    Ca    10.3      24 Aug 2021 08:12  Phos  5.5       Mg     2.50         TPro  6.1  /  Alb  2.5<L>  /  TBili  0.3  /  DBili  x   /  AST  33<H>  /  ALT  10  /  AlkPhos  105      PT/INR - ( 23 Aug 2021 16:07 )   PT: 11.7 sec;   INR: 1.03 ratio         PTT - ( 23 Aug 2021 16:07 )  PTT:21.3 sec Lactate Dehydrogenase, Serum: 692 U/L ( @ 08:12)    Urinalysis Basic - ( 23 Aug 2021 11:48 )    Color: Yellow / Appearance: Turbid / S.027 / pH: x  Gluc: x / Ketone: Trace  / Bili: Negative / Urobili: <2 mg/dL   Blood: x / Protein: 100 mg/dL / Nitrite: Negative   Leuk Esterase: Large / RBC: 7 /HPF / WBC >50 /HPF   Sq Epi: x / Non Sq Epi: x / Bacteria: Few              Bilirubin: Negative (21 @ 11:48)      RADIOLOGY & ADDITIONAL TESTS:  Studies reviewed.

## 2021-08-24 NOTE — PROGRESS NOTE ADULT - ATTENDING COMMENTS
I ICU with resp failure, intubated. Prelim biopsy of LN..CD10 pos lymphoma, further characterization pending. Discussed with MICU attending and team. Can start dexamethasone currently. Optimally, needs MRI brain and LP. We asked team to notify us if an LP is planned in order to add cytology. I agree with Dr Pardo's assessment and plan. In ICU with resp failure, intubated. Prelim biopsy of LN..CD10 pos lymphoma, further characterization pending. Discussed with MICU attending and team. Can start dexamethasone currently. Optimally, needs MRI brain and LP. We asked team to notify us if an LP is planned in order to add cytology. I agree with Dr Pardo's assessment and plan.

## 2021-08-24 NOTE — PROGRESS NOTE ADULT - ASSESSMENT
66-year-old female with PMH significant for HTN, HLD, recently discovered ovarian mass suspicious for malignancy (7/2021), L hydroureteronephrosis s/p renal stent (7/15/21), and recent hospitalization ([facility] 7/26-8/4) for acute renal failure (likely obstructive) requiring urgent HD, ascites s/p therapeutic paracentesis (7/27/21), and pleural effusion s/p R thoracentesis (8/2/21) showing lymphocytosis but no malignant cells admitted for acute hypercarbic respiratory failure due to pleural effusions most likely caused by ovarian malignancy a/w volume overload with ascites and b/l LE edema. 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 Ongoing management being discussed. Pt progressively worsening clinically.

## 2021-08-24 NOTE — PROGRESS NOTE ADULT - PROBLEM SELECTOR PLAN 1
Pt initially presented with progressively worsening SOB x 1 week since recent discharge. Pt satting 94-96% on 3L NC, with increased WOB. Placed on BiPAP with improvement in WOB for ~28h.   Presented with Acute Hypercarbic Resp Failure.  Patient sent on 2L home oxygen since last admission; ambulatory O2 assessment (while ambulating 92%; sitting/resting 96%)  8/21/21 - pt ABG found to have PaO2 70 demonstrating hypoxia, however, when calculating Jacklyn index (333mmHg), only mild concern for ARDS noted. BiPAP placed given discordance between PaO2 and O2 saturation. 8/23/21 - pt found to be tachypneic and hypoxic this AM. 91% on 3L NC. Increased to 5L, now 99%. Pt continues to be A&Ox3. Answering all questions appropriately.   8/24/21 - Pt breathing worse this AM, increased WOB. Placed on BiPAP. Transfer to MICU.  -accepted and transferred to MICU

## 2021-08-24 NOTE — CHART NOTE - NSCHARTNOTEFT_GEN_A_CORE
MICU Accept Note    CHIEF COMPLAINT:     HPI / INTERVAL HISTORY:    PAST MEDICAL & SURGICAL HISTORY:  Hypertension    Ovarian mass    Hypercholesteremia    S/P ureteral stent placement        FAMILY HISTORY:  FHx: hypertension (Mother)    FH: diabetes mellitus (Mother)        SOCIAL HISTORY:  Smoking:   Substance Use:   EtOH Use:   Marital Status:   Sexual History:   Occupation:  Recent Travel:  Country of Birth:   Advance Directives:     HOME MEDICATIONS:      Allergies    penicillins (Rash)    Intolerances          REVIEW OF SYSTEMS:  Constitutional: No fevers, chills, weight loss, weight gain  HEENT: No vision problems, eye pain, nasal congestion, rhinorrhea, sore throat, dysphagia  CV: No chest pain, orthopnea, palpitations  Resp: No cough, dyspnea, wheezing, hemoptysis  GI: No nausea, vomiting, diarrhea, constipation, abdominal pain  : [ ] dysuria [ ] nocturia [ ] hematuria [ ] increased urinary frequency  Musculoskeletal: [ ] back pain [ ] myalgias [ ] arthralgias [ ] fracture  Skin: [ ] rash [ ] itch  Neurological: [ ] headache [ ] dizziness [ ] syncope [ ] weakness [ ] numbness  Psychiatric: [ ] anxiety [ ] depression  Endocrine: [ ] diabetes [ ] thyroid problem  Hematologic/Lymphatic: [ ] anemia [ ] bleeding problem  Allergic/Immunologic: [ ] itchy eyes [ ] nasal discharge [ ] hives [ ] angioedema  [ ] All other systems negative  [ ] Unable to assess ROS because ________    OBJECTIVE:  ICU Vital Signs Last 24 Hrs  T(C): 37.8 (24 Aug 2021 12:35), Max: 37.8 (24 Aug 2021 12:35)  T(F): 100.1 (24 Aug 2021 12:35), Max: 100.1 (24 Aug 2021 12:35)  HR: 120 (24 Aug 2021 12:35) (71 - 120)  BP: 125/70 (24 Aug 2021 12:35) (105/66 - 131/71)  BP(mean): 81 (24 Aug 2021 12:35) (81 - 81)  ABP: --  ABP(mean): --  RR: 27 (24 Aug 2021 12:35) (20 - 27)  SpO2: 96% (24 Aug 2021 12:35) (94% - 100%)        - @ 07:01  -  - @ 07:00  --------------------------------------------------------  IN: 0 mL / OUT: 58 mL / NET: -58 mL      CAPILLARY BLOOD GLUCOSE      POCT Blood Glucose.: 116 mg/dL (23 Aug 2021 14:15)      PHYSICAL EXAM:  General:   HEENT:   Neck:   Chest/Lungs:  Heart:  Abdomen:   Extremities:   Skin:   Neuro:   Psych:     LINES:     HOSPITAL MEDICATIONS:  MEDICATIONS  (STANDING):  allopurinol 100 milliGRAM(s) Oral daily  cefepime   IVPB 1000 milliGRAM(s) IV Intermittent every 24 hours  metroNIDAZOLE  IVPB 500 milliGRAM(s) IV Intermittent every 8 hours  sodium chloride 0.45% 1000 milliLiter(s) (100 mL/Hr) IV Continuous <Continuous>    MEDICATIONS  (PRN):      LABS:                        9.3    18.77 )-----------( 322      ( 24 Aug 2021 08:12 )             30.8     Hgb Trend: 9.3<--, 9.8<--, 10.0<--, 9.7<--, 10.1<--  08-24    135  |  95<L>  |  51<H>  ----------------------------<  88  5.1   |  24  |  2.34<H>    Ca    10.3      24 Aug 2021 08:12  Phos  5.5     08-24  Mg     2.50     08-24    TPro  6.1  /  Alb  2.5<L>  /  TBili  0.3  /  DBili  x   /  AST  33<H>  /  ALT  10  /  AlkPhos  105  08-24    Creatinine Trend: 2.34<--, 2.23<--, 2.19<--, 2.05<--, 1.65<--, 1.24<--  PT/INR - ( 23 Aug 2021 16:07 )   PT: 11.7 sec;   INR: 1.03 ratio         PTT - ( 23 Aug 2021 16:07 )  PTT:21.3 sec  Urinalysis Basic - ( 23 Aug 2021 11:48 )    Color: Yellow / Appearance: Turbid / S.027 / pH: x  Gluc: x / Ketone: Trace  / Bili: Negative / Urobili: <2 mg/dL   Blood: x / Protein: 100 mg/dL / Nitrite: Negative   Leuk Esterase: Large / RBC: 7 /HPF / WBC >50 /HPF   Sq Epi: x / Non Sq Epi: x / Bacteria: Few        Venous Blood Gas:   @ 08:12  7.25/64/63/28/92.7  VBG Lactate: 2.2  Venous Blood Gas:   @ 07:30  7.32/49/61/25/91.7  VBG Lactate: 2.5      MICROBIOLOGY:     RADIOLOGY & ADDITIONAL TESTS:      ASSESSMENT AND PLAN:  Mr./Mrs./Ms. is a ___    #Neuro    #Cardiovascular    #Respiratory    #GI/Nutrition    #/Renal    #Skin    #ID    #Endocrine    #Hematologic/DVT ppx    #Ethics      Sandoval Santizo MD  EM/IM PGY1  Pager: [phone] MICU Accept Note    CHIEF COMPLAINT: Worsening volume overload    HPI / INTERVAL HISTORY:    PAST MEDICAL & SURGICAL HISTORY:  Hypertension    Ovarian mass    Hypercholesteremia    S/P ureteral stent placement        FAMILY HISTORY:  FHx: hypertension (Mother)    FH: diabetes mellitus (Mother)        SOCIAL HISTORY:  Smoking:   Substance Use:   EtOH Use:   Marital Status:   Sexual History:   Occupation:  Recent Travel:  Country of Birth:   Advance Directives:     HOME MEDICATIONS:      Allergies    penicillins (Rash)    Intolerances          REVIEW OF SYSTEMS:  Constitutional: No fevers, chills, weight loss, weight gain  HEENT: No vision problems, eye pain, nasal congestion, rhinorrhea, sore throat, dysphagia  CV: No chest pain, orthopnea, palpitations  Resp: No cough, dyspnea, wheezing, hemoptysis  GI: No nausea, vomiting, diarrhea, constipation, abdominal pain  : [ ] dysuria [ ] nocturia [ ] hematuria [ ] increased urinary frequency  Musculoskeletal: [ ] back pain [ ] myalgias [ ] arthralgias [ ] fracture  Skin: [ ] rash [ ] itch  Neurological: [ ] headache [ ] dizziness [ ] syncope [ ] weakness [ ] numbness  Psychiatric: [ ] anxiety [ ] depression  Endocrine: [ ] diabetes [ ] thyroid problem  Hematologic/Lymphatic: [ ] anemia [ ] bleeding problem  Allergic/Immunologic: [ ] itchy eyes [ ] nasal discharge [ ] hives [ ] angioedema  [ ] All other systems negative  [ ] Unable to assess ROS because ________    OBJECTIVE:  ICU Vital Signs Last 24 Hrs  T(C): 37.8 (24 Aug 2021 12:35), Max: 37.8 (24 Aug 2021 12:35)  T(F): 100.1 (24 Aug 2021 12:35), Max: 100.1 (24 Aug 2021 12:35)  HR: 120 (24 Aug 2021 12:35) (71 - 120)  BP: 125/70 (24 Aug 2021 12:35) (105/66 - 131/71)  BP(mean): 81 (24 Aug 2021 12:35) (81 - 81)  ABP: --  ABP(mean): --  RR: 27 (24 Aug 2021 12:35) (20 - 27)  SpO2: 96% (24 Aug 2021 12:35) (94% - 100%)        - @ 07:01  -  - @ 07:00  --------------------------------------------------------  IN: 0 mL / OUT: 58 mL / NET: -58 mL      CAPILLARY BLOOD GLUCOSE      POCT Blood Glucose.: 116 mg/dL (23 Aug 2021 14:15)      PHYSICAL EXAM:  General:   HEENT:   Neck:   Chest/Lungs:  Heart:  Abdomen:   Extremities:   Skin:   Neuro:   Psych:     LINES:     HOSPITAL MEDICATIONS:  MEDICATIONS  (STANDING):  allopurinol 100 milliGRAM(s) Oral daily  cefepime   IVPB 1000 milliGRAM(s) IV Intermittent every 24 hours  metroNIDAZOLE  IVPB 500 milliGRAM(s) IV Intermittent every 8 hours  sodium chloride 0.45% 1000 milliLiter(s) (100 mL/Hr) IV Continuous <Continuous>    MEDICATIONS  (PRN):      LABS:                        9.3    18.77 )-----------( 322      ( 24 Aug 2021 08:12 )             30.8     Hgb Trend: 9.3<--, 9.8<--, 10.0<--, 9.7<--, 10.1<--  08-24    135  |  95<L>  |  51<H>  ----------------------------<  88  5.1   |  24  |  2.34<H>    Ca    10.3      24 Aug 2021 08:12  Phos  5.5     08-24  Mg     2.50     08-24    TPro  6.1  /  Alb  2.5<L>  /  TBili  0.3  /  DBili  x   /  AST  33<H>  /  ALT  10  /  AlkPhos  105  08-24    Creatinine Trend: 2.34<--, 2.23<--, 2.19<--, 2.05<--, 1.65<--, 1.24<--  PT/INR - ( 23 Aug 2021 16:07 )   PT: 11.7 sec;   INR: 1.03 ratio         PTT - ( 23 Aug 2021 16:07 )  PTT:21.3 sec  Urinalysis Basic - ( 23 Aug 2021 11:48 )    Color: Yellow / Appearance: Turbid / S.027 / pH: x  Gluc: x / Ketone: Trace  / Bili: Negative / Urobili: <2 mg/dL   Blood: x / Protein: 100 mg/dL / Nitrite: Negative   Leuk Esterase: Large / RBC: 7 /HPF / WBC >50 /HPF   Sq Epi: x / Non Sq Epi: x / Bacteria: Few        Venous Blood Gas:   @ 08:12  7.25/64/63/28/92.7  VBG Lactate: 2.2  Venous Blood Gas:   @ 07:30  7.32/49/61/25/91.7  VBG Lactate: 2.5      MICROBIOLOGY:     RADIOLOGY & ADDITIONAL TESTS:      ASSESSMENT AND PLAN:  Mr./Mrs./Ms. is a ___    #Neuro    #Cardiovascular    #Respiratory    #GI/Nutrition    #/Renal    #Skin    #ID    #Endocrine    #Hematologic/DVT ppx    #Ethics      Sandoval Santizo MD  EM/IM PGY1  Pager: [phone] MICU Accept Note    CHIEF COMPLAINT: Worsening volume overload    HPI / INTERVAL HISTORY:    Ms. Stafford is a 66-year-old woman with PMH significant for HTN, HLD, and L hydroureteronephrosis s/p renal stent (7/15/21) who presented with BLE edema and worsening abdominal distension and SOB. Of note, during admission at [facility] in 2021 she was noted to have enhanced appearance of ovaries on CT A/P, and patient stated she was discharged to follow up a possible g with gyn-onc at [facility] for possible ovarian mass. Patient was also recently hospitalized ([facility] -) for acute renal failure (likely obstructive vs MONO) requiring urgent HD, ascites s/p therapeutic paracentesis (21), and pleural effusion s/p R thoracentesis (21) showing atypical lymphocytosis but no malignant cells. When she went home she developed BLE edema, worsening over the past few days along with worsening abdominal distension and SOB. Patient denies fevers/chills, lightheadedness/dizziness, cough, CP, palpitations, cough, n/v/d, abdominal pain, LE pain.     In the ED, vitals T 97-99, -117, //90, RR 26 - 30 (satting 94-96% on NC). Labs significant for WBC 13.4, proBNP 34. VBG 7.45/50/44/35, Lactate 2.7. RVP/COVID PCR neg. CXR shows moderate R-sided and small L-sided pleural effusions. Pt received IV lasix 40mg x 1 in ED. Placed on BiPAP for increased WOB.     The gyn-onc team at [facility] reviewed the CT A/P from OSH, as well as repeat CT A/P at [facility] and that the ovaries were enhanced but not enlarged and no mass. Patient refused CTA chest to rule out PE due to previous adverse reaction with contrast study, but b/l lower extremity Duplex scans and V/Q scans showed no evidence of DVT/PE. Procedure team determined that her ascites was too small for paracentesis, and patient was diuresed.     Pulmonology performed thoracentesis, removing 1.2 L of serosanguinous fluid on the right side on , after which her symptoms improved temporarily and she was weaned to NC. Cytopathology of pleural fluid showed atypical looking lymphoid cells. Pleural fluid from  thoracentesis was consistent with high grade B-cell lymphoma, no malignancy noted. She underwent paracentesis, removing 1.6 L of serous fluid on the left side on , however she had worsening tachypnea and hypoxia on ABG for her oxygen was increased to 5L NC. Excisional biopsy of left inguinal lymph node was also done by surgical oncology on . On  she had three episodes of light brown emesis. CT abdomen/pelvis was done and ruled out gastric obstruction.    Nephrology was consulted for PURVI, likely secondary to contrast-induced nephropathy and hyperkalemia. CT showed stable bilateral hydronephrosis with stent in place.     On , she became more lethargic and started using more accessory muscles to breath as well as belly breathing. She was placed on BIPAP. CT head was negative for intracranial pathology. She was accepted for transfer to MICU, for hypoxic respiratory failure possibly requiring intubation.     PAST MEDICAL & SURGICAL HISTORY:  Hypertension    Ovarian mass    Hypercholesteremia    S/P ureteral stent placement        FAMILY HISTORY:  FHx: hypertension (Mother)    FH: diabetes mellitus (Mother)        SOCIAL HISTORY:  Smoking: Denies  Substance Use: Denies  EtOH Use: Denies  Occupation: [employer]  Advance Directives: FULL CODE per palliative care discussion with patient and her sons (Yuan and Jose). Fill-in proxy is Jose Camargo: [phone]    HOME MEDICATIONS:  Multivitamin  Polyethylene glycol 3350 oral powder: 17 grams orally, once a day PRN  Famotidine 20 mg oral tablet: 1 tab, once a day  Amlodipine 5 mg oral tablet: 1 tab, once a day  Aspirin, enteric coated 81 mg oral delayed release tablet: 1 tab, once a day    Allergies    penicillins (Rash)    Intolerances          REVIEW OF SYSTEMS:  Constitutional: No fevers, chills, weight loss, weight gain  HEENT: No vision problems, eye pain, nasal congestion, rhinorrhea, sore throat, dysphagia  CV: No chest pain, orthopnea, palpitations  Resp: No cough, dyspnea, wheezing, hemoptysis  GI: No nausea, vomiting, diarrhea, constipation, abdominal pain  : [ ] dysuria [ ] nocturia [ ] hematuria [ ] increased urinary frequency  Musculoskeletal: [ ] back pain [ ] myalgias [ ] arthralgias [ ] fracture  Skin: [ ] rash [ ] itch  Neurological: [ ] headache [ ] dizziness [ ] syncope [ ] weakness [ ] numbness  Psychiatric: [ ] anxiety [ ] depression  Endocrine: [ ] diabetes [ ] thyroid problem  Hematologic/Lymphatic: [ ] anemia [ ] bleeding problem  Allergic/Immunologic: [ ] itchy eyes [ ] nasal discharge [ ] hives [ ] angioedema  [ ] All other systems negative  [ ] Unable to assess ROS because ________    OBJECTIVE:  ICU Vital Signs Last 24 Hrs  T(C): 37.8 (24 Aug 2021 12:35), Max: 37.8 (24 Aug 2021 12:35)  T(F): 100.1 (24 Aug 2021 12:35), Max: 100.1 (24 Aug 2021 12:35)  HR: 120 (24 Aug 2021 12:35) (71 - 120)  BP: 125/70 (24 Aug 2021 12:35) (105/66 - 131/71)  BP(mean): 81 (24 Aug 2021 12:35) (81 - 81)  ABP: --  ABP(mean): --  RR: 27 (24 Aug 2021 12:35) (20 - 27)  SpO2: 96% (24 Aug 2021 12:35) (94% - 100%)         @ 07:01  -   @ 07:00  --------------------------------------------------------  IN: 0 mL / OUT: 58 mL / NET: -58 mL      CAPILLARY BLOOD GLUCOSE      POCT Blood Glucose.: 116 mg/dL (23 Aug 2021 14:15)      PHYSICAL EXAM:  General:   HEENT:   Neck:   Chest/Lungs:  Heart:  Abdomen:   Extremities:   Skin:   Neuro:   Psych:     LINES:     HOSPITAL MEDICATIONS:  MEDICATIONS  (STANDING):  allopurinol 100 milliGRAM(s) Oral daily  cefepime   IVPB 1000 milliGRAM(s) IV Intermittent every 24 hours  metroNIDAZOLE  IVPB 500 milliGRAM(s) IV Intermittent every 8 hours  sodium chloride 0.45% 1000 milliLiter(s) (100 mL/Hr) IV Continuous <Continuous>    MEDICATIONS  (PRN):      LABS:                        9.3    18.77 )-----------( 322      ( 24 Aug 2021 08:12 )             30.8     Hgb Trend: 9.3<--, 9.8<--, 10.0<--, 9.7<--, 10.1<--  08    135  |  95<L>  |  51<H>  ----------------------------<  88  5.1   |  24  |  2.34<H>    Ca    10.3      24 Aug 2021 08:12  Phos  5.5       Mg     2.50         TPro  6.1  /  Alb  2.5<L>  /  TBili  0.3  /  DBili  x   /  AST  33<H>  /  ALT  10  /  AlkPhos  105      Creatinine Trend: 2.34<--, 2.23<--, 2.19<--, 2.05<--, 1.65<--, 1.24<--  PT/INR - ( 23 Aug 2021 16:07 )   PT: 11.7 sec;   INR: 1.03 ratio         PTT - ( 23 Aug 2021 16:07 )  PTT:21.3 sec  Urinalysis Basic - ( 23 Aug 2021 11:48 )    Color: Yellow / Appearance: Turbid / S.027 / pH: x  Gluc: x / Ketone: Trace  / Bili: Negative / Urobili: <2 mg/dL   Blood: x / Protein: 100 mg/dL / Nitrite: Negative   Leuk Esterase: Large / RBC: 7 /HPF / WBC >50 /HPF   Sq Epi: x / Non Sq Epi: x / Bacteria: Few        Venous Blood Gas:   @ 08:12  7.25/64/63/28/92.7  VBG Lactate: 2.2  Venous Blood Gas:   @ 07:30  7.32/49/61/25/91.7  VBG Lactate: 2.5      MICROBIOLOGY:   Thoracentesis with gram stain: Culture Results:   No growth at 5 days (21 @ 16:00)    Nose Culture Results:   No Methicillin Resistant Staphylococcus aureus isolated  No Methicillin Sensitive Staphylococcus aureus "PCR is more sensitive for  identifying MRSA/MSSA." (21 @ 23:30)      RADIOLOGY & ADDITIONAL TESTS:    < from: US Abdomen Limited (21 @ 14:42) >    IMPRESSION:    Moderate volume ascites, largest pocket in the right upper quadrant.    < end of copied text >    < from: US Duplex Venous Lower Ext Complete, Bilateral (21 @ 14:45) >    IMPRESSION:  No evidence of deep venous thrombosis in either lower extremity.    < end of copied text >      < from: CT Chest No Cont (21 @ 16:38) >    IMPRESSION:  Patchy groundglass and consolidative opacities within the right lung may represent multifocal pneumonia.  Small-moderate layering pleural effusions and partial atelectasis of bilateral lower lobes.  Enlarged supraclavicular, mediastinal and internal mammary lymph nodes likely metastatic  < end of copied text >    < from: CT Chest w/ IV Cont (21 @ 17:32) >    IMPRESSION:  Abdominal, pelvic and mediastinal lymphadenopathy with the largest nodes in the central mesentery.  Ascites and pleural effusions with peritoneal/omental infiltration.  Diffuse enteritis.  Bilateral moderate hydronephrosis.  Bilateral enlarged adnexa. Consider sonographic correlation.    < end of copied text >        < from: CT Abdomen and Pelvis No Cont (21 @ 20:20) >    IMPRESSION:  Bilateral adnexal masses and peritoneal carcinomatosis with moderate ascites and extensive abdominal and pelvic lymphadenopathy, unchanged from prior examination.    Wall thickening of the distal small bowel with dilatation of more proximal small bowel loops, not significantly changed from prior exam.    < end of copied text >    < from: CT Neck Soft Tissue w/ IV Cont (21 @ 23:07) >    IMPRESSION: No cervical lymphadenopathy by strict imaging size criteria.  Reference lymph node, as discussed.  Vague 1 cm left-sided thyroid nodule. Recommend ultrasound correlation.  Please refer to the report for the concurrent dedicated chest CT for findings regarding the thoracic structures.    < end of copied text >      < from: CT Head No Cont (21 @ 12:13) >    Impression:  Unremarkable noncontrast head CT.    < end of copied text >      ASSESSMENT AND PLAN:  Ms. Stafford is a 66-year-old woman with PMH significant for HTN, HLD, L hydroureteronephrosis s/p renal stent on 7/15, who presents with volume overload 2/2 high grade B-cell lymphoma. S/p thoracentesis , paracentesis and excisional biopsy of left inguinal lymph node on . Accepted to MICU for acute hypoxic respiratory failure possibly requiring intubation.     #Neuro    #Cardiovascular    #Respiratory    #GI/Nutrition    #/Renal    #Skin    #ID    #Endocrine    #Hematologic/DVT ppx    #Ethics MICU Accept Note    CHIEF COMPLAINT: Worsening volume overload    HPI / INTERVAL HISTORY:    Ms. Stafford is a 66-year-old woman with PMH significant for HTN, HLD, and L hydroureteronephrosis s/p renal stent (7/15/21) who presented with BLE edema and worsening abdominal distension and SOB. Of note, during admission at [facility] in 2021 she was noted to have enhanced appearance of ovaries on CT A/P, and patient stated she was discharged to follow up a possible g with gyn-onc at [facility] for possible ovarian mass. Patient was also recently hospitalized ([facility] -) for acute renal failure (likely obstructive vs MONO) requiring urgent HD, ascites s/p therapeutic paracentesis (21), and pleural effusion s/p R thoracentesis (21) showing atypical lymphocytosis but no malignant cells. When she went home she developed BLE edema, worsening over the past few days along with worsening abdominal distension and SOB. Patient denies fevers/chills, lightheadedness/dizziness, cough, CP, palpitations, cough, n/v/d, abdominal pain, LE pain.     In the ED, vitals T 97-99, -117, //90, RR 26 - 30 (satting 94-96% on NC). Labs significant for WBC 13.4, proBNP 34. VBG 7.45/50/44/35, Lactate 2.7. RVP/COVID PCR neg. CXR shows moderate R-sided and small L-sided pleural effusions. Pt received IV lasix 40mg x 1 in ED. Placed on BiPAP for increased WOB.     The gyn-onc team at [facility] reviewed the CT A/P from OSH, as well as repeat CT A/P at [facility] and that the ovaries were enhanced but not enlarged and no mass. Patient refused CTA chest to rule out PE due to previous adverse reaction with contrast study, but b/l lower extremity Duplex scans and V/Q scans showed no evidence of DVT/PE. Procedure team determined that her ascites was too small for paracentesis, and patient was diuresed.     Pulmonology performed thoracentesis, removing 1.2 L of serosanguinous fluid on the right side on , after which her symptoms improved temporarily and she was weaned to NC. Cytopathology of pleural fluid showed atypical looking lymphoid cells. Pleural fluid from  thoracentesis was consistent with high grade B-cell lymphoma, no malignancy noted. She underwent paracentesis, removing 1.6 L of serous fluid on the left side on , however she had worsening tachypnea and hypoxia on ABG for her oxygen was increased to 5L NC. Excisional biopsy of left inguinal lymph node was also done by surgical oncology on . On  she had three episodes of light brown emesis. CT abdomen/pelvis was done and ruled out gastric obstruction.    Nephrology was consulted for PURVI, likely secondary to contrast-induced nephropathy and hyperkalemia. CT showed stable bilateral hydronephrosis with stent in place.     On , she became more lethargic and started using more accessory muscles to breath as well as belly breathing. She was placed on BIPAP. CT head was negative for intracranial pathology. She was accepted for transfer to MICU, for hypoxic/hypercapneic respiratory failure possibly requiring intubation.     PAST MEDICAL & SURGICAL HISTORY:  Hypertension    Ovarian mass    Hypercholesteremia    S/P ureteral stent placement        FAMILY HISTORY:  FHx: hypertension (Mother)    FH: diabetes mellitus (Mother)        SOCIAL HISTORY:  Smoking: Denies  Substance Use: Denies  EtOH Use: Denies  Occupation: A  Advance Directives: FULL CODE per palliative care discussion with patient and her sons (Yuan and Jose). Fill-in proxy is Jose Camargo: [phone]    HOME MEDICATIONS:  Multivitamin  Polyethylene glycol 3350 oral powder: 17 grams orally, once a day PRN  Famotidine 20 mg oral tablet: 1 tab, once a day  Amlodipine 5 mg oral tablet: 1 tab, once a day  Aspirin, enteric coated 81 mg oral delayed release tablet: 1 tab, once a day    Allergies    penicillins (Rash)    Intolerances          REVIEW OF SYSTEMS:  Unable to assess review of systems as patient not responding to questions    OBJECTIVE:  ICU Vital Signs Last 24 Hrs  T(C): 37.8 (24 Aug 2021 12:35), Max: 37.8 (24 Aug 2021 12:35)  T(F): 100.1 (24 Aug 2021 12:35), Max: 100.1 (24 Aug 2021 12:35)  HR: 120 (24 Aug 2021 12:35) (71 - 120)  BP: 125/70 (24 Aug 2021 12:35) (105/66 - 131/71)  BP(mean): 81 (24 Aug 2021 12:35) (81 - 81)  ABP: --  ABP(mean): --  RR: 27 (24 Aug 2021 12:35) (20 - 27)  SpO2: 96% (24 Aug 2021 12:35) (94% - 100%)         @ 07:01  -   @ 07:00  --------------------------------------------------------  IN: 0 mL / OUT: 58 mL / NET: -58 mL      CAPILLARY BLOOD GLUCOSE      POCT Blood Glucose.: 116 mg/dL (23 Aug 2021 14:15)      PHYSICAL EXAM:  General: Thin woman, lying in bed on BIPAP  HEENT: Normocephalic   Chest/Lungs: Mechanical breath sounds  Heart: Tachycardic, possible S4  Abdomen: Distended, bowel sounds present. Masses palpable  Extremities: Mild bilateral lower pitting edema  Neuro: Followed command to squeeze hands. Did not respond to asking to nod/shake head, wiggle toes, questions.    LINES: 2 peripheral IVs in left forearm    HOSPITAL MEDICATIONS:  MEDICATIONS  (STANDING):  allopurinol 100 milliGRAM(s) Oral daily  cefepime   IVPB 1000 milliGRAM(s) IV Intermittent every 24 hours  metroNIDAZOLE  IVPB 500 milliGRAM(s) IV Intermittent every 8 hours  sodium chloride 0.45% 1000 milliLiter(s) (100 mL/Hr) IV Continuous <Continuous>    MEDICATIONS  (PRN):      LABS:                        9.3    18.77 )-----------( 322      ( 24 Aug 2021 08:12 )             30.8     Hgb Trend: 9.3<--, 9.8<--, 10.0<--, 9.7<--, 10.1<--  08-24    135  |  95<L>  |  51<H>  ----------------------------<  88  5.1   |  24  |  2.34<H>    Ca    10.3      24 Aug 2021 08:12  Phos  5.5       Mg     2.50         TPro  6.1  /  Alb  2.5<L>  /  TBili  0.3  /  DBili  x   /  AST  33<H>  /  ALT  10  /  AlkPhos  105      Creatinine Trend: 2.34<--, 2.23<--, 2.19<--, 2.05<--, 1.65<--, 1.24<--  PT/INR - ( 23 Aug 2021 16:07 )   PT: 11.7 sec;   INR: 1.03 ratio         PTT - ( 23 Aug 2021 16:07 )  PTT:21.3 sec  Urinalysis Basic - ( 23 Aug 2021 11:48 )    Color: Yellow / Appearance: Turbid / S.027 / pH: x  Gluc: x / Ketone: Trace  / Bili: Negative / Urobili: <2 mg/dL   Blood: x / Protein: 100 mg/dL / Nitrite: Negative   Leuk Esterase: Large / RBC: 7 /HPF / WBC >50 /HPF   Sq Epi: x / Non Sq Epi: x / Bacteria: Few        Venous Blood Gas:   @ 08:12  7.25/64/63/28/92.7  VBG Lactate: 2.2  Venous Blood Gas:   @ 07:30  7.32/49/61/25/91.7  VBG Lactate: 2.5      MICROBIOLOGY:   Thoracentesis with gram stain: Culture Results:   No growth at 5 days (21 @ 16:00)    Nose Culture Results:   No Methicillin Resistant Staphylococcus aureus isolated  No Methicillin Sensitive Staphylococcus aureus "PCR is more sensitive for  identifying MRSA/MSSA." (21 @ 23:30)      RADIOLOGY & ADDITIONAL TESTS:    < from: US Abdomen Limited (21 @ 14:42) >    IMPRESSION:    Moderate volume ascites, largest pocket in the right upper quadrant.    < end of copied text >    < from: US Duplex Venous Lower Ext Complete, Bilateral (21 @ 14:45) >    IMPRESSION:  No evidence of deep venous thrombosis in either lower extremity.    < end of copied text >      < from: CT Chest No Cont (21 @ 16:38) >    IMPRESSION:  Patchy groundglass and consolidative opacities within the right lung may represent multifocal pneumonia.  Small-moderate layering pleural effusions and partial atelectasis of bilateral lower lobes.  Enlarged supraclavicular, mediastinal and internal mammary lymph nodes likely metastatic  < end of copied text >    < from: CT Chest w/ IV Cont (21 @ 17:32) >    IMPRESSION:  Abdominal, pelvic and mediastinal lymphadenopathy with the largest nodes in the central mesentery.  Ascites and pleural effusions with peritoneal/omental infiltration.  Diffuse enteritis.  Bilateral moderate hydronephrosis.  Bilateral enlarged adnexa. Consider sonographic correlation.    < end of copied text >        < from: CT Abdomen and Pelvis No Cont (21 @ 20:20) >    IMPRESSION:  Bilateral adnexal masses and peritoneal carcinomatosis with moderate ascites and extensive abdominal and pelvic lymphadenopathy, unchanged from prior examination.    Wall thickening of the distal small bowel with dilatation of more proximal small bowel loops, not significantly changed from prior exam.    < end of copied text >    < from: CT Neck Soft Tissue w/ IV Cont (21 @ 23:07) >    IMPRESSION: No cervical lymphadenopathy by strict imaging size criteria.  Reference lymph node, as discussed.  Vague 1 cm left-sided thyroid nodule. Recommend ultrasound correlation.  Please refer to the report for the concurrent dedicated chest CT for findings regarding the thoracic structures.    < end of copied text >      < from: CT Head No Cont (21 @ 12:13) >    Impression:  Unremarkable noncontrast head CT.    < end of copied text >      ASSESSMENT AND PLAN:  Ms. Stafford is a 66-year-old woman with PMH significant for HTN, HLD, L hydroureteronephrosis s/p renal stent on 7/15, who presents with volume overload 2/2 high grade B-cell lymphoma. S/p thoracentesis , paracentesis and excisional biopsy of left inguinal lymph node on . Accepted to MICU for acute hypoxic/hypercapneic respiratory failure possibly requiring intubation.     #Neuro  - Altered mental status, not responding to most commands, likely 2/2 multifactorial in the setting of hypercarbic respiratory failure, possibly uremia contributing  - CT head with no intracranial pathology  - Planned sedation with propofol for intubation    #Cardiovascular  - Patient tachycardic and intermittently hypotensive  - Echo 8/3 showing concentric left ventricular remodeling, hyperdynamic left ventricle, calcified trileaflet aortic valve with normal opening, EF 56%  - proBNP increased from 34 on  to 730 today; repeat echo and ECG  - Planned pressure support with phenylephrine post possible intubation    #Respiratory  - Hypoxic/hypercapneic respiratory failure likely secondary to malignant pleural effusions s/p thoracentesis on  with re-accumulation of pleural effusions  - Currently on BIPAP with 10/5, 18 BPM and 50% O2  - POCUS by pulmonology showing moderate right sided pleural effusion without diaphragmatic flattening  - Planned for possible intubation  - F/u repeat ABG    #GI/Nutrition  - S/p paracentesis   - NPO pending possible intubation  - 3 episodes of bilious emesis; CT done showing no obstruction, but showing wall thickening of the distal small bowel with dilation of more proximal small bowel loops  - No surgical intervention at this time   - GI prophylaxis    #/Renal  - PURVI (Cr 2.34 from 0.99 on admission) possibly due to MONO and hyperkalemia; protein:creatinine ratio consistent with intrarenal pathology  - Hyperkalemia improved after Lokelma and insulin/D50   - Nephrology on board, recommend holding LASIX & other nephrotoxic medications  - Monitor BMP  - B/L hydronephrosis stable on CT a/p    #Skin  - No sacral decubiti    #ID  - On cefepime, metronidazole for empiric treatment of UTI  - No cultures were sent prior to initiation of antibiotics  - Patient afebrile today, with leukocytosis stable at 17    #Endocrine  - No active issues  - Fingersticks within normal limits    #Hematologic/DVT ppx  - B-cell lymphoma  - TLS labs qD  - Allopurinol for TLS prophylaxis, received 1 dose rasburicase   - Heme/Onc on board, pending further recommendations   - Pending pathology from excisional biopsy of left inguinal lymph node and flow cytometry  - Check LE Dopplers for DVT for worsening SOB, patient has been off DVT prophylaxis since     #Ethics  - Patient is FULL CODE per palliative care discussion with patient and her sons (Yuan and Jose). Fill-in proxy is Jose Camargo: [phone] MICU Accept Note    CHIEF COMPLAINT: Worsening volume overload    HPI / INTERVAL HISTORY:    Ms. Stafford is a 66-year-old woman with PMH significant for HTN, HLD, and L hydroureteronephrosis s/p renal stent (7/15/21) who presented with BLE edema and worsening abdominal distension and SOB. Of note, during admission at [facility] in 2021 she was noted to have enhanced appearance of ovaries on CT A/P, and patient stated she was discharged to follow up a possible g with gyn-onc at [facility] for possible ovarian mass. Patient was also recently hospitalized ([facility] -) for acute renal failure (likely obstructive vs MONO) requiring urgent HD, ascites s/p therapeutic paracentesis (21), and pleural effusion s/p R thoracentesis (21) showing atypical lymphocytosis but no malignant cells. When she went home she developed BLE edema, worsening over the past few days along with worsening abdominal distension and SOB. Patient denies fevers/chills, lightheadedness/dizziness, cough, CP, palpitations, cough, n/v/d, abdominal pain, LE pain.     In the ED, vitals T 97-99, -117, //90, RR 26 - 30 (satting 94-96% on NC). Labs significant for WBC 13.4, proBNP 34. VBG 7.45/50/44/35, Lactate 2.7. RVP/COVID PCR neg. CXR shows moderate R-sided and small L-sided pleural effusions. Pt received IV lasix 40mg x 1 in ED. Placed on BiPAP for increased WOB.     The gyn-onc team at [facility] reviewed the CT A/P from OSH, as well as repeat CT A/P at [facility] and that the ovaries were enhanced but not enlarged and no mass. Patient refused CTA chest to rule out PE due to previous adverse reaction with contrast study, but b/l lower extremity Duplex scans and V/Q scans showed no evidence of DVT/PE. Procedure team determined that her ascites was too small for paracentesis, and patient was diuresed.     Pulmonology performed thoracentesis, removing 1.2 L of serosanguinous fluid on the right side on , after which her symptoms improved temporarily and she was weaned to NC. Cytopathology of pleural fluid showed atypical looking lymphoid cells. Pleural fluid from  thoracentesis was consistent with high grade B-cell lymphoma, no malignancy noted. She underwent paracentesis, removing 1.6 L of serous fluid on the left side on , however she had worsening tachypnea and hypoxia on ABG for her oxygen was increased to 5L NC. Excisional biopsy of left inguinal lymph node was also done by surgical oncology on . On  she had three episodes of light brown emesis. CT abdomen/pelvis was done and ruled out gastric obstruction.    Nephrology was consulted for PURVI, likely secondary to contrast-induced nephropathy and hyperkalemia. CT showed stable bilateral hydronephrosis with stent in place.     On , she became more lethargic and started using more accessory muscles to breath as well as belly breathing. She was placed on BIPAP. CT head was negative for intracranial pathology. She was accepted for transfer to MICU, for hypoxic/hypercapneic respiratory failure possibly requiring intubation.     PAST MEDICAL & SURGICAL HISTORY:  Hypertension    Ovarian mass    Hypercholesteremia    S/P ureteral stent placement        FAMILY HISTORY:  FHx: hypertension (Mother)    FH: diabetes mellitus (Mother)        SOCIAL HISTORY:  Smoking: Denies  Substance Use: Denies  EtOH Use: Denies  Occupation: A  Advance Directives: FULL CODE per palliative care discussion with patient and her sons (Yuan and Jose). Fill-in proxy is Jose Camargo: [phone]    HOME MEDICATIONS:  Multivitamin  Polyethylene glycol 3350 oral powder: 17 grams orally, once a day PRN  Famotidine 20 mg oral tablet: 1 tab, once a day  Amlodipine 5 mg oral tablet: 1 tab, once a day  Aspirin, enteric coated 81 mg oral delayed release tablet: 1 tab, once a day    Allergies    penicillins (Rash)    Intolerances          REVIEW OF SYSTEMS:  Unable to assess review of systems as patient not responding to questions    OBJECTIVE:  ICU Vital Signs Last 24 Hrs  T(C): 37.8 (24 Aug 2021 12:35), Max: 37.8 (24 Aug 2021 12:35)  T(F): 100.1 (24 Aug 2021 12:35), Max: 100.1 (24 Aug 2021 12:35)  HR: 120 (24 Aug 2021 12:35) (71 - 120)  BP: 125/70 (24 Aug 2021 12:35) (105/66 - 131/71)  BP(mean): 81 (24 Aug 2021 12:35) (81 - 81)  ABP: --  ABP(mean): --  RR: 27 (24 Aug 2021 12:35) (20 - 27)  SpO2: 96% (24 Aug 2021 12:35) (94% - 100%)         @ 07:01  -   @ 07:00  --------------------------------------------------------  IN: 0 mL / OUT: 58 mL / NET: -58 mL      CAPILLARY BLOOD GLUCOSE      POCT Blood Glucose.: 116 mg/dL (23 Aug 2021 14:15)      PHYSICAL EXAM:  General: Thin woman, lying in bed on BIPAP  HEENT: Normocephalic   Chest/Lungs: Mechanical breath sounds  Heart: Tachycardic, possible S4  Abdomen: Distended, bowel sounds present. Masses palpable  Extremities: Mild bilateral lower pitting edema  Neuro: Followed command to squeeze hands. Did not respond to asking to nod/shake head, wiggle toes, questions.    LINES: 2 peripheral IVs in left forearm    HOSPITAL MEDICATIONS:  MEDICATIONS  (STANDING):  allopurinol 100 milliGRAM(s) Oral daily  cefepime   IVPB 1000 milliGRAM(s) IV Intermittent every 24 hours  metroNIDAZOLE  IVPB 500 milliGRAM(s) IV Intermittent every 8 hours  sodium chloride 0.45% 1000 milliLiter(s) (100 mL/Hr) IV Continuous <Continuous>    MEDICATIONS  (PRN):      LABS:                        9.3    18.77 )-----------( 322      ( 24 Aug 2021 08:12 )             30.8     Hgb Trend: 9.3<--, 9.8<--, 10.0<--, 9.7<--, 10.1<--  08-24    135  |  95<L>  |  51<H>  ----------------------------<  88  5.1   |  24  |  2.34<H>    Ca    10.3      24 Aug 2021 08:12  Phos  5.5       Mg     2.50         TPro  6.1  /  Alb  2.5<L>  /  TBili  0.3  /  DBili  x   /  AST  33<H>  /  ALT  10  /  AlkPhos  105      Creatinine Trend: 2.34<--, 2.23<--, 2.19<--, 2.05<--, 1.65<--, 1.24<--  PT/INR - ( 23 Aug 2021 16:07 )   PT: 11.7 sec;   INR: 1.03 ratio         PTT - ( 23 Aug 2021 16:07 )  PTT:21.3 sec  Urinalysis Basic - ( 23 Aug 2021 11:48 )    Color: Yellow / Appearance: Turbid / S.027 / pH: x  Gluc: x / Ketone: Trace  / Bili: Negative / Urobili: <2 mg/dL   Blood: x / Protein: 100 mg/dL / Nitrite: Negative   Leuk Esterase: Large / RBC: 7 /HPF / WBC >50 /HPF   Sq Epi: x / Non Sq Epi: x / Bacteria: Few        Venous Blood Gas:   @ 08:12  7.25/64/63/28/92.7  VBG Lactate: 2.2  Venous Blood Gas:   @ 07:30  7.32/49/61/25/91.7  VBG Lactate: 2.5      MICROBIOLOGY:   Thoracentesis with gram stain: Culture Results:   No growth at 5 days (21 @ 16:00)    Nose Culture Results:   No Methicillin Resistant Staphylococcus aureus isolated  No Methicillin Sensitive Staphylococcus aureus "PCR is more sensitive for  identifying MRSA/MSSA." (21 @ 23:30)      RADIOLOGY & ADDITIONAL TESTS:    < from: US Abdomen Limited (21 @ 14:42) >    IMPRESSION:    Moderate volume ascites, largest pocket in the right upper quadrant.    < end of copied text >    < from: US Duplex Venous Lower Ext Complete, Bilateral (21 @ 14:45) >    IMPRESSION:  No evidence of deep venous thrombosis in either lower extremity.    < end of copied text >      < from: CT Chest No Cont (21 @ 16:38) >    IMPRESSION:  Patchy groundglass and consolidative opacities within the right lung may represent multifocal pneumonia.  Small-moderate layering pleural effusions and partial atelectasis of bilateral lower lobes.  Enlarged supraclavicular, mediastinal and internal mammary lymph nodes likely metastatic  < end of copied text >    < from: CT Chest w/ IV Cont (21 @ 17:32) >    IMPRESSION:  Abdominal, pelvic and mediastinal lymphadenopathy with the largest nodes in the central mesentery.  Ascites and pleural effusions with peritoneal/omental infiltration.  Diffuse enteritis.  Bilateral moderate hydronephrosis.  Bilateral enlarged adnexa. Consider sonographic correlation.    < end of copied text >        < from: CT Abdomen and Pelvis No Cont (21 @ 20:20) >    IMPRESSION:  Bilateral adnexal masses and peritoneal carcinomatosis with moderate ascites and extensive abdominal and pelvic lymphadenopathy, unchanged from prior examination.    Wall thickening of the distal small bowel with dilatation of more proximal small bowel loops, not significantly changed from prior exam.    < end of copied text >    < from: CT Neck Soft Tissue w/ IV Cont (21 @ 23:07) >    IMPRESSION: No cervical lymphadenopathy by strict imaging size criteria.  Reference lymph node, as discussed.  Vague 1 cm left-sided thyroid nodule. Recommend ultrasound correlation.  Please refer to the report for the concurrent dedicated chest CT for findings regarding the thoracic structures.    < end of copied text >      < from: CT Head No Cont (21 @ 12:13) >    Impression:  Unremarkable noncontrast head CT.    < end of copied text >      ASSESSMENT AND PLAN:  Ms. Stafford is a 66-year-old woman with PMH significant for HTN, HLD, L hydroureteronephrosis s/p renal stent on 7/15, who presents with volume overload 2/2 high grade B-cell lymphoma. S/p thoracentesis , paracentesis and excisional biopsy of left inguinal lymph node on . Accepted to MICU for acute hypoxic/hypercapneic respiratory failure possibly requiring intubation.     #Neuro  - Altered mental status, not responding to most commands, likely 2/2 multifactorial in the setting of hypercarbic respiratory failure, possibly uremia contributing  - CT head with no intracranial pathology  - Planned sedation with propofol for intubation    #Cardiovascular  - Patient tachycardic and intermittently hypotensive  - Echo 8/3 showing concentric left ventricular remodeling, hyperdynamic left ventricle, calcified trileaflet aortic valve with normal opening, EF 56%  - proBNP increased from 34 on  to 730 today; repeat echo and ECG  - Planned pressure support with phenylephrine post possible intubation    #Respiratory  - Hypoxic/hypercapneic respiratory failure likely secondary to malignant pleural effusions s/p thoracentesis on  with re-accumulation of pleural effusions  - Currently on BIPAP with 10/5, 18 BPM and 50% O2  - POCUS by pulmonology showing moderate right sided pleural effusion without diaphragmatic flattening  - Planned for possible intubation  - F/u repeat ABG    #GI/Nutrition  - S/p paracentesis   - NPO pending possible intubation  - 3 episodes of bilious emesis; CT done showing no obstruction, but showing wall thickening of the distal small bowel with dilation of more proximal small bowel loops  - No surgical intervention at this time   - GI prophylaxis    #/Renal  - PURVI (Cr 2.34 from 0.99 on admission) possibly due to MONO and hyperkalemia; protein:creatinine ratio consistent with intrarenal pathology  - Hyperkalemia improved after Lokelma and insulin/D50   - Nephrology on board, recommend holding LASIX & other nephrotoxic medications  - Monitor BMP  - B/L hydronephrosis stable on CT a/p    #Skin  - No sacral decubiti    #ID  - On cefepime, metronidazole for empiric treatment of UTI  - No cultures were sent prior to initiation of antibiotics  - Patient afebrile today, with leukocytosis stable at 17    #Endocrine  - No active issues  - Fingersticks within normal limits    #Hematologic/DVT ppx  - B-cell lymphoma  - TLS labs qD  - Allopurinol for TLS prophylaxis, received 1 dose rasburicase   - Heme/Onc on board, pending further recommendations   - Pending pathology from excisional biopsy of left inguinal lymph node and flow cytometry  - Check LE Dopplers for DVT for worsening SOB, patient has been off DVT prophylaxis since     #Ethics  - Patient is FULL CODE per palliative care discussion with patient and her sons (Yuan and Jose). Fill-in proxy is Jose Camargo: [phone]    Patient examined and care reviewed in detail on bedside rounds  Critically ill on NIV with advanced stage B cell lymphoma and large right PLEFF Pt required immediate intubation on arrival to MICU for lethargy/distressed breathing  Frequent bedside visits with therapy change today.   I have personally provided 35+ minutes of critical care time concurrently with the resident/fellow; this excludes time spent on separate procedures.

## 2021-08-24 NOTE — PROGRESS NOTE ADULT - ASSESSMENT
66F with PMH significant for HTN, HLD, recently discovered ovarian mass suspicious for malignancy, L hydroureteronephrosis s/p renal stent, and recent hospitalization to [facility] for acute renal failure (likely obstructive) requiring urgent HD, ascites s/p therapeutic paracentesis, and pleural effusion s/p R thoracentesis showing lymphocytosis but no malignant cells admitted for acute hypoxic respiratory failure with imaging showing bilateral pleural effusion. Cytopathology reveals DLBCL.     #Acute hypoxic respiratory failure  # DLBCL  # Pleural Effusion  # PURVI  # AMS  - slight worsening FIO2 requirements and increase WOB now hypercapnic, minimally responsive of AVAPS. POCUS with moderate R sided plefs without diaphragmatic flattening.   - Plef does not explain the degree with mental status change, Likely multifactorial with elevated BUN, hypercapnea but would check CTH.   - patient is s/p excisional lymph node biopsy and therapeutic paracentesis on 8/20. Please folllow up resutls.   - Please check LE dopplars for DVT, given etiology for worsening SOB can hold off on epimeric hep gtt. Patient has been off DVT ppx since 8/12.   - Given degree of  lethergy on BIPAP will consult MICU for admission.   - Will need to d/w onc as decompensation likely 2/2 to underlying DLBCL.     Dae Hyeon Kim MD-[id number]  Pulmonary Critical Care Fellow  Pager [phone]

## 2021-08-24 NOTE — PROGRESS NOTE ADULT - PROBLEM SELECTOR PLAN 2
Pt found to have ovarian mass on CT Abd/Pelvis during recently hospitalization at [facility] in early July 2021. Thoracentesis fluid studies with many cells, Large, abnormal immature looking mononuclear cells, many with cauliflower. Many RBCs nuclei (few in clusters). Found to have B-cell lymphoma. S/p surgical excisional bx on 8/21/21.  -Heme/onc consult; recs appreciated    =f/u Hep panel, HIV, HLTV1 and 2    =f/u TLS labs QD    =decreased Allopurinol to 100mg from 300mg QD (8/22/21)    =f/u G6PD in case of treatment of TLS    =Started Rasburicase prophylactically for TLS  -Completed Surg-onc excisional bx on 8/21/21    =f/u bx results    =f/u w/ new CT findings - wall thickening of the distal small bowel with dilatation of more proximal small bowel loops    =no immediate concerns; most likely 2/2 ongoing malignancy. Monitor.

## 2021-08-24 NOTE — PROGRESS NOTE ADULT - ATTENDING COMMENTS
ATN sec to MONO   some TLS parameters are deranged but Calcium is not low   V/ol with intravascular depletion  on BiapaP today, going to MICU   will reassess for HD once she is there. please repeat BMP later today   discussed with team at bedside

## 2021-08-25 NOTE — PROGRESS NOTE ADULT - ATTENDING COMMENTS
newly diagnosed lymphoma   respiratory failure and volume overload s/p intubation   component of Obstructive uropathy from lymphoma ( ureteral stent in past)   monitor TLS and low threshold for dialysis if indicated

## 2021-08-25 NOTE — CHART NOTE - NSCHARTNOTEFT_GEN_A_CORE
: Dr. Ness, Dr. Cardenas    INDICATION: Respiratory failure     PROCEDURE:  [X] LIMITED ECHO  [X] LIMITED CHEST  [ ] LIMITED RETROPERITONEAL  [X] LIMITED ABDOMINAL  [ ] LIMITED DVT  [ ] NEEDLE GUIDANCE VASCULAR  [ ] NEEDLE GUIDANCE THORACENTESIS  [ ] NEEDLE GUIDANCE PARACENTESIS  [ ] NEEDLE GUIDANCE PERICARDIOCENTESIS  [ ] OTHER    FINDINGS:  Lungs: Multifocal B lines with B/L pleural effusions, R >L  Heart: Grossly normal LVSF, RV <LV, no pericardial effusions, IVC estimated 2.1 cm  Abdomen: Moderate volume ascites, B/L hydronephrosis L > R, collapsed bladder with catheter tip visualized in bladder.    INTERPRETATION:  B line pattern in lungs pleural effusions B/L  Normal LVSF and normal RV size.  Moderate ascites   B/L hydronephrosis L >R, nondistended bladder.     Images uploaded on [employer]      Delmar Ness MD MPH  Internal Medicine, PGY-2

## 2021-08-25 NOTE — PROGRESS NOTE ADULT - ATTENDING COMMENTS
Patient examined and care reviewed in detail on bedside rounds  Critically ill on vent/pressors with septic lymphoma/TLS risk For chemo as per heme/onc  Frequent bedside visits with therapy change today.   I have personally provided 35+ minutes of critical care time concurrently with the resident/fellow; this excludes time spent on separate procedures.      This patient had a goal directed echo within 24 hours of admission to the ICU  The physician staff has had a goals of care discussion with this patient and/or their family  This patient is on DVT prophylaxis

## 2021-08-25 NOTE — PROGRESS NOTE ADULT - SUBJECTIVE AND OBJECTIVE BOX
Interval Events:    Intubated for fluid overload  Cr downtrending    O: Vital Signs Last 24 Hrs  T(C): 37.9 (25 Aug 2021 12:00), Max: 38.8 (25 Aug 2021 01:00)  T(F): 100.2 (25 Aug 2021 12:00), Max: 101.8 (25 Aug 2021 01:00)  HR: 53 (25 Aug 2021 15:00) (49 - 112)  BP: 127/68 (25 Aug 2021 15:00) (80/45 - 149/80)  BP(mean): 82 (25 Aug 2021 15:00) (53 - 97)  RR: 16 (25 Aug 2021 15:00) (13 - 46)  SpO2: 100% (25 Aug 2021 15:00) (92% - 100%)  Mode: AC/ CMV (Assist Control/ Continuous Mandatory Ventilation)  RR (machine): 16  TV (machine): 380  FiO2: 40  PEEP: 5  ITime: 0.76  MAP: 9  PIP: 26      24 Aug 2021 07:01  -  25 Aug 2021 07:00  --------------------------------------------------------  IN:    FentaNYL: 230.2 mL    IV PiggyBack: 100 mL    Phenylephrine: 468 mL    Propofol: 249.2 mL  Total IN: 1047.4 mL    OUT:    Indwelling Catheter - Urethral (mL): 195 mL    Voided (mL): 310 mL  Total OUT: 505 mL    Total NET: 542.4 mL      25 Aug 2021 07:01  -  25 Aug 2021 15:53  --------------------------------------------------------  IN:  Total IN: 0 mL    OUT:    Indwelling Catheter - Urethral (mL): 350 mL  Total OUT: 350 mL    Total NET: -350 mL          Physical Exam:    Gen: Intubated  Resp: No additional work of breathing   GI: Soft, non-tender, non-distended, with normoactive bowel sounds.  No masses.  : carnes in place draining yellow urine  MSK: Moves all extremities equally  Skin: No rashes    Labs:                        9.3    16.36 )-----------( 322      ( 25 Aug 2021 12:13 )             29.5     25 Aug 2021 12:13    137    |  99     |  56     ----------------------------<  182    5.0     |  19     |  1.88     Ca    8.9        25 Aug 2021 12:13  Phos  5.3       25 Aug 2021 12:13  Mg     2.40      25 Aug 2021 12:13    TPro  5.9    /  Alb  2.2    /  TBili  0.3    /  DBili  x      /  AST  30     /  ALT  9      /  AlkPhos  109    25 Aug 2021 12:13    PT/INR - ( 23 Aug 2021 16:07 )   PT: 11.7 sec;   INR: 1.03 ratio         PTT - ( 24 Aug 2021 14:31 )  PTT:23.3 sec  CAPILLARY BLOOD GLUCOSE            LIVER FUNCTIONS - ( 25 Aug 2021 12:13 )  Alb: 2.2 g/dL / Pro: 5.9 g/dL / ALK PHOS: 109 U/L / ALT: 9 U/L / AST: 30 U/L / GGT: x                 MEDICATIONS  (STANDING):  allopurinol 100 milliGRAM(s) Oral daily  cefepime   IVPB 1000 milliGRAM(s) IV Intermittent every 24 hours  chlorhexidine 0.12% Liquid 15 milliLiter(s) Oral Mucosa every 12 hours  chlorhexidine 4% Liquid 1 Application(s) Topical <User Schedule>  fentaNYL   Infusion. 2 MICROgram(s)/kG/Hr (12.8 mL/Hr) IV Continuous <Continuous>  heparin   Injectable 5000 Unit(s) SubCutaneous every 8 hours  norepinephrine Infusion 0.5 MICROgram(s)/kG/Min (60 mL/Hr) IV Continuous <Continuous>  phenylephrine    Infusion 0.5 MICROgram(s)/kG/Min (6 mL/Hr) IV Continuous <Continuous>  propofol Infusion 50 MICROgram(s)/kG/Min (19.2 mL/Hr) IV Continuous <Continuous>  sodium zirconium cyclosilicate 5 Gram(s) Oral every 8 hours    MEDICATIONS  (PRN):

## 2021-08-25 NOTE — PROGRESS NOTE ADULT - ASSESSMENT
66 year old female with PMHx of HTN, pre-DM, and ovarian mass s/p left ureteral stent placement for hydronephrosis 2/2 extrinsic compression at OSH on 7/15    - Trend Cr, improving.  If Cr continues to worsen would consider NT's.  - Appreciate nephrology input  - Continue carnes for strict I+O's  - Trend Cr  - Seen and evaluated with Dr. Lenz

## 2021-08-25 NOTE — CONSULT NOTE ADULT - ATTENDING COMMENTS
67 yo woman with ovarian mass presumed malignant, ascites, pleural effusions with pleural fluid + B cell lymphoma now s/p inguinal lymph node biopsy 8/20 (path pending).  Course complicated by respiratory failure requiring intubation, PURVI.  Anticipate will start chemo soon.  Concern for potential for reactivation TB (healthcare worker unknown PPD status).  Should screen with QuantiFeron TB gold   Follow inguinal lymph node path results.  Follow paracentesis results - add AFB culture  Send strongyloides Ab   Check HIV.  Agree with continuing cefepime for now.    Will follow     Yanni Keita MD  Pager: [phone]  After 5 PM or weekends please call fellow on call or office [phone]

## 2021-08-25 NOTE — CHART NOTE - NSCHARTNOTEFT_GEN_A_CORE
IR Chart Note    66y Female with recently discovered ovarian mass suspicious for malignancy who presented with acute respiratory failure secondary to pleural effusions. Patient found to have significant mediastinal lymphadenopathy on chest CT. IR initially consulted for percutaneous lymph node biopsy, which was deferred and patient subsequently underwent endobronchial biopsy. IR contacted again regarding possible bilateral nephrostomy tube placement due to persistent hydronephrosis, had left ureteral stent placed in July.  -Given overall improvement of patient's creatinine, no indication for nephrostomy tube placement at this time.  -Recontact IR if patient's renal status deteriorates for reevaluation.

## 2021-08-25 NOTE — PROGRESS NOTE ADULT - PROBLEM SELECTOR PLAN 1
PURVI (acute kidney injury). Recommendation: Likely secondary to IV contrast and diuresis on lasix with possible component of urinary retention and hydronephrosis  On admission, BUN and Cr at 23 and 0.99 respectively. Recent creatinine on 8/23, elevated to 2.05. SCr today at 1.88. POCUS performed by MICU team s/o bilateral hydro. Pt is nonoliguric. Suggest continue present care, no indication of HD. Will reassess daily for HD. Consider b/l PCN for hydro. Will need to consider HD if renal failure continues to worsen. Monitor labs and urine output. Avoid NSAIDs, ACEI/ARBS, RCA and nephrotoxins. Dose medications as per eGFR.

## 2021-08-25 NOTE — PROGRESS NOTE ADULT - NUTRITIONAL ASSESSMENT
This patient has been assessed with a concern for Malnutrition and has been determined to have a diagnosis/diagnoses of Moderate protein-calorie malnutrition.    This patient is being managed with:   Diet NPO with Tube Feed-  Tube Feeding Modality: Orogastric  Jevity 1.2 Renan (JEVITY1.2RTH)  Total Volume for 24 Hours (mL): 480  Continuous  Starting Tube Feed Rate {mL per Hour}: 10  Until Goal Tube Feed Rate (mL per Hour): 20  Tube Feed Duration (in Hours): 24  Tube Feed Start Time: 09:00  Entered: Aug 25 2021  8:39AM

## 2021-08-25 NOTE — PROGRESS NOTE ADULT - SUBJECTIVE AND OBJECTIVE BOX
[facility] DIVISION OF KIDNEY DISEASES AND HYPERTENSION -- FOLLOW UP NOTE  --------------------------------------------------------------------------------  Chief Complaint:    24 hour events/subjective: Pt seen and examined in Er. Intubated and sedated. Scr today at         PAST HISTORY  --------------------------------------------------------------------------------  No significant changes to PMH, PSH, FHx, SHx, unless otherwise noted    ALLERGIES & MEDICATIONS  --------------------------------------------------------------------------------  Allergies    penicillins (Rash)    Intolerances      Standing Inpatient Medications  allopurinol 100 milliGRAM(s) Oral daily  cefepime   IVPB 1000 milliGRAM(s) IV Intermittent every 24 hours  chlorhexidine 0.12% Liquid 15 milliLiter(s) Oral Mucosa every 12 hours  chlorhexidine 4% Liquid 1 Application(s) Topical <User Schedule>  fentaNYL   Infusion. 2 MICROgram(s)/kG/Hr IV Continuous <Continuous>  heparin   Injectable 5000 Unit(s) SubCutaneous every 8 hours  norepinephrine Infusion 0.5 MICROgram(s)/kG/Min IV Continuous <Continuous>  phenylephrine    Infusion 0.5 MICROgram(s)/kG/Min IV Continuous <Continuous>  propofol Infusion 50 MICROgram(s)/kG/Min IV Continuous <Continuous>  sodium zirconium cyclosilicate 5 Gram(s) Oral every 8 hours    PRN Inpatient Medications      REVIEW OF SYSTEMS  --------------------------------------------------------------------------------  Unable to obtain     VITALS/PHYSICAL EXAM  --------------------------------------------------------------------------------  T(C): 37.9 (08-25-21 @ 12:00), Max: 38.8 (08-25-21 @ 01:00)  HR: 53 (08-25-21 @ 15:00) (49 - 112)  BP: 127/68 (08-25-21 @ 15:00) (80/45 - 149/80)  RR: 16 (08-25-21 @ 15:00) (13 - 46)  SpO2: 100% (08-25-21 @ 15:00) (92% - 100%)  Wt(kg): --    Weight (kg): 62.6 (08-25-21 @ 07:00)      08-24-21 @ 07:01  -  08-25-21 @ 07:00  --------------------------------------------------------  IN: 1047.4 mL / OUT: 505 mL / NET: 542.4 mL    08-25-21 @ 07:01  -  08-25-21 @ 15:54  --------------------------------------------------------  IN: 0 mL / OUT: 350 mL / NET: -350 mL        Physical Exam:  	Gen: NAD  	HEENT: MMM  	Pulm: CTA B/L  	CV: S1S2  	Abd: Soft, +BS   	Ext: No LE edema B/L  	Neuro: Awake  	Skin: Warm and dry              : Charles+ draining clear urine  	Vascular access: peripheral       LABS/STUDIES  --------------------------------------------------------------------------------              9.3    16.36 >-----------<  322      [08-25-21 @ 12:13]              29.5     137  |  99  |  56  ----------------------------<  182      [08-25-21 @ 12:13]  5.0   |  19  |  1.88        Ca     8.9     [08-25-21 @ 12:13]      Mg     2.40     [08-25-21 @ 12:13]      Phos  5.3     [08-25-21 @ 12:13]    TPro  5.9  /  Alb  2.2  /  TBili  0.3  /  DBili  x   /  AST  30  /  ALT  9   /  AlkPhos  109  [08-25-21 @ 12:13]    PT/INR: PT 11.7 , INR 1.03       [08-23-21 @ 16:07]  PTT: 23.3       [08-24-21 @ 14:31]    Uric acid 2.2      [08-25-21 @ 12:13]        [08-25-21 @ 12:13]    Creatinine Trend:  SCr 1.88 [08-25 @ 12:13]  SCr 2.35 [08-25 @ 02:48]  SCr 2.59 [08-24 @ 14:33]  SCr 2.34 [08-24 @ 08:12]  SCr 2.23 [08-23 @ 23:31]    Urinalysis - [08-23-21 @ 11:48]      Color Yellow / Appearance Turbid / SG 1.027 / pH 5.5      Gluc Negative / Ketone Trace  / Bili Negative / Urobili <2 mg/dL       Blood Large / Protein 100 mg/dL / Leuk Est Large / Nitrite Negative      RBC 7 / WBC >50 / Hyaline  / Gran 2 / Sq Epi  / Non Sq Epi  / Bacteria Few    Urine Creatinine 122      [08-23-21 @ 19:59]  Urine Protein 198      [08-23-21 @ 19:59]  Urine Sodium <20      [08-23-21 @ 19:59]  Urine Urea Nitrogen 454.0      [08-23-21 @ 19:59]  Urine Potassium 77.8      [08-23-21 @ 19:59]  Urine Chloride <20      [08-23-21 @ 19:59]  Urine Osmolality 445      [08-23-21 @ 19:59]    TSH 1.85      [07-29-21 @ 11:12]    HBsAb <3.0      [07-27-21 @ 09:32]  HBsAg Nonreact      [07-27-21 @ 09:32]  HBcAb Nonreact      [07-27-21 @ 09:32]  HCV 0.13, Nonreact      [07-27-21 @ 09:30]  HIV Nonreact      [08-25-21 @ 12:13]    SPEP Interpretation: with acute phase reaction. HOSSEIN Kay MD      [08-03-21 @ 07:01]

## 2021-08-25 NOTE — PROGRESS NOTE ADULT - ASSESSMENT
Ms. Stafford is a 66-year-old woman with PMH significant for HTN, HLD, L hydroureteronephrosis s/p renal stent on 7/15, who presents with volume overload 2/2 high grade B-cell lymphoma. S/p thoracentesis 8/13, paracentesis and excisional biopsy of left inguinal lymph node on 8/20. Accepted to MICU for acute hypoxic/hypercapneic respiratory failure possibly requiring intubation.     #Neuro  - Altered mental status, likely 2/2 multifactorial in the setting of hypercarbic respiratory failure, possibly uremia contributing  - Currently sedated and intubated with propofol and fentanyl  - CT head with no intracranial pathology    #Cardiovascular  - Patient bradycardic  - On pressor support with phenylephrine  - Echo 8/3 showing concentric left ventricular remodeling, hyperdynamic left ventricle, calcified trileaflet aortic valve with normal opening, EF 56%  - proBNP increased from 34 on 8/11 to 730 today; repeat echo and ECG    #Respiratory  - Hypoxic/hypercapneic respiratory failure likely secondary to malignant pleural effusions s/p thoracentesis on 8/13 with re-accumulation of pleural effusions  - Intubated 8/24 with vent settings of 16 | 0.38 | 5 | 40  - POCUS by pulmonology showing moderate right sided pleural effusion without diaphragmatic flattening  - F/u repeat ABG    #GI/Nutrition  - S/p paracentesis 8/20  - NPO   - 3 episodes of bilious emesis; CT done showing no obstruction, but showing wall thickening of the distal small bowel with dilation of more proximal small bowel loops  - No surgical intervention at this time   - GI prophylaxis    #/Renal  - PURVI (Cr 2.34 from 0.99 on admission) possibly due to MONO and hyperkalemia; protein:creatinine ratio consistent with intrarenal pathology  - Hyperkalemia worsening 5.1 --> 5.4, started on insulin/D50  - Nephrology on board, recommend holding LASIX & other nephrotoxic medications  - Monitor BMP  - B/L hydronephrosis stable on CT a/p    #Skin  - No sacral decubiti    #ID  - On cefepime, metronidazole for empiric treatment of UTI  - Patient febrile overnight 101.3-101.8, blood cultures and urine cultures sent  - Leukocytosis stable    #Endocrine  - No active issues  - Fingersticks within normal limits    #Hematologic/DVT ppx  - B-cell lymphoma, per heme preliminary results of surgical pathology consistent with B-cell lymphoma but pending further evaluation for treatment choice  - TLS labs q8  - Allopurinol for TLS prophylaxis, received 1 dose rasburicase 8/23  - Heme/Onc recommending starting steroids with dexamethasone  - Pending flow cytometry  - Check LE Dopplers for DVT for worsening SOB, patient has been off DVT prophylaxis since 8/12    #Ethics  - Patient is FULL CODE per palliative care discussion with patient and her sons (Yuan and Jose). Fill-in proxy is Jose Camargo: [phone] Ms. Stafford is a 66-year-old woman with PMH significant for HTN, HLD, L hydroureteronephrosis s/p renal stent on 7/15, who presents with volume overload 2/2 high grade B-cell lymphoma. S/p thoracentesis 8/13, paracentesis and excisional biopsy of left inguinal lymph node on 8/20. Accepted to MICU for acute hypoxic/hypercapneic respiratory failure possibly requiring intubation.     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  - Currently sedated and intubated with propofol and fentanyl  - ABG prior to intubation showed 7.33 | 52 | 70 | 27  - CT head with no intracranial pathology  - Will perform LP with flow cytometry and cytopathology to evaluate possible lymphomatous meningitis    #Cardiovascular  - Patient bradycardic  - On pressor support with phenylephrine  - Echo 8/3 showing concentric left ventricular remodeling, hyperdynamic left ventricle, calcified trileaflet aortic valve with normal opening, EF 56%  - proBNP increased from 34 on 8/11 to 730 today  - POCUS showing adequate cardiac output    #Respiratory  - Hypoxic/hypercapneic respiratory failure likely secondary to malignant pleural effusions s/p thoracentesis on 8/13 with re-accumulation of pleural effusions  - Intubated 8/24 with vent settings of 16 | 0.38 | 5 | 40  - POCUS by pulmonology showing moderate right sided pleural effusion without diaphragmatic flattening  - S/p second thoracentesis 8/25  - Continue to monitor    #GI/Nutrition  - S/p paracentesis 8/20  - Will start tube feeds today   - 3 episodes of bilious emesis; CT done showing no obstruction, but showing wall thickening of the distal small bowel with dilation of more proximal small bowel loops  - No surgical intervention at this time     #/Renal  - PURVI (Cr 2.34 from 0.99 on admission) possibly due to MONO and hyperkalemia; protein:creatinine ratio consistent with intrarenal pathology  - Hyperkalemia worsening 5.1 --> 5.4, started on insulin/D50  - Nephrology on board, recommend holding LASIX & other nephrotoxic medications. Will ask about possible nephrostomy tubes given bilateral hydronephrosis seen on POCUS  - Monitor BMP  - B/L hydronephrosis stable on CT a/p    #Skin  - No sacral decubiti    #ID  - On cefepime for empiric treatment of febrile illness. Will discontinue metronidazole at this point  - Patient febrile overnight 101.3-101.8, blood cultures and urine cultures sent  - Leukocytosis stable    #Endocrine  - Will follow up AM cortisol  - Fingersticks within normal limits    #Hematologic/DVT ppx  - B-cell lymphoma, per heme preliminary results of surgical pathology consistent with B-cell lymphoma but pending further evaluation for treatment choice  - TLS labs q8  - Allopurinol for TLS prophylaxis, received 1 dose rasburicase 8/23  - Heme/Onc recommending starting steroids with dexamethasone, patient has received 2 doses of 20 mg and tolerated well, therefore will increase next dose to 40 mg  - Pending flow cytometry  - Check LE Dopplers for DVT for worsening SOB, patient has been off DVT prophylaxis since 8/12    #Ethics  - Patient is FULL CODE per palliative care discussion with patient and her sons (Yuna and Jose). Fill-in proxy is Jose Camargo: [phone]

## 2021-08-25 NOTE — PROGRESS NOTE ADULT - SUBJECTIVE AND OBJECTIVE BOX
INTERVAL HPI/OVERNIGHT EVENTS:    SUBJECTIVE: Patient seen and examined at bedside.     CONSTITUTIONAL: No weakness, fevers or chills  EYES/ENT: No visual changes;  No vertigo or throat pain   NECK: No pain or stiffness  RESPIRATORY: No cough, wheezing, hemoptysis; No shortness of breath  CARDIOVASCULAR: No chest pain or palpitations  GASTROINTESTINAL: No abdominal or epigastric pain. No nausea, vomiting, or hematemesis; No diarrhea or constipation. No melena or hematochezia.  GENITOURINARY: No dysuria, frequency or hematuria  NEUROLOGICAL: No numbness or weakness  SKIN: No itching, rashes    OBJECTIVE:    VITAL SIGNS:  ICU Vital Signs Last 24 Hrs  T(C): 38.5 (25 Aug 2021 04:00), Max: 38.8 (25 Aug 2021 01:00)  T(F): 101.3 (25 Aug 2021 04:00), Max: 101.8 (25 Aug 2021 01:00)  HR: 56 (25 Aug 2021 07:27) (56 - 120)  BP: 126/67 (25 Aug 2021 07:00) (80/45 - 149/80)  BP(mean): 81 (25 Aug 2021 07:00) (53 - 97)  ABP: --  ABP(mean): --  RR: 16 (25 Aug 2021 07:00) (16 - 46)  SpO2: 96% (25 Aug 2021 07:27) (90% - 100%)    Mode: AC/ CMV (Assist Control/ Continuous Mandatory Ventilation), RR (machine): 16, TV (machine): 380, FiO2: 40, PEEP: 5, ITime: 0.9, MAP: 9, PIP: 26    08-24 @ 07:01  -  - @ 07:00  --------------------------------------------------------  IN: 1047.4 mL / OUT: 505 mL / NET: 542.4 mL      CAPILLARY BLOOD GLUCOSE      POCT Blood Glucose.: 116 mg/dL (23 Aug 2021 14:15)        PHYSICAL EXAM:  GENERAL: NAD, well-developed  HEAD:  Atraumatic, Normocephalic  EYES: EOMI, PERRLA, conjunctiva and sclera clear  NECK: Supple, No JVD  CHEST/LUNG: Clear to auscultation bilaterally; No wheeze  HEART: Regular rate and rhythm; No murmurs, rubs, or gallops  ABDOMEN: Soft, Nontender, Nondistended; Bowel sounds present  EXTREMITIES:  2+ Peripheral Pulses, No clubbing, cyanosis, or edema  PSYCH: AAOx3  NEUROLOGY: non-focal  SKIN: No rashes or lesions  Lines:      MEDICATIONS:  MEDICATIONS  (STANDING):  allopurinol 100 milliGRAM(s) Oral daily  cefepime   IVPB 1000 milliGRAM(s) IV Intermittent every 24 hours  chlorhexidine 0.12% Liquid 15 milliLiter(s) Oral Mucosa every 12 hours  chlorhexidine 4% Liquid 1 Application(s) Topical <User Schedule>  dexAMETHasone  IVPB 20 milliGRAM(s) IV Intermittent daily  dextrose 50% Injectable 50 milliLiter(s) IV Push once  fentaNYL   Infusion. 2 MICROgram(s)/kG/Hr (12.8 mL/Hr) IV Continuous <Continuous>  insulin regular  human recombinant 5 Unit(s) IV Push once  metroNIDAZOLE  IVPB 500 milliGRAM(s) IV Intermittent every 8 hours  norepinephrine Infusion 0.5 MICROgram(s)/kG/Min (60 mL/Hr) IV Continuous <Continuous>  phenylephrine    Infusion 0.5 MICROgram(s)/kG/Min (6 mL/Hr) IV Continuous <Continuous>  propofol Infusion 50 MICROgram(s)/kG/Min (19.2 mL/Hr) IV Continuous <Continuous>  sodium zirconium cyclosilicate 5 Gram(s) Oral every 8 hours    MEDICATIONS  (PRN):      ALLERGIES:  Allergies    penicillins (Rash)    Intolerances        LABS:                        9.5    15.93 )-----------( 339      ( 25 Aug 2021 02:48 )             30.8     08-25    137  |  96<L>  |  59<H>  ----------------------------<  71  5.4<H>   |  21<L>  |  2.35<H>    Ca    9.8      25 Aug 2021 02:48  Phos  5.1     08-25  Mg     2.50     08-25    TPro  6.2  /  Alb  2.5<L>  /  TBili  0.4  /  DBili  x   /  AST  30  /  ALT  8   /  AlkPhos  108  08-25    PT/INR - ( 23 Aug 2021 16:07 )   PT: 11.7 sec;   INR: 1.03 ratio         PTT - ( 24 Aug 2021 14:31 )  PTT:23.3 sec  Urinalysis Basic - ( 23 Aug 2021 11:48 )    Color: Yellow / Appearance: Turbid / S.027 / pH: x  Gluc: x / Ketone: Trace  / Bili: Negative / Urobili: <2 mg/dL   Blood: x / Protein: 100 mg/dL / Nitrite: Negative   Leuk Esterase: Large / RBC: 7 /HPF / WBC >50 /HPF   Sq Epi: x / Non Sq Epi: x / Bacteria: Few        RADIOLOGY & ADDITIONAL TESTS: Reviewed.     INTERVAL HPI/OVERNIGHT EVENTS: Overnight she was febrile to 101.3-101.8. Blood and urine cultures were sent. In addition her potassium increased from 5.1 to 5.4, for which insulin and D50 were started.     SUBJECTIVE: Patient seen and examined at bedside. She is sedated and intubated and requiring pressor support.    OBJECTIVE:    VITAL SIGNS:  ICU Vital Signs Last 24 Hrs  T(C): 38.5 (25 Aug 2021 04:00), Max: 38.8 (25 Aug 2021 01:00)  T(F): 101.3 (25 Aug 2021 04:00), Max: 101.8 (25 Aug 2021 01:00)  HR: 56 (25 Aug 2021 07:) (56 - 120)  BP: 126/67 (25 Aug 2021 07:) (80/45 - 149/80)  BP(mean): 81 (25 Aug 2021 07:) (53 - 97)  ABP: --  ABP(mean): --  RR: 16 (25 Aug 2021 07:) (16 - 46)  SpO2: 96% (25 Aug 2021 07:) (90% - 100%)    Mode: AC/ CMV (Assist Control/ Continuous Mandatory Ventilation), RR (machine): 16, TV (machine): 380, FiO2: 40, PEEP: 5, ITime: 0.9, MAP: 9, PIP: 26    08-24 @ 07:01  -  08-25 @ 07:00  --------------------------------------------------------  IN: 1047.4 mL / OUT: 505 mL / NET: 542.4 mL      CAPILLARY BLOOD GLUCOSE      POCT Blood Glucose.: 116 mg/dL (23 Aug 2021 14:15)        PHYSICAL EXAM:  General: Thin woman, sedated and intubated  HEENT: Normocephalic   Chest/Lungs: Mechanical breath sounds  Heart: Bradycardic  Abdomen: Distended, bowel sounds present. Masses palpable  Extremities: Mild bilateral lower pitting edema  Neuro: Sedated    MEDICATIONS:  MEDICATIONS  (STANDING):  allopurinol 100 milliGRAM(s) Oral daily  cefepime   IVPB 1000 milliGRAM(s) IV Intermittent every 24 hours  chlorhexidine 0.12% Liquid 15 milliLiter(s) Oral Mucosa every 12 hours  chlorhexidine 4% Liquid 1 Application(s) Topical <User Schedule>  dexAMETHasone  IVPB 20 milliGRAM(s) IV Intermittent daily  dextrose 50% Injectable 50 milliLiter(s) IV Push once  fentaNYL   Infusion. 2 MICROgram(s)/kG/Hr (12.8 mL/Hr) IV Continuous <Continuous>  insulin regular  human recombinant 5 Unit(s) IV Push once  metroNIDAZOLE  IVPB 500 milliGRAM(s) IV Intermittent every 8 hours  norepinephrine Infusion 0.5 MICROgram(s)/kG/Min (60 mL/Hr) IV Continuous <Continuous>  phenylephrine    Infusion 0.5 MICROgram(s)/kG/Min (6 mL/Hr) IV Continuous <Continuous>  propofol Infusion 50 MICROgram(s)/kG/Min (19.2 mL/Hr) IV Continuous <Continuous>  sodium zirconium cyclosilicate 5 Gram(s) Oral every 8 hours    MEDICATIONS  (PRN):      ALLERGIES:  Allergies    penicillins (Rash)    Intolerances        LABS:                        9.5    15.93 )-----------( 339      ( 25 Aug 2021 02:48 )             30.8     08-25    137  |  96<L>  |  59<H>  ----------------------------<  71  5.4<H>   |  21<L>  |  2.35<H>    Ca    9.8      25 Aug 2021 02:48  Phos  5.1     08-25  Mg     2.50     08-25    TPro  6.2  /  Alb  2.5<L>  /  TBili  0.4  /  DBili  x   /  AST  30  /  ALT  8   /  AlkPhos  108  08-25    PT/INR - ( 23 Aug 2021 16:07 )   PT: 11.7 sec;   INR: 1.03 ratio         PTT - ( 24 Aug 2021 14:31 )  PTT:23.3 sec  Urinalysis Basic - ( 23 Aug 2021 11:48 )    Color: Yellow / Appearance: Turbid / S.027 / pH: x  Gluc: x / Ketone: Trace  / Bili: Negative / Urobili: <2 mg/dL   Blood: x / Protein: 100 mg/dL / Nitrite: Negative   Leuk Esterase: Large / RBC: 7 /HPF / WBC >50 /HPF   Sq Epi: x / Non Sq Epi: x / Bacteria: Few        RADIOLOGY & ADDITIONAL TESTS: Reviewed.

## 2021-08-25 NOTE — CONSULT NOTE ADULT - ASSESSMENT
Patient is a 66y old  Female who presents with a chief complaint of worsening volume overload (25 Aug 2021 07:57)    HPI:  66-year-old female with PMH significant for HTN, HLD, ovarian mass suspicious for malignancy (2021), and L hydroureteronephrosis s/p renal stent (7/15/21) who presented with bilateral lower extremity edema, worsening abdominal distension, and SOB. The patient had a recent hospitalization ([facility] -) for acute renal failure (likely obstructive vs contrast-induced) requiring urgent hemodialysis, ascites s/p therapeutic paracentesis (21), and pleural effusion s/p R thoracentesis (21) which demonstrated lymphocytosis but no malignant cells. Patient states she was discharged to follow up with gyn-onc at [facility] (previously scheduled after her discharge from [place], where her ovarian mass was discovered earlier in July). However, after she went home from her most recent [facility] admission, she developed b/l LE edema that has been worsening over the past few days along with worsening abdominal distension and SOB. At that time she denied fevers/chills, lightheadedness/dizziness, cough, CP, palpitations, n/v/d, abdominal pain, LE pain.     In the ED, she was found to be tachypneic to sustained 26-30 respirations/min satting 94-96% on NC with elevated WBC (13.4) and lactate (2.7). A CXR at the time revealed a moderate R-sided and small L-sided pleural effusions. Pt received lasix, placed on BiPAP, and MICU consulted. Volume overload in the setting of ovarian malignancy was suspected. Cardiac, renal, and hepatic etiology less likely as patient with recent TTE, which did not show HF and her renal/hepatic function appear normal on labs and recent imaging.     Patient was weened from BiPAP to NC, developed pneumonia and started with vanc+cefepime. Seen by gyn-onc who require ovarian biopsy. IR consulted, deferring percutaneous biopsy and stated endobronchial biopsy would likely be lower risk. Pleural fluid from  came back as high grade B cell lymphoma so biopsy deferred and Heme/Onc consulted. Asked Surg/Onc for lymph node biopsy for further tumor characterization and determined her inguinal node would be better location. Pre-op developed ascites requiring paracentesis. OR () uneventful. Days following developed increasing SOB/tachypnea with concern of recurrent PNA started on vanc+cefepime. During this time developed ATN 2/2 hyperkalemia, contrast, and meds. Continued worsening acute hypoxic respiratory failure and patient was intubated and transferred to the MICU (). Patient became non-responsive with worsening metabolic acidosis. No sedated with propofol/fentanyl. UTI found and treated with empiric cefepime/metronidazole. Patient febrile in MICU to 101.3-101.8, blood cultures and urine cultures sent.    prior hospital charts reviewed [ x ]  CA-125 elevated to 252.  <2 and CEA <1 per records from [facility]    primary team notes reviewed [ x ]  other consultant notes reviewed [ x ]    Allergies  penicillins (Rash)    ANTIMICROBIALS (past 90 days)  MEDICATIONS  (STANDING):  cefepime   IVPB   100 mL/Hr IV Intermittent (21 @ 15:52)   100 mL/Hr IV Intermittent (21 @ 16:39)    cefepime   IVPB   100 mL/Hr IV Intermittent (21 @ 05:38)   100 mL/Hr IV Intermittent (21 @ 21:42)   100 mL/Hr IV Intermittent (21 @ 13:19)   100 mL/Hr IV Intermittent (21 @ 05:29)   100 mL/Hr IV Intermittent (21 @ 21:18)   100 mL/Hr IV Intermittent (21 @ 13:32)   100 mL/Hr IV Intermittent (21 @ 05:29)   100 mL/Hr IV Intermittent (21 @ 21:54)   100 mL/Hr IV Intermittent (21 @ 13:24)   100 mL/Hr IV Intermittent (21 @ 05:09)   100 mL/Hr IV Intermittent (21 @ 21:19)   100 mL/Hr IV Intermittent (21 @ 14:08)   100 mL/Hr IV Intermittent (21 @ 05:04)   100 mL/Hr IV Intermittent (08-15-21 @ 21:48)   100 mL/Hr IV Intermittent (08-15-21 @ 13:24)   100 mL/Hr IV Intermittent (08-15-21 @ 06:52)   100 mL/Hr IV Intermittent (21 @ 22:19)    metroNIDAZOLE  IVPB   100 mL/Hr IV Intermittent (21 @ 05:47)   100 mL/Hr IV Intermittent (21 @ 21:16)   100 mL/Hr IV Intermittent (21 @ 14:06)   100 mL/Hr IV Intermittent (21 @ 05:11)   100 mL/Hr IV Intermittent (21 @ 21:36)   100 mL/Hr IV Intermittent (21 @ 15:17)    vancomycin  IVPB   250 mL/Hr IV Intermittent (21 @ 11:28)   250 mL/Hr IV Intermittent (21 @ 00:13)   250 mL/Hr IV Intermittent (21 @ 12:10)   250 mL/Hr IV Intermittent (08-15-21 @ 20:37)   250 mL/Hr IV Intermittent (08-15-21 @ 09:16)   250 mL/Hr IV Intermittent (21 @ 22:49)    CURRENT ANTIMICROBIALS:    cefepime   IVPB 1000 every 24 hours    OTHER MEDS: MEDICATIONS  (STANDING):  allopurinol 100 daily  dexAMETHasone  IVPB 20 daily  fentaNYL   Infusion. 2 <Continuous>  heparin   Injectable 5000 every 8 hours  norepinephrine Infusion 0.5 <Continuous>  phenylephrine    Infusion 0.5 <Continuous>  propofol Infusion 50 <Continuous>    SOCIAL HISTORY:   Lives at home with brother. Prior to recent hospitalization, was independent with ADL/IADLs. 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 Denies tobacco, EtOH, illicit drug use. (12 Aug 2021 04:16)    FAMILY HISTORY:  FHx: hypertension (Mother)  FH: diabetes mellitus (Mother)    REVIEW OF SYSTEMS  [ X ] All other systems negative except as noted below:	    Constitutional:  [ ] fever [ ] chills  [ ] weight loss  [ X ] weakness  Skin:  [ ] rash [ ] phlebitis	  Eyes: [ ] icterus [ ] pain  [ ] discharge	  ENMT: [ ] sore throat  [ ] thrush [ ] ulcers [ ] exudates  Respiratory: [ X ] dyspnea [ ] hemoptysis [ x ] cough [ ] sputum	  Cardiovascular:  [ ] chest pain [ ] palpitations [ ] edema	  Gastrointestinal:  [ ] nausea [ ] vomiting [ ] diarrhea [ ] constipation [ ] pain	  Genitourinary:  [ ] dysuria [ ] frequency [ ] hematuria [ ] discharge [ ] flank pain  [ ] incontinence  Musculoskeletal:  [ ] myalgias [ ] arthralgias [ ] arthritis  [ ] back pain  Neurological:  [ ] headache [ ] seizures  [ ] confusion/altered mental status  Psychiatric:  [ ] anxiety [ ] depression	  Hematology/Lymphatics:  [ ] lymphadenopathy  Endocrine:  [ ] adrenal [ ] thyroid  Allergic/Immunologic:	 [ ] transplant [ ] seasonal    Vital Signs Last 24 Hrs  T(F): 100.8 (21 @ 08:00), Max: 101.8 (21 @ 01:00)  Vital Signs Last 24 Hrs  HR: 52 (21 @ 10:00) (52 - 120)  BP: 136/66 (21 @ 10:00) (80/45 - 149/80)  RR: 16 (21 @ 10:00)  SpO2: 96% (21 @ 10:00) (90% - 100%)    GENERAL: Pt appears clinically unstable, sedated.  HEENT: Normocephalic; supple neck, no JVD  CARDIAC: RRR, nl S1 and S2, no m/r/g  PULM: Minimal breath sounds on bilateral lower lobes, coarse breath sounds on upper lobes; increased WOB; on BiPAP  ABDOMEN: non-tender but distended, BS+  EXTREMITIES:  2+ peripheral pulses, no clubbing, 2+ edema  NERVOUS SYSTEM:  A&Ox3, no focal deficits  MSK: FROM all 4 extremities  SKIN: No rashes or lesions                        9.5    15.93 )-----------( 339      ( 25 Aug 2021 02:48 )             30.8         137  |  96<L>  |  59<H>  ----------------------------<  71  5.4<H>   |  21<L>  |  2.35<H>    Ca    9.8      25 Aug 2021 02:48  Phos  5.1       Mg     2.50       LDH   677  AG  20    TPro  6.2  /  Alb  2.5<L>  /  TBili  0.4  /  DBili  x   /  AST  30  /  ALT  8   /  AlkPhos  108      Urinalysis Basic - ( 23 Aug 2021 11:48 )  Color: Yellow / Appearance: Turbid / S.027 / pH: x  Gluc: x / Ketone: Trace  / Bili: Negative / Urobili: <2 mg/dL   Blood: x / Protein: 100 mg/dL / Nitrite: Negative   Leuk Esterase: Large / RBC: 7 /HPF / WBC >50 /HPF   Sq Epi: x / Non Sq Epi: x / Bacteria: Few    MICROBIOLOGY:  Urine + BC pending     67yo female with PMH significant for HTN, HLD, suspected ovarian malignancy, and L hydroureteronephrosis s/p renal stent who presented with bilateral lower extremity edema, worsening abdominal distension, and SOB. Patient found to have bilateral pleural effusions and significant ascites. Pathology of pleural fluid returned as high grade diffuse large B cell lymphoma, and patient underwent inguinal lymph node biopsy that is still pending. S/p biopsy patient experiencing acute hypoxic respiratory failure and was intubated/sedated in MICU. Now experiencing fever of unknown origin. Etiology likely 2/2 to lymphoma, but infectious etiology from pulmonary or urinary source cannot be excluded at this time.  -   67yo female with PMH significant for HTN, HLD, suspected ovarian malignancy, and L hydroureteronephrosis s/p renal stent who presented with bilateral lower extremity edema, worsening abdominal distension, and SOB. Patient found to have bilateral pleural effusions and significant ascites. Pathology of pleural fluid returned as high grade diffuse large B cell lymphoma, and patient underwent inguinal lymph node biopsy that is still pending. S/p biopsy patient experiencing acute hypoxic respiratory failure and was intubated/sedated in MICU. Now experiencing fever of unknown origin. Etiology likely 2/2 to manifestations of lymphoma, but infectious etiology from pulmonary or urinary source cannot be excluded at this time. In the setting of expected immunosuppression following corticosteroids/chemotherapy, expanded infectious work-up is indicated.  -ID will continue to follow  -f/u blood and urine cultures  -Continue cefepime, add/adjust coverage upon culture results  -Rec QuantiFeron Gold  -Rec HIV Screen  -Rec Strongyloides Antibodies

## 2021-08-25 NOTE — PROGRESS NOTE ADULT - SUBJECTIVE AND OBJECTIVE BOX
Hematology Oncology Follow-up    INTERVAL HPI/OVERNIGHT EVENTS:  Intubated and sedated; fever at noon today      VITAL SIGNS:  T(F): 100.2 (08-25-21 @ 12:00)  HR: 56 (08-25-21 @ 19:26)  BP: 120/59 (08-25-21 @ 19:00)  RR: 18 (08-25-21 @ 19:00)  SpO2: 100% (08-25-21 @ 19:26)  Wt(kg): --    08-24-21 @ 07:01  -  08-25-21 @ 07:00  --------------------------------------------------------  IN: 1047.4 mL / OUT: 505 mL / NET: 542.4 mL    08-25-21 @ 07:01  -  08-25-21 @ 20:01  --------------------------------------------------------  IN: 549 mL / OUT: 450 mL / NET: 99 mL        PHYSICAL EXAM:    Constitutional: Sedated, intuabted   Eyes: PERRL, EOMI, sclera non-icteric  Neck: supple, no masses, no JVD, fullness of left supraclavicular area   Respiratory: mechanically ventilalted  Cardiovascular: RRR, normal S1S2, no M/R/G  Gastrointestinal: soft, distended  Extremities:  no edema  Neurological: sedated    MEDICATIONS  (STANDING):  allopurinol 100 milliGRAM(s) Oral daily  cefepime   IVPB 1000 milliGRAM(s) IV Intermittent every 24 hours  chlorhexidine 0.12% Liquid 15 milliLiter(s) Oral Mucosa every 12 hours  chlorhexidine 4% Liquid 1 Application(s) Topical <User Schedule>  fentaNYL   Infusion. 2 MICROgram(s)/kG/Hr (12.8 mL/Hr) IV Continuous <Continuous>  heparin   Injectable 5000 Unit(s) SubCutaneous every 8 hours  norepinephrine Infusion 0.5 MICROgram(s)/kG/Min (60 mL/Hr) IV Continuous <Continuous>  phenylephrine    Infusion 0.5 MICROgram(s)/kG/Min (6 mL/Hr) IV Continuous <Continuous>  propofol Infusion 50 MICROgram(s)/kG/Min (19.2 mL/Hr) IV Continuous <Continuous>  sodium zirconium cyclosilicate 5 Gram(s) Oral every 8 hours    MEDICATIONS  (PRN):      penicillins (Rash)      LABS:                        9.3    16.36 )-----------( 322      ( 25 Aug 2021 12:13 )             29.5     08-25    137  |  99  |  56<H>  ----------------------------<  182<H>  5.0   |  19<L>  |  1.88<H>    Ca    8.9      25 Aug 2021 12:13  Phos  5.3     08-25  Mg     2.40     08-25    TPro  5.9<L>  /  Alb  2.2<L>  /  TBili  0.3  /  DBili  x   /  AST  30  /  ALT  9   /  AlkPhos  109  08-25    PTT - ( 24 Aug 2021 14:31 )  PTT:23.3 sec Lactate Dehydrogenase, Serum: 703 U/L (08-25 @ 12:13)  Lactate Dehydrogenase, Serum: 677 U/L (08-25 @ 02:48)                  RADIOLOGY & ADDITIONAL TESTS:  Studies reviewed.

## 2021-08-25 NOTE — CONSULT NOTE ADULT - SUBJECTIVE AND OBJECTIVE BOX
Patient is a 66y old  Female who presents with a chief complaint of worsening volume overload (25 Aug 2021 07:57)    HPI:  66-year-old female with PMH significant for HTN, HLD, ovarian mass suspicious for malignancy (2021), and L hydroureteronephrosis s/p renal stent (7/15/21) who presented with bilateral lower extremity edema, worsening abdominal distension, and SOB. The patient had a recent hospitalization ([facility] -) for acute renal failure (likely obstructive vs contrast-induced) requiring urgent hemodialysis, ascites s/p therapeutic paracentesis (21), and pleural effusion s/p R thoracentesis (21) which demonstrated lymphocytosis but no malignant cells. Patient states she was discharged to follow up with gyn-onc at [facility] (previously scheduled after her discharge from [place], where her ovarian mass was discovered earlier in July). However, after she went home from her most recent [facility] admission, she developed b/l LE edema that has been worsening over the past few days along with worsening abdominal distension and SOB. At that time she denied fevers/chills, lightheadedness/dizziness, cough, CP, palpitations, n/v/d, abdominal pain, LE pain.     In the ED, she was found to be tachypneic to sustained 26-30 respirations/min satting 94-96% on NC with elevated WBC (13.4) and lactate (2.7). A CXR at the time revealed a moderate R-sided and small L-sided pleural effusions. Pt received lasix, placed on BiPAP, and MICU consulted. Volume overload in the setting of ovarian malignancy was suspected. Cardiac, renal, and hepatic etiology less likely as patient with recent TTE, which did not show HF and her renal/hepatic function appear normal on labs and recent imaging.     Patient was weened from BiPAP to NC, developed pneumonia and started with vanc+cefepime. Seen by gyn-onc who require ovarian biopsy. IR consulted, deferring percutaneous biopsy and stated endobronchial biopsy would likely be lower risk. Pleural fluid from  came back as high grade B cell lymphoma so biopsy deferred and Heme/Onc consulted. Asked Surg/Onc for lymph node biopsy for further tumor characterization and determined her inguinal node would be better location. Pre-op developed ascites requiring paracentesis. OR () of inguinal lymph note uneventful. Days following developed increasing SOB/tachypnea with concern of recurrent PNA and administered vanc+cefepime. During this time developed ATN 2/2 hyperkalemia, contrast, meds. Experienced worsening acute hypoxic respiratory failure in the days following biopsy, and patient was intubated and transferred to the MICU (). Patient became non-responsive with worsening metabolic acidosis. Now sedated with propofol/fentanyl. UTI found and treated with empiric cefepime/metronidazole. Patient febrile in MICU to 101.3-101.8F, blood cultures and urine cultures sent. ID consulted for fever of unknown origin.    prior hospital charts reviewed [ x ]  CA-125 elevated to 252.  <2 and CEA <1 per records from [facility]    primary team notes reviewed [ x ]  other consultant notes reviewed [ x ]    Allergies  penicillins (Rash)    ANTIMICROBIALS (past 90 days)  MEDICATIONS  (STANDING):  cefepime   IVPB   100 mL/Hr IV Intermittent (21 @ 15:52)   100 mL/Hr IV Intermittent (21 @ 16:39)    cefepime   IVPB   100 mL/Hr IV Intermittent (21 @ 05:38)   100 mL/Hr IV Intermittent (21 @ 21:42)   100 mL/Hr IV Intermittent (21 @ 13:19)   100 mL/Hr IV Intermittent (21 @ 05:29)   100 mL/Hr IV Intermittent (21 @ 21:18)   100 mL/Hr IV Intermittent (21 @ 13:32)   100 mL/Hr IV Intermittent (21 @ 05:29)   100 mL/Hr IV Intermittent (21 @ 21:54)   100 mL/Hr IV Intermittent (21 @ 13:24)   100 mL/Hr IV Intermittent (21 @ 05:09)   100 mL/Hr IV Intermittent (21 @ 21:19)   100 mL/Hr IV Intermittent (21 @ 14:08)   100 mL/Hr IV Intermittent (21 @ 05:04)   100 mL/Hr IV Intermittent (08-15-21 @ 21:48)   100 mL/Hr IV Intermittent (08-15-21 @ 13:24)   100 mL/Hr IV Intermittent (08-15-21 @ 06:52)   100 mL/Hr IV Intermittent (21 @ 22:19)    metroNIDAZOLE  IVPB   100 mL/Hr IV Intermittent (21 @ 05:47)   100 mL/Hr IV Intermittent (21 @ 21:16)   100 mL/Hr IV Intermittent (21 @ 14:06)   100 mL/Hr IV Intermittent (21 @ 05:11)   100 mL/Hr IV Intermittent (21 @ 21:36)   100 mL/Hr IV Intermittent (21 @ 15:17)    vancomycin  IVPB   250 mL/Hr IV Intermittent (21 @ 11:28)   250 mL/Hr IV Intermittent (21 @ 00:13)   250 mL/Hr IV Intermittent (21 @ 12:10)   250 mL/Hr IV Intermittent (08-15-21 @ 20:37)   250 mL/Hr IV Intermittent (08-15-21 @ 09:16)   250 mL/Hr IV Intermittent (21 @ 22:49)    CURRENT ANTIMICROBIALS:    cefepime   IVPB 1000 every 24 hours    OTHER MEDS: MEDICATIONS  (STANDING):  allopurinol 100 daily  dexAMETHasone  IVPB 20 daily  fentaNYL   Infusion. 2 <Continuous>  heparin   Injectable 5000 every 8 hours  norepinephrine Infusion 0.5 <Continuous>  phenylephrine    Infusion 0.5 <Continuous>  propofol Infusion 50 <Continuous>    SOCIAL HISTORY:   Lives at home with brother. Prior to recent hospitalization, was independent with ADL/IADLs. Worked as a home health aid. Denies tobacco, EtOH, illicit drug use. (12 Aug 2021 04:16)    FAMILY HISTORY:  FHx: hypertension (Mother)  FH: diabetes mellitus (Mother)    REVIEW OF SYSTEMS  [ X ] All other systems negative except as noted below:	    Constitutional:  [ ] fever [ ] chills  [ ] weight loss  [ X ] weakness  Skin:  [ ] rash [ ] phlebitis	  Eyes: [ ] icterus [ ] pain  [ ] discharge	  ENMT: [ ] sore throat  [ ] thrush [ ] ulcers [ ] exudates  Respiratory: [ X ] dyspnea [ ] hemoptysis [ x ] cough [ ] sputum	  Cardiovascular:  [ ] chest pain [ ] palpitations [ ] edema	  Gastrointestinal:  [ ] nausea [ ] vomiting [ ] diarrhea [ ] constipation [ ] pain	  Genitourinary:  [ ] dysuria [ ] frequency [ ] hematuria [ ] discharge [ ] flank pain  [ ] incontinence  Musculoskeletal:  [ ] myalgias [ ] arthralgias [ ] arthritis  [ ] back pain [ X ] edema  Neurological:  [ ] headache [ ] seizures  [ ] confusion/altered mental status  Psychiatric:  [ ] anxiety [ ] depression	  Hematology/Lymphatics:  [ ] lymphadenopathy  Endocrine:  [ ] adrenal [ ] thyroid  Allergic/Immunologic:  [ ] transplant [ ] seasonal    Vital Signs Last 24 Hrs  T(F): 100.8 (21 @ 08:00), Max: 101.8 (21 @ 01:00)  Vital Signs Last 24 Hrs  HR: 52 (21 @ 10:00) (52 - 120)  BP: 136/66 (21 @ 10:00) (80/45 - 149/80)  RR: 16 (21 @ 10:00)  SpO2: 96% (21 @ 10:00) (90% - 100%)    GENERAL: Pt sedated, unresponsive to questioning  HEENT: Normocephalic; supple neck, no JVD  CARDIAC: RRR, nl S1 and S2, no m/r/g  PULM: Minimal breath sounds on bilateral lower lobes, coarse breath sounds on upper lobes; increased WOB; intubated.  ABDOMEN: non-tender but distended with significant ascites, BS+  EXTREMITIES:  2+ peripheral pulses, no clubbing, 2+ peripheral edema  MSK: FROM all 4 extremities  SKIN: No rashes or lesions                        9.5    15.93 )-----------( 339      ( 25 Aug 2021 02:48 )             30.8         137  |  96<L>  |  59<H>  ----------------------------<  71  5.4<H>   |  21<L>  |  2.35<H>    Ca    9.8      25 Aug 2021 02:48  Phos  5.1       Mg     2.50       LDH   677  AG  20    TPro  6.2  /  Alb  2.5<L>  /  TBili  0.4  /  DBili  x   /  AST  30  /  ALT  8   /  AlkPhos  108      Urinalysis Basic - ( 23 Aug 2021 11:48 )  Color: Yellow / Appearance: Turbid / S.027 / pH: x  Gluc: x / Ketone: Trace  / Bili: Negative / Urobili: <2 mg/dL   Blood: x / Protein: 100 mg/dL / Nitrite: Negative   Leuk Esterase: Large / RBC: 7 /HPF / WBC >50 /HPF   Sq Epi: x / Non Sq Epi: x / Bacteria: Few    MICROBIOLOGY:  Urine + BC pending

## 2021-08-26 NOTE — PROGRESS NOTE ADULT - ATTENDING COMMENTS
newly diagnosed lymphoma   respiratory failure and volume overload s/p intubation   component of Obstructive uropathy from lymphoma ( ureteral stent in past)   monitor TLS and low threshold for dialysis if indicated  manage K medically today

## 2021-08-26 NOTE — PROGRESS NOTE ADULT - SUBJECTIVE AND OBJECTIVE BOX
Hematology Oncology Follow-up    INTERVAL HPI/OVERNIGHT EVENTS: Overnight, remained febrile (101.6) and bradycardic to the 40s.     SUBJECTIVE: Patient seen and examined at bedside. Patient remains sedated and intubated, requiring pressor support.     Review of Systems: Unable to obtain ROS, patient intubated    VITAL SIGNS:  T(F): 100.4 (08-26-21 @ 08:00)  HR: 91 (08-26-21 @ 10:40)  BP: 129/62 (08-26-21 @ 10:00)  RR: 16 (08-26-21 @ 10:00)  SpO2: 100% (08-26-21 @ 10:40)  Wt(kg): --    08-25-21 @ 07:01  -  08-26-21 @ 07:00  --------------------------------------------------------  IN: 1459.8 mL / OUT: 1250 mL / NET: 209.8 mL    08-26-21 @ 07:01  -  08-26-21 @ 12:05  --------------------------------------------------------  IN: 185.9 mL / OUT: 250 mL / NET: -64.1 mL        PHYSICAL EXAM:  General: Thin woman, sedated and intubated  HEENT: Normocephalic   Chest/Lungs: Mechanical breath sounds  Heart: Bradycardic  Abdomen: Distended, bowel sounds present. Masses palpable  Extremities: Mild bilateral lower pitting edema  Neuro: Sedated    MEDICATIONS  (STANDING):  allopurinol 100 milliGRAM(s) Oral daily  cefepime   IVPB 1000 milliGRAM(s) IV Intermittent every 12 hours  chlorhexidine 0.12% Liquid 15 milliLiter(s) Oral Mucosa every 12 hours  chlorhexidine 4% Liquid 1 Application(s) Topical <User Schedule>  dexAMETHasone  IVPB 40 milliGRAM(s) IV Intermittent daily  dextrose 40% Gel 15 Gram(s) Oral once  dextrose 5%. 1000 milliLiter(s) (50 mL/Hr) IV Continuous <Continuous>  dextrose 5%. 1000 milliLiter(s) (100 mL/Hr) IV Continuous <Continuous>  dextrose 50% Injectable 12.5 Gram(s) IV Push once  dextrose 50% Injectable 25 Gram(s) IV Push once  dextrose 50% Injectable 25 Gram(s) IV Push once  fentaNYL    Injectable 50 MICROGram(s) IV Push once  fentaNYL   Infusion. 2 MICROgram(s)/kG/Hr (12.8 mL/Hr) IV Continuous <Continuous>  glucagon  Injectable 1 milliGRAM(s) IntraMuscular once  heparin   Injectable 5000 Unit(s) SubCutaneous every 8 hours  insulin lispro (ADMELOG) corrective regimen sliding scale   SubCutaneous every 6 hours  phenylephrine    Infusion 0.5 MICROgram(s)/kG/Min (6 mL/Hr) IV Continuous <Continuous>  propofol Infusion 50 MICROgram(s)/kG/Min (19.2 mL/Hr) IV Continuous <Continuous>    MEDICATIONS  (PRN):      penicillins (Rash)      LABS:                        10.0   21.58 )-----------( 313      ( 26 Aug 2021 04:57 )             31.3     08-26    137  |  101  |  61<H>  ----------------------------<  209<H>  5.6<H>   |  20<L>  |  1.34<H>    Ca    8.5      26 Aug 2021 04:57  Phos  6.3     08-26  Mg     2.40     08-26    TPro  6.1  /  Alb  2.0<L>  /  TBili  0.2  /  DBili  x   /  AST  31  /  ALT  8   /  AlkPhos  119  08-26    PTT - ( 24 Aug 2021 14:31 )  PTT:23.3 sec Lactate Dehydrogenase, Serum: 760 U/L (08-26 @ 04:57)  Lactate Dehydrogenase, Serum: 674 U/L (08-25 @ 20:51)  Lactate Dehydrogenase, Serum: 703 U/L (08-25 @ 12:13)    RADIOLOGY & ADDITIONAL TESTS:  Studies reviewed.    < from: US Kidney and Bladder (08.26.21 @ 11:16) >    EXAM:  US KIDNEYS AND BLADDER      PROCEDURE DATE:  Aug 26 2021     INTERPRETATION:  CLINICAL INFORMATION: Known bilateral hydronephrosis in the setting of diffuse large B-cell lymphoma status post left ureteral stent placement.    COMPARISON: CT abdomen and pelvis 8/22/2021    TECHNIQUE: Sonography of the kidneys and bladder.    FINDINGS:    Right kidney: 9.6 cm. Mild hydronephrosis, mildly improved since 8/22/2021. No renal mass or calculi.    Left kidney: 10.8 cm. Moderate hydronephrosis. No renal mass or calculi. A known left ureteral stent is not well visualized.    Urinary bladder: Collapsed around a Charles catheter and not well visualized.    Other: Small to moderate volume ascites.    IMPRESSION:    Mild right and moderate lefthydronephrosis, mildly improved on the right since the CT of 8/22/2021.    Small to moderate volume of ascites.    --- End of Report ---    LAETHA BAIRD MD; Attending Radiologist  This document has been electronically signed. Aug26 2021 11:49AM    < end of copied text >

## 2021-08-26 NOTE — PROGRESS NOTE ADULT - ASSESSMENT
67 yo F with known medical history of HTL, HLD, with recurrent admissions since June for abdominal distention, ascites, ARF, pleural effusions with "ovarian mass" initially admitted for fluid overload. Hematology was consulted because cytopathology from 8/2/2021 pleural fluid (thoracentesis) showed a DLBCL with MYC and BCL6 rearrangements.     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   - LP with CSF flow and cytopathology done 8/25; results pending  - 8/20: Excisional biopsy of left inguinal lymph node by Surg-Onc. Prelim discussed with pathologist over phone; Initial stains consistent with lymphoma; however flow and remaining stains pending. However, given recurrent Pleural effusions, and decompensation over the weekend; will start on steroids. Recommendations for treatment will depend on final path.   - 8/24: Dexamethsone 20 mg IV daily x 2 days; plan on increasing to 40mg IV daily  - Would recommend MRI brain w/wo contrast to rule out CNS involvement if possible given critical status   - Continue with allopurinol for TLS prophylaxis;   - Will require TLS lab monitoring [id number] (LDH, Uric acid, BMP, phos, Mag)     PURVI   - Discussed with nephrology regarding etiology ATN 2/2 Contrast induced nephropathy;   - concern for possible TLS involvement as uric acid was very elevated 8/20 (prompting allopurinol initiation)   - s/p 1 dose rasburicase 8/23; please draw uric acid on Ice as without this causes false lower readings.   -8/25 renal function improved; LDH and Phos mildly elevated; all other TLS labs downtrending; continue to monitor    Initial work up for treatment:  - Echo 8/3 with LVEF of 56%  - Hepatitis panel negative; Hep B core total negative, HIV negative, HTLV1 and HTLV2, Pending    Erick Dumont MD, MPH  Hematology / Oncology PGY7  p

## 2021-08-26 NOTE — PROGRESS NOTE ADULT - ATTENDING COMMENTS
Patient examined and care reviewed in detail on bedside rounds  Critically ill on vent/pressors with advanced stage lymphoma Will defer extubation until treatment plan has been established  Frequent bedside visits with therapy change today.   I have personally provided 35+ minutes of critical care time concurrently with the resident/fellow; this excludes time spent on separate procedures.

## 2021-08-26 NOTE — PROGRESS NOTE ADULT - ASSESSMENT
Ms. Stafford is a 66-year-old woman with PMH significant for HTN, HLD, L hydroureteronephrosis s/p renal stent on 7/15, who presents with volume overload 2/2 high grade B-cell lymphoma. S/p thoracentesis 8/13, paracentesis and excisional biopsy of left inguinal lymph node on 8/20. Accepted to MICU for acute hypoxic/hypercapneic respiratory failure possibly requiring intubation.     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  - Currently sedated and intubated with propofol and fentanyl  - ABG prior to intubation showed 7.33 | 52 | 70 | 27  - CT head with no intracranial pathology  - Will perform LP with flow cytometry and cytopathology to evaluate possible lymphomatous meningitis    #Cardiovascular  - Patient bradycardic  - On pressor support with phenylephrine  - Echo 8/3 showing concentric left ventricular remodeling, hyperdynamic left ventricle, calcified trileaflet aortic valve with normal opening, EF 56%  - proBNP increased from 34 on 8/11 to 730 today  - POCUS showing adequate cardiac output    #Respiratory  - Hypoxic/hypercapneic respiratory failure likely secondary to malignant pleural effusions s/p thoracentesis on 8/13 with re-accumulation of pleural effusions  - Intubated 8/24 with vent settings of 16 | 0.38 | 5 | 40  - POCUS by pulmonology showing moderate right sided pleural effusion without diaphragmatic flattening  - S/p second thoracentesis 8/25  - Continue to monitor    #GI/Nutrition  - S/p paracentesis 8/20  - Will start tube feeds today   - 3 episodes of bilious emesis; CT done showing no obstruction, but showing wall thickening of the distal small bowel with dilation of more proximal small bowel loops  - No surgical intervention at this time     #/Renal  - PURVI (Cr 2.34 from 0.99 on admission) possibly due to MONO and hyperkalemia; protein:creatinine ratio consistent with intrarenal pathology  - Hyperkalemia worsening 5.1 --> 5.4, started on insulin/D50  - Nephrology on board, recommend holding LASIX & other nephrotoxic medications. Will ask about possible nephrostomy tubes given bilateral hydronephrosis seen on POCUS  - Monitor BMP  - B/L hydronephrosis stable on CT a/p    #Skin  - No sacral decubiti    #ID  - On cefepime for empiric treatment of febrile illness. Will discontinue metronidazole at this point  - Patient febrile overnight 101.3-101.8, blood cultures and urine cultures sent  - Leukocytosis stable    #Endocrine  - Will follow up AM cortisol  - Fingersticks within normal limits    #Hematologic/DVT ppx  - B-cell lymphoma, per heme preliminary results of surgical pathology consistent with B-cell lymphoma but pending further evaluation for treatment choice  - TLS labs q8  - Allopurinol for TLS prophylaxis, received 1 dose rasburicase 8/23  - Heme/Onc recommending starting steroids with dexamethasone, patient has received 2 doses of 20 mg and tolerated well, therefore will increase next dose to 40 mg  - Pending flow cytometry  - Check LE Dopplers for DVT for worsening SOB, patient has been off DVT prophylaxis since 8/12    #Ethics  - Patient is FULL CODE per palliative care discussion with patient and her sons (Yuna and Jose). Fill-in proxy is Jose Camargo: [phone] Ms. Stafford is a 66-year-old woman with PMH significant for HTN, HLD, L hydroureteronephrosis s/p renal stent on 7/15, who presents with volume overload 2/2 high grade B-cell lymphoma. S/p thoracentesis 8/13, paracentesis and excisional biopsy of left inguinal lymph node on 8/20. Accepted to MICU for acute hypoxic/hypercapneic respiratory failure possibly requiring intubation.     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  - Currently sedated and intubated with propofol and fentanyl  - ABG prior to intubation showed 7.33 | 52 | 70 | 27  - CT head with no intracranial pathology  - S/p LP with flow cytometry and cytopathology to evaluate possible lymphomatous meningitis, pending results  - Will obtain MRI per heme recommendations to further evaluate CNS involvement    #Cardiovascular  - Patient bradycardic  - On pressor support with phenylephrine  - Echo 8/3 showing concentric left ventricular remodeling, hyperdynamic left ventricle, calcified trileaflet aortic valve with normal opening, EF 56%  - proBNP increased from 34 on 8/11 to 730 today  - POCUS showing adequate cardiac output    #Respiratory  - Hypoxic/hypercapneic respiratory failure likely secondary to malignant pleural effusions s/p thoracentesis on 8/13 with re-accumulation of pleural effusions  - Intubated 8/24 with vent settings of 16 | 0.38 | 5 | 40  - POCUS by pulmonology showing moderate right sided pleural effusion without diaphragmatic flattening  - S/p second thoracentesis 8/25, repeat POCUS on 8/26 showing bilateral effusions  - Continue to monitor  - When sedation was weaned, patient attempted self-extubation, therefore re-sedated    #GI/Nutrition  - S/p paracentesis 8/20, still remains distended  - Patient tolerating tube feeds  - 3 episodes of bilious emesis; CT done showing no obstruction, but showing wall thickening of the distal small bowel with dilation of more proximal small bowel loops  - No surgical intervention at this time     #/Renal  - PURVI   - Possibly due to MONO and hyperkalemia; protein:creatinine ratio consistent with intrarenal pathology, and possible further contribution of obstruction secondary to lymphoma  - Cr downtrending, to 1.34 today  - Nephrology on board, recommend holding LASIX & other nephrotoxic medications.   - Nephrostomy tubes considered however per IR recommendations not appropriate at this time as patient's Cr is downtrending  - Potassium 5.6 today, with mildly hemolyzed specimen, will repeat BMP  - B/L hydronephrosis stable on CT a/p    #Skin  - No sacral decubiti    #ID  - On cefepime for empiric treatment of febrile illness. Will increase dose given improvement in renal function  - Patient febrile overnight 101.6, blood cultures pending and urine cultures negative  - Leukocytosis stable    #Endocrine  - Will follow up AM cortisol  - Glucose of 291 today in the setting of high dose steroids, will add moderate correctional scale insulin    #Hematologic/DVT ppx  - B-cell lymphoma, per heme preliminary results of surgical pathology consistent with B-cell lymphoma but pending further evaluation for treatment choice  - TLS labs q8  - Allopurinol for TLS prophylaxis, received 1 dose rasburicase 8/23  - Heme/Onc recommending starting steroids with dexamethasone, currently on 40 mg dose daily  - Pending flow cytometry  - DVT prophylaxis with heparin subQ    #Ethics  - Patient is FULL CODE per palliative care discussion with patient and her sons (Yuan and Jose). Fill-in proxy is Jose Camargo: [phone]

## 2021-08-26 NOTE — CHART NOTE - NSCHARTNOTEFT_GEN_A_CORE
NUTRITION FOLLOW-UP    _________________Diet___________________  Diet, NPO with Tube Feed:   Tube Feeding Modality: Orogastric  Nepro with Carb Steady (NEPRORTH)  Total Volume for 24 Hours (mL): 720  Continuous  Starting Tube Feed Rate {mL per Hour}: 30  Until Goal Tube Feed Rate (mL per Hour): 30  Tube Feed Duration (in Hours): 24  Tube Feed Start Time: 14:30  No Carb Prosource TF     Qty per Day:  2 (08-26-21 @ 14:17)    TF Provides:  720mL total volume                       1296 kcals (+198 Propofol kcals)= 1494 total daily calories (~28.5 cals/kg IBW)                      88g total protein (1.7g protein/kg IBW)    65 yo F with PMH of HTN, HLD, ovarian mass with concern for malignancy,  b/l pleural effusions, hydroureteronephrosis s/p renal stent, & recent hospitalization at [facility] for acute renal failure requiring urgent HD, ascites, s/p paracentesis and pleural effusion s/p thoracentesis.  Admitted for hypercarbic respiratory failure 2/2 to pleural effusions likely 2/2 to ovarian mass and also with volume overload, ascites and b/l LE edema, and advanced B-cell lymphoma.  Pt. transferred to MICU and now intubated/sedated.    Attempted to decrease Propofol, although Pt. was pulling at lines and thus Propofol increased.    Currently prescribed rate of Propofol over 24hrs @ 7.5mL/hr provides 198 non-nutritive lipid calories.    Pt. has been frequently NPO throughout hospitalization.  Initiated on Jevity 1.2  @ 20mL/hr via OGT however advised changing to Nepro with addition of NoCarb Prosource given fluid accumulation, elevated K, Phos in the setting of PURVI (may require HD) & elevated glucose.  Insulin initiated.      No noted recent BM (per flow sheet, last 8/15?).  Suggest bowel regimen.  +Abdominal distention (s/p paracentesis 8/20).      Weight increase likely fluid related given ascites and severe fluid accumulation.              Weight:                                 Height:   63"                       Ideal Body Weight = 52.3kg   63.5kg (8/26)  62.6kg (8/25)  61.3kg (8/24)  64.0kg (8/12)      ____  Re-Estimated Energy Needs (based on Ideal Body Weight) ____  25-30 kcals/kg IBW= 7447-2093 kcals/day  1.6-1.8g protein/kg IBW= 84-94 g protein/day          Edema:  2+ generalized, ankles, feet. +Ascites    Skin:  No noted pressure injuries.       ________________________Pertinent Medications____________   MEDICATIONS  (STANDING):  allopurinol 100 milliGRAM(s) Oral daily  cefepime   IVPB 1000 milliGRAM(s) IV Intermittent every 12 hours  chlorhexidine 0.12% Liquid 15 milliLiter(s) Oral Mucosa every 12 hours  chlorhexidine 4% Liquid 1 Application(s) Topical <User Schedule>  dexAMETHasone  IVPB 40 milliGRAM(s) IV Intermittent daily  dextrose 40% Gel 15 Gram(s) Oral once  dextrose 5%. 1000 milliLiter(s) (100 mL/Hr) IV Continuous <Continuous>  dextrose 5%. 1000 milliLiter(s) (50 mL/Hr) IV Continuous <Continuous>  dextrose 50% Injectable 25 Gram(s) IV Push once  dextrose 50% Injectable 12.5 Gram(s) IV Push once  dextrose 50% Injectable 25 Gram(s) IV Push once  fentaNYL   Infusion. 2 MICROgram(s)/kG/Hr (12.8 mL/Hr) IV Continuous <Continuous>  glucagon  Injectable 1 milliGRAM(s) IntraMuscular once  heparin   Injectable 5000 Unit(s) SubCutaneous every 8 hours  insulin lispro (ADMELOG) corrective regimen sliding scale   SubCutaneous every 6 hours  petrolatum Ophthalmic Ointment 1 Application(s) Both EYES two times a day  phenylephrine    Infusion 0.5 MICROgram(s)/kG/Min (6 mL/Hr) IV Continuous <Continuous>  propofol Infusion 50 MICROgram(s)/kG/Min (19.2 mL/Hr) IV Continuous <Continuous>    MEDICATIONS  (PRN):          _________________________Pertinent Labs____________________     08-26    138  |  106  |  58<H>  ----------------------------<  237<H>  4.6   |  20<L>  |  0.97    Ca    8.1<L>      26 Aug 2021 13:55  Phos  5.2     08-26  Mg     2.20     08-26    TPro  6.1  /  Alb  2.0<L>  /  TBili  0.2  /  DBili  x   /  AST  31  /  ALT  8   /  AlkPhos  119  08-26                                                                   10.0   21.58 )-----------( 313      ( 26 Aug 2021 04:57 )             31.3         CAPILLARY BLOOD GLUCOSE      POCT Blood Glucose.: 227 mg/dL (26 Aug 2021 12:42)    POCT Blood Glucose.: 227 mg/dL (08-26-21 @ 12:42)  POCT Blood Glucose.: 281 mg/dL (08-26-21 @ 06:42)      ____ NUTRITION Dx:  Pt. remains moderated malnourished ___      PLAN/RECOMMENDATIONS:    1) Continue current enteral regimen, as tolerated.  2) Obtain daily weights  3) Monitor tolerance to TF, glucose values, triglycerides, renal indices.    RDN remains available and will f/u PRN.          Yulisa Argueta, DENI, CDN pager [phone]

## 2021-08-26 NOTE — PROGRESS NOTE ADULT - SUBJECTIVE AND OBJECTIVE BOX
[facility] DIVISION OF KIDNEY DISEASES AND HYPERTENSION -- FOLLOW UP NOTE  --------------------------------------------------------------------------------   HPI: 66-year-old female with PMH significant for HTN, HLD, recently discovered ovarian mass suspicious for malignancy (7/2021), L hydroureteronephrosis s/p renal stent (7/15/21), and recent hospitalization ([facility] 7/26-8/4) for acute renal failure (likely obstructive) requiring urgent HD, ascites s/p therapeutic paracentesis (7/27/21), and pleural effusion s/p R thoracentesis (8/2/21) showing lymphocytosis but no malignant cells admitted for acute hypercarbic respiratory failure due to pleural effusions most likely caused by ovarian malignancy a/w volume overload with ascites and b/l LE edema. 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 Now found to have new onset PURVI.    SCr was at 0.99 on 08/19 which increased to 1.65 on 08/22 and peaked to 2.59 on 08/24. SCr later on 08/25 downtrended to 1.88 and at 1.34 on 08/26. Pt seen and examined at bedside, remains intubated and sedated.       PAST HISTORY  --------------------------------------------------------------------------------  No significant changes to PMH, PSH, FHx, SHx, unless otherwise noted    ALLERGIES & MEDICATIONS  --------------------------------------------------------------------------------  Allergies    penicillins (Rash)    Intolerances      Standing Inpatient Medications  allopurinol 100 milliGRAM(s) Oral daily  cefepime   IVPB 1000 milliGRAM(s) IV Intermittent every 24 hours  chlorhexidine 0.12% Liquid 15 milliLiter(s) Oral Mucosa every 12 hours  chlorhexidine 4% Liquid 1 Application(s) Topical <User Schedule>  dexAMETHasone  IVPB 40 milliGRAM(s) IV Intermittent daily  dextrose 40% Gel 15 Gram(s) Oral once  dextrose 5%. 1000 milliLiter(s) IV Continuous <Continuous>  dextrose 5%. 1000 milliLiter(s) IV Continuous <Continuous>  dextrose 50% Injectable 12.5 Gram(s) IV Push once  dextrose 50% Injectable 25 Gram(s) IV Push once  dextrose 50% Injectable 25 Gram(s) IV Push once  fentaNYL   Infusion. 2 MICROgram(s)/kG/Hr IV Continuous <Continuous>  glucagon  Injectable 1 milliGRAM(s) IntraMuscular once  heparin   Injectable 5000 Unit(s) SubCutaneous every 8 hours  insulin lispro (ADMELOG) corrective regimen sliding scale   SubCutaneous every 6 hours  phenylephrine    Infusion 0.5 MICROgram(s)/kG/Min IV Continuous <Continuous>  propofol Infusion 50 MICROgram(s)/kG/Min IV Continuous <Continuous>    PRN Inpatient Medications      REVIEW OF SYSTEMS  --------------------------------------------------------------------------------  Unable to obtain ROS        VITALS/PHYSICAL EXAM  --------------------------------------------------------------------------------  T(C): 38 (08-26-21 @ 08:00), Max: 38.7 (08-26-21 @ 00:00)  HR: 44 (08-26-21 @ 10:00) (42 - 58)  BP: 129/62 (08-26-21 @ 10:00) (100/46 - 140/62)  RR: 16 (08-26-21 @ 10:00) (13 - 19)  SpO2: 100% (08-26-21 @ 10:00) (97% - 100%)  Wt(kg): --    Weight (kg): 62.6 (08-25-21 @ 07:00)      08-25-21 @ 07:01  -  08-26-21 @ 07:00  --------------------------------------------------------  IN: 1459.8 mL / OUT: 1250 mL / NET: 209.8 mL    08-26-21 @ 07:01  -  08-26-21 @ 10:35  --------------------------------------------------------  IN: 185.9 mL / OUT: 250 mL / NET: -64.1 mL        Physical Exam:  	Gen: NAD  	HEENT: MMM  	Pulm: CTA B/L  	CV: S1S2  	Abd: Soft, +BS   	Ext: No LE edema B/L  	Neuro: Awake  	Skin: Warm and dry  	Vascular access:      LABS/STUDIES  --------------------------------------------------------------------------------              10.0   21.58 >-----------<  313      [08-26-21 @ 04:57]              31.3     137  |  101  |  61  ----------------------------<  209      [08-26-21 @ 04:57]  5.6   |  20  |  1.34        Ca     8.5     [08-26-21 @ 04:57]      Mg     2.40     [08-26-21 @ 04:57]      Phos  6.3     [08-26-21 @ 04:57]    TPro  6.1  /  Alb  2.0  /  TBili  0.2  /  DBili  x   /  AST  31  /  ALT  8   /  AlkPhos  119  [08-26-21 @ 04:57]      PTT: 23.3       [08-24-21 @ 14:31]    Uric acid 2.6      [08-26-21 @ 04:57]        [08-26-21 @ 04:57]    Creatinine Trend:  SCr 1.34 [08-26 @ 04:57]  SCr 1.67 [08-25 @ 20:51]  SCr 1.88 [08-25 @ 12:13]  SCr 2.35 [08-25 @ 02:48]  SCr 2.59 [08-24 @ 14:33]    Urinalysis - [08-23-21 @ 11:48]      Color Yellow / Appearance Turbid / SG 1.027 / pH 5.5      Gluc Negative / Ketone Trace  / Bili Negative / Urobili <2 mg/dL       Blood Large / Protein 100 mg/dL / Leuk Est Large / Nitrite Negative      RBC 7 / WBC >50 / Hyaline  / Gran 2 / Sq Epi  / Non Sq Epi  / Bacteria Few    Urine Creatinine 122      [08-23-21 @ 19:59]  Urine Protein 198      [08-23-21 @ 19:59]  Urine Sodium <20      [08-23-21 @ 19:59]  Urine Urea Nitrogen 454.0      [08-23-21 @ 19:59]  Urine Potassium 77.8      [08-23-21 @ 19:59]  Urine Chloride <20      [08-23-21 @ 19:59]  Urine Osmolality 445      [08-23-21 @ 19:59]    TSH 1.85      [07-29-21 @ 11:12]    HIV Nonreact      [08-25-21 @ 12:13]    SPEP Interpretation: with acute phase reaction. HOSSEIN Kay MD      [08-03-21 @ 07:01]   [facility] DIVISION OF KIDNEY DISEASES AND HYPERTENSION -- FOLLOW UP NOTE  --------------------------------------------------------------------------------   HPI: 66-year-old female with PMH significant for HTN, HLD, recently discovered ovarian mass suspicious for malignancy (7/2021), L hydroureteronephrosis s/p renal stent (7/15/21), and recent hospitalization ([facility] 7/26-8/4) for acute renal failure (likely obstructive) requiring urgent HD, ascites s/p therapeutic paracentesis (7/27/21), and pleural effusion s/p R thoracentesis (8/2/21) showing lymphocytosis but no malignant cells admitted for acute hypercarbic respiratory failure due to pleural effusions most likely caused by ovarian malignancy a/w volume overload with ascites and b/l LE edema. 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 Now found to have new onset PURVI.    SCr was at 0.99 on 08/19 which increased to 1.65 on 08/22 and peaked to 2.59 on 08/24. SCr later on 08/25 downtrended to 1.88 and at 1.34 on 08/26. Pt seen and examined at bedside, remains intubated and sedated. Pt is nonoliguric has UOP~ 1250 ml and Scr today at 1.34.       PAST HISTORY  --------------------------------------------------------------------------------  No significant changes to PMH, PSH, FHx, SHx, unless otherwise noted    ALLERGIES & MEDICATIONS  --------------------------------------------------------------------------------  Allergies    penicillins (Rash)    Intolerances      Standing Inpatient Medications  allopurinol 100 milliGRAM(s) Oral daily  cefepime   IVPB 1000 milliGRAM(s) IV Intermittent every 24 hours  chlorhexidine 0.12% Liquid 15 milliLiter(s) Oral Mucosa every 12 hours  chlorhexidine 4% Liquid 1 Application(s) Topical <User Schedule>  dexAMETHasone  IVPB 40 milliGRAM(s) IV Intermittent daily  dextrose 40% Gel 15 Gram(s) Oral once  dextrose 5%. 1000 milliLiter(s) IV Continuous <Continuous>  dextrose 5%. 1000 milliLiter(s) IV Continuous <Continuous>  dextrose 50% Injectable 12.5 Gram(s) IV Push once  dextrose 50% Injectable 25 Gram(s) IV Push once  dextrose 50% Injectable 25 Gram(s) IV Push once  fentaNYL   Infusion. 2 MICROgram(s)/kG/Hr IV Continuous <Continuous>  glucagon  Injectable 1 milliGRAM(s) IntraMuscular once  heparin   Injectable 5000 Unit(s) SubCutaneous every 8 hours  insulin lispro (ADMELOG) corrective regimen sliding scale   SubCutaneous every 6 hours  phenylephrine    Infusion 0.5 MICROgram(s)/kG/Min IV Continuous <Continuous>  propofol Infusion 50 MICROgram(s)/kG/Min IV Continuous <Continuous>    PRN Inpatient Medications      REVIEW OF SYSTEMS  --------------------------------------------------------------------------------  Unable to obtain ROS        VITALS/PHYSICAL EXAM  --------------------------------------------------------------------------------  T(C): 38 (08-26-21 @ 08:00), Max: 38.7 (08-26-21 @ 00:00)  HR: 44 (08-26-21 @ 10:00) (42 - 58)  BP: 129/62 (08-26-21 @ 10:00) (100/46 - 140/62)  RR: 16 (08-26-21 @ 10:00) (13 - 19)  SpO2: 100% (08-26-21 @ 10:00) (97% - 100%)  Wt(kg): --    Weight (kg): 62.6 (08-25-21 @ 07:00)      08-25-21 @ 07:01  -  08-26-21 @ 07:00  --------------------------------------------------------  IN: 1459.8 mL / OUT: 1250 mL / NET: 209.8 mL    08-26-21 @ 07:01  -  08-26-21 @ 10:35  --------------------------------------------------------  IN: 185.9 mL / OUT: 250 mL / NET: -64.1 mL        Physical Exam:  	Gen: NAD  	HEENT: aniceteric  	Pulm: mechanically ventilated via ETT  	CV: S1S2  	Abd: Soft, +BS  	Ext: No LE edema B/L  	Neuro: sedated  	Skin: Warm and dry      LABS/STUDIES  --------------------------------------------------------------------------------              10.0   21.58 >-----------<  313      [08-26-21 @ 04:57]              31.3     137  |  101  |  61  ----------------------------<  209      [08-26-21 @ 04:57]  5.6   |  20  |  1.34        Ca     8.5     [08-26-21 @ 04:57]      Mg     2.40     [08-26-21 @ 04:57]      Phos  6.3     [08-26-21 @ 04:57]    TPro  6.1  /  Alb  2.0  /  TBili  0.2  /  DBili  x   /  AST  31  /  ALT  8   /  AlkPhos  119  [08-26-21 @ 04:57]      PTT: 23.3       [08-24-21 @ 14:31]    Uric acid 2.6      [08-26-21 @ 04:57]        [08-26-21 @ 04:57]    Creatinine Trend:  SCr 1.34 [08-26 @ 04:57]  SCr 1.67 [08-25 @ 20:51]  SCr 1.88 [08-25 @ 12:13]  SCr 2.35 [08-25 @ 02:48]  SCr 2.59 [08-24 @ 14:33]    Urinalysis - [08-23-21 @ 11:48]      Color Yellow / Appearance Turbid / SG 1.027 / pH 5.5      Gluc Negative / Ketone Trace  / Bili Negative / Urobili <2 mg/dL       Blood Large / Protein 100 mg/dL / Leuk Est Large / Nitrite Negative      RBC 7 / WBC >50 / Hyaline  / Gran 2 / Sq Epi  / Non Sq Epi  / Bacteria Few    Urine Creatinine 122      [08-23-21 @ 19:59]  Urine Protein 198      [08-23-21 @ 19:59]  Urine Sodium <20      [08-23-21 @ 19:59]  Urine Urea Nitrogen 454.0      [08-23-21 @ 19:59]  Urine Potassium 77.8      [08-23-21 @ 19:59]  Urine Chloride <20      [08-23-21 @ 19:59]  Urine Osmolality 445      [08-23-21 @ 19:59]    TSH 1.85      [07-29-21 @ 11:12]    HIV Nonreact      [08-25-21 @ 12:13]    SPEP Interpretation: with acute phase reaction. HOSSEIN Kay MD      [08-03-21 @ 07:01]

## 2021-08-26 NOTE — PROGRESS NOTE ADULT - PROBLEM SELECTOR PLAN 1
Likely secondary to IV contrast and diuresis on lasix with possible component of urinary retention and hydronephrosis  SCr was at 0.99 on 08/19 which increased to 1.65 on 08/22 and peaked to 2.59 on 08/24. SCr later on 08/25 downtrended to 1.88 and at 1.34 on 08/26. Pt seen and examined at bedside, remains intubated and sedated. Pt is nonoliguric has UOP~ 1250 ml and Scr today at 1.34. Suggest continue present care, no indication of HD. Will reassess daily for HD. Will need to consider HD if renal failure continues to worsen. Monitor labs and urine output. Avoid NSAIDs, ACEI/ARBS, RCA and nephrotoxins. Dose medications as per eGFR.

## 2021-08-26 NOTE — CHART NOTE - NSCHARTNOTEFT_GEN_A_CORE
: Dr. Ness, Dr. Cardenas, Dr. Troy    INDICATION: Respiratory failure     PROCEDURE:  [X] LIMITED ECHO  [X] LIMITED CHEST  [ ] LIMITED RETROPERITONEAL  [X] LIMITED ABDOMINAL  [ ] LIMITED DVT  [ ] NEEDLE GUIDANCE VASCULAR  [ ] NEEDLE GUIDANCE THORACENTESIS  [ ] NEEDLE GUIDANCE PARACENTESIS  [ ] NEEDLE GUIDANCE PERICARDIOCENTESIS  [ ] OTHER    FINDINGS:  Lungs: A lines with B/L moderate sized pleural effusions, L>R  Heart: Grossly normal LVSF, RV <LV, no pericardial effusions, IVC estimated 1.7 cm, VTI 23  Abdomen: Moderate volume ascites, mostly on the RLQ, B/L hydronephrosis L > R    INTERPRETATION:  -A line pattern in lungs pleural effusions B/L L>R  -Normal LVSF  -Moderate ascites   -B/L hydronephrosis     Images Uploaded on Q Angela Troy, PGY3  Pager [phone]  Internal Medicine

## 2021-08-26 NOTE — PROGRESS NOTE ADULT - ATTENDING COMMENTS
seen during rounds,  sedated and intubated. Still awaiting path finalizations, started dexamethasone. Discussed with MICU team. I agree with thenassessment and plan as outlined in Dr Dumont' note

## 2021-08-26 NOTE — PROGRESS NOTE ADULT - SUBJECTIVE AND OBJECTIVE BOX
INTERVAL HPI/OVERNIGHT EVENTS: Overnight, remained febrile (101.6) and bradycardic to the 40s.     SUBJECTIVE: Patient seen and examined at bedside.     OBJECTIVE:    VITAL SIGNS:  ICU Vital Signs Last 24 Hrs  T(C): 38.7 (26 Aug 2021 00:00), Max: 38.7 (26 Aug 2021 00:00)  T(F): 101.6 (26 Aug 2021 00:00), Max: 101.6 (26 Aug 2021 00:00)  HR: 49 (26 Aug 2021 07:00) (42 - 62)  BP: 124/56 (26 Aug 2021 07:00) (100/46 - 140/62)  BP(mean): 74 (26 Aug 2021 07:00) (58 - 83)  ABP: --  ABP(mean): --  RR: 16 (26 Aug 2021 07:00) (13 - 19)  SpO2: 100% (26 Aug 2021 07:00) (93% - 100%)    Mode: AC/ CMV (Assist Control/ Continuous Mandatory Ventilation), RR (machine): 16, TV (machine): 380, FiO2: 60, PEEP: 5, ITime: 0.92, MAP: 9, PIP: 24    08-25 @ 07:01  -  08-26 @ 07:00  --------------------------------------------------------  IN: 1459.8 mL / OUT: 1250 mL / NET: 209.8 mL      CAPILLARY BLOOD GLUCOSE      POCT Blood Glucose.: 281 mg/dL (26 Aug 2021 06:42)        PHYSICAL EXAM:  General: Thin woman, sedated and intubated  HEENT: Normocephalic   Chest/Lungs: Mechanical breath sounds  Heart: Bradycardic  Abdomen: Distended, bowel sounds present. Masses palpable  Extremities: Mild bilateral lower pitting edema  Neuro: Sedated      MEDICATIONS:  MEDICATIONS  (STANDING):  allopurinol 100 milliGRAM(s) Oral daily  cefepime   IVPB 1000 milliGRAM(s) IV Intermittent every 24 hours  chlorhexidine 0.12% Liquid 15 milliLiter(s) Oral Mucosa every 12 hours  chlorhexidine 4% Liquid 1 Application(s) Topical <User Schedule>  dexAMETHasone  IVPB 40 milliGRAM(s) IV Intermittent daily  fentaNYL   Infusion. 2 MICROgram(s)/kG/Hr (12.8 mL/Hr) IV Continuous <Continuous>  heparin   Injectable 5000 Unit(s) SubCutaneous every 8 hours  phenylephrine    Infusion 0.5 MICROgram(s)/kG/Min (6 mL/Hr) IV Continuous <Continuous>  propofol Infusion 50 MICROgram(s)/kG/Min (19.2 mL/Hr) IV Continuous <Continuous>    MEDICATIONS  (PRN):      ALLERGIES:  Allergies    penicillins (Rash)    Intolerances        LABS:                        10.0   21.58 )-----------( 313      ( 26 Aug 2021 04:57 )             31.3     08-26    137  |  101  |  61<H>  ----------------------------<  209<H>  5.6<H>   |  20<L>  |  1.34<H>    Ca    8.5      26 Aug 2021 04:57  Phos  6.3     08-26  Mg     2.40     08-26    TPro  6.1  /  Alb  2.0<L>  /  TBili  0.2  /  DBili  x   /  AST  31  /  ALT  8   /  AlkPhos  119  08-26    PTT - ( 24 Aug 2021 14:31 )  PTT:23.3 sec      RADIOLOGY & ADDITIONAL TESTS: Reviewed.     INTERVAL HPI/OVERNIGHT EVENTS: Overnight, remained febrile (101.6) and bradycardic to the 40s.     SUBJECTIVE: Patient seen and examined at bedside. Patient remains sedated and intubated, requiring pressor support.     OBJECTIVE:    VITAL SIGNS:  ICU Vital Signs Last 24 Hrs  T(C): 38.7 (26 Aug 2021 00:00), Max: 38.7 (26 Aug 2021 00:00)  T(F): 101.6 (26 Aug 2021 00:00), Max: 101.6 (26 Aug 2021 00:00)  HR: 49 (26 Aug 2021 07:00) (42 - 62)  BP: 124/56 (26 Aug 2021 07:00) (100/46 - 140/62)  BP(mean): 74 (26 Aug 2021 07:00) (58 - 83)  ABP: --  ABP(mean): --  RR: 16 (26 Aug 2021 07:00) (13 - 19)  SpO2: 100% (26 Aug 2021 07:00) (93% - 100%)    Mode: AC/ CMV (Assist Control/ Continuous Mandatory Ventilation), RR (machine): 16, TV (machine): 380, FiO2: 60, PEEP: 5, ITime: 0.92, MAP: 9, PIP: 24    08-25 @ 07:01  -  08-26 @ 07:00  --------------------------------------------------------  IN: 1459.8 mL / OUT: 1250 mL / NET: 209.8 mL      CAPILLARY BLOOD GLUCOSE      POCT Blood Glucose.: 281 mg/dL (26 Aug 2021 06:42)        PHYSICAL EXAM:  General: Thin woman, sedated and intubated  HEENT: Normocephalic   Chest/Lungs: Mechanical breath sounds  Heart: Bradycardic  Abdomen: Distended, bowel sounds present. Masses palpable  Extremities: Mild bilateral lower pitting edema  Neuro: Sedated      MEDICATIONS:  MEDICATIONS  (STANDING):  allopurinol 100 milliGRAM(s) Oral daily  cefepime   IVPB 1000 milliGRAM(s) IV Intermittent every 24 hours  chlorhexidine 0.12% Liquid 15 milliLiter(s) Oral Mucosa every 12 hours  chlorhexidine 4% Liquid 1 Application(s) Topical <User Schedule>  dexAMETHasone  IVPB 40 milliGRAM(s) IV Intermittent daily  fentaNYL   Infusion. 2 MICROgram(s)/kG/Hr (12.8 mL/Hr) IV Continuous <Continuous>  heparin   Injectable 5000 Unit(s) SubCutaneous every 8 hours  phenylephrine    Infusion 0.5 MICROgram(s)/kG/Min (6 mL/Hr) IV Continuous <Continuous>  propofol Infusion 50 MICROgram(s)/kG/Min (19.2 mL/Hr) IV Continuous <Continuous>    MEDICATIONS  (PRN):      ALLERGIES:  Allergies    penicillins (Rash)    Intolerances        LABS:                        10.0   21.58 )-----------( 313      ( 26 Aug 2021 04:57 )             31.3     08-26    137  |  101  |  61<H>  ----------------------------<  209<H>  5.6<H>   |  20<L>  |  1.34<H>    Ca    8.5      26 Aug 2021 04:57  Phos  6.3     08-26  Mg     2.40     08-26    TPro  6.1  /  Alb  2.0<L>  /  TBili  0.2  /  DBili  x   /  AST  31  /  ALT  8   /  AlkPhos  119  08-26    PTT - ( 24 Aug 2021 14:31 )  PTT:23.3 sec      RADIOLOGY & ADDITIONAL TESTS: Reviewed.

## 2021-08-27 NOTE — PROGRESS NOTE ADULT - ATTENDING COMMENTS
Patient examined and care reviewed in detail on bedside rounds  Critically ill on vent/pressors with advanced stage lymphoma Awaiting chemo Fails SBT today  Frequent bedside visits with therapy change today.   I have personally provided 35+ minutes of critical care time concurrently with the resident/fellow; this excludes time spent on separate procedures.

## 2021-08-27 NOTE — PROGRESS NOTE ADULT - ASSESSMENT
67 yo F with known medical history of HTL, HLD, with recurrent admissions since June for abdominal distention, ascites, ARF, pleural effusions with "ovarian mass" initially admitted for fluid overload. Hematology was consulted because cytopathology from 8/2/2021 pleural fluid (thoracentesis) showed a DLBCL with MYC and BCL6 rearrangements.     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   - LP with CSF flow and cytopathology done 8/25; appears traumatic with 6000 rbc; 77 lymphocytes although no atypicals, 23 monocytes;  flow pending.   - 8/20: Excisional biopsy of left inguinal lymph node: DLFCL, NOS, Germinal centre B-cell subtype with high proliferative index and necrosis. CD20, PAX-5, CD10, BCL-6, BCL-2, C-MYC positive. FISH, Karyotype pending.   Initial work up for treatment: Echo 8/3 with LVEF of 56%; Hepatitis panel negative; Hep B core total negative, HIV negative, HTLV1 and HTLV2, Pending  - 8/24: Dexamethsone 20 mg IV daily x 2 days; Increased to dexamethasone 40mg daily IV on 8/26  - Will plan to start R-CHOP as soon as possible. However given fever yesterday and today uptrending, (Tmax 100.9), will need to discuss optimal timing of chemo initiation with MICU team.   - Does not require to remain intubated for initiation of chemo as intubation was not secondary to tumor burden but due to pleural effusion.   - Would recommend MRI brain w/wo contrast to rule out CNS involvement if possible given critical status   - Continue with allopurinol for TLS prophylaxis;   - Will require TLS lab monitoring [id number] (LDH, Uric acid, BMP, phos, Mag)     #PURVI - improving   - Discussed with nephrology regarding etiology ATN 2/2 Contrast induced nephropathy; Improved returned to baseline.   - concern for possible TLS involvement as uric acid was very elevated 8/20 (prompting allopurinol initiation);   - s/p 1 dose rasburicase 8/23; please draw uric acid on Ice as without this causes false lower readings.   -8/27: improvement in hyperkalemia from yesterday (although hemolyzed), Calcium downtrending, Phos stable, Uric acid stable   - continue allopurinol and nephrology management     INCOMPLETE, WILL UPDATE LATER    Rosemarie Pardo   PGY4 Heme/Onc   [phone]   65 yo F with known medical history of HTL, HLD, with recurrent admissions since June for abdominal distention, ascites, ARF, pleural effusions with "ovarian mass" initially admitted for fluid overload. Hematology was consulted because cytopathology from 8/2/2021 pleural fluid (thoracentesis) showed a DLBCL with MYC and BCL6 rearrangements.     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   - LP with CSF flow and cytopathology done 8/25; appears traumatic with 6000 rbc; 77 lymphocytes although no atypicals, 23 monocytes;  flow pending.   - 8/20: Excisional biopsy of left inguinal lymph node: DLFCL, NOS, Germinal centre B-cell subtype with high proliferative index and necrosis. CD20, PAX-5, CD10, BCL-6, BCL-2, C-MYC positive. FISH, Karyotype pending.   Initial work up for treatment: Echo 8/3 with LVEF of 56%; Hepatitis panel negative; Hep B core total negative, HIV negative, HTLV1 and HTLV2, Pending  - 8/24: Dexamethsone 20 mg IV daily x 2 days; Increased to dexamethasone 40mg daily IV on 8/26 - to be continued for 5 days until 8/29  - Will plan to start R-CHOP tomorrow. Rituxan on 8/28; cyclophosphamide, Doxorubicin and vincristine on Sunday8/29; Continue Dexamethsone 40mg until 8/29  - Consent obtained from Son Yuan. Consent in chart.   - Does not require to remain intubated for initiation of chemo as intubation was not secondary to tumor burden but due to pleural effusion.   - Would recommend MRI brain w/wo contrast to rule out CNS involvement if possible given critical status   - Continue with allopurinol for TLS prophylaxis;   - Will require TLS lab monitoring [id number] (LDH, Uric acid, BMP, phos, Mag)     #PURVI - improving   - Discussed with nephrology regarding etiology ATN 2/2 Contrast induced nephropathy; Improved returned to baseline.   - concern for possible TLS involvement as uric acid was very elevated 8/20 (prompting allopurinol initiation);   - s/p 1 dose rasburicase 8/23; please draw uric acid on Ice as without this causes false lower readings.   - 8/27: improvement in hyperkalemia from yesterday (although hemolyzed), Calcium downtrending, Phos stable, Uric acid stable   - continue allopurinol and nephrology management     Rosemarie Pardo   PGY4 Heme/Onc   [phone]

## 2021-08-27 NOTE — PROGRESS NOTE ADULT - SUBJECTIVE AND OBJECTIVE BOX
Follow Up:  respiratory failure, new dx lymphoma    Interval History/ROS: remains intubated, sedated in MICU. low grade fever.    Allergies  penicillins (Rash)        ANTIMICROBIALS:  cefepime   IVPB 2000 every 12 hours      OTHER MEDS:  allopurinol 100 milliGRAM(s) Oral daily  chlorhexidine 0.12% Liquid 15 milliLiter(s) Oral Mucosa every 12 hours  chlorhexidine 4% Liquid 1 Application(s) Topical <User Schedule>  dexAMETHasone  IVPB 40 milliGRAM(s) IV Intermittent daily  dextrose 40% Gel 15 Gram(s) Oral once  dextrose 5%. 1000 milliLiter(s) IV Continuous <Continuous>  dextrose 5%. 1000 milliLiter(s) IV Continuous <Continuous>  dextrose 50% Injectable 25 Gram(s) IV Push once  dextrose 50% Injectable 12.5 Gram(s) IV Push once  dextrose 50% Injectable 25 Gram(s) IV Push once  fentaNYL   Infusion. 2 MICROgram(s)/kG/Hr IV Continuous <Continuous>  glucagon  Injectable 1 milliGRAM(s) IntraMuscular once  heparin   Injectable 5000 Unit(s) SubCutaneous every 8 hours  insulin lispro (ADMELOG) corrective regimen sliding scale   SubCutaneous every 6 hours  insulin NPH human recombinant 6 Unit(s) SubCutaneous every 6 hours  petrolatum Ophthalmic Ointment 1 Application(s) Both EYES two times a day  phenylephrine    Infusion 0.5 MICROgram(s)/kG/Min IV Continuous <Continuous>  propofol Infusion 50 MICROgram(s)/kG/Min IV Continuous <Continuous>      Vital Signs Last 24 Hrs  T(C): 38.3 (27 Aug 2021 08:00), Max: 38.3 (26 Aug 2021 18:00)  T(F): 100.9 (27 Aug 2021 08:00), Max: 101 (26 Aug 2021 18:00)  HR: 51 (27 Aug 2021 10:40) (39 - 70)  BP: 96/54 (27 Aug 2021 10:00) (88/52 - 141/67)  BP(mean): 65 (27 Aug 2021 10:00) (61 - 85)  RR: 17 (27 Aug 2021 10:00) (16 - 17)  SpO2: 99% (27 Aug 2021 10:40) (91% - 100%)    PHYSICAL EXAM:  Constitutional: sedated  Eyes: No icterus.  Oral cavity: ETT, OGT  Neck: Supple  RS:  clear ant  CVS: S1, S2   Abdomen: Soft. slight distended.  : carnes  Extremities:  edema 1+  Skin: No lesions noted  Vascular: peripheral iv  Neuro: unable                          9.4    15.19 )-----------( 302      ( 27 Aug 2021 06:02 )             29.0       08-27    143  |  107  |  52<H>  ----------------------------<  264<H>  4.3   |  22  |  0.80    Ca    7.9<L>      27 Aug 2021 05:59  Phos  4.0     08-27  Mg     2.10     08-27    TPro  5.5<L>  /  Alb  2.5<L>  /  TBili  0.3  /  DBili  x   /  AST  26  /  ALT  9   /  AlkPhos  116  08-27      CSF  wbc 0  protein 32  glu 111    MICROBIOLOGY:    .Body Fluid Pleural Fluid  08-25-21   No growth  --    polymorphonuclear leukocytes seen  No organisms seen  by cytocentrifuge      .CSF CSF  08-25-21   No growth  --    No polymorphonuclear cells seen  No organisms seen  by cytocentrifuge      .Blood Blood-Peripheral  08-25-21   No growth to date.  --  --      Catheterized Catheterized  08-25-21   No growth  --  --      .Nose  08-14-21   No Methicillin Resistant Staphylococcus aureus isolated  No Methicillin Sensitive Staphylococcus aureus "PCR is more sensitive for  identifying MRSA/MSSA."  --  --      .Body Fluid Thoracentesis Fluid  08-13-21   No growth at 5 days  --    polymorphonuclear leukocytes seen  No organisms seen  by cytocentrifuge      .Body Fluid Pleural Fluid  08-03-21   No growth  --  --      .Body Fluid Pleural Fluid  08-02-21   No growth at 5 days  --    polymorphonuclear leukocytes seen  No organisms seen  by cytocentrifuge          HSV 1/2 PCR: Olivia (08-25 @ 21:28)        RADIOLOGY:

## 2021-08-27 NOTE — PROCEDURE NOTE - PRACTITIONER PERFORMING THE TIME OUT
Ricky Garza MD
Edward Black PA-C
Joanna Haskins PA-C
Edward Black PA-C
Ricky Garza MD, Tan Cardenas MD
[place]

## 2021-08-27 NOTE — PROGRESS NOTE ADULT - ATTENDING COMMENTS
Patient seen and examined, case discussed with fellows and Dr valdovinos of the ICU. We plan to start Rituxan tomorrow and CHOP on Sunday, followed by Xarxio daily starting 24 hours later. I agree with Dr Pardo's assessment and plan.

## 2021-08-27 NOTE — PROGRESS NOTE ADULT - ASSESSMENT
65yo female with PMH significant for HTN, HLD, suspected ovarian malignancy, and L hydroureteronephrosis s/p renal stent who presented with bilateral lower extremity edema, worsening abdominal distension, and SOB. Patient found to have bilateral pleural effusions and significant ascites. 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 S/p biopsy patient experiencing acute hypoxic respiratory failure and was intubated/sedated in MICU. Now experiencing fever of unknown origin. Etiology likely 2/2 to manifestations of lymphoma: no obvious infectious etiology identified as yet - blood, urine cultures neg. pleural fluid, peritoneal fluid, CSF cultures negative to date.      In the setting of expected immunosuppression following corticosteroids/chemotherapy would check QuantiFeron Gold and Strongyloides Antibodies (testing)    If peritoneal fluid, pleural fluid cultures remain neg would stop cefepime soon    Yanni Keita MD  Pager: [phone]  After 5 PM or weekends please call fellow on call or office [phone]

## 2021-08-27 NOTE — PROGRESS NOTE ADULT - SUBJECTIVE AND OBJECTIVE BOX
Hematology Oncology Follow-up    INTERVAL HPI/OVERNIGHT EVENTS:  No o/n events, patient resting comfortably. No complaints at this time. Patient specifically denies fever, chills, dizziness, weakness, CP, palpitations, SOB, cough, N/V/D/C, dysuria, changes in bowel movements, LE edema.    Review of Systems:  General: denies fevers/chills, night sweats, malaise, changes in appetite  Head: denies HA  Eyes: denies vision change  ENT: denies oral lesions, rhinorrhea, epistaxys, sore throat, dysphagia  Respiratory: denies cough, shortness of breath, pleurisy  Cardiovascular: denies chest pain, palpitaitons, ANGEL  Gastrointestinal: denies nausea, vomiting, abdominal pain, constipation, diarrhea, melena, hematochezia  MSK: denies joint pain or muscle pain  Neuro: denies headache, weakness, or parasthesias  Skin: denies rash, petichiae, echymoses  Psych: denies anxiety or sleep disturbances    VITAL SIGNS:  T(F): 100.9 (08-27-21 @ 08:00)  HR: 60 (08-27-21 @ 10:00)  BP: 96/54 (08-27-21 @ 10:00)  RR: 17 (08-27-21 @ 10:00)  SpO2: 98% (08-27-21 @ 10:00)  Wt(kg): --    08-26-21 @ 07:01  -  08-27-21 @ 07:00  --------------------------------------------------------  IN: 1577.7 mL / OUT: 1515 mL / NET: 62.7 mL    08-27-21 @ 07:01  -  08-27-21 @ 10:05  --------------------------------------------------------  IN: 386.4 mL / OUT: 195 mL / NET: 191.4 mL        PHYSICAL EXAM:    Constitutional: AAOx3, NAD  Eyes: PERRL, EOMI, sclera non-icteric  Neck: supple, no masses, no JVD, no lymphadenopathy  Respiratory: CTA b/l, no wheezing, rhonchi, rales, with normal respiratory effort  Cardiovascular: RRR, normal S1S2, no M/R/G  Gastrointestinal: soft, NTND, no masses palpable, BS normal in all four quadrants, no HSM  Extremities:  no edema  MSK: no obvious abnormalities, normal ROM, no lymphadenopathy  Neurological: Grossly intact  Skin: Normal temperature, no rash, no echymoses, no petichiae  Psych: normal affect    MEDICATIONS  (STANDING):  allopurinol 100 milliGRAM(s) Oral daily  cefepime   IVPB 2000 milliGRAM(s) IV Intermittent every 12 hours  chlorhexidine 0.12% Liquid 15 milliLiter(s) Oral Mucosa every 12 hours  chlorhexidine 4% Liquid 1 Application(s) Topical <User Schedule>  dexAMETHasone  IVPB 40 milliGRAM(s) IV Intermittent daily  dextrose 40% Gel 15 Gram(s) Oral once  dextrose 5%. 1000 milliLiter(s) (100 mL/Hr) IV Continuous <Continuous>  dextrose 5%. 1000 milliLiter(s) (50 mL/Hr) IV Continuous <Continuous>  dextrose 50% Injectable 25 Gram(s) IV Push once  dextrose 50% Injectable 12.5 Gram(s) IV Push once  dextrose 50% Injectable 25 Gram(s) IV Push once  fentaNYL   Infusion. 2 MICROgram(s)/kG/Hr (12.8 mL/Hr) IV Continuous <Continuous>  glucagon  Injectable 1 milliGRAM(s) IntraMuscular once  heparin   Injectable 5000 Unit(s) SubCutaneous every 8 hours  insulin lispro (ADMELOG) corrective regimen sliding scale   SubCutaneous every 6 hours  insulin NPH human recombinant 6 Unit(s) SubCutaneous every 6 hours  petrolatum Ophthalmic Ointment 1 Application(s) Both EYES two times a day  phenylephrine    Infusion 0.5 MICROgram(s)/kG/Min (6 mL/Hr) IV Continuous <Continuous>  propofol Infusion 50 MICROgram(s)/kG/Min (19.2 mL/Hr) IV Continuous <Continuous>    MEDICATIONS  (PRN):      penicillins (Rash)      LABS:                        9.4    15.19 )-----------( 302      ( 27 Aug 2021 06:02 )             29.0     08-27    143  |  107  |  52<H>  ----------------------------<  264<H>  4.3   |  22  |  0.80    Ca    7.9<L>      27 Aug 2021 05:59  Phos  4.0     08-27  Mg     2.10     08-27    TPro  5.5<L>  /  Alb  2.5<L>  /  TBili  0.3  /  DBili  x   /  AST  26  /  ALT  9   /  AlkPhos  116  08-27     Lactate Dehydrogenase, Serum: 664 U/L (08-27 @ 05:59)  Lactate Dehydrogenase, Serum: 702 U/L (08-26 @ 22:52)  Lactate Dehydrogenase, Serum: 692 U/L (08-26 @ 13:55)        RADIOLOGY & ADDITIONAL TESTS:  Studies reviewed.    8/20 PAthology of inguinal lymph node:   Final Diagnosis  1.  Lymph node, left inguinal:  - Diffuse large B-cell lymphoma, NOS, Germinal centre B-cell  subtype with high proliferative index and necrosis  See note and description.    Diagnostic note:  Patient's recent diagnosis of High grade B-cell lymphoma with MYC  and BCL-6 rearrangement in the pleural fluid (31-HW-,  8/2/2021) is noted.  The current excision biopsy of left inguinal lymph node  demonstrates involvement by - Diffuse large B-cell lymphoma, NOS,  Germinal centre B-cell subtype with high proliferative index and  necrosis. FISH studies for MYC, BCL-2 and BCL-6 gene  rearrangements are pending.    Microscopic description:  Histologic sections of left inguinal lymph node demonstrate  effacement of architecture with sheets of large atypical cells  with irregular nuclear contours, dispersed chromatin and  prominent nucleoli. There are few interspersed small lymphocytes  , histiocytes and plasma cells. The neoplastic cells penetrate  the perinodal adipose tissue. There is brisk mitosis and  apoptosis. Necrotic areas are identified.    Immunohistochemistry:  Immunohistochemical stains for CD3, CD5,  CD20, PAX5, CD10, BCL6, BCL2, MUM1, Ki67, c-MYC, CyclinD1, EBV  encoded RNA (LORELEI) in situ hybridization, CD21, CD23, CD30, p53  stains performed on formalin fixed paraffin embedded tissue,  block 1A.    The large atypical cells are positive for CD20, PAX-5, CD10  (bright), BCL-6, BCL-2 (bright), C-myc; negative CD5, MUM-1. Ki-  67 demonstrates an overall proliferative rate of about 80%. CD21  is negative-consistent with diffuse process. CD23 stains few B-  cells. p53 stains few atypical cells. Cyclin D1 stains scattered  histiocytes.  CD3/CD5 stain T-cells.    LORELEI and CD30 stains are pending.    Flow cytometry analysis:  -  Monotypic B-cells (90% of cells), positive for kappa  (bright), CD19, CD20 (bright), CD10 (bright), FMC-7; negative  CD5, CD23; consistent with CD10 positive B-cell lymphoma    Cytogenetic studies  Karyotype: Pending       Hematology Oncology Follow-up    INTERVAL HPI/OVERNIGHT EVENTS:  Intubated, sedated     VITAL SIGNS:  T(F): 100.9 (08-27-21 @ 08:00)  HR: 60 (08-27-21 @ 10:00)  BP: 96/54 (08-27-21 @ 10:00)  RR: 17 (08-27-21 @ 10:00)  SpO2: 98% (08-27-21 @ 10:00)  Wt(kg): --    08-26-21 @ 07:01  -  08-27-21 @ 07:00  --------------------------------------------------------  IN: 1577.7 mL / OUT: 1515 mL / NET: 62.7 mL    08-27-21 @ 07:01  -  08-27-21 @ 10:05  --------------------------------------------------------  IN: 386.4 mL / OUT: 195 mL / NET: 191.4 mL        PHYSICAL EXAM:    General: Thin woman, sedated and intubated  HEENT: Normocephalic   Chest/Lungs: Mechanical breath sounds  Heart: Regular rate and rhythm  Abdomen: Distended, bowel sounds present. Masses palpable  Extremities: Mild bilateral lower pitting edema. Moves all 4 extremities spontaneously   Neuro: Sedated    MEDICATIONS  (STANDING):  allopurinol 100 milliGRAM(s) Oral daily  cefepime   IVPB 2000 milliGRAM(s) IV Intermittent every 12 hours  chlorhexidine 0.12% Liquid 15 milliLiter(s) Oral Mucosa every 12 hours  chlorhexidine 4% Liquid 1 Application(s) Topical <User Schedule>  dexAMETHasone  IVPB 40 milliGRAM(s) IV Intermittent daily  dextrose 40% Gel 15 Gram(s) Oral once  dextrose 5%. 1000 milliLiter(s) (100 mL/Hr) IV Continuous <Continuous>  dextrose 5%. 1000 milliLiter(s) (50 mL/Hr) IV Continuous <Continuous>  dextrose 50% Injectable 25 Gram(s) IV Push once  dextrose 50% Injectable 12.5 Gram(s) IV Push once  dextrose 50% Injectable 25 Gram(s) IV Push once  fentaNYL   Infusion. 2 MICROgram(s)/kG/Hr (12.8 mL/Hr) IV Continuous <Continuous>  glucagon  Injectable 1 milliGRAM(s) IntraMuscular once  heparin   Injectable 5000 Unit(s) SubCutaneous every 8 hours  insulin lispro (ADMELOG) corrective regimen sliding scale   SubCutaneous every 6 hours  insulin NPH human recombinant 6 Unit(s) SubCutaneous every 6 hours  petrolatum Ophthalmic Ointment 1 Application(s) Both EYES two times a day  phenylephrine    Infusion 0.5 MICROgram(s)/kG/Min (6 mL/Hr) IV Continuous <Continuous>  propofol Infusion 50 MICROgram(s)/kG/Min (19.2 mL/Hr) IV Continuous <Continuous>    MEDICATIONS  (PRN):      penicillins (Rash)      LABS:                        9.4    15.19 )-----------( 302      ( 27 Aug 2021 06:02 )             29.0     08-27    143  |  107  |  52<H>  ----------------------------<  264<H>  4.3   |  22  |  0.80    Ca    7.9<L>      27 Aug 2021 05:59  Phos  4.0     08-27  Mg     2.10     08-27    TPro  5.5<L>  /  Alb  2.5<L>  /  TBili  0.3  /  DBili  x   /  AST  26  /  ALT  9   /  AlkPhos  116  08-27     Lactate Dehydrogenase, Serum: 664 U/L (08-27 @ 05:59)  Lactate Dehydrogenase, Serum: 702 U/L (08-26 @ 22:52)  Lactate Dehydrogenase, Serum: 692 U/L (08-26 @ 13:55)        RADIOLOGY & ADDITIONAL TESTS:  Studies reviewed.    8/20 PAthology of inguinal lymph node:   Final Diagnosis  1.  Lymph node, left inguinal:  - Diffuse large B-cell lymphoma, NOS, Germinal centre B-cell  subtype with high proliferative index and necrosis  See note and description.    Diagnostic note:  Patient's recent diagnosis of High grade B-cell lymphoma with MYC  and BCL-6 rearrangement in the pleural fluid (17-CD-,  8/2/2021) is noted.  The current excision biopsy of left inguinal lymph node  demonstrates involvement by - Diffuse large B-cell lymphoma, NOS,  Germinal centre B-cell subtype with high proliferative index and  necrosis. FISH studies for MYC, BCL-2 and BCL-6 gene  rearrangements are pending.    Microscopic description:  Histologic sections of left inguinal lymph node demonstrate  effacement of architecture with sheets of large atypical cells  with irregular nuclear contours, dispersed chromatin and  prominent nucleoli. There are few interspersed small lymphocytes  , histiocytes and plasma cells. The neoplastic cells penetrate  the perinodal adipose tissue. There is brisk mitosis and  apoptosis. Necrotic areas are identified.    Immunohistochemistry:  Immunohistochemical stains for CD3, CD5,  CD20, PAX5, CD10, BCL6, BCL2, MUM1, Ki67, c-MYC, CyclinD1, EBV  encoded RNA (LORELEI) in situ hybridization, CD21, CD23, CD30, p53  stains performed on formalin fixed paraffin embedded tissue,  block 1A.    The large atypical cells are positive for CD20, PAX-5, CD10  (bright), BCL-6, BCL-2 (bright), C-myc; negative CD5, MUM-1. Ki-  67 demonstrates an overall proliferative rate of about 80%. CD21  is negative-consistent with diffuse process. CD23 stains few B-  cells. p53 stains few atypical cells. Cyclin D1 stains scattered  histiocytes.  CD3/CD5 stain T-cells.    LORELEI and CD30 stains are pending.    Flow cytometry analysis:  -  Monotypic B-cells (90% of cells), positive for kappa  (bright), CD19, CD20 (bright), CD10 (bright), FMC-7; negative  CD5, CD23; consistent with CD10 positive B-cell lymphoma    Cytogenetic studies  Karyotype: Pending

## 2021-08-27 NOTE — PROGRESS NOTE ADULT - ATTENDING COMMENTS
newly diagnosed lymphoma   respiratory failure and volume overload s/p intubation   component of Obstructive uropathy from lymphoma ( ureteral stent in past)   monitor TLS   improving Cr trend.

## 2021-08-27 NOTE — PROGRESS NOTE ADULT - PROBLEM SELECTOR PLAN 1
Likely secondary to IV contrast and diuresis on lasix with possible component of urinary retention and hydronephrosis (hx of ureteral stents in the past)  SCr was at 0.99 on 08/19 which increased to 1.65 on 08/22 and peaked to 2.59 on 08/24. SCr later on 08/25 downtrended to 1.88 and at 1.34 on 08/26. Scr today 08/27 at 0.8. Pt is nonoliguric has UOP~ 1410 ml. Suggest continue present care, no indication of HD. Will reassess daily for HD. Will need to consider HD if renal failure continues to worsen. Monitor labs and urine output. Avoid NSAIDs, ACEI/ARBS, RCA and nephrotoxins. Dose medications as per eGFR. Likely secondary to IV contrast and diuresis on lasix with possible component of urinary retention and hydronephrosis (hx of ureteral stents in the past)  SCr was at 0.99 on 08/19 which increased to 1.65 on 08/22 and peaked to 2.59 on 08/24. SCr later on 08/25 downtrended to 1.88 and at 1.34 on 08/26. Scr today 08/27 at 0.8. Pt is nonoliguric has UOP~ 1410 ml. Suggest continue present care, no indication of HD. . Monitor labs and urine output. Avoid NSAIDs, ACEI/ARBS, RCA and nephrotoxins.

## 2021-08-27 NOTE — PROCEDURE NOTE - NSANESTHESIA_GEN_A_CORE
1% lidocaine
no anesthesia administered
1% lidocaine
no anesthesia administered
no anesthesia administered

## 2021-08-27 NOTE — CHART NOTE - NSCHARTNOTEFT_GEN_A_CORE
Patient was placed in a lateral decubitus for this L moderate pleural effusion. Ultrasound examination determined the optimal pleural entry site on the mid  axillary line in the 7th  inter-costal space, just below the breast line. We marked the spot and prepared the area in a sterile fashion with clorhexidne. Local analgesia at the two sites and subcutaneously between the two sites was then provided at the entry port and the exit port (three fingerbreadths anterior and inferior to the entry site). An exploratory thoracentesis was done using the small bore needle at the entry site, then the angiocath available in the kit was advanced in the pleural space; We then  advanced the guide wire with the tip oriented posteriorly, then once inside, removed the catheter. Then made the skin incisions: one centimeter in extent at the entry port and 0.5 cm at the exit port. Afterwards, we connected the catheter to the tunneler device and dilated the subcutaneous tissue while advancing the catheter from the exit to the entry port; once the cuff was just beyond the exit port, we disconnected the tunneler device. Then we used the dilator and the accessory peel away introducer sheath over the guidewire to dilate the intercostals space; once the dilator was inside the pleural space, we removed the dilator and the guide wire and just left the sheath in place. We then placed the catheter in the pleural space as follows: brought the pleural aspect of the catheter inside the sheath and advanced it while peeling away the sheath, until the catheter was completely in; we then removed the sheath completely; then pushed the catheter seen in the incision and gently pulled on the distal aspect, to make sure the catheter was straight. Then we closed the incisions: 3 stitches over the entry port and one stitch over the exit port while securing the catheter as well. We drained 600 cc and then looped the catheter over the dressing and covered it with a 4 X 4 gauze and placed a tegaderm over it.    Assisted by MD Ricky Pittman MD  Pulmonary/ Critical Care Medicine

## 2021-08-27 NOTE — PROGRESS NOTE ADULT - SUBJECTIVE AND OBJECTIVE BOX
[facility] DIVISION OF KIDNEY DISEASES AND HYPERTENSION -- FOLLOW UP NOTE  --------------------------------------------------------------------------------  HPI: 66-year-old female with PMH significant for HTN, HLD, recently discovered ovarian mass suspicious for malignancy (7/2021), L hydroureteronephrosis s/p renal stent (7/15/21), and recent hospitalization ([facility] 7/26-8/4) for acute renal failure (likely obstructive) requiring urgent HD, ascites s/p therapeutic paracentesis (7/27/21), and pleural effusion s/p R thoracentesis (8/2/21) showing lymphocytosis but no malignant cells admitted for acute hypercarbic respiratory failure due to pleural effusions most likely caused by ovarian malignancy a/w volume overload with ascites and b/l LE edema. 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 Now found to have new onset PURVI.    SCr was at 0.99 on 08/19 which increased to 1.65 on 08/22 and peaked to 2.59 on 08/24. SCr later on 08/25 downtrended to 1.88 and at 1.34 on 08/26. Scr today 08/27 at 0.8. Pt seen and examined at bedside, remains intubated and sedated. Pt is nonoliguric has UOP~ 1410 ml.      PAST HISTORY  --------------------------------------------------------------------------------  No significant changes to PMH, PSH, FHx, SHx, unless otherwise noted    ALLERGIES & MEDICATIONS  --------------------------------------------------------------------------------  Allergies    penicillins (Rash)    Intolerances      Standing Inpatient Medications  allopurinol 100 milliGRAM(s) Oral daily  cefepime   IVPB 2000 milliGRAM(s) IV Intermittent every 12 hours  chlorhexidine 0.12% Liquid 15 milliLiter(s) Oral Mucosa every 12 hours  chlorhexidine 4% Liquid 1 Application(s) Topical <User Schedule>  dexAMETHasone  IVPB 40 milliGRAM(s) IV Intermittent daily  dextrose 40% Gel 15 Gram(s) Oral once  dextrose 5%. 1000 milliLiter(s) IV Continuous <Continuous>  dextrose 5%. 1000 milliLiter(s) IV Continuous <Continuous>  dextrose 50% Injectable 25 Gram(s) IV Push once  dextrose 50% Injectable 12.5 Gram(s) IV Push once  dextrose 50% Injectable 25 Gram(s) IV Push once  fentaNYL   Infusion. 2 MICROgram(s)/kG/Hr IV Continuous <Continuous>  glucagon  Injectable 1 milliGRAM(s) IntraMuscular once  heparin   Injectable 5000 Unit(s) SubCutaneous every 8 hours  insulin lispro (ADMELOG) corrective regimen sliding scale   SubCutaneous every 6 hours  insulin NPH human recombinant 6 Unit(s) SubCutaneous every 6 hours  petrolatum Ophthalmic Ointment 1 Application(s) Both EYES two times a day  phenylephrine    Infusion 0.5 MICROgram(s)/kG/Min IV Continuous <Continuous>  propofol Infusion 50 MICROgram(s)/kG/Min IV Continuous <Continuous>    PRN Inpatient Medications      REVIEW OF SYSTEMS  --------------------------------------------------------------------------------  Unable to obtain ROS    VITALS/PHYSICAL EXAM  --------------------------------------------------------------------------------  T(C): 36.9 (08-27-21 @ 00:00), Max: 38.3 (08-26-21 @ 18:00)  HR: 56 (08-27-21 @ 09:00) (39 - 91)  BP: 99/59 (08-27-21 @ 09:00) (88/52 - 141/67)  RR: 16 (08-27-21 @ 08:00) (16 - 16)  SpO2: 100% (08-27-21 @ 09:00) (91% - 100%)  Wt(kg): --        08-26-21 @ 07:01  -  08-27-21 @ 07:00  --------------------------------------------------------  IN: 1577.7 mL / OUT: 1515 mL / NET: 62.7 mL    08-27-21 @ 07:01  -  08-27-21 @ 10:01  --------------------------------------------------------  IN: 177.3 mL / OUT: 135 mL / NET: 42.3 mL        Physical Exam:  	Gen: NAD  	HEENT: aniceteric  	Pulm: mechanically ventilated via ETT  	CV: S1S2  	Abd: Soft, +BS  	Ext: No LE edema B/L  	Neuro: sedated  	Skin: Warm and dry      LABS/STUDIES  --------------------------------------------------------------------------------              9.4    15.19 >-----------<  302      [08-27-21 @ 06:02]              29.0     143  |  107  |  52  ----------------------------<  264      [08-27-21 @ 05:59]  4.3   |  22  |  0.80        Ca     7.9     [08-27-21 @ 05:59]      Mg     2.10     [08-27-21 @ 05:59]      Phos  4.0     [08-27-21 @ 05:59]    TPro  5.5  /  Alb  2.5  /  TBili  0.3  /  DBili  x   /  AST  26  /  ALT  9   /  AlkPhos  116  [08-27-21 @ 05:59]        Uric acid 3.2      [08-27-21 @ 05:59]        [08-27-21 @ 05:59]    Creatinine Trend:  SCr 0.80 [08-27 @ 05:59]  SCr 0.88 [08-26 @ 22:52]  SCr 0.97 [08-26 @ 13:55]  SCr 1.34 [08-26 @ 04:57]  SCr 1.67 [08-25 @ 20:51]    Urinalysis - [08-23-21 @ 11:48]      Color Yellow / Appearance Turbid / SG 1.027 / pH 5.5      Gluc Negative / Ketone Trace  / Bili Negative / Urobili <2 mg/dL       Blood Large / Protein 100 mg/dL / Leuk Est Large / Nitrite Negative      RBC 7 / WBC >50 / Hyaline  / Gran 2 / Sq Epi  / Non Sq Epi  / Bacteria Few    Urine Creatinine 122      [08-23-21 @ 19:59]  Urine Protein 198      [08-23-21 @ 19:59]  Urine Sodium <20      [08-23-21 @ 19:59]  Urine Urea Nitrogen 454.0      [08-23-21 @ 19:59]  Urine Potassium 77.8      [08-23-21 @ 19:59]  Urine Chloride <20      [08-23-21 @ 19:59]  Urine Osmolality 445      [08-23-21 @ 19:59]    TSH 1.85      [07-29-21 @ 11:12]    HBsAg Nonreact      [08-26-21 @ 10:02]  HCV 0.20, Nonreact      [08-26-21 @ 10:02]  HIV Nonreact      [08-25-21 @ 12:13]    SPEP Interpretation: with acute phase reaction. HOSSEIN Kay MD      [08-03-21 @ 07:01]

## 2021-08-27 NOTE — CHART NOTE - NSCHARTNOTEFT_GEN_A_CORE
: Dr. Ness, Dr. Cardenas, Dr. Troy    INDICATION: Respiratory failure     PROCEDURE:  [X] LIMITED ECHO  [X] LIMITED CHEST  [ ] LIMITED RETROPERITONEAL  [X] LIMITED ABDOMINAL  [ ] LIMITED DVT  [ ] NEEDLE GUIDANCE VASCULAR  [ ] NEEDLE GUIDANCE THORACENTESIS  [ ] NEEDLE GUIDANCE PARACENTESIS  [ ] NEEDLE GUIDANCE PERICARDIOCENTESIS  [ ] OTHER    FINDINGS:  Lungs: A line predominant pattern with B/L moderate PLEFF, L>R  Heart: Grossly normal LVSF, RV <LV, no pericardial effusions, IVC indeterminant  Abdomen: Moderate volume ascites, mostly on the RLQ, B/L hydronephrosis still present    INTERPRETATION:  -A line pattern in lungs pleural effusions B/L L>R  -Normal LVSF  -Moderate ascites   -B/L hydronephrosis     Images Uploaded on Q Angela Troy, PGY3  Pager [phone]  Internal Medicine.

## 2021-08-27 NOTE — PROGRESS NOTE ADULT - SUBJECTIVE AND OBJECTIVE BOX
INTERVAL HPI/OVERNIGHT EVENTS: No acute events overnight.     SUBJECTIVE: Patient seen and examined at bedside. Patient remains sedated and intubated but is more active, moving neck and all 4 extremities. She does not follow commands.     OBJECTIVE:    VITAL SIGNS:  ICU Vital Signs Last 24 Hrs  T(C): 38.3 (27 Aug 2021 08:00), Max: 38.3 (26 Aug 2021 18:00)  T(F): 100.9 (27 Aug 2021 08:00), Max: 101 (26 Aug 2021 18:00)  HR: 51 (27 Aug 2021 10:40) (39 - 70)  BP: 96/54 (27 Aug 2021 10:00) (88/52 - 141/67)  BP(mean): 65 (27 Aug 2021 10:00) (61 - 85)  ABP: --  ABP(mean): --  RR: 17 (27 Aug 2021 10:00) (16 - 17)  SpO2: 99% (27 Aug 2021 10:40) (91% - 100%)    Mode: AC/ CMV (Assist Control/ Continuous Mandatory Ventilation), RR (machine): 16, TV (machine): 380, FiO2: 40, PEEP: 5, ITime: 0.91, MAP: 9, PIP: 24    08-26 @ 07:01 - 08-27 @ 07:00  --------------------------------------------------------  IN: 1577.7 mL / OUT: 1515 mL / NET: 62.7 mL    08-27 @ 07:01 - 08-27 @ 11:36  --------------------------------------------------------  IN: 316.4 mL / OUT: 195 mL / NET: 121.4 mL      CAPILLARY BLOOD GLUCOSE      POCT Blood Glucose.: 271 mg/dL (27 Aug 2021 05:25)        PHYSICAL EXAM:  General: Thin woman, sedated and intubated  HEENT: Normocephalic   Chest/Lungs: Mechanical breath sounds  Heart: Regular rate and rhythm  Abdomen: Distended, bowel sounds present. Masses palpable  Extremities: Mild bilateral lower pitting edema. Moves all 4 extremities spontaneously   Neuro: Sedated    MEDICATIONS:  MEDICATIONS  (STANDING):  allopurinol 100 milliGRAM(s) Oral daily  cefepime   IVPB 2000 milliGRAM(s) IV Intermittent every 12 hours  chlorhexidine 0.12% Liquid 15 milliLiter(s) Oral Mucosa every 12 hours  chlorhexidine 4% Liquid 1 Application(s) Topical <User Schedule>  dexAMETHasone  IVPB 40 milliGRAM(s) IV Intermittent daily  dextrose 40% Gel 15 Gram(s) Oral once  dextrose 5%. 1000 milliLiter(s) (100 mL/Hr) IV Continuous <Continuous>  dextrose 5%. 1000 milliLiter(s) (50 mL/Hr) IV Continuous <Continuous>  dextrose 50% Injectable 25 Gram(s) IV Push once  dextrose 50% Injectable 12.5 Gram(s) IV Push once  dextrose 50% Injectable 25 Gram(s) IV Push once  fentaNYL   Infusion. 2 MICROgram(s)/kG/Hr (12.8 mL/Hr) IV Continuous <Continuous>  glucagon  Injectable 1 milliGRAM(s) IntraMuscular once  heparin   Injectable 5000 Unit(s) SubCutaneous every 8 hours  insulin lispro (ADMELOG) corrective regimen sliding scale   SubCutaneous every 6 hours  insulin NPH human recombinant 6 Unit(s) SubCutaneous every 6 hours  petrolatum Ophthalmic Ointment 1 Application(s) Both EYES two times a day  phenylephrine    Infusion 0.5 MICROgram(s)/kG/Min (6 mL/Hr) IV Continuous <Continuous>  propofol Infusion 50 MICROgram(s)/kG/Min (19.2 mL/Hr) IV Continuous <Continuous>    MEDICATIONS  (PRN):      ALLERGIES:  Allergies    penicillins (Rash)    Intolerances        LABS:                        9.4    15.19 )-----------( 302      ( 27 Aug 2021 06:02 )             29.0     08-27    143  |  107  |  52<H>  ----------------------------<  264<H>  4.3   |  22  |  0.80    Ca    7.9<L>      27 Aug 2021 05:59  Phos  4.0     08-27  Mg     2.10     08-27    TPro  5.5<L>  /  Alb  2.5<L>  /  TBili  0.3  /  DBili  x   /  AST  26  /  ALT  9   /  AlkPhos  116  08-27          RADIOLOGY & ADDITIONAL TESTS: Reviewed.

## 2021-08-27 NOTE — PROGRESS NOTE ADULT - ASSESSMENT
Ms. Stafford is a 66-year-old woman with PMH significant for HTN, HLD, L hydroureteronephrosis s/p renal stent on 7/15, who presents with volume overload 2/2 high grade B-cell lymphoma. S/p thoracentesis 8/13, paracentesis and excisional biopsy of left inguinal lymph node on 8/20. Accepted to MICU for acute hypoxic/hypercapneic respiratory failure possibly requiring intubation.     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  - Currently sedated and intubated with propofol and fentanyl  - ABG prior to intubation showed 7.33 | 52 | 70 | 27, easing sedation for possible spontaneous breathing trial  - CT head with no intracranial pathology  - S/p LP with flow cytometry and cytopathology to evaluate possible lymphomatous meningitis, pending results  - Will obtain MRI per heme recommendations to further evaluate CNS involvement unless patient's neurological function when off sedation intact    #Cardiovascular  - Patient bradycardic  - On pressor support with phenylephrine  - Echo 8/3 showing concentric left ventricular remodeling, hyperdynamic left ventricle, calcified trileaflet aortic valve with normal opening, EF 56%  - proBNP increased from 34 on 8/11 to 730 today  - POCUS showing adequate cardiac output    #Respiratory  - Hypoxic/hypercapneic respiratory failure likely secondary to malignant pleural effusions s/p thoracentesis on 8/13 with re-accumulation of pleural effusions  - Intubated 8/24 with vent settings of 16 | 0.38 | 5 | 40  - POCUS by pulmonology showing moderate right sided pleural effusion without diaphragmatic flattening  - S/p second thoracentesis 8/25, repeat POCUS on 8/26 showing bilateral effusions, will place chest tube for recurrent effusion  - Continue to monitor, may ease sedation and attempt spontaneous breathing trial    #GI/Nutrition  - S/p paracentesis 8/20, still remains distended  - Patient tolerating tube feeds  - 3 episodes of bilious emesis; CT done showing no obstruction, but showing wall thickening of the distal small bowel with dilation of more proximal small bowel loops  - No surgical intervention at this time     #/Renal  - PURVI, resolved  - Possibly due to MONO and hyperkalemia; protein:creatinine ratio consistent with intrarenal pathology, and possible further contribution of obstruction secondary to lymphoma  - Cr 0.80 today  - Nephrology on board, recommend holding LASIX & other nephrotoxic medications.   - Nephrostomy tubes considered however per IR recommendations not appropriate at this time as patient's Cr is downtrending  - Potassium 5.1 today, continue to monitor  - B/L hydronephrosis stable on CT a/p    #Skin  - No sacral decubiti    #ID  - On cefepime for empiric treatment of febrile illness. Will further increase dose given improvement in renal function  - Patient afebrile overnight, blood cultures NGTD and urine cultures negative  - Leukocytosis stable    #Endocrine  - Will follow up AM cortisol  - Glucose of 291 today in the setting of high dose steroids, will add NPH q6H as well as continuing moderate correctional scale insulin    #Hematologic/DVT ppx  - B-cell lymphoma, per heme preliminary results of surgical pathology consistent with B-cell lymphoma but pending further evaluation for treatment choice  - TLS labs q8  - Allopurinol for TLS prophylaxis, received 1 dose rasburicase 8/23  - Heme/Onc recommending starting steroids with dexamethasone, currently on 40 mg dose daily  - Pending flow cytometry and further categorization of lymphoma to determine type of chemotherapy  - Will follow up with hematology whether chemotherapy is appropriate given patient's functional status  - DVT prophylaxis with heparin subQ    #Ethics  - Patient is FULL CODE per palliative care discussion with patient and her sons (Yuan and Jose). Fill-in proxy is Chaileslie Camargo: [phone]

## 2021-08-28 NOTE — PROGRESS NOTE ADULT - ASSESSMENT
Ms. Stafford is a 66-year-old woman with PMH significant for HTN, HLD, L hydroureteronephrosis s/p renal stent on 7/15, who presents with volume overload 2/2 high grade B-cell lymphoma. S/p thoracentesis 8/13, paracentesis and excisional biopsy of left inguinal lymph node on 8/20. Accepted to MICU for acute hypoxic/hypercapneic respiratory failure now s/p intubation and L pleurex catheter placement.    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  - Currently sedated and intubated with propofol and fentanyl  - ABG prior to intubation showed 7.33 | 52 | 70 | 27, easing sedation for possible spontaneous breathing trial  - CT head with no intracranial pathology  - S/p LP with flow cytometry and cytopathology to evaluate possible lymphomatous meningitis, pending results  - Will obtain MRI per heme recommendations to further evaluate CNS involvement unless patient's neurological function when off sedation intact    #Cardiovascular  - Patient bradycardic  - On pressor support with phenylephrine  - Echo 8/3 showing concentric left ventricular remodeling, hyperdynamic left ventricle, calcified trileaflet aortic valve with normal opening, EF 56%  - proBNP increased from 34 on 8/11 to 730 today  - POCUS showing adequate cardiac output    #Respiratory  - Hypoxic/hypercapneic respiratory failure likely secondary to malignant pleural effusions s/p thoracentesis on 8/13 with re-accumulation of pleural effusions  - Intubated 8/24 with vent settings of 16 | 0.38 | 5 | 40  - POCUS by pulmonology showing moderate right sided pleural effusion without diaphragmatic flattening  - S/p second thoracentesis 8/25, repeat POCUS on 8/26 showing bilateral effusions, will place chest tube for recurrent effusion  - Continue to monitor, may ease sedation and attempt spontaneous breathing trial    #GI/Nutrition  - S/p paracentesis 8/20, still remains distended  - Patient tolerating tube feeds  - 3 episodes of bilious emesis; CT done showing no obstruction, but showing wall thickening of the distal small bowel with dilation of more proximal small bowel loops  - No surgical intervention at this time     #/Renal  - PURVI, resolved  - Possibly due to MONO and hyperkalemia; protein:creatinine ratio consistent with intrarenal pathology, and possible further contribution of obstruction secondary to lymphoma  - Cr 0.80 today  - Nephrology on board, recommend holding LASIX & other nephrotoxic medications.   - Nephrostomy tubes considered however per IR recommendations not appropriate at this time as patient's Cr is downtrending  - Potassium 5.1 today, continue to monitor  - B/L hydronephrosis stable on CT a/p    #Skin  - No sacral decubiti    #ID  - On cefepime for empiric treatment of febrile illness. Will further increase dose given improvement in renal function  - Patient afebrile overnight, blood cultures NGTD and urine cultures negative  - Leukocytosis stable    #Endocrine  - Will follow up AM cortisol  - Glucose of 291 today in the setting of high dose steroids, will add NPH q6H as well as continuing moderate correctional scale insulin    #Hematologic/DVT ppx  - B-cell lymphoma, per heme preliminary results of surgical pathology consistent with B-cell lymphoma but pending further evaluation for treatment choice  - TLS labs q8  - Allopurinol for TLS prophylaxis, received 1 dose rasburicase 8/23  - Heme/Onc recommending starting steroids with dexamethasone, currently on 40 mg dose daily  - Pending flow cytometry and further categorization of lymphoma to determine type of chemotherapy  - Will follow up with hematology whether chemotherapy is appropriate given patient's functional status  - DVT prophylaxis with heparin subQ    #Ethics  - Patient is FULL CODE per palliative care discussion with patient and her sons (Yuan and Jose). Fill-in proxy is Jose Camargo: [phone] Ms. Stafford is a 66-year-old woman with PMH significant for HTN, HLD, L hydroureteronephrosis s/p renal stent on 7/15, who presents with volume overload 2/2 high grade B-cell lymphoma. S/p thoracentesis 8/13, paracentesis and excisional biopsy of left inguinal lymph node on 8/20. Accepted to MICU for acute hypoxic/hypercapneic respiratory failure now s/p intubation and L pleurex catheter placement, pending chemotherapy.    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  - Currently sedated and intubated with propofol and fentanyl  - CT head with no intracranial pathology  - S/p LP with flow cytometry and cytopathology to evaluate possible lymphomatous meningitis, pending results  - Will obtain MRI per heme recommendations to further evaluate CNS involvement unless patient's neurological function when mental status is evaluated off of sedation    #Cardiovascular  - On pressor support with phenylephrine  - Echo 8/3 showing concentric left ventricular remodeling, hyperdynamic left ventricle, calcified trileaflet aortic valve with normal opening, EF 56%  - proBNP increased from 34 on 8/11 to 730 today  - POCUS showing adequate cardiac output    #Respiratory  - Hypoxic/hypercapneic respiratory failure likely secondary to malignant pleural effusions s/p thoracentesis on 8/13 with re-accumulation of pleural effusions  - Intubated 8/24 with vent settings of 16 | 380 | 5 | 40  - POCUS by pulmonology showing moderate right sided pleural effusion without diaphragmatic flattening  - S/p second thoracentesis 8/25, repeat POCUS on 8/26 showing bilateral effusions, s/p L pleurex   - Continue to monitor, may ease sedation and attempt spontaneous breathing trial    #GI/Nutrition  - S/p paracentesis 8/20, still remains distended  - Patient tolerating tube feeds    #/Renal  - PURVI, resolved  - Nephrology on board, recommend holding LASIX & other nephrotoxic medications.   - Nephrostomy tubes considered however per IR recommendations not appropriate at this time as patient's Cr is downtrending  - B/L hydronephrosis stable on CT a/p    #Skin  - No sacral decubiti    #ID  - On cefepime for empiric treatment of febrile illness   - Patient febrile overnight, blood cultures NGTD and urine cultures negative  - Leukocytosis stable    #Endocrine  - nph q6h, SSI while on steroids  - will readjust as necessary     #Hematologic/DVT ppx  - B-cell lymphoma, per heme preliminary results of surgical pathology consistent with B-cell lymphoma but pending further evaluation for treatment choice  - TLS labs q8  - Allopurinol for TLS prophylaxis, received 1 dose rasburicase 8/23  - Heme/Onc recommending starting steroids with dexamethasone, currently on 40 mg dose daily to end 8/29   - Plan to begin chemotherapy ([facility]) today   - DVT prophylaxis with heparin subQ    #Ethics  - Patient is FULL CODE per palliative care discussion with patient and her sons (Yuan and Jose). Fill-in proxy is Jose Camargo: [phone]

## 2021-08-28 NOTE — PROGRESS NOTE ADULT - SUBJECTIVE AND OBJECTIVE BOX
COVERING RESIDENT: MALGORZATA PERRY | [id number] / [phone]    INTERVAL HPI/OVERNIGHT EVENTS: No acute events overnight.    SUBJECTIVE: Patient seen and examined at bedside.     Unable to assess ROS as pt is intubated + sedated.    OBJECTIVE:    VITAL SIGNS:  ICU Vital Signs Last 24 Hrs  T(C): 38 (28 Aug 2021 04:00), Max: 38.7 (28 Aug 2021 00:00)  T(F): 100.4 (28 Aug 2021 04:00), Max: 101.6 (28 Aug 2021 00:00)  HR: 65 (28 Aug 2021 07:19) (46 - 91)  BP: 106/62 (28 Aug 2021 07:00) (91/52 - 149/89)  BP(mean): 72 (28 Aug 2021 07:00) (62 - 102)  ABP: --  ABP(mean): --  RR: 16 (28 Aug 2021 06:00) (16 - 17)  SpO2: 99% (28 Aug 2021 07:19) (92% - 100%)    Mode: AC/ CMV (Assist Control/ Continuous Mandatory Ventilation), RR (machine): 16, TV (machine): 380, FiO2: 40, PEEP: 5, ITime: 0.92, MAP: 9, PIP: 24    08-27 @ 07:01  -  08-28 @ 07:00  --------------------------------------------------------  IN: 2171.7 mL / OUT: 2125 mL / NET: 46.7 mL      CAPILLARY BLOOD GLUCOSE      POCT Blood Glucose.: 213 mg/dL (28 Aug 2021 05:23)      PHYSICAL EXAM:    PHYSICAL EXAM:  General: Thin woman, sedated and intubated  HEENT: Normocephalic   Chest/Lungs: Mechanical breath sounds, L pleurex catheter in place draining serosanguinous fluid  Heart: Regular rate and rhythm  Abdomen: Distended, bowel sounds present. Masses palpable  Extremities: Mild bilateral lower pitting edema. Moves all 4 extremities spontaneously   Neuro: Sedated    MEDICATIONS:  MEDICATIONS  (STANDING):  allopurinol 100 milliGRAM(s) Oral daily  cefepime   IVPB 2000 milliGRAM(s) IV Intermittent every 12 hours  chlorhexidine 0.12% Liquid 15 milliLiter(s) Oral Mucosa every 12 hours  chlorhexidine 4% Liquid 1 Application(s) Topical <User Schedule>  dexAMETHasone  IVPB 40 milliGRAM(s) IV Intermittent daily  dextrose 40% Gel 15 Gram(s) Oral once  dextrose 5%. 1000 milliLiter(s) (50 mL/Hr) IV Continuous <Continuous>  dextrose 5%. 1000 milliLiter(s) (100 mL/Hr) IV Continuous <Continuous>  dextrose 50% Injectable 25 Gram(s) IV Push once  dextrose 50% Injectable 12.5 Gram(s) IV Push once  dextrose 50% Injectable 25 Gram(s) IV Push once  fentaNYL   Infusion. 2 MICROgram(s)/kG/Hr (12.8 mL/Hr) IV Continuous <Continuous>  glucagon  Injectable 1 milliGRAM(s) IntraMuscular once  heparin   Injectable 5000 Unit(s) SubCutaneous every 8 hours  insulin lispro (ADMELOG) corrective regimen sliding scale   SubCutaneous every 6 hours  insulin NPH human recombinant 6 Unit(s) SubCutaneous every 6 hours  norepinephrine Infusion 0.05 MICROgram(s)/kG/Min (5.87 mL/Hr) IV Continuous <Continuous>  petrolatum Ophthalmic Ointment 1 Application(s) Both EYES two times a day  polyethylene glycol 3350 17 Gram(s) Oral daily  propofol Infusion 50 MICROgram(s)/kG/Min (19.2 mL/Hr) IV Continuous <Continuous>  senna Syrup 15 milliLiter(s) Oral at bedtime    MEDICATIONS  (PRN):      ALLERGIES:  Allergies    penicillins (Rash)    Intolerances        LABS:                        9.3    15.03 )-----------( 274      ( 28 Aug 2021 02:56 )             29.6     08-28    145  |  109<H>  |  64<H>  ----------------------------<  216<H>  4.8   |  22  |  0.78    Ca    7.9<L>      28 Aug 2021 02:56  Phos  4.0     08-28  Mg     2.30     08-28    TPro  5.7<L>  /  Alb  2.3<L>  /  TBili  <0.2  /  DBili  x   /  AST  26  /  ALT  9   /  AlkPhos  107  08-28    PT/INR - ( 27 Aug 2021 13:39 )   PT: 12.3 sec;   INR: 1.07 ratio         PTT - ( 27 Aug 2021 13:39 )  PTT:27.8 sec      RADIOLOGY & ADDITIONAL TESTS: Reviewed. COVERING RESIDENT: MALGORZATA PERRY | 11300 / [phone]    INTERVAL HPI/OVERNIGHT EVENTS: Patient febrile overnight, Tmax 38.7.     SUBJECTIVE: Patient seen and examined at bedside.     Unable to assess ROS as pt is intubated + sedated.    OBJECTIVE:    VITAL SIGNS:  ICU Vital Signs Last 24 Hrs  T(C): 38 (28 Aug 2021 04:00), Max: 38.7 (28 Aug 2021 00:00)  T(F): 100.4 (28 Aug 2021 04:00), Max: 101.6 (28 Aug 2021 00:00)  HR: 65 (28 Aug 2021 07:19) (46 - 91)  BP: 106/62 (28 Aug 2021 07:00) (91/52 - 149/89)  BP(mean): 72 (28 Aug 2021 07:00) (62 - 102)  ABP: --  ABP(mean): --  RR: 16 (28 Aug 2021 06:00) (16 - 17)  SpO2: 99% (28 Aug 2021 07:19) (92% - 100%)    Mode: AC/ CMV (Assist Control/ Continuous Mandatory Ventilation), RR (machine): 16, TV (machine): 380, FiO2: 40, PEEP: 5, ITime: 0.92, MAP: 9, PIP: 24    08-27 @ 07:01  -  08-28 @ 07:00  --------------------------------------------------------  IN: 2171.7 mL / OUT: 2125 mL / NET: 46.7 mL      CAPILLARY BLOOD GLUCOSE      POCT Blood Glucose.: 213 mg/dL (28 Aug 2021 05:23)      PHYSICAL EXAM:    PHYSICAL EXAM:  General: Thin woman, sedated and intubated  HEENT: Normocephalic   Chest/Lungs: Mechanical breath sounds, L pleurex catheter in place draining serosanguinous fluid  Heart: Regular rate and rhythm  Abdomen: Distended, bowel sounds present. Masses palpable  Extremities: Mild bilateral lower pitting edema. Moves all 4 extremities spontaneously   Neuro: Sedated    MEDICATIONS:  MEDICATIONS  (STANDING):  allopurinol 100 milliGRAM(s) Oral daily  cefepime   IVPB 2000 milliGRAM(s) IV Intermittent every 12 hours  chlorhexidine 0.12% Liquid 15 milliLiter(s) Oral Mucosa every 12 hours  chlorhexidine 4% Liquid 1 Application(s) Topical <User Schedule>  dexAMETHasone  IVPB 40 milliGRAM(s) IV Intermittent daily  dextrose 40% Gel 15 Gram(s) Oral once  dextrose 5%. 1000 milliLiter(s) (50 mL/Hr) IV Continuous <Continuous>  dextrose 5%. 1000 milliLiter(s) (100 mL/Hr) IV Continuous <Continuous>  dextrose 50% Injectable 25 Gram(s) IV Push once  dextrose 50% Injectable 12.5 Gram(s) IV Push once  dextrose 50% Injectable 25 Gram(s) IV Push once  fentaNYL   Infusion. 2 MICROgram(s)/kG/Hr (12.8 mL/Hr) IV Continuous <Continuous>  glucagon  Injectable 1 milliGRAM(s) IntraMuscular once  heparin   Injectable 5000 Unit(s) SubCutaneous every 8 hours  insulin lispro (ADMELOG) corrective regimen sliding scale   SubCutaneous every 6 hours  insulin NPH human recombinant 6 Unit(s) SubCutaneous every 6 hours  norepinephrine Infusion 0.05 MICROgram(s)/kG/Min (5.87 mL/Hr) IV Continuous <Continuous>  petrolatum Ophthalmic Ointment 1 Application(s) Both EYES two times a day  polyethylene glycol 3350 17 Gram(s) Oral daily  propofol Infusion 50 MICROgram(s)/kG/Min (19.2 mL/Hr) IV Continuous <Continuous>  senna Syrup 15 milliLiter(s) Oral at bedtime    MEDICATIONS  (PRN):      ALLERGIES:  Allergies    penicillins (Rash)    Intolerances        LABS:                        9.3    15.03 )-----------( 274      ( 28 Aug 2021 02:56 )             29.6     08-28    145  |  109<H>  |  64<H>  ----------------------------<  216<H>  4.8   |  22  |  0.78    Ca    7.9<L>      28 Aug 2021 02:56  Phos  4.0     08-28  Mg     2.30     08-28    TPro  5.7<L>  /  Alb  2.3<L>  /  TBili  <0.2  /  DBili  x   /  AST  26  /  ALT  9   /  AlkPhos  107  08-28    PT/INR - ( 27 Aug 2021 13:39 )   PT: 12.3 sec;   INR: 1.07 ratio         PTT - ( 27 Aug 2021 13:39 )  PTT:27.8 sec      RADIOLOGY & ADDITIONAL TESTS: Reviewed.

## 2021-08-28 NOTE — PROGRESS NOTE ADULT - PROBLEM SELECTOR PLAN 1
Likely secondary to IV contrast and diuresis on lasix with possible component of urinary retention and hydronephrosis (hx of ureteral stents in the past)    SCr was at 0.99 on 08/19 which increased to 1.65 on 08/22 and peaked to 2.59 on 08/24. SCr had downtrended to 0.78 today. Pt is nonoliguric has UOP~ 1L. Pt. to initiate chemotherapy today as per Oncology today. Closely monitor for TLS. Monitor labs and urine output. Avoid NSAIDs, ACEI/ARBS, RCA and nephrotoxins.     If you have any questions, please feel free to contact me  Celio Barrow  Nephrology Fellow  [phone]  (After 5pm or on weekends please page the on-call fellow)

## 2021-08-28 NOTE — PROGRESS NOTE ADULT - SUBJECTIVE AND OBJECTIVE BOX
[facility] Division of Kidney Diseases & Hypertension  FOLLOW UP NOTE  [phone]--------------------------------------------------------------------------------    HPI: 66-year-old female with HTN, HLD, recently discovered ovarian mass suspicious for malignancy (7/2021), L hydroureteronephrosis s/p renal stent (7/15/21), and recent hospitalization ([facility] 7/26-8/4) for acute renal failure (likely obstructive) requiring urgent HD, ascites s/p therapeutic paracentesis (7/27/21), and pleural effusion s/p R thoracentesis (8/2/21) showing lymphocytosis but no malignant cells admitted for acute hypercarbic respiratory failure due to pleural effusions most likely caused by ovarian malignancy complicated with volume overload with ascites and B/L LE edema. 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 Now found to have new onset PURVI.    SCr was at 0.99 on 08/19 which increased to 1.65 on 08/22 and peaked to 2.59 on 08/24. SCr has now gradually improved to 0.78 today. Pt. remains non-oliguric. Pt. seen and examined in MICU. Pt. intubated/sedated and unable to provide ROS. Pt. to initiate chemotherapy today.    PAST HISTORY  --------------------------------------------------------------------------------  No significant changes to PMH, PSH, FHx, SHx, unless otherwise noted    ALLERGIES & MEDICATIONS  --------------------------------------------------------------------------------  Allergies    penicillins (Rash)    Intolerances    Standing Inpatient Medications  allopurinol 100 milliGRAM(s) Oral daily  cefepime   IVPB 2000 milliGRAM(s) IV Intermittent every 12 hours  chlorhexidine 0.12% Liquid 15 milliLiter(s) Oral Mucosa every 12 hours  chlorhexidine 4% Liquid 1 Application(s) Topical <User Schedule>  dexAMETHasone  IVPB 40 milliGRAM(s) IV Intermittent daily  dextrose 40% Gel 15 Gram(s) Oral once  dextrose 5%. 1000 milliLiter(s) IV Continuous <Continuous>  dextrose 5%. 1000 milliLiter(s) IV Continuous <Continuous>  dextrose 50% Injectable 25 Gram(s) IV Push once  dextrose 50% Injectable 12.5 Gram(s) IV Push once  dextrose 50% Injectable 25 Gram(s) IV Push once  fentaNYL   Infusion. 2 MICROgram(s)/kG/Hr IV Continuous <Continuous>  glucagon  Injectable 1 milliGRAM(s) IntraMuscular once  heparin   Injectable 5000 Unit(s) SubCutaneous every 8 hours  insulin lispro (ADMELOG) corrective regimen sliding scale   SubCutaneous every 6 hours  insulin NPH human recombinant 6 Unit(s) SubCutaneous every 6 hours  norepinephrine Infusion 0.05 MICROgram(s)/kG/Min IV Continuous <Continuous>  petrolatum Ophthalmic Ointment 1 Application(s) Both EYES two times a day  polyethylene glycol 3350 17 Gram(s) Oral daily  propofol Infusion 50 MICROgram(s)/kG/Min IV Continuous <Continuous>  senna Syrup 15 milliLiter(s) Oral at bedtime    VITALS/PHYSICAL EXAM  --------------------------------------------------------------------------------  T(C): 37.4 (08-28-21 @ 13:32), Max: 38.7 (08-28-21 @ 00:00)  HR: 65 (08-28-21 @ 13:32) (46 - 142)  BP: 116/66 (08-28-21 @ 13:32) (82/54 - 149/89)  RR: 16 (08-28-21 @ 13:32) (16 - 16)  SpO2: 97% (08-28-21 @ 13:32) (92% - 100%)  Wt(kg): --    08-27-21 @ 07:01  -  08-28-21 @ 07:00  --------------------------------------------------------  IN: 2171.7 mL / OUT: 2165 mL / NET: 6.7 mL    08-28-21 @ 07:01  -  08-28-21 @ 13:47  --------------------------------------------------------  IN: 594.1 mL / OUT: 290 mL / NET: 304.1 mL    Physical Exam:              Gen: Sedated  	HEENT: ET tube +  	Pulm: mechanically ventilated via ETT  	CV: S1S2  	Abd: Soft, +BS  	Ext: No LE edema B/L  	Neuro: Sedated  	Skin: Warm and dry    LABS/STUDIES  --------------------------------------------------------------------------------              9.3    15.03 >-----------<  274      [08-28-21 @ 02:56]              29.6     145  |  109  |  64  ----------------------------<  216      [08-28-21 @ 02:56]  4.8   |  22  |  0.78        Ca     7.9     [08-28-21 @ 02:56]      Mg     2.30     [08-28-21 @ 02:56]      Phos  4.0     [08-28-21 @ 02:56]    TPro  5.7  /  Alb  2.3  /  TBili  <0.2  /  DBili  x   /  AST  26  /  ALT  9   /  AlkPhos  107  [08-28-21 @ 02:56]    PT/INR: PT 12.3 , INR 1.07       [08-27-21 @ 13:39]  PTT: 27.8       [08-27-21 @ 13:39]    Uric acid 3.9      [08-28-21 @ 02:56]        [08-28-21 @ 02:56]    Creatinine Trend:  SCr 0.78 [08-28 @ 02:56]  SCr 0.74 [08-27 @ 13:39]  SCr 0.80 [08-27 @ 05:59]  SCr 0.88 [08-26 @ 22:52]  SCr 0.97 [08-26 @ 13:55]    Urinalysis - [08-23-21 @ 11:48]      Color Yellow / Appearance Turbid / SG 1.027 / pH 5.5      Gluc Negative / Ketone Trace  / Bili Negative / Urobili <2 mg/dL       Blood Large / Protein 100 mg/dL / Leuk Est Large / Nitrite Negative      RBC 7 / WBC >50 / Hyaline  / Gran 2 / Sq Epi  / Non Sq Epi  / Bacteria Few    Urine Creatinine 122      [08-23-21 @ 19:59]  Urine Protein 198      [08-23-21 @ 19:59]  Urine Sodium <20      [08-23-21 @ 19:59]  Urine Urea Nitrogen 454.0      [08-23-21 @ 19:59]  Urine Potassium 77.8      [08-23-21 @ 19:59]  Urine Chloride <20      [08-23-21 @ 19:59]  Urine Osmolality 445      [08-23-21 @ 19:59]    HBsAg Nonreact      [08-26-21 @ 10:02]  HCV 0.20, Nonreact      [08-26-21 @ 10:02]  HIV Nonreact      [08-25-21 @ 12:13]

## 2021-08-28 NOTE — PROGRESS NOTE ADULT - ATTENDING COMMENTS
66F with PMH significant for HTN, HLD, recently discovered ovarian mass suspicious for malignancy, L hydroureteronephrosis s/p renal stent, and recent hospitalization to [facility] for acute renal failure (likely obstructive) requiring urgent HD, ascites s/p therapeutic paracentesis, and pleural effusion s/p R thoracentesis showing lymphocytosis but no malignant cells admitted for acute hypoxic respiratory failure with imaging showing bilateral pleural effusion. Cytopathology reveals DLBCL. Sent to MICU for AMS and hypoxic respiratory failure and intubated.    Currently receiving steroid treatment with allopurinol. Left sided Pleurx catheter and new left sided PTX so pleurx catheter is placed on suction with resolution of PTX. Central line placed and currently will receive RCHOP for DLBCL. Labs Q8 to evaluate for tumor lysis syndrome.     Prognosis is guarded.

## 2021-08-28 NOTE — PROVIDER CONTACT NOTE (OTHER) - RECOMMENDATIONS
Dr. Loza notified
notify md, continue to monitor pt
notify md, continue to monitor pt
Continue to Monitor
Notify MD to come assess patient, continue to monitor.
keep rate at 200 until complete
Keep Rituxan rate at 200 ml/hr until finished
Notify MD
Notify MD   EKG
restrain when BP increased
Notify MD
Notify provider for further intervention
Notify provider for further intervention.
all of the calculation discrepancy within 10%

## 2021-08-28 NOTE — PROGRESS NOTE ADULT - SUBJECTIVE AND OBJECTIVE BOX
INTERVAL HPI/OVERNIGHT EVENTS:  - patient intubated  - unable to provide history  - spiked fever overnight    VITAL SIGNS:  T(F): 98.6 (08-28-21 @ 14:27)  HR: 122 (08-28-21 @ 14:30)  BP: 93/61 (08-28-21 @ 14:30)  RR: 19 (08-28-21 @ 14:27)  SpO2: 98% (08-28-21 @ 14:30)  Wt(kg): --    PHYSICAL EXAM:  intubated  abdomen distended and protuberant  plurx tank with serosanguinous fluid      MEDICATIONS  (STANDING):  allopurinol 100 milliGRAM(s) Oral daily  cefepime   IVPB 2000 milliGRAM(s) IV Intermittent every 12 hours  chlorhexidine 0.12% Liquid 15 milliLiter(s) Oral Mucosa every 12 hours  chlorhexidine 4% Liquid 1 Application(s) Topical <User Schedule>  dexAMETHasone  IVPB 40 milliGRAM(s) IV Intermittent daily  dextrose 40% Gel 15 Gram(s) Oral once  dextrose 5%. 1000 milliLiter(s) (100 mL/Hr) IV Continuous <Continuous>  dextrose 5%. 1000 milliLiter(s) (50 mL/Hr) IV Continuous <Continuous>  dextrose 50% Injectable 25 Gram(s) IV Push once  dextrose 50% Injectable 12.5 Gram(s) IV Push once  dextrose 50% Injectable 25 Gram(s) IV Push once  fentaNYL   Infusion. 2 MICROgram(s)/kG/Hr (12.8 mL/Hr) IV Continuous <Continuous>  glucagon  Injectable 1 milliGRAM(s) IntraMuscular once  heparin   Injectable 5000 Unit(s) SubCutaneous every 8 hours  insulin lispro (ADMELOG) corrective regimen sliding scale   SubCutaneous every 6 hours  insulin NPH human recombinant 6 Unit(s) SubCutaneous every 6 hours  norepinephrine Infusion 0.05 MICROgram(s)/kG/Min (2.93 mL/Hr) IV Continuous <Continuous>  petrolatum Ophthalmic Ointment 1 Application(s) Both EYES two times a day  polyethylene glycol 3350 17 Gram(s) Oral daily  propofol Infusion 50 MICROgram(s)/kG/Min (19.2 mL/Hr) IV Continuous <Continuous>  senna Syrup 15 milliLiter(s) Oral at bedtime  vancomycin  IVPB 1000 milliGRAM(s) IV Intermittent every 12 hours    MEDICATIONS  (PRN):  hydrocortisone sodium succinate Injectable 100 milliGRAM(s) IV Push once PRN PRN Chemotherapy Reaction  meperidine     Injectable 25 milliGRAM(s) IV Push once PRN PRN Chemotherapy Reaction (Rigors)  metoclopramide Injectable 10 milliGRAM(s) IV Push every 6 hours PRN Nausea      Allergies    penicillins (Rash)    Intolerances        LABS:                        9.3    15.03 )-----------( 274      ( 28 Aug 2021 02:56 )             29.6     08-28    145  |  109<H>  |  64<H>  ----------------------------<  216<H>  4.8   |  22  |  0.78    Ca    7.9<L>      28 Aug 2021 02:56  Phos  4.0     08-28  Mg     2.30     08-28    TPro  5.7<L>  /  Alb  2.3<L>  /  TBili  <0.2  /  DBili  x   /  AST  26  /  ALT  9   /  AlkPhos  107  08-28    PT/INR - ( 27 Aug 2021 13:39 )   PT: 12.3 sec;   INR: 1.07 ratio         PTT - ( 27 Aug 2021 13:39 )  PTT:27.8 sec      RADIOLOGY & ADDITIONAL TESTS:  Studies reviewed.    ASSESSMENT & PLAN:

## 2021-08-28 NOTE — PROGRESS NOTE ADULT - ASSESSMENT
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 Her course has been complicated by intermittent fevers and she was started on empiric cefepime, although blood cultures have been negative. She has had labile blood pressures, at times requiring pressor support.  Her course has also been complicated by respiratory failure requiring intubation and failed a spontaneous breathing trial this morning.    She started on Rituximab this morning to initiate R-CHOP chemotherapy.    During the day we were called that she become more hypotensive and needed increased vasopressor support. We considered a differential diagnosis including infusion reaction to rituximab but given the lack of other infusion reaction sequelae and given that her blood pressure has been labile over the last several days, and given a short trial of stopping rituximab entirely did not recover blood pressure, I suspect other factors are more likely at fault. Therefore we advised to continue the rituximab, at a slow rate, and she appears to be tolerating this well as the blood pressure is stable. We also considered on the differential an infectious etiology given the intermittent fevers, and note an enteritis identified on CT scan this admission. We note that the primary team has added vancomycin empirically to cover for infection.      We will re-evaluate tomorrow. If the patient continues having signs of hemodynamic instability, we will likely postpone CHOP therapy tomorrow. Appreciate continued monitoring for TLS given high risk (prior PURVI); nephrology is following.    Aba Baez MD PhD  Oncology Attending

## 2021-08-28 NOTE — PROGRESS NOTE ADULT - NUTRITIONAL ASSESSMENT
This patient has been assessed with a concern for Malnutrition and has been determined to have a diagnosis/diagnoses of Moderate protein-calorie malnutrition.    This patient is being managed with:   Diet NPO with Tube Feed-  Tube Feeding Modality: Orogastric  Nepro with Carb Steady (NEPRORTH)  Total Volume for 24 Hours (mL): 720  Continuous  Starting Tube Feed Rate {mL per Hour}: 30  Until Goal Tube Feed Rate (mL per Hour): 30  Tube Feed Duration (in Hours): 24  Tube Feed Start Time: 14:30  No Carb Prosource TF     Qty per Day:  2  Entered: Aug 26 2021  2:16PM

## 2021-08-29 NOTE — PROGRESS NOTE ADULT - SUBJECTIVE AND OBJECTIVE BOX
INTERVAL HPI/OVERNIGHT EVENTS:    SUBJECTIVE: Patient seen and examined at bedside. Patient underwent       VITAL SIGNS:  ICU Vital Signs Last 24 Hrs  T(C): 37.7 (29 Aug 2021 04:00), Max: 38.9 (28 Aug 2021 20:00)  T(F): 99.8 (29 Aug 2021 04:00), Max: 102 (28 Aug 2021 20:00)  HR: 88 (29 Aug 2021 06:00) (58 - 142)  BP: 99/67 (29 Aug 2021 06:00) (75/48 - 152/76)  BP(mean): 75 (29 Aug 2021 06:00) (55 - 95)  ABP: --  ABP(mean): --  RR: 16 (29 Aug 2021 06:00) (16 - 19)  SpO2: 96% (29 Aug 2021 06:00) (94% - 100%)    Mode: AC/ CMV (Assist Control/ Continuous Mandatory Ventilation), RR (machine): 16, TV (machine): 380, FiO2: 40, PEEP: 5, ITime: 0.94, MAP: 9, PIP: 28  Plateau pressure:   P/F ratio:      @ 07:01  -   @ 07:00  --------------------------------------------------------  IN: 3924.4 mL / OUT: 630 mL / NET: 3294.4 mL      CAPILLARY BLOOD GLUCOSE      POCT Blood Glucose.: 160 mg/dL (29 Aug 2021 06:57)    ECG:    PHYSICAL EXAM:    General:   HEENT:   Neck:   Respiratory:   Cardiovascular:   Abdomen:   Extremities:  Neurological:    MEDICATIONS:  MEDICATIONS  (STANDING):  allopurinol 100 milliGRAM(s) Oral daily  cefepime   IVPB 2000 milliGRAM(s) IV Intermittent every 12 hours  chlorhexidine 0.12% Liquid 15 milliLiter(s) Oral Mucosa every 12 hours  chlorhexidine 4% Liquid 1 Application(s) Topical <User Schedule>  cyclophosphamide IVPB (eMAR) 1240 milliGRAM(s) IV Intermittent once  dexAMETHasone  IVPB 40 milliGRAM(s) IV Intermittent daily  dextrose 40% Gel 15 Gram(s) Oral once  dextrose 5%. 1000 milliLiter(s) (100 mL/Hr) IV Continuous <Continuous>  dextrose 5%. 1000 milliLiter(s) (50 mL/Hr) IV Continuous <Continuous>  dextrose 50% Injectable 25 Gram(s) IV Push once  dextrose 50% Injectable 12.5 Gram(s) IV Push once  dextrose 50% Injectable 25 Gram(s) IV Push once  DOXOrubicin IVPB (eMAR) 82 milliGRAM(s) IV Intermittent once  fentaNYL   Infusion. 2 MICROgram(s)/kG/Hr (12.8 mL/Hr) IV Continuous <Continuous>  fosaprepitant IVPB 150 milliGRAM(s) IV Intermittent once  glucagon  Injectable 1 milliGRAM(s) IntraMuscular once  heparin   Injectable 5000 Unit(s) SubCutaneous every 8 hours  insulin lispro (ADMELOG) corrective regimen sliding scale   SubCutaneous every 6 hours  insulin NPH human recombinant 6 Unit(s) SubCutaneous every 6 hours  lactated ringers. 1000 milliLiter(s) (150 mL/Hr) IV Continuous <Continuous>  methylnaltrexone Injectable 12 milliGRAM(s) SubCutaneous once  norepinephrine Infusion 0.05 MICROgram(s)/kG/Min (2.93 mL/Hr) IV Continuous <Continuous>  petrolatum Ophthalmic Ointment 1 Application(s) Both EYES two times a day  polyethylene glycol 3350 17 Gram(s) Oral daily  propofol Infusion 50 MICROgram(s)/kG/Min (19.2 mL/Hr) IV Continuous <Continuous>  senna Syrup 15 milliLiter(s) Oral at bedtime  vancomycin  IVPB 1000 milliGRAM(s) IV Intermittent every 12 hours  vinCRIStine IVPB (eMAR) 2 milliGRAM(s) IV Intermittent once    MEDICATIONS  (PRN):  bisacodyl Suppository 10 milliGRAM(s) Rectal daily PRN Constipation  hydrocortisone sodium succinate Injectable 100 milliGRAM(s) IV Push once PRN PRN Chemotherapy Reaction  meperidine     Injectable 25 milliGRAM(s) IV Push once PRN PRN Chemotherapy Reaction (Rigors)  metoclopramide Injectable 10 milliGRAM(s) IV Push every 6 hours PRN Nausea  ondansetron Injectable 8 milliGRAM(s) IV Push every 8 hours PRN Nausea      ALLERGIES:  Allergies    penicillins (Rash)    Intolerances        LABS:                        11.0   12.79 )-----------( 249      ( 29 Aug 2021 01:28 )             35.1     08-29    139  |  107  |  76<H>  ----------------------------<  156<H>  5.5<H>   |  16<L>  |  0.89    Ca    8.1<L>      29 Aug 2021 02:26  Phos  5.7       Mg     2.20         TPro  5.4<L>  /  Alb  2.1<L>  /  TBili  <0.2  /  DBili  x   /  AST  34<H>  /  ALT  10  /  AlkPhos  89      PT/INR - ( 27 Aug 2021 13:39 )   PT: 12.3 sec;   INR: 1.07 ratio         PTT - ( 27 Aug 2021 13:39 )  PTT:27.8 sec  Urinalysis Basic - ( 28 Aug 2021 17:00 )    Color: Yellow / Appearance: Turbid / S.034 / pH: x  Gluc: x / Ketone: Negative  / Bili: Negative / Urobili: <2 mg/dL   Blood: x / Protein: 100 mg/dL / Nitrite: Negative   Leuk Esterase: Large / RBC: 6-10 /HPF / WBC 15-20 /HPF   Sq Epi: x / Non Sq Epi: Occasional / Bacteria: Moderate        RADIOLOGY & ADDITIONAL TESTS: Reviewed.           INTERVAL HPI/OVERNIGHT EVENTS:    SUBJECTIVE: Patient seen and examined at bedside. Patient underwent first dose of Rituxan yesterday.       VITAL SIGNS:  ICU Vital Signs Last 24 Hrs  T(C): 37.7 (29 Aug 2021 04:00), Max: 38.9 (28 Aug 2021 20:00)  T(F): 99.8 (29 Aug 2021 04:00), Max: 102 (28 Aug 2021 20:00)  HR: 88 (29 Aug 2021 06:00) (58 - 142)  BP: 99/67 (29 Aug 2021 06:00) (75/48 - 152/76)  BP(mean): 75 (29 Aug 2021 06:00) (55 - 95)  ABP: --  ABP(mean): --  RR: 16 (29 Aug 2021 06:00) (16 - 19)  SpO2: 96% (29 Aug 2021 06:00) (94% - 100%)    Mode: AC/ CMV (Assist Control/ Continuous Mandatory Ventilation), RR (machine): 16, TV (machine): 380, FiO2: 40, PEEP: 5, ITime: 0.94, MAP: 9, PIP: 28  Plateau pressure:   P/F ratio:      @ 07:01  -  08- @ 07:00  --------------------------------------------------------  IN: 3924.4 mL / OUT: 630 mL / NET: 3294.4 mL      CAPILLARY BLOOD GLUCOSE      POCT Blood Glucose.: 160 mg/dL (29 Aug 2021 06:57)    ECG:    PHYSICAL EXAM:    General:   HEENT:   Neck:   Respiratory:   Cardiovascular:   Abdomen:   Extremities:  Neurological:    MEDICATIONS:  MEDICATIONS  (STANDING):  allopurinol 100 milliGRAM(s) Oral daily  cefepime   IVPB 2000 milliGRAM(s) IV Intermittent every 12 hours  chlorhexidine 0.12% Liquid 15 milliLiter(s) Oral Mucosa every 12 hours  chlorhexidine 4% Liquid 1 Application(s) Topical <User Schedule>  cyclophosphamide IVPB (eMAR) 1240 milliGRAM(s) IV Intermittent once  dexAMETHasone  IVPB 40 milliGRAM(s) IV Intermittent daily  dextrose 40% Gel 15 Gram(s) Oral once  dextrose 5%. 1000 milliLiter(s) (100 mL/Hr) IV Continuous <Continuous>  dextrose 5%. 1000 milliLiter(s) (50 mL/Hr) IV Continuous <Continuous>  dextrose 50% Injectable 25 Gram(s) IV Push once  dextrose 50% Injectable 12.5 Gram(s) IV Push once  dextrose 50% Injectable 25 Gram(s) IV Push once  DOXOrubicin IVPB (eMAR) 82 milliGRAM(s) IV Intermittent once  fentaNYL   Infusion. 2 MICROgram(s)/kG/Hr (12.8 mL/Hr) IV Continuous <Continuous>  fosaprepitant IVPB 150 milliGRAM(s) IV Intermittent once  glucagon  Injectable 1 milliGRAM(s) IntraMuscular once  heparin   Injectable 5000 Unit(s) SubCutaneous every 8 hours  insulin lispro (ADMELOG) corrective regimen sliding scale   SubCutaneous every 6 hours  insulin NPH human recombinant 6 Unit(s) SubCutaneous every 6 hours  lactated ringers. 1000 milliLiter(s) (150 mL/Hr) IV Continuous <Continuous>  methylnaltrexone Injectable 12 milliGRAM(s) SubCutaneous once  norepinephrine Infusion 0.05 MICROgram(s)/kG/Min (2.93 mL/Hr) IV Continuous <Continuous>  petrolatum Ophthalmic Ointment 1 Application(s) Both EYES two times a day  polyethylene glycol 3350 17 Gram(s) Oral daily  propofol Infusion 50 MICROgram(s)/kG/Min (19.2 mL/Hr) IV Continuous <Continuous>  senna Syrup 15 milliLiter(s) Oral at bedtime  vancomycin  IVPB 1000 milliGRAM(s) IV Intermittent every 12 hours  vinCRIStine IVPB (eMAR) 2 milliGRAM(s) IV Intermittent once    MEDICATIONS  (PRN):  bisacodyl Suppository 10 milliGRAM(s) Rectal daily PRN Constipation  hydrocortisone sodium succinate Injectable 100 milliGRAM(s) IV Push once PRN PRN Chemotherapy Reaction  meperidine     Injectable 25 milliGRAM(s) IV Push once PRN PRN Chemotherapy Reaction (Rigors)  metoclopramide Injectable 10 milliGRAM(s) IV Push every 6 hours PRN Nausea  ondansetron Injectable 8 milliGRAM(s) IV Push every 8 hours PRN Nausea      ALLERGIES:  Allergies    penicillins (Rash)    Intolerances        LABS:                        11.0   12.79 )-----------( 249      ( 29 Aug 2021 01:28 )             35.1         139  |  107  |  76<H>  ----------------------------<  156<H>  5.5<H>   |  16<L>  |  0.89    Ca    8.1<L>      29 Aug 2021 02:26  Phos  5.7       Mg     2.20         TPro  5.4<L>  /  Alb  2.1<L>  /  TBili  <0.2  /  DBili  x   /  AST  34<H>  /  ALT  10  /  AlkPhos  89      PT/INR - ( 27 Aug 2021 13:39 )   PT: 12.3 sec;   INR: 1.07 ratio         PTT - ( 27 Aug 2021 13:39 )  PTT:27.8 sec  Urinalysis Basic - ( 28 Aug 2021 17:00 )    Color: Yellow / Appearance: Turbid / S.034 / pH: x  Gluc: x / Ketone: Negative  / Bili: Negative / Urobili: <2 mg/dL   Blood: x / Protein: 100 mg/dL / Nitrite: Negative   Leuk Esterase: Large / RBC: 6-10 /HPF / WBC 15-20 /HPF   Sq Epi: x / Non Sq Epi: Occasional / Bacteria: Moderate        RADIOLOGY & ADDITIONAL TESTS: Reviewed.           INTERVAL HPI/OVERNIGHT EVENTS:    SUBJECTIVE: Patient seen and examined at bedside. Patient underwent first dose of Rituxan yesterday. Patient febrile 102 at 8pm. TLS labs showing uptrending potassium to 5.5 for which insulin and D50 were given. Uric acid 4.8 wnl. Patient had vomiting starting at 6:30 am, received methylnaltrexone, tube feeds were stopped, NG tube was put to suction, had 400 cc of tube feed output.  Patient did not had BM over past two weeks with       VITAL SIGNS:  ICU Vital Signs Last 24 Hrs  T(C): 37.7 (29 Aug 2021 04:00), Max: 38.9 (28 Aug 2021 20:00)  T(F): 99.8 (29 Aug 2021 04:00), Max: 102 (28 Aug 2021 20:00)  HR: 88 (29 Aug 2021 06:00) (58 - 142)  BP: 99/67 (29 Aug 2021 06:00) (75/48 - 152/76)  BP(mean): 75 (29 Aug 2021 06:00) (55 - 95)  ABP: --  ABP(mean): --  RR: 16 (29 Aug 2021 06:00) (16 - 19)  SpO2: 96% (29 Aug 2021 06:00) (94% - 100%)    Mode: AC/ CMV (Assist Control/ Continuous Mandatory Ventilation), RR (machine): 16, TV (machine): 380, FiO2: 40, PEEP: 5, ITime: 0.94, MAP: 9, PIP: 28  Plateau pressure:   P/F ratio:      @ 07:01  -  - @ 07:00  --------------------------------------------------------  IN: 3924.4 mL / OUT: 630 mL / NET: 3294.4 mL      CAPILLARY BLOOD GLUCOSE      POCT Blood Glucose.: 160 mg/dL (29 Aug 2021 06:57)    ECG:    PHYSICAL EXAM:    General:   HEENT:   Neck:   Respiratory:   Cardiovascular:   Abdomen:   Extremities:  Neurological:    MEDICATIONS:  MEDICATIONS  (STANDING):  allopurinol 100 milliGRAM(s) Oral daily  cefepime   IVPB 2000 milliGRAM(s) IV Intermittent every 12 hours  chlorhexidine 0.12% Liquid 15 milliLiter(s) Oral Mucosa every 12 hours  chlorhexidine 4% Liquid 1 Application(s) Topical <User Schedule>  cyclophosphamide IVPB (eMAR) 1240 milliGRAM(s) IV Intermittent once  dexAMETHasone  IVPB 40 milliGRAM(s) IV Intermittent daily  dextrose 40% Gel 15 Gram(s) Oral once  dextrose 5%. 1000 milliLiter(s) (100 mL/Hr) IV Continuous <Continuous>  dextrose 5%. 1000 milliLiter(s) (50 mL/Hr) IV Continuous <Continuous>  dextrose 50% Injectable 25 Gram(s) IV Push once  dextrose 50% Injectable 12.5 Gram(s) IV Push once  dextrose 50% Injectable 25 Gram(s) IV Push once  DOXOrubicin IVPB (eMAR) 82 milliGRAM(s) IV Intermittent once  fentaNYL   Infusion. 2 MICROgram(s)/kG/Hr (12.8 mL/Hr) IV Continuous <Continuous>  fosaprepitant IVPB 150 milliGRAM(s) IV Intermittent once  glucagon  Injectable 1 milliGRAM(s) IntraMuscular once  heparin   Injectable 5000 Unit(s) SubCutaneous every 8 hours  insulin lispro (ADMELOG) corrective regimen sliding scale   SubCutaneous every 6 hours  insulin NPH human recombinant 6 Unit(s) SubCutaneous every 6 hours  lactated ringers. 1000 milliLiter(s) (150 mL/Hr) IV Continuous <Continuous>  methylnaltrexone Injectable 12 milliGRAM(s) SubCutaneous once  norepinephrine Infusion 0.05 MICROgram(s)/kG/Min (2.93 mL/Hr) IV Continuous <Continuous>  petrolatum Ophthalmic Ointment 1 Application(s) Both EYES two times a day  polyethylene glycol 3350 17 Gram(s) Oral daily  propofol Infusion 50 MICROgram(s)/kG/Min (19.2 mL/Hr) IV Continuous <Continuous>  senna Syrup 15 milliLiter(s) Oral at bedtime  vancomycin  IVPB 1000 milliGRAM(s) IV Intermittent every 12 hours  vinCRIStine IVPB (eMAR) 2 milliGRAM(s) IV Intermittent once    MEDICATIONS  (PRN):  bisacodyl Suppository 10 milliGRAM(s) Rectal daily PRN Constipation  hydrocortisone sodium succinate Injectable 100 milliGRAM(s) IV Push once PRN PRN Chemotherapy Reaction  meperidine     Injectable 25 milliGRAM(s) IV Push once PRN PRN Chemotherapy Reaction (Rigors)  metoclopramide Injectable 10 milliGRAM(s) IV Push every 6 hours PRN Nausea  ondansetron Injectable 8 milliGRAM(s) IV Push every 8 hours PRN Nausea      ALLERGIES:  Allergies    penicillins (Rash)    Intolerances        LABS:                        11.0   12.79 )-----------( 249      ( 29 Aug 2021 01:28 )             35.1         139  |  107  |  76<H>  ----------------------------<  156<H>  5.5<H>   |  16<L>  |  0.89    Ca    8.1<L>      29 Aug 2021 02:26  Phos  5.7       Mg     2.20         TPro  5.4<L>  /  Alb  2.1<L>  /  TBili  <0.2  /  DBili  x   /  AST  34<H>  /  ALT  10  /  AlkPhos  89      PT/INR - ( 27 Aug 2021 13:39 )   PT: 12.3 sec;   INR: 1.07 ratio         PTT - ( 27 Aug 2021 13:39 )  PTT:27.8 sec  Urinalysis Basic - ( 28 Aug 2021 17:00 )    Color: Yellow / Appearance: Turbid / S.034 / pH: x  Gluc: x / Ketone: Negative  / Bili: Negative / Urobili: <2 mg/dL   Blood: x / Protein: 100 mg/dL / Nitrite: Negative   Leuk Esterase: Large / RBC: 6-10 /HPF / WBC 15-20 /HPF   Sq Epi: x / Non Sq Epi: Occasional / Bacteria: Moderate        RADIOLOGY & ADDITIONAL TESTS: Reviewed.           INTERVAL HPI/OVERNIGHT EVENTS:    SUBJECTIVE: Patient seen and examined at bedside. Patient underwent first dose of Rituxan yesterday. Patient febrile 102 at 8pm. TLS labs showing uptrending potassium to 5.5 for which insulin and D50 were given. Uric acid 4.8 wnl. Patient had vomiting starting at 6:30 am, received methylnaltrexone, tube feeds were stopped, NG tube was put to suction, had 400 cc of tube feed output.  Patient did not had BM over past two weeks and therefore was ordered for senna, miralax, lactulose, and dulcolax suppository.       VITAL SIGNS:  ICU Vital Signs Last 24 Hrs  T(C): 37.7 (29 Aug 2021 04:00), Max: 38.9 (28 Aug 2021 20:00)  T(F): 99.8 (29 Aug 2021 04:00), Max: 102 (28 Aug 2021 20:00)  HR: 88 (29 Aug 2021 06:00) (58 - 142)  BP: 99/67 (29 Aug 2021 06:00) (75/48 - 152/76)  BP(mean): 75 (29 Aug 2021 06:00) (55 - 95)  ABP: --  ABP(mean): --  RR: 16 (29 Aug 2021 06:00) (16 - 19)  SpO2: 96% (29 Aug 2021 06:00) (94% - 100%)    Mode: AC/ CMV (Assist Control/ Continuous Mandatory Ventilation), RR (machine): 16, TV (machine): 380, FiO2: 40, PEEP: 5, ITime: 0.94, MAP: 9, PIP: 28  Plateau pressure:   P/F ratio:      @ 07:01  -  - @ 07:00  --------------------------------------------------------  IN: 3924.4 mL / OUT: 630 mL / NET: 3294.4 mL      CAPILLARY BLOOD GLUCOSE      POCT Blood Glucose.: 160 mg/dL (29 Aug 2021 06:57)    PHYSICAL EXAM:    General: intubated, sedated  Respiratory: L pleurx in place, draining 100 cc overnight   Cardiovascular: S1S2 present  Abdomen: distended, tympanic, appears to be edematous bowel on POCUS, no significant ascites appreciated.   Extremities: LE swelling, edematous. Cold extremities, RUE colder than left   Neurological: sedated     MEDICATIONS:  MEDICATIONS  (STANDING):  allopurinol 100 milliGRAM(s) Oral daily  cefepime   IVPB 2000 milliGRAM(s) IV Intermittent every 12 hours  chlorhexidine 0.12% Liquid 15 milliLiter(s) Oral Mucosa every 12 hours  chlorhexidine 4% Liquid 1 Application(s) Topical <User Schedule>  cyclophosphamide IVPB (eMAR) 1240 milliGRAM(s) IV Intermittent once  dexAMETHasone  IVPB 40 milliGRAM(s) IV Intermittent daily  dextrose 40% Gel 15 Gram(s) Oral once  dextrose 5%. 1000 milliLiter(s) (100 mL/Hr) IV Continuous <Continuous>  dextrose 5%. 1000 milliLiter(s) (50 mL/Hr) IV Continuous <Continuous>  dextrose 50% Injectable 25 Gram(s) IV Push once  dextrose 50% Injectable 12.5 Gram(s) IV Push once  dextrose 50% Injectable 25 Gram(s) IV Push once  DOXOrubicin IVPB (eMAR) 82 milliGRAM(s) IV Intermittent once  fentaNYL   Infusion. 2 MICROgram(s)/kG/Hr (12.8 mL/Hr) IV Continuous <Continuous>  fosaprepitant IVPB 150 milliGRAM(s) IV Intermittent once  glucagon  Injectable 1 milliGRAM(s) IntraMuscular once  heparin   Injectable 5000 Unit(s) SubCutaneous every 8 hours  insulin lispro (ADMELOG) corrective regimen sliding scale   SubCutaneous every 6 hours  insulin NPH human recombinant 6 Unit(s) SubCutaneous every 6 hours  lactated ringers. 1000 milliLiter(s) (150 mL/Hr) IV Continuous <Continuous>  methylnaltrexone Injectable 12 milliGRAM(s) SubCutaneous once  norepinephrine Infusion 0.05 MICROgram(s)/kG/Min (2.93 mL/Hr) IV Continuous <Continuous>  petrolatum Ophthalmic Ointment 1 Application(s) Both EYES two times a day  polyethylene glycol 3350 17 Gram(s) Oral daily  propofol Infusion 50 MICROgram(s)/kG/Min (19.2 mL/Hr) IV Continuous <Continuous>  senna Syrup 15 milliLiter(s) Oral at bedtime  vancomycin  IVPB 1000 milliGRAM(s) IV Intermittent every 12 hours  vinCRIStine IVPB (eMAR) 2 milliGRAM(s) IV Intermittent once    MEDICATIONS  (PRN):  bisacodyl Suppository 10 milliGRAM(s) Rectal daily PRN Constipation  hydrocortisone sodium succinate Injectable 100 milliGRAM(s) IV Push once PRN PRN Chemotherapy Reaction  meperidine     Injectable 25 milliGRAM(s) IV Push once PRN PRN Chemotherapy Reaction (Rigors)  metoclopramide Injectable 10 milliGRAM(s) IV Push every 6 hours PRN Nausea  ondansetron Injectable 8 milliGRAM(s) IV Push every 8 hours PRN Nausea      ALLERGIES:  Allergies    penicillins (Rash)    Intolerances        LABS:                        11.0   12.79 )-----------( 249      ( 29 Aug 2021 01:28 )             35.1         139  |  107  |  76<H>  ----------------------------<  156<H>  5.5<H>   |  16<L>  |  0.89    Ca    8.1<L>      29 Aug 2021 02:26  Phos  5.7       Mg     2.20         TPro  5.4<L>  /  Alb  2.1<L>  /  TBili  <0.2  /  DBili  x   /  AST  34<H>  /  ALT  10  /  AlkPhos  89      PT/INR - ( 27 Aug 2021 13:39 )   PT: 12.3 sec;   INR: 1.07 ratio         PTT - ( 27 Aug 2021 13:39 )  PTT:27.8 sec  Urinalysis Basic - ( 28 Aug 2021 17:00 )    Color: Yellow / Appearance: Turbid / S.034 / pH: x  Gluc: x / Ketone: Negative  / Bili: Negative / Urobili: <2 mg/dL   Blood: x / Protein: 100 mg/dL / Nitrite: Negative   Leuk Esterase: Large / RBC: 6-10 /HPF / WBC 15-20 /HPF   Sq Epi: x / Non Sq Epi: Occasional / Bacteria: Moderate        RADIOLOGY & ADDITIONAL TESTS: Reviewed.

## 2021-08-29 NOTE — PROGRESS NOTE ADULT - ATTENDING COMMENTS
given hyperkalemia and hyperphosphatemia recommend using intravascular expansion and loop diuretics to promote tubular flow can also consider adding a phosphorus binder. plan discussed with team

## 2021-08-29 NOTE — PROGRESS NOTE ADULT - ASSESSMENT
Ms. Stafford is a 66-year-old woman with PMH significant for HTN, HLD, L hydroureteronephrosis s/p renal stent on 7/15, who presents with volume overload 2/2 high grade B-cell lymphoma. S/p thoracentesis 8/13, paracentesis and excisional biopsy of left inguinal lymph node on 8/20. Accepted to MICU for acute hypoxic/hypercapneic respiratory failure now s/p intubation and L pleurex catheter placement, pending chemotherapy.    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  - Currently sedated and intubated with propofol and fentanyl  - CT head with no intracranial pathology  - S/p LP with flow cytometry and cytopathology to evaluate possible lymphomatous meningitis, pending results  - Will obtain MRI per heme recommendations to further evaluate CNS involvement unless patient's neurological function when mental status is evaluated off of sedation    #Cardiovascular  - On pressor support with phenylephrine  - Echo 8/3 showing concentric left ventricular remodeling, hyperdynamic left ventricle, calcified trileaflet aortic valve with normal opening, EF 56%  - proBNP increased from 34 on 8/11 to 730 today  - POCUS showing adequate cardiac output    #Respiratory  - Hypoxic/hypercapneic respiratory failure likely secondary to malignant pleural effusions s/p thoracentesis on 8/13 with re-accumulation of pleural effusions  - Intubated 8/24 with vent settings of 16 | 380 | 5 | 40  - POCUS by pulmonology showing moderate right sided pleural effusion without diaphragmatic flattening  - S/p second thoracentesis 8/25, repeat POCUS on 8/26 showing bilateral effusions, s/p L pleurex   - Continue to monitor, may ease sedation and attempt spontaneous breathing trial    #GI/Nutrition  - S/p paracentesis 8/20, still remains distended  - Patient tolerating tube feeds    #/Renal  - PURVI, resolved  - Nephrology on board, recommend holding LASIX & other nephrotoxic medications.   - Nephrostomy tubes considered however per IR recommendations not appropriate at this time as patient's Cr is downtrending  - B/L hydronephrosis stable on CT a/p    #Skin  - No sacral decubiti    #ID  - On cefepime for empiric treatment of febrile illness   - Patient febrile overnight, blood cultures NGTD and urine cultures negative  - Leukocytosis stable    #Endocrine  - nph q6h, SSI while on steroids  - will readjust as necessary     #Hematologic/DVT ppx  - B-cell lymphoma, per heme preliminary results of surgical pathology consistent with B-cell lymphoma but pending further evaluation for treatment choice  - TLS labs q8  - Allopurinol for TLS prophylaxis, received 1 dose rasburicase 8/23  - Heme/Onc recommending starting steroids with dexamethasone, currently on 40 mg dose daily to end 8/29   - Plan to begin chemotherapy (R-CHOP) today   - DVT prophylaxis with heparin subQ    #Ethics  - Patient is FULL CODE per palliative care discussion with patient and her sons (Yuan and Jose). Fill-in proxy is Jose Camargo: [phone] Ms. Stafford is a 66-year-old woman with PMH significant for HTN, HLD, L hydroureteronephrosis s/p renal stent on 7/15, who presents with volume overload 2/2 high grade B-cell lymphoma. S/p thoracentesis 8/13, paracentesis and excisional biopsy of left inguinal lymph node on 8/20. Accepted to MICU for acute hypoxic/hypercapneic respiratory failure now s/p intubation and L pleurex catheter placement, pending chemotherapy.    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  - Currently sedated and intubated with propofol and fentanyl  - CT head with no intracranial pathology  - S/p LP with flow cytometry and cytopathology to evaluate possible lymphomatous meningitis, pending results  - Will obtain MRI per heme recommendations to further evaluate CNS involvement unless patient's neurological function when mental status is evaluated off of sedation    #Cardiovascular  - On pressor support with phenylephrine  - Echo 8/3 showing concentric left ventricular remodeling, hyperdynamic left ventricle, calcified trileaflet aortic valve with normal opening, EF 56%  - proBNP increased from 34 on 8/11 to 730 today  - POCUS showing adequate cardiac output    #Respiratory  - Hypoxic/hypercapneic respiratory failure likely secondary to malignant pleural effusions s/p thoracentesis on 8/13 with re-accumulation of pleural effusions  - Intubated 8/24 with vent settings of 16 | 380 | 5 | 40  - POCUS by pulmonology showing moderate right sided pleural effusion without diaphragmatic flattening  - S/p second thoracentesis 8/25, repeat POCUS on 8/26 showing bilateral effusions, s/p L pleurex   - Continue to monitor, may ease sedation and attempt spontaneous breathing trial    #GI/Nutrition  - S/p paracentesis 8/20, still remains distended  - Patient tolerating tube feeds  - constipation: bisacodyl suppository, senna, miralax, s/p 1 dose lactulose  - AXR read pending- will give Reglan if no ileus   - if no BM post 3 pm will try fecal disimpaction     #/Renal  - PURVI, resolved  - Nephrology on board, recommend holding LASIX & other nephrotoxic medications.   - Nephrostomy tubes considered however per IR recommendations not appropriate at this time as patient's Cr is downtrending  - B/L hydronephrosis stable on CT a/p  - monitor TLS labs q6h,   - NS @150 cc/h for kaliuresis     #Skin  - No sacral decubitis    #ID  - Was On cefepime for empiric treatment of febrile illness   - will change to Vanco and Zosyn to include anaerobic coverage   - Patient febrile   - U/A grossly positive, ordered urine cx   - blood cx 9/28/21 pending     #Endocrine  - nph q6h, SSI while on steroids  - will readjust as necessary     #Hematologic/DVT ppx  - B-cell lymphoma, per heme preliminary results of surgical pathology consistent with B-cell lymphoma but pending further evaluation for treatment choice  - TLS labs q8  - Allopurinol for TLS prophylaxis, received 1 dose rasburicase 8/23  - Heme/Onc recommending starting steroids with dexamethasone, s/p 40 mg dose daily to end 8/29   - s/p Rituxan 8/28/21  - Will hold CHOP for today until cx results   - DVT prophylaxis with heparin subQ    #Ethics  - Patient is FULL CODE per palliative care discussion with patient and her sons (Yuan and Jose). Fill-in proxy is Jose Camargo: [phone]

## 2021-08-29 NOTE — PROGRESS NOTE ADULT - ASSESSMENT
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 Her course has been complicated by intermittent fevers and she was started on empiric cefepime. She has had labile blood pressures, at times requiring pressor support.  Her course has also been complicated by respiratory failure requiring intubation and failed a spontaneous breathing trial this morning.    Yesterday (8/28), she started on Rituximab, and had been slated to start CHOP today (8/29).    Overnight she spiked a high fever to 38.9 at 8PM and was febrile to 38.6 when I rounded on her at 9AM. Her pressor requirement today is modest but higher (0.25 mcg/kg/min) than when I saw her yesterday (0.12 mcg/kg/min). She has a modestly elevated lactate when checked today (2.5). Her fevers and hypotension may related to her aggressive lymphoma and her sedation, respectively, but I feel it is important to rule out an infectious source (sepsis) before starting combination chemotherapy. I would like to gather more data to evaluate a source of infection; these include: 1) waiting until the blood cultures that were drawn yesterday result (purportedly will result 8/30), 2) obtaining a urine culture, noting that yesterday's carnes U/A was grossly positive with bacteria but urine culture was not sent so I am unable to be reassured that this is more likely from contamination or colonization. Lastly, she has an enteritis, as noted on CT Abd report from 8/18 that has not changed in appearance on CT Abd 8/22, which may be due to peritoneal carcinomatosis or interstitial edema, but could reflect (and predispose to) infection. Therefore, I will postpone chemotherapy planned today, and the primary team can consider resuming chemotherapy tomorrow based on the data that will result from the evaluation above. This plan was discussed with the chemotherapy nurse and pharmacist, who agree with the plan.     Aba Baez MD PhD  Oncology Attending

## 2021-08-29 NOTE — PROGRESS NOTE ADULT - ATTENDING COMMENTS
66F with PMH significant for HTN, HLD, recently discovered ovarian mass suspicious for malignancy, L hydroureteronephrosis s/p renal stent, and recent hospitalization to [facility] for acute renal failure (likely obstructive) requiring urgent HD, ascites s/p therapeutic paracentesis, and pleural effusion s/p R thoracentesis showing lymphocytosis but no malignant cells admitted for acute hypoxic respiratory failure with imaging showing bilateral pleural effusion. Cytopathology reveals DLBCL. Sent to MICU for AMS and hypoxic respiratory failure and intubated.    Currently receiving allopurinol but unsure if she is actually absorbing it. She has high residuals and has not had a BM for several weeks. Abd plate not suggestive of blockage. Will give aggressive bowel regimen and Reglan, If this does not help, may need manual disimpaction. Left sided Pleurx catheter and new left sided PTX so pleurx catheter is placed on suction with resolution of PTX. Central line placed and received Rituximab on 8/28. Was scheduled for RCHOP today but with was rescheduled due to a fear she may have an underlying infection given UA is positive. Blood culture pending. Charles removed and urine culture to be taken tomorrow. Cont broad spectrum antibiotics.     Follow up TLS labs. Appreciate nephrology input and will start NS. However, may need CVVH or Rasburicase if she cannot absorb allopurinol. May not be an issue today as RCHOP will not be given.    Prognosis is guarded.

## 2021-08-29 NOTE — PROGRESS NOTE ADULT - PROBLEM SELECTOR PLAN 1
Likely secondary to IV contrast and diuresis/lasix with possible component of urinary retention and hydronephrosis (hx of ureteral stents in the past).     SCr was at 0.99 on 08/19 which increased to 1.65 on 08/22 and peaked to 2.59 on 08/24. SCr downtrended to 0.78 yesterday and remains stable at 0.89 today. Pt. now with evidence of early TLS given hyperkalemia, hyperphosphatemia. UOP diminished over the last 12 hours, however clinically with no evidence of significant fluid overload. Recommend continuing Normal saline to promote kaliuresis. Closely monitor TLS labs and urine output. If pt. becomes oliguric with critical acid-base/electrolyte derangement , RRT might need to be considered. Avoid NSAIDs, ACEI/ARBS, RCA and nephrotoxins.     If you have any questions, please feel free to contact me  Celio Barrow  Nephrology Fellow  [phone]  (After 5pm or on weekends please page the on-call fellow) Likely secondary to IV contrast and diuresis/lasix with possible component of urinary retention and hydronephrosis and now TLS? (hx of ureteral stents in the past).     SCr was at 0.99 on 08/19 which increased to 1.65 on 08/22 and peaked to 2.59 on 08/24. SCr downtrended to 0.78 yesterday and remains stable at 0.89 today. Pt. now with evidence of early TLS given hyperkalemia, hyperphosphatemia. UOP diminished over the last 12 hours, however clinically with no evidence of significant fluid overload. Recommend continuing Normal saline to promote kaliuresis. Closely monitor TLS labs and urine output. If pt. becomes oliguric with critical acid-base/electrolyte derangement , RRT might need to be considered. Avoid NSAIDs, ACEI/ARBS, RCA and nephrotoxins.     If you have any questions, please feel free to contact me  Celio Barrow  Nephrology Fellow  [phone]  (After 5pm or on weekends please page the on-call fellow)

## 2021-08-29 NOTE — PROGRESS NOTE ADULT - SUBJECTIVE AND OBJECTIVE BOX
[facility] Division of Kidney Diseases & Hypertension  FOLLOW UP NOTE  [phone]--------------------------------------------------------------------------------    HPI: 66-year-old female with HTN, HLD, recently discovered ovarian mass suspicious for malignancy (7/2021), L hydroureteronephrosis s/p renal stent (7/15/21), and recent hospitalization ([facility] 7/26-8/4) for acute renal failure (likely obstructive) requiring urgent HD, ascites s/p therapeutic paracentesis (7/27/21), and pleural effusion s/p R thoracentesis (8/2/21) showing lymphocytosis but no malignant cells admitted for acute hypercarbic respiratory failure due to pleural effusions most likely caused by ovarian malignancy complicated with volume overload with ascites and B/L LE edema. 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 Now found to have new onset PURVI.    SCr was at 0.99 on 08/19 which increased to 1.65 on 08/22 and peaked to 2.59 on 08/24. SCr improved to 0.78 yesterday and remains stable at 0.89 today. UOP slightly decreased over the last 12 hours. Pt. seen and examined in MICU. Pt. intubated/sedated and unable to provide ROS. Pt. to initiate chemotherapy on 8/28/21.    PAST HISTORY  --------------------------------------------------------------------------------  No significant changes to PMH, PSH, FHx, SHx, unless otherwise noted    ALLERGIES & MEDICATIONS  --------------------------------------------------------------------------------  Allergies    penicillins (Rash)    Intolerances    Standing Inpatient Medications  allopurinol 100 milliGRAM(s) Oral daily  cefepime   IVPB 2000 milliGRAM(s) IV Intermittent every 12 hours  chlorhexidine 0.12% Liquid 15 milliLiter(s) Oral Mucosa every 12 hours  chlorhexidine 4% Liquid 1 Application(s) Topical <User Schedule>  cyclophosphamide IVPB (eMAR) 1240 milliGRAM(s) IV Intermittent once  dextrose 40% Gel 15 Gram(s) Oral once  dextrose 5%. 1000 milliLiter(s) IV Continuous <Continuous>  dextrose 5%. 1000 milliLiter(s) IV Continuous <Continuous>  dextrose 50% Injectable 25 Gram(s) IV Push once  dextrose 50% Injectable 12.5 Gram(s) IV Push once  dextrose 50% Injectable 25 Gram(s) IV Push once  DOXOrubicin IVPB (eMAR) 82 milliGRAM(s) IV Intermittent once  fentaNYL   Infusion. 2 MICROgram(s)/kG/Hr IV Continuous <Continuous>  fosaprepitant IVPB 150 milliGRAM(s) IV Intermittent once  glucagon  Injectable 1 milliGRAM(s) IntraMuscular once  heparin   Injectable 5000 Unit(s) SubCutaneous every 8 hours  insulin lispro (ADMELOG) corrective regimen sliding scale   SubCutaneous every 6 hours  insulin NPH human recombinant 6 Unit(s) SubCutaneous every 6 hours  norepinephrine Infusion 0.05 MICROgram(s)/kG/Min IV Continuous <Continuous>  petrolatum Ophthalmic Ointment 1 Application(s) Both EYES two times a day  polyethylene glycol 3350 17 Gram(s) Oral daily  propofol Infusion 50 MICROgram(s)/kG/Min IV Continuous <Continuous>  senna Syrup 15 milliLiter(s) Oral at bedtime  sodium chloride 0.9%. 1000 milliLiter(s) IV Continuous <Continuous>  vancomycin  IVPB 1000 milliGRAM(s) IV Intermittent every 12 hours  vinCRIStine IVPB (eMAR) 2 milliGRAM(s) IV Intermittent once    VITALS/PHYSICAL EXAM  --------------------------------------------------------------------------------  T(C): 37.7 (08-29-21 @ 04:00), Max: 38.9 (08-28-21 @ 20:00)  HR: 77 (08-29-21 @ 08:00) (58 - 127)  BP: 121/67 (08-29-21 @ 08:00) (75/48 - 152/76)  RR: 16 (08-29-21 @ 08:00) (16 - 19)  SpO2: 97% (08-29-21 @ 08:00) (94% - 100%)  Wt(kg): --    08-28-21 @ 07:01  -  08-29-21 @ 07:00  --------------------------------------------------------  IN: 3974.4 mL / OUT: 650 mL / NET: 3324.4 mL    08-29-21 @ 07:01  -  08-29-21 @ 09:10  --------------------------------------------------------  IN: 186 mL / OUT: 115 mL / NET: 71 mL    Physical Exam:              Gen: Sedated  	HEENT: ET tube +  	Pulm: mechanically ventilated via ETT  	CV: S1S2  	Abd: Soft, +BS  	Ext: No LE edema B/L  	Neuro: Sedated  	Skin: Warm and dry    LABS/STUDIES  --------------------------------------------------------------------------------              11.0   12.79 >-----------<  249      [08-29-21 @ 01:28]              35.1     139  |  107  |  76  ----------------------------<  156      [08-29-21 @ 02:26]  5.5   |  16  |  0.89        Ca     8.1     [08-29-21 @ 02:26]      Mg     2.20     [08-29-21 @ 02:26]      Phos  5.7     [08-29-21 @ 02:26]    TPro  5.4  /  Alb  2.1  /  TBili  <0.2  /  DBili  x   /  AST  34  /  ALT  10  /  AlkPhos  89  [08-29-21 @ 02:26]    PT/INR: PT 12.3 , INR 1.07       [08-27-21 @ 13:39]  PTT: 27.8       [08-27-21 @ 13:39]    Uric acid 4.8      [08-29-21 @ 01:28]        [08-29-21 @ 02:26]    Creatinine Trend:  SCr 0.89 [08-29 @ 02:26]  SCr 0.85 [08-29 @ 01:28]  SCr 0.94 [08-29 @ 00:37]  SCr 0.71 [08-28 @ 14:39]  SCr 0.78 [08-28 @ 02:56]    Urinalysis - [08-28-21 @ 17:00]      Color Yellow / Appearance Turbid / SG 1.034 / pH 6.5      Gluc Trace / Ketone Negative  / Bili Negative / Urobili <2 mg/dL       Blood Small / Protein 100 mg/dL / Leuk Est Large / Nitrite Negative      RBC 6-10 / WBC 15-20 / Hyaline 3 / Gran 20 / Sq Epi  / Non Sq Epi Occasional / Bacteria Moderate    Urine Creatinine 122      [08-23-21 @ 19:59]  Urine Protein 198      [08-23-21 @ 19:59]  Urine Sodium <20      [08-23-21 @ 19:59]  Urine Urea Nitrogen 454.0      [08-23-21 @ 19:59]  Urine Potassium 77.8      [08-23-21 @ 19:59]  Urine Chloride <20      [08-23-21 @ 19:59]  Urine Osmolality 445      [08-23-21 @ 19:59]    HBsAg Nonreact      [08-26-21 @ 10:02]  HCV 0.20, Nonreact      [08-26-21 @ 10:02]  HIV Nonreact      [08-25-21 @ 12:13]

## 2021-08-29 NOTE — PROGRESS NOTE ADULT - SUBJECTIVE AND OBJECTIVE BOX
INTERVAL HPI/OVERNIGHT EVENTS:  -intubated, unable to provide history  - spiked fever overnight again  -increased pressor requirements today compared to yesterday  - no bowel mvmt  - abd xray showing marked bowel stool burden  - U/A grossly positive, cultures not done  - blood culture results not yet posted    VITAL SIGNS:  T(F): 101.5 (21 @ 08:00)  HR: 90 (21 @ 11:15)  BP: 98/61 (21 @ 11:00)  RR: 16 (21 @ 11:15)  SpO2: 100% (21 @ 11:15)  Wt(kg): --    PHYSICAL EXAM:  lying in bed, intubated, sedated  no cellulitis at line sites    MEDICATIONS  (STANDING):  allopurinol 100 milliGRAM(s) Oral daily  cefepime   IVPB 2000 milliGRAM(s) IV Intermittent every 12 hours  chlorhexidine 0.12% Liquid 15 milliLiter(s) Oral Mucosa every 12 hours  chlorhexidine 4% Liquid 1 Application(s) Topical <User Schedule>  dextrose 40% Gel 15 Gram(s) Oral once  dextrose 5%. 1000 milliLiter(s) (100 mL/Hr) IV Continuous <Continuous>  dextrose 5%. 1000 milliLiter(s) (50 mL/Hr) IV Continuous <Continuous>  dextrose 50% Injectable 25 Gram(s) IV Push once  dextrose 50% Injectable 12.5 Gram(s) IV Push once  dextrose 50% Injectable 25 Gram(s) IV Push once  fentaNYL   Infusion. 2 MICROgram(s)/kG/Hr (12.8 mL/Hr) IV Continuous <Continuous>  glucagon  Injectable 1 milliGRAM(s) IntraMuscular once  heparin   Injectable 5000 Unit(s) SubCutaneous every 8 hours  insulin lispro (ADMELOG) corrective regimen sliding scale   SubCutaneous every 6 hours  insulin NPH human recombinant 6 Unit(s) SubCutaneous every 6 hours  norepinephrine Infusion 0.05 MICROgram(s)/kG/Min (2.93 mL/Hr) IV Continuous <Continuous>  petrolatum Ophthalmic Ointment 1 Application(s) Both EYES two times a day  polyethylene glycol 3350 17 Gram(s) Oral daily  propofol Infusion 50 MICROgram(s)/kG/Min (19.2 mL/Hr) IV Continuous <Continuous>  senna Syrup 15 milliLiter(s) Oral at bedtime  sodium chloride 0.9%. 1000 milliLiter(s) (150 mL/Hr) IV Continuous <Continuous>  vancomycin  IVPB 1000 milliGRAM(s) IV Intermittent every 12 hours    MEDICATIONS  (PRN):  bisacodyl Suppository 10 milliGRAM(s) Rectal daily PRN Constipation  hydrocortisone sodium succinate Injectable 100 milliGRAM(s) IV Push once PRN PRN Chemotherapy Reaction  meperidine     Injectable 25 milliGRAM(s) IV Push once PRN PRN Chemotherapy Reaction (Rigors)      Allergies    penicillins (Rash)    Intolerances        LABS:                        9.3    12.82 )-----------( 213      ( 29 Aug 2021 11:07 )             29.8     08-    139  |  107  |  76<H>  ----------------------------<  156<H>  5.5<H>   |  16<L>  |  0.89    Ca    8.1<L>      29 Aug 2021 02:26  Phos  5.7       Mg     2.20         TPro  5.4<L>  /  Alb  2.1<L>  /  TBili  <0.2  /  DBili  x   /  AST  34<H>  /  ALT  10  /  AlkPhos  89  -    PT/INR - ( 29 Aug 2021 11:07 )   PT: 12.5 sec;   INR: 1.10 ratio         PTT - ( 29 Aug 2021 11:07 )  PTT:26.6 sec  Urinalysis Basic - ( 28 Aug 2021 17:00 )    Color: Yellow / Appearance: Turbid / S.034 / pH: x  Gluc: x / Ketone: Negative  / Bili: Negative / Urobili: <2 mg/dL   Blood: x / Protein: 100 mg/dL / Nitrite: Negative   Leuk Esterase: Large / RBC: 6-10 /HPF / WBC 15-20 /HPF   Sq Epi: x / Non Sq Epi: Occasional / Bacteria: Moderate        RADIOLOGY & ADDITIONAL TESTS:  Studies reviewed.    ASSESSMENT & PLAN:

## 2021-08-30 NOTE — PROGRESS NOTE ADULT - ASSESSMENT
Ms. Stafford is a 66-year-old woman with PMH significant for HTN, HLD, L hydroureteronephrosis s/p renal stent on 7/15, who presents with volume overload 2/2 high grade B-cell lymphoma. S/p thoracentesis 8/13, paracentesis and excisional biopsy of left inguinal lymph node on 8/20. Accepted to MICU for acute hypoxic/hypercapneic respiratory failure now s/p intubation and L pleurex catheter placement, s/p chemotherapy.    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  - Currently sedated and intubated with propofol and fentanyl  - CT head with no intracranial pathology  - S/p LP with flow cytometry and cytopathology to evaluate possible lymphomatous meningitis, pending results  - Will obtain MRI per heme recommendations to further evaluate CNS involvement unless patient's neurological function improves off of sedation    #Cardiovascular  - On pressor support with levo  - Echo 8/3 showing concentric left ventricular remodeling, hyperdynamic left ventricle, calcified trileaflet aortic valve with normal opening, EF 56%  - POCUS showing adequate cardiac output    #Respiratory  - Hypoxic/hypercapneic respiratory failure likely secondary to malignant pleural effusions s/p thoracentesis on 8/13 with re-accumulation of pleural effusions  - Intubated 8/24 with vent settings of 16 | 380 | 5 | 40  - POCUS by pulmonology showing moderate right sided pleural effusion without diaphragmatic flattening  - S/p second thoracentesis 8/25, repeat POCUS on 8/26 showing bilateral effusions, s/p L pleurex   - Continue to monitor, may ease sedation and attempt spontaneous breathing trial    #GI/Nutrition  - S/p paracentesis 8/20, still remains distended  - Patient tolerating tube feeds  - constipation: bisacodyl suppository, senna, miralax, s/p 1 dose lactulose  - AXR read pending- will give Reglan if no ileus   - s/p fecal disimpaction 8/29, abd x-ray without obstruction     #/Renal  - PURVI, resolved  - Nephrology on board, recommend holding LASIX & other nephrotoxic medications.   - Nephrostomy tubes considered however per IR recommendations not appropriate at this time as patient's Cr is stable  - B/L hydronephrosis stable on CT a/p  - monitor TLS labs q6h   - NS @150 cc/h for kaliuresis   - carnes placed     #Skin  - No sacral decubiti    #ID  - Was on cefepime for empiric treatment of febrile illness   - c/w Vanco and Zosyn to include anaerobic coverage   - U/A grossly positive, ordered urine cx   - blood cx 8/28/21 NGTD    #Endocrine  - SSI   - will readjust as necessary     #Hematologic/DVT ppx  - B-cell lymphoma, per heme preliminary results of surgical pathology consistent with B-cell lymphoma but pending further evaluation for treatment choice  - TLS labs q8  - Allopurinol for TLS prophylaxis, s/p rasburicase  - Heme/Onc recommending starting steroids with dexamethasone, s/p 40 mg dose daily to end 8/29   - s/p Rituxan 8/28/21  - CHOP held until cx results   - DVT prophylaxis with heparin subQ    #Ethics  - Patient is FULL CODE per palliative care discussion with patient and her sons (Yuan and Jose). Fill-in proxy is Chaileslie Raman: [phone] Ms. Stafford is a 66-year-old woman with PMH significant for HTN, HLD, L hydroureteronephrosis s/p renal stent on 7/15, who presents with volume overload 2/2 high grade B-cell lymphoma. S/p thoracentesis 8/13, paracentesis and excisional biopsy of left inguinal lymph node on 8/20. Accepted to MICU for acute hypoxic/hypercapneic respiratory failure now s/p intubation and L pleurex catheter placement, s/p chemotherapy.    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  - Currently sedated and intubated with propofol and fentanyl  - CT head with no intracranial pathology  - S/p LP with flow cytometry and cytopathology to evaluate possible lymphomatous meningitis, pending results  - Will obtain MRI per heme recommendations to further evaluate CNS involvement unless patient's neurological function improves off of sedation    #Cardiovascular  - On pressor support with levo  - Echo 8/3 showing concentric left ventricular remodeling, hyperdynamic left ventricle, calcified trileaflet aortic valve with normal opening, EF 56%  - POCUS showing adequate cardiac output    #Respiratory  - Hypoxic/hypercapneic respiratory failure likely secondary to malignant pleural effusions s/p thoracentesis on 8/13 with re-accumulation of pleural effusions  - Intubated 8/24 with vent settings of 16 | 380 | 5 | 40  - POCUS by pulmonology showing moderate right sided pleural effusion without diaphragmatic flattening  - S/p second thoracentesis 8/25, repeat POCUS on 8/26 showing bilateral effusions, s/p L pleurex   - Continue to monitor, may ease sedation and attempt spontaneous breathing trial    #GI/Nutrition  - S/p paracentesis 8/20, still remains distended  - Patient tolerating tube feeds  - constipation: bisacodyl suppository, senna, miralax, s/p 1 dose lactulose  - AXR read pending- will give Reglan if no ileus   - s/p fecal disimpaction 8/29, abd x-ray without obstruction   - trial of golytely today and monitor for BM    #/Renal  - PURVI, resolved  - Nephrology on board, recommend holding LASIX & other nephrotoxic medications.   - Nephrostomy tubes considered however per IR recommendations not appropriate at this time as patient's Cr is stable  - B/L hydronephrosis stable on CT a/p  - monitor TLS labs q8h   - NS @150 cc/h for kaliuresis   - carnes placed     #Skin  - No sacral decubiti    #ID  - c/w Vanco and Zosyn to include anaerobic coverage   - U/A grossly positive, urine cx pending  - blood cx 8/28/21 NGTD  - repeat vanc trough    #Endocrine  - SSI   - will readjust as necessary     #Hematologic/DVT ppx  - B-cell lymphoma, per heme preliminary results of surgical pathology consistent with B-cell lymphoma but pending further evaluation for treatment choice  - TLS labs q8  - Allopurinol for TLS prophylaxis, s/p rasburicase  - Heme/Onc recommending starting steroids with dexamethasone, s/p 40 mg dose daily to end 8/29   - s/p Rituxan 8/28/21  - CHOP held until cx results   - DVT prophylaxis with heparin subQ    #Ethics  - Patient is FULL CODE per palliative care discussion with patient and her sons (Yuan and Jose).   Fill-in proxy is Jose Camargo: [phone]

## 2021-08-30 NOTE — PROGRESS NOTE ADULT - ATTENDING COMMENTS
PURVI  TLS  Obstructive uropathy now s/p carnes placement  Metabolic Acidosis    Cont to monitor Is/Os  Consider starting bicarbonate gtt to help with acidosis and prevent urate nephropathy

## 2021-08-30 NOTE — PROGRESS NOTE ADULT - SUBJECTIVE AND OBJECTIVE BOX
****************************************  Marin Adame PGY4  Hematology/Oncology  [phone]/[id number]  ****************************************  SUBJECTIVE  Unable to obtain ROS due to patient being intubated and sedated     OBJECTIVE   Vital Signs Last 24 Hrs  T(C): 35.8 (30 Aug 2021 08:00), Max: 37 (29 Aug 2021 16:00)  T(F): 96.5 (30 Aug 2021 08:00), Max: 98.6 (29 Aug 2021 16:00)  HR: 59 (30 Aug 2021 11:07) (56 - 92)  BP: 122/69 (30 Aug 2021 10:00) (91/54 - 141/80)  BP(mean): 81 (30 Aug 2021 10:00) (63 - 96)  RR: 16 (30 Aug 2021 10:00) (16 - 16)  SpO2: 93% (30 Aug 2021 11:07) (93% - 100%)    21 @ :21 @ 07:00  --------------------------------------------------------  IN: 5183 mL / OUT: 1525 mL / NET: 3658 mL    21 @ 07:21 @ 12:43  --------------------------------------------------------  IN: 531 mL / OUT: 245 mL / NET: 286 mL      PHYSICAL EXAM:  General: intubated, sedated  Respiratory: L pleurx in place, draining 100 cc overnight   Cardiovascular: S1S2 present  Abdomen: distended, tympanic, appears to be edematous bowel on POCUS, no significant ascites appreciated.   Extremities: LE swelling, edematous. Cold extremities, RUE colder than left   Neurological: sedated       LABS                        8.8    13.62 )-----------( 198      ( 30 Aug 2021 03:45 )             27.6       08-30    136  |  106  |  76<H>  ----------------------------<  64<L>  5.1   |  14<L>  |  1.08    Ca    7.8<L>      30 Aug 2021 03:45  Phos  7.3     -  Mg     2.00     -    TPro  4.7<L>  /  Alb  2.0<L>  /  TBili  <0.2  /  DBili  x   /  AST  29  /  ALT  8   /  AlkPhos  67  -30      Urinalysis Basic - ( 28 Aug 2021 17:00 )    Color: Yellow / Appearance: Turbid / S.034 / pH: x  Gluc: x / Ketone: Negative  / Bili: Negative / Urobili: <2 mg/dL   Blood: x / Protein: 100 mg/dL / Nitrite: Negative   Leuk Esterase: Large / RBC: 6-10 /HPF / WBC 15-20 /HPF   Sq Epi: x / Non Sq Epi: Occasional / Bacteria: Moderate        Follow Up:      RADIOLOGY:

## 2021-08-30 NOTE — PROVIDER CONTACT NOTE (HYPOGLYCEMIA EVENT) - NS PROVIDER CONTACT BACKGROUND-HYPO
Age: 66y    Gender: Female    POCT Blood Glucose:  64 mg/dL (08-30-21 @ 04:48)  92 mg/dL (08-29-21 @ 21:55)  83 mg/dL (08-29-21 @ 18:18)  142 mg/dL (08-29-21 @ 11:04)  160 mg/dL (08-29-21 @ 06:57)      eMAR:allopurinol   100 milliGRAM(s) Oral (08-29-21 @ 11:05)    dexAMETHasone  IVPB   120 mL/Hr IV Intermittent (08-29-21 @ 08:19)    insulin lispro (ADMELOG) corrective regimen sliding scale   2  SubCutaneous (08-29-21 @ 07:09)    insulin NPH human recombinant   6 Unit(s) SubCutaneous (08-29-21 @ 11:06)   6 Unit(s) SubCutaneous (08-29-21 @ 07:09)

## 2021-08-30 NOTE — CHART NOTE - NSCHARTNOTEFT_GEN_A_CORE
: Dr. Garza and Dr. Coleman    INDICATION: Respiratory failure     PROCEDURE:  [X] LIMITED ECHO  [X] LIMITED CHEST  [ ] LIMITED RETROPERITONEAL  [X] LIMITED ABDOMINAL  [ ] LIMITED DVT  [ ] NEEDLE GUIDANCE VASCULAR  [ ] NEEDLE GUIDANCE THORACENTESIS  [ ] NEEDLE GUIDANCE PARACENTESIS  [ ] NEEDLE GUIDANCE PERICARDIOCENTESIS  [ ] OTHER    FINDINGS:  Lungs: A line predominant pattern with B/L moderate PLEFF, R now >L, lung sliding present bilaterally  Heart: Grossly normal LVSF, RV <LV, no pericardial effusions, IVC indeterminant  Abdomen: Moderate volume ascites, dilated loops of bowel with signs of ileus    INTERPRETATION:  -A line pattern in lungs pleural effusions B/L R>L  -no PTX  -Normal LVSF  -Moderate ascites   -Possible ileus    Images Uploaded on GARY Troy, PGY3  Pager [phone]  Internal Medicine.

## 2021-08-30 NOTE — PROGRESS NOTE ADULT - ASSESSMENT
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 Her course has been complicated by intermittent fevers and she was started on empiric cefepime. She has had labile blood pressures, at times requiring pressor support.  Her course has also been complicated by respiratory failure requiring intubation.       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 LP with CSF flow and cytopathology done 8/25; appears traumatic with 6000 rbc; 77 lymphocytes although no atypicals, 23 monocytes  - 8/20: Excisional biopsy of left inguinal lymph node: DLFCL, NOS, Germinal centre B-cell subtype with high proliferative index and necrosis. CD20, PAX-5, CD10, BCL-6, BCL-2, C-MYC positive. FISH, Karyotype pending.   Initial work up for treatment: Echo 8/3 with LVEF of 56%; Hepatitis panel negative; Hep B core total negative, HIV negative, HTLV1 and HTLV2, Pending  - 8/24: Dexamethasone 20 mg IV daily x 2 days; Increased to dexamethasone 40mg daily IV on 8/26 - to be continued for 5 days until 8/29  - Received Rituxan on 8/28; plan was for cyclophosphamide, Doxorubicin and vincristine on Sunday 8/29, however patient with ongoing fevers and increasing pressor requirement concerning for possible infectious process, holding CHOP until infectious work-up complete   - Continue with allopurinol for TLS prophylaxis;   - Will require TLS lab monitoring [id number] (LDH, Uric acid, BMP, phos, Mag)       Fevers:   - unclear source of infection, work up in progress   - Bcx from 8/28 NGTD  - Urine cx pending   - Was on cefepime; now on zosyn and vanc by level (management of abx as per primary team)      66 woman with new diagnosis 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 Her course has been complicated by intermittent fevers and she was started on empiric cefepime. She has had labile blood pressures, at times requiring pressor support.  Her course has also been complicated by respiratory failure requiring intubation.       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 LP with CSF flow and cytopathology done 8/25; appears traumatic with 6000 rbc; 77 lymphocytes although no atypicals, 23 monocytes  - 8/20: Excisional biopsy of left inguinal lymph node: DLFCL, NOS, Germinal centre B-cell subtype with high proliferative index and necrosis. CD20, PAX-5, CD10, BCL-6, BCL-2, C-MYC positive. FISH, Karyotype pending.   Initial work up for treatment: Echo 8/3 with LVEF of 56%; Hepatitis panel negative; Hep B core total negative, HIV negative, HTLV1 and HTLV2, Pending  - 8/24: Dexamethasone 20 mg IV daily x 2 days; Increased to dexamethasone 40mg daily IV on 8/26 - to be continued for 5 days until 8/29  - Received Rituxan on 8/28; plan was for cyclophosphamide, Doxorubicin and vincristine on Sunday 8/29, however patient with ongoing fevers and increasing pressor requirement concerning for possible infectious process. Currently afebrile and infectious w/up negative so far. Will plan for dexamethasone 40mg IV x4 days starting 8/31, and Cytoxan reduced dose starting 9/1.   - Continue with allopurinol for TLS prophylaxis;   - Will require TLS lab monitoring [id number] (LDH, Uric acid, BMP, phos, Mag)       Fevers:   - unclear source of infection, work up in progress   - Bcx from 8/28 NGTD  - Urine cx pending   - Was on cefepime; now on zosyn and vanc by level (management of abx as per primary team)

## 2021-08-30 NOTE — PROGRESS NOTE ADULT - ATTENDING COMMENTS
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 CD20, PAX-5, CD10, BCL-6, BCL-2, C-MYC positive. FISH, Karyotype pending.  presented with volume overload, now intubated in the MICU and with pressor support, planned for R-CHOP chemotherapy.   Her course has been complicated by intermittent fevers   started on dexamethasone 20 mg IV daily x 2 days; Increased to dexamethasone 40mg daily IV on 8/26 - to be continued for 5 days until 8/29  received Rituxan on 8/28 only with plans for cyclophosphamide, Doxorubicin and vincristine on Sunday 8/29 however patient with ongoing fevers and increasing pressor requirement concerning for possible infectious process.   Currently afebrile and infectious w/up negative so far.   Will plan to restart dexamethasone 40mg IV x4 days starting 8/31, and Cytoxan reduced dose starting 9/1 given her large burden of disease  Continue with allopurinol for TLS prophylaxis;   Continue with TLS lab monitoring [id number] (LDH, Uric acid, BMP, phos, Mag)

## 2021-08-30 NOTE — PROGRESS NOTE ADULT - PROBLEM SELECTOR PLAN 1
Likely secondary to IV contrast and diuresis/lasix with possible component of urinary retention and hydronephrosis and now TLS? (hx of ureteral stents in the past).       SCr was at 0.99 on 08/19 which increased to 1.65 on 08/22 and peaked to 2.59 on 08/24. SCr improved to 0.78 yesterday and remains stable at 1.08 today 08/30. UOP slightly decreased over the last 12 hours, carnes was reinserted and noted to have nonoliguric. Pt. now with evidence of early TLS given hyperkalemia, hyperphosphatemia. UOP diminished over the last 12 hours, however clinically with no evidence of significant fluid overload. Recommend continuing normal saline to promote kaliuresis. Closely monitor TLS labs and urine output. If pt. becomes oliguric with critical acid-base/electrolyte derangement , RRT might need to be considered. Avoid NSAIDs, ACEI/ARBS, RCA and nephrotoxins.

## 2021-08-30 NOTE — PROGRESS NOTE ADULT - SUBJECTIVE AND OBJECTIVE BOX
COVERING RESIDENT: MALGORZATA PERRY (PGY-2) | [id number] / [phone]    INTERVAL HPI/OVERNIGHT EVENTS: TLS labs showing hyperkalemia and elevated uric acid for which patient received rasburicase. Patient also started on D5 for hypoglycemia.     SUBJECTIVE: Patient seen and examined at bedside.     Unable to assess ROS as pt is intubated + sedated.    OBJECTIVE:    VITAL SIGNS:  ICU Vital Signs Last 24 Hrs  T(C): 36.1 (30 Aug 2021 04:00), Max: 38.3 (29 Aug 2021 12:00)  T(F): 96.9 (30 Aug 2021 04:00), Max: 101 (29 Aug 2021 12:00)  HR: 58 (30 Aug 2021 08:00) (56 - 96)  BP: 119/70 (30 Aug 2021 08:00) (89/61 - 141/80)  BP(mean): 81 (30 Aug 2021 08:00) (63 - 96)  ABP: --  ABP(mean): --  RR: 16 (30 Aug 2021 08:) (16 - 16)  SpO2: 100% (30 Aug 2021 08:00) (95% - 100%)    Mode: AC/ CMV (Assist Control/ Continuous Mandatory Ventilation), RR (machine): 16, TV (machine): 380, FiO2: 40, PEEP: 5, ITime: 0.76, MAP: 11, PIP: 28    08-29 @ 07:01  -  08-30 @ 07:00  --------------------------------------------------------  IN: 5183 mL / OUT: 1525 mL / NET: 3658 mL      CAPILLARY BLOOD GLUCOSE      POCT Blood Glucose.: 135 mg/dL (30 Aug 2021 05:33)      PHYSICAL EXAM:    General: NAD  HEENT: NC/AT; PERRL, clear conjunctiva  Neck: supple  Respiratory: CTA b/l  Cardiovascular: +S1/S2; RRR  Abdomen: soft, NT/ND; +BS x4  Extremities: WWP, 2+ peripheral pulses b/l; no LE edema  Skin: normal color and turgor; no rash  Neurological:    MEDICATIONS:  MEDICATIONS  (STANDING):  allopurinol 100 milliGRAM(s) Oral daily  chlorhexidine 0.12% Liquid 15 milliLiter(s) Oral Mucosa every 12 hours  chlorhexidine 4% Liquid 1 Application(s) Topical <User Schedule>  dextrose 40% Gel 15 Gram(s) Oral once  dextrose 5% + sodium chloride 0.9%. 1000 milliLiter(s) (150 mL/Hr) IV Continuous <Continuous>  dextrose 5%. 1000 milliLiter(s) (50 mL/Hr) IV Continuous <Continuous>  dextrose 5%. 1000 milliLiter(s) (100 mL/Hr) IV Continuous <Continuous>  dextrose 50% Injectable 25 Gram(s) IV Push once  dextrose 50% Injectable 12.5 Gram(s) IV Push once  dextrose 50% Injectable 25 Gram(s) IV Push once  fentaNYL   Infusion. 2 MICROgram(s)/kG/Hr (12.8 mL/Hr) IV Continuous <Continuous>  glucagon  Injectable 1 milliGRAM(s) IntraMuscular once  heparin   Injectable 5000 Unit(s) SubCutaneous every 8 hours  insulin lispro (ADMELOG) corrective regimen sliding scale   SubCutaneous every 6 hours  norepinephrine Infusion 0.05 MICROgram(s)/kG/Min (2.93 mL/Hr) IV Continuous <Continuous>  petrolatum Ophthalmic Ointment 1 Application(s) Both EYES two times a day  piperacillin/tazobactam IVPB.. 3.375 Gram(s) IV Intermittent every 8 hours  polyethylene glycol 3350 17 Gram(s) Oral daily  propofol Infusion 50 MICROgram(s)/kG/Min (19.2 mL/Hr) IV Continuous <Continuous>  senna Syrup 15 milliLiter(s) Oral at bedtime    MEDICATIONS  (PRN):  bisacodyl Suppository 10 milliGRAM(s) Rectal daily PRN Constipation  hydrocortisone sodium succinate Injectable 100 milliGRAM(s) IV Push once PRN PRN Chemotherapy Reaction  meperidine     Injectable 25 milliGRAM(s) IV Push once PRN PRN Chemotherapy Reaction (Rigors)      ALLERGIES:  Allergies    penicillins (Rash)    Intolerances        LABS:                        8.8    13.62 )-----------( 198      ( 30 Aug 2021 03:45 )             27.6     08-30    136  |  106  |  76<H>  ----------------------------<  64<L>  5.1   |  14<L>  |  1.08    Ca    7.8<L>      30 Aug 2021 03:45  Phos  7.3     08-30  Mg     2.00     -30    TPro  4.7<L>  /  Alb  2.0<L>  /  TBili  <0.2  /  DBili  x   /  AST  29  /  ALT  8   /  AlkPhos  67  08-30    PT/INR - ( 30 Aug 2021 03:45 )   PT: 11.5 sec;   INR: 1.01 ratio         PTT - ( 30 Aug 2021 03:45 )  PTT:27.0 sec  Urinalysis Basic - ( 28 Aug 2021 17:00 )    Color: Yellow / Appearance: Turbid / S.034 / pH: x  Gluc: x / Ketone: Negative  / Bili: Negative / Urobili: <2 mg/dL   Blood: x / Protein: 100 mg/dL / Nitrite: Negative   Leuk Esterase: Large / RBC: 6-10 /HPF / WBC 15-20 /HPF   Sq Epi: x / Non Sq Epi: Occasional / Bacteria: Moderate        RADIOLOGY & ADDITIONAL TESTS: Reviewed.

## 2021-08-30 NOTE — PROGRESS NOTE ADULT - SUBJECTIVE AND OBJECTIVE BOX
[facility] DIVISION OF KIDNEY DISEASES AND HYPERTENSION -- FOLLOW UP NOTE  --------------------------------------------------------------------------------  HPI: 66-year-old female with HTN, HLD, recently discovered ovarian mass suspicious for malignancy (7/2021), L hydroureteronephrosis s/p renal stent (7/15/21), and recent hospitalization ([facility] 7/26-8/4) for acute renal failure (likely obstructive) requiring urgent HD, ascites s/p therapeutic paracentesis (7/27/21), and pleural effusion s/p R thoracentesis (8/2/21) showing lymphocytosis but no malignant cells admitted for acute hypercarbic respiratory failure due to pleural effusions most likely caused by ovarian malignancy complicated with volume overload with ascites and B/L LE edema. 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 Now found to have new onset PURVI.    SCr was at 0.99 on 08/19 which increased to 1.65 on 08/22 and peaked to 2.59 on 08/24. SCr improved to 0.78 yesterday and remains stable at 1.08 today 08/30. UOP slightly decreased over the last 12 hours, carnes was reinserted and noted to have nonoliguric. Pt. seen and examined in MICU. Pt. intubated/sedated and unable to provide ROS.       PAST HISTORY  --------------------------------------------------------------------------------  No significant changes to PMH, PSH, FHx, SHx, unless otherwise noted    ALLERGIES & MEDICATIONS  --------------------------------------------------------------------------------  Allergies    penicillins (Rash)    Intolerances      Standing Inpatient Medications  allopurinol 100 milliGRAM(s) Oral daily  chlorhexidine 0.12% Liquid 15 milliLiter(s) Oral Mucosa every 12 hours  chlorhexidine 4% Liquid 1 Application(s) Topical <User Schedule>  dextrose 40% Gel 15 Gram(s) Oral once  dextrose 5% + sodium chloride 0.9%. 1000 milliLiter(s) IV Continuous <Continuous>  dextrose 5%. 1000 milliLiter(s) IV Continuous <Continuous>  dextrose 5%. 1000 milliLiter(s) IV Continuous <Continuous>  dextrose 50% Injectable 12.5 Gram(s) IV Push once  dextrose 50% Injectable 25 Gram(s) IV Push once  dextrose 50% Injectable 25 Gram(s) IV Push once  fentaNYL   Infusion. 2 MICROgram(s)/kG/Hr IV Continuous <Continuous>  glucagon  Injectable 1 milliGRAM(s) IntraMuscular once  heparin   Injectable 5000 Unit(s) SubCutaneous every 8 hours  insulin lispro (ADMELOG) corrective regimen sliding scale   SubCutaneous every 6 hours  norepinephrine Infusion 0.05 MICROgram(s)/kG/Min IV Continuous <Continuous>  petrolatum Ophthalmic Ointment 1 Application(s) Both EYES two times a day  piperacillin/tazobactam IVPB.. 3.375 Gram(s) IV Intermittent every 8 hours  polyethylene glycol 3350 17 Gram(s) Oral daily  propofol Infusion 50 MICROgram(s)/kG/Min IV Continuous <Continuous>  senna Syrup 15 milliLiter(s) Oral at bedtime    PRN Inpatient Medications  bisacodyl Suppository 10 milliGRAM(s) Rectal daily PRN  hydrocortisone sodium succinate Injectable 100 milliGRAM(s) IV Push once PRN  meperidine     Injectable 25 milliGRAM(s) IV Push once PRN      REVIEW OF SYSTEMS  --------------------------------------------------------------------------------  Unable to obtain     VITALS/PHYSICAL EXAM  --------------------------------------------------------------------------------  T(C): 35.8 (08-30-21 @ 08:00), Max: 37 (08-29-21 @ 16:00)  HR: 64 (08-30-21 @ 14:38) (56 - 85)  BP: 103/62 (08-30-21 @ 14:00) (91/54 - 141/80)  RR: 16 (08-30-21 @ 10:00) (16 - 16)  SpO2: 100% (08-30-21 @ 14:38) (93% - 100%)  Wt(kg): --        08-29-21 @ 07:01  -  08-30-21 @ 07:00  --------------------------------------------------------  IN: 5183 mL / OUT: 1525 mL / NET: 3658 mL    08-30-21 @ 07:01  -  08-30-21 @ 14:41  --------------------------------------------------------  IN: 1479 mL / OUT: 395 mL / NET: 1084 mL        Physical Exam:  	 Gen: Sedated  	HEENT: ET tube +  	Pulm: mechanically ventilated via ETT  	CV: S1S2  	Abd: Soft, +BS  	Ext: No LE edema B/L  	Neuro: Sedated  	Skin: Warm and dry    LABS/STUDIES  --------------------------------------------------------------------------------              8.8    13.62 >-----------<  198      [08-30-21 @ 03:45]              27.6     136  |  106  |  76  ----------------------------<  64      [08-30-21 @ 03:45]  5.1   |  14  |  1.08        Ca     7.8     [08-30-21 @ 03:45]      iCa    1.09     [08-30 @ 12:56]      Mg     2.00     [08-30-21 @ 03:45]      Phos  7.3     [08-30-21 @ 03:45]    TPro  4.7  /  Alb  2.0  /  TBili  <0.2  /  DBili  x   /  AST  29  /  ALT  8   /  AlkPhos  67  [08-30-21 @ 03:45]    PT/INR: PT 11.5 , INR 1.01       [08-30-21 @ 03:45]  PTT: 27.0       [08-30-21 @ 03:45]    Uric acid 3.0      [08-30-21 @ 12:56]        [08-30-21 @ 12:56]    Creatinine Trend:  SCr 1.08 [08-30 @ 03:45]  SCr 1.05 [08-29 @ 20:43]  SCr 0.85 [08-29 @ 11:07]  SCr 0.89 [08-29 @ 02:26]  SCr 0.85 [08-29 @ 01:28]    Urinalysis - [08-28-21 @ 17:00]      Color Yellow / Appearance Turbid / SG 1.034 / pH 6.5      Gluc Trace / Ketone Negative  / Bili Negative / Urobili <2 mg/dL       Blood Small / Protein 100 mg/dL / Leuk Est Large / Nitrite Negative      RBC 6-10 / WBC 15-20 / Hyaline 3 / Gran 20 / Sq Epi  / Non Sq Epi Occasional / Bacteria Moderate    Urine Creatinine 122      [08-23-21 @ 19:59]  Urine Protein 198      [08-23-21 @ 19:59]  Urine Sodium <20      [08-23-21 @ 19:59]  Urine Urea Nitrogen 454.0      [08-23-21 @ 19:59]  Urine Potassium 77.8      [08-23-21 @ 19:59]  Urine Chloride <20      [08-23-21 @ 19:59]  Urine Osmolality 445      [08-23-21 @ 19:59]    TSH 1.85      [07-29-21 @ 11:12]    HBsAg Nonreact      [08-26-21 @ 10:02]  HCV 0.20, Nonreact      [08-26-21 @ 10:02]  HIV Nonreact      [08-25-21 @ 12:13]    SPEP Interpretation: with acute phase reaction. HOSSEIN Kay MD      [08-03-21 @ 07:01]

## 2021-08-30 NOTE — PROGRESS NOTE ADULT - ATTENDING COMMENTS
1. Acute hypoxemic respiratory failure due to B cell lymphoma with recurrent pleural effusions. Pt with L  Pleurx catheter hooked up to suction for PTX. No air leak. Continue current AC vent settings..  2. ID. Fever 24 hours ago. No fever  last night. Pt on Vancomycin and Zosyn, Await all cx results.  Follow Vancomycin levels. Currently elevated.  3. B Cell lymphoma. If no evidence of  acute infection tomorrow am will start CHOP as per HEME. Lung POCUS shows no pleural effusion on L . Predominant a lines bilaterally. Abd POCUS shows moderate simple ascites. Continue to follow tumor lysis labs. Pt has received Rituxan recently.  4. FEN:  Tube feeding on hold due to residuals. Decrease Fentanyl. Receiving bowel regimen.  5. Hypotension requiring norepinephrine.

## 2021-08-31 NOTE — PROGRESS NOTE ADULT - SUBJECTIVE AND OBJECTIVE BOX
****************************************  Marin Adame PGY4  Hematology/Oncology  [phone]  ****************************************  SUBJECTIVE  Patient seen and examined at bedside. No acute events overnight  Unable to obtain ROS due to patient being intubated and sedated     OBJECTIVE   Vital Signs Last 24 Hrs  T(C): 36.7 (31 Aug 2021 08:00), Max: 36.8 (30 Aug 2021 20:00)  T(F): 98 (31 Aug 2021 08:00), Max: 98.2 (30 Aug 2021 20:00)  HR: 88 (31 Aug 2021 10:40) (63 - 97)  BP: 102/56 (31 Aug 2021 10:00) (95/50 - 116/60)  BP(mean): 66 (31 Aug 2021 10:00) (60 - 83)  RR: 16 (31 Aug 2021 10:00) (16 - 16)  SpO2: 100% (31 Aug 2021 10:40) (100% - 100%)    08-30-21 @ 07:01  -  08-31-21 @ 07:00  --------------------------------------------------------  IN: 3939 mL / OUT: 1195 mL / NET: 2744 mL    08-31-21 @ 07:01  -  08-31-21 @ 13:12  --------------------------------------------------------  IN: 74.3 mL / OUT: 100 mL / NET: -25.7 mL      PHYSICAL EXAM:  General: intubated, sedated  Respiratory: L pleurx in place, draining 100 cc overnight   Cardiovascular: S1S2 present  Abdomen: distended, tympanic, appears to be edematous bowel on POCUS, no significant ascites appreciated.   Extremities: LE swelling, edematous. Cold extremities, RUE colder than left   Neurological: sedated             LABS                        8.6    11.19 )-----------( 183      ( 31 Aug 2021 00:46 )             27.3       08-31    139  |  109<H>  |  69<H>  ----------------------------<  184<H>  4.3   |  13<L>  |  1.17    Ca    7.5<L>      31 Aug 2021 00:46  Phos  7.3     08-31  Mg     1.90     08-31    TPro  4.4<L>  /  Alb  1.8<L>  /  TBili  0.2  /  DBili  x   /  AST  32  /  ALT  9   /  AlkPhos  68  08-31          Follow Up:      RADIOLOGY:

## 2021-08-31 NOTE — PROGRESS NOTE ADULT - ASSESSMENT
Likely secondary to IV contrast and diuresis/lasix with possible component of urinary retention and hydronephrosis and now TLS? (hx of ureteral stents in the past).

## 2021-08-31 NOTE — PROGRESS NOTE ADULT - SUBJECTIVE AND OBJECTIVE BOX
COVERING RESIDENT: MALGORZATA PERRY     INTERVAL HPI/OVERNIGHT EVENTS: No acute events overnight.    SUBJECTIVE: Patient seen and examined at bedside.     Unable to assess ROS as pt is intubated + sedated.    OBJECTIVE:    VITAL SIGNS:  ICU Vital Signs Last 24 Hrs  T(C): 36.7 (31 Aug 2021 08:00), Max: 36.8 (30 Aug 2021 20:00)  T(F): 98 (31 Aug 2021 08:00), Max: 98.2 (30 Aug 2021 20:00)  HR: 89 (31 Aug 2021 08:00) (58 - 97)  BP: 105/63 (31 Aug 2021 08:00) (95/50 - 128/68)  BP(mean): 69 (31 Aug 2021 08:00) (60 - 84)  ABP: --  ABP(mean): --  RR: 16 (31 Aug 2021 08:00) (16 - 16)  SpO2: 100% (31 Aug 2021 08:00) (93% - 100%)    Mode: AC/ CMV (Assist Control/ Continuous Mandatory Ventilation), RR (machine): 16, TV (machine): 380, FiO2: 40, PEEP: 5, ITime: 0.76, MAP: 10, PIP: 28    08-30 @ 07:01 - 08-31 @ 07:00  --------------------------------------------------------  IN: 3939 mL / OUT: 1195 mL / NET: 2744 mL    08-31 @ 07:01  -  08-31 @ 08:06  --------------------------------------------------------  IN: 27 mL / OUT: 50 mL / NET: -23 mL      CAPILLARY BLOOD GLUCOSE      POCT Blood Glucose.: 166 mg/dL (31 Aug 2021 05:10)      PHYSICAL EXAM:    General: intubated, sedated  Respiratory: L pleurx in place  Cardiovascular: S1S2 present  Abdomen: distended, tympanic, appears to be edematous bowel on POCUS, no significant ascites appreciated.   Extremities: LE swelling, edematous. Cold extremities, RUE colder than left   Neurological: sedated     MEDICATIONS:  MEDICATIONS  (STANDING):  allopurinol 100 milliGRAM(s) Oral daily  chlorhexidine 0.12% Liquid 15 milliLiter(s) Oral Mucosa every 12 hours  chlorhexidine 4% Liquid 1 Application(s) Topical <User Schedule>  dexAMETHasone  IVPB 40 milliGRAM(s) IV Intermittent daily  dextrose 40% Gel 15 Gram(s) Oral once  dextrose 5%. 1000 milliLiter(s) (100 mL/Hr) IV Continuous <Continuous>  dextrose 5%. 1000 milliLiter(s) (50 mL/Hr) IV Continuous <Continuous>  dextrose 50% Injectable 25 Gram(s) IV Push once  dextrose 50% Injectable 12.5 Gram(s) IV Push once  dextrose 50% Injectable 25 Gram(s) IV Push once  fentaNYL   Infusion. 2 MICROgram(s)/kG/Hr (12.8 mL/Hr) IV Continuous <Continuous>  glucagon  Injectable 1 milliGRAM(s) IntraMuscular once  heparin   Injectable 5000 Unit(s) SubCutaneous every 8 hours  insulin lispro (ADMELOG) corrective regimen sliding scale   SubCutaneous every 6 hours  norepinephrine Infusion 0.05 MICROgram(s)/kG/Min (2.93 mL/Hr) IV Continuous <Continuous>  petrolatum Ophthalmic Ointment 1 Application(s) Both EYES two times a day  piperacillin/tazobactam IVPB.. 3.375 Gram(s) IV Intermittent every 8 hours  polyethylene glycol 3350 17 Gram(s) Oral daily  propofol Infusion 50 MICROgram(s)/kG/Min (19.2 mL/Hr) IV Continuous <Continuous>  senna Syrup 15 milliLiter(s) Oral at bedtime    MEDICATIONS  (PRN):  bisacodyl Suppository 10 milliGRAM(s) Rectal daily PRN Constipation  hydrocortisone sodium succinate Injectable 100 milliGRAM(s) IV Push once PRN PRN Chemotherapy Reaction  meperidine     Injectable 25 milliGRAM(s) IV Push once PRN PRN Chemotherapy Reaction (Rigors)      ALLERGIES:  Allergies    penicillins (Rash)    Intolerances        LABS:                        8.6    11.19 )-----------( 183      ( 31 Aug 2021 00:46 )             27.3     08-31    139  |  109<H>  |  69<H>  ----------------------------<  184<H>  4.3   |  13<L>  |  1.17    Ca    7.5<L>      31 Aug 2021 00:46  Phos  7.3     08-31  Mg     1.90     08-31    TPro  4.4<L>  /  Alb  1.8<L>  /  TBili  0.2  /  DBili  x   /  AST  32  /  ALT  9   /  AlkPhos  68  08-31    PT/INR - ( 31 Aug 2021 00:46 )   PT: 11.3 sec;   INR: 0.99 ratio         PTT - ( 31 Aug 2021 00:46 )  PTT:27.7 sec      RADIOLOGY & ADDITIONAL TESTS: Reviewed. COVERING RESIDENT: MALGORZATA PERRY     INTERVAL HPI/OVERNIGHT EVENTS: Patient hypothermic overnight to 96.3.    SUBJECTIVE: Patient seen and examined at bedside.     Unable to assess ROS as pt is intubated + sedated.    OBJECTIVE:    VITAL SIGNS:  ICU Vital Signs Last 24 Hrs  T(C): 36.7 (31 Aug 2021 08:00), Max: 36.8 (30 Aug 2021 20:00)  T(F): 98 (31 Aug 2021 08:00), Max: 98.2 (30 Aug 2021 20:00)  HR: 89 (31 Aug 2021 08:00) (58 - 97)  BP: 105/63 (31 Aug 2021 08:00) (95/50 - 128/68)  BP(mean): 69 (31 Aug 2021 08:00) (60 - 84)  ABP: --  ABP(mean): --  RR: 16 (31 Aug 2021 08:00) (16 - 16)  SpO2: 100% (31 Aug 2021 08:00) (93% - 100%)    Mode: AC/ CMV (Assist Control/ Continuous Mandatory Ventilation), RR (machine): 16, TV (machine): 380, FiO2: 40, PEEP: 5, ITime: 0.76, MAP: 10, PIP: 28    08-30 @ 07:01  -  08-31 @ 07:00  --------------------------------------------------------  IN: 3939 mL / OUT: 1195 mL / NET: 2744 mL    08-31 @ 07:01  -  08-31 @ 08:06  --------------------------------------------------------  IN: 27 mL / OUT: 50 mL / NET: -23 mL      CAPILLARY BLOOD GLUCOSE      POCT Blood Glucose.: 166 mg/dL (31 Aug 2021 05:10)      PHYSICAL EXAM:    General: intubated, sedated  Respiratory: L pleurx in place  Cardiovascular: S1S2 present  Abdomen: distended, tympanic, appears to be edematous bowel on POCUS, no significant ascites appreciated.   Extremities: LE swelling, edematous. Cold extremities, RUE colder than left   Neurological: sedated     MEDICATIONS:  MEDICATIONS  (STANDING):  allopurinol 100 milliGRAM(s) Oral daily  chlorhexidine 0.12% Liquid 15 milliLiter(s) Oral Mucosa every 12 hours  chlorhexidine 4% Liquid 1 Application(s) Topical <User Schedule>  dexAMETHasone  IVPB 40 milliGRAM(s) IV Intermittent daily  dextrose 40% Gel 15 Gram(s) Oral once  dextrose 5%. 1000 milliLiter(s) (100 mL/Hr) IV Continuous <Continuous>  dextrose 5%. 1000 milliLiter(s) (50 mL/Hr) IV Continuous <Continuous>  dextrose 50% Injectable 25 Gram(s) IV Push once  dextrose 50% Injectable 12.5 Gram(s) IV Push once  dextrose 50% Injectable 25 Gram(s) IV Push once  fentaNYL   Infusion. 2 MICROgram(s)/kG/Hr (12.8 mL/Hr) IV Continuous <Continuous>  glucagon  Injectable 1 milliGRAM(s) IntraMuscular once  heparin   Injectable 5000 Unit(s) SubCutaneous every 8 hours  insulin lispro (ADMELOG) corrective regimen sliding scale   SubCutaneous every 6 hours  norepinephrine Infusion 0.05 MICROgram(s)/kG/Min (2.93 mL/Hr) IV Continuous <Continuous>  petrolatum Ophthalmic Ointment 1 Application(s) Both EYES two times a day  piperacillin/tazobactam IVPB.. 3.375 Gram(s) IV Intermittent every 8 hours  polyethylene glycol 3350 17 Gram(s) Oral daily  propofol Infusion 50 MICROgram(s)/kG/Min (19.2 mL/Hr) IV Continuous <Continuous>  senna Syrup 15 milliLiter(s) Oral at bedtime    MEDICATIONS  (PRN):  bisacodyl Suppository 10 milliGRAM(s) Rectal daily PRN Constipation  hydrocortisone sodium succinate Injectable 100 milliGRAM(s) IV Push once PRN PRN Chemotherapy Reaction  meperidine     Injectable 25 milliGRAM(s) IV Push once PRN PRN Chemotherapy Reaction (Rigors)      ALLERGIES:  Allergies    penicillins (Rash)    Intolerances        LABS:                        8.6    11.19 )-----------( 183      ( 31 Aug 2021 00:46 )             27.3     08-31    139  |  109<H>  |  69<H>  ----------------------------<  184<H>  4.3   |  13<L>  |  1.17    Ca    7.5<L>      31 Aug 2021 00:46  Phos  7.3     08-31  Mg     1.90     08-31    TPro  4.4<L>  /  Alb  1.8<L>  /  TBili  0.2  /  DBili  x   /  AST  32  /  ALT  9   /  AlkPhos  68  08-31    PT/INR - ( 31 Aug 2021 00:46 )   PT: 11.3 sec;   INR: 0.99 ratio         PTT - ( 31 Aug 2021 00:46 )  PTT:27.7 sec      RADIOLOGY & ADDITIONAL TESTS: Reviewed.

## 2021-08-31 NOTE — PROGRESS NOTE ADULT - ASSESSMENT
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 Her course has been complicated by intermittent fevers and she was started on empiric cefepime. She has had labile blood pressures, at times requiring pressor support.  Her course has also been complicated by respiratory failure requiring intubation.       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 LP with CSF flow and cytopathology done 8/25; appears traumatic with 6000 rbc; 77 lymphocytes although no atypicals, 23 monocytes  - 8/20: Excisional biopsy of left inguinal lymph node: DLFCL, NOS, Germinal centre B-cell subtype with high proliferative index and necrosis. CD20, PAX-5, CD10, BCL-6, BCL-2, C-MYC positive. FISH, Karyotype pending.   Initial work up for treatment: Echo 8/3 with LVEF of 56%; Hepatitis panel negative; Hep B core total negative, HIV negative, HTLV1 and HTLV2, Pending  - 8/24: Dexamethasone 20 mg IV daily x 2 days; Increased to dexamethasone 40mg daily IV on 8/26 - to be continued for 5 days until 8/29  - Received Rituxan on 8/28; plan was for cyclophosphamide, Doxorubicin and vincristine on Sunday 8/29, however patient with ongoing fevers and increasing pressor requirement concerning for possible infectious process. Currently afebrile and infectious w/up negative so far.  -c/w dexamethasone 40mg IV x4 days (8/31-9/3)  -Plan for Cytoxan reduced dose starting 9/1.   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 Her course has been complicated by intermittent fevers and she was started on empiric cefepime. She has had labile blood pressures, at times requiring pressor support.  Her course has also been complicated by respiratory failure requiring intubation.       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 LP with CSF flow and cytopathology done 8/25; appears traumatic with 6000 rbc; 77 lymphocytes although no atypicals, 23 monocytes  - 8/20: Excisional biopsy of left inguinal lymph node: DLFCL, NOS, Germinal centre B-cell subtype with high proliferative index and necrosis. CD20, PAX-5, CD10, BCL-6, BCL-2, C-MYC positive. FISH, Karyotype pending.   Initial work up for treatment: Echo 8/3 with LVEF of 56%; Hepatitis panel negative; Hep B core total negative, HIV negative, HTLV1 and HTLV2, Pending  - 8/24: Dexamethasone 20 mg IV daily x 2 days; Increased to dexamethasone 40mg daily IV on 8/26 - to be continued for 5 days until 8/29  - Received Rituxan on 8/28; plan was for cyclophosphamide, Doxorubicin and vincristine on Sunday 8/29, however patient with ongoing fevers and increasing pressor requirement concerning for possible infectious process. Currently afebrile and infectious w/up negative so far.  -discontinue dexamethasone 40mg IV after today's dose   -Plan for Cytoxan reduced dose starting 9/1.   - Continue with allopurinol for TLS prophylaxis;   - Will require TLS lab monitoring [id number] (LDH, Uric acid, BMP, phos, Mag)       Fevers:   - unclear source of fevers, work up negative so pato   - Bcx from 8/28 NGTD  - Urine cx no growth   - Was on cefepime; now on zosyn and vanc by level (management of abx as per primary team)

## 2021-08-31 NOTE — PROGRESS NOTE ADULT - SUBJECTIVE AND OBJECTIVE BOX
[facility] DIVISION OF KIDNEY DISEASES AND HYPERTENSION -- FOLLOW UP NOTE  --------------------------------------------------------------------------------  HPI: 66-year-old female with HTN, HLD, recently discovered ovarian mass suspicious for malignancy (7/2021), L hydroureteronephrosis s/p renal stent (7/15/21), and recent hospitalization ([facility] 7/26-8/4) for acute renal failure (likely obstructive) requiring urgent HD, ascites s/p therapeutic paracentesis (7/27/21), and pleural effusion s/p R thoracentesis (8/2/21) showing lymphocytosis but no malignant cells admitted for acute hypercarbic respiratory failure due to pleural effusions most likely caused by ovarian malignancy complicated with volume overload with ascites and B/L LE edema. 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 Now found to have new onset PURVI.    SCr was at 0.99 on 08/19 which increased to 1.65 on 08/22 and peaked to 2.59 on 08/24. SCr today 08/31 at 1.17 and nonoliguric with UOP at 925 cc in last 24 hours.       PAST HISTORY  --------------------------------------------------------------------------------  No significant changes to PMH, PSH, FHx, SHx, unless otherwise noted    ALLERGIES & MEDICATIONS  --------------------------------------------------------------------------------  Allergies    penicillins (Rash)    Intolerances      Standing Inpatient Medications  allopurinol 100 milliGRAM(s) Oral daily  chlorhexidine 0.12% Liquid 15 milliLiter(s) Oral Mucosa every 12 hours  chlorhexidine 4% Liquid 1 Application(s) Topical <User Schedule>  dexAMETHasone  IVPB 40 milliGRAM(s) IV Intermittent daily  dextrose 40% Gel 15 Gram(s) Oral once  dextrose 5%. 1000 milliLiter(s) IV Continuous <Continuous>  dextrose 5%. 1000 milliLiter(s) IV Continuous <Continuous>  dextrose 50% Injectable 25 Gram(s) IV Push once  dextrose 50% Injectable 12.5 Gram(s) IV Push once  dextrose 50% Injectable 25 Gram(s) IV Push once  glucagon  Injectable 1 milliGRAM(s) IntraMuscular once  heparin   Injectable 5000 Unit(s) SubCutaneous every 8 hours  insulin lispro (ADMELOG) corrective regimen sliding scale   SubCutaneous every 6 hours  norepinephrine Infusion 0.05 MICROgram(s)/kG/Min IV Continuous <Continuous>  petrolatum Ophthalmic Ointment 1 Application(s) Both EYES two times a day  piperacillin/tazobactam IVPB.. 3.375 Gram(s) IV Intermittent every 8 hours  polyethylene glycol 3350 17 Gram(s) Oral daily  propofol Infusion 50 MICROgram(s)/kG/Min IV Continuous <Continuous>  senna Syrup 15 milliLiter(s) Oral at bedtime    PRN Inpatient Medications  bisacodyl Suppository 10 milliGRAM(s) Rectal daily PRN  hydrocortisone sodium succinate Injectable 100 milliGRAM(s) IV Push once PRN  meperidine     Injectable 25 milliGRAM(s) IV Push once PRN      REVIEW OF SYSTEMS  --------------------------------------------------------------------------------  unable to obtain    VITALS/PHYSICAL EXAM  --------------------------------------------------------------------------------  T(C): 36.7 (08-31-21 @ 08:00), Max: 36.8 (08-30-21 @ 20:00)  HR: 88 (08-31-21 @ 10:40) (59 - 97)  BP: 102/56 (08-31-21 @ 10:00) (95/50 - 126/67)  RR: 16 (08-31-21 @ 10:00) (16 - 16)  SpO2: 100% (08-31-21 @ 10:40) (100% - 100%)  Wt(kg): --        08-30-21 @ 07:01  -  08-31-21 @ 07:00  --------------------------------------------------------  IN: 3939 mL / OUT: 1195 mL / NET: 2744 mL    08-31-21 @ 07:01  -  08-31-21 @ 12:00  --------------------------------------------------------  IN: 74.3 mL / OUT: 100 mL / NET: -25.7 mL        Physical Exam:  	Gen: Sedated  	HEENT: ET tube +  	Pulm: mechanically ventilated via ETT  	CV: S1S2  	Abd: Soft, +BS  	Ext: No LE edema B/L  	Neuro: Sedated  	Skin: Warm and dry      LABS/STUDIES  --------------------------------------------------------------------------------              8.6    11.19 >-----------<  183      [08-31-21 @ 00:46]              27.3     139  |  109  |  69  ----------------------------<  184      [08-31-21 @ 00:46]  4.3   |  13  |  1.17        Ca     7.5     [08-31-21 @ 00:46]      iCa    1.05     [08-31 @ 08:55]      Mg     1.90     [08-31-21 @ 00:46]      Phos  7.3     [08-31-21 @ 00:46]    TPro  4.4  /  Alb  1.8  /  TBili  0.2  /  DBili  x   /  AST  32  /  ALT  9   /  AlkPhos  68  [08-31-21 @ 00:46]    PT/INR: PT 11.3 , INR 0.99       [08-31-21 @ 00:46]  PTT: 27.7       [08-31-21 @ 00:46]    Uric acid 2.4      [08-31-21 @ 08:55]        [08-31-21 @ 08:55]    Creatinine Trend:  SCr 1.17 [08-31 @ 00:46]  SCr 1.21 [08-30 @ 17:18]  SCr 1.08 [08-30 @ 03:45]  SCr 1.05 [08-29 @ 20:43]  SCr 0.85 [08-29 @ 11:07]    Urinalysis - [08-28-21 @ 17:00]      Color Yellow / Appearance Turbid / SG 1.034 / pH 6.5      Gluc Trace / Ketone Negative  / Bili Negative / Urobili <2 mg/dL       Blood Small / Protein 100 mg/dL / Leuk Est Large / Nitrite Negative      RBC 6-10 / WBC 15-20 / Hyaline 3 / Gran 20 / Sq Epi  / Non Sq Epi Occasional / Bacteria Moderate      TSH 1.85      [07-29-21 @ 11:12]    HBsAg Nonreact      [08-26-21 @ 10:02]  HCV 0.20, Nonreact      [08-26-21 @ 10:02]  HIV Nonreact      [08-25-21 @ 12:13]    SPEP Interpretation: with acute phase reaction. HOSSEIN Kay MD      [08-03-21 @ 07:01]

## 2021-08-31 NOTE — PROGRESS NOTE ADULT - ATTENDING COMMENTS
Patient examined and ROS reviewed. A case of suspected TLS with decreased urine output and increase in uric acid, phosphate, decrease in bicarbonate and elevation of serum creatinine in the setting of lymphoma and obstructive uropathy. Advised to continue hydration.

## 2021-08-31 NOTE — PROGRESS NOTE ADULT - PROBLEM SELECTOR PLAN 1
SCr was at 0.99 on 08/19 which increased to 1.65 on 08/22 and peaked to 2.59 on 08/24. SCr improved to 0.78 yesterday and remains stable at 1.08 today 08/30. UOP slightly decreased over the last 12 hours, carnes was reinserted and noted to have nonoliguric. Pt. now with evidence of early TLS given hyperkalemia, hyperphosphatemia. SCr today 08/31 at 1.17 and nonoliguric with UOP at 925 cc in last 24 hours. Consider bicarb ggt to help with acidosis. No plan for HD. Closely monitor TLS labs and urine output. If pt. becomes oliguric with critical acid-base/electrolyte derangement , RRT might need to be considered. Avoid NSAIDs, ACEI/ARBS, RCA and nephrotoxins.

## 2021-08-31 NOTE — PROGRESS NOTE ADULT - ASSESSMENT
Ms. Stafford is a 66-year-old woman with PMH significant for HTN, HLD, L hydroureteronephrosis s/p renal stent on 7/15, who presents with volume overload 2/2 high grade B-cell lymphoma. S/p thoracentesis 8/13, paracentesis and excisional biopsy of left inguinal lymph node on 8/20. Accepted to MICU for acute hypoxic/hypercapneic respiratory failure now s/p intubation and L pleurex catheter placement, s/p chemotherapy.    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  - Currently sedated and intubated with propofol and fentanyl  - CT head with no intracranial pathology  - S/p LP with flow cytometry and cytopathology to evaluate possible lymphomatous meningitis, pending results  - Will obtain MRI per heme recommendations to further evaluate CNS involvement unless patient's neurological function improves off of sedation    #Cardiovascular  - On pressor support with levo  - Echo 8/3 showing concentric left ventricular remodeling, hyperdynamic left ventricle, calcified trileaflet aortic valve with normal opening, EF 56%  - POCUS showing adequate cardiac output    #Respiratory  - Hypoxic/hypercapneic respiratory failure likely secondary to malignant pleural effusions s/p thoracentesis on 8/13 with re-accumulation of pleural effusions  - Intubated 8/24 with vent settings of 16 | 380 | 5 | 40  - POCUS by pulmonology showing moderate right sided pleural effusion without diaphragmatic flattening  - S/p second thoracentesis 8/25, repeat POCUS on 8/26 showing bilateral effusions, s/p L pleurex   - Continue to monitor, may ease sedation and attempt spontaneous breathing trial    #GI/Nutrition  - S/p paracentesis 8/20, still remains distended  - Patient tolerating tube feeds  - constipation: bisacodyl suppository, senna, miralax, s/p 1 dose lactulose  - AXR read pending- will give Reglan if no ileus   - s/p fecal disimpaction 8/29, abd x-ray without obstruction   - trial of golytely today and monitor for BM    #/Renal  - PURVI, resolved  - Nephrology on board, recommend holding LASIX & other nephrotoxic medications.   - Nephrostomy tubes considered however per IR recommendations not appropriate at this time as patient's Cr is stable  - B/L hydronephrosis stable on CT a/p  - monitor TLS labs q8h   - NS @150 cc/h for kaliuresis   - carnes placed     #Skin  - No sacral decubiti    #ID  - c/w Vanco and Zosyn to include anaerobic coverage   - U/A grossly positive, urine cx pending  - blood cx 8/28/21 NGTD  - repeat vanc trough    #Endocrine  - SSI   - will readjust as necessary     #Hematologic/DVT ppx  - B-cell lymphoma, per heme preliminary results of surgical pathology consistent with B-cell lymphoma but pending further evaluation for treatment choice  - TLS labs q8  - Allopurinol for TLS prophylaxis, s/p rasburicase  - Heme/Onc recommending starting steroids with dexamethasone, s/p 40 mg dose daily to end 8/29   - s/p Rituxan 8/28/21  - CHOP held until cx results   - DVT prophylaxis with heparin subQ    #Ethics  - Patient is FULL CODE per palliative care discussion with patient and her sons (Yuan and Jose).   Fill-in proxy is Jose Camargo: [phone] Ms. Stafford is a 66-year-old woman with PMH significant for HTN, HLD, L hydroureteronephrosis s/p renal stent on 7/15, who presents with volume overload 2/2 high grade B-cell lymphoma. S/p thoracentesis 8/13, paracentesis and excisional biopsy of left inguinal lymph node on 8/20. Accepted to MICU for acute hypoxic/hypercapneic respiratory failure now s/p intubation and L pleurex catheter placement, s/p chemotherapy.    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  - Currently sedated and intubated with propofol and fentanyl - will d/c fentanyl today   - CT head with no intracranial pathology  - S/p LP with flow cytometry and cytopathology to evaluate possible lymphomatous meningitis, pending results  - Will monitor mental status off of sedation      #Cardiovascular  - On pressor support with levo  - Echo 8/3 showing concentric left ventricular remodeling, hyperdynamic left ventricle, calcified trileaflet aortic valve with normal opening, EF 56%  - POCUS showing adequate cardiac output    #Respiratory  - Hypoxic/hypercapneic respiratory failure likely secondary to malignant pleural effusions s/p thoracentesis on 8/13 with re-accumulation of pleural effusions  - Intubated 8/24 with vent settings of 16 | 380 | 5 | 40  - POCUS by pulmonology showing moderate right sided pleural effusion without diaphragmatic flattening  - S/p second thoracentesis 8/25, repeat POCUS on 8/26 showing bilateral effusions, s/p L pleurex   - Continue to monitor, may ease sedation and attempt spontaneous breathing trial    #GI/Nutrition  - S/p paracentesis 8/20, still remains distended with ascites   - BM 8/30 overnight   - s/p fecal disimpaction 8/29, abd x-ray without obstruction     #/Renal  - PURVI, resolved  - Nephrology on board, recommend holding LASIX & other nephrotoxic medications.   - Nephrostomy tubes considered however per IR recommendations not appropriate at this time as patient's Cr is stable  - B/L hydronephrosis stable on CT a/p  - monitor TLS labs q8h   - carnes placed     #Skin  - No sacral decubiti    #ID  - s/p Vanco and Zosyn (to end 8/31)   - U/A grossly positive, urine cx NGTD  - blood cx 8/28/21 NGTD    #Endocrine  - SSI   - will readjust as necessary     #Hematologic/DVT ppx  - B-cell lymphoma, per heme preliminary results of surgical pathology consistent with B-cell lymphoma but pending further evaluation for treatment choice  - TLS labs q8  - Allopurinol for TLS prophylaxis, s/p rasburicase  - Heme/Onc recommending starting steroids with dexamethasone, s/p 40 mg dose (ended 8/29)   - s/p Rituxan 8/28/21  - restart dexamethasone (8/31 -->) given large tumor burden  - CHOP held for now   - DVT prophylaxis with heparin subQ    #Ethics  - Patient is FULL CODE per palliative care discussion with patient and her sons (Yuan and Jose).   Fill-in proxy is Jose Camargo: [phone] Ms. Stafford is a 66-year-old woman with PMH significant for HTN, HLD, L hydroureteronephrosis s/p renal stent on 7/15, who presents with volume overload 2/2 high grade B-cell lymphoma. S/p thoracentesis 8/13, paracentesis and excisional biopsy of left inguinal lymph node on 8/20. Accepted to MICU for acute hypoxic/hypercapneic respiratory failure now s/p intubation and L pleurex catheter placement, s/p chemotherapy.    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  - Currently sedated and intubated with propofol and fentanyl - will d/c fentanyl today   - CT head with no intracranial pathology  - S/p LP with flow cytometry and cytopathology to evaluate possible lymphomatous meningitis, pending results  - Will monitor mental status off of sedation      #Cardiovascular  - On pressor support with levo  - Echo 8/3 showing concentric left ventricular remodeling, hyperdynamic left ventricle, calcified trileaflet aortic valve with normal opening, EF 56%  - POCUS showing adequate cardiac output    #Respiratory  - Hypoxic/hypercapneic respiratory failure likely secondary to malignant pleural effusions s/p thoracentesis on 8/13 with re-accumulation of pleural effusions  - Intubated 8/24 with vent settings of 16 | 380 | 5 | 40  - POCUS by pulmonology showing moderate right sided pleural effusion without diaphragmatic flattening  - S/p second thoracentesis 8/25, repeat POCUS on 8/26 showing bilateral effusions, s/p L pleurex   - Continue to monitor, may ease sedation and attempt spontaneous breathing trial    #GI/Nutrition  - S/p paracentesis 8/20, still remains distended with ascites   - BM 8/30 overnight   - s/p fecal disimpaction 8/29, abd x-ray without obstruction   - will take NG tube off suction and start trickle feeds    #/Renal  - PURVI, resolved  - Nephrology on board, recommend holding LASIX & other nephrotoxic medications.   - Nephrostomy tubes considered however per IR recommendations not appropriate at this time as patient's Cr is stable  - B/L hydronephrosis stable on CT a/p  - monitor TLS labs q8h   - carnes placed     #Skin  - No sacral decubiti    #ID  - s/p Vanco and Zosyn (to end 8/31)   - U/A grossly positive, urine cx NGTD  - blood cx 8/28/21 NGTD    #Endocrine  - SSI   - will readjust as necessary     #Hematologic/DVT ppx  - B-cell lymphoma, per heme preliminary results of surgical pathology consistent with B-cell lymphoma but pending further evaluation for treatment choice  - TLS labs q8  - Allopurinol for TLS prophylaxis, s/p rasburicase  - Heme/Onc recommending starting steroids with dexamethasone, s/p 40 mg dose (ended 8/29)   - s/p Rituxan 8/28/21  - restart dexamethasone (8/31 -->) given large tumor burden  - CHOP held for now   - DVT prophylaxis with heparin subQ    #Ethics  - Patient is FULL CODE per palliative care discussion with patient and her sons (Yuan and Jose).   Fill-in proxy is Jose Camargo: [phone]

## 2021-08-31 NOTE — PROGRESS NOTE ADULT - ATTENDING COMMENTS
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 CD20, PAX-5, CD10, BCL-6, BCL-2, C-MYC positive. FISH, Karyotype pending.  presented with volume overload, now intubated in the MICU and with pressor support, planned for R-CHOP chemotherapy.   Her course has been complicated by intermittent fevers   started on dexamethasone 20 mg IV daily x 2 days; Increased to dexamethasone 40mg daily IV on 8/26 - to be continued for 5 days until 8/29  received Rituxan on 8/28 only with plans for cyclophosphamide, Doxorubicin and vincristine on Sunday 8/29 however patient with ongoing fevers and increasing pressor requirement concerning for possible infectious process.   Now afebrile and infectious w/up negative.  dexamethasone 40mg IV x 1 on 8/31, and then Cytoxan reduced dose x 3 days starting 9/1 given her large burden of disease  Patient is at high risk for TLS  Continue with TLS lab monitoring q1kowhv (LDH, Uric acid, BMP, phos, Mag) while on treatment   Continue with allopurinol for TLS prophylaxis;   We will continue to follow

## 2021-08-31 NOTE — CHART NOTE - NSCHARTNOTEFT_GEN_A_CORE
: Dr. Garza and Dr. Coleman    INDICATION: Respiratory failure     PROCEDURE:  [X] LIMITED ECHO  [X] LIMITED CHEST  [ ] LIMITED RETROPERITONEAL  [X] LIMITED ABDOMINAL  [ ] LIMITED DVT  [ ] NEEDLE GUIDANCE VASCULAR  [ ] NEEDLE GUIDANCE THORACENTESIS  [ ] NEEDLE GUIDANCE PARACENTESIS  [ ] NEEDLE GUIDANCE PERICARDIOCENTESIS  [ ] OTHER    FINDINGS:  Lungs: A line predominant pattern with B/L moderate PLEFF, R now >L, lung sliding present bilaterally  Heart: Grossly normal LVSF, RV <LV, no pericardial effusions, IVC indeterminant  Abdomen: Moderate volume ascites, dilated loops of bowel with signs of ileus    INTERPRETATION:  -A line pattern in lungs pleural effusions B/L R>L  -no PTX  -Normal LVSF  -Moderate ascites   -Possible ileus    Images Uploaded on Q Path    Ricky Garza MD  PGY-4  Pulmonary/ Critical Care

## 2021-08-31 NOTE — PROGRESS NOTE ADULT - ATTENDING COMMENTS
1. Acute hypoxemic respiratory failure due to B cell lymphoma with recurrent pleural effusions. Pt with L  Pleurx catheter hooked up to suction for PTX. No air leak. Continue current AC vent settings. Taper sedation as tolerated.  2. ID. Fever 24 hours ago. No  further fever D/C Vancomycin and Zosyn today. .  Follow Vancomycin levels. Currently elevated.  3. B Cell lymphoma. If no evidence of  acute infection tomorrow am will start cytoxan tomorrow as per HEME. Lung POCUS shows no pleural effusion on L . Predominant a lines bilaterally. Abd POCUS shows moderate simple ascites. Continue to follow tumor lysis labs. Pt has received Rituxan recently.  4. FEN:  Tube feeding on hold due to residuals. Decrease Fentanyl. Receiving bowel regimen.  5. Hypotension requiring norepinephrine.

## 2021-09-01 NOTE — PROGRESS NOTE ADULT - ATTENDING COMMENTS
PURVI  Ascites  Volume overload    Please cont to monitor Is/Os and daily weights  Measure bladder pressure  Cont to trend Cr

## 2021-09-01 NOTE — PROGRESS NOTE ADULT - PROBLEM SELECTOR PLAN 1
SCr was at 0.99 on 08/19 which increased to 1.65 on 08/22 and peaked to 2.59 on 08/24. SCr improved to 0.78 8/29. has remained nonoliguric. Pt. now with evidence of early TLS given hyperkalemia, hyperphosphatemia. SCr  08/31 at 1.17 >> today at 1.3 and nonoliguric with UOP at 1.1L cc in last 24 hours. No plan for HD. Closely monitor TLS labs and urine output. Please measure bladder pressure.  Avoid NSAIDs, ACEI/ARBS, RCA and nephrotoxins.

## 2021-09-01 NOTE — PROGRESS NOTE ADULT - SUBJECTIVE AND OBJECTIVE BOX
****************************************  Marin Adame PGY4  Hematology/Oncology  [phone]/[id number]  ****************************************  SUBJECTIVE  Patient seen and examined at bedside. No acute events overnight  Unable to obtain ROS due to patient being intubated and sedated     OBJECTIVE   Vital Signs Last 24 Hrs  T(C): 36.9 (01 Sep 2021 08:16), Max: 37.3 (31 Aug 2021 16:00)  T(F): 98.4 (01 Sep 2021 08:16), Max: 99.1 (31 Aug 2021 16:00)  HR: 101 (01 Sep 2021 15:00) (85 - 114)  BP: 123/74 (01 Sep 2021 15:00) (97/59 - 134/76)  BP(mean): 86 (01 Sep 2021 15:00) (67 - 89)  RR: 18 (01 Sep 2021 15:00) (16 - 26)  SpO2: 99% (01 Sep 2021 15:00) (95% - 100%)    08-31-21 @ 07:01  -  09-01-21 @ 07:00  --------------------------------------------------------  IN: 648.5 mL / OUT: 1770 mL / NET: -1121.5 mL    09-01-21 @ 07:01 - 09-01-21 @ 15:32  --------------------------------------------------------  IN: 149 mL / OUT: 285 mL / NET: -136 mL      PHYSICAL EXAM:  General: intubated, sedated  Respiratory: L pleurx in place, draining 100 cc overnight   Cardiovascular: S1S2 present  Abdomen: distended, tympanic, appears to be edematous bowel on POCUS, no significant ascites appreciated.   Extremities: LE swelling, edematous. Cold extremities, RUE colder than left   Neurological: sedated       LABS                        8.6    15.72 )-----------( 205      ( 01 Sep 2021 00:16 )             26.4       09-01    142  |  106  |  73<H>  ----------------------------<  106<H>  4.2   |  15<L>  |  1.47<H>    Ca    7.7<L>      01 Sep 2021 07:59  Phos  8.1     09-01  Mg     1.90     09-01    TPro  4.9<L>  /  Alb  2.0<L>  /  TBili  0.2  /  DBili  x   /  AST  30  /  ALT  10  /  AlkPhos  79  09-01          Follow Up:      RADIOLOGY:

## 2021-09-01 NOTE — PROGRESS NOTE ADULT - ASSESSMENT
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 Her course has been complicated by intermittent fevers and she was started on empiric cefepime. She has had labile blood pressures, at times requiring pressor support.  Her course has also been complicated by respiratory failure requiring intubation.       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 LP with CSF flow and cytopathology done 8/25; appears traumatic with 6000 rbc; 77 lymphocytes although no atypicals, 23 monocytes  - 8/20: Excisional biopsy of left inguinal lymph node: DLFCL, NOS, Germinal centre B-cell subtype with high proliferative index and necrosis. CD20, PAX-5, CD10, BCL-6, BCL-2, C-MYC positive. FISH, Karyotype pending.   Initial work up for treatment: Echo 8/3 with LVEF of 56%; Hepatitis panel negative; Hep B core total negative, HIV negative, HTLV1 and HTLV2, Pending  - 8/24: Dexamethasone 20 mg IV daily x 2 days; Increased to dexamethasone 40mg daily IV on 8/26 - to be continued for 5 days until 8/29  - Received Rituxan on 8/28; plan was for cyclophosphamide, Doxorubicin and vincristine on Sunday 8/29, however patient with ongoing fevers and increasing pressor requirement concerning for possible infectious process. Currently afebrile and infectious w/up negative so far.  -discontinue dexamethasone 40mg IV 8/31  -Start reduced Cytoxan dose starting 9/1, continue on 9/2 and 9/3  - Continue with allopurinol for TLS prophylaxis;   - Will require TLS lab monitoring [id number] (LDH, Uric acid, BMP, phos, Mag)       Fevers:   - unclear source of fevers, work up negative so pato   - Bcx from 8/28 NGTD  - Urine cx no growth   - Was on cefepime; now on zosyn and vanc by level (management of abx as per primary team)   - of note patient with indeterminate quant gold, and +ve strongyloides; management as per ID

## 2021-09-01 NOTE — PROGRESS NOTE ADULT - ASSESSMENT
Ms. Stafford is a 66-year-old woman with PMH significant for HTN, HLD, L hydroureteronephrosis s/p renal stent on 7/15, who presents with volume overload 2/2 high grade B-cell lymphoma. S/p thoracentesis 8/13, paracentesis and excisional biopsy of left inguinal lymph node on 8/20. Accepted to MICU for acute hypoxic/hypercapneic respiratory failure now s/p intubation and L pleurex catheter placement, s/p chemotherapy.    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  - Currently sedated and intubated with propofol and fentanyl - will d/c fentanyl today   - CT head with no intracranial pathology  - S/p LP with flow cytometry and cytopathology to evaluate possible lymphomatous meningitis, pending results  - Will monitor mental status off of sedation      #Cardiovascular  - On pressor support with levo  - Echo 8/3 showing concentric left ventricular remodeling, hyperdynamic left ventricle, calcified trileaflet aortic valve with normal opening, EF 56%  - POCUS showing adequate cardiac output    #Respiratory  - Hypoxic/hypercapneic respiratory failure likely secondary to malignant pleural effusions s/p thoracentesis on 8/13 with re-accumulation of pleural effusions  - Intubated 8/24 with vent settings of 16 | 380 | 5 | 40  - POCUS by pulmonology showing moderate right sided pleural effusion without diaphragmatic flattening  - S/p second thoracentesis 8/25, repeat POCUS on 8/26 showing bilateral effusions, s/p L pleurex   - Continue to monitor, may ease sedation and attempt spontaneous breathing trial    #GI/Nutrition  - S/p paracentesis 8/20, still remains distended with ascites   - BM 8/30 overnight   - s/p fecal disimpaction 8/29, abd x-ray without obstruction   - will take NG tube off suction and start trickle feeds    #/Renal  - PURVI, resolved  - Nephrology on board, recommend holding LASIX & other nephrotoxic medications.   - Nephrostomy tubes considered however per IR recommendations not appropriate at this time as patient's Cr is stable  - B/L hydronephrosis stable on CT a/p  - monitor TLS labs q8h   - carnes placed     #Skin  - No sacral decubiti    #ID  - s/p Vanco and Zosyn (to end 8/31)   - U/A grossly positive, urine cx NGTD  - blood cx 8/28/21 NGTD    #Endocrine  - SSI   - will readjust as necessary     #Hematologic/DVT ppx  - B-cell lymphoma, per heme preliminary results of surgical pathology consistent with B-cell lymphoma but pending further evaluation for treatment choice  - TLS labs q8  - Allopurinol for TLS prophylaxis, s/p rasburicase  - Heme/Onc recommending starting steroids with dexamethasone, s/p 40 mg dose (ended 8/29)   - s/p Rituxan 8/28/21  - restart dexamethasone (8/31 -->) given large tumor burden  - CHOP held for now   - DVT prophylaxis with heparin subQ    #Ethics  - Patient is FULL CODE per palliative care discussion with patient and her sons (Yuan and Jose).   Fill-in proxy is Jose Camargo: [phone] Ms. Stafford is a 66-year-old woman with PMH significant for HTN, HLD, L hydroureteronephrosis s/p renal stent on 7/15, who presents with volume overload 2/2 high grade B-cell lymphoma. S/p thoracentesis 8/13, paracentesis and excisional biopsy of left inguinal lymph node on 8/20. Accepted to MICU for acute hypoxic/hypercapneic respiratory failure now s/p intubation and L pleurex catheter placement, s/p chemotherapy.    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 meningitis, pending results  - Will monitor mental status off of sedation      #Cardiovascular  - On pressor support with levo  - Echo 8/3 showing concentric left ventricular remodeling, hyperdynamic left ventricle, calcified trileaflet aortic valve with normal opening, EF 56%  - POCUS showing adequate cardiac output    #Respiratory  - Hypoxic/hypercapneic respiratory failure likely secondary to malignant pleural effusions s/p thoracentesis on 8/13 with re-accumulation of pleural effusions  - Intubated 8/24 with vent settings of 16 | 380 | 5 | 40  - S/p second thoracentesis 8/25, s/p L pleurex 8/27  - Continue to monitor, will ease sedation and attempt spontaneous breathing trial    #GI/Nutrition  - S/p paracentesis 8/20, still remains distended with ascites   - BM 8/30 overnight   - s/p fecal disimpaction 8/29, abd x-ray without obstruction   - resume tube feeds    #/Renal  - PURVI, resolved  - Nephrology on board, recommend holding LASIX & other nephrotoxic medications.   - Nephrostomy tubes considered however per IR recommendations not appropriate at this time as patient's Cr is stable  - B/L hydronephrosis stable on CT a/p  - monitor TLS labs q8h   - carnes placed     #Skin  - No sacral decubiti    #ID  - s/p Vanco and Zosyn (ended 8/31)   - U/A grossly positive, urine cx NGTD  - blood cx 8/28/21 NGTD  - strongyloides positive - start ivermectin + INH (given indeterminate quant gold)    #Endocrine  - SSI   - will readjust as necessary     #Hematologic/DVT ppx  - B-cell lymphoma, per heme preliminary results of surgical pathology consistent with B-cell lymphoma but pending further evaluation for treatment choice  - TLS labs q8  - Allopurinol for TLS prophylaxis, s/p rasburicase  - Heme/Onc recommending starting steroids with dexamethasone, s/p 40 mg dose (ended 8/29, 8/31)   - s/p Rituxan 8/28/21  - to receive cyclophosphamide 9/1  - DVT prophylaxis with heparin subQ    #Ethics  - Patient is FULL CODE per palliative care discussion with patient and her sons (Yuan and Jose).   Fill-in proxy is Jose Camargo: [phone]

## 2021-09-01 NOTE — PROGRESS NOTE ADULT - SUBJECTIVE AND OBJECTIVE BOX
[facility] DIVISION OF KIDNEY DISEASES AND HYPERTENSION -- FOLLOW UP NOTE  --------------------------------------------------------------------------------  If any questions, please feel free to contact me  NS pager: [phone], [facility]: [phone]  Dusty Fountain M.D.  Nephrology Fellow    (After 5 pm or on weekends please page the on-call fellow)  --------------------------------------------------------------------------------    Chief Complaint:  Patient is a 66y old  Female who presents with a chief complaint of Worsening volume overload (01 Sep 2021 15:30)    24 hour events/subjective:  Patient seen and examined at bedside, in NAD, sCr at 1.3mg/dl. non oliguric UOP: 1.1L in the past 24hrs. Vitals/labs/imaging reviewed       PAST HISTORY  --------------------------------------------------------------------------------  No significant changes to PMH, PSH, FHx, SHx, unless otherwise noted    ALLERGIES & MEDICATIONS  --------------------------------------------------------------------------------  Allergies    penicillins (Rash)    Intolerances      Standing Inpatient Medications  allopurinol 100 milliGRAM(s) Oral daily  chlorhexidine 0.12% Liquid 15 milliLiter(s) Oral Mucosa every 12 hours  chlorhexidine 4% Liquid 1 Application(s) Topical <User Schedule>  cyclophosphamide IVPB (eMAR) 100 milliGRAM(s) IV Intermittent daily  dextrose 40% Gel 15 Gram(s) Oral once  dextrose 5%. 1000 milliLiter(s) IV Continuous <Continuous>  dextrose 5%. 1000 milliLiter(s) IV Continuous <Continuous>  dextrose 50% Injectable 25 Gram(s) IV Push once  dextrose 50% Injectable 12.5 Gram(s) IV Push once  dextrose 50% Injectable 25 Gram(s) IV Push once  glucagon  Injectable 1 milliGRAM(s) IntraMuscular once  heparin   Injectable 5000 Unit(s) SubCutaneous every 8 hours  insulin lispro (ADMELOG) corrective regimen sliding scale   SubCutaneous every 6 hours  isoniazid 300 milliGRAM(s) Oral every 24 hours  ivermectin 12 milliGRAM(s) Oral every 24 hours  norepinephrine Infusion 0.05 MICROgram(s)/kG/Min IV Continuous <Continuous>  petrolatum Ophthalmic Ointment 1 Application(s) Both EYES two times a day  polyethylene glycol 3350 17 Gram(s) Oral daily  propofol Infusion 50 MICROgram(s)/kG/Min IV Continuous <Continuous>  senna Syrup 15 milliLiter(s) Oral at bedtime  sevelamer carbonate 1200 milliGRAM(s) Oral three times a day    PRN Inpatient Medications  bisacodyl Suppository 10 milliGRAM(s) Rectal daily PRN  hydrocortisone sodium succinate Injectable 100 milliGRAM(s) IV Push once PRN  meperidine     Injectable 25 milliGRAM(s) IV Push once PRN      REVIEW OF SYSTEMS  --------------------------------------------------------------------------------  unable to obtain due to current medical conditions     All other systems were reviewed and are negative, except as noted.    VITALS/PHYSICAL EXAM  --------------------------------------------------------------------------------  T(C): 36.7 (09-01-21 @ 16:00), Max: 36.9 (09-01-21 @ 08:16)  HR: 110 (09-01-21 @ 18:00) (85 - 111)  BP: 116/69 (09-01-21 @ 18:00) (98/56 - 134/76)  RR: 18 (09-01-21 @ 18:00) (16 - 26)  SpO2: 100% (09-01-21 @ 18:00) (95% - 100%)  Wt(kg): --        08-31-21 @ 07:01  -  09-01-21 @ 07:00  --------------------------------------------------------  IN: 648.5 mL / OUT: 1770 mL / NET: -1121.5 mL    09-01-21 @ 07:01  -  09-01-21 @ 19:33  --------------------------------------------------------  IN: 264 mL / OUT: 695 mL / NET: -431 mL        Physical Exam:  	Gen: Sedated  	HEENT: ET tube +  	Pulm: mechanically ventilated via ETT  	CV: S1S2  	Abd: Soft, +BS  	Ext: No LE edema B/L  	Neuro: Sedated  	Skin: Warm and dry      LABS/STUDIES  --------------------------------------------------------------------------------              8.6    15.72 >-----------<  205      [09-01-21 @ 00:16]              26.4     142  |  106  |  73  ----------------------------<  106      [09-01-21 @ 07:59]  4.2   |  15  |  1.47        Ca     7.7     [09-01-21 @ 07:59]      iCa    1.10     [09-01 @ 07:59]      Mg     1.90     [09-01-21 @ 07:59]      Phos  8.1     [09-01-21 @ 00:16]    TPro  4.9  /  Alb  2.0  /  TBili  0.2  /  DBili  x   /  AST  30  /  ALT  10  /  AlkPhos  79  [09-01-21 @ 07:59]    PT/INR: PT 11.3 , INR 0.99       [08-31-21 @ 00:46]  PTT: 27.7       [08-31-21 @ 00:46]    Uric acid 2.1      [09-01-21 @ 07:59]        [09-01-21 @ 07:59]    Creatinine Trend:  SCr 1.47 [09-01 @ 07:59]  SCr 1.36 [09-01 @ 00:16]  SCr 1.35 [08-31 @ 16:51]  SCr 1.17 [08-31 @ 00:46]  SCr 1.21 [08-30 @ 17:18]    Urinalysis - [08-28-21 @ 17:00]      Color Yellow / Appearance Turbid / SG 1.034 / pH 6.5      Gluc Trace / Ketone Negative  / Bili Negative / Urobili <2 mg/dL       Blood Small / Protein 100 mg/dL / Leuk Est Large / Nitrite Negative      RBC 6-10 / WBC 15-20 / Hyaline 3 / Gran 20 / Sq Epi  / Non Sq Epi Occasional / Bacteria Moderate      TSH 1.85      [07-29-21 @ 11:12]    HBsAg Nonreact      [08-26-21 @ 10:02]  HCV 0.20, Nonreact      [08-26-21 @ 10:02]  HIV Nonreact      [08-25-21 @ 12:13]    SPEP Interpretation: with acute phase reaction. HOSSEIN Kay MD      [08-03-21 @ 07:01]

## 2021-09-01 NOTE — PROGRESS NOTE ADULT - ATTENDING COMMENTS
1. Acute hypoxemic respiratory failure due to B cell lymphoma with recurrent pleural effusions. Pt with L  Pleurx catheter hooked up to suction for PTX. No air leak. Continue current AC vent settings. Taper sedation as tolerated.  2. ID. Fever 24 hours ago. No  further fever D/C Vancomycin and Zosyn today. .  Follow Vancomycin levels. . Stat  mg daily for quant gold inteermediate now that pt on immunosuppressive therapy with cytoxan.  Start  ivermectin for stronglyoides   positive.  3. B Cell lymphoma. Cytoxan today. as per HEME. Lung POCUS shows no pleural effusion on L . Predominant a lines bilaterally. Abd POCUS shows moderate simple ascites. Continue to follow tumor lysis labs. Pt has received Rituxan recently.  4. FEN:  Tube feeding on hold due to residuals. Decrease Fentanyl. Receiving bowel regimen.  5. Hypotension requiring norepinephrine.

## 2021-09-01 NOTE — CHART NOTE - NSCHARTNOTEFT_GEN_A_CORE
Strongyloides Ab+  Starting immunosuppressive therapy for lymphoma.  Would give ivermectin 12 mg po QD x 2 days via OGT.  QuantiferonTB gold indeterminate  would probably treat as well with  mg po QD       Yanni Keita MD  Pager: [phone]  After 5 PM or weekends please call fellow on call or office [phone]

## 2021-09-01 NOTE — PROGRESS NOTE ADULT - ASSESSMENT
67yo female with PMH significant for HTN, HLD, suspected ovarian malignancy, and L hydroureteronephrosis s/p renal stent who presented with bilateral lower extremity edema, worsening abdominal distension, and SOB. Patient found to have bilateral pleural effusions and significant ascites. 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 S/p biopsy patient experiencing acute hypoxic respiratory failure and was intubated/sedated in MICU. Now experiencing fever of unknown origin. Etiology likely 2/2 to manifestations of lymphoma: no obvious infectious etiology identified as yet - blood, urine cultures neg. pleural fluid, peritoneal fluid, CSF cultures negative to date.    screening strongyloides Ab +  screening quantiferon gold indeterminate       In the setting of immunosuppression following corticosteroids/chemotherapy would  give ivermectin 12 mg po (via OGT) q 24 h x 2 days  - patient at risk for hyperinfection with strongyloides.    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  follow AFB cultures.            Yanni Keita MD  Pager: [phone]  After 5 PM or weekends please call fellow on call or office [phone]

## 2021-09-01 NOTE — CHART NOTE - NSCHARTNOTEFT_GEN_A_CORE
: Dr. Garza and Dr. Coleman    INDICATION: Respiratory failure     PROCEDURE:  [X] LIMITED ECHO  [X] LIMITED CHEST  [ ] LIMITED RETROPERITONEAL  [X] LIMITED ABDOMINAL  [ ] LIMITED DVT  [ ] NEEDLE GUIDANCE VASCULAR  [ ] NEEDLE GUIDANCE THORACENTESIS  [ ] NEEDLE GUIDANCE PARACENTESIS  [ ] NEEDLE GUIDANCE PERICARDIOCENTESIS  [ ] OTHER    FINDINGS:  Lungs: A line predominant pattern with B/L moderate PLEFF, R now >L, R sided effusion much larger with associated atelectasis, lung sliding present bilaterally  Heart: Grossly normal LVSF, RV <LV, no pericardial effusions, IVC indeterminant  Abdomen: Moderate volume ascites, dilated loops of bowel with signs of ileus    INTERPRETATION:  -A line pattern in lungs pleural effusions B/L R>L  -no PTX  -Normal LVSF  -Moderate ascites   -Possible ileus    Images Uploaded on Q Path    Ricky Garza MD  PGY-4  Pulmonary/ Critical Care

## 2021-09-01 NOTE — PROGRESS NOTE ADULT - SUBJECTIVE AND OBJECTIVE BOX
Follow Up:  respiratory failure, new dx lymphoma    Interval History/ROS: remains intubated, sedated in MICU. started chemo new diagnosis lymphoma.    Allergies  penicillins (Rash)        ANTIMICROBIALS:  isoniazid 300 every 24 hours  ivermectin 12 every 24 hours    MEDICATIONS  (STANDING):  allopurinol 100 daily  bisacodyl Suppository 10 daily PRN  cyclophosphamide IVPB (eMAR) 100 daily  dextrose 40% Gel 15 once  dextrose 50% Injectable 25 once  dextrose 50% Injectable 12.5 once  dextrose 50% Injectable 25 once  glucagon  Injectable 1 once  heparin   Injectable 5000 every 8 hours  hydrocortisone sodium succinate Injectable 100 once PRN  insulin lispro (ADMELOG) corrective regimen sliding scale  every 6 hours  meperidine     Injectable 25 once PRN  norepinephrine Infusion 0.05 <Continuous>  polyethylene glycol 3350 17 daily  propofol Infusion 50 <Continuous>  senna Syrup 15 at bedtime    Vital Signs Last 24 Hrs  T(F): 98.4 (09-01-21 @ 08:16), Max: 99.1 (08-31-21 @ 16:00)  HR: 97 (09-01-21 @ 13:00)  BP: 120/63 (09-01-21 @ 13:00)  RR: 17 (09-01-21 @ 13:00)  SpO2: 98% (09-01-21 @ 11:08) (95% - 100%)    PHYSICAL EXAM:  Constitutional: sedated  Eyes: No icterus.  Oral cavity: ETT, OGT  Neck: Supple  RS:  scattered rhonchi, chest tube left  CVS: S1, S2   Abdomen: Soft. slight distended.  : carnes  Extremities:  edema 1+  Skin: No lesions noted  Vascular: left IJ 8/27  Neuro: unable                                   8.6    15.72 )-----------( 205      ( 01 Sep 2021 00:16 )             26.4 09-01    142  |  106  |  73  ----------------------------<  106  4.2   |  15  |  1.47  Ca    7.7      01 Sep 2021 07:59Phos  8.1     09-01Mg     1.90     09-01  TPro  4.9  /  Alb  2.0  /  TBili  0.2  /  DBili  x   /  AST  30  /  ALT  10  /  AlkPhos  79  09-01      CSF  wbc 0  protein 32  glu 111    MICROBIOLOGY:    cuCulture - Urine (08.30.21 @ 05:00)   Specimen Source: Catheterized Catheterized   Culture Results:   No growth Culture - Blood (08.28.21 @ 18:00)   Specimen Source: .Blood Blood-Peripheral   Culture Results:   No growth to date. Culture - Blood (08.28.21 @ 18:00)   Specimen Source: .Blood Blood-Peripheral   Culture Results:   No growth to date. Culture - Fungal, Body Fluid (08.25.21 @ 21:06)   Specimen Source: .Body Fluid Pleural Fluid   Culture Results:   Testing in progress Culture - Fungal, CSF (08.25.21 @ 20:57)   Specimen Source: .CSF CSF   Culture Results:   Testing in progress Culture - Acid Fast - CSF . (08.25.21 @ 20:57)   Specimen Source: .CSF CSF   Acid Fast Bacilli Smear:   Less than 5 cc of CSF received,   unable to perform AFB smear.   Culture Results:   Culture is being performed.     .Body Fluid Pleural Fluid  08-25-21   No growth  --    polymorphonuclear leukocytes seen  No organisms seen  by cytocentrifuge      .CSF CSF  08-25-21   No growth  --    No polymorphonuclear cells seen  No organisms seen  by cytocentrifuge      .Blood Blood-Peripheral  08-25-21   No growth to date.  --  --      Catheterized Catheterized  08-25-21   No growth  --  --      .Nose  08-14-21   No Methicillin Resistant Staphylococcus aureus isolated  No Methicillin Sensitive Staphylococcus aureus "PCR is more sensitive for  identifying MRSA/MSSA."  --  --      .Body Fluid Thoracentesis Fluid  08-13-21   No growth at 5 days  --    polymorphonuclear leukocytes seen  No organisms seen  by cytocentrifuge      .Body Fluid Pleural Fluid  08-03-21   No growth  --  --      .Body Fluid Pleural Fluid  08-02-21   No growth at 5 days  --    polymorphonuclear leukocytes seen  No organisms seen  by cytocentrifuge          HSV 1/2 PCR: NotDetec (08-25 @ 21:28)        RADIOLOGY:

## 2021-09-02 NOTE — PROGRESS NOTE ADULT - ATTENDING COMMENTS
1. Acute hypoxemic respiratory failure due to B cell lymphoma with recurrent pleural effusions. Pt with L  Pleurx catheter hooked up to suction for PTX. No air leak. Continue current AC vent settings. Taper sedation as tolerated. Tolerating PS wean of 8 cm  2. ID. Fever 24 hours ago. No  further fever D/C Vancomycin and Zosyn today. .  Follow Vancomycin levels. . Stat  mg daily for quant gold inteermediate now that pt on immunosuppressive therapy with cytoxan.  Start  ivermectin for stronglyoides   positive.  3. B Cell lymphoma. Cytoxan today. as per HEME. Lung POCUS shows no pleural effusion on L . Predominant a lines bilaterally. Abd POCUS shows moderate simple ascites. Continue to follow tumor lysis labs. Pt has received Rituxan recently.  4. FEN:  Tube feeding on hold due to residuals. Decrease Fentanyl. Receiving bowel regimen.  5. Hypotension requiring norepinephrine.  6. Renal . PURVI from Tumor lysis syndrome.  Creatinine slowly rising.  Pt more acidemic. Start bicarb drip.

## 2021-09-02 NOTE — PROGRESS NOTE ADULT - SUBJECTIVE AND OBJECTIVE BOX
[facility] DIVISION OF KIDNEY DISEASES AND HYPERTENSION -- FOLLOW UP NOTE  --------------------------------------------------------------------------------  If any questions, please feel free to contact me  NS pager: [phone], [facility]: [phone]  Dusty Fountain M.D.  Nephrology Fellow    (After 5 pm or on weekends please page the on-call fellow)  --------------------------------------------------------------------------------  Chief Complaint:  Patient is a 66y old  Female who presents with a chief complaint of PURVI    HPI:  66F with HTN, HLD, recently discovered ovarian mass suspicious for malignancy (7/2021), L hydroureteronephrosis s/p renal stent (7/15/21), and recent hospitalization (J 7/26-8/4) for acute renal failure (likely obstructive) requiring urgent HD, ascites s/p therapeutic paracentesis (7/27/21), and pleural effusion s/p R thoracentesis (8/2/21) admitted for acute hypercarbic respiratory failure due to pleural effusions most likely caused by ovarian malignancy complicated with volume overload with ascites. 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 Now found to have new onset PURVI. Baseline Cr 0.7-1.1mg/dl.  had a recent PURVI episode which resolved- started to trended up again on 8/31 to 1.3mg/dl.    Patient seen and examined at bedside, in NAD, sCr 1.6mg/dl today and ALb 1.6. non oliguric UOP: 870L in the past 24hrs. Vitals/labs/imaging reviewed     PAST HISTORY  --------------------------------------------------------------------------------  No significant changes to PMH, PSH, FHx, SHx, unless otherwise noted    ALLERGIES & MEDICATIONS  --------------------------------------------------------------------------------  Allergies    penicillins (Rash)    Intolerances      Standing Inpatient Medications  allopurinol 100 milliGRAM(s) Oral daily  chlorhexidine 0.12% Liquid 15 milliLiter(s) Oral Mucosa every 12 hours  chlorhexidine 4% Liquid 1 Application(s) Topical <User Schedule>  cyclophosphamide IVPB (eMAR) 100 milliGRAM(s) IV Intermittent daily  dexMEDEtomidine Infusion 0.5 MICROgram(s)/kG/Hr IV Continuous <Continuous>  dextrose 40% Gel 15 Gram(s) Oral once  dextrose 5%. 1000 milliLiter(s) IV Continuous <Continuous>  dextrose 5%. 1000 milliLiter(s) IV Continuous <Continuous>  dextrose 50% Injectable 25 Gram(s) IV Push once  dextrose 50% Injectable 12.5 Gram(s) IV Push once  dextrose 50% Injectable 25 Gram(s) IV Push once  glucagon  Injectable 1 milliGRAM(s) IntraMuscular once  heparin   Injectable 5000 Unit(s) SubCutaneous every 8 hours  insulin lispro (ADMELOG) corrective regimen sliding scale   SubCutaneous every 6 hours  isoniazid 300 milliGRAM(s) Oral every 24 hours  ivermectin 12 milliGRAM(s) Oral every 24 hours  norepinephrine Infusion 0.05 MICROgram(s)/kG/Min IV Continuous <Continuous>  petrolatum Ophthalmic Ointment 1 Application(s) Both EYES two times a day  polyethylene glycol 3350 17 Gram(s) Oral daily  propofol Infusion 50 MICROgram(s)/kG/Min IV Continuous <Continuous>  senna Syrup 15 milliLiter(s) Oral at bedtime  sevelamer carbonate 1200 milliGRAM(s) Oral three times a day  sodium bicarbonate  Infusion 0.18 mEq/kG/Hr IV Continuous <Continuous>    PRN Inpatient Medications  bisacodyl Suppository 10 milliGRAM(s) Rectal daily PRN  hydrocortisone sodium succinate Injectable 100 milliGRAM(s) IV Push once PRN  meperidine     Injectable 25 milliGRAM(s) IV Push once PRN      REVIEW OF SYSTEMS  --------------------------------------------------------------------------------  unable to obtain due to current medical conditions       VITALS/PHYSICAL EXAM  --------------------------------------------------------------------------------  T(C): 35.9 (09-02-21 @ 04:00), Max: 37.2 (09-02-21 @ 00:00)  HR: 76 (09-02-21 @ 10:37) (76 - 113)  BP: 81/48 (09-02-21 @ 10:00) (81/48 - 125/72)  RR: 31 (09-02-21 @ 10:00) (16 - 31)  SpO2: 100% (09-02-21 @ 10:37) (98% - 100%)  Wt(kg): --        09-01-21 @ 07:01  -  09-02-21 @ 07:00  --------------------------------------------------------  IN: 768 mL / OUT: 1180 mL / NET: -412 mL    09-02-21 @ 07:01  -  09-02-21 @ 12:08  --------------------------------------------------------  IN: 69 mL / OUT: 120 mL / NET: -51 mL        Physical Exam:              Gen: Sedated  	HEENT: ET tube +  	Pulm: mechanically ventilated via ETT  	CV: S1S2  	Abd: Soft, +BS  	Ext: No LE edema B/L  	Neuro: Sedated  	Skin: Warm and dry    LABS/STUDIES  --------------------------------------------------------------------------------              8.5    16.55 >-----------<  217      [09-02-21 @ 05:47]              26.4     139  |  107  |  75  ----------------------------<  130      [09-02-21 @ 05:47]  3.7   |  11  |  1.66        Ca     7.6     [09-02-21 @ 05:47]      iCa    1.03     [09-02 @ 05:47]      Mg     1.90     [09-02-21 @ 05:47]      Phos  8.4     [09-02-21 @ 05:47]    TPro  5.0  /  Alb  1.9  /  TBili  <0.2  /  DBili  x   /  AST  37  /  ALT  14  /  AlkPhos  101  [09-02-21 @ 05:47]    PT/INR: PT 11.0 , INR 0.96       [09-02-21 @ 05:47]  PTT: 29.9       [09-02-21 @ 05:47]    Uric acid 2.2      [09-02-21 @ 05:47]  LDH 1098      [09-02-21 @ 05:47]    Creatinine Trend:  SCr 1.66 [09-02 @ 05:47]  SCr 1.60 [09-01 @ 20:50]  SCr 1.47 [09-01 @ 07:59]  SCr 1.36 [09-01 @ 00:16]  SCr 1.35 [08-31 @ 16:51]    Urinalysis - [08-28-21 @ 17:00]      Color Yellow / Appearance Turbid / SG 1.034 / pH 6.5      Gluc Trace / Ketone Negative  / Bili Negative / Urobili <2 mg/dL       Blood Small / Protein 100 mg/dL / Leuk Est Large / Nitrite Negative      RBC 6-10 / WBC 15-20 / Hyaline 3 / Gran 20 / Sq Epi  / Non Sq Epi Occasional / Bacteria Moderate      TSH 1.85      [07-29-21 @ 11:12]    HBsAg Nonreact      [08-26-21 @ 10:02]  HCV 0.20, Nonreact      [08-26-21 @ 10:02]  HIV Nonreact      [08-25-21 @ 12:13]

## 2021-09-02 NOTE — PROGRESS NOTE ADULT - ASSESSMENT
Ms. Stafford is a 66-year-old woman with PMH significant for HTN, HLD, L hydroureteronephrosis s/p renal stent on 7/15, who presents with volume overload 2/2 high grade B-cell lymphoma. S/p thoracentesis 8/13, paracentesis and excisional biopsy of left inguinal lymph node on 8/20. Accepted to MICU for acute hypoxic/hypercapneic respiratory failure now s/p intubation and L pleurex catheter placement, s/p chemotherapy.    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 meningitis, pending results  - Will monitor mental status off of sedation      #Cardiovascular  - On pressor support with levo  - Echo 8/3 showing concentric left ventricular remodeling, hyperdynamic left ventricle, calcified trileaflet aortic valve with normal opening, EF 56%  - POCUS showing adequate cardiac output    #Respiratory  - Hypoxic/hypercapneic respiratory failure likely secondary to malignant pleural effusions s/p thoracentesis on 8/13 with re-accumulation of pleural effusions  - Intubated 8/24 with vent settings of 16 | 380 | 5 | 40  - S/p second thoracentesis 8/25, s/p L pleurex 8/27  - Continue to monitor, will ease sedation and attempt spontaneous breathing trial    #GI/Nutrition  - S/p paracentesis 8/20, still remains distended with ascites   - BM 8/30 overnight   - s/p fecal disimpaction 8/29, abd x-ray without obstruction   - resume tube feeds    #/Renal  - PURVI, resolved  - Nephrology on board, recommend holding LASIX & other nephrotoxic medications.   - Nephrostomy tubes considered however per IR recommendations not appropriate at this time as patient's Cr is stable  - B/L hydronephrosis stable on CT a/p  - monitor TLS labs q8h   - carnes placed     #Skin  - No sacral decubiti    #ID  - s/p Vanco and Zosyn (ended 8/31)   - U/A grossly positive, urine cx NGTD  - blood cx 8/28/21 NGTD  - strongyloides positive - start ivermectin + INH (given indeterminate quant gold)    #Endocrine  - SSI   - will readjust as necessary     #Hematologic/DVT ppx  - B-cell lymphoma, per heme preliminary results of surgical pathology consistent with B-cell lymphoma but pending further evaluation for treatment choice  - TLS labs q8  - Allopurinol for TLS prophylaxis, s/p rasburicase  - Heme/Onc recommending starting steroids with dexamethasone, s/p 40 mg dose (ended 8/29, 8/31)   - s/p Rituxan 8/28/21  - to receive cyclophosphamide 9/1  - DVT prophylaxis with heparin subQ    #Ethics  - Patient is FULL CODE per palliative care discussion with patient and her sons (Yuan and Jose).   Fill-in proxy is Jose Camargo: [phone] Ms. Stafford is a 66-year-old woman with PMH significant for HTN, HLD, L hydroureteronephrosis s/p renal stent on 7/15, who presents with volume overload 2/2 high grade B-cell lymphoma. S/p thoracentesis 8/13, paracentesis and excisional biopsy of left inguinal lymph node on 8/20. Accepted to MICU for acute hypoxic/hypercapneic respiratory failure now s/p intubation and L pleurex catheter placement, s/p chemotherapy.    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  - CT head with no intracranial pathology  - S/p LP with flow cytometry and cytopathology to evaluate possible lymphomatous meningitis, negative thus far   - Currently sedated with precedex - will wean as tolerated    #Cardiovascular  vasoplegic shock 2/2 sedatives   - Echo 8/3 showing concentric left ventricular remodeling, hyperdynamic left ventricle, calcified trileaflet aortic valve with normal opening, EF 56%  - POCUS showing adequate cardiac output  - On pressor support with levo, wean as tolerated    #Respiratory  - Hypoxic/hypercapneic respiratory failure likely secondary to malignant pleural effusions s/p thoracentesis on 8/13 with re-accumulation of pleural effusions  - S/p second thoracentesis 8/25, s/p L pleurex 8/27  - Continue to monitor, spontaneous breathing trial on 8/5 today    #GI/Nutrition  - S/p paracentesis 8/20, still remains distended with ascites   - BM 8/30 overnight   - s/p fecal disimpaction 8/29, abd x-ray without obstruction   - resume tube feeds    #/Renal  - PURVI with worsening CR 2/2 ATN in the setting of supratherapeutic vancomycin level + tumor lysis   - Nephrology on board, recommend holding LASIX & other nephrotoxic medications.   - Nephrostomy tubes considered however per IR recommendations not appropriate at this time as patient's Cr is stable  - B/L hydronephrosis stable on CT a/p  - monitor TLS labs q8h, now with uptrending LDH and hyperphosphatemia, although uric acid remains low   - carnes placed for accurate I+Os  - will start bicarb drip   - will start albumin with goal > 3.5     #Skin  - No sacral decubiti    #ID  - s/p Vanco and Zosyn (ended 8/31)   - U/A grossly positive, urine cx NGTD  - blood cx 8/28/21 NGTD  - strongyloides positive - s/p ivermectin (9/1 - 9/2) given IC state   - c/w INH (given indeterminate quant gold)    #Endocrine  - SSI   - will readjust as necessary     #Hematologic/DVT ppx  - B-cell lymphoma, per heme preliminary results of surgical pathology consistent with B-cell lymphoma but pending further evaluation for treatment choice  - TLS labs q8  - Allopurinol for TLS prophylaxis, s/p rasburicase  - Heme/Onc recommending starting steroids with dexamethasone, s/p 40 mg dose (ended 8/29, 8/31)   - s/p Rituxan 8/28/21  - s/p cyclophosphamide 1st cycle 9/1, to receive cyclophosphamide 2nd cycle 9/2  - DVT prophylaxis with heparin subQ    #Ethics  - Patient is FULL CODE per palliative care discussion with patient and her sons (Yuan and Jose).     Fill-in proxy is Jose Camargo: [phone]

## 2021-09-02 NOTE — PROGRESS NOTE ADULT - SUBJECTIVE AND OBJECTIVE BOX
COVERING RESIDENT: MALGORZATA PERRY (PGY-2) | [id number] / [phone]    INTERVAL HPI/OVERNIGHT EVENTS: No acute events overnight.    SUBJECTIVE: Patient seen and examined at bedside.     Unable to assess ROS as pt is intubated + sedated.    OBJECTIVE:    VITAL SIGNS:  ICU Vital Signs Last 24 Hrs  T(C): 35.9 (02 Sep 2021 04:00), Max: 37.2 (02 Sep 2021 00:00)  T(F): 96.6 (02 Sep 2021 04:00), Max: 98.9 (02 Sep 2021 00:00)  HR: 106 (02 Sep 2021 06:58) (92 - 113)  BP: 112/67 (02 Sep 2021 06:00) (91/50 - 134/76)  BP(mean): 78 (02 Sep 2021 06:00) (57 - 89)  ABP: --  ABP(mean): --  RR: 18 (02 Sep 2021 06:00) (16 - 26)  SpO2: 100% (02 Sep 2021 06:58) (96% - 100%)    Mode: AC/ CMV (Assist Control/ Continuous Mandatory Ventilation), RR (machine): 16, TV (machine): 380, FiO2: 40, PEEP: 5, ITime: 0.68, MAP: 9, PIP: 28    09-01 @ 07:01  -  09-02 @ 07:00  --------------------------------------------------------  IN: 768 mL / OUT: 1180 mL / NET: -412 mL      CAPILLARY BLOOD GLUCOSE      POCT Blood Glucose.: 129 mg/dL (02 Sep 2021 06:22)      PHYSICAL EXAM:    General: NAD  HEENT: NC/AT; PERRL, clear conjunctiva  Neck: supple  Respiratory: CTA b/l  Cardiovascular: +S1/S2; RRR  Abdomen: soft, NT/ND; +BS x4  Extremities: WWP, 2+ peripheral pulses b/l; no LE edema  Skin: normal color and turgor; no rash  Neurological:    MEDICATIONS:  MEDICATIONS  (STANDING):  allopurinol 100 milliGRAM(s) Oral daily  chlorhexidine 0.12% Liquid 15 milliLiter(s) Oral Mucosa every 12 hours  chlorhexidine 4% Liquid 1 Application(s) Topical <User Schedule>  cyclophosphamide IVPB (eMAR) 100 milliGRAM(s) IV Intermittent daily  dexMEDEtomidine Infusion 0.5 MICROgram(s)/kG/Hr (7.83 mL/Hr) IV Continuous <Continuous>  dextrose 40% Gel 15 Gram(s) Oral once  dextrose 5%. 1000 milliLiter(s) (100 mL/Hr) IV Continuous <Continuous>  dextrose 5%. 1000 milliLiter(s) (50 mL/Hr) IV Continuous <Continuous>  dextrose 50% Injectable 25 Gram(s) IV Push once  dextrose 50% Injectable 12.5 Gram(s) IV Push once  dextrose 50% Injectable 25 Gram(s) IV Push once  glucagon  Injectable 1 milliGRAM(s) IntraMuscular once  heparin   Injectable 5000 Unit(s) SubCutaneous every 8 hours  insulin lispro (ADMELOG) corrective regimen sliding scale   SubCutaneous every 6 hours  isoniazid 300 milliGRAM(s) Oral every 24 hours  ivermectin 12 milliGRAM(s) Oral every 24 hours  petrolatum Ophthalmic Ointment 1 Application(s) Both EYES two times a day  polyethylene glycol 3350 17 Gram(s) Oral daily  propofol Infusion 50 MICROgram(s)/kG/Min (19.2 mL/Hr) IV Continuous <Continuous>  senna Syrup 15 milliLiter(s) Oral at bedtime  sevelamer carbonate 1200 milliGRAM(s) Oral three times a day    MEDICATIONS  (PRN):  bisacodyl Suppository 10 milliGRAM(s) Rectal daily PRN Constipation  hydrocortisone sodium succinate Injectable 100 milliGRAM(s) IV Push once PRN PRN Chemotherapy Reaction  meperidine     Injectable 25 milliGRAM(s) IV Push once PRN PRN Chemotherapy Reaction (Rigors)      ALLERGIES:  Allergies    penicillins (Rash)    Intolerances        LABS:                        8.5    16.55 )-----------( 217      ( 02 Sep 2021 05:47 )             26.4     09-02    139  |  107  |  75<H>  ----------------------------<  130<H>  3.7   |  11<L>  |  1.66<H>    Ca    7.6<L>      02 Sep 2021 05:47  Phos  8.4     09-02  Mg     1.90     09-02    TPro  5.0<L>  /  Alb  1.9<L>  /  TBili  <0.2  /  DBili  x   /  AST  37<H>  /  ALT  14  /  AlkPhos  101  09-02    PT/INR - ( 02 Sep 2021 05:47 )   PT: 11.0 sec;   INR: 0.96 ratio         PTT - ( 02 Sep 2021 05:47 )  PTT:29.9 sec      RADIOLOGY & ADDITIONAL TESTS: Reviewed. COVERING RESIDENT: MALGORZATA PERRY (PGY-2) | [id number] / [phone]    INTERVAL HPI/OVERNIGHT EVENTS: No acute events overnight.    SUBJECTIVE: Patient seen and examined at bedside.     Unable to assess ROS as pt is intubated + sedated.    OBJECTIVE:    VITAL SIGNS:  ICU Vital Signs Last 24 Hrs  T(C): 35.9 (02 Sep 2021 04:00), Max: 37.2 (02 Sep 2021 00:00)  T(F): 96.6 (02 Sep 2021 04:00), Max: 98.9 (02 Sep 2021 00:00)  HR: 106 (02 Sep 2021 06:58) (92 - 113)  BP: 112/67 (02 Sep 2021 06:00) (91/50 - 134/76)  BP(mean): 78 (02 Sep 2021 06:00) (57 - 89)  ABP: --  ABP(mean): --  RR: 18 (02 Sep 2021 06:00) (16 - 26)  SpO2: 100% (02 Sep 2021 06:58) (96% - 100%)    Mode: AC/ CMV (Assist Control/ Continuous Mandatory Ventilation), RR (machine): 16, TV (machine): 380, FiO2: 40, PEEP: 5, ITime: 0.68, MAP: 9, PIP: 28    09-01 @ 07:01  -  09-02 @ 07:00  --------------------------------------------------------  IN: 768 mL / OUT: 1180 mL / NET: -412 mL      CAPILLARY BLOOD GLUCOSE      POCT Blood Glucose.: 129 mg/dL (02 Sep 2021 06:22)      PHYSICAL EXAM:    General: intubated, sedated  Respiratory: L pleurx in place  Cardiovascular: S1S2 present  Abdomen: distended, tympanic, soft  Extremities: LE swelling, edematous.   Neurological: sedated     MEDICATIONS:  MEDICATIONS  (STANDING):  allopurinol 100 milliGRAM(s) Oral daily  chlorhexidine 0.12% Liquid 15 milliLiter(s) Oral Mucosa every 12 hours  chlorhexidine 4% Liquid 1 Application(s) Topical <User Schedule>  cyclophosphamide IVPB (eMAR) 100 milliGRAM(s) IV Intermittent daily  dexMEDEtomidine Infusion 0.5 MICROgram(s)/kG/Hr (7.83 mL/Hr) IV Continuous <Continuous>  dextrose 40% Gel 15 Gram(s) Oral once  dextrose 5%. 1000 milliLiter(s) (100 mL/Hr) IV Continuous <Continuous>  dextrose 5%. 1000 milliLiter(s) (50 mL/Hr) IV Continuous <Continuous>  dextrose 50% Injectable 25 Gram(s) IV Push once  dextrose 50% Injectable 12.5 Gram(s) IV Push once  dextrose 50% Injectable 25 Gram(s) IV Push once  glucagon  Injectable 1 milliGRAM(s) IntraMuscular once  heparin   Injectable 5000 Unit(s) SubCutaneous every 8 hours  insulin lispro (ADMELOG) corrective regimen sliding scale   SubCutaneous every 6 hours  isoniazid 300 milliGRAM(s) Oral every 24 hours  ivermectin 12 milliGRAM(s) Oral every 24 hours  petrolatum Ophthalmic Ointment 1 Application(s) Both EYES two times a day  polyethylene glycol 3350 17 Gram(s) Oral daily  propofol Infusion 50 MICROgram(s)/kG/Min (19.2 mL/Hr) IV Continuous <Continuous>  senna Syrup 15 milliLiter(s) Oral at bedtime  sevelamer carbonate 1200 milliGRAM(s) Oral three times a day    MEDICATIONS  (PRN):  bisacodyl Suppository 10 milliGRAM(s) Rectal daily PRN Constipation  hydrocortisone sodium succinate Injectable 100 milliGRAM(s) IV Push once PRN PRN Chemotherapy Reaction  meperidine     Injectable 25 milliGRAM(s) IV Push once PRN PRN Chemotherapy Reaction (Rigors)      ALLERGIES:  Allergies    penicillins (Rash)    Intolerances        LABS:                        8.5    16.55 )-----------( 217      ( 02 Sep 2021 05:47 )             26.4     09-02    139  |  107  |  75<H>  ----------------------------<  130<H>  3.7   |  11<L>  |  1.66<H>    Ca    7.6<L>      02 Sep 2021 05:47  Phos  8.4     09-02  Mg     1.90     09-02    TPro  5.0<L>  /  Alb  1.9<L>  /  TBili  <0.2  /  DBili  x   /  AST  37<H>  /  ALT  14  /  AlkPhos  101  09-02    PT/INR - ( 02 Sep 2021 05:47 )   PT: 11.0 sec;   INR: 0.96 ratio         PTT - ( 02 Sep 2021 05:47 )  PTT:29.9 sec      RADIOLOGY & ADDITIONAL TESTS: Reviewed.

## 2021-09-02 NOTE — PROGRESS NOTE ADULT - ASSESSMENT
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 Her course has been complicated by intermittent fevers and she was started on empiric cefepime. She has had labile blood pressures, at times requiring pressor support.  Her course has also been complicated by respiratory failure requiring intubation.       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 LP with CSF flow and cytopathology done 8/25; appears traumatic with 6000 rbc; 77 lymphocytes although no atypicals, 23 monocytes  - 8/20: Excisional biopsy of left inguinal lymph node: DLFCL, NOS, Germinal centre B-cell subtype with high proliferative index and necrosis. CD20, PAX-5, CD10, BCL-6, BCL-2, C-MYC positive. FISH, Karyotype pending.   Initial work up for treatment: Echo 8/3 with LVEF of 56%; Hepatitis panel negative; Hep B core total negative, HIV negative, HTLV1 and HTLV2, Pending  - 8/24: Dexamethasone 20 mg IV daily x 2 days; Increased to dexamethasone 40mg daily IV on 8/26 - to be continued for 5 days, ended on 8/29  - s/p Rituxan on 8/28; plan was for cyclophosphamide, Doxorubicin and vincristine on Sunday 8/29, however patient with ongoing fevers and increasing pressor requirement concerning for possible infectious process. Currently afebrile and infectious w/up negative so far.  -discontinued dexamethasone 40mg IV 8/31  -Will start treatment slowly with a flat dose of  Cytoxan 100mg daily x3 days to assess if patient can tolerate given large tumor burden.    ---D1 Cytoxoan 100mg 9/1  ---Today is D2 Cytoxan 100mg  -Continue with allopurinol for TLS prophylaxis;   -Check TLS lab monitoring [id number] (LDH, Uric acid, BMP, phos, Mag)   -LDH is rising and Cr is trending up, though uric acid remains low. 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 Her course has been complicated by intermittent fevers and she was started on empiric cefepime. She has had labile blood pressures, at times requiring pressor support.  Her course has also been complicated by respiratory failure requiring intubation.       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 LP with CSF flow and cytopathology done 8/25; appears traumatic with 6000 rbc; 77 lymphocytes although no atypicals, 23 monocytes  - 8/20: Excisional biopsy of left inguinal lymph node: DLFCL, NOS, Germinal centre B-cell subtype with high proliferative index and necrosis. CD20, PAX-5, CD10, BCL-6, BCL-2, C-MYC positive. FISH, Karyotype pending.   Initial work up for treatment: Echo 8/3 with LVEF of 56%; Hepatitis panel negative; Hep B core total negative, HIV negative, HTLV1 and HTLV2, Pending  - 8/24: Dexamethasone 20 mg IV daily x 2 days; Increased to dexamethasone 40mg daily IV on 8/26 - to be continued for 5 days, ended on 8/29  - s/p Rituxan on 8/28; plan was for cyclophosphamide, Doxorubicin and vincristine on Sunday 8/29, however patient with ongoing fevers and increasing pressor requirement concerning for possible infectious process. Currently afebrile and infectious w/up negative so far.  -discontinued dexamethasone 40mg IV 8/31  -Will start treatment slowly with a flat dose of  Cytoxan daily x3 days to assess if patient can tolerate given large tumor burden.    ---D1 Cytoxoan 100mg 9/1  ---Today is D2 Cytoxan 100mg  ---We will plan to increase Cytoxan dose on 9/3 to Cytoxan 225mg q12h  -If patient can tolerate cytoxan, we will then plan to start treatment with EPOCH on 9/4  -Continue with allopurinol for TLS prophylaxis;   -Check TLS lab monitoring [id number] (LDH, Uric acid, BMP, phos, Mag)   -LDH is rising and Cr is trending up, though uric acid remains low. Appreciate nephrology recommendations      #Fevers  - unclear source of fevers, work up negative so pato   - Bcx from 8/28 NGTD  - Urine cx no growth   - Was on cefepime; now on zosyn and vanc by level (management of abx as per primary team)   - of note patient with indeterminate quant gold, and +ve strongyloides; management as per ID     Esther Lees MD  Hematology/Oncology Fellow, PGY-5  Pager: [phone]  After 5pm and on weekends please page on-call fellow

## 2021-09-02 NOTE — PROGRESS NOTE ADULT - SUBJECTIVE AND OBJECTIVE BOX
INTERVAL HPI/OVERNIGHT EVENTS:  Patient S&E at bedside. No o/n events  -Patient remains intubated and sedated  -Tolerating chemo    VITAL SIGNS:  T(F): 96.6 (09-02-21 @ 04:00)  HR: 100 (09-02-21 @ 09:00)  BP: 117/67 (09-02-21 @ 09:00)  RR: 21 (09-02-21 @ 09:00)  SpO2: 100% (09-02-21 @ 09:00)  Wt(kg): --    PHYSICAL EXAM:  General: intubated, sedated  Respiratory: L pleurx in place, draining  Cardiovascular: S1S2 present  Abdomen: distended, tympanic, appears to be edematous bowel on POCUS, no significant ascites appreciated.   Extremities: LE swelling, edematous. Cold extremities, RUE colder than left   Neurological: sedated       MEDICATIONS  (STANDING):  allopurinol 100 milliGRAM(s) Oral daily  chlorhexidine 0.12% Liquid 15 milliLiter(s) Oral Mucosa every 12 hours  chlorhexidine 4% Liquid 1 Application(s) Topical <User Schedule>  cyclophosphamide IVPB (eMAR) 100 milliGRAM(s) IV Intermittent daily  dexMEDEtomidine Infusion 0.5 MICROgram(s)/kG/Hr (7.83 mL/Hr) IV Continuous <Continuous>  dextrose 40% Gel 15 Gram(s) Oral once  dextrose 5%. 1000 milliLiter(s) (100 mL/Hr) IV Continuous <Continuous>  dextrose 5%. 1000 milliLiter(s) (50 mL/Hr) IV Continuous <Continuous>  dextrose 50% Injectable 25 Gram(s) IV Push once  dextrose 50% Injectable 12.5 Gram(s) IV Push once  dextrose 50% Injectable 25 Gram(s) IV Push once  glucagon  Injectable 1 milliGRAM(s) IntraMuscular once  heparin   Injectable 5000 Unit(s) SubCutaneous every 8 hours  insulin lispro (ADMELOG) corrective regimen sliding scale   SubCutaneous every 6 hours  isoniazid 300 milliGRAM(s) Oral every 24 hours  ivermectin 12 milliGRAM(s) Oral every 24 hours  petrolatum Ophthalmic Ointment 1 Application(s) Both EYES two times a day  polyethylene glycol 3350 17 Gram(s) Oral daily  propofol Infusion 50 MICROgram(s)/kG/Min (19.2 mL/Hr) IV Continuous <Continuous>  senna Syrup 15 milliLiter(s) Oral at bedtime  sevelamer carbonate 1200 milliGRAM(s) Oral three times a day    MEDICATIONS  (PRN):  bisacodyl Suppository 10 milliGRAM(s) Rectal daily PRN Constipation  hydrocortisone sodium succinate Injectable 100 milliGRAM(s) IV Push once PRN PRN Chemotherapy Reaction  meperidine     Injectable 25 milliGRAM(s) IV Push once PRN PRN Chemotherapy Reaction (Rigors)      Allergies    penicillins (Rash)    Intolerances        LABS:                        8.5    16.55 )-----------( 217      ( 02 Sep 2021 05:47 )             26.4     09-02    139  |  107  |  75<H>  ----------------------------<  130<H>  3.7   |  11<L>  |  1.66<H>    Ca    7.6<L>      02 Sep 2021 05:47  Phos  8.4     09-02  Mg     1.90     09-02    TPro  5.0<L>  /  Alb  1.9<L>  /  TBili  <0.2  /  DBili  x   /  AST  37<H>  /  ALT  14  /  AlkPhos  101  09-02    PT/INR - ( 02 Sep 2021 05:47 )   PT: 11.0 sec;   INR: 0.96 ratio         PTT - ( 02 Sep 2021 05:47 )  PTT:29.9 sec      RADIOLOGY & ADDITIONAL TESTS:  Studies reviewed.

## 2021-09-02 NOTE — PROGRESS NOTE ADULT - ATTENDING COMMENTS
PURVI  Ascites  Volume overload    Please cont to monitor Is/Os and daily weights  Please give albumin today to help improve oncotic pressure  Cont to trend Cr

## 2021-09-02 NOTE — PROGRESS NOTE ADULT - PROBLEM SELECTOR PLAN 1
Pt with PURVI in the setting of PURVI 2/2 IV contrast and obstructive uropathy. Also with concerns of TLS. SCr was at 0.99 on 08/19 which increased to 1.65 on 08/22 and peaked to 2.59 on 08/24. Pt. now with concerns of TLS given hyperkalemia, hyperphosphatemia. SCr  08/31 at 1.17 >> today worsened to 1.6mg/dl and has remained nonoliguric with UOP at 875cc in last 24 hours. No plan for HD. Closely monitor TLS labs and urine output. Bladder pressure was measured and wnl. At this time recommend IV albumin infusion, goal alb >3.5.  Avoid NSAIDs, ACEI/ARBS, RCA and nephrotoxins.

## 2021-09-03 NOTE — PROGRESS NOTE ADULT - ATTENDING COMMENTS
1. Acute hypoxemic respiratory failure due to B cell lymphoma with recurrent pleural effusions. Pt with L  Pleurx catheter hooked up to suction for PTX. No air leak. Continue current AC vent settings. Pt awake and follows commands. However she failed SBT today.  2. ID. Fever 24 hours ago. No  further fever D/C Vancomycin and Zosyn today. .  Follow Vancomycin levels. . Stat  mg daily for quant gold inteermediate now that pt on immunosuppressive therapy with cytoxan.  Start  ivermectin for stronglyoides   positive.  3. B Cell lymphoma. Cytoxan today again. as per HEME. Lung POCUS shows no pleural effusion on L . Predominant a lines bilaterally. Abd POCUS shows moderate simple ascites. Continue to follow tumor lysis labs. Pt has received Rituxan recently.  4. FEN:  Tube feeding on hold due to residuals. Decrease Fentanyl. Receiving bowel regimen.  5. Hypotension requiring norepinephrine.  6. Renal . PURVI from Tumor lysis syndrome.  Creatinine slowly rising.  Pt more acidemic. Start bicarb drip.

## 2021-09-03 NOTE — PROGRESS NOTE ADULT - ATTENDING COMMENTS
PURVI  Ascites  Volume overload  Hypernatremia    Replete free water, deficit is 2.4L. Would give D5W IV and free water via NG tube.  Please cont to monitor Is/Os and daily weights  Please give albumin today to help improve oncotic pressure  Cont to trend Cr

## 2021-09-03 NOTE — PROGRESS NOTE ADULT - SUBJECTIVE AND OBJECTIVE BOX
COVERING RESIDENT: MALGORZATA PERRY (PGY-2) | [id number] / [phone]    INTERVAL HPI/OVERNIGHT EVENTS: No acute events overnight.    SUBJECTIVE: Patient seen and examined at bedside.     Unable to assess ROS as pt is intubated + sedated.    OBJECTIVE:    VITAL SIGNS:  ICU Vital Signs Last 24 Hrs  T(C): 36.1 (03 Sep 2021 04:00), Max: 36.8 (02 Sep 2021 16:00)  T(F): 96.9 (03 Sep 2021 04:00), Max: 98.3 (02 Sep 2021 16:00)  HR: 58 (03 Sep 2021 07:03) (55 - 106)  BP: 108/56 (03 Sep 2021 06:00) (81/48 - 125/72)  BP(mean): 68 (03 Sep 2021 06:00) (56 - 84)  ABP: --  ABP(mean): --  RR: 19 (03 Sep 2021 06:00) (15 - 31)  SpO2: 100% (03 Sep 2021 07:03) (99% - 100%)    Mode: CPAP with PS, FiO2: 40, PEEP: 5, PS: 8, MAP: 8, PIP: 14    09-02 @ 07:01  -  09-03 @ 07:00  --------------------------------------------------------  IN: 2697 mL / OUT: 985 mL / NET: 1712 mL      CAPILLARY BLOOD GLUCOSE      POCT Blood Glucose.: 151 mg/dL (03 Sep 2021 05:40)      PHYSICAL EXAM:    General: intubated, sedated  Respiratory: L pleurx in place  Cardiovascular: S1S2 present  Abdomen: distended, tympanic, soft  Extremities: LE swelling, edematous.   Neurological: sedated     MEDICATIONS:  MEDICATIONS  (STANDING):  albumin human 25% IVPB 100 milliLiter(s) IV Intermittent every 6 hours  allopurinol 100 milliGRAM(s) Oral daily  chlorhexidine 0.12% Liquid 15 milliLiter(s) Oral Mucosa every 12 hours  chlorhexidine 4% Liquid 1 Application(s) Topical <User Schedule>  cyclophosphamide IVPB (eMAR) 100 milliGRAM(s) IV Intermittent daily  dexMEDEtomidine Infusion 0.5 MICROgram(s)/kG/Hr (7.83 mL/Hr) IV Continuous <Continuous>  dextrose 40% Gel 15 Gram(s) Oral once  dextrose 5%. 1000 milliLiter(s) (50 mL/Hr) IV Continuous <Continuous>  dextrose 5%. 1000 milliLiter(s) (100 mL/Hr) IV Continuous <Continuous>  dextrose 50% Injectable 25 Gram(s) IV Push once  dextrose 50% Injectable 12.5 Gram(s) IV Push once  dextrose 50% Injectable 25 Gram(s) IV Push once  glucagon  Injectable 1 milliGRAM(s) IntraMuscular once  heparin   Injectable 5000 Unit(s) SubCutaneous every 8 hours  insulin lispro (ADMELOG) corrective regimen sliding scale   SubCutaneous every 6 hours  isoniazid 300 milliGRAM(s) Oral every 24 hours  norepinephrine Infusion 0.05 MICROgram(s)/kG/Min (2.93 mL/Hr) IV Continuous <Continuous>  petrolatum Ophthalmic Ointment 1 Application(s) Both EYES two times a day  polyethylene glycol 3350 17 Gram(s) Oral daily  propofol Infusion 50 MICROgram(s)/kG/Min (19.2 mL/Hr) IV Continuous <Continuous>  senna Syrup 15 milliLiter(s) Oral at bedtime  sevelamer carbonate Powder 1200 milliGRAM(s) Oral three times a day with meals  sodium bicarbonate  Infusion 0.18 mEq/kG/Hr (75 mL/Hr) IV Continuous <Continuous>    MEDICATIONS  (PRN):  bisacodyl Suppository 10 milliGRAM(s) Rectal daily PRN Constipation  hydrocortisone sodium succinate Injectable 100 milliGRAM(s) IV Push once PRN PRN Chemotherapy Reaction  meperidine     Injectable 25 milliGRAM(s) IV Push once PRN PRN Chemotherapy Reaction (Rigors)      ALLERGIES:  Allergies    penicillins (Rash)    Intolerances        LABS:                        8.5    16.55 )-----------( 217      ( 02 Sep 2021 05:47 )             26.4     09-03    140  |  106  |  78<H>  ----------------------------<  149<H>  3.8   |  15<L>  |  1.78<H>    Ca    7.5<L>      03 Sep 2021 00:13  Phos  8.2     09-03  Mg     1.90     09-03    TPro  4.9<L>  /  Alb  2.4<L>  /  TBili  0.4  /  DBili  x   /  AST  35<H>  /  ALT  11  /  AlkPhos  75  09-03    PT/INR - ( 02 Sep 2021 05:47 )   PT: 11.0 sec;   INR: 0.96 ratio         PTT - ( 02 Sep 2021 05:47 )  PTT:29.9 sec      RADIOLOGY & ADDITIONAL TESTS: Reviewed.

## 2021-09-03 NOTE — PROGRESS NOTE ADULT - ASSESSMENT
Ms. Stafford is a 66-year-old woman with PMH significant for HTN, HLD, L hydroureteronephrosis s/p renal stent on 7/15, who presents with volume overload 2/2 high grade B-cell lymphoma. S/p thoracentesis 8/13, paracentesis and excisional biopsy of left inguinal lymph node on 8/20. Accepted to MICU for acute hypoxic/hypercapneic respiratory failure now s/p intubation and L pleurex catheter placement, s/p chemotherapy.    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  - CT head with no intracranial pathology  - S/p LP with flow cytometry and cytopathology to evaluate possible lymphomatous meningitis, negative thus far   - Currently sedated with precedex - will wean as tolerated    #Cardiovascular  vasoplegic shock 2/2 sedatives   - Echo 8/3 showing concentric left ventricular remodeling, hyperdynamic left ventricle, calcified trileaflet aortic valve with normal opening, EF 56%  - POCUS showing adequate cardiac output  - On pressor support with levo, wean as tolerated    #Respiratory  - Hypoxic/hypercapneic respiratory failure likely secondary to malignant pleural effusions s/p thoracentesis on 8/13 with re-accumulation of pleural effusions  - S/p second thoracentesis 8/25, s/p L pleurex 8/27  - Continue to monitor, spontaneous breathing trial on 8/5 today    #GI/Nutrition  - S/p paracentesis 8/20, still remains distended with ascites   - BM 8/30 overnight   - s/p fecal disimpaction 8/29, abd x-ray without obstruction   - resume tube feeds    #/Renal  - PURVI with worsening CR 2/2 ATN in the setting of supratherapeutic vancomycin level + tumor lysis   - Nephrology on board, recommend holding LASIX & other nephrotoxic medications.   - Nephrostomy tubes considered however per IR recommendations not appropriate at this time as patient's Cr is stable  - B/L hydronephrosis stable on CT a/p  - monitor TLS labs q8h, now with uptrending LDH and hyperphosphatemia, although uric acid remains low   - carnes placed for accurate I+Os  - will start bicarb drip   - will start albumin with goal > 3.5     #Skin  - No sacral decubiti    #ID  - s/p Vanco and Zosyn (ended 8/31)   - U/A grossly positive, urine cx NGTD  - blood cx 8/28/21 NGTD  - strongyloides positive - s/p ivermectin (9/1 - 9/2) given IC state   - c/w INH (given indeterminate quant gold)    #Endocrine  - SSI   - will readjust as necessary     #Hematologic/DVT ppx  - B-cell lymphoma, per heme preliminary results of surgical pathology consistent with B-cell lymphoma but pending further evaluation for treatment choice  - TLS labs q8  - Allopurinol for TLS prophylaxis, s/p rasburicase  - Heme/Onc recommending starting steroids with dexamethasone, s/p 40 mg dose (ended 8/29, 8/31)   - s/p Rituxan 8/28/21  - s/p cyclophosphamide 1st cycle 9/1, to receive cyclophosphamide 2nd cycle 9/2  - DVT prophylaxis with heparin subQ    #Ethics  - Patient is FULL CODE per palliative care discussion with patient and her sons (Yuan and Jose).     Fill-in proxy is Jose Camargo: [phone] Ms. Stafford is a 66-year-old woman with PMH significant for HTN, HLD, L hydroureteronephrosis s/p renal stent on 7/15, who presents with volume overload 2/2 high grade B-cell lymphoma. S/p thoracentesis 8/13, paracentesis and excisional biopsy of left inguinal lymph node on 8/20. Accepted to MICU for acute hypoxic/hypercapneic respiratory failure now s/p intubation and L pleurex catheter placement, s/p chemotherapy.    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  - CT head with no intracranial pathology  - S/p LP with flow cytometry and cytopathology to evaluate possible lymphomatous meningitis, negative thus far   - Currently sedated with precedex - will wean as tolerated    #Cardiovascular  vasoplegic shock 2/2 sedatives   - Echo 8/3 showing concentric left ventricular remodeling, hyperdynamic left ventricle, calcified trileaflet aortic valve with normal opening, EF 56%  - POCUS showing adequate cardiac output  - On pressor support with levo, wean as tolerated    #Respiratory  - Hypoxic/hypercapneic respiratory failure likely secondary to malignant pleural effusions s/p thoracentesis on 8/13 with re-accumulation of pleural effusions  - S/p second thoracentesis 8/25, s/p L pleurex 8/27  - On 5/5 pressure support, however remains tachypneic with low TVs, will repeat ABG before attempting extubation    #GI/Nutrition  - S/p paracentesis 8/20, still remains distended with ascites   - BM 8/30 overnight   - s/p fecal disimpaction 8/29, abd x-ray without obstruction   - hold tube feeds for possible extubation    #/Renal  - PURVI with worsening CR 2/2 ATN in the setting of supratherapeutic vancomycin level + tumor lysis   - Nephrology on board, recommend holding LASIX & other nephrotoxic medications.   - Nephrostomy tubes considered however per IR recommendations not appropriate at this time as patient's Cr is stable  - B/L hydronephrosis stable on CT a/p  - monitor TLS labs q8h, now with uptrending LDH and hyperphosphatemia, although uric acid remains low   - carnes placed for accurate I+Os  - c/w bicarb drip   - s/p albumin with goal > 3.5 to assist with diuresis      #Skin  - No sacral decubiti    #ID  - s/p Vanco and Zosyn (ended 8/31)   - U/A grossly positive, urine cx NGTD  - blood cx 8/28/21 NGTD  - strongyloides positive - s/p ivermectin (9/1 - 9/2) given IC state   - c/w INH (given indeterminate quant gold)    #Endocrine  - SSI   - will readjust as necessary     #Hematologic/DVT ppx  - B-cell lymphoma, per heme preliminary results of surgical pathology consistent with B-cell lymphoma but pending further evaluation for treatment choice  - TLS labs q8  - Allopurinol for TLS prophylaxis, s/p rasburicase  - Heme/Onc recommending starting steroids with dexamethasone, s/p 40 mg dose (ended 8/29, 8/31)   - s/p Rituxan 8/28/21  - s/p cyclophosphamide 1st cycle 9/1, cyclophosphamide 2nd cycle 9/2, 3rd cycle pending 9/3   - DVT prophylaxis with heparin subQ    #Ethics  - Patient is FULL CODE per palliative care discussion with patient and her sons (Yuan and Jose).     Fill-in proxy is Jose Camargo: [phone]

## 2021-09-03 NOTE — PROGRESS NOTE ADULT - PROBLEM SELECTOR PLAN 2
noted with Na 152. free water deficit of 2.4L. Please start Free water 300cc q6hrs via NGT noted with Na 152. free water deficit of 2.4L. Start d5w infusion 100cc/hr x 24hrs. lso start Free water 300cc q6hrs via NGT

## 2021-09-03 NOTE — PROGRESS NOTE ADULT - PROBLEM SELECTOR PLAN 1
Pt with PURVI in the setting of PURVI 2/2 IV contrast and obstructive uropathy. Also with concerns of TLS. SCr was at 0.99 on 08/19 which increased to 1.65 on 08/22 and peaked to 2.59 on 08/24. Pt. now with concerns of TLS given hyperkalemia, hyperphosphatemia. SCr  08/31 at 1.17. today seems that is plateauing to 1.7mg/dl and has remained nonoliguric with UOP at 865cc in last 24 hours. No plan for HD. Closely monitor TLS labs and urine output. Bladder pressure was measured and wnl. At this time recommend IV albumin infusion, goal alb >3.5.  Avoid NSAIDs, ACEI/ARBS, RCA and nephrotoxins. Pt with PURVI in the setting of PURVI 2/2 IV contrast and obstructive uropathy. Also with concerns of TLS. SCr was at 0.99 on 08/19 which increased to 1.65 on 08/22 and peaked to 2.59 on 08/24. Pt. now with concerns of TLS given hyperkalemia, hyperphosphatemia. PURVI likely ATN. CT scan stable b/L hydro    SCr  08/31 at 1.17. today seems that is plateauing to 1.8mg/dl and has remained nonoliguric with UOP at 865cc in last 24 hours. No plan for HD. Closely monitor TLS labs and urine output. Bladder pressure was measured and wnl. At this time recommend IV albumin infusion, goal alb >3.5.  Avoid NSAIDs, ACEI/ARBS, RCA and nephrotoxins.

## 2021-09-03 NOTE — PROGRESS NOTE ADULT - SUBJECTIVE AND OBJECTIVE BOX
[facility] DIVISION OF KIDNEY DISEASES AND HYPERTENSION -- FOLLOW UP NOTE  --------------------------------------------------------------------------------  If any questions, please feel free to contact me  NS pager: [phone], [facility]: [phone]  Dusty Fountain M.D.  Nephrology Fellow    (After 5 pm or on weekends please page the on-call fellow)  --------------------------------------------------------------------------------    Chief Complaint:  Patient is a 66y old  Female who presents with a chief complaint of Worsening volume overload (03 Sep 2021 07:46)    HPI:  66F with HTN, HLD, recently discovered ovarian mass suspicious for malignancy (7/2021), L hydroureteronephrosis s/p renal stent (7/15/21), and recent hospitalization ([facility] 7/26-8/4) for PURVI requiring urgent HD, ascites s/p therapeutic paracentesis (7/27/21), and pleural effusion s/p R thoracentesis (8/2/21) admitted for acute hypercarbic respiratory failure due to pleural effusions most likely caused by ovarian malignancy complicated with volume overload with ascites. Now found to have new onset PURVI. Baseline Cr 0.7-1.1mg/dl.  had a recent PURVI episode which resolved- started to trended up again on 8/31 to 1.3mg/dl.    Patient seen and examined at bedside, in NAD, sCr 1.7mg/dl today, started on albumin infusion.  non oliguric UOP: 865L in the past 24hrs. Vitals/labs/imaging reviewed     PAST HISTORY  --------------------------------------------------------------------------------  No significant changes to PMH, PSH, FHx, SHx, unless otherwise noted    ALLERGIES & MEDICATIONS  --------------------------------------------------------------------------------  Allergies    penicillins (Rash)    Intolerances      Standing Inpatient Medications  albumin human 25% IVPB 100 milliLiter(s) IV Intermittent every 6 hours  allopurinol 100 milliGRAM(s) Oral daily  chlorhexidine 0.12% Liquid 15 milliLiter(s) Oral Mucosa every 12 hours  chlorhexidine 4% Liquid 1 Application(s) Topical <User Schedule>  cyclophosphamide IVPB (eMAR) 100 milliGRAM(s) IV Intermittent daily  dexMEDEtomidine Infusion 0.5 MICROgram(s)/kG/Hr IV Continuous <Continuous>  dextrose 40% Gel 15 Gram(s) Oral once  dextrose 5%. 1000 milliLiter(s) IV Continuous <Continuous>  dextrose 5%. 1000 milliLiter(s) IV Continuous <Continuous>  dextrose 50% Injectable 25 Gram(s) IV Push once  dextrose 50% Injectable 12.5 Gram(s) IV Push once  dextrose 50% Injectable 25 Gram(s) IV Push once  glucagon  Injectable 1 milliGRAM(s) IntraMuscular once  heparin   Injectable 5000 Unit(s) SubCutaneous every 8 hours  insulin lispro (ADMELOG) corrective regimen sliding scale   SubCutaneous every 6 hours  isoniazid 300 milliGRAM(s) Oral every 24 hours  norepinephrine Infusion 0.05 MICROgram(s)/kG/Min IV Continuous <Continuous>  petrolatum Ophthalmic Ointment 1 Application(s) Both EYES two times a day  polyethylene glycol 3350 17 Gram(s) Oral daily  propofol Infusion 50 MICROgram(s)/kG/Min IV Continuous <Continuous>  senna Syrup 15 milliLiter(s) Oral at bedtime  sevelamer carbonate Powder 1200 milliGRAM(s) Oral three times a day with meals  sodium bicarbonate  Infusion 0.18 mEq/kG/Hr IV Continuous <Continuous>    PRN Inpatient Medications  bisacodyl Suppository 10 milliGRAM(s) Rectal daily PRN  hydrocortisone sodium succinate Injectable 100 milliGRAM(s) IV Push once PRN  meperidine     Injectable 25 milliGRAM(s) IV Push once PRN      REVIEW OF SYSTEMS  --------------------------------------------------------------------------------  unable to obtain due to current medical conditions     VITALS/PHYSICAL EXAM  --------------------------------------------------------------------------------  T(C): 36.1 (09-03-21 @ 04:00), Max: 36.8 (09-02-21 @ 16:00)  HR: 58 (09-03-21 @ 08:00) (55 - 84)  BP: 104/53 (09-03-21 @ 08:00) (81/48 - 125/72)  RR: 17 (09-03-21 @ 08:00) (15 - 31)  SpO2: 100% (09-03-21 @ 08:00) (99% - 100%)  Wt(kg): --        09-02-21 @ 07:01  -  09-03-21 @ 07:00  --------------------------------------------------------  IN: 2697 mL / OUT: 985 mL / NET: 1712 mL    09-03-21 @ 07:01  -  09-03-21 @ 09:12  --------------------------------------------------------  IN: 101 mL / OUT: 0 mL / NET: 101 mL        Physical Exam:  	Gen: Sedated  	HEENT: ET tube +  	Pulm: mechanically ventilated via ETT  	CV: S1S2  	Abd: Soft, +BS  	Ext: No LE edema B/L  	Neuro: Sedated  	Skin: Warm and dry    LABS/STUDIES  --------------------------------------------------------------------------------              7.1    12.11 >-----------<  202      [09-03-21 @ 07:44]              22.3     152  |  100  |  76  ----------------------------<  115      [09-03-21 @ 07:44]  3.4   |  17  |  1.85        Ca     7.3     [09-03-21 @ 07:44]      iCa    0.97     [09-03 @ 07:44]      Mg     1.90     [09-03-21 @ 07:44]      Phos  7.5     [09-03-21 @ 07:44]    TPro  5.0  /  Alb  2.8  /  TBili  0.5  /  DBili  x   /  AST  32  /  ALT  9   /  AlkPhos  65  [09-03-21 @ 07:44]    PT/INR: PT 11.5 , INR 1.00       [09-03-21 @ 07:44]  PTT: 42.2       [09-03-21 @ 07:44]    Uric acid 2.6      [09-03-21 @ 07:44]        [09-03-21 @ 07:44]    Creatinine Trend:  SCr 1.85 [09-03 @ 07:44]  SCr 1.78 [09-03 @ 00:13]  SCr 1.73 [09-02 @ 14:21]  SCr 1.66 [09-02 @ 05:47]  SCr 1.60 [09-01 @ 20:50]    Urinalysis - [08-28-21 @ 17:00]      Color Yellow / Appearance Turbid / SG 1.034 / pH 6.5      Gluc Trace / Ketone Negative  / Bili Negative / Urobili <2 mg/dL       Blood Small / Protein 100 mg/dL / Leuk Est Large / Nitrite Negative      RBC 6-10 / WBC 15-20 / Hyaline 3 / Gran 20 / Sq Epi  / Non Sq Epi Occasional / Bacteria Moderate      TSH 1.85      [07-29-21 @ 11:12]    HBsAg Nonreact      [08-26-21 @ 10:02]  HCV 0.20, Nonreact      [08-26-21 @ 10:02]  HIV Nonreact      [08-25-21 @ 12:13]       [facility] DIVISION OF KIDNEY DISEASES AND HYPERTENSION -- FOLLOW UP NOTE  --------------------------------------------------------------------------------  If any questions, please feel free to contact me  NS pager: [phone], [facility]: [phone]  Dusty Fountani M.D.  Nephrology Fellow    (After 5 pm or on weekends please page the on-call fellow)  --------------------------------------------------------------------------------    Chief Complaint:  Patient is a 66y old  Female who presents with a chief complaint of Worsening volume overload (03 Sep 2021 07:46)    HPI:  66F with HTN, HLD, recently discovered ovarian mass suspicious for malignancy (7/2021), L hydroureteronephrosis s/p renal stent (7/15/21), and recent hospitalization ([facility] 7/26-8/4) for PURVI requiring urgent HD, ascites s/p therapeutic paracentesis (7/27/21), and pleural effusion s/p R thoracentesis (8/2/21) admitted for acute hypercarbic respiratory failure due to pleural effusions most likely caused by ovarian malignancy complicated with volume overload with ascites. Now found to have new onset PURVI. Baseline Cr 0.7-1.1mg/dl.  had a recent PURVI episode which resolved- started to trended up again on 8/31 to 1.3mg/dl.    Patient seen and examined at bedside, in NAD, sCr 1.8mg/dl today, started on albumin infusion.  non oliguric UOP: 865L in the past 24hrs. Vitals/labs/imaging reviewed     PAST HISTORY  --------------------------------------------------------------------------------  No significant changes to PMH, PSH, FHx, SHx, unless otherwise noted    ALLERGIES & MEDICATIONS  --------------------------------------------------------------------------------  Allergies    penicillins (Rash)    Intolerances      Standing Inpatient Medications  albumin human 25% IVPB 100 milliLiter(s) IV Intermittent every 6 hours  allopurinol 100 milliGRAM(s) Oral daily  chlorhexidine 0.12% Liquid 15 milliLiter(s) Oral Mucosa every 12 hours  chlorhexidine 4% Liquid 1 Application(s) Topical <User Schedule>  cyclophosphamide IVPB (eMAR) 100 milliGRAM(s) IV Intermittent daily  dexMEDEtomidine Infusion 0.5 MICROgram(s)/kG/Hr IV Continuous <Continuous>  dextrose 40% Gel 15 Gram(s) Oral once  dextrose 5%. 1000 milliLiter(s) IV Continuous <Continuous>  dextrose 5%. 1000 milliLiter(s) IV Continuous <Continuous>  dextrose 50% Injectable 25 Gram(s) IV Push once  dextrose 50% Injectable 12.5 Gram(s) IV Push once  dextrose 50% Injectable 25 Gram(s) IV Push once  glucagon  Injectable 1 milliGRAM(s) IntraMuscular once  heparin   Injectable 5000 Unit(s) SubCutaneous every 8 hours  insulin lispro (ADMELOG) corrective regimen sliding scale   SubCutaneous every 6 hours  isoniazid 300 milliGRAM(s) Oral every 24 hours  norepinephrine Infusion 0.05 MICROgram(s)/kG/Min IV Continuous <Continuous>  petrolatum Ophthalmic Ointment 1 Application(s) Both EYES two times a day  polyethylene glycol 3350 17 Gram(s) Oral daily  propofol Infusion 50 MICROgram(s)/kG/Min IV Continuous <Continuous>  senna Syrup 15 milliLiter(s) Oral at bedtime  sevelamer carbonate Powder 1200 milliGRAM(s) Oral three times a day with meals  sodium bicarbonate  Infusion 0.18 mEq/kG/Hr IV Continuous <Continuous>    PRN Inpatient Medications  bisacodyl Suppository 10 milliGRAM(s) Rectal daily PRN  hydrocortisone sodium succinate Injectable 100 milliGRAM(s) IV Push once PRN  meperidine     Injectable 25 milliGRAM(s) IV Push once PRN      REVIEW OF SYSTEMS  --------------------------------------------------------------------------------  unable to obtain due to current medical conditions     VITALS/PHYSICAL EXAM  --------------------------------------------------------------------------------  T(C): 36.1 (09-03-21 @ 04:00), Max: 36.8 (09-02-21 @ 16:00)  HR: 58 (09-03-21 @ 08:00) (55 - 84)  BP: 104/53 (09-03-21 @ 08:00) (81/48 - 125/72)  RR: 17 (09-03-21 @ 08:00) (15 - 31)  SpO2: 100% (09-03-21 @ 08:00) (99% - 100%)  Wt(kg): --        09-02-21 @ 07:01  -  09-03-21 @ 07:00  --------------------------------------------------------  IN: 2697 mL / OUT: 985 mL / NET: 1712 mL    09-03-21 @ 07:01  -  09-03-21 @ 09:12  --------------------------------------------------------  IN: 101 mL / OUT: 0 mL / NET: 101 mL        Physical Exam:  	Gen: Sedated  	HEENT: ET tube +  	Pulm: mechanically ventilated via ETT  	CV: S1S2  	Abd: Soft, +BS  	Ext: No LE edema B/L  	Neuro: Sedated  	Skin: Warm and dry    LABS/STUDIES  --------------------------------------------------------------------------------              7.1    12.11 >-----------<  202      [09-03-21 @ 07:44]              22.3     152  |  100  |  76  ----------------------------<  115      [09-03-21 @ 07:44]  3.4   |  17  |  1.85        Ca     7.3     [09-03-21 @ 07:44]      iCa    0.97     [09-03 @ 07:44]      Mg     1.90     [09-03-21 @ 07:44]      Phos  7.5     [09-03-21 @ 07:44]    TPro  5.0  /  Alb  2.8  /  TBili  0.5  /  DBili  x   /  AST  32  /  ALT  9   /  AlkPhos  65  [09-03-21 @ 07:44]    PT/INR: PT 11.5 , INR 1.00       [09-03-21 @ 07:44]  PTT: 42.2       [09-03-21 @ 07:44]    Uric acid 2.6      [09-03-21 @ 07:44]        [09-03-21 @ 07:44]    Creatinine Trend:  SCr 1.85 [09-03 @ 07:44]  SCr 1.78 [09-03 @ 00:13]  SCr 1.73 [09-02 @ 14:21]  SCr 1.66 [09-02 @ 05:47]  SCr 1.60 [09-01 @ 20:50]    Urinalysis - [08-28-21 @ 17:00]      Color Yellow / Appearance Turbid / SG 1.034 / pH 6.5      Gluc Trace / Ketone Negative  / Bili Negative / Urobili <2 mg/dL       Blood Small / Protein 100 mg/dL / Leuk Est Large / Nitrite Negative      RBC 6-10 / WBC 15-20 / Hyaline 3 / Gran 20 / Sq Epi  / Non Sq Epi Occasional / Bacteria Moderate      TSH 1.85      [07-29-21 @ 11:12]    HBsAg Nonreact      [08-26-21 @ 10:02]  HCV 0.20, Nonreact      [08-26-21 @ 10:02]  HIV Nonreact      [08-25-21 @ 12:13]

## 2021-09-03 NOTE — PROGRESS NOTE ADULT - ASSESSMENT
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 Her course has been complicated by intermittent fevers and she was started on empiric cefepime. She has had labile blood pressures, at times requiring pressor support.  Her course has also been complicated by respiratory failure requiring intubation.       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 LP with CSF flow and cytopathology done 8/25; appears traumatic with 6000 rbc; 77 lymphocytes although no atypicals, 23 monocytes  - 8/20: Excisional biopsy of left inguinal lymph node: DLFCL, NOS, Germinal centre B-cell subtype with high proliferative index and necrosis. CD20, PAX-5, CD10, BCL-6, BCL-2, C-MYC positive. FISH, Karyotype pending.   Initial work up for treatment: Echo 8/3 with LVEF of 56%; Hepatitis panel negative; Hep B core total negative, HIV negative, HTLV1 and HTLV2, Pending  - 8/24: Dexamethasone 20 mg IV daily x 2 days; Increased to dexamethasone 40mg daily IV on 8/26 - to be continued for 5 days, ended on 8/29  - s/p Rituxan on 8/28; plan was for cyclophosphamide, Doxorubicin and vincristine on Sunday 8/29, however patient with ongoing fevers and increasing pressor requirement concerning for possible infectious process. Currently afebrile and infectious w/up negative so far.  -discontinued dexamethasone 40mg IV 8/31  ---s/p D1-D2 of Cyclophosphamide  100mg 9/1-9/2  ---D3 (9/3) dose of cyclophosphamide increased to 225mg q12hrs   -If patient can tolerate cyclophosphamide then will start Etoposide, doxorubicin and vincristine on 9/4 as part of DA-EPOCH (dose level 1)  -Continue with allopurinol for TLS prophylaxis;   -Check TLS lab monitoring [id number] (LDH, Uric acid, BMP, phos, Mag)   -LDH is rising and Cr is trending up, though uric acid remains low. Appreciate nephrology recommendations

## 2021-09-03 NOTE — PROGRESS NOTE ADULT - ATTENDING COMMENTS
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 CD20, PAX-5, CD10, BCL-6, BCL-2, C-MYC positive. FISH, Karyotype pending.  initially presented with volume overload, now intubated in the MICU and with pressor support, planned for R-CHOP chemotherapy.   dex from 8/26-8/29, Rituxan on 8/28 only with plans for cyclophosphamide, Doxorubicin and vincristine on Sunday 8/29 however patient with ongoing fevers and increasing pressor requirement concerning for possible infectious process.   infectious w/up negative and now afebrile  s/p D1-D2 of Cyclophosphamide 100mg daily 9/1-9/2 (reduced dose given large burden of disease)  D3 (9/3) dose of cyclophosphamide increased to 225mg q12hrs   If patient can tolerate cyclophosphamide then will start etoposide, doxorubicin and vincristine on 9/4 as part of DA-EPOCH (dose level 1)  Patient is at high risk for TLS, Continue with TLS lab monitoring [id number] (LDH, Uric acid, BMP, phos, Mag) while on treatment   Continue with allopurinol for TLS prophylaxis;   We will continue to follow .

## 2021-09-04 NOTE — PROGRESS NOTE ADULT - ASSESSMENT
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 Patient is now intubated in the MICU and with pressor support, started on R-CHOP chemotherapy. Patient also has bilateral adnexal masses and peritoneal carcinomatosis with ascites and extensive abdominal and pelvic adenopathy. Hospitalization course has been c/b ongoing fevers and increasing pressor support, concerning for infectious process.    -s/p Dexamethasone 8/26-8/29, Rituxan 8/28  -s/p D1-D2 cyclophosphamide 100mg 9/1-9/2 with reduction of doses   -s/p D3 cyclophosphamide 225mg q12h on 9/3  -proceed with DA-EPOCH today (9/4) as planned - etoposide, doxorubicin and vincristine  -continue close monitoring of TLS labs q8h given high risk of tumor lysis   -continue with allopurinol for TLS ppx  -appreciate nephrology recommendations for PURVI and TLS   -f/u palliative care recommendations for continued GOC discussions with patient's sons   -continue DVT ppx with heparin subq    Will continue to follow with you closely.    Armando Lenz MD  Heme/Onc attending

## 2021-09-04 NOTE — PROGRESS NOTE ADULT - PROBLEM SELECTOR PLAN 1
Pt with PURVI in the setting of PURVI 2/2 IV contrast and obstructive uropathy. Also with concerns of TLS. SCr was at 0.99 on 08/19 which increased to 1.65 on 08/22 and peaked to 2.59 on 08/24. Pt. now with concerns of TLS given hyperkalemia, hyperphosphatemia. PURVI likely ATN. CT scan stable b/L hydro    SCr today 09/04 at 1.9 mg/dl today, nonoliguric UOP~ 635. labs reviewed. No plan for HD. Closely monitor TLS labs and urine output. Bladder pressure was measured and wnl. At this time recommend IV albumin infusion, goal alb >3.5.  Avoid NSAIDs, ACEI/ARBS, RCA and nephrotoxins

## 2021-09-04 NOTE — PROGRESS NOTE ADULT - ASSESSMENT
Ms. Stafford is a 66-year-old woman with PMH significant for HTN, HLD, L hydroureteronephrosis s/p renal stent on 7/15, who presents with volume overload 2/2 high grade B-cell lymphoma. S/p thoracentesis 8/13, paracentesis and excisional biopsy of left inguinal lymph node on 8/20. Accepted to MICU for acute hypoxic/hypercapneic respiratory failure now s/p intubation and L pleurex catheter placement, s/p chemotherapy.    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  - CT head with no intracranial pathology  - S/p LP with flow cytometry and cytopathology to evaluate possible lymphomatous meningitis, negative thus far   - Currently sedated with precedex - will wean as tolerated    #Cardiovascular  vasoplegic shock 2/2 sedatives   - Echo 8/3 showing concentric left ventricular remodeling, hyperdynamic left ventricle, calcified trileaflet aortic valve with normal opening, EF 56%  - POCUS showing adequate cardiac output  - On pressor support with levo, wean as tolerated    #Respiratory  - Hypoxic/hypercapneic respiratory failure likely secondary to malignant pleural effusions s/p thoracentesis on 8/13 with re-accumulation of pleural effusions  - S/p second thoracentesis 8/25, s/p L pleurex 8/27  - On 5/5 pressure support, however remains tachypneic with low TVs, will repeat ABG before attempting extubation    #GI/Nutrition  - S/p paracentesis 8/20, still remains distended with ascites   - BM 8/30 overnight   - s/p fecal disimpaction 8/29, abd x-ray without obstruction   - hold tube feeds for possible extubation    #/Renal  - PURVI with worsening CR 2/2 ATN in the setting of supratherapeutic vancomycin level + tumor lysis   - Nephrology on board, recommend holding LASIX & other nephrotoxic medications.   - Nephrostomy tubes considered however per IR recommendations not appropriate at this time as patient's Cr is stable  - B/L hydronephrosis stable on CT a/p  - monitor TLS labs q8h, now with uptrending LDH and hyperphosphatemia, although uric acid remains low   - carnes placed for accurate I+Os  - c/w bicarb drip   - s/p albumin with goal > 3.5 to assist with diuresis      #Skin  - No sacral decubiti    #ID  - s/p Vanco and Zosyn (ended 8/31)   - U/A grossly positive, urine cx NGTD  - blood cx 8/28/21 NGTD  - strongyloides positive - s/p ivermectin (9/1 - 9/2) given IC state   - c/w INH (given indeterminate quant gold)    #Endocrine  - SSI   - will readjust as necessary     #Hematologic/DVT ppx  - B-cell lymphoma, per heme preliminary results of surgical pathology consistent with B-cell lymphoma but pending further evaluation for treatment choice  - TLS labs q8  - Allopurinol for TLS prophylaxis, s/p rasburicase  - Heme/Onc recommending starting steroids with dexamethasone, s/p 40 mg dose (ended 8/29, 8/31)   - s/p Rituxan 8/28/21  - s/p cyclophosphamide 1st cycle 9/1, cyclophosphamide 2nd cycle 9/2, 3rd cycle pending 9/3   - DVT prophylaxis with heparin subQ    #Ethics  - Patient is FULL CODE per palliative care discussion with patient and her sons (Yuan and Jose).     Fill-in proxy is oJse Camargo: [phone] Ms. Stafford is a 66-year-old woman with PMH significant for HTN, HLD, L hydroureteronephrosis s/p renal stent on 7/15, who presents with volume overload 2/2 high grade B-cell lymphoma. S/p thoracentesis 8/13, paracentesis and excisional biopsy of left inguinal lymph node on 8/20. Accepted to MICU for acute hypoxic/hypercapneic respiratory failure now s/p intubation and L pleurex catheter placement, now undergoing chemotherapy.    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  - CT head with no intracranial pathology  - S/p LP with flow cytometry and cytopathology to evaluate possible lymphomatous meningitis, negative thus far   - Currently sedated with precedex but following commands - will wean as tolerated when attempting extubation    #Cardiovascular  vasoplegic shock 2/2 sedatives   - Echo 8/3 showing concentric left ventricular remodeling, hyperdynamic left ventricle, calcified trileaflet aortic valve with normal opening, EF 56%  - POCUS showing adequate cardiac output  - On pressor support with levo, wean as tolerated    #Respiratory  - Hypoxic/hypercapneic respiratory failure likely secondary to malignant pleural effusions s/p thoracentesis on 8/13 with re-accumulation of pleural effusions  - S/p second thoracentesis 8/25, s/p L pleurex 8/27  - On pressure support, however remains tachypneic with low TVs, will repeat thoracentesis on R side before attempting extubation   - pending thoracentesis on R side 9/4    #GI/Nutrition  #malignant ascites   - S/p paracentesis 8/20, still remains distended with ascites     #?ileus   - hold tube feeds as pt with residuals and persistent nausea 2/2 chemotherapy   - abd x-ray ordered  - zofran prn  - check QTc + consider reglan    #diarrhea   - previously constipated s/p golytely + fecal disempaction    #/Renal  - PURVI with worsening CR 2/2 ATN in the setting of supratherapeutic vancomycin level + tumor lysis   - B/L hydronephrosis stable on CT a/p 2/2 malignant LAD  - Nephrology on board, recommend holding LASIX & other nephrotoxic medications.   - Nephrostomy tubes considered however per IR recommendations not appropriate at this time as patient's Cr previously downtrending, may consider re-consulting if patient's renal function continues to worsen   - monitor TLS labs q8h, now with uptrending LDH and hyperphosphatemia, although uric acid remains low   - c/w phosphate binder  - s/p bicarb drip, transitioned to PO bicarb   - c/w albumin with goal > 3.5 to assist with diuresis, dosed for IV lasix 40 9/4   - carnes placed for accurate I+Os    #Skin  - No sacral decubiti    #ID  - s/p Vanco and Zosyn (ended 8/31)   - U/A grossly positive, urine cx NGTD  - blood cx 8/28/21 NGTD  - strongyloides positive - s/p ivermectin (9/1 - 9/2) given IC state   - c/w INH + pyridoxine (given indeterminate quant gold)    #Endocrine  - SSI   - will readjust as necessary     #Hematologic/DVT ppx  - newly diagnosed diffuse large B-cell lymphoma  - TLS labs q8  - Allopurinol for TLS prophylaxis  - Heme/Onc recommending starting steroids with dexamethasone, s/p 40 mg dose (ended 8/29, 8/31)   - s/p Rituxan 8/28/21  - s/p cyclophosphamide (1st cycle 9/1, 2nd cycle 9/2, 3rd cycle 9/3)  - pending doxorubicin 9/4  - DVT prophylaxis with heparin subQ    #Ethics  - Patient is FULL CODE per palliative care discussion with patient and her sons (Yuan and Jose).     Fill-in proxy is Jose Camargo: [phone]

## 2021-09-04 NOTE — PROGRESS NOTE ADULT - SUBJECTIVE AND OBJECTIVE BOX
[facility] DIVISION OF KIDNEY DISEASES AND HYPERTENSION -- FOLLOW UP NOTE  --------------------------------------------------------------------------------  Chief Complaint:    24 hour events/subjective: 66F with HTN, HLD, recently discovered ovarian mass suspicious for malignancy (7/2021), L hydroureteronephrosis s/p renal stent (7/15/21), and recent hospitalization ([facility] 7/26-8/4) for PURVI requiring urgent HD, ascites s/p therapeutic paracentesis (7/27/21), and pleural effusion s/p R thoracentesis (8/2/21) admitted for acute hypercarbic respiratory failure due to pleural effusions most likely caused by ovarian malignancy complicated with volume overload with ascites. Now found to have new onset PURVI. Baseline Cr 0.7-1.1mg/dl.  had a recent PURVI episode which resolved- started to trended up again on 8/31 to 1.3mg/dl.    Patient seen and examined at bedside, remains intubated and sedated. SCr today 09/04 at 1.9 mg/dl today, nonoliguric UOP~ 635.    PAST HISTORY  --------------------------------------------------------------------------------  No significant changes to PMH, PSH, FHx, SHx, unless otherwise noted    ALLERGIES & MEDICATIONS  --------------------------------------------------------------------------------  Allergies    penicillins (Rash)    Intolerances      Standing Inpatient Medications  albumin human 25% IVPB 100 milliLiter(s) IV Intermittent every 6 hours  allopurinol 100 milliGRAM(s) Oral daily  chlorhexidine 0.12% Liquid 15 milliLiter(s) Oral Mucosa every 12 hours  chlorhexidine 4% Liquid 1 Application(s) Topical <User Schedule>  dexMEDEtomidine Infusion 0.5 MICROgram(s)/kG/Hr IV Continuous <Continuous>  dextrose 40% Gel 15 Gram(s) Oral once  dextrose 5%. 1000 milliLiter(s) IV Continuous <Continuous>  dextrose 5%. 1000 milliLiter(s) IV Continuous <Continuous>  dextrose 50% Injectable 25 Gram(s) IV Push once  dextrose 50% Injectable 12.5 Gram(s) IV Push once  dextrose 50% Injectable 25 Gram(s) IV Push once  doxorubicin IVPB w/etoposide (eMAR) 18 milliGRAM(s) IV Intermittent every 24 hours  glucagon  Injectable 1 milliGRAM(s) IntraMuscular once  heparin   Injectable 5000 Unit(s) SubCutaneous every 8 hours  insulin lispro (ADMELOG) corrective regimen sliding scale   SubCutaneous every 6 hours  isoniazid 300 milliGRAM(s) Oral every 24 hours  norepinephrine Infusion 0.05 MICROgram(s)/kG/Min IV Continuous <Continuous>  petrolatum Ophthalmic Ointment 1 Application(s) Both EYES two times a day  polyethylene glycol 3350 17 Gram(s) Oral daily  pyridoxine 50 milliGRAM(s) Oral daily  senna Syrup 15 milliLiter(s) Oral at bedtime  sevelamer carbonate Powder 1200 milliGRAM(s) Oral three times a day with meals  sodium bicarbonate 1300 milliGRAM(s) Oral two times a day    PRN Inpatient Medications  bisacodyl Suppository 10 milliGRAM(s) Rectal daily PRN  hydrocortisone sodium succinate Injectable 100 milliGRAM(s) IV Push once PRN  meperidine     Injectable 25 milliGRAM(s) IV Push once PRN  ondansetron Injectable 8 milliGRAM(s) IV Push every 8 hours PRN      REVIEW OF SYSTEMS  --------------------------------------------------------------------------------  Unable to obtain      VITALS/PHYSICAL EXAM  --------------------------------------------------------------------------------  T(C): 37.3 (09-04-21 @ 12:00), Max: 37.3 (09-04-21 @ 12:00)  HR: 67 (09-04-21 @ 14:00) (59 - 67)  BP: 118/64 (09-04-21 @ 14:00) (105/56 - 128/99)  RR: 22 (09-04-21 @ 14:00) (16 - 22)  SpO2: 100% (09-04-21 @ 14:00) (98% - 100%)  Wt(kg): --        09-03-21 @ 07:01  -  09-04-21 @ 07:00  --------------------------------------------------------  IN: 2999 mL / OUT: 1265 mL / NET: 1734 mL    09-04-21 @ 07:01  -  09-04-21 @ 15:02  --------------------------------------------------------  IN: 177.1 mL / OUT: 355 mL / NET: -177.9 mL        Physical Exam:  	Gen: Sedated  	HEENT: ET tube +  	Pulm: mechanically ventilated via ETT  	CV: S1S2  	Abd: Soft, +BS  	Ext: No LE edema B/L  	Neuro: Sedated  	Skin: Warm and dry    LABS/STUDIES  --------------------------------------------------------------------------------              9.0    12.41 >-----------<  198      [09-04-21 @ 11:53]              26.3     135  |  102  |  74  ----------------------------<  98      [09-04-21 @ 11:53]  4.2   |  17  |  1.90        Ca     7.1     [09-04-21 @ 11:53]      iCa    0.85     [09-04 @ 04:30]      Mg     1.70     [09-04-21 @ 11:53]      Phos  6.5     [09-04-21 @ 11:53]    TPro  5.0  /  Alb  2.7  /  TBili  0.8  /  DBili  x   /  AST  32  /  ALT  12  /  AlkPhos  78  [09-04-21 @ 11:53]    PT/INR: PT 11.6 , INR 1.02       [09-04-21 @ 04:30]  PTT: 28.1       [09-04-21 @ 04:30]    Uric acid 2.8      [09-04-21 @ 11:53]        [09-04-21 @ 11:53]    Creatinine Trend:  SCr 1.90 [09-04 @ 11:53]  SCr 1.92 [09-04 @ 04:30]  SCr 1.93 [09-03 @ 19:57]  SCr 1.85 [09-03 @ 07:44]  SCr 1.78 [09-03 @ 00:13]    Urinalysis - [08-28-21 @ 17:00]      Color Yellow / Appearance Turbid / SG 1.034 / pH 6.5      Gluc Trace / Ketone Negative  / Bili Negative / Urobili <2 mg/dL       Blood Small / Protein 100 mg/dL / Leuk Est Large / Nitrite Negative      RBC 6-10 / WBC 15-20 / Hyaline 3 / Gran 20 / Sq Epi  / Non Sq Epi Occasional / Bacteria Moderate      TSH 1.85      [07-29-21 @ 11:12]    HBsAg Nonreact      [08-26-21 @ 10:02]  HCV 0.20, Nonreact      [08-26-21 @ 10:02]  HIV Nonreact      [08-25-21 @ 12:13]

## 2021-09-04 NOTE — PROVIDER CONTACT NOTE (OTHER) - DATE AND TIME:
12-Aug-2021 22:15
22-Aug-2021 16:55
28-Aug-2021 14:50
13-Aug-2021 11:30
24-Aug-2021 06:25
28-Aug-2021 15:00
04-Sep-2021 11:50
21-Aug-2021 15:00
28-Aug-2021 09:34
12-Aug-2021 17:00
14-Aug-2021 12:00
14-Aug-2021 12:00
15-Aug-2021 23:00
21-Aug-2021 08:30
20-Aug-2021 20:00

## 2021-09-04 NOTE — PROVIDER CONTACT NOTE (OTHER) - BACKGROUND
66 year old female admitted with pleural effusion, recently admitted for ovarian mass, s/p paracentesis.
Pt. currently in ICU. Admitted with DLBCL. Pt. received 1 unit PRBC this morning after CBC resulted. Repeat CBC sent.
Patient admitted here for pleural effusion, with pmh of pneumonia, acute respiratory failure
hx  DLBCL
hx DLBLC
Patient admitted for pleural effusion, PMH ovarian mass, ascites.
Patient admitted with pleural effusion. Hx of HLD, ovarian mass, htn
pt is 67 y/o female admit diagnosis pleural effusion pmh incldues htn, ovarian mass
patient admitted for pleural effusion. PMH of ovarian mass
Patient receiving rituxan presently rate at 200ml/hr.
chemotherapy for DLBCL
patient admitted for pleural effusion. PMH of ovarian mass
patient admitted for pleural effusion. PMH of ovarian mass
pt is 67 y/o female admit diagnosis pleural effusion pmh incldues htn, ovarian mass
66 y.o. female admitted for pleural effusion with PMHx. of PURVI, ovarian mass, HTN, acute respiratory failure...

## 2021-09-04 NOTE — PROVIDER CONTACT NOTE (OTHER) - NAME OF MD/NP/PA/DO NOTIFIED:
Dina Thompson
Dr Adame
Ashwin Gaming
Mikey Siegel
Dr Adame
Dr Adame
Heme/onc Dr. Adame
Dr. Loza
Kierra Suazo
MD Vin
Marin Adame [id number] hematology team
MD, Vin Bustos
Mikey Siegel
Dina Thompson MD
SANA García

## 2021-09-04 NOTE — PROVIDER CONTACT NOTE (OTHER) - ACTION/TREATMENT ORDERED:
MD made aware. Nursing care continued
MD made aware. Stated will order EKG. nursing care continued
MD notified, came to assess patient, will continue to monitor.
MD notified, increase 1L of nasal cannula. Continue to Monitor
Dr. Loza at bedside examining the patient
MD Danielle made aware and assessed patient at bedside, and states will order CT abdomen to r/o SBO. IVP antiemetic administered as prescribed. Patient kept NPO for now, will continue to monitor.
MD aware and at bedside with patient, ABG being drawn and stat dose of Lasix to be given
md notified, continue to monitor pt
Okay to give chemo.
MD made aware. no intervention at time. nursing care continued
Provider aware, okay with the 10% difference, okay with continuing chemotherapy with current vitals.
keep rate at 200 ml/hr
md notified, continue to monitor pt
Restarted when BP increased to 96/59

## 2021-09-04 NOTE — PROGRESS NOTE ADULT - ATTENDING COMMENTS
Patient is 66-year-old with newly diagnosed high grade B-cell lymphoma. Patient with acute hypoxic/hypercapneic respiratory failure now s/p intubation and L pleurex catheter placement, now undergoing chemotherapy. pt off sedation, appropriate mental status. tachypenic on SBT x 2, has large right pleural effusion, ascites, left pleurex draining about 200 cc/day. Will give lasix with albumin today. Continue ventilatory supportive care. Off pressor support.

## 2021-09-04 NOTE — PROGRESS NOTE ADULT - SUBJECTIVE AND OBJECTIVE BOX
COVERING RESIDENT: MALGORZATA PERRY (PGY-2) | [id number] / [phone]    INTERVAL HPI/OVERNIGHT EVENTS: No acute events overnight.    SUBJECTIVE: Patient seen and examined at bedside.     Unable to assess ROS as pt is intubated + sedated.    OBJECTIVE:    VITAL SIGNS:  ICU Vital Signs Last 24 Hrs  T(C): 36.6 (04 Sep 2021 08:00), Max: 37.1 (04 Sep 2021 04:00)  T(F): 97.8 (04 Sep 2021 08:00), Max: 98.7 (04 Sep 2021 04:00)  HR: 61 (04 Sep 2021 08:00) (58 - 81)  BP: 110/57 (04 Sep 2021 08:00) (96/49 - 168/67)  BP(mean): 69 (04 Sep 2021 08:00) (59 - 96)  ABP: --  ABP(mean): --  RR: 19 (04 Sep 2021 08:00) (16 - 31)  SpO2: 100% (04 Sep 2021 08:00) (93% - 100%)    Mode: AC/ CMV (Assist Control/ Continuous Mandatory Ventilation), RR (machine): 16, TV (machine): 380, FiO2: 60, PEEP: 5, ITime: 0.78, MAP: 11, PIP: 29    09-03 @ 07:01 - 09-04 @ 07:00  --------------------------------------------------------  IN: 2999 mL / OUT: 1265 mL / NET: 1734 mL    09-04 @ 07:01 - 09-04 @ 08:11  --------------------------------------------------------  IN: 25.3 mL / OUT: 45 mL / NET: -19.7 mL      CAPILLARY BLOOD GLUCOSE      POCT Blood Glucose.: 126 mg/dL (04 Sep 2021 06:21)      PHYSICAL EXAM:    General: intubated, sedated  Respiratory: L pleurx in place  Cardiovascular: S1S2 present  Abdomen: distended, tympanic, soft  Extremities: LE swelling, edematous.   Neurological: sedated     MEDICATIONS:  MEDICATIONS  (STANDING):  allopurinol 100 milliGRAM(s) Oral daily  chlorhexidine 0.12% Liquid 15 milliLiter(s) Oral Mucosa every 12 hours  chlorhexidine 4% Liquid 1 Application(s) Topical <User Schedule>  dexMEDEtomidine Infusion 0.5 MICROgram(s)/kG/Hr (7.83 mL/Hr) IV Continuous <Continuous>  dextrose 40% Gel 15 Gram(s) Oral once  dextrose 5%. 1000 milliLiter(s) (50 mL/Hr) IV Continuous <Continuous>  dextrose 5%. 1000 milliLiter(s) (100 mL/Hr) IV Continuous <Continuous>  dextrose 50% Injectable 25 Gram(s) IV Push once  dextrose 50% Injectable 12.5 Gram(s) IV Push once  dextrose 50% Injectable 25 Gram(s) IV Push once  glucagon  Injectable 1 milliGRAM(s) IntraMuscular once  heparin   Injectable 5000 Unit(s) SubCutaneous every 8 hours  insulin lispro (ADMELOG) corrective regimen sliding scale   SubCutaneous every 6 hours  isoniazid 300 milliGRAM(s) Oral every 24 hours  norepinephrine Infusion 0.05 MICROgram(s)/kG/Min (2.93 mL/Hr) IV Continuous <Continuous>  petrolatum Ophthalmic Ointment 1 Application(s) Both EYES two times a day  polyethylene glycol 3350 17 Gram(s) Oral daily  propofol Infusion 50 MICROgram(s)/kG/Min (19.2 mL/Hr) IV Continuous <Continuous>  senna Syrup 15 milliLiter(s) Oral at bedtime  sevelamer carbonate Powder 1200 milliGRAM(s) Oral three times a day with meals  sodium bicarbonate 1300 milliGRAM(s) Oral two times a day    MEDICATIONS  (PRN):  bisacodyl Suppository 10 milliGRAM(s) Rectal daily PRN Constipation  hydrocortisone sodium succinate Injectable 100 milliGRAM(s) IV Push once PRN PRN Chemotherapy Reaction  meperidine     Injectable 25 milliGRAM(s) IV Push once PRN PRN Chemotherapy Reaction (Rigors)      ALLERGIES:  Allergies    penicillins (Rash)    Intolerances        LABS:                        6.8    11.17 )-----------( 208      ( 04 Sep 2021 04:30 )             20.9     09-04    136  |  100  |  75<H>  ----------------------------<  135<H>  3.4<L>   |  17<L>  |  1.92<H>    Ca    7.1<L>      04 Sep 2021 04:30  Phos  6.4     09-04  Mg     1.70     09-04    TPro  5.0<L>  /  Alb  2.9<L>  /  TBili  0.6  /  DBili  x   /  AST  35<H>  /  ALT  11  /  AlkPhos  71  09-04    PT/INR - ( 04 Sep 2021 04:30 )   PT: 11.6 sec;   INR: 1.02 ratio         PTT - ( 04 Sep 2021 04:30 )  PTT:28.1 sec      RADIOLOGY & ADDITIONAL TESTS: Reviewed. COVERING RESIDENT: MALGORZATA PERRY (PGY-2) | [id number] / [phone]    INTERVAL HPI/OVERNIGHT EVENTS: No acute events overnight.    SUBJECTIVE: Patient seen and examined at bedside.     Unable to assess ROS as pt is intubated + sedated. Patient denies pain.    OBJECTIVE:    VITAL SIGNS:  ICU Vital Signs Last 24 Hrs  T(C): 36.6 (04 Sep 2021 08:00), Max: 37.1 (04 Sep 2021 04:00)  T(F): 97.8 (04 Sep 2021 08:00), Max: 98.7 (04 Sep 2021 04:00)  HR: 61 (04 Sep 2021 08:00) (58 - 81)  BP: 110/57 (04 Sep 2021 08:00) (96/49 - 168/67)  BP(mean): 69 (04 Sep 2021 08:00) (59 - 96)  ABP: --  ABP(mean): --  RR: 19 (04 Sep 2021 08:00) (16 - 31)  SpO2: 100% (04 Sep 2021 08:00) (93% - 100%)    Mode: AC/ CMV (Assist Control/ Continuous Mandatory Ventilation), RR (machine): 16, TV (machine): 380, FiO2: 60, PEEP: 5, ITime: 0.78, MAP: 11, PIP: 29    09-03 @ 07:01 - 09-04 @ 07:00  --------------------------------------------------------  IN: 2999 mL / OUT: 1265 mL / NET: 1734 mL    09-04 @ 07:01  - 09-04 @ 08:11  --------------------------------------------------------  IN: 25.3 mL / OUT: 45 mL / NET: -19.7 mL      CAPILLARY BLOOD GLUCOSE      POCT Blood Glucose.: 126 mg/dL (04 Sep 2021 06:21)      PHYSICAL EXAM:    General: intubated, sedated  Respiratory: L pleurex in place  Cardiovascular: S1S2 present  Abdomen: distended, tympanic, soft  Extremities: LE swelling, edematous  Neurological: sedated, but following commands     MEDICATIONS:  MEDICATIONS  (STANDING):  allopurinol 100 milliGRAM(s) Oral daily  chlorhexidine 0.12% Liquid 15 milliLiter(s) Oral Mucosa every 12 hours  chlorhexidine 4% Liquid 1 Application(s) Topical <User Schedule>  dexMEDEtomidine Infusion 0.5 MICROgram(s)/kG/Hr (7.83 mL/Hr) IV Continuous <Continuous>  dextrose 40% Gel 15 Gram(s) Oral once  dextrose 5%. 1000 milliLiter(s) (50 mL/Hr) IV Continuous <Continuous>  dextrose 5%. 1000 milliLiter(s) (100 mL/Hr) IV Continuous <Continuous>  dextrose 50% Injectable 25 Gram(s) IV Push once  dextrose 50% Injectable 12.5 Gram(s) IV Push once  dextrose 50% Injectable 25 Gram(s) IV Push once  glucagon  Injectable 1 milliGRAM(s) IntraMuscular once  heparin   Injectable 5000 Unit(s) SubCutaneous every 8 hours  insulin lispro (ADMELOG) corrective regimen sliding scale   SubCutaneous every 6 hours  isoniazid 300 milliGRAM(s) Oral every 24 hours  norepinephrine Infusion 0.05 MICROgram(s)/kG/Min (2.93 mL/Hr) IV Continuous <Continuous>  petrolatum Ophthalmic Ointment 1 Application(s) Both EYES two times a day  polyethylene glycol 3350 17 Gram(s) Oral daily  propofol Infusion 50 MICROgram(s)/kG/Min (19.2 mL/Hr) IV Continuous <Continuous>  senna Syrup 15 milliLiter(s) Oral at bedtime  sevelamer carbonate Powder 1200 milliGRAM(s) Oral three times a day with meals  sodium bicarbonate 1300 milliGRAM(s) Oral two times a day    MEDICATIONS  (PRN):  bisacodyl Suppository 10 milliGRAM(s) Rectal daily PRN Constipation  hydrocortisone sodium succinate Injectable 100 milliGRAM(s) IV Push once PRN PRN Chemotherapy Reaction  meperidine     Injectable 25 milliGRAM(s) IV Push once PRN PRN Chemotherapy Reaction (Rigors)      ALLERGIES:  Allergies    penicillins (Rash)    Intolerances        LABS:                        6.8    11.17 )-----------( 208      ( 04 Sep 2021 04:30 )             20.9     09-04    136  |  100  |  75<H>  ----------------------------<  135<H>  3.4<L>   |  17<L>  |  1.92<H>    Ca    7.1<L>      04 Sep 2021 04:30  Phos  6.4     09-04  Mg     1.70     09-04    TPro  5.0<L>  /  Alb  2.9<L>  /  TBili  0.6  /  DBili  x   /  AST  35<H>  /  ALT  11  /  AlkPhos  71  09-04    PT/INR - ( 04 Sep 2021 04:30 )   PT: 11.6 sec;   INR: 1.02 ratio         PTT - ( 04 Sep 2021 04:30 )  PTT:28.1 sec      RADIOLOGY & ADDITIONAL TESTS: Reviewed.

## 2021-09-04 NOTE — PROVIDER CONTACT NOTE (OTHER) - SITUATION
Patient's breathing has not changed much but a noted change in the use of accessory muscles and belly breathing
weight on chemo order 61.5, pt wt 62.3, BSA on chemo order 1.65 (with 160cm height, 61.5kg weight) but actual BSA 1.64, with the chemo BSA Rituxan dose ordered 620 but actual dose calculated 615.
Patient vomited three times, vomitus noted to be light brown fluid.
pt hr elevated
Patient hear rate 120's BP 80,/50.s
pt hr elevated
RR:25 (>20)
Hold rituxan
Patient confused, asking to move to chair in corner of room while sitting in chair. Patient stating everything is ok on that side of the room
H/H 6.8/20.9. Pt. to receive chemotherapy today
RR 34
HR of 140
heart rate 120's BP 80;s/50

## 2021-09-04 NOTE — PROVIDER CONTACT NOTE (OTHER) - ASSESSMENT
HR of 125 patient denies chest pain. No acute distress noted
Patient denies headache or dizziness. Patient is alert. A&O*4. Vital Signs: BP:123/79, HR: 105, RR:25, and SpO2:94%.
Riruxan at 200 ml/hr
Monitor vs
No s/s bleeding. VS stable
Pt vitals unstable, febrile overnight, hypotensive, tachycardic
HR of 140 patient denies chest pain. No acute distress noted
Patient is alert and oriented, however making confused statements such as "the corner of the room is ok" and requesting to move to the other side of the room. Patient tachypneic to 23 RPM, O2 sat 100% on room air, tachycardic at 126, /79
's BP 80's /50,s
pt is a&ox4, no acute distress noted, other vitals stable
Patient vomited three times during shift, vomitus noted to be light brown fluid, patient stated feeling "a little better" after vomiting.
pt is a&ox4, no acute distress noted, other vitals stable
vitals as above
Patient alert and verbally responsive, lethargic at times. Breathing rate approximately the same with use of a bit more accessory muscle usage and belly breathing.
HR of 135 patient denies chest pain. No acute distress noted

## 2021-09-04 NOTE — PROGRESS NOTE ADULT - ATTENDING COMMENTS
Patient with above noted history with course further complicated by PURVI/ATN.    1. PURVI/ATN - labs as noted above.  Monitor serial labs.   2. Hypernatremia - hold free water and monitor labs.  3. Metabolic acidosis - continue with oral bicarbonate.   4. Hyperphosphatemia - continue with binders.

## 2021-09-04 NOTE — PROVIDER CONTACT NOTE (OTHER) - REASON
HR of 135
Patient's breathing has not changed much but a noted change in the use of accessory muscles and belly breathing
bp  75/48 hr 109
chemo order discrepancy within 10%
BP 80'/50's Hr 120's
HR of 140
RR 34
patient confused
H/H 6.8/20.9 pending chemo
incteased heart rate and low BP
Patient vomited three times during shift, vomitus noted to be light brown fluid.
RR:25 (>20)
pt hr elevated 121 bpm
pt hr elevated 121 bpm

## 2021-09-05 NOTE — PROGRESS NOTE ADULT - PROBLEM SELECTOR PLAN 3
SCO2 at 15 downtrending from 17 yesterday. Increase sodium bicarbonate tablets to 1300 mg TID. Monitor SCO2 daily.

## 2021-09-05 NOTE — PROGRESS NOTE ADULT - ATTENDING COMMENTS
Patient is 66-year-old with newly diagnosed high grade B-cell lymphoma. Patient with acute hypoxic/hypercapneic respiratory failure now s/p intubation and L pleurex catheter placement, now undergoing chemotherapy. pt off sedation, appropriate mental status. tachypenic on SBT x 2, has large right pleural effusion, ascites, continue left pleurex draining. Patient self-extubated yesterday and needed re-intubation. resedated on precedex. Poor response to lasix, and now with CXR c/w pulmonary edema. trial with bumex. Continue ventilatory supportive care. Off pressor support.

## 2021-09-05 NOTE — PROGRESS NOTE ADULT - SUBJECTIVE AND OBJECTIVE BOX
INTERVAL HPI/OVERNIGHT EVENTS:    Patient S&E at bedside. Overnight patient self-extubated and coded. CPR performed and subsequently pulse regained and patient re-intubated.  Patient completed doxorubicin infusion 9/4. Remains sedated and intubated.     VITAL SIGNS:  T(F): 97 (09-05-21 @ 12:00)  HR: 76 (09-05-21 @ 16:00)  BP: 108/61 (09-05-21 @ 16:00)  RR: 28 (09-05-21 @ 16:00)  SpO2: 95% (09-05-21 @ 16:00)  Wt(kg): --    PHYSICAL EXAM:    Constitutional: intubated and sedated  Eyes: PERRL, sclera non-icteric  Gastrointestinal: soft, distended, no masses palpable  Extremities: no edema  Neurological: sedated    MEDICATIONS  (STANDING):  allopurinol 100 milliGRAM(s) Oral daily  chlorhexidine 0.12% Liquid 15 milliLiter(s) Oral Mucosa every 12 hours  chlorhexidine 4% Liquid 1 Application(s) Topical <User Schedule>  dexMEDEtomidine Infusion 0.5 MICROgram(s)/kG/Hr (7.83 mL/Hr) IV Continuous <Continuous>  dextrose 40% Gel 15 Gram(s) Oral once  dextrose 5%. 1000 milliLiter(s) (100 mL/Hr) IV Continuous <Continuous>  dextrose 5%. 1000 milliLiter(s) (50 mL/Hr) IV Continuous <Continuous>  dextrose 50% Injectable 25 Gram(s) IV Push once  dextrose 50% Injectable 12.5 Gram(s) IV Push once  dextrose 50% Injectable 25 Gram(s) IV Push once  doxorubicin IVPB w/etoposide (eMAR) 18 milliGRAM(s) IV Intermittent every 24 hours  glucagon  Injectable 1 milliGRAM(s) IntraMuscular once  heparin   Injectable 5000 Unit(s) SubCutaneous every 8 hours  insulin lispro (ADMELOG) corrective regimen sliding scale   SubCutaneous every 6 hours  isoniazid 300 milliGRAM(s) Oral every 24 hours  norepinephrine Infusion 0.05 MICROgram(s)/kG/Min (2.93 mL/Hr) IV Continuous <Continuous>  petrolatum Ophthalmic Ointment 1 Application(s) Both EYES two times a day  polyethylene glycol 3350 17 Gram(s) Oral daily  propofol Infusion 30 MICROgram(s)/kG/Min (11.3 mL/Hr) IV Continuous <Continuous>  pyridoxine 50 milliGRAM(s) Oral daily  senna Syrup 15 milliLiter(s) Oral at bedtime  sevelamer carbonate Powder 1200 milliGRAM(s) Oral three times a day with meals  sodium bicarbonate 1300 milliGRAM(s) Oral three times a day    MEDICATIONS  (PRN):  bisacodyl Suppository 10 milliGRAM(s) Rectal daily PRN Constipation  hydrocortisone sodium succinate Injectable 100 milliGRAM(s) IV Push once PRN PRN Chemotherapy Reaction  ondansetron Injectable 8 milliGRAM(s) IV Push every 8 hours PRN Nausea      Allergies    penicillins (Rash)    Intolerances        LABS:                        7.7    13.27 )-----------( 162      ( 05 Sep 2021 17:10 )             22.7     09-05    140  |  103  |  75<H>  ----------------------------<  113<H>  3.7   |  13<L>  |  2.24<H>    Ca    7.2<L>      05 Sep 2021 17:10  Phos  7.6     09-05  Mg     1.70     09-05    TPro  5.4<L>  /  Alb  3.4  /  TBili  0.9  /  DBili  x   /  AST  39<H>  /  ALT  11  /  AlkPhos  75  09-05    PT/INR - ( 05 Sep 2021 05:21 )   PT: 12.6 sec;   INR: 1.10 ratio         PTT - ( 05 Sep 2021 05:21 )  PTT:41.1 sec      RADIOLOGY & ADDITIONAL TESTS:  Studies reviewed.

## 2021-09-05 NOTE — PROGRESS NOTE ADULT - PROBLEM SELECTOR PLAN 1
Pt with PRUVI in the setting of PURVI 2/2 IV contrast and obstructive uropathy. Also with concerns of TLS. SCr was at 0.99 on 08/19 which increased to 1.65 on 08/22 and peaked to 2.59 on 08/24. Pt. now with concerns of TLS given hyperkalemia, hyperphosphatemia. PURVI likely ATN. CT scan stable b/L hydro    SCr today 09/05 at 1.9 mg/dl today, nonoliguric UOP~1200. labs reviewed. No plan for HD. Closely monitor TLS labs and urine output.  Avoid NSAIDs, ACEI/ARBS, RCA and nephrotoxins

## 2021-09-05 NOTE — PROGRESS NOTE ADULT - SUBJECTIVE AND OBJECTIVE BOX
INTERVAL HPI/OVERNIGHT EVENTS:    SUBJECTIVE: Patient seen and examined at bedside.     CONSTITUTIONAL: No weakness, fevers or chills  EYES/ENT: No visual changes;  No vertigo or throat pain   NECK: No pain or stiffness  RESPIRATORY: No cough, wheezing, hemoptysis; No shortness of breath  CARDIOVASCULAR: No chest pain or palpitations  GASTROINTESTINAL: No abdominal or epigastric pain. No nausea, vomiting, or hematemesis; No diarrhea or constipation. No melena or hematochezia.  GENITOURINARY: No dysuria, frequency or hematuria  NEUROLOGICAL: No numbness or weakness  SKIN: No itching, rashes    OBJECTIVE:    VITAL SIGNS:  ICU Vital Signs Last 24 Hrs  T(C): 36.5 (05 Sep 2021 04:00), Max: 37.3 (04 Sep 2021 12:00)  T(F): 97.7 (05 Sep 2021 04:00), Max: 99.2 (04 Sep 2021 12:00)  HR: 112 (05 Sep 2021 07:29) (60 - 112)  BP: 104/54 (05 Sep 2021 06:00) (104/54 - 147/69)  BP(mean): 63 (05 Sep 2021 06:00) (63 - 106)  ABP: --  ABP(mean): --  RR: 17 (05 Sep 2021 06:00) (16 - 30)  SpO2: 100% (05 Sep 2021 07:29) (88% - 100%)    Mode: AC/ CMV (Assist Control/ Continuous Mandatory Ventilation), RR (machine): 16, TV (machine): 380, FiO2: 100, PEEP: 5, ITime: 0.84, MAP: 13, PIP: 41    09-04 @ 07:01  -  09-05 @ 07:00  --------------------------------------------------------  IN: 783.6 mL / OUT: 1650 mL / NET: -866.4 mL      CAPILLARY BLOOD GLUCOSE      POCT Blood Glucose.: 122 mg/dL (05 Sep 2021 05:40)      PHYSICAL EXAM:    General: NAD  HEENT: NC/AT; PERRL, clear conjunctiva  Neck: supple  Respiratory: CTA b/l  Cardiovascular: +S1/S2; RRR  Abdomen: soft, NT/ND; +BS x4  Extremities: WWP, 2+ peripheral pulses b/l; no LE edema  Skin: normal color and turgor; no rash  Neurological:    MEDICATIONS:  MEDICATIONS  (STANDING):  allopurinol 100 milliGRAM(s) Oral daily  calcium gluconate IVPB 1 Gram(s) IV Intermittent once  chlorhexidine 0.12% Liquid 15 milliLiter(s) Oral Mucosa every 12 hours  chlorhexidine 4% Liquid 1 Application(s) Topical <User Schedule>  dexMEDEtomidine Infusion 0.5 MICROgram(s)/kG/Hr (7.83 mL/Hr) IV Continuous <Continuous>  dextrose 40% Gel 15 Gram(s) Oral once  dextrose 5%. 1000 milliLiter(s) (50 mL/Hr) IV Continuous <Continuous>  dextrose 5%. 1000 milliLiter(s) (100 mL/Hr) IV Continuous <Continuous>  dextrose 50% Injectable 25 Gram(s) IV Push once  dextrose 50% Injectable 12.5 Gram(s) IV Push once  dextrose 50% Injectable 25 Gram(s) IV Push once  doxorubicin IVPB w/etoposide (eMAR) 18 milliGRAM(s) IV Intermittent every 24 hours  glucagon  Injectable 1 milliGRAM(s) IntraMuscular once  heparin   Injectable 5000 Unit(s) SubCutaneous every 8 hours  insulin lispro (ADMELOG) corrective regimen sliding scale   SubCutaneous every 6 hours  isoniazid 300 milliGRAM(s) Oral every 24 hours  norepinephrine Infusion 0.05 MICROgram(s)/kG/Min (2.93 mL/Hr) IV Continuous <Continuous>  petrolatum Ophthalmic Ointment 1 Application(s) Both EYES two times a day  polyethylene glycol 3350 17 Gram(s) Oral daily  propofol Infusion 30 MICROgram(s)/kG/Min (11.3 mL/Hr) IV Continuous <Continuous>  pyridoxine 50 milliGRAM(s) Oral daily  senna Syrup 15 milliLiter(s) Oral at bedtime  sevelamer carbonate Powder 1200 milliGRAM(s) Oral three times a day with meals  sodium bicarbonate 1300 milliGRAM(s) Oral two times a day    MEDICATIONS  (PRN):  bisacodyl Suppository 10 milliGRAM(s) Rectal daily PRN Constipation  hydrocortisone sodium succinate Injectable 100 milliGRAM(s) IV Push once PRN PRN Chemotherapy Reaction  ondansetron Injectable 8 milliGRAM(s) IV Push every 8 hours PRN Nausea      ALLERGIES:  Allergies    penicillins (Rash)    Intolerances        LABS:                        7.9    17.23 )-----------( 178      ( 05 Sep 2021 05:21 )             23.8     09-05    135  |  98  |  76<H>  ----------------------------<  102<H>  3.7   |  15<L>  |  1.99<H>    Ca    7.2<L>      05 Sep 2021 05:21  Phos  7.6     09-05  Mg     1.70     09-05    TPro  5.6<L>  /  Alb  3.5  /  TBili  1.6<H>  /  DBili  x   /  AST  35<H>  /  ALT  11  /  AlkPhos  81  09-05    PT/INR - ( 05 Sep 2021 05:21 )   PT: 12.6 sec;   INR: 1.10 ratio         PTT - ( 05 Sep 2021 05:21 )  PTT:41.1 sec      RADIOLOGY & ADDITIONAL TESTS: Reviewed.     INTERVAL HPI/OVERNIGHT EVENTS:  -self extubated overnight. Coded, ROSC achieved in <1 minute, reintubated   -doxorubicin yesterday-today   -TLS labs stable     SUBJECTIVE: Patient seen and examined at bedside.     ROS: unable to     OBJECTIVE:    VITAL SIGNS:  ICU Vital Signs Last 24 Hrs  T(C): 36.5 (05 Sep 2021 04:00), Max: 37.3 (04 Sep 2021 12:00)  T(F): 97.7 (05 Sep 2021 04:00), Max: 99.2 (04 Sep 2021 12:00)  HR: 112 (05 Sep 2021 07:29) (60 - 112)  BP: 104/54 (05 Sep 2021 06:00) (104/54 - 147/69)  BP(mean): 63 (05 Sep 2021 06:00) (63 - 106)  ABP: --  ABP(mean): --  RR: 17 (05 Sep 2021 06:00) (16 - 30)  SpO2: 100% (05 Sep 2021 07:29) (88% - 100%)    Mode: AC/ CMV (Assist Control/ Continuous Mandatory Ventilation), RR (machine): 16, TV (machine): 380, FiO2: 100, PEEP: 5, ITime: 0.84, MAP: 13, PIP: 41    09-04 @ 07:01  -  09-05 @ 07:00  --------------------------------------------------------  IN: 783.6 mL / OUT: 1650 mL / NET: -866.4 mL      CAPILLARY BLOOD GLUCOSE      POCT Blood Glucose.: 122 mg/dL (05 Sep 2021 05:40)      PHYSICAL EXAM:    General: NAD  HEENT: NC/AT; PERRL, clear conjunctiva  Neck: supple  Respiratory: CTA b/l  Cardiovascular: +S1/S2; RRR  Abdomen: soft, NT/ND; +BS x4  Extremities: WWP, 2+ peripheral pulses b/l; no LE edema  Skin: normal color and turgor; no rash  Neurological:    MEDICATIONS:  MEDICATIONS  (STANDING):  allopurinol 100 milliGRAM(s) Oral daily  calcium gluconate IVPB 1 Gram(s) IV Intermittent once  chlorhexidine 0.12% Liquid 15 milliLiter(s) Oral Mucosa every 12 hours  chlorhexidine 4% Liquid 1 Application(s) Topical <User Schedule>  dexMEDEtomidine Infusion 0.5 MICROgram(s)/kG/Hr (7.83 mL/Hr) IV Continuous <Continuous>  dextrose 40% Gel 15 Gram(s) Oral once  dextrose 5%. 1000 milliLiter(s) (50 mL/Hr) IV Continuous <Continuous>  dextrose 5%. 1000 milliLiter(s) (100 mL/Hr) IV Continuous <Continuous>  dextrose 50% Injectable 25 Gram(s) IV Push once  dextrose 50% Injectable 12.5 Gram(s) IV Push once  dextrose 50% Injectable 25 Gram(s) IV Push once  doxorubicin IVPB w/etoposide (eMAR) 18 milliGRAM(s) IV Intermittent every 24 hours  glucagon  Injectable 1 milliGRAM(s) IntraMuscular once  heparin   Injectable 5000 Unit(s) SubCutaneous every 8 hours  insulin lispro (ADMELOG) corrective regimen sliding scale   SubCutaneous every 6 hours  isoniazid 300 milliGRAM(s) Oral every 24 hours  norepinephrine Infusion 0.05 MICROgram(s)/kG/Min (2.93 mL/Hr) IV Continuous <Continuous>  petrolatum Ophthalmic Ointment 1 Application(s) Both EYES two times a day  polyethylene glycol 3350 17 Gram(s) Oral daily  propofol Infusion 30 MICROgram(s)/kG/Min (11.3 mL/Hr) IV Continuous <Continuous>  pyridoxine 50 milliGRAM(s) Oral daily  senna Syrup 15 milliLiter(s) Oral at bedtime  sevelamer carbonate Powder 1200 milliGRAM(s) Oral three times a day with meals  sodium bicarbonate 1300 milliGRAM(s) Oral two times a day    MEDICATIONS  (PRN):  bisacodyl Suppository 10 milliGRAM(s) Rectal daily PRN Constipation  hydrocortisone sodium succinate Injectable 100 milliGRAM(s) IV Push once PRN PRN Chemotherapy Reaction  ondansetron Injectable 8 milliGRAM(s) IV Push every 8 hours PRN Nausea      ALLERGIES:  Allergies    penicillins (Rash)    Intolerances        LABS:                        7.9    17.23 )-----------( 178      ( 05 Sep 2021 05:21 )             23.8     09-05    135  |  98  |  76<H>  ----------------------------<  102<H>  3.7   |  15<L>  |  1.99<H>    Ca    7.2<L>      05 Sep 2021 05:21  Phos  7.6     09-05  Mg     1.70     09-05    TPro  5.6<L>  /  Alb  3.5  /  TBili  1.6<H>  /  DBili  x   /  AST  35<H>  /  ALT  11  /  AlkPhos  81  09-05    PT/INR - ( 05 Sep 2021 05:21 )   PT: 12.6 sec;   INR: 1.10 ratio         PTT - ( 05 Sep 2021 05:21 )  PTT:41.1 sec      RADIOLOGY & ADDITIONAL TESTS: Reviewed.

## 2021-09-05 NOTE — CHART NOTE - NSCHARTNOTEFT_GEN_A_CORE
Patient self extubated at ~5AM and noted to be excessively vomiting and shortly thereafter was unresponsive but had a pulse. Prior to intubation, patient lost pulse and CPR was initiated at 506AM. Epinephrine given, bicarb given and ROSC achieved at 508. Started on propofol and given fentanyl 50 iv push.     Darya Troy, PGY3

## 2021-09-05 NOTE — PROGRESS NOTE ADULT - SUBJECTIVE AND OBJECTIVE BOX
[facility] DIVISION OF KIDNEY DISEASES AND HYPERTENSION -- FOLLOW UP NOTE  --------------------------------------------------------------------------------  HPI; 66F with HTN, HLD, recently discovered ovarian mass suspicious for malignancy (7/2021), L hydroureteronephrosis s/p renal stent (7/15/21), and recent hospitalization ([facility] 7/26-8/4) for PURVI requiring urgent HD, ascites s/p therapeutic paracentesis (7/27/21), and pleural effusion s/p R thoracentesis (8/2/21) admitted for acute hypercarbic respiratory failure due to pleural effusions most likely caused by ovarian malignancy complicated with volume overload with ascites. Now found to have new onset PURVI. Baseline Cr 0.7-1.1mg/dl.  had a recent PURVI episode which resolved- started to trended up again on 8/31 to 1.3mg/dl.    24 hour events/subjective: Overnight events noted, self extubated and reintubated now, Patient seen and examined at bedside, remains intubated and sedated. SCr today 09/05 at 1.9 mg/dl , nonoliguric UOP~ 1200.       PAST HISTORY  --------------------------------------------------------------------------------  No significant changes to PMH, PSH, FHx, SHx, unless otherwise noted    ALLERGIES & MEDICATIONS  --------------------------------------------------------------------------------  Allergies    penicillins (Rash)    Intolerances      Standing Inpatient Medications  allopurinol 100 milliGRAM(s) Oral daily  chlorhexidine 0.12% Liquid 15 milliLiter(s) Oral Mucosa every 12 hours  chlorhexidine 4% Liquid 1 Application(s) Topical <User Schedule>  dexMEDEtomidine Infusion 0.5 MICROgram(s)/kG/Hr IV Continuous <Continuous>  dextrose 40% Gel 15 Gram(s) Oral once  dextrose 5%. 1000 milliLiter(s) IV Continuous <Continuous>  dextrose 5%. 1000 milliLiter(s) IV Continuous <Continuous>  dextrose 50% Injectable 25 Gram(s) IV Push once  dextrose 50% Injectable 12.5 Gram(s) IV Push once  dextrose 50% Injectable 25 Gram(s) IV Push once  doxorubicin IVPB w/etoposide (eMAR) 18 milliGRAM(s) IV Intermittent every 24 hours  furosemide   Injectable 40 milliGRAM(s) IV Push two times a day  glucagon  Injectable 1 milliGRAM(s) IntraMuscular once  heparin   Injectable 5000 Unit(s) SubCutaneous every 8 hours  insulin lispro (ADMELOG) corrective regimen sliding scale   SubCutaneous every 6 hours  isoniazid 300 milliGRAM(s) Oral every 24 hours  norepinephrine Infusion 0.05 MICROgram(s)/kG/Min IV Continuous <Continuous>  petrolatum Ophthalmic Ointment 1 Application(s) Both EYES two times a day  polyethylene glycol 3350 17 Gram(s) Oral daily  propofol Infusion 30 MICROgram(s)/kG/Min IV Continuous <Continuous>  pyridoxine 50 milliGRAM(s) Oral daily  senna Syrup 15 milliLiter(s) Oral at bedtime  sevelamer carbonate Powder 1200 milliGRAM(s) Oral three times a day with meals  sodium bicarbonate 1300 milliGRAM(s) Oral two times a day    PRN Inpatient Medications  bisacodyl Suppository 10 milliGRAM(s) Rectal daily PRN  hydrocortisone sodium succinate Injectable 100 milliGRAM(s) IV Push once PRN  ondansetron Injectable 8 milliGRAM(s) IV Push every 8 hours PRN      REVIEW OF SYSTEMS  --------------------------------------------------------------------------------  Unable to obtain    VITALS/PHYSICAL EXAM  --------------------------------------------------------------------------------  T(C): 36.1 (09-05-21 @ 12:00), Max: 37.2 (09-05-21 @ 00:00)  HR: 102 (09-05-21 @ 12:00) (60 - 112)  BP: 114/57 (09-05-21 @ 12:00) (104/54 - 147/69)  RR: 18 (09-05-21 @ 12:00) (16 - 30)  SpO2: 94% (09-05-21 @ 12:00) (88% - 100%)  Wt(kg): --        09-04-21 @ 07:01  -  09-05-21 @ 07:00  --------------------------------------------------------  IN: 790.6 mL / OUT: 1650 mL / NET: -859.4 mL    09-05-21 @ 07:01  -  09-05-21 @ 12:58  --------------------------------------------------------  IN: 190 mL / OUT: 285 mL / NET: -95 mL        Physical Exam:  	Gen: Sedated  	HEENT: ET tube +  	Pulm: mechanically ventilated via ETT  	CV: S1S2  	Abd: Soft, +BS  	Ext: No LE edema B/L  	Neuro: Sedated              : Charles+ clear urine  	Skin: Warm and dry    LABS/STUDIES  --------------------------------------------------------------------------------              7.9    17.23 >-----------<  178      [09-05-21 @ 05:21]              23.8     135  |  98  |  76  ----------------------------<  102      [09-05-21 @ 05:21]  3.7   |  15  |  1.99        Ca     7.2     [09-05-21 @ 05:21]      iCa    0.93     [09-05 @ 05:21]      Mg     1.70     [09-05-21 @ 05:21]      Phos  7.6     [09-05-21 @ 05:21]    TPro  x   /  Alb  x   /  TBili  1.6  /  DBili  1.2  /  AST  x   /  ALT  x   /  AlkPhos  x   [09-05-21 @ 05:24]    PT/INR: PT 12.6 , INR 1.10       [09-05-21 @ 05:21]  PTT: 41.1       [09-05-21 @ 05:21]    Uric acid 3.2      [09-05-21 @ 05:21]        [09-05-21 @ 05:21]    Creatinine Trend:  SCr 1.99 [09-05 @ 05:21]  SCr 2.05 [09-04 @ 21:01]  SCr 1.90 [09-04 @ 11:53]  SCr 1.92 [09-04 @ 04:30]  SCr 1.93 [09-03 @ 19:57]    Urinalysis - [08-28-21 @ 17:00]      Color Yellow / Appearance Turbid / SG 1.034 / pH 6.5      Gluc Trace / Ketone Negative  / Bili Negative / Urobili <2 mg/dL       Blood Small / Protein 100 mg/dL / Leuk Est Large / Nitrite Negative      RBC 6-10 / WBC 15-20 / Hyaline 3 / Gran 20 / Sq Epi  / Non Sq Epi Occasional / Bacteria Moderate      TSH 1.85      [07-29-21 @ 11:12]    HBsAg Nonreact      [08-26-21 @ 10:02]  HCV 0.20, Nonreact      [08-26-21 @ 10:02]  HIV Nonreact      [08-25-21 @ 12:13]

## 2021-09-05 NOTE — PROGRESS NOTE ADULT - ASSESSMENT
Ms. Stafford is a 66-year-old woman with PMH significant for HTN, HLD, L hydroureteronephrosis s/p renal stent on 7/15, who presents with volume overload 2/2 high grade B-cell lymphoma. S/p thoracentesis 8/13, paracentesis and excisional biopsy of left inguinal lymph node on 8/20. Accepted to MICU for acute hypoxic/hypercapneic respiratory failure now s/p intubation and L pleurex catheter placement, now undergoing chemotherapy.    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  - CT head with no intracranial pathology  - S/p LP with flow cytometry and cytopathology to evaluate possible lymphomatous meningitis, negative thus far   - Currently sedated with precedex but following commands - will wean as tolerated when attempting extubation    #Cardiovascular  vasoplegic shock 2/2 sedatives   - Echo 8/3 showing concentric left ventricular remodeling, hyperdynamic left ventricle, calcified trileaflet aortic valve with normal opening, EF 56%  - POCUS showing adequate cardiac output  - On pressor support with levo, wean as tolerated    #Respiratory  - Hypoxic/hypercapneic respiratory failure likely secondary to malignant pleural effusions s/p thoracentesis on 8/13 with re-accumulation of pleural effusions  - S/p second thoracentesis 8/25, s/p L pleurex 8/27  - On pressure support, however remains tachypneic with low TVs, will repeat thoracentesis on R side before attempting extubation   - pending thoracentesis on R side 9/4    #GI/Nutrition  #malignant ascites   - S/p paracentesis 8/20, still remains distended with ascites     #?ileus   - hold tube feeds as pt with residuals and persistent nausea 2/2 chemotherapy   - abd x-ray ordered  - zofran prn  - check QTc + consider reglan    #diarrhea   - previously constipated s/p golytely + fecal disempaction    #/Renal  - PURVI with worsening CR 2/2 ATN in the setting of supratherapeutic vancomycin level + tumor lysis   - B/L hydronephrosis stable on CT a/p 2/2 malignant LAD  - Nephrology on board, recommend holding LASIX & other nephrotoxic medications.   - Nephrostomy tubes considered however per IR recommendations not appropriate at this time as patient's Cr previously downtrending, may consider re-consulting if patient's renal function continues to worsen   - monitor TLS labs q8h, now with uptrending LDH and hyperphosphatemia, although uric acid remains low   - c/w phosphate binder  - s/p bicarb drip, transitioned to PO bicarb   - c/w albumin with goal > 3.5 to assist with diuresis, dosed for IV lasix 40 9/4   - carnes placed for accurate I+Os    #Skin  - No sacral decubiti    #ID  - s/p Vanco and Zosyn (ended 8/31)   - U/A grossly positive, urine cx NGTD  - blood cx 8/28/21 NGTD  - strongyloides positive - s/p ivermectin (9/1 - 9/2) given IC state   - c/w INH + pyridoxine (given indeterminate quant gold)    #Endocrine  - SSI   - will readjust as necessary     #Hematologic/DVT ppx  - newly diagnosed diffuse large B-cell lymphoma  - TLS labs q8  - Allopurinol for TLS prophylaxis  - Heme/Onc recommending starting steroids with dexamethasone, s/p 40 mg dose (ended 8/29, 8/31)   - s/p Rituxan 8/28/21  - s/p cyclophosphamide (1st cycle 9/1, 2nd cycle 9/2, 3rd cycle 9/3)  - pending doxorubicin 9/4  - DVT prophylaxis with heparin subQ    #Ethics  - Patient is FULL CODE per palliative care discussion with patient and her sons (Yuan and Jose).     Fill-in proxy is Jose Camargo: [phone] Ms. Stafford is a 66-year-old woman with PMH significant for HTN, HLD, L hydroureteronephrosis s/p renal stent on 7/15, who presents with volume overload 2/2 high grade B-cell lymphoma. S/p thoracentesis 8/13, paracentesis and excisional biopsy of left inguinal lymph node on 8/20. Accepted to MICU for acute hypoxic/hypercapneic respiratory failure now s/p intubation and L pleurex catheter placement, now undergoing chemotherapy.    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  - CT head with no intracranial pathology  - S/p LP with flow cytometry and cytopathology to evaluate possible lymphomatous meningitis, negative thus far   - Currently sedated with precedex but following commands - will wean as tolerated when attempting extubation    #Cardiovascular  vasoplegic shock 2/2 sedatives   - Echo 8/3 showing concentric left ventricular remodeling, hyperdynamic left ventricle, calcified trileaflet aortic valve with normal opening, EF 56%  - POCUS showing adequate cardiac output  - On pressor support with levo, wean as tolerated    #Respiratory  - Hypoxic/hypercapneic respiratory failure likely secondary to malignant pleural effusions s/p thoracentesis on 8/13 with re-accumulation of pleural effusions  - S/p second thoracentesis 8/25, s/p L pleurex 8/27  - On pressure support, however remains tachypneic with low TVs, will repeat thoracentesis on R side before attempting extubation   - pending thoracentesis on R side 9/4    #GI/Nutrition  #malignant ascites   - S/p paracentesis 8/20, still remains distended with ascites     #?ileus   - hold tube feeds as pt with residuals and persistent nausea 2/2 chemotherapy   - abd x-ray ordered  - zofran prn  - given reglan 5 today     #diarrhea   - previously constipated s/p golytely + fecal disempaction    #/Renal  - PURVI with worsening CR 2/2 ATN in the setting of supratherapeutic vancomycin level + tumor lysis   - B/L hydronephrosis stable on CT a/p 2/2 malignant LAD  - Nephrology on board, recommend holding LASIX & other nephrotoxic medications.   - Nephrostomy tubes considered however per IR recommendations not appropriate at this time as patient's Cr previously downtrending, may consider re-consulting if patient's renal function continues to worsen   - monitor TLS labs q8h, now with uptrending LDH and hyperphosphatemia, although uric acid remains low   - c/w phosphate binder  - s/p bicarb drip, transitioned to PO bicarb   - c/w albumin with goal > 3.5 to assist with diuresis, dosed for IV lasix 40 9/4   - carnes placed for accurate I+Os    #Skin  - No sacral decubiti    #ID  - s/p Vanco and Zosyn (ended 8/31)   - U/A grossly positive, urine cx NGTD  - blood cx 8/28/21 NGTD  - strongyloides positive - s/p ivermectin (9/1 - 9/2) given IC state   - c/w INH + pyridoxine (given indeterminate quant gold)    #Endocrine  - SSI   - will readjust as necessary     #Hematologic/DVT ppx  - newly diagnosed diffuse large B-cell lymphoma  - TLS labs q8  - Allopurinol for TLS prophylaxis  - Heme/Onc recommending starting steroids with dexamethasone, s/p 40 mg dose (ended 8/29, 8/31)   - s/p Rituxan 8/28/21  - s/p cyclophosphamide (1st cycle 9/1, 2nd cycle 9/2, 3rd cycle 9/3)  - s/p doxorubicin 9/4  - DVT prophylaxis with heparin subQ    #Ethics  - Patient is FULL CODE per palliative care discussion with patient and her sons (Yuan and Jose).     Fill-in proxy is Jose Camargo: [phone]

## 2021-09-05 NOTE — PROGRESS NOTE ADULT - ATTENDING COMMENTS
Patient with ovarian malignancy now complicated by volume overload, respiratory failure and PURVI.      1. PURVI -  monitoring serial labs and urine outputs as noted above.    2. Metabolic acidosis - increase oral sodium bicarbonate  3. Hyperphosphatemia - monitor on binders.    Will monitor with you.

## 2021-09-05 NOTE — PROGRESS NOTE ADULT - ASSESSMENT
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 Patient is now intubated in the MICU and with pressor support, started on R-CHOP chemotherapy. Patient also has bilateral adnexal masses and peritoneal carcinomatosis with ascites and extensive abdominal and pelvic adenopathy. Hospitalization course has been c/b ongoing fevers and increasing pressor support, concerning for infectious process.    Overnight events reviewed, patient self-extubated and coded.   CPR performed with epinephrine given; Pulse regained and patient was re-intubated, now on propofol.    -HOLDING chemotherapy today given above acute events  -will reassess clinical status daily for resuming treatment once clinically stabilized   -s/p Dexamethasone 8/26-8/29, Rituxan 8/28  -s/p D1-D2 cyclophosphamide 100mg 9/1-9/2 with reduction of doses   -s/p D3 cyclophosphamide 225mg q12h on 9/3  -s/p C1 EPOCH 9/4 (etoposide, doxorubicin and vincristine)  -continue close monitoring of TLS labs q8h given high risk of tumor lysis   -continue with allopurinol for TLS ppx  -RBC transfusion support to keep Hgb >7 (last transfused 9/4)  -appreciate nephrology recommendations for PURVI and TLS   -f/u palliative care recommendations for ongoing GOC discussions  -continue DVT ppx with heparin subq    Will continue to follow with you closely.    Armando Lenz MD  Heme/Onc attending

## 2021-09-06 NOTE — PROGRESS NOTE ADULT - ASSESSMENT
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 Patient is now intubated in the MICU and with pressor support, started on R-CHOP chemotherapy. Patient also has bilateral adnexal masses and peritoneal carcinomatosis with ascites and extensive abdominal and pelvic adenopathy. Hospitalization course has been c/b ongoing fevers and increasing pressor and O2 support.    Past 24 hour events and studies reviewed in detail. Patient completed doxorubicin infusion on 9/4.  Patient self-extubated and coded on 9/5. CPR performed. Pulse regained and patient was re-intubated, now on propofol with increasing oxygen requirements.   Given ongoing hemodynamic instability and increasing oxygen requirements, we will continue to hold chemotherapy today. We will continue to closely monitor the patient's clinical status and the hematology team will reassess the patient tomorrow to consider resuming chemotherapy based on the patient's clinical status. The current chemotherapy regimen, treatment course so far, and oncologic treatment plans were discussed in detail with the patient's son Yuan at bedside today.     -HOLDING chemotherapy today given above acute events  -will reassess clinical status daily for resuming chemotherapy   -s/p Dexamethasone 8/26-8/29, Rituxan 8/28  -s/p D1-D2 cyclophosphamide 100mg 9/1-9/2 with reduction of doses   -s/p D3 cyclophosphamide 225mg q12h on 9/3  -s/p doxorubicin completed 9/4  -continue close monitoring of TLS labs q8h given high risk of TLS (phos, Mg, BMP, LDH, uric acid)    -continue with allopurinol for TLS ppx  -RBC transfusion support to keep Hgb >7 (last transfused 9/4)  -appreciate nephrology recommendations for PURVI and TLS, bumex gtt started for decreased urine output and volume overload    -f/u palliative care recommendations for ongoing GOC discussions  -continue DVT ppx with heparin subq    Will continue to follow with you closely.    Armando Lenz MD  Heme/Onc attending

## 2021-09-06 NOTE — PROGRESS NOTE ADULT - ATTENDING COMMENTS
Patient is 66-year-old with newly diagnosed high grade B-cell lymphoma. Patient with acute hypoxic/hypercapneic respiratory failure now s/p intubation and L pleurex catheter placement, now undergoing chemotherapy. pt off sedation, appropriate mental status. tachypenic on SBT x 2, has large right pleural effusion, ascites, continue left pleurex draining. Patient self-extubated yesterday and needed re-intubation. resedated on precedex. Poor response to lasix, and now with CXR c/w pulmonary edema. bumex. Continue ventilatory supportive care. Patient is 66-year-old with newly diagnosed high grade B-cell lymphoma. Patient with acute hypoxic/hypercapneic respiratory failure now s/p intubation and L pleurex catheter placement, now undergoing chemotherapy. pt off sedation, appropriate mental status. tachypenic on SBT x 2, has large right pleural effusion, ascites, continue left pleurex draining. Patient self-extubated yesterday and needed re-intubation. resedated on precedex. Poor response to lasix, and now with CXR c/w pulmonary edema. bumex drip, increase bicarbonate tabs, appreciate renal recs. Continue ventilatory supportive care. prognosis guarded.

## 2021-09-06 NOTE — PROGRESS NOTE ADULT - PROBLEM SELECTOR PLAN 1
Pt with PURVI in the setting of PURVI 2/2 IV contrast and obstructive uropathy. Also with concerns of TLS initially. SCr was at 0.99 on 08/19 which increased to 1.65 on 08/22 and peaked to 2.59 on 08/24. PURVI likely ATN. CT scan stable b/L hydro    SCr today elevated/stable at 2.19, nonoliguric UOP~1200. Labs reviewed. Pt. clinically hypervolemic with increasing FiO2 requirement. Recommend diuresis with IV Bumex gtt at 1,g/hr over the next 12 hours. Closely monitor and urine output. Avoid NSAIDs, ACEI/ARBS, RCA and nephrotoxins

## 2021-09-06 NOTE — PROGRESS NOTE ADULT - PROBLEM SELECTOR PLAN 2
SCO2 low at 12 today. pH 7.24. Continue sodium bicarbonate tablets to 1300 mg TID and give an additional amp of sodium bicarbonate. Monitor SCO2 and pH daily.    If you have any questions, please feel free to contact me  Celio Barrow  Nephrology Fellow  [phone]  (After 5pm or on weekends please page the on-call fellow)

## 2021-09-06 NOTE — PROGRESS NOTE ADULT - ATTENDING COMMENTS
Patient with hypertension, left hydroureteronephrosis complicated by ovarian mass, respiratory failure, ascites and PURVI    1. PURVI - creatinine as noted, nonoliguric.  Monitor serial labs.  2. Fluid overload - Bumex drip and assess response.  3. Hyperphosphatemia - on Sevelamer.  Monitor serial phosphorus levels and adjust regimen to course.  4. Metabolic acidosis - bicarbonate supplementation.  5. Hypotension - pressors as needed.

## 2021-09-06 NOTE — PROGRESS NOTE ADULT - SUBJECTIVE AND OBJECTIVE BOX
INTERVAL HPI/OVERNIGHT EVENTS:    Patient S&E at bedside. Patient self-extubated and coded on 9/5. CPR performed and subsequently pulse regained and patient was re-intubated.  Patient completed doxorubicin infusion 9/4. Remains sedated and intubated. Over the past 24 hours with increasing O2 requirements.      VITAL SIGNS:  T(F): 99.1 (09-06-21 @ 16:00)  HR: 73 (09-06-21 @ 17:00)  BP: 111/58 (09-06-21 @ 17:00)  RR: 26 (09-06-21 @ 17:00)  SpO2: 94% (09-06-21 @ 17:00)  Wt(kg): --    PHYSICAL EXAM:    Constitutional: intubated and sedated  Eyes: PERRL, sclera non-icteric  Gastrointestinal: soft, distended, no masses palpable  Extremities: no edema  Neurological: sedated    MEDICATIONS  (STANDING):  allopurinol 100 milliGRAM(s) Oral daily  buMETAnide Infusion 1000 mG/Hr (5 mL/Hr) IV Continuous <Continuous>  chlorhexidine 0.12% Liquid 15 milliLiter(s) Oral Mucosa every 12 hours  chlorhexidine 4% Liquid 1 Application(s) Topical <User Schedule>  dexMEDEtomidine Infusion 0.5 MICROgram(s)/kG/Hr (7.83 mL/Hr) IV Continuous <Continuous>  dextrose 40% Gel 15 Gram(s) Oral once  dextrose 5%. 1000 milliLiter(s) (50 mL/Hr) IV Continuous <Continuous>  dextrose 5%. 1000 milliLiter(s) (100 mL/Hr) IV Continuous <Continuous>  dextrose 50% Injectable 25 Gram(s) IV Push once  dextrose 50% Injectable 12.5 Gram(s) IV Push once  dextrose 50% Injectable 25 Gram(s) IV Push once  doxorubicin IVPB w/etoposide (eMAR) 18 milliGRAM(s) IV Intermittent every 24 hours  glucagon  Injectable 1 milliGRAM(s) IntraMuscular once  heparin   Injectable 5000 Unit(s) SubCutaneous every 8 hours  insulin lispro (ADMELOG) corrective regimen sliding scale   SubCutaneous every 6 hours  isoniazid 300 milliGRAM(s) Oral every 24 hours  midodrine 10 milliGRAM(s) Oral every 8 hours  norepinephrine Infusion 0.05 MICROgram(s)/kG/Min (2.93 mL/Hr) IV Continuous <Continuous>  petrolatum Ophthalmic Ointment 1 Application(s) Both EYES two times a day  piperacillin/tazobactam IVPB.. 3.375 Gram(s) IV Intermittent every 8 hours  polyethylene glycol 3350 17 Gram(s) Oral daily  propofol Infusion 30 MICROgram(s)/kG/Min (11.3 mL/Hr) IV Continuous <Continuous>  pyridoxine 50 milliGRAM(s) Oral daily  senna Syrup 15 milliLiter(s) Oral at bedtime  sevelamer carbonate Powder 1200 milliGRAM(s) Oral three times a day with meals  sodium bicarbonate 1300 milliGRAM(s) Oral three times a day    MEDICATIONS  (PRN):  bisacodyl Suppository 10 milliGRAM(s) Rectal daily PRN Constipation  hydrocortisone sodium succinate Injectable 100 milliGRAM(s) IV Push once PRN PRN Chemotherapy Reaction  ondansetron Injectable 8 milliGRAM(s) IV Push every 8 hours PRN Nausea      Allergies    penicillins (Rash)    Intolerances        LABS:                        8.0    11.30 )-----------( 145      ( 06 Sep 2021 06:14 )             23.7     09-06    139  |  103  |  76<H>  ----------------------------<  116<H>  3.7   |  12<L>  |  2.39<H>    Ca    7.2<L>      06 Sep 2021 15:59  Phos  9.8     09-06  Mg     1.70     09-06    TPro  5.4<L>  /  Alb  2.7<L>  /  TBili  1.0  /  DBili  x   /  AST  41<H>  /  ALT  9   /  AlkPhos  116  09-06    PT/INR - ( 06 Sep 2021 06:14 )   PT: 14.4 sec;   INR: 1.28 ratio         PTT - ( 06 Sep 2021 06:14 )  PTT:36.0 sec      RADIOLOGY & ADDITIONAL TESTS:  Studies reviewed.

## 2021-09-06 NOTE — PROGRESS NOTE ADULT - SUBJECTIVE AND OBJECTIVE BOX
[facility] Division of Kidney Diseases & Hypertension  FOLLOW UP NOTE  [phone]--------------------------------------------------------------------------------    HPI; 66F with HTN, HLD, recently discovered ovarian mass suspicious for malignancy (7/2021), L hydroureteronephrosis s/p renal stent (7/15/21), and recent hospitalization ([facility] 7/26-8/4) for PURVI requiring urgent HD, ascites s/p therapeutic paracentesis (7/27/21), and pleural effusion s/p R thoracentesis (8/2/21) admitted for acute hypercarbic respiratory failure due to pleural effusions most likely caused by ovarian malignancy complicated with volume overload with ascites. Now found to have new onset PURVI. Baseline Cr 0.7-1.1mg/dl. Had a recent PURVI episode which resolved- started to trended up again on 8/31.    24 hour events/subjective: Patient seen and examined at bedside, remains intubated and sedated. SCr today 09/06 at 2.19 mg/dl , Nonoliguric UOP~ 1200. Of note, pt had a brief cardiopulmonary arrest with ROSC achieved in ~2 mins on 8/5/21.    PAST HISTORY  --------------------------------------------------------------------------------  No significant changes to PMH, PSH, FHx, SHx, unless otherwise noted    ALLERGIES & MEDICATIONS  --------------------------------------------------------------------------------  Allergies    penicillins (Rash)    Intolerances    Standing Inpatient Medications  allopurinol 100 milliGRAM(s) Oral daily  buMETAnide Infusion 1000 mG/Hr IV Continuous <Continuous>  chlorhexidine 0.12% Liquid 15 milliLiter(s) Oral Mucosa every 12 hours  chlorhexidine 4% Liquid 1 Application(s) Topical <User Schedule>  dexMEDEtomidine Infusion 0.5 MICROgram(s)/kG/Hr IV Continuous <Continuous>  dextrose 40% Gel 15 Gram(s) Oral once  dextrose 5%. 1000 milliLiter(s) IV Continuous <Continuous>  dextrose 5%. 1000 milliLiter(s) IV Continuous <Continuous>  dextrose 50% Injectable 25 Gram(s) IV Push once  dextrose 50% Injectable 12.5 Gram(s) IV Push once  dextrose 50% Injectable 25 Gram(s) IV Push once  doxorubicin IVPB w/etoposide (eMAR) 18 milliGRAM(s) IV Intermittent every 24 hours  glucagon  Injectable 1 milliGRAM(s) IntraMuscular once  heparin   Injectable 5000 Unit(s) SubCutaneous every 8 hours  insulin lispro (ADMELOG) corrective regimen sliding scale   SubCutaneous every 6 hours  isoniazid 300 milliGRAM(s) Oral every 24 hours  midodrine 10 milliGRAM(s) Oral every 8 hours  norepinephrine Infusion 0.05 MICROgram(s)/kG/Min IV Continuous <Continuous>  petrolatum Ophthalmic Ointment 1 Application(s) Both EYES two times a day  piperacillin/tazobactam IVPB.. 3.375 Gram(s) IV Intermittent every 8 hours  polyethylene glycol 3350 17 Gram(s) Oral daily  propofol Infusion 30 MICROgram(s)/kG/Min IV Continuous <Continuous>  pyridoxine 50 milliGRAM(s) Oral daily  senna Syrup 15 milliLiter(s) Oral at bedtime  sevelamer carbonate Powder 1200 milliGRAM(s) Oral three times a day with meals  sodium bicarbonate 1300 milliGRAM(s) Oral three times a day    VITALS/PHYSICAL EXAM  --------------------------------------------------------------------------------  T(C): 36.1 (09-06-21 @ 12:00), Max: 36.1 (09-06-21 @ 12:00)  HR: 65 (09-06-21 @ 12:00) (58 - 78)  BP: 110/62 (09-06-21 @ 12:00) (100/56 - 138/69)  RR: 26 (09-06-21 @ 12:00) (24 - 29)  SpO2: 94% (09-06-21 @ 12:00) (83% - 100%)  Wt(kg): --    09-05-21 @ 07:01  -  09-06-21 @ 07:00  --------------------------------------------------------  IN: 1111.4 mL / OUT: 1930 mL / NET: -818.6 mL    09-06-21 @ 07:01  -  09-06-21 @ 14:48  --------------------------------------------------------  IN: 91.6 mL / OUT: 285 mL / NET: -193.4 mL    Physical Exam:              Gen: Sedated  	HEENT: ET tube +  	Pulm: Mechanically ventilated via ETT  	CV: S1S2  	Abd: Soft, +BS  	Ext: Pitting LE edema B/L  	Neuro: Sedated              : Charles+ clear urine  	Skin: Warm and dry    LABS/STUDIES  --------------------------------------------------------------------------------              8.0    11.30 >-----------<  145      [09-06-21 @ 06:14]              23.7     137  |  101  |  78  ----------------------------<  119      [09-06-21 @ 06:11]  3.7   |  12  |  2.19        Ca     7.1     [09-06-21 @ 06:11]      iCa    0.87     [09-06 @ 06:14]      Mg     1.70     [09-06-21 @ 06:11]      Phos  9.4     [09-06-21 @ 06:11]    TPro  5.4  /  Alb  3.0  /  TBili  0.9  /  DBili  x   /  AST  44  /  ALT  11  /  AlkPhos  96  [09-06-21 @ 06:11]    PT/INR: PT 14.4 , INR 1.28       [09-06-21 @ 06:14]  PTT: 36.0       [09-06-21 @ 06:14]    Uric acid 4.6      [09-06-21 @ 06:11]  LDH 1024      [09-06-21 @ 06:11]    Creatinine Trend:  SCr 2.19 [09-06 @ 06:11]  SCr 2.24 [09-05 @ 17:10]  SCr 2.13 [09-05 @ 12:56]  SCr 1.99 [09-05 @ 05:21]  SCr 2.05 [09-04 @ 21:01]

## 2021-09-06 NOTE — PROGRESS NOTE ADULT - ASSESSMENT
66-year-old woman with PMH significant for HTN, HLD, L hydroureteronephrosis s/p renal stent on 7/15, who presents with volume overload 2/2 high grade B-cell lymphoma. S/p thoracentesis 8/13, paracentesis and excisional biopsy of left inguinal lymph node on 8/20. Accepted to MICU for acute hypoxic/hypercapneic respiratory failure now s/p intubation and L pleurex catheter placement, now undergoing chemotherapy.    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  - CT head with no intracranial pathology  - S/p LP with flow cytometry and cytopathology to evaluate possible lymphomatous meningitis, negative thus far   - Currently sedated with precedex but following commands - will wean as tolerated when attempting extubation    #Cardiovascular  vasoplegic shock 2/2 sedatives   - Echo 8/3 showing concentric left ventricular remodeling, hyperdynamic left ventricle, calcified trileaflet aortic valve with normal opening, EF 56%  - POCUS showing adequate cardiac output  - On pressor support with levo, wean as tolerated    #Respiratory  - Hypoxic/hypercapneic respiratory failure likely secondary to malignant pleural effusions s/p thoracentesis on 8/13 with re-accumulation of pleural effusions  - S/p second thoracentesis 8/25, s/p L pleurex 8/27  - On pressure support, however remains tachypneic with low TVs, will repeat thoracentesis on R side before attempting extubation   - pending thoracentesis on R side 9/4    #GI/Nutrition  #malignant ascites   - S/p paracentesis 8/20, still remains distended with ascites     #?ileus   - hold tube feeds as pt with residuals and persistent nausea 2/2 chemotherapy   - abd x-ray ordered  - zofran prn  - given reglan 5 today     #diarrhea   - previously constipated s/p golytely + fecal disempaction    #/Renal  - PURVI with worsening CR 2/2 ATN in the setting of supratherapeutic vancomycin level + tumor lysis   - B/L hydronephrosis stable on CT a/p 2/2 malignant LAD  - Nephrology on board, recommend holding LASIX & other nephrotoxic medications.   - Nephrostomy tubes considered however per IR recommendations not appropriate at this time as patient's Cr previously downtrending, may consider re-consulting if patient's renal function continues to worsen   - monitor TLS labs q8h, now with uptrending LDH and hyperphosphatemia, although uric acid remains low   - c/w phosphate binder  - s/p bicarb drip, transitioned to PO bicarb   - c/w albumin with goal > 3.5 to assist with diuresis, dosed for IV lasix 40 9/4   - carnes placed for accurate I+Os    #Skin  - No sacral decubiti    #ID  - s/p Vanco and Zosyn (ended 8/31)   - U/A grossly positive, urine cx NGTD  - blood cx 8/28/21 NGTD  - strongyloides positive - s/p ivermectin (9/1 - 9/2) given IC state   - c/w INH + pyridoxine (given indeterminate quant gold)    #Endocrine  - SSI   - will readjust as necessary     #Hematologic/DVT ppx  - newly diagnosed diffuse large B-cell lymphoma  - TLS labs q8  - Allopurinol for TLS prophylaxis  - Heme/Onc recommending starting steroids with dexamethasone, s/p 40 mg dose (ended 8/29, 8/31)   - s/p Rituxan 8/28/21  - s/p cyclophosphamide (1st cycle 9/1, 2nd cycle 9/2, 3rd cycle 9/3)  - s/p doxorubicin 9/4  - DVT prophylaxis with heparin subQ    #Ethics  - Patient is FULL CODE per palliative care discussion with patient and her sons (Yuan and Jose).     Fill-in proxy is Jose Camargo: [phone]

## 2021-09-06 NOTE — PROGRESS NOTE ADULT - SUBJECTIVE AND OBJECTIVE BOX
INTERVAL HPI/OVERNIGHT EVENTS:    SUBJECTIVE: Patient seen and examined at bedside.       VITAL SIGNS:  ICU Vital Signs Last 24 Hrs  T(C): 35.8 (06 Sep 2021 04:00), Max: 36.6 (05 Sep 2021 08:00)  T(F): 96.5 (06 Sep 2021 04:00), Max: 97.8 (05 Sep 2021 08:00)  HR: 60 (06 Sep 2021 07:00) (60 - 112)  BP: 119/58 (06 Sep 2021 07:00) (98/52 - 138/69)  BP(mean): 71 (06 Sep 2021 07:00) (63 - 89)  ABP: --  ABP(mean): --  RR: 26 (06 Sep 2021 07:00) (16 - 29)  SpO2: 94% (06 Sep 2021 07:00) (83% - 100%)    Mode: AC/ CMV (Assist Control/ Continuous Mandatory Ventilation), RR (machine): 26, TV (machine): 400, FiO2: 70, PEEP: 8, ITime: 0.85, MAP: 17, PIP: 40  Plateau pressure:   P/F ratio:     09-05 @ 07:01  -  09-06 @ 07:00  --------------------------------------------------------  IN: 1111.4 mL / OUT: 1930 mL / NET: -818.6 mL    09-06 @ 07:01  -  09-06 @ 07:30  --------------------------------------------------------  IN: 45.8 mL / OUT: 0 mL / NET: 45.8 mL      CAPILLARY BLOOD GLUCOSE      POCT Blood Glucose.: 115 mg/dL (06 Sep 2021 06:24)    ECG:    PHYSICAL EXAM:    General:   HEENT:   Neck:   Respiratory:   Cardiovascular:   Abdomen:   Extremities:  Neurological:    MEDICATIONS:  MEDICATIONS  (STANDING):  allopurinol 100 milliGRAM(s) Oral daily  chlorhexidine 0.12% Liquid 15 milliLiter(s) Oral Mucosa every 12 hours  chlorhexidine 4% Liquid 1 Application(s) Topical <User Schedule>  dexMEDEtomidine Infusion 0.5 MICROgram(s)/kG/Hr (7.83 mL/Hr) IV Continuous <Continuous>  dextrose 40% Gel 15 Gram(s) Oral once  dextrose 5%. 1000 milliLiter(s) (50 mL/Hr) IV Continuous <Continuous>  dextrose 5%. 1000 milliLiter(s) (100 mL/Hr) IV Continuous <Continuous>  dextrose 50% Injectable 25 Gram(s) IV Push once  dextrose 50% Injectable 12.5 Gram(s) IV Push once  dextrose 50% Injectable 25 Gram(s) IV Push once  doxorubicin IVPB w/etoposide (eMAR) 18 milliGRAM(s) IV Intermittent every 24 hours  glucagon  Injectable 1 milliGRAM(s) IntraMuscular once  heparin   Injectable 5000 Unit(s) SubCutaneous every 8 hours  insulin lispro (ADMELOG) corrective regimen sliding scale   SubCutaneous every 6 hours  isoniazid 300 milliGRAM(s) Oral every 24 hours  norepinephrine Infusion 0.05 MICROgram(s)/kG/Min (2.93 mL/Hr) IV Continuous <Continuous>  petrolatum Ophthalmic Ointment 1 Application(s) Both EYES two times a day  polyethylene glycol 3350 17 Gram(s) Oral daily  propofol Infusion 30 MICROgram(s)/kG/Min (11.3 mL/Hr) IV Continuous <Continuous>  pyridoxine 50 milliGRAM(s) Oral daily  senna Syrup 15 milliLiter(s) Oral at bedtime  sevelamer carbonate Powder 1200 milliGRAM(s) Oral three times a day with meals  sodium bicarbonate 1300 milliGRAM(s) Oral three times a day    MEDICATIONS  (PRN):  bisacodyl Suppository 10 milliGRAM(s) Rectal daily PRN Constipation  hydrocortisone sodium succinate Injectable 100 milliGRAM(s) IV Push once PRN PRN Chemotherapy Reaction  ondansetron Injectable 8 milliGRAM(s) IV Push every 8 hours PRN Nausea      ALLERGIES:  Allergies    penicillins (Rash)    Intolerances        LABS:                        8.0    11.30 )-----------( 145      ( 06 Sep 2021 06:14 )             23.7     09-06    137  |  101  |  78<H>  ----------------------------<  119<H>  3.7   |  12<L>  |  2.19<H>    Ca    7.1<L>      06 Sep 2021 06:11  Phos  9.4     09-06  Mg     1.70     09-06    TPro  5.4<L>  /  Alb  3.0<L>  /  TBili  0.9  /  DBili  x   /  AST  44<H>  /  ALT  11  /  AlkPhos  96  09-06    PT/INR - ( 06 Sep 2021 06:14 )   PT: 14.4 sec;   INR: 1.28 ratio         PTT - ( 06 Sep 2021 06:14 )  PTT:36.0 sec      RADIOLOGY & ADDITIONAL TESTS: Reviewed.

## 2021-09-07 NOTE — CHART NOTE - NSCHARTNOTEFT_GEN_A_CORE
: Tamica Ontiveros PGY1, Medicine, Pager [phone] ([facility])    INDICATION: resp failure    PROCEDURE:  [X] LIMITED ECHO  [X] LIMITED CHEST  [X] LIMITED RETROPERITONEAL    FINDINGS:  Lungs: A-line predominant pattern in anterior lung fields. Normal pleural surface. Lung sliding present. Large pleural effusion on R and smaller on L.  Heart: Limited views. LV systolic function grossly normal. RV smaller than LV. No septal flattening on short parasternal view. No pericardial effusion.  IVC: Indeterminate in size.  Abd: Loops of bowel with peristalsis. No ascites.    INTERPRETATION:  Lungs: Normal lung aeration pattern anteriorly.   Heart: Normal systolic function on limited cardiac views.  IVC: Indeterminate in size given increased intraabdominal pressure.    Images uploaded on Q Path.    Tamica Ontiveros MD  PGY1, Medicine, [facility]  Pager [phone] ([facility])    A lines: normal, atelectasis, asthma/COPD, PTX, no pulm edema    >3 B lines: focal may suggest PNA or atelectasis; diffuse in >2 zones b/l suggests "interstitial syndrome" (eg ARDS, pulm edema, interstitial PNA, diffuse parenchymal disease (eg fibrosis))    Pleural line: thick may suggest PNA, ARDS, or fibrosis; irregular may suggest abnormal parenchyma (eg fibrosis) or PNA; normal in pulm edema    Consolidation: atelectasis, PNA    Air bronchograms: PNA, alvolear filling defect : Tamica Ontiveros PGY1, Medicine, Pager [phone]) [phone] ([facility])    INDICATION: resp failure    PROCEDURE:  [X] LIMITED ECHO  [X] LIMITED CHEST  [X] LIMITED ABDOMEN  [X] LIMITED RETROPERITONEAL    FINDINGS:  Lungs: A-line predominant pattern in anterior lung fields with very few scattered B anywhere. Normal pleural surface. Lung sliding present. Large pleural effusion on R and smaller on L.  Heart: Limited views. LV systolic function grossly normal. Maybe thickened LV wall. RV smaller than LV. No septal flattening on short parasternal view. No pericardial effusion.  IVC: indeterminate size  Abd: Large ascites on R.  Kidney: enlarged with effaced structures, linear hyperechoic structure in L    INTERPRETATION:  Lungs: Normal lung aeration pattern anteriorly. Large pleural effusion on R with compressive atelectasis, and smaller on L.  Heart: Normal systolic function on limited cardiac views.  IVC: Indeterminate in size given increased intraabdominal pressure.  Abd: Large ascites on R.  Kidney: hydronephrosis and stent in L    Images uploaded on Q Path.    Tamica Ontiveros MD  PGY1, Medicine, [facility]  Pager [phone]) [phone] ([facility])

## 2021-09-07 NOTE — PROGRESS NOTE ADULT - ASSESSMENT
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 Patient is now intubated in the MICU and with pressor support, started on R-CHOP chemotherapy. Patient also has bilateral adnexal masses and peritoneal carcinomatosis with ascites and extensive abdominal and pelvic adenopathy. Hospitalization course has been c/b ongoing fevers and increasing pressor and O2 support.    Past 24 hour events and studies reviewed in detail. Patient completed doxorubicin infusion on 9/4.  Patient self-extubated and coded on 9/5. CPR performed. Pulse regained and patient was re-intubated, now on propofol with increasing oxygen requirements.   Given ongoing hemodynamic instability and increasing oxygen requirements, we will continue to hold chemotherapy today. We will continue to closely monitor the patient's clinical status and the hematology team will reassess the patient tomorrow to consider resuming chemotherapy based on the patient's clinical status. The current chemotherapy regimen, treatment course so far, and oncologic treatment plans were discussed in detail with the patient's son Yuan at bedside today.     -HOLDING chemotherapy today given above acute events  -will reassess clinical status daily for resuming chemotherapy   -s/p Dexamethasone 8/26-8/29, Rituxan 8/28  -s/p D1-D2 cyclophosphamide 100mg 9/1-9/2 with reduction of doses   -s/p D3 cyclophosphamide 225mg q12h on 9/3  -s/p doxorubicin completed 9/4  -continue close monitoring of TLS labs q8h given high risk of TLS (phos, Mg, BMP, LDH, uric acid)    -continue with allopurinol for TLS ppx  -RBC transfusion support to keep Hgb >7 (last transfused 9/4)  -appreciate nephrology recommendations for PURVI and TLS, bumex gtt started for decreased urine output and volume overload    -f/u palliative care recommendations for ongoing GOC discussions  -continue DVT ppx with heparin subq    Will continue to follow with you closely.    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 Patient is now intubated in the MICU and with pressor support, started on R-CHOP chemotherapy. Patient also has bilateral adnexal masses and peritoneal carcinomatosis with ascites and extensive abdominal and pelvic adenopathy. Hospitalization course has been c/b ongoing fevers and increasing pressor and O2 support. Now s/p self extubation on 9/5 c/b cardiac arrest, now re-intubated and sedated      -HOLDING chemotherapy today given recent cardiac arrest; pending further eval of mental statys   -will reassess clinical status daily for resuming chemotherapy   -s/p Dexamethasone 8/26-8/29, Rituxan 8/28  -s/p D1-D2 cyclophosphamide 100mg 9/1-9/2 with reduction of doses   -s/p D3 cyclophosphamide 225mg q12h on 9/3  -s/p doxorubicin completed 9/4  -continue close monitoring of TLS labs q8h given high risk of TLS (phos, Mg, BMP, LDH, uric acid)    -continue with allopurinol for TLS ppx  -RBC transfusion support to keep Hgb >7 (last transfused 9/4)  -appreciate nephrology recommendations for PURVI and TLS, bumex gtt started for decreased urine output and volume overload    -f/u palliative care recommendations for ongoing GOC discussions  -continue DVT ppx with heparin subq

## 2021-09-07 NOTE — PROGRESS NOTE ADULT - PROBLEM SELECTOR PLAN 1
Pt with PURVI in the setting of PURVI 2/2 IV contrast and obstructive uropathy. Also with concerns of TLS initially. SCr was at 0.99 on 08/19 which increased to 1.65 on 08/22 and peaked to 2.59 on 08/24. PURVI likely ATN. CT scan stable b/L hydro. SCr today elevated/stable at 2.2, nonoliguric UOP~1300cc. Labs reviewed. Pt. clinically hypervolemic, started on bumex gtt. Agree with bumex gtt at 3mg/dl. Closely monitor and urine output. Avoid NSAIDs, ACEI/ARBS, RCA and nephrotoxins

## 2021-09-07 NOTE — PROGRESS NOTE ADULT - SUBJECTIVE AND OBJECTIVE BOX
[facility] DIVISION OF KIDNEY DISEASES AND HYPERTENSION -- FOLLOW UP NOTE  --------------------------------------------------------------------------------  If any questions, please feel free to contact me  NS pager: [phone], [facility]: [phone]  Dusty Fountain M.D.  Nephrology Fellow    (After 5 pm or on weekends please page the on-call fellow)  --------------------------------------------------------------------------------    Chief Complaint:  Patient is a 66y old  Female who presents with a chief complaint of Worsening volume overload (07 Sep 2021 07:23)    HPI  66F with HTN, HLD, recently discovered ovarian mass suspicious for malignancy (7/2021), L hydroureteronephrosis s/p renal stent (7/15/21), and recent hospitalization ([facility] 7/26-8/4) for PURVI requiring urgent HD, ascites s/p therapeutic paracentesis (7/27/21), and pleural effusion s/p R thoracentesis (8/2/21) admitted for acute hypercarbic respiratory failure due to pleural effusions most likely caused by ovarian malignancy complicated with volume overload with ascites. Now found to have new onset PURVI. Baseline Cr 0.7-1.1mg/dl. Had a recent PURVI episode which resolved- started to trended up again on 8/31.    Patient seen and examined at bedside, remains intubated and sedated. SCr today 09/07 at 2.2 mg/dl , Nonoliguric UOP~ 1300cc. Vitals/labs/imaging reviewed           PAST HISTORY  --------------------------------------------------------------------------------  No significant changes to PMH, PSH, FHx, SHx, unless otherwise noted    ALLERGIES & MEDICATIONS  --------------------------------------------------------------------------------  Allergies    penicillins (Rash)    Intolerances      Standing Inpatient Medications  allopurinol 100 milliGRAM(s) Oral daily  buMETAnide Infusion 3 mG/Hr IV Continuous <Continuous>  chlorhexidine 0.12% Liquid 15 milliLiter(s) Oral Mucosa every 12 hours  chlorhexidine 4% Liquid 1 Application(s) Topical <User Schedule>  dexMEDEtomidine Infusion 0.5 MICROgram(s)/kG/Hr IV Continuous <Continuous>  dextrose 40% Gel 15 Gram(s) Oral once  dextrose 5%. 1000 milliLiter(s) IV Continuous <Continuous>  dextrose 5%. 1000 milliLiter(s) IV Continuous <Continuous>  dextrose 50% Injectable 25 Gram(s) IV Push once  dextrose 50% Injectable 12.5 Gram(s) IV Push once  dextrose 50% Injectable 25 Gram(s) IV Push once  doxorubicin IVPB w/etoposide (eMAR) 18 milliGRAM(s) IV Intermittent every 24 hours  glucagon  Injectable 1 milliGRAM(s) IntraMuscular once  heparin   Injectable 5000 Unit(s) SubCutaneous every 8 hours  insulin lispro (ADMELOG) corrective regimen sliding scale   SubCutaneous every 6 hours  isoniazid 300 milliGRAM(s) Oral every 24 hours  midodrine 10 milliGRAM(s) Oral every 8 hours  norepinephrine Infusion 0.05 MICROgram(s)/kG/Min IV Continuous <Continuous>  petrolatum Ophthalmic Ointment 1 Application(s) Both EYES two times a day  piperacillin/tazobactam IVPB.. 3.375 Gram(s) IV Intermittent every 8 hours  polyethylene glycol 3350 17 Gram(s) Oral daily  propofol Infusion 30 MICROgram(s)/kG/Min IV Continuous <Continuous>  pyridoxine 50 milliGRAM(s) Oral daily  senna Syrup 15 milliLiter(s) Oral at bedtime  sevelamer carbonate Powder 1200 milliGRAM(s) Oral three times a day with meals  sodium bicarbonate 1300 milliGRAM(s) Oral three times a day    PRN Inpatient Medications  bisacodyl Suppository 10 milliGRAM(s) Rectal daily PRN  hydrocortisone sodium succinate Injectable 100 milliGRAM(s) IV Push once PRN  ondansetron Injectable 8 milliGRAM(s) IV Push every 8 hours PRN      REVIEW OF SYSTEMS  --------------------------------------------------------------------------------  unable to obtain due to current medical conditions       VITALS/PHYSICAL EXAM  --------------------------------------------------------------------------------  T(C): 36.6 (09-07-21 @ 08:00), Max: 37.3 (09-06-21 @ 16:00)  HR: 64 (09-07-21 @ 10:34) (61 - 82)  BP: 128/70 (09-07-21 @ 10:00) (103/54 - 128/70)  RR: 26 (09-07-21 @ 10:00) (16 - 27)  SpO2: 99% (09-07-21 @ 10:34) (89% - 99%)  Wt(kg): --        09-06-21 @ 07:01  -  09-07-21 @ 07:00  --------------------------------------------------------  IN: 1885.6 mL / OUT: 1830 mL / NET: 55.6 mL    09-07-21 @ 07:01  -  09-07-21 @ 10:58  --------------------------------------------------------  IN: 189.3 mL / OUT: 500 mL / NET: -310.7 mL        Physical Exam:  	Gen: Sedated  	HEENT: ET tube +  	Pulm: Mechanically ventilated via ETT  	CV: S1S2  	Abd: Soft, +BS  	Ext: 3+ Pitting LE edema B/L  	Neuro: Sedated              : Charles+ clear urine  	Skin: Warm and dry      LABS/STUDIES  --------------------------------------------------------------------------------              7.7    7.87  >-----------<  122      [09-07-21 @ 00:46]              21.8     139  |  103  |  83  ----------------------------<  122      [09-07-21 @ 00:46]  3.6   |  12  |  2.29        Ca     6.6     [09-07-21 @ 00:46]      iCa    0.73     [09-07 @ 10:31]      Mg     1.70     [09-07-21 @ 00:46]      Phos  9.5     [09-07-21 @ 00:46]    TPro  5.1  /  Alb  2.3  /  TBili  1.0  /  DBili  x   /  AST  46  /  ALT  11  /  AlkPhos  145  [09-07-21 @ 00:46]    PT/INR: PT 13.8 , INR 1.22       [09-07-21 @ 00:46]  PTT: 36.3       [09-07-21 @ 00:46]    Uric acid 5.5      [09-07-21 @ 00:46]  LDH 1064      [09-07-21 @ 00:46]    Creatinine Trend:  SCr 2.29 [09-07 @ 00:46]  SCr 2.39 [09-06 @ 15:59]  SCr 2.19 [09-06 @ 06:11]  SCr 2.24 [09-05 @ 17:10]  SCr 2.13 [09-05 @ 12:56]    Urinalysis - [08-28-21 @ 17:00]      Color Yellow / Appearance Turbid / SG 1.034 / pH 6.5      Gluc Trace / Ketone Negative  / Bili Negative / Urobili <2 mg/dL       Blood Small / Protein 100 mg/dL / Leuk Est Large / Nitrite Negative      RBC 6-10 / WBC 15-20 / Hyaline 3 / Gran 20 / Sq Epi  / Non Sq Epi Occasional / Bacteria Moderate      TSH 1.85      [07-29-21 @ 11:12]    HBsAg Nonreact      [08-26-21 @ 10:02]  HCV 0.20, Nonreact      [08-26-21 @ 10:02]  HIV Nonreact      [08-25-21 @ 12:13]

## 2021-09-07 NOTE — PROGRESS NOTE ADULT - SUBJECTIVE AND OBJECTIVE BOX
INTERVAL HPI/OVERNIGHT EVENTS:    SUBJECTIVE: Patient seen and examined at bedside.       VITAL SIGNS:  ICU Vital Signs Last 24 Hrs  T(C): 37.2 (07 Sep 2021 04:00), Max: 37.3 (06 Sep 2021 16:00)  T(F): 98.9 (07 Sep 2021 04:00), Max: 99.2 (06 Sep 2021 20:00)  HR: 65 (07 Sep 2021 07:15) (58 - 82)  BP: 116/61 (07 Sep 2021 05:00) (103/54 - 119/64)  BP(mean): 73 (07 Sep 2021 05:00) (61 - 78)  ABP: --  ABP(mean): --  RR: 26 (07 Sep 2021 06:00) (16 - 28)  SpO2: 97% (07 Sep 2021 07:15) (89% - 98%)    Mode: AC/ CMV (Assist Control/ Continuous Mandatory Ventilation), RR (machine): 26, TV (machine): 400, FiO2: 60, PEEP: 8, ITime: 0.83, MAP: 18, PIP: 39  Plateau pressure:   P/F ratio:     09-06 @ 07:01  -  09-07 @ 07:00  --------------------------------------------------------  IN: 1885.6 mL / OUT: 1830 mL / NET: 55.6 mL      CAPILLARY BLOOD GLUCOSE      POCT Blood Glucose.: 125 mg/dL (07 Sep 2021 06:23)    ECG:    PHYSICAL EXAM:    General:   HEENT:   Neck:   Respiratory:   Cardiovascular:   Abdomen:   Extremities:  Neurological:    MEDICATIONS:  MEDICATIONS  (STANDING):  allopurinol 100 milliGRAM(s) Oral daily  buMETAnide Infusion 3 mG/Hr (15 mL/Hr) IV Continuous <Continuous>  chlorhexidine 0.12% Liquid 15 milliLiter(s) Oral Mucosa every 12 hours  chlorhexidine 4% Liquid 1 Application(s) Topical <User Schedule>  dexMEDEtomidine Infusion 0.5 MICROgram(s)/kG/Hr (7.83 mL/Hr) IV Continuous <Continuous>  dextrose 40% Gel 15 Gram(s) Oral once  dextrose 5%. 1000 milliLiter(s) (50 mL/Hr) IV Continuous <Continuous>  dextrose 5%. 1000 milliLiter(s) (100 mL/Hr) IV Continuous <Continuous>  dextrose 50% Injectable 25 Gram(s) IV Push once  dextrose 50% Injectable 12.5 Gram(s) IV Push once  dextrose 50% Injectable 25 Gram(s) IV Push once  doxorubicin IVPB w/etoposide (eMAR) 18 milliGRAM(s) IV Intermittent every 24 hours  glucagon  Injectable 1 milliGRAM(s) IntraMuscular once  heparin   Injectable 5000 Unit(s) SubCutaneous every 8 hours  insulin lispro (ADMELOG) corrective regimen sliding scale   SubCutaneous every 6 hours  isoniazid 300 milliGRAM(s) Oral every 24 hours  midodrine 10 milliGRAM(s) Oral every 8 hours  norepinephrine Infusion 0.05 MICROgram(s)/kG/Min (2.93 mL/Hr) IV Continuous <Continuous>  petrolatum Ophthalmic Ointment 1 Application(s) Both EYES two times a day  piperacillin/tazobactam IVPB.. 3.375 Gram(s) IV Intermittent every 8 hours  polyethylene glycol 3350 17 Gram(s) Oral daily  propofol Infusion 30 MICROgram(s)/kG/Min (11.3 mL/Hr) IV Continuous <Continuous>  pyridoxine 50 milliGRAM(s) Oral daily  senna Syrup 15 milliLiter(s) Oral at bedtime  sevelamer carbonate Powder 1200 milliGRAM(s) Oral three times a day with meals  sodium bicarbonate 1300 milliGRAM(s) Oral three times a day    MEDICATIONS  (PRN):  bisacodyl Suppository 10 milliGRAM(s) Rectal daily PRN Constipation  hydrocortisone sodium succinate Injectable 100 milliGRAM(s) IV Push once PRN PRN Chemotherapy Reaction  ondansetron Injectable 8 milliGRAM(s) IV Push every 8 hours PRN Nausea      ALLERGIES:  Allergies    penicillins (Rash)    Intolerances        LABS:                        7.7    7.87  )-----------( 122      ( 07 Sep 2021 00:46 )             21.8     09-07    139  |  103  |  83<H>  ----------------------------<  122<H>  3.6   |  12<L>  |  2.29<H>    Ca    6.6<L>      07 Sep 2021 00:46  Phos  9.5     09-07  Mg     1.70     09-07    TPro  5.1<L>  /  Alb  2.3<L>  /  TBili  1.0  /  DBili  x   /  AST  46<H>  /  ALT  11  /  AlkPhos  145<H>  09-07    PT/INR - ( 07 Sep 2021 00:46 )   PT: 13.8 sec;   INR: 1.22 ratio         PTT - ( 07 Sep 2021 00:46 )  PTT:36.3 sec      RADIOLOGY & ADDITIONAL TESTS: Reviewed.

## 2021-09-07 NOTE — PROGRESS NOTE ADULT - ATTENDING COMMENTS
PURVI  Metabolic Acidosis    Cont to monitor urine output while on bumex. Continue bicarb tabs. No current signs of TLS.    Will monitor for RRT needs.

## 2021-09-07 NOTE — PROGRESS NOTE ADULT - ASSESSMENT
66-year-old woman with PMH significant for HTN, HLD, L hydroureteronephrosis s/p renal stent on 7/15, who presents with volume overload 2/2 high grade B-cell lymphoma. S/p thoracentesis 8/13, paracentesis and excisional biopsy of left inguinal lymph node on 8/20. Accepted to MICU for acute hypoxic/hypercapneic respiratory failure now s/p intubation and L pleurex catheter placement, now undergoing chemotherapy.    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  - CT head with no intracranial pathology  - S/p LP with flow cytometry and cytopathology to evaluate possible lymphomatous meningitis, negative thus far   - Currently sedated with precedex but following commands - will wean as tolerated when attempting extubation    #Cardiovascular  vasoplegic shock 2/2 sedatives   - Echo 8/3 showing concentric left ventricular remodeling, hyperdynamic left ventricle, calcified trileaflet aortic valve with normal opening, EF 56%  - POCUS showing adequate cardiac output  - On pressor support with levo, wean as tolerated    #Respiratory  - Hypoxic/hypercapneic respiratory failure likely secondary to malignant pleural effusions s/p thoracentesis on 8/13 with re-accumulation of pleural effusions  - S/p second thoracentesis 8/25, s/p L pleurex 8/27  - On pressure support, however remains tachypneic with low TVs, will repeat thoracentesis on R side before attempting extubation   - pending thoracentesis on R side 9/4    #GI/Nutrition  #malignant ascites   - S/p paracentesis 8/20, still remains distended with ascites     #?ileus   - hold tube feeds as pt with residuals and persistent nausea 2/2 chemotherapy   - abd x-ray ordered  - zofran prn  - given reglan 5 today     #diarrhea   - previously constipated s/p golytely + fecal disempaction    #/Renal  - PURVI with worsening CR 2/2 ATN in the setting of supratherapeutic vancomycin level + tumor lysis   - B/L hydronephrosis stable on CT a/p 2/2 malignant LAD  - Nephrology on board, recommend holding LASIX & other nephrotoxic medications.   - Nephrostomy tubes considered however per IR recommendations not appropriate at this time as patient's Cr previously downtrending, may consider re-consulting if patient's renal function continues to worsen   - monitor TLS labs q8h, now with uptrending LDH and hyperphosphatemia, although uric acid remains low   - c/w phosphate binder  - s/p bicarb drip, transitioned to PO bicarb   - c/w albumin with goal > 3.5 to assist with diuresis, dosed for IV lasix 40 9/4   - carnes placed for accurate I+Os    #Skin  - No sacral decubiti    #ID  - s/p Vanco and Zosyn (ended 8/31)   - U/A grossly positive, urine cx NGTD  - blood cx 8/28/21 NGTD  - strongyloides positive - s/p ivermectin (9/1 - 9/2) given IC state   - c/w INH + pyridoxine (given indeterminate quant gold)    #Endocrine  - SSI   - will readjust as necessary     #Hematologic/DVT ppx  - newly diagnosed diffuse large B-cell lymphoma  - TLS labs q8  - Allopurinol for TLS prophylaxis  - Heme/Onc recommending starting steroids with dexamethasone, s/p 40 mg dose (ended 8/29, 8/31)   - s/p Rituxan 8/28/21  - s/p cyclophosphamide (1st cycle 9/1, 2nd cycle 9/2, 3rd cycle 9/3)  - s/p doxorubicin 9/4  - DVT prophylaxis with heparin subQ    #Ethics  - Patient is FULL CODE per palliative care discussion with patient and her sons (Yuan and Jose).     Fill-in proxy is Jose Camargo: [phone]

## 2021-09-07 NOTE — PROGRESS NOTE ADULT - ATTENDING COMMENTS
Patient examined and care reviewed in detail on bedside rounds  Critically ill on vent/pressors with advanced stage lymphoma Onc reconsult re advisability of con'd chemo given oor functional status  Frequent bedside visits with therapy change today.   I have personally provided 35+ minutes of critical care time concurrently with the resident/fellow; this excludes time spent on separate procedures.

## 2021-09-07 NOTE — PROGRESS NOTE ADULT - SUBJECTIVE AND OBJECTIVE BOX
****************************************  Marin Adame PGY4  Hematology/Oncology  [phone]  ****************************************  SUBJECTIVE    Patient seen and examined at bedside. No acute events overnight  Reported  Denied HA, CP, SOB, n/v/d/c , fevers, chills    OBJECTIVE     Vital Signs Last 24 Hrs  T(C): 36.6 (07 Sep 2021 08:00), Max: 37.3 (06 Sep 2021 16:00)  T(F): 97.9 (07 Sep 2021 08:00), Max: 99.2 (06 Sep 2021 20:00)  HR: 64 (07 Sep 2021 10:34) (62 - 82)  BP: 128/70 (07 Sep 2021 10:00) (103/54 - 128/70)  BP(mean): 83 (07 Sep 2021 10:00) (61 - 83)  RR: 26 (07 Sep 2021 10:00) (16 - 27)  SpO2: 99% (07 Sep 2021 10:34) (89% - 99%)    09-06-21 @ 07:01  -  09-07-21 @ 07:00  --------------------------------------------------------  IN: 1885.6 mL / OUT: 1830 mL / NET: 55.6 mL    09-07-21 @ 07:01  -  09-07-21 @ 11:06  --------------------------------------------------------  IN: 189.3 mL / OUT: 500 mL / NET: -310.7 mL      PHYSICAL EXAM:  Constitutional: non-toxic, no distress  HEAD/EYES: anicteric, no conjunctival injection  ENT:  supple, no thrush  Cardiovascular:   normal S1, S2, no murmur, no edema  Respiratory:  clear BS bilaterally, no wheezes, no rales  GI:  soft, non-tender, normal bowel sounds  :  no carnes, no CVA tenderness  Musculoskeletal:  no synovitis, normal ROM  Neurologic: awake and alert, normal strength, no focal findings  Skin:  no rash, no erythema, no phlebitis  Heme/Onc: no lymphadenopathy   Psychiatric:  awake, alert, appropriate mood          LABS                        7.7    7.87  )-----------( 122      ( 07 Sep 2021 00:46 )             21.8       09-07    139  |  100  |  x   ----------------------------<  x   3.2<L>   |  x   |  x     Ca    6.6<L>      07 Sep 2021 00:46  Phos  9.5     09-07  Mg     1.70     09-07    TPro  5.1<L>  /  Alb  2.3<L>  /  TBili  1.0  /  DBili  x   /  AST  46<H>  /  ALT  11  /  AlkPhos  145<H>  09-07          Culture - Sputum (collected 09-06-21 @ 22:37)  Source: .Sputum Sputum  Gram Stain (09-06-21 @ 23:22):    Numerous polymorphonuclear leukocytes per low power field    Rare Squamous epithelial cells per low power field    No organisms seen per oil power field      Follow Up:      RADIOLOGY: ****************************************  Marin Adame PGY4  Hematology/Oncology  [phone]  ****************************************  SUBJECTIVE  Patient seen and examined at bedside. No acute events overnight  Unable to obtain ROS due to patient being intubated and sedated     OBJECTIVE   Vital Signs Last 24 Hrs  T(C): 36.6 (07 Sep 2021 08:00), Max: 37.3 (06 Sep 2021 16:00)  T(F): 97.9 (07 Sep 2021 08:00), Max: 99.2 (06 Sep 2021 20:00)  HR: 64 (07 Sep 2021 10:34) (62 - 82)  BP: 128/70 (07 Sep 2021 10:00) (103/54 - 128/70)  BP(mean): 83 (07 Sep 2021 10:00) (61 - 83)  RR: 26 (07 Sep 2021 10:00) (16 - 27)  SpO2: 99% (07 Sep 2021 10:34) (89% - 99%)    09-06-21 @ 07:01  -  09-07-21 @ 07:00  --------------------------------------------------------  IN: 1885.6 mL / OUT: 1830 mL / NET: 55.6 mL    09-07-21 @ 07:01  -  09-07-21 @ 11:06  --------------------------------------------------------  IN: 189.3 mL / OUT: 500 mL / NET: -310.7 mL      PHYSICAL EXAM:  Constitutional: intubated and sedated  Eyes: PERRL, sclera non-icteric  Gastrointestinal: soft, distended, no masses palpable  Extremities: no edema  Neurological: sedated      LABS                        7.7    7.87  )-----------( 122      ( 07 Sep 2021 00:46 )             21.8       09-07    139  |  100  |  x   ----------------------------<  x   3.2<L>   |  x   |  x     Ca    6.6<L>      07 Sep 2021 00:46  Phos  9.5     09-07  Mg     1.70     09-07    TPro  5.1<L>  /  Alb  2.3<L>  /  TBili  1.0  /  DBili  x   /  AST  46<H>  /  ALT  11  /  AlkPhos  145<H>  09-07          Culture - Sputum (collected 09-06-21 @ 22:37)  Source: .Sputum Sputum  Gram Stain (09-06-21 @ 23:22):    Numerous polymorphonuclear leukocytes per low power field    Rare Squamous epithelial cells per low power field    No organisms seen per oil power field      Follow Up:      RADIOLOGY:

## 2021-09-07 NOTE — PROGRESS NOTE ADULT - ATTENDING COMMENTS
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 CD20, PAX-5, CD10, BCL-6, BCL-2, C-MYC positive.   initially presented with volume overload, now intubated in the MICU and with pressor support, planned for R-CHOP chemotherapy.   dex from 8/26-8/29, Rituxan on 8/28 only with plans for cyclophosphamide, Doxorubicin and vincristine on Sunday 8/29 however patient with ongoing fevers and increasing pressor requirement concerning for possible infectious process.   infectious w/up negative and now afebrile  s/p D1-D2 of Cyclophosphamide 100mg daily 9/1-9/2 (reduced dose given large burden of disease)  D3 (9/3) dose of cyclophosphamide increased to 225mg q12hrs   etoposide, doxorubicin and vincristine on 9/4 as part of DA-EPOCH (dose level 1)  Patient completed doxorubicin infusion on 9/4 but then self-extubated and coded on 9/5. CPR performed. Pulse regained and patient was re-intubated, further chemo held.  MICU tracheostomy today, start chemo treatment after tracheostomy

## 2021-09-08 NOTE — PROGRESS NOTE ADULT - ASSESSMENT
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 Patient is now intubated in the MICU and with pressor support, started on R-CHOP chemotherapy. Patient also has bilateral adnexal masses and peritoneal carcinomatosis with ascites and extensive abdominal and pelvic adenopathy. Hospitalization course has been c/b ongoing fevers and increasing pressor and O2 support. Now s/p self extubation on 9/5 c/b cardiac arrest, now re-intubated and sedated      DLBCL  -s/p Dexamethasone 8/26-8/29, Rituxan 8/28  -s/p D1-D2 cyclophosphamide 100mg 9/1-9/2 with reduction of doses   -s/p D3 cyclophosphamide 225mg q12h on 9/3  -continue close monitoring of TLS labs q8h given high risk of TLS (phos, Mg, BMP, LDH, uric acid)    -continue with allopurinol for TLS ppx  -RBC transfusion support to keep Hgb >7 (last transfused 9/4)  -appreciate nephrology recommendations for PURVI and TLS, bumex gtt started for decreased urine output and volume overload    -f/u palliative care recommendations for ongoing GOC discussions  -continue DVT ppx with heparin subq  -Noted to have elevated liver enzymes; Follow up repeat   -Noted to have decreasing Plts and WBC, Follow up repeat CBC, will continue to monitor  -s/p doxorubicin, vincristine, etoposide completed 9/4; only one day completed due to above events on 9/5; Plan to resume on 9/10 pending placement of Trach

## 2021-09-08 NOTE — PROGRESS NOTE ADULT - SUBJECTIVE AND OBJECTIVE BOX
INTERVAL HPI/OVERNIGHT EVENTS:    SUBJECTIVE: Patient seen and examined at bedside.       VITAL SIGNS:  ICU Vital Signs Last 24 Hrs  T(C): 36.6 (08 Sep 2021 04:00), Max: 38.3 (07 Sep 2021 18:00)  T(F): 97.9 (08 Sep 2021 04:00), Max: 101 (07 Sep 2021 18:00)  HR: 62 (08 Sep 2021 07:00) (60 - 80)  BP: 106/61 (08 Sep 2021 07:00) (90/47 - 136/65)  BP(mean): 72 (08 Sep 2021 07:00) (58 - 92)  ABP: --  ABP(mean): --  RR: 26 (08 Sep 2021 07:00) (23 - 26)  SpO2: 99% (08 Sep 2021 07:00) (95% - 100%)    Mode: AC/ CMV (Assist Control/ Continuous Mandatory Ventilation), RR (machine): 26, TV (machine): 400, FiO2: 60, PEEP: 8, ITime: 0.84, MAP: 17, PIP: 40  Plateau pressure:   P/F ratio:     09-07 @ 07:01  -  09-08 @ 07:00  --------------------------------------------------------  IN: 2050.3 mL / OUT: 3110 mL / NET: -1059.7 mL      CAPILLARY BLOOD GLUCOSE      POCT Blood Glucose.: 126 mg/dL (08 Sep 2021 05:04)    ECG:    PHYSICAL EXAM:    General:   HEENT:   Neck:   Respiratory:   Cardiovascular:   Abdomen:   Extremities:  Neurological:    MEDICATIONS:  MEDICATIONS  (STANDING):  albumin human 25% IVPB 100 milliLiter(s) IV Intermittent every 6 hours  allopurinol 100 milliGRAM(s) Oral daily  buMETAnide Infusion 3 mG/Hr (15 mL/Hr) IV Continuous <Continuous>  chlorhexidine 0.12% Liquid 15 milliLiter(s) Oral Mucosa every 12 hours  chlorhexidine 4% Liquid 1 Application(s) Topical <User Schedule>  dexMEDEtomidine Infusion 0.5 MICROgram(s)/kG/Hr (7.83 mL/Hr) IV Continuous <Continuous>  dextrose 40% Gel 15 Gram(s) Oral once  dextrose 5%. 1000 milliLiter(s) (50 mL/Hr) IV Continuous <Continuous>  dextrose 5%. 1000 milliLiter(s) (100 mL/Hr) IV Continuous <Continuous>  dextrose 50% Injectable 25 Gram(s) IV Push once  dextrose 50% Injectable 12.5 Gram(s) IV Push once  dextrose 50% Injectable 25 Gram(s) IV Push once  doxorubicin IVPB w/etoposide (eMAR) 18 milliGRAM(s) IV Intermittent every 24 hours  glucagon  Injectable 1 milliGRAM(s) IntraMuscular once  heparin   Injectable 5000 Unit(s) SubCutaneous every 8 hours  insulin lispro (ADMELOG) corrective regimen sliding scale   SubCutaneous every 6 hours  isoniazid 300 milliGRAM(s) Oral every 24 hours  midodrine 10 milliGRAM(s) Oral every 8 hours  norepinephrine Infusion 0.05 MICROgram(s)/kG/Min (2.93 mL/Hr) IV Continuous <Continuous>  petrolatum Ophthalmic Ointment 1 Application(s) Both EYES two times a day  piperacillin/tazobactam IVPB.. 3.375 Gram(s) IV Intermittent every 8 hours  polyethylene glycol 3350 17 Gram(s) Oral daily  propofol Infusion 30 MICROgram(s)/kG/Min (11.3 mL/Hr) IV Continuous <Continuous>  pyridoxine 50 milliGRAM(s) Oral daily  senna Syrup 15 milliLiter(s) Oral at bedtime  sevelamer carbonate Powder 1200 milliGRAM(s) Oral three times a day with meals  sodium bicarbonate 1300 milliGRAM(s) Oral three times a day    MEDICATIONS  (PRN):  bisacodyl Suppository 10 milliGRAM(s) Rectal daily PRN Constipation  hydrocortisone sodium succinate Injectable 100 milliGRAM(s) IV Push once PRN PRN Chemotherapy Reaction  ondansetron Injectable 8 milliGRAM(s) IV Push every 8 hours PRN Nausea      ALLERGIES:  Allergies    penicillins (Rash)    Intolerances        LABS:                        8.6    3.57  )-----------( x        ( 08 Sep 2021 06:26 )             25.5     09-08    138  |  99  |  82<H>  ----------------------------<  120<H>  3.6   |  13<L>  |  2.35<H>    Ca    6.6<L>      08 Sep 2021 06:26  Phos  9.2     09-08  Mg     1.70     09-08    TPro  5.4<L>  /  Alb  2.9<L>  /  TBili  2.0<H>  /  DBili  x   /  AST  43<H>  /  ALT  10  /  AlkPhos  152<H>  09-08    PT/INR - ( 08 Sep 2021 06:26 )   PT: 14.0 sec;   INR: 1.24 ratio         PTT - ( 08 Sep 2021 06:26 )  PTT:35.0 sec      RADIOLOGY & ADDITIONAL TESTS: Reviewed.   INTERVAL HPI/OVERNIGHT EVENTS:  No acute overnight events.     SUBJECTIVE: Patient seen and examined at bedside. Continues on precedex.      VITAL SIGNS:  ICU Vital Signs Last 24 Hrs  T(C): 36.6 (08 Sep 2021 04:00), Max: 38.3 (07 Sep 2021 18:00)  T(F): 97.9 (08 Sep 2021 04:00), Max: 101 (07 Sep 2021 18:00)  HR: 62 (08 Sep 2021 07:00) (60 - 80)  BP: 106/61 (08 Sep 2021 07:00) (90/47 - 136/65)  BP(mean): 72 (08 Sep 2021 07:00) (58 - 92)  ABP: --  ABP(mean): --  RR: 26 (08 Sep 2021 07:00) (23 - 26)  SpO2: 99% (08 Sep 2021 07:00) (95% - 100%)    Mode: AC/ CMV (Assist Control/ Continuous Mandatory Ventilation), RR (machine): 26, TV (machine): 400, FiO2: 60, PEEP: 8, ITime: 0.84, MAP: 17, PIP: 40  Plateau pressure:   P/F ratio:     09-07 @ 07:01  -  09-08 @ 07:00  --------------------------------------------------------  IN: 2050.3 mL / OUT: 3110 mL / NET: -1059.7 mL      CAPILLARY BLOOD GLUCOSE      POCT Blood Glucose.: 126 mg/dL (08 Sep 2021 05:04)    ECG:    PHYSICAL EXAM:    General:   HEENT:   Neck:   Respiratory:   Cardiovascular:   Abdomen:   Extremities:  Neurological:    MEDICATIONS:  MEDICATIONS  (STANDING):  albumin human 25% IVPB 100 milliLiter(s) IV Intermittent every 6 hours  allopurinol 100 milliGRAM(s) Oral daily  buMETAnide Infusion 3 mG/Hr (15 mL/Hr) IV Continuous <Continuous>  chlorhexidine 0.12% Liquid 15 milliLiter(s) Oral Mucosa every 12 hours  chlorhexidine 4% Liquid 1 Application(s) Topical <User Schedule>  dexMEDEtomidine Infusion 0.5 MICROgram(s)/kG/Hr (7.83 mL/Hr) IV Continuous <Continuous>  dextrose 40% Gel 15 Gram(s) Oral once  dextrose 5%. 1000 milliLiter(s) (50 mL/Hr) IV Continuous <Continuous>  dextrose 5%. 1000 milliLiter(s) (100 mL/Hr) IV Continuous <Continuous>  dextrose 50% Injectable 25 Gram(s) IV Push once  dextrose 50% Injectable 12.5 Gram(s) IV Push once  dextrose 50% Injectable 25 Gram(s) IV Push once  doxorubicin IVPB w/etoposide (eMAR) 18 milliGRAM(s) IV Intermittent every 24 hours  glucagon  Injectable 1 milliGRAM(s) IntraMuscular once  heparin   Injectable 5000 Unit(s) SubCutaneous every 8 hours  insulin lispro (ADMELOG) corrective regimen sliding scale   SubCutaneous every 6 hours  isoniazid 300 milliGRAM(s) Oral every 24 hours  midodrine 10 milliGRAM(s) Oral every 8 hours  norepinephrine Infusion 0.05 MICROgram(s)/kG/Min (2.93 mL/Hr) IV Continuous <Continuous>  petrolatum Ophthalmic Ointment 1 Application(s) Both EYES two times a day  piperacillin/tazobactam IVPB.. 3.375 Gram(s) IV Intermittent every 8 hours  polyethylene glycol 3350 17 Gram(s) Oral daily  propofol Infusion 30 MICROgram(s)/kG/Min (11.3 mL/Hr) IV Continuous <Continuous>  pyridoxine 50 milliGRAM(s) Oral daily  senna Syrup 15 milliLiter(s) Oral at bedtime  sevelamer carbonate Powder 1200 milliGRAM(s) Oral three times a day with meals  sodium bicarbonate 1300 milliGRAM(s) Oral three times a day    MEDICATIONS  (PRN):  bisacodyl Suppository 10 milliGRAM(s) Rectal daily PRN Constipation  hydrocortisone sodium succinate Injectable 100 milliGRAM(s) IV Push once PRN PRN Chemotherapy Reaction  ondansetron Injectable 8 milliGRAM(s) IV Push every 8 hours PRN Nausea      ALLERGIES:  Allergies    penicillins (Rash)    Intolerances        LABS:                        8.6    3.57  )-----------( x        ( 08 Sep 2021 06:26 )             25.5     09-08    138  |  99  |  82<H>  ----------------------------<  120<H>  3.6   |  13<L>  |  2.35<H>    Ca    6.6<L>      08 Sep 2021 06:26  Phos  9.2     09-08  Mg     1.70     09-08    TPro  5.4<L>  /  Alb  2.9<L>  /  TBili  2.0<H>  /  DBili  x   /  AST  43<H>  /  ALT  10  /  AlkPhos  152<H>  09-08    PT/INR - ( 08 Sep 2021 06:26 )   PT: 14.0 sec;   INR: 1.24 ratio         PTT - ( 08 Sep 2021 06:26 )  PTT:35.0 sec      RADIOLOGY & ADDITIONAL TESTS: Reviewed.

## 2021-09-08 NOTE — PROGRESS NOTE ADULT - SUBJECTIVE AND OBJECTIVE BOX
[facility] DIVISION OF KIDNEY DISEASES AND HYPERTENSION -- FOLLOW UP NOTE  --------------------------------------------------------------------------------  If any questions, please feel free to contact me  [facility] pager: [phone], [facility]: [phone]  Dusty Fountain M.D.  Nephrology Fellow    (After 5 pm or on weekends please page the on-call fellow)  --------------------------------------------------------------------------------    HPI:  66F with HTN, HLD, recently discovered ovarian mass suspicious for malignancy (7/2021), L hydroureteronephrosis s/p renal stent (7/15/21), and recent hospitalization ([facility] 7/26-8/4) for PURVI requiring urgent HD, ascites s/p therapeutic paracentesis (7/27/21), and pleural effusion s/p R thoracentesis (8/2/21) admitted for acute hypercarbic respiratory failure due to pleural effusions most likely caused by ovarian malignancy complicated with volume overload with ascites. Now found to have new onset PURVI. Baseline Cr 0.7-1.1mg/dl. Had a recent PURVI episode which resolved- started to trended up again on 8/31.    Patient seen and examined at bedside, remains intubated and sedated. SCr today 09/07 at 2.3 mg/dl , Nonoliguric UOP~ 2.6L. Vitals/labs/imaging reviewed     PAST HISTORY  --------------------------------------------------------------------------------  No significant changes to PMH, PSH, FHx, SHx, unless otherwise noted    ALLERGIES & MEDICATIONS  --------------------------------------------------------------------------------  Allergies    penicillins (Rash)    Intolerances      Standing Inpatient Medications  albumin human 25% IVPB 100 milliLiter(s) IV Intermittent every 6 hours  allopurinol 100 milliGRAM(s) Oral daily  buMETAnide Infusion 3 mG/Hr IV Continuous <Continuous>  chlorhexidine 0.12% Liquid 15 milliLiter(s) Oral Mucosa every 12 hours  chlorhexidine 4% Liquid 1 Application(s) Topical <User Schedule>  dexMEDEtomidine Infusion 0.5 MICROgram(s)/kG/Hr IV Continuous <Continuous>  dextrose 40% Gel 15 Gram(s) Oral once  dextrose 5%. 1000 milliLiter(s) IV Continuous <Continuous>  dextrose 5%. 1000 milliLiter(s) IV Continuous <Continuous>  dextrose 50% Injectable 25 Gram(s) IV Push once  dextrose 50% Injectable 12.5 Gram(s) IV Push once  dextrose 50% Injectable 25 Gram(s) IV Push once  doxorubicin IVPB w/etoposide (eMAR) 18 milliGRAM(s) IV Intermittent every 24 hours  glucagon  Injectable 1 milliGRAM(s) IntraMuscular once  heparin   Injectable 5000 Unit(s) SubCutaneous every 8 hours  insulin lispro (ADMELOG) corrective regimen sliding scale   SubCutaneous every 6 hours  isoniazid 300 milliGRAM(s) Oral every 24 hours  midodrine 10 milliGRAM(s) Oral every 8 hours  norepinephrine Infusion 0.05 MICROgram(s)/kG/Min IV Continuous <Continuous>  petrolatum Ophthalmic Ointment 1 Application(s) Both EYES two times a day  piperacillin/tazobactam IVPB.. 3.375 Gram(s) IV Intermittent every 8 hours  polyethylene glycol 3350 17 Gram(s) Oral daily  propofol Infusion 30 MICROgram(s)/kG/Min IV Continuous <Continuous>  pyridoxine 50 milliGRAM(s) Oral daily  senna Syrup 15 milliLiter(s) Oral at bedtime  sevelamer carbonate Powder 1200 milliGRAM(s) Oral three times a day with meals  sodium bicarbonate 1300 milliGRAM(s) Oral three times a day    PRN Inpatient Medications  bisacodyl Suppository 10 milliGRAM(s) Rectal daily PRN  hydrocortisone sodium succinate Injectable 100 milliGRAM(s) IV Push once PRN  ondansetron Injectable 8 milliGRAM(s) IV Push every 8 hours PRN      REVIEW OF SYSTEMS  --------------------------------------------------------------------------------  unable to obtain due to current medical conditions       VITALS/PHYSICAL EXAM  --------------------------------------------------------------------------------  T(C): 36 (09-08-21 @ 12:00), Max: 38.3 (09-07-21 @ 18:00)  HR: 65 (09-08-21 @ 14:00) (52 - 80)  BP: 115/58 (09-08-21 @ 14:00) (88/51 - 136/65)  RR: 27 (09-08-21 @ 14:00) (26 - 27)  SpO2: 100% (09-08-21 @ 14:00) (98% - 100%)  Wt(kg): --        09-07-21 @ 07:01  -  09-08-21 @ 07:00  --------------------------------------------------------  IN: 2050.3 mL / OUT: 3110 mL / NET: -1059.7 mL    09-08-21 @ 07:01  -  09-08-21 @ 14:42  --------------------------------------------------------  IN: 669.5 mL / OUT: 740 mL / NET: -70.5 mL        Physical Exam:  	Gen: Sedated  	HEENT: ET tube +  	Pulm: Mechanically ventilated via ETT  	CV: S1S2  	Abd: Soft, +BS  	Ext: 2+ Pitting LE edema B/L  	Neuro: Sedated              : Charles+ clear urine  	Skin: Warm and dry      LABS/STUDIES  --------------------------------------------------------------------------------              8.6    3.57  >-----------<  54       [09-08-21 @ 06:26]              25.5     138  |  99  |  82  ----------------------------<  120      [09-08-21 @ 06:26]  3.6   |  13  |  2.35        Ca     6.6     [09-08-21 @ 06:26]      iCa    0.81     [09-08 @ 06:26]      Mg     1.70     [09-08-21 @ 06:26]      Phos  9.2     [09-08-21 @ 06:26]    TPro  5.4  /  Alb  2.9  /  TBili  2.0  /  DBili  x   /  AST  43  /  ALT  10  /  AlkPhos  152  [09-08-21 @ 06:26]    PT/INR: PT 14.0 , INR 1.24       [09-08-21 @ 06:26]  PTT: 35.0       [09-08-21 @ 06:26]    Uric acid 6.4      [09-08-21 @ 06:26]        [09-08-21 @ 06:26]    Creatinine Trend:  SCr 2.35 [09-08 @ 06:26]  SCr 2.45 [09-07 @ 21:47]  SCr 2.37 [09-07 @ 10:31]  SCr 2.29 [09-07 @ 00:46]  SCr 2.39 [09-06 @ 15:59]    Urinalysis - [08-28-21 @ 17:00]      Color Yellow / Appearance Turbid / SG 1.034 / pH 6.5      Gluc Trace / Ketone Negative  / Bili Negative / Urobili <2 mg/dL       Blood Small / Protein 100 mg/dL / Leuk Est Large / Nitrite Negative      RBC 6-10 / WBC 15-20 / Hyaline 3 / Gran 20 / Sq Epi  / Non Sq Epi Occasional / Bacteria Moderate      TSH 1.85      [07-29-21 @ 11:12]    HBsAg Nonreact      [08-26-21 @ 10:02]  HCV 0.20, Nonreact      [08-26-21 @ 10:02]  HIV Nonreact      [08-25-21 @ 12:13]

## 2021-09-08 NOTE — PROGRESS NOTE ADULT - SUBJECTIVE AND OBJECTIVE BOX
****************************************  Marin Adame PGY4  Hematology/Oncology  [phone]/[id number]  ****************************************  SUBJECTIVE  Patient seen and examined at bedside. No acute events overnight  Unable to obtain ROS due to patient being intubated    OBJECTIVE   Vital Signs Last 24 Hrs  T(C): 36 (08 Sep 2021 12:00), Max: 38.3 (07 Sep 2021 18:00)  T(F): 96.8 (08 Sep 2021 12:00), Max: 101 (07 Sep 2021 18:00)  HR: 65 (08 Sep 2021 16:00) (52 - 80)  BP: 111/62 (08 Sep 2021 16:00) (88/51 - 136/65)  BP(mean): 73 (08 Sep 2021 16:00) (59 - 88)  RR: 26 (08 Sep 2021 16:00) (26 - 27)  SpO2: 100% (08 Sep 2021 15:51) (98% - 100%)    09-07-21 @ 07:01  -  09-08-21 @ 07:00  --------------------------------------------------------  IN: 2050.3 mL / OUT: 3110 mL / NET: -1059.7 mL    09-08-21 @ 07:01  -  09-08-21 @ 16:47  --------------------------------------------------------  IN: 846.5 mL / OUT: 865 mL / NET: -18.5 mL      PHYSICAL EXAM:  Constitutional: intubated, mildly sedated   Eyes: PERRL, sclera non-icteric  Gastrointestinal: soft, distended, no masses palpable  Extremities: no edema  Neurological: mildly sedated, response to verbal stimuli, follows simple commands and attempts to interact        LABS                        8.6    3.57  )-----------( 54       ( 08 Sep 2021 06:26 )             25.5       09-08    138  |  99  |  82<H>  ----------------------------<  120<H>  3.6   |  13<L>  |  2.35<H>    Ca    6.6<L>      08 Sep 2021 06:26  Phos  9.2     09-08  Mg     1.70     09-08    TPro  5.4<L>  /  Alb  2.9<L>  /  TBili  2.0<H>  /  DBili  x   /  AST  43<H>  /  ALT  10  /  AlkPhos  152<H>  09-08          Follow Up:      RADIOLOGY:

## 2021-09-08 NOTE — PROGRESS NOTE ADULT - ATTENDING COMMENTS
Patient examined and care reviewed in detail on bedside rounds  Critically ill on vent/pressors with lymphoma D/W onc regarding whether con'd chemo is appropriante given her poor functional status  Frequent bedside visits with therapy change today.   I have personally provided 35+ minutes of critical care time concurrently with the resident/fellow; this excludes time spent on separate procedures.

## 2021-09-08 NOTE — PROGRESS NOTE ADULT - ASSESSMENT
66-year-old woman with PMH significant for HTN, HLD, L hydroureteronephrosis s/p renal stent on 7/15, who presents with volume overload 2/2 high grade B-cell lymphoma. S/p thoracentesis 8/13, paracentesis and excisional biopsy of left inguinal lymph node on 8/20. Accepted to MICU for acute hypoxic/hypercapneic respiratory failure now s/p intubation and L pleurex catheter placement, now undergoing chemotherapy.    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  - CT head with no intracranial pathology  - S/p LP with flow cytometry and cytopathology to evaluate possible lymphomatous meningitis, negative thus far   - Currently sedated with precedex but following commands - will wean as tolerated when attempting extubation    #Cardiovascular  vasoplegic shock 2/2 sedatives   - Echo 8/3 showing concentric left ventricular remodeling, hyperdynamic left ventricle, calcified trileaflet aortic valve with normal opening, EF 56%  - POCUS showing adequate cardiac output  - On pressor support with levo, wean as tolerated    #Respiratory  - Hypoxic/hypercapneic respiratory failure likely secondary to malignant pleural effusions s/p thoracentesis on 8/13 with re-accumulation of pleural effusions  - S/p second thoracentesis 8/25, s/p L pleurex 8/27  - On pressure support, however remains tachypneic with low TVs, will repeat thoracentesis on R side before attempting extubation   - pending thoracentesis on R side 9/4    #GI/Nutrition  #malignant ascites   - S/p paracentesis 8/20, still remains distended with ascites     #?ileus   - hold tube feeds as pt with residuals and persistent nausea 2/2 chemotherapy   - abd x-ray ordered  - zofran prn  - given reglan 5 today     #diarrhea   - previously constipated s/p golytely + fecal disempaction    #/Renal  - PURVI with worsening CR 2/2 ATN in the setting of supratherapeutic vancomycin level + tumor lysis   - B/L hydronephrosis stable on CT a/p 2/2 malignant LAD  - Nephrology on board, recommend holding LASIX & other nephrotoxic medications.   - Nephrostomy tubes considered however per IR recommendations not appropriate at this time as patient's Cr previously downtrending, may consider re-consulting if patient's renal function continues to worsen   - monitor TLS labs q8h, now with uptrending LDH and hyperphosphatemia, although uric acid remains low   - c/w phosphate binder  - s/p bicarb drip, transitioned to PO bicarb   - c/w albumin with goal > 3.5 to assist with diuresis, dosed for IV lasix 40 9/4   - carnes placed for accurate I+Os    #Skin  - No sacral decubiti    #ID  - s/p Vanco and Zosyn (ended 8/31)   - U/A grossly positive, urine cx NGTD  - blood cx 8/28/21 NGTD  - strongyloides positive - s/p ivermectin (9/1 - 9/2) given IC state   - c/w INH + pyridoxine (given indeterminate quant gold)    #Endocrine  - SSI   - will readjust as necessary     #Hematologic/DVT ppx  - newly diagnosed diffuse large B-cell lymphoma  - TLS labs q8  - Allopurinol for TLS prophylaxis  - Heme/Onc recommending starting steroids with dexamethasone, s/p 40 mg dose (ended 8/29, 8/31)   - s/p Rituxan 8/28/21  - s/p cyclophosphamide (1st cycle 9/1, 2nd cycle 9/2, 3rd cycle 9/3)  - s/p doxorubicin 9/4  - DVT prophylaxis with heparin subQ    #Ethics  - Patient is FULL CODE per palliative care discussion with patient and her sons (Yuan and Jose).     Fill-in proxy is Jose Camargo: [phone]   66-year-old woman with PMH significant for HTN, HLD, L hydroureteronephrosis s/p renal stent on 7/15, who presents with volume overload 2/2 high grade B-cell lymphoma. S/p thoracentesis 8/13, paracentesis and excisional biopsy of left inguinal lymph node on 8/20. Accepted to MICU for acute hypoxic/hypercapneic respiratory failure now s/p intubation and L pleurex catheter placement, now undergoing chemotherapy.    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  - CT head with no intracranial pathology  - S/p LP with flow cytometry and cytopathology to evaluate possible lymphomatous meningitis, negative thus far   - Currently sedated with precedex but following commands - will wean as tolerated    #Cardiovascular  vasoplegic shock 2/2 sedatives   - Echo 8/3 showing concentric left ventricular remodeling, hyperdynamic left ventricle, calcified trileaflet aortic valve with normal opening, EF 56%  - POCUS showing adequate cardiac output  - On pressor support with levo, wean as tolerated    #Respiratory  - Hypoxic/hypercapneic respiratory failure likely secondary to malignant pleural effusions s/p thoracentesis on 8/13 with re-accumulation of pleural effusions  - S/p second thoracentesis 8/25, s/p L pleurex 8/27  - On pressure support, however remains tachypneic with low TVs, will repeat thoracentesis on R side before attempting extubation   - unsuccessful breathing trials, will require trach, likely will place tomorrow    #GI/Nutrition  #malignant ascites   - S/p paracentesis 8/20, still remains distended with ascites     #?ileus   - hold tube feeds as pt with residuals and persistent nausea 2/2 chemotherapy   - abd x-ray ordered  - zofran prn  - given reglan 5 today     #diarrhea   - previously constipated s/p golytely + fecal disempaction    #/Renal  - PURVI with worsening CR 2/2 ATN in the setting of supratherapeutic vancomycin level + tumor lysis   - B/L hydronephrosis stable on CT a/p 2/2 malignant LAD  - Nephrology on board, recommend holding LASIX & other nephrotoxic medications.   - Nephrostomy tubes considered however per IR recommendations not appropriate at this time as patient's Cr previously downtrending, may consider re-consulting if patient's renal function continues to worsen   - monitor TLS labs q8h, now with uptrending LDH and hyperphosphatemia, although uric acid remains low   - c/w phosphate binder  - s/p bicarb drip, transitioned to PO bicarb   - c/w albumin with goal > 3.5 to assist with diuresis, dosed for IV lasix 40 9/4   - carnes placed for accurate I+Os    #Skin  - No sacral decubiti    #ID  - s/p Vanco and Zosyn (ended 8/31)   - U/A grossly positive, urine cx NGTD  - blood cx 8/28/21 NGTD  - strongyloides positive - s/p ivermectin (9/1 - 9/2) given IC state   - c/w INH + pyridoxine (given indeterminate quant gold)    #Endocrine  - SSI   - will readjust as necessary     #Hematologic/DVT ppx  - newly diagnosed diffuse large B-cell lymphoma  - TLS labs q8  - Allopurinol for TLS prophylaxis  - Heme/Onc recommending starting steroids with dexamethasone, s/p 40 mg dose (ended 8/29, 8/31)   - s/p Rituxan 8/28/21  - s/p cyclophosphamide (1st cycle 9/1, 2nd cycle 9/2, 3rd cycle 9/3)  - s/p doxorubicin 9/4  - as per oncology, likely to resume chemotherapy tomorrow  - DVT prophylaxis with heparin subQ    #Ethics  - Patient is FULL CODE per palliative care discussion with patient and her sons (Yuan and Jose).     Fill-in proxy is Chaileslie Camargo: [phone]

## 2021-09-08 NOTE — CHART NOTE - NSCHARTNOTEFT_GEN_A_CORE
NUTRITION FOLLOW-UP    _________________Diet___________________  Diet, NPO with Tube Feed:   Tube Feeding Modality: Orogastric  Nepro with Carb Steady (NEPRORTH)  Total Volume for 24 Hours (mL): 720  Continuous  Starting Tube Feed Rate {mL per Hour}: 30  Until Goal Tube Feed Rate (mL per Hour): 30  Tube Feed Duration (in Hours): 24  Tube Feed Start Time: 14:30  No Carb Prosource TF     Qty per Day:  2 (08-26-21 @ 14:17)    TF Provides:   720mL total volume                       1296 kcals (~25 cals/kg IBW)                      88g total protein (1.7g protein/kg IBW)      67 yo F with PMH of HTN, HLD, ovarian mass with concern for malignancy,  b/l pleural effusions, hydroureteronephrosis s/p renal stent, & recent hospitalization at [facility] for acute renal failure requiring urgent HD, ascites, s/p paracentesis and pleural effusion s/p thoracentesis.  Admitted for hypercarbic respiratory failure 2/2 to pleural effusions likely 2/2 to ovarian mass and also with volume overload, ascites and b/l LE edema, and advanced B-cell lymphoma.      Tube feeding on hold since (9/6) 2/2 to residuals, and nausea & concern of possible ileus (?), per chart.  However, last BM (9/7).  Would resume enteral feedings, as medically appropriate & increase rate of Nepro to 35mL/hr, as tolerated, with 2 packets NoCarb Prosource per day.       Weight increase likely 2/2 to fluid accumulation given worsening volume overload. +Abdominal distention, ascites.        Weight:                                 Height:   63"                       Ideal Body Weight = 52.3kg   75.2kg (9/7)  69.1kg (8/30)  63.5kg (8/26)  62.6kg (8/25)  61.3kg (8/24)  64.0kg (8/12)      _____ Estimated Energy Needs (based on Ideal Body Weight) ____  25-30 kcals/kg IBW= 9967-9914 kcals/day  1.8g -2.0g protein/kg IBW= 94-105g protein/day    Edema:  2+ generalized.  +Ascites.     Skin:  Intact.       ________________________Pertinent Medications____________   MEDICATIONS  (STANDING):  albumin human 25% IVPB 100 milliLiter(s) IV Intermittent every 6 hours  allopurinol 100 milliGRAM(s) Oral daily  buMETAnide Infusion 3 mG/Hr (15 mL/Hr) IV Continuous <Continuous>  chlorhexidine 0.12% Liquid 15 milliLiter(s) Oral Mucosa every 12 hours  chlorhexidine 4% Liquid 1 Application(s) Topical <User Schedule>  dexMEDEtomidine Infusion 0.5 MICROgram(s)/kG/Hr (7.83 mL/Hr) IV Continuous <Continuous>  dextrose 40% Gel 15 Gram(s) Oral once  dextrose 5%. 1000 milliLiter(s) (50 mL/Hr) IV Continuous <Continuous>  dextrose 5%. 1000 milliLiter(s) (100 mL/Hr) IV Continuous <Continuous>  dextrose 50% Injectable 25 Gram(s) IV Push once  dextrose 50% Injectable 12.5 Gram(s) IV Push once  dextrose 50% Injectable 25 Gram(s) IV Push once  doxorubicin IVPB w/etoposide (eMAR) 18 milliGRAM(s) IV Intermittent every 24 hours  glucagon  Injectable 1 milliGRAM(s) IntraMuscular once  heparin   Injectable 5000 Unit(s) SubCutaneous every 8 hours  insulin lispro (ADMELOG) corrective regimen sliding scale   SubCutaneous every 6 hours  isoniazid 300 milliGRAM(s) Oral every 24 hours  midodrine 10 milliGRAM(s) Oral every 8 hours  norepinephrine Infusion 0.05 MICROgram(s)/kG/Min (2.93 mL/Hr) IV Continuous <Continuous>  petrolatum Ophthalmic Ointment 1 Application(s) Both EYES two times a day  piperacillin/tazobactam IVPB.. 3.375 Gram(s) IV Intermittent every 8 hours  polyethylene glycol 3350 17 Gram(s) Oral daily  propofol Infusion 30 MICROgram(s)/kG/Min (11.3 mL/Hr) IV Continuous <Continuous>  pyridoxine 50 milliGRAM(s) Oral daily  senna Syrup 15 milliLiter(s) Oral at bedtime  sevelamer carbonate Powder 1200 milliGRAM(s) Oral three times a day with meals  sodium bicarbonate 1300 milliGRAM(s) Oral three times a day    MEDICATIONS  (PRN):  bisacodyl Suppository 10 milliGRAM(s) Rectal daily PRN Constipation  hydrocortisone sodium succinate Injectable 100 milliGRAM(s) IV Push once PRN PRN Chemotherapy Reaction  ondansetron Injectable 8 milliGRAM(s) IV Push every 8 hours PRN Nausea          _________________________Pertinent Labs____________________     09-08    138  |  99  |  82<H>  ----------------------------<  120<H>  3.6   |  13<L>  |  2.35<H>    Ca    6.6<L>      08 Sep 2021 06:26  Phos  9.2     09-08  Mg     1.70     09-08    TPro  5.4<L>  /  Alb  2.9<L>  /  TBili  2.0<H>  /  DBili  x   /  AST  43<H>  /  ALT  10  /  AlkPhos  152<H>  09-08                                                                   8.6    3.57  )-----------( 54       ( 08 Sep 2021 06:26 )             25.5         CAPILLARY BLOOD GLUCOSE      POCT Blood Glucose.: 127 mg/dL (08 Sep 2021 11:18)    POCT Blood Glucose.: 127 mg/dL (09-08-21 @ 11:18)  POCT Blood Glucose.: 126 mg/dL (09-08-21 @ 05:04)  POCT Blood Glucose.: 104 mg/dL (09-07-21 @ 23:10)  POCT Blood Glucose.: 118 mg/dL (09-07-21 @ 18:01)      ____ NUTRITION Dx _____  Pt. remains moderately malnourished.         PLAN/RECOMMENDATIONS:    1) Resume TF.  Increase Nepro to goal of 35mL/hr, as tolerated, + 2 packets NoCarb Prosource/day          [TF provides 840mL total volume                            1512 kcals (29 kcals/kg IBW)                            98 total g protein (1.9g protein/kg IBW)    2) Obtain daily weights  3) Monitor tolerance to TF.      RDN remains available and will f/u PRN.          Yulisa Argueta, DENI, CDN pager [phone]

## 2021-09-08 NOTE — PROGRESS NOTE ADULT - PROBLEM SELECTOR PLAN 1
Pt with PURVI in the setting of PURVI 2/2 IV contrast and obstructive uropathy. Also with concerns of TLS initially. SCr was at 0.99 on 08/19 which increased to 1.65 on 08/22 and peaked to 2.59 on 08/24. PURVI likely ATN. CT scan stable b/L hydro. SCr today elevated/stable at 2.3, nonoliguric UOP~2.6L. Labs reviewed. c/w  bumex gtt at 3mg/dl. Closely monitor and urine output. Avoid NSAIDs, ACEI/ARBS, RCA and nephrotoxins

## 2021-09-08 NOTE — CHART NOTE - NSCHARTNOTEFT_GEN_A_CORE
: Shweta Uribe    INDICATION: resp failure    PROCEDURE:  [X] LIMITED ECHO  [X] LIMITED CHEST    FINDINGS:  Lungs: Diffuse and scattered B lines on R and scattered B lines on L. Normal pleural surface. Lung sliding present. Large pleural effusion on R. R later diaphragm obscured by skin dressing.  Heart: Limited views. LV systolic function grossly normal. Maybe thickened LV wall. RV smaller than LV. No septal flattening on short parasternal view.     INTERPRETATION:   Lungs: R lung with interstitial syndrome (eg pulm edema, interstitial PNA, fibrosis). Both lungs with atelectasis more likely than PNA.  Heart: No LVSD on limited cardiac views.    Images uploaded on [employer].    Tamica Ontiveros MD  PGY1, Medicine, [facility]  Pager [phone] ([facility]) [phone] ([facility]) : Shweta Uribe    INDICATION: resp failure    PROCEDURE:  [X] LIMITED ECHO  [X] LIMITED CHEST    FINDINGS:  Lungs: Diffuse B lines on R and scattered B lines on L. Irregular pleural surface. Lung sliding present. Large pleural effusion on R. R later diaphragm obscured by skin dressing.  Heart: Limited views. LV systolic function grossly normal. Maybe thickened LV wall. RV smaller than LV. No septal flattening on short parasternal view.     INTERPRETATION:   Lungs: R lung with interstitial syndrome (eg pulm edema, interstitial PNA, fibrosis). Both lungs with atelectasis more likely than PNA. Irregular pleura suggests pulm not cardiac etiology.  Heart: No LVSD on limited cardiac views.    Images uploaded on [employer].    Tamica Ontiveros MD  PGY1, Medicine, [facility]  Pager [phone] ([phone] ([facility])

## 2021-09-09 NOTE — PROGRESS NOTE ADULT - NUTRITIONAL ASSESSMENT
This patient has been assessed with a concern for Malnutrition and has been determined to have a diagnosis/diagnoses of Moderate protein-calorie malnutrition.    This patient is being managed with:   Diet NPO after Midnight-     NPO Start Date: 08-Sep-2021   NPO Start Time: 23:59  Entered: Sep  8 2021  8:19PM    Diet NPO with Tube Feed-  Tube Feeding Modality: Orogastric  Nepro with Carb Steady (NEPRORTH)  Total Volume for 24 Hours (mL): 720  Continuous  Starting Tube Feed Rate {mL per Hour}: 30  Until Goal Tube Feed Rate (mL per Hour): 30  Tube Feed Duration (in Hours): 24  Tube Feed Start Time: 14:30  No Carb Prosource TF     Qty per Day:  2  Entered: Aug 26 2021  2:16PM

## 2021-09-09 NOTE — PROGRESS NOTE ADULT - SUBJECTIVE AND OBJECTIVE BOX
INTERVAL HPI/OVERNIGHT EVENTS:  Patient S&E at bedside. No o/n events    VITAL SIGNS:  T(F): 97.8 (09-09-21 @ 08:00)  HR: 76 (09-09-21 @ 09:00)  BP: 101/58 (09-09-21 @ 08:00)  RR: 28 (09-09-21 @ 09:00)  SpO2: 100% (09-09-21 @ 09:00)  Wt(kg): --    PHYSICAL EXAM:    Constitutional: intubated, mildly sedated   Eyes: PERRL, sclera non-icteric  Gastrointestinal: soft, distended, no masses palpable  Extremities: no edema  Neurological: mildly sedated, response to verbal stimuli, follows simple commands and attempts to interact      MEDICATIONS  (STANDING):  albumin human 25% IVPB 100 milliLiter(s) IV Intermittent every 6 hours  allopurinol 100 milliGRAM(s) Oral daily  buMETAnide Infusion 3 mG/Hr (15 mL/Hr) IV Continuous <Continuous>  chlorhexidine 0.12% Liquid 15 milliLiter(s) Oral Mucosa every 12 hours  chlorhexidine 4% Liquid 1 Application(s) Topical <User Schedule>  dexMEDEtomidine Infusion 0.5 MICROgram(s)/kG/Hr (7.83 mL/Hr) IV Continuous <Continuous>  dextrose 40% Gel 15 Gram(s) Oral once  dextrose 5%. 1000 milliLiter(s) (50 mL/Hr) IV Continuous <Continuous>  dextrose 5%. 1000 milliLiter(s) (100 mL/Hr) IV Continuous <Continuous>  dextrose 50% Injectable 25 Gram(s) IV Push once  dextrose 50% Injectable 12.5 Gram(s) IV Push once  dextrose 50% Injectable 25 Gram(s) IV Push once  doxorubicin IVPB w/etoposide (eMAR) 18 milliGRAM(s) IV Intermittent every 24 hours  glucagon  Injectable 1 milliGRAM(s) IntraMuscular once  insulin lispro (ADMELOG) corrective regimen sliding scale   SubCutaneous every 6 hours  isoniazid 300 milliGRAM(s) Oral every 24 hours  midodrine 10 milliGRAM(s) Oral every 8 hours  norepinephrine Infusion 0.05 MICROgram(s)/kG/Min (2.93 mL/Hr) IV Continuous <Continuous>  petrolatum Ophthalmic Ointment 1 Application(s) Both EYES two times a day  piperacillin/tazobactam IVPB.. 3.375 Gram(s) IV Intermittent every 8 hours  polyethylene glycol 3350 17 Gram(s) Oral daily  potassium chloride  20 mEq/100 mL IVPB 20 milliEquivalent(s) IV Intermittent every 2 hours  pyridoxine 50 milliGRAM(s) Oral daily  senna Syrup 15 milliLiter(s) Oral at bedtime  sevelamer carbonate Powder 1200 milliGRAM(s) Oral three times a day with meals  sodium bicarbonate 1300 milliGRAM(s) Oral three times a day    MEDICATIONS  (PRN):  bisacodyl Suppository 10 milliGRAM(s) Rectal daily PRN Constipation  hydrocortisone sodium succinate Injectable 100 milliGRAM(s) IV Push once PRN PRN Chemotherapy Reaction  ondansetron Injectable 8 milliGRAM(s) IV Push every 8 hours PRN Nausea      Allergies    penicillins (Rash)    Intolerances        LABS:                        7.9    0.82  )-----------( 44       ( 09 Sep 2021 05:40 )             22.6     09-09    140  |  98  |  81<H>  ----------------------------<  111<H>  3.2<L>   |  13<L>  |  2.34<H>    Ca    6.5<LL>      09 Sep 2021 05:40  Phos  8.7     09-09  Mg     1.60     09-09    TPro  5.6<L>  /  Alb  3.2<L>  /  TBili  4.1<H>  /  DBili  x   /  AST  40<H>  /  ALT  10  /  AlkPhos  165<H>  09-09    PT/INR - ( 08 Sep 2021 06:26 )   PT: 14.0 sec;   INR: 1.24 ratio         PTT - ( 08 Sep 2021 06:26 )  PTT:35.0 sec      RADIOLOGY & ADDITIONAL TESTS:  Studies reviewed.     INTERVAL HPI/OVERNIGHT EVENTS:  Patient S&E at bedside. No o/n events  -Patient is responsive this morning, opening her eyes to verbal stimuli  -Planned for trach today     VITAL SIGNS:  T(F): 97.8 (09-09-21 @ 08:00)  HR: 76 (09-09-21 @ 09:00)  BP: 101/58 (09-09-21 @ 08:00)  RR: 28 (09-09-21 @ 09:00)  SpO2: 100% (09-09-21 @ 09:00)  Wt(kg): --    PHYSICAL EXAM:    Constitutional: intubated, mildly sedated   Eyes: PERRL, sclera non-icteric  Gastrointestinal: soft, distended, no masses palpable  Extremities: no edema  Neurological: mildly sedated, response to verbal stimuli, follows simple commands and attempts to interact      MEDICATIONS  (STANDING):  albumin human 25% IVPB 100 milliLiter(s) IV Intermittent every 6 hours  allopurinol 100 milliGRAM(s) Oral daily  buMETAnide Infusion 3 mG/Hr (15 mL/Hr) IV Continuous <Continuous>  chlorhexidine 0.12% Liquid 15 milliLiter(s) Oral Mucosa every 12 hours  chlorhexidine 4% Liquid 1 Application(s) Topical <User Schedule>  dexMEDEtomidine Infusion 0.5 MICROgram(s)/kG/Hr (7.83 mL/Hr) IV Continuous <Continuous>  dextrose 40% Gel 15 Gram(s) Oral once  dextrose 5%. 1000 milliLiter(s) (50 mL/Hr) IV Continuous <Continuous>  dextrose 5%. 1000 milliLiter(s) (100 mL/Hr) IV Continuous <Continuous>  dextrose 50% Injectable 25 Gram(s) IV Push once  dextrose 50% Injectable 12.5 Gram(s) IV Push once  dextrose 50% Injectable 25 Gram(s) IV Push once  doxorubicin IVPB w/etoposide (eMAR) 18 milliGRAM(s) IV Intermittent every 24 hours  glucagon  Injectable 1 milliGRAM(s) IntraMuscular once  insulin lispro (ADMELOG) corrective regimen sliding scale   SubCutaneous every 6 hours  isoniazid 300 milliGRAM(s) Oral every 24 hours  midodrine 10 milliGRAM(s) Oral every 8 hours  norepinephrine Infusion 0.05 MICROgram(s)/kG/Min (2.93 mL/Hr) IV Continuous <Continuous>  petrolatum Ophthalmic Ointment 1 Application(s) Both EYES two times a day  piperacillin/tazobactam IVPB.. 3.375 Gram(s) IV Intermittent every 8 hours  polyethylene glycol 3350 17 Gram(s) Oral daily  potassium chloride  20 mEq/100 mL IVPB 20 milliEquivalent(s) IV Intermittent every 2 hours  pyridoxine 50 milliGRAM(s) Oral daily  senna Syrup 15 milliLiter(s) Oral at bedtime  sevelamer carbonate Powder 1200 milliGRAM(s) Oral three times a day with meals  sodium bicarbonate 1300 milliGRAM(s) Oral three times a day    MEDICATIONS  (PRN):  bisacodyl Suppository 10 milliGRAM(s) Rectal daily PRN Constipation  hydrocortisone sodium succinate Injectable 100 milliGRAM(s) IV Push once PRN PRN Chemotherapy Reaction  ondansetron Injectable 8 milliGRAM(s) IV Push every 8 hours PRN Nausea      Allergies    penicillins (Rash)    Intolerances        LABS:                        7.9    0.82  )-----------( 44       ( 09 Sep 2021 05:40 )             22.6     09-09    140  |  98  |  81<H>  ----------------------------<  111<H>  3.2<L>   |  13<L>  |  2.34<H>    Ca    6.5<LL>      09 Sep 2021 05:40  Phos  8.7     09-09  Mg     1.60     09-09    TPro  5.6<L>  /  Alb  3.2<L>  /  TBili  4.1<H>  /  DBili  x   /  AST  40<H>  /  ALT  10  /  AlkPhos  165<H>  09-09    PT/INR - ( 08 Sep 2021 06:26 )   PT: 14.0 sec;   INR: 1.24 ratio         PTT - ( 08 Sep 2021 06:26 )  PTT:35.0 sec      RADIOLOGY & ADDITIONAL TESTS:  Studies reviewed.

## 2021-09-09 NOTE — PROGRESS NOTE ADULT - ASSESSMENT
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 Patient is now intubated in the MICU and with pressor support, started on R-CHOP chemotherapy. Patient also has bilateral adnexal masses and peritoneal carcinomatosis with ascites and extensive abdominal and pelvic adenopathy. Hospitalization course has been c/b ongoing fevers and increasing pressor and O2 support. Now s/p self extubation on 9/5 c/b cardiac arrest, now re-intubated and sedated      #DLBCL  -s/p Dexamethasone 8/26-8/29, Rituxan 8/28  -s/p D1-D2 cyclophosphamide 100mg 9/1-9/2 with reduction of doses   -s/p D3 cyclophosphamide 225mg q12h on 9/3  -continue close monitoring of TLS labs q8h given high risk of TLS (phos, Mg, BMP, LDH, uric acid)    -continue with allopurinol for TLS ppx  -RBC transfusion support to keep Hgb >7 (last transfused 9/4)  -appreciate nephrology recommendations for PURVI and TLS, bumex gtt started for decreased urine output and volume overload    -f/u palliative care recommendations for ongoing GOC discussions  -continue DVT ppx with heparin subq  -s/p doxorubicin, vincristine, etoposide completed 9/4; only one day completed due to above events on 9/5  -If patient is not having a trach placed today, we will plan to resume EPOCH on 9/10     Esther Lees MD  Hematology/Oncology Fellow, PGY-5  Pager: [phone]  After 5pm and on weekends please page on-call fellow     66 woman with new diagnosis of 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 Patient is now intubated in the MICU and with pressor support, started on R-CHOP chemotherapy. Patient also has bilateral adnexal masses and peritoneal carcinomatosis with ascites and extensive abdominal and pelvic adenopathy. Hospitalization course has been c/b ongoing fevers and increasing pressor and O2 support. Now s/p self extubation on 9/5 c/b cardiac arrest, now re-intubated and sedated      #DLBCL  -s/p Dexamethasone 8/26-8/29, Rituxan 8/28  -s/p D1-D2 cyclophosphamide 100mg 9/1-9/2 with reduction of doses   -s/p D3 cyclophosphamide 225mg q12h on 9/3  -continue close monitoring of TLS labs q8h given high risk of TLS (phos, Mg, BMP, LDH, uric acid)    -continue with allopurinol for TLS ppx  -RBC transfusion support to keep Hgb >7 (last transfused 9/4)  -appreciate nephrology recommendations for PURVI and TLS, bumex gtt started for decreased urine output and volume overload    -f/u palliative care recommendations for ongoing GOC discussions  -continue DVT ppx with heparin subq  -s/p doxorubicin, vincristine, etoposide completed 9/4; only one day completed due to above events on 9/5  -If trach is placed today, we will plan to resume EPOCH on 9/10.     Esther Lees MD  Hematology/Oncology Fellow, PGY-5  Pager: [phone]  After 5pm and on weekends please page on-call fellow

## 2021-09-09 NOTE — PROGRESS NOTE ADULT - SUBJECTIVE AND OBJECTIVE BOX
INTERVAL HPI/OVERNIGHT EVENTS:    SUBJECTIVE: Patient seen and examined at bedside.       VITAL SIGNS:  ICU Vital Signs Last 24 Hrs  T(C): 37.5 (09 Sep 2021 04:00), Max: 37.5 (09 Sep 2021 04:00)  T(F): 99.5 (09 Sep 2021 04:00), Max: 99.5 (09 Sep 2021 04:00)  HR: 70 (09 Sep 2021 06:00) (49 - 75)  BP: 101/56 (09 Sep 2021 06:00) (88/51 - 130/65)  BP(mean): 66 (09 Sep 2021 06:00) (59 - 92)  ABP: --  ABP(mean): --  RR: 26 (09 Sep 2021 06:00) (21 - 232)  SpO2: 100% (09 Sep 2021 06:00) (92% - 100%)    Mode: AC/ CMV (Assist Control/ Continuous Mandatory Ventilation), RR (machine): 26, TV (machine): 400, FiO2: 60, PEEP: 8, ITime: 0.84, MAP: 18, PIP: 40  Plateau pressure:   P/F ratio:     09-08 @ 07:01  -  09-09 @ 07:00  --------------------------------------------------------  IN: 1485.5 mL / OUT: 2390 mL / NET: -904.5 mL      CAPILLARY BLOOD GLUCOSE      POCT Blood Glucose.: 123 mg/dL (08 Sep 2021 23:33)    ECG:    PHYSICAL EXAM:    General:   HEENT:   Neck:   Respiratory:   Cardiovascular:   Abdomen:   Extremities:  Neurological:    MEDICATIONS:  MEDICATIONS  (STANDING):  albumin human 25% IVPB 100 milliLiter(s) IV Intermittent every 6 hours  allopurinol 100 milliGRAM(s) Oral daily  buMETAnide Infusion 3 mG/Hr (15 mL/Hr) IV Continuous <Continuous>  calcium gluconate IVPB 2 Gram(s) IV Intermittent once  chlorhexidine 0.12% Liquid 15 milliLiter(s) Oral Mucosa every 12 hours  chlorhexidine 4% Liquid 1 Application(s) Topical <User Schedule>  dexMEDEtomidine Infusion 0.5 MICROgram(s)/kG/Hr (7.83 mL/Hr) IV Continuous <Continuous>  dextrose 40% Gel 15 Gram(s) Oral once  dextrose 5%. 1000 milliLiter(s) (100 mL/Hr) IV Continuous <Continuous>  dextrose 5%. 1000 milliLiter(s) (50 mL/Hr) IV Continuous <Continuous>  dextrose 50% Injectable 25 Gram(s) IV Push once  dextrose 50% Injectable 12.5 Gram(s) IV Push once  dextrose 50% Injectable 25 Gram(s) IV Push once  doxorubicin IVPB w/etoposide (eMAR) 18 milliGRAM(s) IV Intermittent every 24 hours  glucagon  Injectable 1 milliGRAM(s) IntraMuscular once  insulin lispro (ADMELOG) corrective regimen sliding scale   SubCutaneous every 6 hours  isoniazid 300 milliGRAM(s) Oral every 24 hours  midodrine 10 milliGRAM(s) Oral every 8 hours  norepinephrine Infusion 0.05 MICROgram(s)/kG/Min (2.93 mL/Hr) IV Continuous <Continuous>  petrolatum Ophthalmic Ointment 1 Application(s) Both EYES two times a day  piperacillin/tazobactam IVPB.. 3.375 Gram(s) IV Intermittent every 8 hours  polyethylene glycol 3350 17 Gram(s) Oral daily  potassium chloride  10 mEq/100 mL IVPB 10 milliEquivalent(s) IV Intermittent every 1 hour  pyridoxine 50 milliGRAM(s) Oral daily  senna Syrup 15 milliLiter(s) Oral at bedtime  sevelamer carbonate Powder 1200 milliGRAM(s) Oral three times a day with meals  sodium bicarbonate 1300 milliGRAM(s) Oral three times a day    MEDICATIONS  (PRN):  bisacodyl Suppository 10 milliGRAM(s) Rectal daily PRN Constipation  hydrocortisone sodium succinate Injectable 100 milliGRAM(s) IV Push once PRN PRN Chemotherapy Reaction  ondansetron Injectable 8 milliGRAM(s) IV Push every 8 hours PRN Nausea      ALLERGIES:  Allergies    penicillins (Rash)    Intolerances        LABS:                        7.9    0.82  )-----------( 44       ( 09 Sep 2021 05:40 )             22.6     09-09    140  |  98  |  81<H>  ----------------------------<  111<H>  3.2<L>   |  13<L>  |  2.34<H>    Ca    6.5<LL>      09 Sep 2021 05:40  Phos  8.7     09-09  Mg     1.60     09-09    TPro  5.6<L>  /  Alb  3.2<L>  /  TBili  4.1<H>  /  DBili  x   /  AST  40<H>  /  ALT  10  /  AlkPhos  165<H>  09-09    PT/INR - ( 08 Sep 2021 06:26 )   PT: 14.0 sec;   INR: 1.24 ratio         PTT - ( 08 Sep 2021 06:26 )  PTT:35.0 sec      RADIOLOGY & ADDITIONAL TESTS: Reviewed.   INTERVAL HPI/OVERNIGHT EVENTS: No acute overnight events.    SUBJECTIVE: Patient seen and examined at bedside. On precedex.      VITAL SIGNS:  ICU Vital Signs Last 24 Hrs  T(C): 37.5 (09 Sep 2021 04:00), Max: 37.5 (09 Sep 2021 04:00)  T(F): 99.5 (09 Sep 2021 04:00), Max: 99.5 (09 Sep 2021 04:00)  HR: 70 (09 Sep 2021 06:00) (49 - 75)  BP: 101/56 (09 Sep 2021 06:00) (88/51 - 130/65)  BP(mean): 66 (09 Sep 2021 06:00) (59 - 92)  ABP: --  ABP(mean): --  RR: 26 (09 Sep 2021 06:00) (21 - 232)  SpO2: 100% (09 Sep 2021 06:00) (92% - 100%)    Mode: AC/ CMV (Assist Control/ Continuous Mandatory Ventilation), RR (machine): 26, TV (machine): 400, FiO2: 60, PEEP: 8, ITime: 0.84, MAP: 18, PIP: 40  Plateau pressure:   P/F ratio:     09-08 @ 07:01  -  09-09 @ 07:00  --------------------------------------------------------  IN: 1485.5 mL / OUT: 2390 mL / NET: -904.5 mL      CAPILLARY BLOOD GLUCOSE      POCT Blood Glucose.: 123 mg/dL (08 Sep 2021 23:33)    PHYSICAL EXAM:    T(C): 36.6 (09-09-21 @ 08:00), Max: 37.5 (09-09-21 @ 04:00)  HR: 68 (09-09-21 @ 15:29) (49 - 76)  BP: 112/64 (09-09-21 @ 14:00) (86/48 - 137/73)  RR: 26 (09-09-21 @ 14:00) (21 - 232)  SpO2: 99% (09-09-21 @ 15:29) (92% - 100%)  GENERAL: Intubated, on precedex  HEAD:  Atraumatic, Normocephalic  EYES: EOMI, PERRLA, conjunctiva and sclera clear  NECK: Supple, No JVD  CHEST/LUNG: Clear to auscultation bilaterally; No wheeze  HEART: Regular rate and rhythm; No murmurs, rubs, or gallops  ABDOMEN: Soft, Nondistended; Bowel sounds present  EXTREMITIES:  2+ Peripheral Pulses, No clubbing, cyanosis, or edema  NEUROLOGY: non-focal  SKIN: No rashes or lesions    MEDICATIONS:  MEDICATIONS  (STANDING):  albumin human 25% IVPB 100 milliLiter(s) IV Intermittent every 6 hours  allopurinol 100 milliGRAM(s) Oral daily  buMETAnide Infusion 3 mG/Hr (15 mL/Hr) IV Continuous <Continuous>  calcium gluconate IVPB 2 Gram(s) IV Intermittent once  chlorhexidine 0.12% Liquid 15 milliLiter(s) Oral Mucosa every 12 hours  chlorhexidine 4% Liquid 1 Application(s) Topical <User Schedule>  dexMEDEtomidine Infusion 0.5 MICROgram(s)/kG/Hr (7.83 mL/Hr) IV Continuous <Continuous>  dextrose 40% Gel 15 Gram(s) Oral once  dextrose 5%. 1000 milliLiter(s) (100 mL/Hr) IV Continuous <Continuous>  dextrose 5%. 1000 milliLiter(s) (50 mL/Hr) IV Continuous <Continuous>  dextrose 50% Injectable 25 Gram(s) IV Push once  dextrose 50% Injectable 12.5 Gram(s) IV Push once  dextrose 50% Injectable 25 Gram(s) IV Push once  doxorubicin IVPB w/etoposide (eMAR) 18 milliGRAM(s) IV Intermittent every 24 hours  glucagon  Injectable 1 milliGRAM(s) IntraMuscular once  insulin lispro (ADMELOG) corrective regimen sliding scale   SubCutaneous every 6 hours  isoniazid 300 milliGRAM(s) Oral every 24 hours  midodrine 10 milliGRAM(s) Oral every 8 hours  norepinephrine Infusion 0.05 MICROgram(s)/kG/Min (2.93 mL/Hr) IV Continuous <Continuous>  petrolatum Ophthalmic Ointment 1 Application(s) Both EYES two times a day  piperacillin/tazobactam IVPB.. 3.375 Gram(s) IV Intermittent every 8 hours  polyethylene glycol 3350 17 Gram(s) Oral daily  potassium chloride  10 mEq/100 mL IVPB 10 milliEquivalent(s) IV Intermittent every 1 hour  pyridoxine 50 milliGRAM(s) Oral daily  senna Syrup 15 milliLiter(s) Oral at bedtime  sevelamer carbonate Powder 1200 milliGRAM(s) Oral three times a day with meals  sodium bicarbonate 1300 milliGRAM(s) Oral three times a day    MEDICATIONS  (PRN):  bisacodyl Suppository 10 milliGRAM(s) Rectal daily PRN Constipation  hydrocortisone sodium succinate Injectable 100 milliGRAM(s) IV Push once PRN PRN Chemotherapy Reaction  ondansetron Injectable 8 milliGRAM(s) IV Push every 8 hours PRN Nausea      ALLERGIES:  Allergies    penicillins (Rash)    Intolerances        LABS:                        7.9    0.82  )-----------( 44       ( 09 Sep 2021 05:40 )             22.6     09-09    140  |  98  |  81<H>  ----------------------------<  111<H>  3.2<L>   |  13<L>  |  2.34<H>    Ca    6.5<LL>      09 Sep 2021 05:40  Phos  8.7     09-09  Mg     1.60     09-09    TPro  5.6<L>  /  Alb  3.2<L>  /  TBili  4.1<H>  /  DBili  x   /  AST  40<H>  /  ALT  10  /  AlkPhos  165<H>  09-09    PT/INR - ( 08 Sep 2021 06:26 )   PT: 14.0 sec;   INR: 1.24 ratio         PTT - ( 08 Sep 2021 06:26 )  PTT:35.0 sec      RADIOLOGY & ADDITIONAL TESTS: Reviewed.

## 2021-09-09 NOTE — PROGRESS NOTE ADULT - PROBLEM SELECTOR PLAN 3
patient with hypocalcemia down to iCa: 0.78. Agree with IV calcium today. Consider starting Calcium carbonate 2tabs po bid via NGT.

## 2021-09-09 NOTE — PROGRESS NOTE ADULT - SUBJECTIVE AND OBJECTIVE BOX
[facility] DIVISION OF KIDNEY DISEASES AND HYPERTENSION -- FOLLOW UP NOTE  --------------------------------------------------------------------------------  If any questions, please feel free to contact me  [facility] pager: [phone], [facility]: [phone]  Dusty Fountain M.D.  Nephrology Fellow    (After 5 pm or on weekends please page the on-call fellow)  --------------------------------------------------------------------------------    HPI:  66F with HTN, HLD, recently discovered ovarian mass suspicious for malignancy (7/2021), L hydroureteronephrosis s/p renal stent (7/15/21), and recent hospitalization ([facility] 7/26-8/4) for PURVI requiring urgent HD, ascites s/p therapeutic paracentesis (7/27/21), and pleural effusion s/p R thoracentesis (8/2/21) admitted for acute hypercarbic respiratory failure due to pleural effusions most likely caused by ovarian malignancy complicated with volume overload with ascites. Now found to have new onset PURVI. Baseline Cr 0.7-1.1mg/dl. Had a recent PURVI episode which resolved- started to trended up again on 8/31. Peaked Cr at 2.44 (9/8/21)    Patient seen and examined at bedside, remains intubated. Cr today 09/09 at 2.34mg/dl , Nonoliguric UOP~ 2.2L. Vitals/labs/imaging reviewed     PAST HISTORY  --------------------------------------------------------------------------------  No significant changes to PMH, PSH, FHx, SHx, unless otherwise noted    ALLERGIES & MEDICATIONS  --------------------------------------------------------------------------------  Allergies    penicillins (Rash)    Intolerances      Standing Inpatient Medications  albumin human 25% IVPB 100 milliLiter(s) IV Intermittent every 6 hours  allopurinol 100 milliGRAM(s) Oral daily  buMETAnide Infusion 3 mG/Hr IV Continuous <Continuous>  calcium gluconate IVPB 2 Gram(s) IV Intermittent once  chlorhexidine 0.12% Liquid 15 milliLiter(s) Oral Mucosa every 12 hours  chlorhexidine 4% Liquid 1 Application(s) Topical <User Schedule>  dexMEDEtomidine Infusion 0.5 MICROgram(s)/kG/Hr IV Continuous <Continuous>  dextrose 40% Gel 15 Gram(s) Oral once  dextrose 5%. 1000 milliLiter(s) IV Continuous <Continuous>  dextrose 5%. 1000 milliLiter(s) IV Continuous <Continuous>  dextrose 50% Injectable 25 Gram(s) IV Push once  dextrose 50% Injectable 12.5 Gram(s) IV Push once  dextrose 50% Injectable 25 Gram(s) IV Push once  doxorubicin IVPB w/etoposide (eMAR) 18 milliGRAM(s) IV Intermittent every 24 hours  glucagon  Injectable 1 milliGRAM(s) IntraMuscular once  insulin lispro (ADMELOG) corrective regimen sliding scale   SubCutaneous every 6 hours  isoniazid 300 milliGRAM(s) Oral every 24 hours  midodrine 10 milliGRAM(s) Oral every 8 hours  norepinephrine Infusion 0.05 MICROgram(s)/kG/Min IV Continuous <Continuous>  petrolatum Ophthalmic Ointment 1 Application(s) Both EYES two times a day  piperacillin/tazobactam IVPB.. 3.375 Gram(s) IV Intermittent every 8 hours  polyethylene glycol 3350 17 Gram(s) Oral daily  potassium chloride  10 mEq/100 mL IVPB 10 milliEquivalent(s) IV Intermittent every 1 hour  pyridoxine 50 milliGRAM(s) Oral daily  senna Syrup 15 milliLiter(s) Oral at bedtime  sevelamer carbonate Powder 1200 milliGRAM(s) Oral three times a day with meals  sodium bicarbonate 1300 milliGRAM(s) Oral three times a day    PRN Inpatient Medications  bisacodyl Suppository 10 milliGRAM(s) Rectal daily PRN  hydrocortisone sodium succinate Injectable 100 milliGRAM(s) IV Push once PRN  ondansetron Injectable 8 milliGRAM(s) IV Push every 8 hours PRN      REVIEW OF SYSTEMS  --------------------------------------------------------------------------------  unable to obtain due to current medical conditions    VITALS/PHYSICAL EXAM  --------------------------------------------------------------------------------  T(C): 37.5 (09-09-21 @ 04:00), Max: 37.5 (09-09-21 @ 04:00)  HR: 71 (09-09-21 @ 07:11) (49 - 75)  BP: 101/56 (09-09-21 @ 06:00) (88/51 - 130/65)  RR: 26 (09-09-21 @ 06:00) (21 - 232)  SpO2: 100% (09-09-21 @ 07:11) (92% - 100%)  Wt(kg): --    Weight (kg): 62.6 (09-08-21 @ 15:00)      09-08-21 @ 07:01  -  09-09-21 @ 07:00  --------------------------------------------------------  IN: 1485.5 mL / OUT: 2390 mL / NET: -904.5 mL        Physical Exam:  	Gen: awake  	HEENT: ET tube +  	Pulm: Mechanically ventilated via ETT  	CV: S1S2  	Abd: Soft, +BS  	Ext: 2+ Pitting LE edema B/L  	Neuro: awake              : Charles+ clear urine  	Skin: Warm and dry    LABS/STUDIES  --------------------------------------------------------------------------------              7.9    0.82  >-----------<  44       [09-09-21 @ 05:40]              22.6     140  |  98  |  81  ----------------------------<  111      [09-09-21 @ 05:40]  3.2   |  13  |  2.34        Ca     6.5     [09-09-21 @ 05:40]      iCa    0.78     [09-09 @ 05:40]      Mg     1.60     [09-09-21 @ 05:40]      Phos  8.7     [09-09-21 @ 05:40]    TPro  5.6  /  Alb  3.2  /  TBili  4.1  /  DBili  x   /  AST  40  /  ALT  10  /  AlkPhos  165  [09-09-21 @ 05:40]    PT/INR: PT 14.0 , INR 1.24       [09-08-21 @ 06:26]  PTT: 35.0       [09-08-21 @ 06:26]    Uric acid 6.7      [09-09-21 @ 05:40]        [09-09-21 @ 05:40]    Creatinine Trend:  SCr 2.34 [09-09 @ 05:40]  SCr 2.44 [09-08 @ 18:33]  SCr 2.35 [09-08 @ 06:26]  SCr 2.45 [09-07 @ 21:47]  SCr 2.37 [09-07 @ 10:31]    Urinalysis - [08-28-21 @ 17:00]      Color Yellow / Appearance Turbid / SG 1.034 / pH 6.5      Gluc Trace / Ketone Negative  / Bili Negative / Urobili <2 mg/dL       Blood Small / Protein 100 mg/dL / Leuk Est Large / Nitrite Negative      RBC 6-10 / WBC 15-20 / Hyaline 3 / Gran 20 / Sq Epi  / Non Sq Epi Occasional / Bacteria Moderate      TSH 1.85      [07-29-21 @ 11:12]    HBsAg Nonreact      [08-26-21 @ 10:02]  HCV 0.20, Nonreact      [08-26-21 @ 10:02]  HIV Nonreact      [08-25-21 @ 12:13]

## 2021-09-09 NOTE — PROGRESS NOTE ADULT - ATTENDING COMMENTS
PURVI  Metabolic Acidosis    Cont to monitor urine output while on bumex. Continue bicarb tabs. No current signs of TLS.    Will monitor for RRT needs. PURVI  Metabolic Acidosis  Hypocalcemia    Replete calcium po and IV.  Cont to monitor urine output while on bumex. Continue bicarb tabs. No current signs of TLS.    Will monitor for RRT needs.

## 2021-09-09 NOTE — PROGRESS NOTE ADULT - ATTENDING COMMENTS
Patient examined and care reviewed in detail on bedside rounds  Critically ill on vent/pressors Fails SBT today  Frequent bedside visits with therapy change today.   I have personally provided 35+ minutes of critical care time concurrently with the resident/fellow; this excludes time spent on separate procedures.

## 2021-09-09 NOTE — PROGRESS NOTE ADULT - PROBLEM SELECTOR PLAN 1
Pt with PURVI in the setting of PURVI 2/2 IV contrast and obstructive uropathy. Also with concerns of TLS initially. SCr was at 0.99 on 08/19 - had an PURVI episode which improved, but now with new onset PURVI started to trended up again on 8/31. peaked yesterday to 9/8/21 to 2.44mg/dl. PURVI likely ATN. CT scan stable b/L hydro. Started on Bumex gtt.     SCr today elevated/stable at 2.34, nonoliguric UOP~2.2L. Labs reviewed. c/w bumex gtt at 3mg/dl. Closely monitor and urine output. Avoid NSAIDs, ACEI/ARBS, RCA and nephrotoxins

## 2021-09-09 NOTE — CHART NOTE - NSCHARTNOTEFT_GEN_A_CORE
: Dr. Tan Cardenas and   Tamica Ontiveros, PGY1, Medicine, Pager [phone] (NS) [phone] ([facility])    INDICATION: pending tracheostomy    PROCEDURE:  [X] LIMITED NECK    FINDINGS:  Isthmus visualized. High-riding artery in neck below isthmus.     INTERPRETATION:  Appropriate window for tracheostomy superior to likely innominate artery.    Images uploaded on [employer].    Tamica Ontiveros MD  PGY1, Medicine, [facility]  Pager [phone] ([facility]) [phone] ([facility])

## 2021-09-10 NOTE — PROGRESS NOTE ADULT - SUBJECTIVE AND OBJECTIVE BOX
[facility] DIVISION OF KIDNEY DISEASES AND HYPERTENSION -- FOLLOW UP NOTE  --------------------------------------------------------------------------------  If any questions, please feel free to contact me  [facility] pager: [phone], [facility]: [id number]  Dusty Fountain M.D.  Nephrology Fellow    (After 5 pm or on weekends please page the on-call fellow)  --------------------------------------------------------------------------------    HPI:  66F with HTN, HLD, recently discovered ovarian mass suspicious for malignancy (7/2021), L hydroureteronephrosis s/p renal stent (7/15/21), and recent hospitalization ([facility] 7/26-8/4) for PURVI requiring urgent HD, ascites s/p therapeutic paracentesis (7/27/21), and pleural effusion s/p R thoracentesis (8/2/21) admitted for acute hypercarbic respiratory failure due to pleural effusions most likely caused by ovarian malignancy complicated with volume overload with ascites. Now found to have new onset PURVI. Baseline Cr 0.7-1.1mg/dl. Had a recent PURVI episode which resolved- started to trended up again on 8/31. Peaked Cr at 2.44 (9/8/21)    Patient seen and examined at bedside, remains intubated. Cr today 09/10 at 2.3mg/dl, Nonoliguric UOP~ 1.6L. Vitals/labs/imaging reviewed     PAST HISTORY  --------------------------------------------------------------------------------  No significant changes to PMH, PSH, FHx, SHx, unless otherwise noted    ALLERGIES & MEDICATIONS  --------------------------------------------------------------------------------  Allergies    penicillins (Rash)    Intolerances      Standing Inpatient Medications  allopurinol 100 milliGRAM(s) Oral daily  buMETAnide Infusion 3 mG/Hr IV Continuous <Continuous>  chlorhexidine 0.12% Liquid 15 milliLiter(s) Oral Mucosa every 12 hours  chlorhexidine 4% Liquid 1 Application(s) Topical <User Schedule>  dexMEDEtomidine Infusion 0.5 MICROgram(s)/kG/Hr IV Continuous <Continuous>  dextrose 40% Gel 15 Gram(s) Oral once  dextrose 5%. 1000 milliLiter(s) IV Continuous <Continuous>  dextrose 5%. 1000 milliLiter(s) IV Continuous <Continuous>  dextrose 50% Injectable 25 Gram(s) IV Push once  dextrose 50% Injectable 12.5 Gram(s) IV Push once  dextrose 50% Injectable 25 Gram(s) IV Push once  doxorubicin IVPB w/etoposide (eMAR) 18 milliGRAM(s) IV Intermittent every 24 hours  filgrastim-sndz (ZARXIO) Injectable 300 MICROGram(s) SubCutaneous daily  glucagon  Injectable 1 milliGRAM(s) IntraMuscular once  insulin lispro (ADMELOG) corrective regimen sliding scale   SubCutaneous every 6 hours  isoniazid 300 milliGRAM(s) Oral every 24 hours  midodrine 10 milliGRAM(s) Oral every 8 hours  norepinephrine Infusion 0.05 MICROgram(s)/kG/Min IV Continuous <Continuous>  petrolatum Ophthalmic Ointment 1 Application(s) Both EYES two times a day  piperacillin/tazobactam IVPB.. 3.375 Gram(s) IV Intermittent every 8 hours  polyethylene glycol 3350 17 Gram(s) Oral daily  pyridoxine 50 milliGRAM(s) Oral daily  senna Syrup 15 milliLiter(s) Oral at bedtime  sevelamer carbonate Powder 1200 milliGRAM(s) Oral three times a day with meals  sodium bicarbonate 1300 milliGRAM(s) Oral three times a day    PRN Inpatient Medications  bisacodyl Suppository 10 milliGRAM(s) Rectal daily PRN  hydrocortisone sodium succinate Injectable 100 milliGRAM(s) IV Push once PRN  ondansetron Injectable 8 milliGRAM(s) IV Push every 8 hours PRN      REVIEW OF SYSTEMS  --------------------------------------------------------------------------------  unable to obtain due to current medical conditions     VITALS/PHYSICAL EXAM  --------------------------------------------------------------------------------  T(C): 36.9 (09-10-21 @ 04:00), Max: 37 (09-10-21 @ 00:00)  HR: 59 (09-10-21 @ 10:00) (55 - 74)  BP: 103/57 (09-10-21 @ 09:00) (92/55 - 137/73)  RR: 26 (09-10-21 @ 10:00) (26 - 27)  SpO2: 100% (09-10-21 @ 10:00) (96% - 100%)  Wt(kg): --    Weight (kg): 62.6 (09-08-21 @ 15:00)      09-09-21 @ 07:01  -  09-10-21 @ 07:00  --------------------------------------------------------  IN: 1464.6 mL / OUT: 1915 mL / NET: -450.4 mL    09-10-21 @ 07:01  -  09-10-21 @ 10:22  --------------------------------------------------------  IN: 38.5 mL / OUT: 165 mL / NET: -126.5 mL        Physical Exam:  	Gen: awake  	HEENT: ET tube +  	Pulm: Mechanically ventilated via ETT  	CV: S1S2  	Abd: Soft, +BS  	Ext: 2+ Pitting LE edema B/L  	Neuro: awake              : Charles+ clear urine  	Skin: Warm and dry      LABS/STUDIES  --------------------------------------------------------------------------------              7.5    0.20  >-----------<  29       [09-10-21 @ 05:32]              21.4     140  |  101  |  84  ----------------------------<  112      [09-10-21 @ 05:32]  3.6   |  11  |  2.35        Ca     7.0     [09-10-21 @ 05:32]      iCa    0.86     [09-10 @ 05:32]      Mg     1.60     [09-10-21 @ 05:32]      Phos  8.5     [09-10-21 @ 05:32]    TPro  5.6  /  Alb  3.2  /  TBili  4.1  /  DBili  x   /  AST  40  /  ALT  10  /  AlkPhos  213  [09-10-21 @ 05:32]    PT/INR: PT 13.2 , INR 1.17       [09-10-21 @ 05:32]  PTT: 30.4       [09-10-21 @ 05:32]    Uric acid 7.1      [09-10-21 @ 05:32]        [09-10-21 @ 05:32]    Creatinine Trend:  SCr 2.35 [09-10 @ 05:32]  SCr 2.46 [09-09 @ 21:02]  SCr 2.34 [09-09 @ 05:40]  SCr 2.44 [09-08 @ 18:33]  SCr 2.35 [09-08 @ 06:26]    Urinalysis - [08-28-21 @ 17:00]      Color Yellow / Appearance Turbid / SG 1.034 / pH 6.5      Gluc Trace / Ketone Negative  / Bili Negative / Urobili <2 mg/dL       Blood Small / Protein 100 mg/dL / Leuk Est Large / Nitrite Negative      RBC 6-10 / WBC 15-20 / Hyaline 3 / Gran 20 / Sq Epi  / Non Sq Epi Occasional / Bacteria Moderate      TSH 1.85      [07-29-21 @ 11:12]    HBsAg Nonreact      [08-26-21 @ 10:02]  HCV 0.20, Nonreact      [08-26-21 @ 10:02]  HIV Nonreact      [08-25-21 @ 12:13]

## 2021-09-10 NOTE — PROGRESS NOTE ADULT - ASSESSMENT
66-year-old woman with PMH significant for HTN, HLD, L hydroureteronephrosis s/p renal stent on 7/15, who presents with volume overload 2/2 high grade B-cell lymphoma. S/p thoracentesis 8/13, paracentesis and excisional biopsy of left inguinal lymph node on 8/20. Accepted to MICU for acute hypoxic/hypercapneic respiratory failure now s/p intubation and L pleurex catheter placement, now undergoing chemotherapy.    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  - CT head with no intracranial pathology  - S/p LP with flow cytometry and cytopathology to evaluate possible lymphomatous meningitis, negative thus far   - Currently sedated with precedex but following commands - will wean as tolerated    #Cardiovascular  vasoplegic shock 2/2 sedatives   - Echo 8/3 showing concentric left ventricular remodeling, hyperdynamic left ventricle, calcified trileaflet aortic valve with normal opening, EF 56%  - POCUS showing adequate cardiac output  - On pressor support with levo, wean as tolerated    #Respiratory  - Hypoxic/hypercapneic respiratory failure likely secondary to malignant pleural effusions s/p thoracentesis on 8/13 with re-accumulation of pleural effusions  - S/p second thoracentesis 8/25, s/p L pleurex 8/27  - On pressure support, however remains tachypneic with low TVs, will repeat thoracentesis on R side before attempting extubation   - unsuccessful breathing trials, will require trach    #GI/Nutrition  #malignant ascites   - S/p paracentesis 8/20, still remains distended with ascites     #?ileus   - hold tube feeds as pt with residuals and persistent nausea 2/2 chemotherapy   - abd x-ray ordered  - zofran prn  - given reglan 5 today     #diarrhea   - previously constipated s/p golytely + fecal disempaction    #/Renal  - PURVI with worsening CR 2/2 ATN in the setting of supratherapeutic vancomycin level + tumor lysis   - B/L hydronephrosis stable on CT a/p 2/2 malignant LAD  - Nephrology on board, recommend holding LASIX & other nephrotoxic medications.   - Nephrostomy tubes considered however per IR recommendations not appropriate at this time as patient's Cr previously downtrending, may consider re-consulting if patient's renal function continues to worsen   - monitor TLS labs q8h, now with uptrending LDH and hyperphosphatemia, although uric acid remains low   - c/w phosphate binder  - s/p bicarb drip, transitioned to PO bicarb   - c/w albumin with goal > 3.5 to assist with diuresis, dosed for IV lasix 40 9/4   - carnes placed for accurate I+Os    #Skin  - No sacral decubiti    #ID  - s/p Vanco and Zosyn (ended 8/31)   - U/A grossly positive, urine cx NGTD  - blood cx 8/28/21 NGTD  - strongyloides positive - s/p ivermectin (9/1 - 9/2) given IC state   - c/w INH + pyridoxine (given indeterminate quant gold)    #Endocrine  - SSI   - will readjust as necessary     #Hematologic/DVT ppx  - newly diagnosed diffuse large B-cell lymphoma  - TLS labs q8  - Allopurinol for TLS prophylaxis  - Heme/Onc recommending starting steroids with dexamethasone, s/p 40 mg dose (ended 8/29, 8/31)   - s/p Rituxan 8/28/21  - s/p cyclophosphamide (1st cycle 9/1, 2nd cycle 9/2, 3rd cycle 9/3)  - s/p doxorubicin 9/4  - will receive filgastrim 300 mg IV daily for neutropenia  - as per oncology, will resume chemotherapy once trach is done  - DVT prophylaxis with heparin subQ    #Ethics  - Patient is FULL CODE per palliative care discussion with patient and her sons (Yuan and Jose)  - Son (Yuan) and father still deciding on whether they would want a trach, understanding that this would be the pt's only option     Fill-in proxy is Jose Tellezon: [phone]   66-year-old woman with PMH significant for HTN, HLD, L hydroureteronephrosis s/p renal stent on 7/15, who presents with volume overload 2/2 high grade B-cell lymphoma. S/p thoracentesis 8/13, paracentesis and excisional biopsy of left inguinal lymph node on 8/20. Accepted to MICU for acute hypoxic/hypercapneic respiratory failure now s/p intubation and L pleurex catheter placement, now undergoing chemotherapy.    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  - CT head with no intracranial pathology  - S/p LP with flow cytometry and cytopathology to evaluate possible lymphomatous meningitis, negative thus far   - Currently sedated with precedex but following commands - will wean as tolerated    #Cardiovascular  vasoplegic shock 2/2 sedatives   - Echo 8/3 showing concentric left ventricular remodeling, hyperdynamic left ventricle, calcified trileaflet aortic valve with normal opening, EF 56%  - POCUS showing adequate cardiac output  - On pressor support with levo, wean as tolerated    #Respiratory  - Hypoxic/hypercapneic respiratory failure likely secondary to malignant pleural effusions s/p thoracentesis on 8/13 with re-accumulation of pleural effusions  - S/p second thoracentesis 8/25, s/p L pleurex 8/27  - On pressure support, however remains tachypneic with low TVs, will repeat thoracentesis on R side before attempting extubation   - unsuccessful breathing trials, will require future trach once platelets stabilize    #GI/Nutrition  #malignant ascites   - S/p paracentesis 8/20, still remains distended with ascites     #?ileus   - hold tube feeds as pt with residuals and persistent nausea 2/2 chemotherapy   - abd x-ray ordered  - zofran prn  - given reglan 5 today     #diarrhea   - previously constipated s/p golytely + fecal disempaction    #/Renal  - PURVI with worsening CR 2/2 ATN in the setting of supratherapeutic vancomycin level + tumor lysis   - B/L hydronephrosis stable on CT a/p 2/2 malignant LAD  - Nephrology on board, recommend holding LASIX & other nephrotoxic medications.   - Nephrostomy tubes considered however per IR recommendations not appropriate at this time as patient's Cr previously downtrending, may consider re-consulting if patient's renal function continues to worsen   - monitor TLS labs q8h, now with uptrending LDH and hyperphosphatemia, although uric acid remains low   - c/w phosphate binder  - s/p bicarb drip, transitioned to PO bicarb   - c/w albumin with goal > 3.5 to assist with diuresis, dosed for IV lasix 40 9/4   - carnes placed for accurate I+Os    #Skin  - No sacral decubiti    #ID  - s/p Vanco and Zosyn (ended 8/31)   - U/A grossly positive, urine cx NGTD  - blood cx 8/28/21 NGTD  - strongyloides positive - s/p ivermectin (9/1 - 9/2) given IC state   - c/w INH + pyridoxine (given indeterminate quant gold)    #Endocrine  - SSI   - will readjust as necessary     #Hematologic/DVT ppx  - newly diagnosed diffuse large B-cell lymphoma  - TLS labs q8  - Allopurinol for TLS prophylaxis  - Heme/Onc recommending starting steroids with dexamethasone, s/p 40 mg dose (ended 8/29, 8/31)   - s/p Rituxan 8/28/21  - s/p cyclophosphamide (1st cycle 9/1, 2nd cycle 9/2, 3rd cycle 9/3)  - s/p doxorubicin 9/4  - restarting doxorubicin/etoposide on 9/10   - will hold filgastrim 300 mg IV for chemotherapy  - as per oncology, will resume chemotherapy once trach is done  - DVT prophylaxis with heparin subQ    #Ethics  - Patient is FULL CODE per palliative care discussion with patient and her sons (Yuan and Jose)  - Son (Yuan) and father still deciding on whether they would want a trach, understanding that this would be the pt's only option     Fill-in proxy is Jose Tellezon: [phone]

## 2021-09-10 NOTE — PROGRESS NOTE ADULT - SUBJECTIVE AND OBJECTIVE BOX
INTERVAL HPI/OVERNIGHT EVENTS:  Patient S&E at bedside. No o/n events  -Per discussion with MICU, no plans for trach today  -Will plan to start chemotherapy today    VITAL SIGNS:  T(F): 98.5 (09-10-21 @ 04:00)  HR: 59 (09-10-21 @ 10:00)  BP: 103/57 (09-10-21 @ 09:00)  RR: 26 (09-10-21 @ 10:00)  SpO2: 100% (09-10-21 @ 10:00)  Wt(kg): --    PHYSICAL EXAM:    Constitutional: NAD  Eyes: EOMI, sclera non-icteric  Neck: supple  Respiratory: CTAB, no wheezes or crackles   Cardiovascular: RRR  Gastrointestinal: soft, NTND, + BS  Extremities: no cyanosis, clubbing or edema   Neurological: awake and alert      MEDICATIONS  (STANDING):  allopurinol 100 milliGRAM(s) Oral daily  buMETAnide Infusion 3 mG/Hr (15 mL/Hr) IV Continuous <Continuous>  chlorhexidine 0.12% Liquid 15 milliLiter(s) Oral Mucosa every 12 hours  chlorhexidine 4% Liquid 1 Application(s) Topical <User Schedule>  dexMEDEtomidine Infusion 0.5 MICROgram(s)/kG/Hr (7.83 mL/Hr) IV Continuous <Continuous>  dextrose 40% Gel 15 Gram(s) Oral once  dextrose 5%. 1000 milliLiter(s) (50 mL/Hr) IV Continuous <Continuous>  dextrose 5%. 1000 milliLiter(s) (100 mL/Hr) IV Continuous <Continuous>  dextrose 50% Injectable 25 Gram(s) IV Push once  dextrose 50% Injectable 12.5 Gram(s) IV Push once  dextrose 50% Injectable 25 Gram(s) IV Push once  doxorubicin IVPB w/etoposide (eMAR) 18 milliGRAM(s) IV Intermittent every 24 hours  filgrastim-sndz (ZARXIO) Injectable 300 MICROGram(s) SubCutaneous daily  glucagon  Injectable 1 milliGRAM(s) IntraMuscular once  insulin lispro (ADMELOG) corrective regimen sliding scale   SubCutaneous every 6 hours  isoniazid 300 milliGRAM(s) Oral every 24 hours  midodrine 10 milliGRAM(s) Oral every 8 hours  norepinephrine Infusion 0.05 MICROgram(s)/kG/Min (2.93 mL/Hr) IV Continuous <Continuous>  petrolatum Ophthalmic Ointment 1 Application(s) Both EYES two times a day  piperacillin/tazobactam IVPB.. 3.375 Gram(s) IV Intermittent every 8 hours  polyethylene glycol 3350 17 Gram(s) Oral daily  pyridoxine 50 milliGRAM(s) Oral daily  senna Syrup 15 milliLiter(s) Oral at bedtime  sevelamer carbonate Powder 1200 milliGRAM(s) Oral three times a day with meals  sodium bicarbonate 1300 milliGRAM(s) Oral three times a day    MEDICATIONS  (PRN):  bisacodyl Suppository 10 milliGRAM(s) Rectal daily PRN Constipation  hydrocortisone sodium succinate Injectable 100 milliGRAM(s) IV Push once PRN PRN Chemotherapy Reaction  ondansetron Injectable 8 milliGRAM(s) IV Push every 8 hours PRN Nausea      Allergies    penicillins (Rash)    Intolerances        LABS:                        7.5    0.20  )-----------( 29       ( 10 Sep 2021 05:32 )             21.4     09-10    140  |  101  |  84<H>  ----------------------------<  112<H>  3.6   |  11<L>  |  2.35<H>    Ca    7.0<L>      10 Sep 2021 05:32  Phos  8.5     09-10  Mg     1.60     09-10    TPro  5.6<L>  /  Alb  3.2<L>  /  TBili  4.1<H>  /  DBili  x   /  AST  40<H>  /  ALT  10  /  AlkPhos  213<H>  09-10    PT/INR - ( 10 Sep 2021 05:32 )   PT: 13.2 sec;   INR: 1.17 ratio         PTT - ( 10 Sep 2021 05:32 )  PTT:30.4 sec      RADIOLOGY & ADDITIONAL TESTS:  Studies reviewed.   INTERVAL HPI/OVERNIGHT EVENTS:  Patient S&E at bedside. No o/n events  -Per discussion with MICU, no plans for trach today  -Will plan to start chemotherapy today   -Patient responsive to verbal stimuli this morning    VITAL SIGNS:  T(F): 98.5 (09-10-21 @ 04:00)  HR: 59 (09-10-21 @ 10:00)  BP: 103/57 (09-10-21 @ 09:00)  RR: 26 (09-10-21 @ 10:00)  SpO2: 100% (09-10-21 @ 10:00)  Wt(kg): --    PHYSICAL EXAM:    Constitutional: intubated, mildly sedated   Eyes: PERRL, sclera non-icteric  Gastrointestinal: soft, distended, no masses palpable  Extremities: no edema  Neurological: mildly sedated, response to verbal stimuli, follows simple commands and attempts to interact      MEDICATIONS  (STANDING):  allopurinol 100 milliGRAM(s) Oral daily  buMETAnide Infusion 3 mG/Hr (15 mL/Hr) IV Continuous <Continuous>  chlorhexidine 0.12% Liquid 15 milliLiter(s) Oral Mucosa every 12 hours  chlorhexidine 4% Liquid 1 Application(s) Topical <User Schedule>  dexMEDEtomidine Infusion 0.5 MICROgram(s)/kG/Hr (7.83 mL/Hr) IV Continuous <Continuous>  dextrose 40% Gel 15 Gram(s) Oral once  dextrose 5%. 1000 milliLiter(s) (50 mL/Hr) IV Continuous <Continuous>  dextrose 5%. 1000 milliLiter(s) (100 mL/Hr) IV Continuous <Continuous>  dextrose 50% Injectable 25 Gram(s) IV Push once  dextrose 50% Injectable 12.5 Gram(s) IV Push once  dextrose 50% Injectable 25 Gram(s) IV Push once  doxorubicin IVPB w/etoposide (eMAR) 18 milliGRAM(s) IV Intermittent every 24 hours  filgrastim-sndz (ZARXIO) Injectable 300 MICROGram(s) SubCutaneous daily  glucagon  Injectable 1 milliGRAM(s) IntraMuscular once  insulin lispro (ADMELOG) corrective regimen sliding scale   SubCutaneous every 6 hours  isoniazid 300 milliGRAM(s) Oral every 24 hours  midodrine 10 milliGRAM(s) Oral every 8 hours  norepinephrine Infusion 0.05 MICROgram(s)/kG/Min (2.93 mL/Hr) IV Continuous <Continuous>  petrolatum Ophthalmic Ointment 1 Application(s) Both EYES two times a day  piperacillin/tazobactam IVPB.. 3.375 Gram(s) IV Intermittent every 8 hours  polyethylene glycol 3350 17 Gram(s) Oral daily  pyridoxine 50 milliGRAM(s) Oral daily  senna Syrup 15 milliLiter(s) Oral at bedtime  sevelamer carbonate Powder 1200 milliGRAM(s) Oral three times a day with meals  sodium bicarbonate 1300 milliGRAM(s) Oral three times a day    MEDICATIONS  (PRN):  bisacodyl Suppository 10 milliGRAM(s) Rectal daily PRN Constipation  hydrocortisone sodium succinate Injectable 100 milliGRAM(s) IV Push once PRN PRN Chemotherapy Reaction  ondansetron Injectable 8 milliGRAM(s) IV Push every 8 hours PRN Nausea      Allergies    penicillins (Rash)    Intolerances        LABS:                        7.5    0.20  )-----------( 29       ( 10 Sep 2021 05:32 )             21.4     09-10    140  |  101  |  84<H>  ----------------------------<  112<H>  3.6   |  11<L>  |  2.35<H>    Ca    7.0<L>      10 Sep 2021 05:32  Phos  8.5     09-10  Mg     1.60     09-10    TPro  5.6<L>  /  Alb  3.2<L>  /  TBili  4.1<H>  /  DBili  x   /  AST  40<H>  /  ALT  10  /  AlkPhos  213<H>  09-10    PT/INR - ( 10 Sep 2021 05:32 )   PT: 13.2 sec;   INR: 1.17 ratio         PTT - ( 10 Sep 2021 05:32 )  PTT:30.4 sec      RADIOLOGY & ADDITIONAL TESTS:  Studies reviewed.

## 2021-09-10 NOTE — PROGRESS NOTE ADULT - PROBLEM SELECTOR PLAN 1
Pt with PURVI in the setting of PURVI 2/2 IV contrast and obstructive uropathy. Also with concerns of TLS initially. SCr was at 0.99 on 08/19 - had an PURVI episode which improved, but now with new onset PURVI started to trended up again on 8/31. peaked yesterday to 9/8/21 to 2.44mg/dl. PURVI likely ATN. CT scan stable b/L hydro. Started on Bumex gtt.     SCr today elevated/stable at 2.3mg/dl, nonoliguric UOP~1.6L. Labs reviewed. c/w bumex gtt at 3mg/dl. Closely monitor and urine output. Avoid NSAIDs, ACEI/ARBS, RCA and nephrotoxins. No indication for RRT at this time.

## 2021-09-10 NOTE — PHARMACOTHERAPY INTERVENTION NOTE - COMMENTS
Recommended dose reductions on chemotherapy medications due to renal/hepatic impairment. Details about specific reductions to come if chemotherapy is definitely going to be resumed.
Recommended reducing dose of doxorubicin to 25% of usual dose due to elevated bilirubin.
Recommended giving 75% of etoposide dose due to CrCl being between 15-50 mL/min.
Recommended holding off on chemotherapy due to low platelets and ANC, but going ahead with it as these low numbers are likely due to the chemotherapy that was given to the patient the other day.
Recommended considering holding etoposide due to elevated bilirubin, but going ahead with it as if the cancer is causing the liver insufficiency, then it needs to be treated rather than if the liver insufficiency was from a mechanical issue.

## 2021-09-10 NOTE — PROGRESS NOTE ADULT - ATTENDING COMMENTS
Patient examined and care reviewed in detail on bedside rounds  Critically ill on vent Fails SBT today For chemo today as per onc  Frequent bedside visits with therapy change today.   I have personally provided 35+ minutes of critical care time concurrently with the resident/fellow; this excludes time spent on separate procedures.

## 2021-09-10 NOTE — PROGRESS NOTE ADULT - SUBJECTIVE AND OBJECTIVE BOX
INTERVAL HPI/OVERNIGHT EVENTS:    SUBJECTIVE: Patient seen and examined at bedside.       VITAL SIGNS:  ICU Vital Signs Last 24 Hrs  T(C): 36.9 (10 Sep 2021 04:00), Max: 37 (10 Sep 2021 00:00)  T(F): 98.5 (10 Sep 2021 04:00), Max: 98.6 (10 Sep 2021 00:00)  HR: 62 (10 Sep 2021 07:00) (55 - 76)  BP: 111/61 (10 Sep 2021 07:00) (83/47 - 137/73)  BP(mean): 74 (10 Sep 2021 07:00) (53 - 90)  ABP: --  ABP(mean): --  RR: 26 (10 Sep 2021 07:00) (24 - 28)  SpO2: 100% (10 Sep 2021 07:00) (96% - 100%)    Mode: AC/ CMV (Assist Control/ Continuous Mandatory Ventilation), RR (machine): 26, TV (machine): 400, FiO2: 60, PEEP: 8, ITime: 0.78, MAP: 18, PIP: 46  Plateau pressure:   P/F ratio:     09-09 @ 07:01  -  09-10 @ 07:00  --------------------------------------------------------  IN: 1464.6 mL / OUT: 1915 mL / NET: -450.4 mL      CAPILLARY BLOOD GLUCOSE      POCT Blood Glucose.: 78 mg/dL (10 Sep 2021 06:34)    ECG:    PHYSICAL EXAM:    General:   HEENT:   Neck:   Respiratory:   Cardiovascular:   Abdomen:   Extremities:  Neurological:    MEDICATIONS:  MEDICATIONS  (STANDING):  allopurinol 100 milliGRAM(s) Oral daily  buMETAnide Infusion 3 mG/Hr (15 mL/Hr) IV Continuous <Continuous>  chlorhexidine 0.12% Liquid 15 milliLiter(s) Oral Mucosa every 12 hours  chlorhexidine 4% Liquid 1 Application(s) Topical <User Schedule>  dexMEDEtomidine Infusion 0.5 MICROgram(s)/kG/Hr (7.83 mL/Hr) IV Continuous <Continuous>  dextrose 40% Gel 15 Gram(s) Oral once  dextrose 5%. 1000 milliLiter(s) (50 mL/Hr) IV Continuous <Continuous>  dextrose 5%. 1000 milliLiter(s) (100 mL/Hr) IV Continuous <Continuous>  dextrose 50% Injectable 25 Gram(s) IV Push once  dextrose 50% Injectable 12.5 Gram(s) IV Push once  dextrose 50% Injectable 25 Gram(s) IV Push once  doxorubicin IVPB w/etoposide (eMAR) 18 milliGRAM(s) IV Intermittent every 24 hours  filgrastim-sndz (ZARXIO) Injectable 300 MICROGram(s) SubCutaneous daily  glucagon  Injectable 1 milliGRAM(s) IntraMuscular once  insulin lispro (ADMELOG) corrective regimen sliding scale   SubCutaneous every 6 hours  isoniazid 300 milliGRAM(s) Oral every 24 hours  midodrine 10 milliGRAM(s) Oral every 8 hours  norepinephrine Infusion 0.05 MICROgram(s)/kG/Min (2.93 mL/Hr) IV Continuous <Continuous>  petrolatum Ophthalmic Ointment 1 Application(s) Both EYES two times a day  piperacillin/tazobactam IVPB.. 3.375 Gram(s) IV Intermittent every 8 hours  polyethylene glycol 3350 17 Gram(s) Oral daily  pyridoxine 50 milliGRAM(s) Oral daily  senna Syrup 15 milliLiter(s) Oral at bedtime  sevelamer carbonate Powder 1200 milliGRAM(s) Oral three times a day with meals  sodium bicarbonate 1300 milliGRAM(s) Oral three times a day    MEDICATIONS  (PRN):  bisacodyl Suppository 10 milliGRAM(s) Rectal daily PRN Constipation  hydrocortisone sodium succinate Injectable 100 milliGRAM(s) IV Push once PRN PRN Chemotherapy Reaction  ondansetron Injectable 8 milliGRAM(s) IV Push every 8 hours PRN Nausea      ALLERGIES:  Allergies    penicillins (Rash)    Intolerances        LABS:                        7.5    0.20  )-----------( 29       ( 10 Sep 2021 05:32 )             21.4     09-10    140  |  101  |  84<H>  ----------------------------<  112<H>  3.6   |  11<L>  |  2.35<H>    Ca    7.0<L>      10 Sep 2021 05:32  Phos  8.5     09-10  Mg     1.60     09-10    TPro  5.6<L>  /  Alb  3.2<L>  /  TBili  4.1<H>  /  DBili  x   /  AST  40<H>  /  ALT  10  /  AlkPhos  213<H>  09-10    PT/INR - ( 10 Sep 2021 05:32 )   PT: 13.2 sec;   INR: 1.17 ratio         PTT - ( 10 Sep 2021 05:32 )  PTT:30.4 sec      RADIOLOGY & ADDITIONAL TESTS: Reviewed.   INTERVAL HPI/OVERNIGHT EVENTS: No overnight events.    SUBJECTIVE: Patient seen and examined at bedside. Pt remains on bumex gtt and precedex.      VITAL SIGNS:  ICU Vital Signs Last 24 Hrs  T(C): 36.9 (10 Sep 2021 04:00), Max: 37 (10 Sep 2021 00:00)  T(F): 98.5 (10 Sep 2021 04:00), Max: 98.6 (10 Sep 2021 00:00)  HR: 62 (10 Sep 2021 07:00) (55 - 76)  BP: 111/61 (10 Sep 2021 07:00) (83/47 - 137/73)  BP(mean): 74 (10 Sep 2021 07:00) (53 - 90)  ABP: --  ABP(mean): --  RR: 26 (10 Sep 2021 07:00) (24 - 28)  SpO2: 100% (10 Sep 2021 07:00) (96% - 100%)    Mode: AC/ CMV (Assist Control/ Continuous Mandatory Ventilation), RR (machine): 26, TV (machine): 400, FiO2: 60, PEEP: 8, ITime: 0.78, MAP: 18, PIP: 46  Plateau pressure:   P/F ratio:     09-09 @ 07:01  -  09-10 @ 07:00  --------------------------------------------------------  IN: 1464.6 mL / OUT: 1915 mL / NET: -450.4 mL      CAPILLARY BLOOD GLUCOSE      POCT Blood Glucose.: 78 mg/dL (10 Sep 2021 06:34)    PHYSICAL EXAM:    T(C): 36.6 (09-09-21 @ 08:00), Max: 37.5 (09-09-21 @ 04:00)  HR: 68 (09-09-21 @ 15:29) (49 - 76)  BP: 112/64 (09-09-21 @ 14:00) (86/48 - 137/73)  RR: 26 (09-09-21 @ 14:00) (21 - 232)  SpO2: 99% (09-09-21 @ 15:29) (92% - 100%)  GENERAL: Intubated, on precedex  HEAD:  Atraumatic, Normocephalic  EYES: EOMI, PERRLA, conjunctiva and sclera clear  NECK: Supple, No JVD  CHEST/LUNG: Clear to auscultation bilaterally; No wheeze  HEART: Regular rate and rhythm; No murmurs, rubs, or gallops  ABDOMEN: Soft, Nondistended; Bowel sounds present  EXTREMITIES:  2+ Peripheral Pulses, No clubbing, cyanosis, or edema  NEUROLOGY: unable to accurately assess  SKIN: No rashes or lesions    MEDICATIONS:  MEDICATIONS  (STANDING):  allopurinol 100 milliGRAM(s) Oral daily  buMETAnide Infusion 3 mG/Hr (15 mL/Hr) IV Continuous <Continuous>  chlorhexidine 0.12% Liquid 15 milliLiter(s) Oral Mucosa every 12 hours  chlorhexidine 4% Liquid 1 Application(s) Topical <User Schedule>  dexMEDEtomidine Infusion 0.5 MICROgram(s)/kG/Hr (7.83 mL/Hr) IV Continuous <Continuous>  dextrose 40% Gel 15 Gram(s) Oral once  dextrose 5%. 1000 milliLiter(s) (50 mL/Hr) IV Continuous <Continuous>  dextrose 5%. 1000 milliLiter(s) (100 mL/Hr) IV Continuous <Continuous>  dextrose 50% Injectable 25 Gram(s) IV Push once  dextrose 50% Injectable 12.5 Gram(s) IV Push once  dextrose 50% Injectable 25 Gram(s) IV Push once  doxorubicin IVPB w/etoposide (eMAR) 18 milliGRAM(s) IV Intermittent every 24 hours  filgrastim-sndz (ZARXIO) Injectable 300 MICROGram(s) SubCutaneous daily  glucagon  Injectable 1 milliGRAM(s) IntraMuscular once  insulin lispro (ADMELOG) corrective regimen sliding scale   SubCutaneous every 6 hours  isoniazid 300 milliGRAM(s) Oral every 24 hours  midodrine 10 milliGRAM(s) Oral every 8 hours  norepinephrine Infusion 0.05 MICROgram(s)/kG/Min (2.93 mL/Hr) IV Continuous <Continuous>  petrolatum Ophthalmic Ointment 1 Application(s) Both EYES two times a day  piperacillin/tazobactam IVPB.. 3.375 Gram(s) IV Intermittent every 8 hours  polyethylene glycol 3350 17 Gram(s) Oral daily  pyridoxine 50 milliGRAM(s) Oral daily  senna Syrup 15 milliLiter(s) Oral at bedtime  sevelamer carbonate Powder 1200 milliGRAM(s) Oral three times a day with meals  sodium bicarbonate 1300 milliGRAM(s) Oral three times a day    MEDICATIONS  (PRN):  bisacodyl Suppository 10 milliGRAM(s) Rectal daily PRN Constipation  hydrocortisone sodium succinate Injectable 100 milliGRAM(s) IV Push once PRN PRN Chemotherapy Reaction  ondansetron Injectable 8 milliGRAM(s) IV Push every 8 hours PRN Nausea      ALLERGIES:  Allergies    penicillins (Rash)    Intolerances      LABS:                        7.5    0.20  )-----------( 29       ( 10 Sep 2021 05:32 )             21.4     09-10    140  |  101  |  84<H>  ----------------------------<  112<H>  3.6   |  11<L>  |  2.35<H>    Ca    7.0<L>      10 Sep 2021 05:32  Phos  8.5     09-10  Mg     1.60     09-10    TPro  5.6<L>  /  Alb  3.2<L>  /  TBili  4.1<H>  /  DBili  x   /  AST  40<H>  /  ALT  10  /  AlkPhos  213<H>  09-10    PT/INR - ( 10 Sep 2021 05:32 )   PT: 13.2 sec;   INR: 1.17 ratio         PTT - ( 10 Sep 2021 05:32 )  PTT:30.4 sec      RADIOLOGY & ADDITIONAL TESTS: Reviewed.

## 2021-09-10 NOTE — PROGRESS NOTE ADULT - ASSESSMENT
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 Patient is now intubated in the MICU and with pressor support, started on R-CHOP chemotherapy. Patient also has bilateral adnexal masses and peritoneal carcinomatosis with ascites and extensive abdominal and pelvic adenopathy. Hospitalization course has been c/b ongoing fevers and increasing pressor and O2 support. Now s/p self extubation on 9/5 c/b cardiac arrest, now re-intubated and sedated      DLBCL  -s/p Dexamethasone 8/26-8/29, Rituxan 8/28  -s/p D1-D2 cyclophosphamide 100mg 9/1-9/2 with reduction of doses   -s/p D3 cyclophosphamide 225mg q12h on 9/3  -continue close monitoring of TLS labs q8h given high risk of TLS (phos, Mg, BMP, LDH, uric acid)    -continue with allopurinol for TLS ppx  -RBC transfusion support to keep Hgb >7 (last transfused 9/4)  -appreciate nephrology recommendations for PURVI and TLS, bumex gtt started for decreased urine output and volume overload    -f/u palliative care recommendations for ongoing GOC discussions  -continue DVT ppx with heparin subq  -s/p doxorubicin, vincristine, etoposide completed 9/4; only one day completed due to above events on 9/5 (self-extubated, cardiac arrest)  ---Patient now severely neutropenic, likely due to 24hrs of chemotherapy  ---Zarxio 300mcg daily started on 9/9. Continue daily until count recovery  ---Treatment was initially held due to plans for trach by MICU team. Trach is now on hold, so we will plan to proceed with continuation of treatment on 9/10.  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 Patient is now intubated in the MICU and with pressor support, started on R-CHOP chemotherapy. Patient also has bilateral adnexal masses and peritoneal carcinomatosis with ascites and extensive abdominal and pelvic adenopathy. Hospitalization course has been c/b ongoing fevers and increasing pressor and O2 support. Now s/p self extubation on 9/5 c/b cardiac arrest, now re-intubated and sedated      DLBCL  -s/p Dexamethasone 8/26-8/29, Rituxan 8/28  -s/p D1-D2 cyclophosphamide 100mg 9/1-9/2 with reduction of doses   -s/p D3 cyclophosphamide 225mg q12h on 9/3  -continue close monitoring of TLS labs q8h given high risk of TLS (phos, Mg, BMP, LDH, uric acid)    -continue with allopurinol for TLS ppx  -RBC transfusion support to keep Hgb >7 (last transfused 9/4)  -appreciate nephrology recommendations for PURVI and TLS, bumex gtt started for decreased urine output and volume overload    -f/u palliative care recommendations for ongoing GOC discussions  -continue DVT ppx with heparin subq  -s/p doxorubicin, vincristine, etoposide completed 9/4; only one day completed due to above events on 9/5 (self-extubated, cardiac arrest)  ---Patient now severely neutropenic, likely due to 24hrs of chemotherapy  ---Zarxio 300mcg daily given on 9/9. Please hold zarxio on 9/10, 9/11 and 9/12 while patient is getting chemotherapy. Will plan to restart on 9/13.   ---Treatment was initially held due to plans for trach by MICU team. Trach is now on hold, so we will proceed with continuation of treatment on 9/10.  ------No dose adjustments required for neutropenia  ------Dose adjustments to be made for hyperbilirubinemia:  -----------Etoposide 50% of dose: now 25mg/m2 (9/10-9/12)  -----------Vincristine: Will hold as Tbili >3  -----------Doxorubicine 25% of dose: now 2.5mg/m2 (9/10-9/12)    Esther Lees MD  Hematology/Oncology Fellow, PGY-5  Pager: [phone]  After 5pm and on weekends please page on-call fellow

## 2021-09-10 NOTE — PROGRESS NOTE ADULT - ATTENDING COMMENTS
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 CD20, PAX-5, CD10, BCL-6, BCL-2, C-MYC positive.   initially presented with volume overload, now intubated in the MICU and with pressor support, planned for R-CHOP chemotherapy.   dex from 8/26-8/29, Rituxan on 8/28 only with plans for cyclophosphamide, Doxorubicin and vincristine on Sunday 8/29 however patient with ongoing fevers and increasing pressor requirement concerning for possible infectious process.   infectious w/up negative and now afebrile  s/p D1-D2 of Cyclophosphamide 100mg daily 9/1-9/2 (reduced dose given large burden of disease)  D3 (9/3) dose of cyclophosphamide increased to 225mg q12hrs   etoposide, doxorubicin and vincristine on 9/4 as part of DA-EPOCH (dose level 1)  Patient completed doxorubicin infusion on 9/4 but then self-extubated and coded on 9/5. CPR performed. Pulse regained and patient was re-intubated, further chemo held.  Mental status improving off sedation, restart adramycin/etoposide infusion over 3 days starting today 9/10.  Vincristine held due to elevated t bili.  Myelosuppression likely 2/2 to 24h infusion; was on zarxio, hold while on chemo then restart on monday  Patient is at high risk for TLS due to large tumor burden, Continue with TLS lab monitoring [id number] (LDH, Uric acid, BMP, phos, Mag) while on treatment   Continue with allopurinol for TLS prophylaxis;   We will continue to follow .

## 2021-09-10 NOTE — PHARMACOTHERAPY INTERVENTION NOTE - INTERVENTION TYPE CHEMO
Chemo - Dose adjustment; under/over dose

## 2021-09-10 NOTE — PHARMACOTHERAPY INTERVENTION NOTE - NSPHMCOMMSECONDPHM_PHM_A_CORE FT
Xander Gregorio, PharmD

## 2021-09-11 NOTE — CHART NOTE - NSCHARTNOTEFT_GEN_A_CORE
: Dr. Graza and Dr. Kiser    INDICATION: Respiratory failure     PROCEDURE:  [X] LIMITED ECHO  [X] LIMITED CHEST  [ ] LIMITED RETROPERITONEAL  [X] LIMITED ABDOMINAL  [ ] LIMITED DVT  [ ] NEEDLE GUIDANCE VASCULAR  [ ] NEEDLE GUIDANCE THORACENTESIS  [ ] NEEDLE GUIDANCE PARACENTESIS  [ ] NEEDLE GUIDANCE PERICARDIOCENTESIS  [ ] OTHER    FINDINGS:  Lungs: A line predominant pattern with B/L moderate PLEFF, R now >L, R sided effusion much larger with associated atelectasis, lung sliding present bilaterally  Heart: Grossly normal LVSF, RV <LV, no pericardial effusions, IVC indeterminant  Abdomen: Moderate-severe volume ascites    INTERPRETATION:  -A line pattern in lungs pleural effusions B/L R>L  -no PTX  -Normal LVSF  -Moderate-Severe ascites    Images Uploaded on Q Path    Ricky Garza MD  PGY-4  Pulmonary/ Critical Care

## 2021-09-11 NOTE — PROGRESS NOTE ADULT - ATTENDING COMMENTS
Pt. with non-oliguric PURVI. Scr elevated/stable at 2.4. Monitor labs and urine output. Avoid any potential nephrotoxins. Dose medications as per eGFR.

## 2021-09-11 NOTE — PROGRESS NOTE ADULT - ATTENDING COMMENTS
Failure to wean from vent.  Large ascites on POCUS.  Will perform therapeutic paracentesis after platelet transfusion to facilitate weaning.

## 2021-09-11 NOTE — PROGRESS NOTE ADULT - PROBLEM SELECTOR PLAN 3
Patient with hypocalcemia. Ionized calcium improved to 0.97. Recommend switching phosphate binders to Calcium acetate 2 tabs TID given hypocalcemia and hyperphosphatemia.    If you have any questions, please feel free to contact me  Celio Barrow  Nephrology Fellow  [phone]  (After 5pm or on weekends please page the on-call fellow)

## 2021-09-11 NOTE — PROGRESS NOTE ADULT - PROBLEM SELECTOR PLAN 2
Pt. with metabolic acidosis in the setting of PURVI. Serum CO2 low at 12. Continue sodium bicarbonate tablets 1300 mg TID. Monitor SCO2 and pH daily.

## 2021-09-11 NOTE — PROGRESS NOTE ADULT - PROBLEM SELECTOR PLAN 1
Pt with PURVI in the setting of IV contrast exposure and obstructive uropathy. Of note, recent hospitalization at [facility] 7/26-8/4 for PURVI requiring urgent HD, however PURVI had resolved at discharge. SCr was at 0.99 on 08/19. Pt. had an PURVI episode which resolved during current hospitalization, but now with recurrent PURVI. Scr elevated since 8/31. Repeat CT scan showed stable B/L hydronephrosis. Scr elevated/stable at 2.40 today. Pt. currently non-oliguric on Bumex gtt, however noted decreasing UOP and positive fluid balance in 24 hours. Labs reviewed. Recommend continuing bumex gtt and consider one dose of metolazone 5mg if concerns over volume overload. Closely monitor urine output. If renal failure worsens, RRT might need to be considered. Avoid NSAIDs, ACEI/ARBS, RCA and nephrotoxins. Dose medications for eGFR < 10.

## 2021-09-11 NOTE — PROGRESS NOTE ADULT - SUBJECTIVE AND OBJECTIVE BOX
Medicine Follow-up    INTERVAL HPI/OVERNIGHT EVENTS:  Pt intubated, sedated, unresponsive.  Dtr at bedside.      VITAL SIGNS:  T(F): 97 (09-11-21 @ 08:00)  HR: 58 (09-11-21 @ 15:00)  BP: 115/58 (09-11-21 @ 15:00)  RR: 26 (09-11-21 @ 15:00)  SpO2: 99% (09-11-21 @ 15:00)  Wt(kg): --    09-10-21 @ 07:01  -  09-11-21 @ 07:00  --------------------------------------------------------  IN: 1565.9 mL / OUT: 710 mL / NET: 855.9 mL    09-11-21 @ 07:01  -  09-11-21 @ 15:34  --------------------------------------------------------  IN: 622.8 mL / OUT: 65 mL / NET: 557.8 mL        PHYSICAL EXAM:    Constitutional: sedated and unresponsive - intubated   Eyes: sclera non-icteric  Respiratory: trasmitted ET / coarse BS in all quadrants B/L   Cardiovascular: RRR, normal S1S2  Gastrointestinal: NABA, soft, NT  Extremities:  BLE edema    MEDICATIONS  (STANDING):  allopurinol 100 milliGRAM(s) Oral daily  buMETAnide Infusion 4 mG/Hr (20 mL/Hr) IV Continuous <Continuous>  chlorhexidine 0.12% Liquid 15 milliLiter(s) Oral Mucosa every 12 hours  chlorhexidine 4% Liquid 1 Application(s) Topical <User Schedule>  desmopressin IVPB 18 MICROGram(s) IV Intermittent once  dexMEDEtomidine Infusion 0.5 MICROgram(s)/kG/Hr (7.83 mL/Hr) IV Continuous <Continuous>  dextrose 40% Gel 15 Gram(s) Oral once  dextrose 5%. 1000 milliLiter(s) (50 mL/Hr) IV Continuous <Continuous>  dextrose 5%. 1000 milliLiter(s) (100 mL/Hr) IV Continuous <Continuous>  dextrose 50% Injectable 25 Gram(s) IV Push once  dextrose 50% Injectable 12.5 Gram(s) IV Push once  dextrose 50% Injectable 25 Gram(s) IV Push once  doxorubicin IVPB w/etoposide (eMAR) 5 milliGRAM(s) IV Intermittent daily  glucagon  Injectable 1 milliGRAM(s) IntraMuscular once  insulin lispro (ADMELOG) corrective regimen sliding scale   SubCutaneous every 6 hours  isoniazid 300 milliGRAM(s) Oral every 24 hours  midodrine 20 milliGRAM(s) Oral every 8 hours  norepinephrine Infusion 0.05 MICROgram(s)/kG/Min (2.93 mL/Hr) IV Continuous <Continuous>  ondansetron Injectable 8 milliGRAM(s) IV Push every 8 hours  petrolatum Ophthalmic Ointment 1 Application(s) Both EYES two times a day  piperacillin/tazobactam IVPB.. 3.375 Gram(s) IV Intermittent every 8 hours  polyethylene glycol 3350 17 Gram(s) Oral daily  pyridoxine 50 milliGRAM(s) Oral daily  senna Syrup 15 milliLiter(s) Oral at bedtime  sevelamer carbonate Powder 1200 milliGRAM(s) Oral three times a day with meals  sodium bicarbonate 1300 milliGRAM(s) Oral three times a day    MEDICATIONS  (PRN):  bisacodyl Suppository 10 milliGRAM(s) Rectal daily PRN Constipation  hydrocortisone sodium succinate Injectable 100 milliGRAM(s) IV Push once PRN PRN Chemotherapy Reaction      penicillins (Rash)      LABS:                        7.7    0.11  )-----------( 7        ( 11 Sep 2021 04:49 )             21.5     09-11    140  |  99  |  87<H>  ----------------------------<  141<H>  3.4<L>   |  12<L>  |  2.40<H>    Ca    7.7<L>      11 Sep 2021 04:49  Phos  8.0     09-11  Mg     2.10     09-11    TPro  5.6<L>  /  Alb  3.0<L>  /  TBili  3.6<H>  /  DBili  x   /  AST  32  /  ALT  10  /  AlkPhos  225<H>  09-11    PT/INR - ( 10 Sep 2021 23:19 )   PT: 12.8 sec;   INR: 1.13 ratio         PTT - ( 10 Sep 2021 23:19 )  PTT:29.5 sec Lactate Dehydrogenase, Serum: 734 U/L (09-11 @ 04:49)  Lactate Dehydrogenase, Serum: 777 U/L (09-10 @ 23:19)        RADIOLOGY & ADDITIONAL TESTS:  Studies reviewed.

## 2021-09-11 NOTE — PROGRESS NOTE ADULT - SUBJECTIVE AND OBJECTIVE BOX
[facility] Division of Kidney Diseases & Hypertension  FOLLOW UP NOTE  [phone]--------------------------------------------------------------------------------    HPI : 66 year old female with HTN, HLD, recently discovered ovarian mass suspicious for malignancy (7/2021), L hydroureteronephrosis s/p renal stent (7/15/21), and recent hospitalization ([facility] 7/26-8/4) for PURVI requiring urgent HD, ascites s/p therapeutic paracentesis (7/27/21), and pleural effusion s/p R thoracentesis (8/2/21) admitted for acute hypercarbic respiratory failure due to pleural effusions in the setting of ovarian malignancy complicated with volume overload with ascites. Now found to have new onset PURVI. Baseline Cr 0.7-1.1mg/dl. Had a recent PURVI episode which resolved- started to trend up again on 8/31. Pt. initiated on chemotherapy on 9/1. Pt. had a cardiopulmonary arrest on 9/5/21 and remains intubated, sedated in MICU.    Patient seen and examined at bedside. SCr elevated/stable at 2.40 today. Pt. non-oliguric on Bumex gtt , however noted decreased UOP in the last 24 hours.    PAST HISTORY  --------------------------------------------------------------------------------  No significant changes to PMH, PSH, FHx, SHx, unless otherwise noted    ALLERGIES & MEDICATIONS  --------------------------------------------------------------------------------  Allergies    penicillins (Rash)    Intolerances    Standing Inpatient Medications  allopurinol 100 milliGRAM(s) Oral daily  buMETAnide Infusion 4 mG/Hr IV Continuous <Continuous>  chlorhexidine 0.12% Liquid 15 milliLiter(s) Oral Mucosa every 12 hours  chlorhexidine 4% Liquid 1 Application(s) Topical <User Schedule>  dexMEDEtomidine Infusion 0.5 MICROgram(s)/kG/Hr IV Continuous <Continuous>  dextrose 40% Gel 15 Gram(s) Oral once  dextrose 5%. 1000 milliLiter(s) IV Continuous <Continuous>  dextrose 5%. 1000 milliLiter(s) IV Continuous <Continuous>  dextrose 50% Injectable 25 Gram(s) IV Push once  dextrose 50% Injectable 12.5 Gram(s) IV Push once  dextrose 50% Injectable 25 Gram(s) IV Push once  doxorubicin IVPB w/etoposide (eMAR) 5 milliGRAM(s) IV Intermittent daily  glucagon  Injectable 1 milliGRAM(s) IntraMuscular once  insulin lispro (ADMELOG) corrective regimen sliding scale   SubCutaneous every 6 hours  isoniazid 300 milliGRAM(s) Oral every 24 hours  midodrine 20 milliGRAM(s) Oral every 8 hours  norepinephrine Infusion 0.05 MICROgram(s)/kG/Min IV Continuous <Continuous>  ondansetron Injectable 8 milliGRAM(s) IV Push every 8 hours  petrolatum Ophthalmic Ointment 1 Application(s) Both EYES two times a day  piperacillin/tazobactam IVPB.. 3.375 Gram(s) IV Intermittent every 8 hours  polyethylene glycol 3350 17 Gram(s) Oral daily  potassium chloride   Solution 40 milliEquivalent(s) Oral once  pyridoxine 50 milliGRAM(s) Oral daily  senna Syrup 15 milliLiter(s) Oral at bedtime  sevelamer carbonate Powder 1200 milliGRAM(s) Oral three times a day with meals  sodium bicarbonate 1300 milliGRAM(s) Oral three times a day    REVIEW OF SYSTEMS  --------------------------------------------------------------------------------  Unable to obtain due to medical condition    VITALS/PHYSICAL EXAM  --------------------------------------------------------------------------------  T(C): 36.8 (09-11-21 @ 00:00), Max: 36.8 (09-11-21 @ 00:00)  HR: 54 (09-11-21 @ 06:00) (52 - 80)  BP: 114/65 (09-11-21 @ 06:00) (86/60 - 133/67)  RR: 26 (09-11-21 @ 06:00) (26 - 26)  SpO2: 100% (09-11-21 @ 06:00) (96% - 100%)  Wt(kg): --    09-10-21 @ 07:01  -  09-11-21 @ 07:00  --------------------------------------------------------  IN: 1565.9 mL / OUT: 710 mL / NET: 855.9 mL    Physical Exam:              Gen: Intubated  	HEENT: ET tube +  	Pulm: Mechanically ventilated via ETT  	CV: S1S2  	Abd: Soft, +BS  	Ext: 2+ Pitting LE edema B/L  	Neuro: Sedated              : Charles+ clear urine  	Skin: Warm and dry    LABS/STUDIES  --------------------------------------------------------------------------------              7.7    0.11  >-----------<  7        [09-11-21 @ 04:49]              21.5     140  |  99  |  87  ----------------------------<  141      [09-11-21 @ 04:49]  3.4   |  12  |  2.40        Ca     7.7     [09-11-21 @ 04:49]      iCa    0.97     [09-11 @ 04:49]      Mg     2.10     [09-11-21 @ 04:49]      Phos  8.0     [09-11-21 @ 04:49]    TPro  5.6  /  Alb  3.0  /  TBili  3.6  /  DBili  x   /  AST  32  /  ALT  10  /  AlkPhos  225  [09-11-21 @ 04:49]    PT/INR: PT 12.8 , INR 1.13       [09-10-21 @ 23:19]  PTT: 29.5       [09-10-21 @ 23:19]    Uric acid 7.1      [09-11-21 @ 04:49]        [09-11-21 @ 04:49]    Creatinine Trend:  SCr 2.40 [09-11 @ 04:49]  SCr 2.36 [09-10 @ 23:19]  SCr 2.39 [09-10 @ 14:27]  SCr 2.35 [09-10 @ 05:32]  SCr 2.46 [09-09 @ 21:02]

## 2021-09-11 NOTE — PROGRESS NOTE ADULT - ASSESSMENT
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 Patient is now intubated in the MICU and with pressor support, started on R-CHOP chemotherapy. Patient also has bilateral adnexal masses and peritoneal carcinomatosis with ascites and extensive abdominal and pelvic adenopathy. Hospitalization course has been c/b ongoing fevers and increasing pressor and O2 support. S/p self extubation on 9/5 c/b cardiac arrest, then re-intubated and sedated      DLBCL  -s/p Dexamethasone 8/26-8/29, Rituxan 8/28  -s/p D1-D2 cyclophosphamide 100mg 9/1-9/2 with reduction of doses   -s/p D3 cyclophosphamide 225mg q12h on 9/3  -continue close monitoring of TLS labs q8h given high risk of TLS (phos, Mg, BMP, LDH, uric acid)    -continue with allopurinol for TLS ppx  -RBC transfusion support to keep Hgb >7 (last transfused 9/4)  -appreciate nephrology recommendations for PURVI and TLS, bumex gtt started for decreased urine output and volume overload    -f/u palliative care recommendations for ongoing GOC discussions  -continue DVT ppx with heparin subq  -s/p doxorubicin, vincristine, etoposide completed 9/4; only one day completed due to above events on 9/5 (self-extubated, cardiac arrest)  ---Patient now severely neutropenic, likely due to 24hrs of chemotherapy  ---Zarxio 300mcg daily given on 9/9. Please hold zarxio on 9/10, 9/11 and 9/12 while patient is getting chemotherapy. Will plan to restart on 9/13.   ---Treatment was initially held due to plans for trach by MICU team. Trach is now on hold, so we will proceed with continuation of treatment on 9/10 and 9/11.  ------No dose adjustments required for neutropenia  ------Dose adjustments to be made for hyperbilirubinemia:  -----------Etoposide 50% of dose: now 25mg/m2 (9/10-9/12)  -----------Vincristine: Will hold as Tbili >3  -----------Doxorubicine 25% of dose: now 2.5mg/m2 (9/10-9/12)    TLS labs revd today and no evidence of TLS - con tto yessio wdaily   To restart Zarxio on 9/13

## 2021-09-11 NOTE — PROGRESS NOTE ADULT - SUBJECTIVE AND OBJECTIVE BOX
INTERVAL HPI/OVERNIGHT EVENTS:    SUBJECTIVE: Patient seen and examined at bedside.       VITAL SIGNS:  ICU Vital Signs Last 24 Hrs  T(C): 36.8 (11 Sep 2021 00:00), Max: 36.8 (11 Sep 2021 00:00)  T(F): 98.2 (11 Sep 2021 00:00), Max: 98.2 (11 Sep 2021 00:00)  HR: 54 (11 Sep 2021 06:00) (52 - 80)  BP: 114/65 (11 Sep 2021 06:00) (86/60 - 133/67)  BP(mean): 77 (11 Sep 2021 06:00) (60 - 90)  ABP: --  ABP(mean): --  RR: 26 (11 Sep 2021 06:00) (26 - 26)  SpO2: 100% (11 Sep 2021 06:00) (96% - 100%)    Mode: AC/ CMV (Assist Control/ Continuous Mandatory Ventilation), RR (machine): 26, TV (machine): 400, FiO2: 60, PEEP: 8, ITime: 0.78, MAP: 17, PIP: 41  Plateau pressure:   P/F ratio:     09-10 @ 07:01  -  09-11 @ 07:00  --------------------------------------------------------  IN: 1565.9 mL / OUT: 710 mL / NET: 855.9 mL      CAPILLARY BLOOD GLUCOSE      POCT Blood Glucose.: 131 mg/dL (11 Sep 2021 05:34)    ECG:    PHYSICAL EXAM:    General:   HEENT:   Neck:   Respiratory:   Cardiovascular:   Abdomen:   Extremities:  Neurological:    MEDICATIONS:  MEDICATIONS  (STANDING):  allopurinol 100 milliGRAM(s) Oral daily  buMETAnide Infusion 4 mG/Hr (20 mL/Hr) IV Continuous <Continuous>  chlorhexidine 0.12% Liquid 15 milliLiter(s) Oral Mucosa every 12 hours  chlorhexidine 4% Liquid 1 Application(s) Topical <User Schedule>  dexMEDEtomidine Infusion 0.5 MICROgram(s)/kG/Hr (7.83 mL/Hr) IV Continuous <Continuous>  dextrose 40% Gel 15 Gram(s) Oral once  dextrose 5%. 1000 milliLiter(s) (50 mL/Hr) IV Continuous <Continuous>  dextrose 5%. 1000 milliLiter(s) (100 mL/Hr) IV Continuous <Continuous>  dextrose 50% Injectable 25 Gram(s) IV Push once  dextrose 50% Injectable 12.5 Gram(s) IV Push once  dextrose 50% Injectable 25 Gram(s) IV Push once  doxorubicin IVPB w/etoposide (eMAR) 5 milliGRAM(s) IV Intermittent daily  glucagon  Injectable 1 milliGRAM(s) IntraMuscular once  insulin lispro (ADMELOG) corrective regimen sliding scale   SubCutaneous every 6 hours  isoniazid 300 milliGRAM(s) Oral every 24 hours  midodrine 20 milliGRAM(s) Oral every 8 hours  norepinephrine Infusion 0.05 MICROgram(s)/kG/Min (2.93 mL/Hr) IV Continuous <Continuous>  ondansetron Injectable 8 milliGRAM(s) IV Push every 8 hours  petrolatum Ophthalmic Ointment 1 Application(s) Both EYES two times a day  piperacillin/tazobactam IVPB.. 3.375 Gram(s) IV Intermittent every 8 hours  polyethylene glycol 3350 17 Gram(s) Oral daily  potassium chloride   Solution 40 milliEquivalent(s) Oral once  pyridoxine 50 milliGRAM(s) Oral daily  senna Syrup 15 milliLiter(s) Oral at bedtime  sevelamer carbonate Powder 1200 milliGRAM(s) Oral three times a day with meals  sodium bicarbonate 1300 milliGRAM(s) Oral three times a day    MEDICATIONS  (PRN):  bisacodyl Suppository 10 milliGRAM(s) Rectal daily PRN Constipation  hydrocortisone sodium succinate Injectable 100 milliGRAM(s) IV Push once PRN PRN Chemotherapy Reaction      ALLERGIES:  Allergies    penicillins (Rash)    Intolerances        LABS:                        7.7    0.11  )-----------( 7        ( 11 Sep 2021 04:49 )             21.5     09-11    140  |  99  |  87<H>  ----------------------------<  141<H>  3.4<L>   |  12<L>  |  2.40<H>    Ca    7.7<L>      11 Sep 2021 04:49  Phos  8.0     09-11  Mg     2.10     09-11    TPro  5.6<L>  /  Alb  3.0<L>  /  TBili  3.6<H>  /  DBili  x   /  AST  32  /  ALT  10  /  AlkPhos  225<H>  09-11    PT/INR - ( 10 Sep 2021 23:19 )   PT: 12.8 sec;   INR: 1.13 ratio         PTT - ( 10 Sep 2021 23:19 )  PTT:29.5 sec      RADIOLOGY & ADDITIONAL TESTS: Reviewed.   INTERVAL HPI/OVERNIGHT EVENTS: No acute overnight events.     SUBJECTIVE: Patient seen and examined at bedside. Pt remains on precedex and on levophed.       VITAL SIGNS:  ICU Vital Signs Last 24 Hrs  T(C): 36.8 (11 Sep 2021 00:00), Max: 36.8 (11 Sep 2021 00:00)  T(F): 98.2 (11 Sep 2021 00:00), Max: 98.2 (11 Sep 2021 00:00)  HR: 54 (11 Sep 2021 06:00) (52 - 80)  BP: 114/65 (11 Sep 2021 06:00) (86/60 - 133/67)  BP(mean): 77 (11 Sep 2021 06:00) (60 - 90)  ABP: --  ABP(mean): --  RR: 26 (11 Sep 2021 06:00) (26 - 26)  SpO2: 100% (11 Sep 2021 06:00) (96% - 100%)    Mode: AC/ CMV (Assist Control/ Continuous Mandatory Ventilation), RR (machine): 26, TV (machine): 400, FiO2: 60, PEEP: 8, ITime: 0.78, MAP: 17, PIP: 41  Plateau pressure:   P/F ratio:     09-10 @ 07:01  -  09-11 @ 07:00  --------------------------------------------------------  IN: 1565.9 mL / OUT: 710 mL / NET: 855.9 mL      CAPILLARY BLOOD GLUCOSE      POCT Blood Glucose.: 131 mg/dL (11 Sep 2021 05:34)    PHYSICAL EXAM:  T(C): 36.1 (09-11-21 @ 08:00), Max: 36.8 (09-11-21 @ 00:00)  HR: 55 (09-11-21 @ 12:00) (50 - 80)  BP: 93/50 (09-11-21 @ 12:00) (86/60 - 133/67)  RR: 26 (09-11-21 @ 11:00) (26 - 26)  SpO2: 100% (09-11-21 @ 11:00) (98% - 100%)  GENERAL: Intubated, on precedex  HEAD:  Atraumatic, Normocephalic  EYES: EOMI, PERRLA, conjunctiva and sclera clear  NECK: Supple, No JVD  CHEST/LUNG: Clear to auscultation bilaterally; No wheeze  HEART: Regular rate and rhythm; No murmurs, rubs, or gallops  ABDOMEN: Soft, Nondistended; Bowel sounds present  EXTREMITIES:  2+ Peripheral Pulses, No clubbing, cyanosis, or edema  NEUROLOGY: unable to accurately assess  SKIN: No rashes or lesions    MEDICATIONS:  MEDICATIONS  (STANDING):  allopurinol 100 milliGRAM(s) Oral daily  buMETAnide Infusion 4 mG/Hr (20 mL/Hr) IV Continuous <Continuous>  chlorhexidine 0.12% Liquid 15 milliLiter(s) Oral Mucosa every 12 hours  chlorhexidine 4% Liquid 1 Application(s) Topical <User Schedule>  dexMEDEtomidine Infusion 0.5 MICROgram(s)/kG/Hr (7.83 mL/Hr) IV Continuous <Continuous>  dextrose 40% Gel 15 Gram(s) Oral once  dextrose 5%. 1000 milliLiter(s) (50 mL/Hr) IV Continuous <Continuous>  dextrose 5%. 1000 milliLiter(s) (100 mL/Hr) IV Continuous <Continuous>  dextrose 50% Injectable 25 Gram(s) IV Push once  dextrose 50% Injectable 12.5 Gram(s) IV Push once  dextrose 50% Injectable 25 Gram(s) IV Push once  doxorubicin IVPB w/etoposide (eMAR) 5 milliGRAM(s) IV Intermittent daily  glucagon  Injectable 1 milliGRAM(s) IntraMuscular once  insulin lispro (ADMELOG) corrective regimen sliding scale   SubCutaneous every 6 hours  isoniazid 300 milliGRAM(s) Oral every 24 hours  midodrine 20 milliGRAM(s) Oral every 8 hours  norepinephrine Infusion 0.05 MICROgram(s)/kG/Min (2.93 mL/Hr) IV Continuous <Continuous>  ondansetron Injectable 8 milliGRAM(s) IV Push every 8 hours  petrolatum Ophthalmic Ointment 1 Application(s) Both EYES two times a day  piperacillin/tazobactam IVPB.. 3.375 Gram(s) IV Intermittent every 8 hours  polyethylene glycol 3350 17 Gram(s) Oral daily  potassium chloride   Solution 40 milliEquivalent(s) Oral once  pyridoxine 50 milliGRAM(s) Oral daily  senna Syrup 15 milliLiter(s) Oral at bedtime  sevelamer carbonate Powder 1200 milliGRAM(s) Oral three times a day with meals  sodium bicarbonate 1300 milliGRAM(s) Oral three times a day    MEDICATIONS  (PRN):  bisacodyl Suppository 10 milliGRAM(s) Rectal daily PRN Constipation  hydrocortisone sodium succinate Injectable 100 milliGRAM(s) IV Push once PRN PRN Chemotherapy Reaction      ALLERGIES:  Allergies    penicillins (Rash)    Intolerances    LABS:                        7.7    0.11  )-----------( 7        ( 11 Sep 2021 04:49 )             21.5     09-11    140  |  99  |  87<H>  ----------------------------<  141<H>  3.4<L>   |  12<L>  |  2.40<H>    Ca    7.7<L>      11 Sep 2021 04:49  Phos  8.0     09-11  Mg     2.10     09-11    TPro  5.6<L>  /  Alb  3.0<L>  /  TBili  3.6<H>  /  DBili  x   /  AST  32  /  ALT  10  /  AlkPhos  225<H>  09-11    PT/INR - ( 10 Sep 2021 23:19 )   PT: 12.8 sec;   INR: 1.13 ratio         PTT - ( 10 Sep 2021 23:19 )  PTT:29.5 sec      RADIOLOGY & ADDITIONAL TESTS: Reviewed.

## 2021-09-12 NOTE — PROGRESS NOTE ADULT - ASSESSMENT
66-year-old woman with PMH significant for HTN, HLD, L hydroureteronephrosis s/p renal stent on 7/15, who presents with volume overload 2/2 high grade B-cell lymphoma. S/p thoracentesis 8/13, paracentesis and excisional biopsy of left inguinal lymph node on 8/20. Accepted to MICU for acute hypoxic/hypercapneic respiratory failure now s/p intubation and L pleurex catheter placement, now undergoing chemotherapy.    #Neuro  - Altered mental status, likely 2/2 multifactorial in the setting of hypercarbic respiratory failure  - CTH with no intracranial pathology  - Concern for lymphomatous meningitis, however ruled out s/p LP with flow cytometry and cytopathology negative  - Currently sedated with precedex intermittently following commands - will wean as tolerated    #Cardiovascular  Vasoplegic shock 2/2 to sedatives   - Echo 8/3 showing concentric left ventricular remodeling, hyperdynamic left ventricle, calcified trileaflet aortic valve with normal opening, EF 56%  - POCUS showing adequate cardiac output  - On pressor support with levo, wean as tolerated, continue with midodrine 20 mg TID    #Respiratory  - Hypoxic/hypercapneic respiratory failure likely secondary to malignant pleural effusions from malignancy  - S/p thoracentesis on 8/13 with re-accumulation of pleural effusions  - S/p second thoracentesis 8/25, s/p L pleurex 8/27  - On pressure support, however remains tachypneic with low TVs, will repeat thoracentesis on R side before attempting extubation   - Unsuccessful breathing trials, will require future trach once platelets stabilize    #GI/Nutrition  Malignant ascites   - S/p paracentesis 8/20  - Still remains ascites, possible paracentesis    Ileus   - hold tube feeds as pt with residuals and persistent nausea 2/2 chemotherapy   - abd x-ray ordered  - zofran prn    #diarrhea   - previously constipated s/p golytely + fecal disimpaction    #/Renal  - PURVI likely 2/2 to ATN in the setting of previous supratherapeutic vancomycin level + tumor lysis + IV contrast  - B/L hydronephrosis stable on CT A/P 2/2 malignant LAD  - Nephrology on board, recommend holding LASIX & other nephrotoxic medications.   - Nephrostomy tubes considered however per IR recommendations not appropriate at this time as patient's Cr previously downtrending, may consider re-consulting if patient's renal function continues to worsen   - monitor TLS labs q8h, now with uptrending LDH and hyperphosphatemia, although uric acid remains low   - c/w phosphate binder  - s/p bicarb drip, transitioned to PO bicarb   - carnes placed for accurate I+Os  -plan for CRRT today 9/12    #Skin  - No sacral decubiti    #ID  - s/p Vanco and Zosyn (ended 8/31)   - U/A grossly positive, urine cx NGTD  - blood cx 8/28/21 NGTD  - strongyloides positive - s/p ivermectin (9/1 - 9/2) given IC state   - c/w INH + pyridoxine (given indeterminate quant gold)    #Endocrine  - SSI   - will readjust as necessary     #Hematologic/DVT ppx  - newly diagnosed diffuse large B-cell lymphoma  - TLS labs q8  - Allopurinol for TLS prophylaxis  - Heme/Onc recommending starting steroids with dexamethasone, s/p 40 mg dose (ended 8/29, 8/31)   - s/p Rituxan 8/28/21  - s/p cyclophosphamide (1st cycle 9/1, 2nd cycle 9/2, 3rd cycle 9/3)  - s/p doxorubicin 9/4  - restarting doxorubicin/etoposide on 9/10   - will hold filgastrim 300 mg IV for chemotherapy  - as per oncology, will resume chemotherapy once trach is done  - DVT prophylaxis with SCDs, no pharmacological px  -s/p 1U pRBC 9/11 6.8 --> 8.7    #Ethics  - Patient is FULL CODE per palliative care discussion with patient and her sons (Yuan and Jose)  - Son (Yuan) and father still deciding on whether they would want a trach, understanding that this would be the pt's only option     Fill-in proxy is Jose Camargo: [phone]

## 2021-09-12 NOTE — PROGRESS NOTE ADULT - PROBLEM SELECTOR PLAN 3
Patient with hypocalcemia. Ionized calcium low at 0.92 today. Recommend switching phosphate binders to Calcium acetate 2 tabs TID given hypocalcemia and hyperphosphatemia. Plans for Hemodialysis today.    If you have any questions, please feel free to contact me  Celio Barrow  Nephrology Fellow  [phone]  (After 5pm or on weekends please page the on-call fellow)

## 2021-09-12 NOTE — PROGRESS NOTE ADULT - ASSESSMENT
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 Patient is now intubated in the MICU and with pressor support, started on R-CHOP chemotherapy. Patient also has bilateral adnexal masses and peritoneal carcinomatosis with ascites and extensive abdominal and pelvic adenopathy. Hospitalization course has been c/b ongoing fevers and increasing pressor and O2 support. S/p self extubation on 9/5 c/b cardiac arrest, then re-intubated and sedated      DLBCL  -s/p Dexamethasone 8/26-8/29, Rituxan 8/28  -s/p D1-D2 cyclophosphamide 100mg 9/1-9/2 with reduction of doses   -s/p D3 cyclophosphamide 225mg q12h on 9/3  -continue close monitoring of TLS labs q8h given high risk of TLS (phos, Mg, BMP, LDH, uric acid)    -continue with allopurinol for TLS ppx  -RBC transfusion support to keep Hgb >7 (last transfused 9/4)  -appreciate nephrology recommendations for PURVI and TLS, bumex gtt started for decreased urine output and volume overload    -f/u palliative care recommendations for ongoing GOC discussions  -continue DVT ppx with heparin subq  -s/p doxorubicin, vincristine, etoposide completed 9/4; only one day completed due to above events on 9/5 (self-extubated, cardiac arrest)  ---Patient now severely neutropenic, likely due to 24hrs of chemotherapy  ---Zarxio 300mcg daily given on 9/9. Please hold zarxio on 9/10, 9/11 and 9/12 while patient is getting chemotherapy.    ****Zarxio  Needs to restart on 9/13.*********     ---Treatment was initially held due to plans for trach by MICU team. Trach is now on hold, so we will proceed with continuation of treatment on 9/10 and 9/11.  ------No dose adjustments required for neutropenia  ------Dose adjustments to be made for hyperbilirubinemia:  -----------Etoposide 50% of dose: now 25mg/m2 (9/10-9/12)  -----------Vincristine: Will hold as Tbili >3  -----------Doxorubicine 25% of dose: now 2.5mg/m2 (9/10-9/12)    TLS labs revd today and no evidence of TLS - although LDH and P rising somewhat - cont to follow with TLS labs every 8 hours.    S/P 1u pRBC and plt transfusion yest with rise of both - repeat / follow. Suggest cont to transfuse plts if <10    ****Restart Zarxio on 9/13*****

## 2021-09-12 NOTE — PROGRESS NOTE ADULT - ATTENDING COMMENTS
Pt. with oliguric PURVI and metabolic acidosis. Plan for HD as outlined above. Monitor labs and urine output. Avoid any potential nephrotoxins. Dose medications as per eGFR. Overall prognosis guarded. Assessment and plan discussed with MICU team.

## 2021-09-12 NOTE — CHART NOTE - NSCHARTNOTEFT_GEN_A_CORE
DIALYSIS CONSENT NOTE    Thoroughly reviewed risks and benefits of Hemodialysis/ CRRT with patient's son David Camargo. Mr. Camargo agrees to proceed with dialytic therapy.  All questions were answered    Will plan for HD later today.    If you have any questions, please feel free to contact me  Celio Barrow  Nephrology Fellow  [phone]  (After 5pm or on weekends please page the on-call fellow)

## 2021-09-12 NOTE — PROGRESS NOTE ADULT - SUBJECTIVE AND OBJECTIVE BOX
INTERVAL HPI/OVERNIGHT EVENTS:    O/N: 1U pRBC overnight, otherwise no events. Unable to obtain ROS given mental status.     SUBJECTIVE: Patient seen and examined at bedside.       OBJECTIVE:    VITAL SIGNS:  ICU Vital Signs Last 24 Hrs  T(C): 36.6 (12 Sep 2021 08:00), Max: 36.6 (12 Sep 2021 08:00)  T(F): 97.8 (12 Sep 2021 08:00), Max: 97.8 (12 Sep 2021 08:00)  HR: 58 (12 Sep 2021 10:00) (50 - 887)  BP: 119/67 (12 Sep 2021 10:00) (85/39 - 138/73)  BP(mean): 78 (12 Sep 2021 10:00) (50 - 103)  ABP: --  ABP(mean): --  RR: 26 (12 Sep 2021 10:00) (26 - 33)  SpO2: 100% (12 Sep 2021 10:00) (96% - 100%)    Mode: AC/ CMV (Assist Control/ Continuous Mandatory Ventilation), RR (machine): 26, TV (machine): 400, FiO2: 40, PEEP: 5, ITime: 0.74, MAP: 15, PIP: 38    09-11 @ 07:01 - 09-12 @ 07:00  --------------------------------------------------------  IN: 1734.1 mL / OUT: 423 mL / NET: 1311.1 mL    09-12 @ 07:01  -  09-12 @ 10:16  --------------------------------------------------------  IN: 274.6 mL / OUT: 40 mL / NET: 234.6 mL      CAPILLARY BLOOD GLUCOSE      POCT Blood Glucose.: 108 mg/dL (12 Sep 2021 05:51)      PHYSICAL EXAM:    General: intubated off sedation.   HEENT: NC/AT; PERRL, clear conjunctiva  Neck: supple  Respiratory: + rhonchi b/l   Cardiovascular: +S1/S2; RRR  Abdomen: firm, distended   Extremities: 2+ pitting edema b/l LE   Skin: normal color and turgor; no rash  Neurological:    MEDICATIONS:  MEDICATIONS  (STANDING):  allopurinol 100 milliGRAM(s) Oral daily  buMETAnide Infusion 4 mG/Hr (20 mL/Hr) IV Continuous <Continuous>  chlorhexidine 0.12% Liquid 15 milliLiter(s) Oral Mucosa every 12 hours  chlorhexidine 4% Liquid 1 Application(s) Topical <User Schedule>  dexMEDEtomidine Infusion 0.5 MICROgram(s)/kG/Hr (7.83 mL/Hr) IV Continuous <Continuous>  dextrose 40% Gel 15 Gram(s) Oral once  dextrose 5%. 1000 milliLiter(s) (50 mL/Hr) IV Continuous <Continuous>  dextrose 5%. 1000 milliLiter(s) (100 mL/Hr) IV Continuous <Continuous>  dextrose 50% Injectable 25 Gram(s) IV Push once  dextrose 50% Injectable 12.5 Gram(s) IV Push once  dextrose 50% Injectable 25 Gram(s) IV Push once  doxorubicin IVPB w/etoposide (eMAR) 5 milliGRAM(s) IV Intermittent daily  glucagon  Injectable 1 milliGRAM(s) IntraMuscular once  insulin lispro (ADMELOG) corrective regimen sliding scale   SubCutaneous every 6 hours  isoniazid 300 milliGRAM(s) Oral every 24 hours  midodrine 20 milliGRAM(s) Oral every 8 hours  norepinephrine Infusion 0.05 MICROgram(s)/kG/Min (2.93 mL/Hr) IV Continuous <Continuous>  ondansetron Injectable 8 milliGRAM(s) IV Push every 8 hours  petrolatum Ophthalmic Ointment 1 Application(s) Both EYES two times a day  piperacillin/tazobactam IVPB.. 3.375 Gram(s) IV Intermittent every 8 hours  polyethylene glycol 3350 17 Gram(s) Oral daily  pyridoxine 50 milliGRAM(s) Oral daily  senna Syrup 15 milliLiter(s) Oral at bedtime  sevelamer carbonate Powder 1200 milliGRAM(s) Oral three times a day with meals  sodium bicarbonate 1300 milliGRAM(s) Oral three times a day    MEDICATIONS  (PRN):  bisacodyl Suppository 10 milliGRAM(s) Rectal daily PRN Constipation  hydrocortisone sodium succinate Injectable 100 milliGRAM(s) IV Push once PRN PRN Chemotherapy Reaction      ALLERGIES:  Allergies    penicillins (Rash)    Intolerances        LABS:                        8.7    0.08  )-----------( 41       ( 12 Sep 2021 06:11 )             25.1     09-12    141  |  103  |  86<H>  ----------------------------<  167<H>  3.7   |  9<LL>  |  2.40<H>    Ca    7.2<L>      12 Sep 2021 02:45  Phos  7.7     09-12  Mg     1.70     09-12    TPro  5.7<L>  /  Alb  3.0<L>  /  TBili  4.1<H>  /  DBili  x   /  AST  32  /  ALT  12  /  AlkPhos  253<H>  09-12    PT/INR - ( 12 Sep 2021 02:46 )   PT: 13.5 sec;   INR: 1.19 ratio         PTT - ( 12 Sep 2021 02:46 )  PTT:30.2 sec      RADIOLOGY & ADDITIONAL TESTS: Reviewed.

## 2021-09-12 NOTE — PROGRESS NOTE ADULT - SUBJECTIVE AND OBJECTIVE BOX
[facility] Division of Kidney Diseases & Hypertension  FOLLOW UP NOTE  [phone]--------------------------------------------------------------------------------    HPI : 66 year old female with HTN, HLD, recently discovered ovarian mass suspicious for malignancy (7/2021), L hydroureteronephrosis s/p renal stent (7/15/21), and recent hospitalization ([facility] 7/26-8/4) for PURVI requiring urgent HD, ascites s/p therapeutic paracentesis (7/27/21), and pleural effusion s/p R thoracentesis (8/2/21) admitted for acute hypercarbic respiratory failure due to pleural effusions in the setting of ovarian malignancy complicated with volume overload with ascites. Now found to have new onset PURVI. Baseline Cr 0.7-1.1mg/dl. Had a recent PURVI episode which resolved- started to trend up again on 8/31. Pt. initiated on chemotherapy on 9/1. Pt. had a cardiopulmonary arrest on 9/5/21 and remains intubated in MICU.    Patient seen and examined at bedside. SCr elevated/stable at 2.40 today, however pt. now oliguric despite high dose Bumex gtt.    PAST HISTORY  --------------------------------------------------------------------------------  No significant changes to PMH, PSH, FHx, SHx, unless otherwise noted    ALLERGIES & MEDICATIONS  --------------------------------------------------------------------------------  Allergies    penicillins (Rash)    Intolerances    Standing Inpatient Medications  allopurinol 100 milliGRAM(s) Oral daily  buMETAnide Infusion 4 mG/Hr IV Continuous <Continuous>  chlorhexidine 0.12% Liquid 15 milliLiter(s) Oral Mucosa every 12 hours  chlorhexidine 4% Liquid 1 Application(s) Topical <User Schedule>  dexMEDEtomidine Infusion 0.5 MICROgram(s)/kG/Hr IV Continuous <Continuous>  dextrose 40% Gel 15 Gram(s) Oral once  dextrose 5%. 1000 milliLiter(s) IV Continuous <Continuous>  dextrose 5%. 1000 milliLiter(s) IV Continuous <Continuous>  dextrose 50% Injectable 25 Gram(s) IV Push once  dextrose 50% Injectable 25 Gram(s) IV Push once  dextrose 50% Injectable 12.5 Gram(s) IV Push once  doxorubicin IVPB w/etoposide (eMAR) 5 milliGRAM(s) IV Intermittent daily  glucagon  Injectable 1 milliGRAM(s) IntraMuscular once  insulin lispro (ADMELOG) corrective regimen sliding scale   SubCutaneous every 6 hours  isoniazid 300 milliGRAM(s) Oral every 24 hours  midodrine 20 milliGRAM(s) Oral every 8 hours  norepinephrine Infusion 0.05 MICROgram(s)/kG/Min IV Continuous <Continuous>  ondansetron Injectable 8 milliGRAM(s) IV Push every 8 hours  petrolatum Ophthalmic Ointment 1 Application(s) Both EYES two times a day  piperacillin/tazobactam IVPB.. 3.375 Gram(s) IV Intermittent every 8 hours  polyethylene glycol 3350 17 Gram(s) Oral daily  pyridoxine 50 milliGRAM(s) Oral daily  senna Syrup 15 milliLiter(s) Oral at bedtime  sevelamer carbonate Powder 1200 milliGRAM(s) Oral three times a day with meals  sodium bicarbonate 1300 milliGRAM(s) Oral three times a day    REVIEW OF SYSTEMS  --------------------------------------------------------------------------------  Unable to obtain due to medical condition    VITALS/PHYSICAL EXAM  --------------------------------------------------------------------------------  T(C): 35.7 (09-12-21 @ 04:00), Max: 36.1 (09-11-21 @ 16:00)  HR: 62 (09-12-21 @ 09:00) (50 - 887)  BP: 114/66 (09-12-21 @ 09:00) (85/39 - 138/73)  RR: 26 (09-12-21 @ 09:00) (26 - 33)  SpO2: 97% (09-12-21 @ 09:00) (96% - 100%)  Wt(kg): --    09-11-21 @ 07:01  -  09-12-21 @ 07:00  --------------------------------------------------------  IN: 1734.1 mL / OUT: 423 mL / NET: 1311.1 mL    09-12-21 @ 07:01  -  09-12-21 @ 09:21  --------------------------------------------------------  IN: 274.6 mL / OUT: 40 mL / NET: 234.6 mL    Physical Exam:              Gen: Intubated  	HEENT: ET tube +  	Pulm: Mechanically ventilated via ETT  	CV: S1S2  	Abd: Soft, +BS  	Ext: 2+ Pitting LE edema B/L  	Neuro: Awake, confused              : Charles+ clear urine  	Skin: Warm and dry    LABS/STUDIES  --------------------------------------------------------------------------------              8.7    0.08  >-----------<  41       [09-12-21 @ 06:11]              25.1     141  |  103  |  86  ----------------------------<  167      [09-12-21 @ 02:45]  3.7   |  9   |  2.40        Ca     7.2     [09-12-21 @ 02:45]      iCa    0.92     [09-12 @ 02:45]      Mg     1.70     [09-12-21 @ 02:45]      Phos  7.7     [09-12-21 @ 02:45]    TPro  5.7  /  Alb  3.0  /  TBili  4.1  /  DBili  x   /  AST  32  /  ALT  12  /  AlkPhos  253  [09-12-21 @ 02:45]    PT/INR: PT 13.5 , INR 1.19       [09-12-21 @ 02:46]  PTT: 30.2       [09-12-21 @ 02:46]    Uric acid 7.0      [09-12-21 @ 02:45]        [09-12-21 @ 02:45]    Creatinine Trend:  SCr 2.40 [09-12 @ 02:45]  SCr 2.40 [09-11 @ 04:49]  SCr 2.36 [09-10 @ 23:19]  SCr 2.39 [09-10 @ 14:27]  SCr 2.35 [09-10 @ 05:32] [facility] Division of Kidney Diseases & Hypertension  FOLLOW UP NOTE  [phone]--------------------------------------------------------------------------------    HPI : 66 year old female with HTN, HLD, recently discovered ovarian mass suspicious for malignancy (7/2021), L hydroureteronephrosis s/p renal stent (7/15/21), and recent hospitalization ([facility] 7/26-8/4) for PURVI requiring urgent HD, ascites s/p therapeutic paracentesis (7/27/21), and pleural effusion s/p R thoracentesis (8/2/21) admitted for acute hypercarbic respiratory failure due to pleural effusions in the setting of ovarian malignancy complicated with volume overload with ascites. Now found to have new onset PURVI. Baseline Cr 0.7-1.1mg/dl. Had a recent PURVI episode which resolved- started to trend up again on 8/31. Pt. initiated on chemotherapy on 9/1. Pt. had a cardiopulmonary arrest on 9/5/21 and remains intubated in MICU.    Patient seen and examined at bedside. SCr elevated/stable at 2.40 today, however pt. now oliguric despite high dose Bumex gtt.    PAST HISTORY  --------------------------------------------------------------------------------  No significant changes to PMH, PSH, FHx, SHx, unless otherwise noted    ALLERGIES & MEDICATIONS  --------------------------------------------------------------------------------  Allergies    penicillins (Rash)    Intolerances    Standing Inpatient Medications  allopurinol 100 milliGRAM(s) Oral daily  buMETAnide Infusion 4 mG/Hr IV Continuous <Continuous>  chlorhexidine 0.12% Liquid 15 milliLiter(s) Oral Mucosa every 12 hours  chlorhexidine 4% Liquid 1 Application(s) Topical <User Schedule>  dexMEDEtomidine Infusion 0.5 MICROgram(s)/kG/Hr IV Continuous <Continuous>  dextrose 40% Gel 15 Gram(s) Oral once  dextrose 5%. 1000 milliLiter(s) IV Continuous <Continuous>  dextrose 5%. 1000 milliLiter(s) IV Continuous <Continuous>  dextrose 50% Injectable 25 Gram(s) IV Push once  dextrose 50% Injectable 25 Gram(s) IV Push once  dextrose 50% Injectable 12.5 Gram(s) IV Push once  doxorubicin IVPB w/etoposide (eMAR) 5 milliGRAM(s) IV Intermittent daily  glucagon  Injectable 1 milliGRAM(s) IntraMuscular once  insulin lispro (ADMELOG) corrective regimen sliding scale   SubCutaneous every 6 hours  isoniazid 300 milliGRAM(s) Oral every 24 hours  midodrine 20 milliGRAM(s) Oral every 8 hours  norepinephrine Infusion 0.05 MICROgram(s)/kG/Min IV Continuous <Continuous>  ondansetron Injectable 8 milliGRAM(s) IV Push every 8 hours  petrolatum Ophthalmic Ointment 1 Application(s) Both EYES two times a day  piperacillin/tazobactam IVPB.. 3.375 Gram(s) IV Intermittent every 8 hours  polyethylene glycol 3350 17 Gram(s) Oral daily  pyridoxine 50 milliGRAM(s) Oral daily  senna Syrup 15 milliLiter(s) Oral at bedtime  sevelamer carbonate Powder 1200 milliGRAM(s) Oral three times a day with meals  sodium bicarbonate 1300 milliGRAM(s) Oral three times a day    REVIEW OF SYSTEMS  --------------------------------------------------------------------------------  Unable to obtain due to medical condition    VITALS/PHYSICAL EXAM  --------------------------------------------------------------------------------  T(C): 35.7 (09-12-21 @ 04:00), Max: 36.1 (09-11-21 @ 16:00)  HR: 62 (09-12-21 @ 09:00) (50 - 887)  BP: 114/66 (09-12-21 @ 09:00) (85/39 - 138/73)  RR: 26 (09-12-21 @ 09:00) (26 - 33)  SpO2: 97% (09-12-21 @ 09:00) (96% - 100%)  Wt(kg): --    09-11-21 @ 07:01  -  09-12-21 @ 07:00  --------------------------------------------------------  IN: 1734.1 mL / OUT: 423 mL / NET: 1311.1 mL    09-12-21 @ 07:01  -  09-12-21 @ 09:21  --------------------------------------------------------  IN: 274.6 mL / OUT: 40 mL / NET: 234.6 mL    Physical Exam:              Gen: Intubated  	HEENT: ET tube +  	Pulm: Mechanically ventilated via ETT  	CV: S1S2  	Abd: Soft, +BS  	Ext: 2+ Pitting LE edema B/L  	Neuro: Awake, confused              : Charles+ clear urine  	Skin: Warm and dry    LABS/STUDIES  --------------------------------------------------------------------------------              8.7    0.08  >-----------<  41       [09-12-21 @ 06:11]              25.1     141  |  103  |  86  ----------------------------<  167      [09-12-21 @ 02:45]  3.7   |  9   |  2.40        Ca     7.2     [09-12-21 @ 02:45]      iCa    0.92     [09-12 @ 02:45]      Mg     1.70     [09-12-21 @ 02:45]      Phos  7.7     [09-12-21 @ 02:45]    TPro  5.7  /  Alb  3.0  /  TBili  4.1  /  DBili  x   /  AST  32  /  ALT  12  /  AlkPhos  253  [09-12-21 @ 02:45]    Creatinine Trend:  SCr 2.40 [09-12 @ 02:45]  SCr 2.40 [09-11 @ 04:49]  SCr 2.36 [09-10 @ 23:19]  SCr 2.39 [09-10 @ 14:27]  SCr 2.35 [09-10 @ 05:32]

## 2021-09-12 NOTE — PROGRESS NOTE ADULT - ATTENDING COMMENTS
BLBCL on chemo.  Worsening metabolic acidosis and renal failure.  For HD today.  Volume removal may help facilitate weaning.

## 2021-09-12 NOTE — PROGRESS NOTE ADULT - SUBJECTIVE AND OBJECTIVE BOX
Medicine Follow-up    INTERVAL HPI/OVERNIGHT EVENTS:  Pt sedated / unresponsive, getting emergent HD     VITAL SIGNS:  T(F): 97.8 (09-12-21 @ 12:00)  HR: 65 (09-12-21 @ 13:00)  BP: 109/62 (09-12-21 @ 13:00)  RR: 27 (09-12-21 @ 13:00)  SpO2: 100% (09-12-21 @ 13:00)  Wt(kg): --    09-11-21 @ 07:01  -  09-12-21 @ 07:00  --------------------------------------------------------  IN: 1734.1 mL / OUT: 423 mL / NET: 1311.1 mL    09-12-21 @ 07:01  -  09-12-21 @ 13:42  --------------------------------------------------------  IN: 449.2 mL / OUT: 50 mL / NET: 399.2 mL        PHYSICAL EXAM:    Respiratory: decreased BS B/L bases   Cardiovascular: RRR, normal S1S2  Gastrointestinal: NABS soft, NTND  Extremities:  B/L LE edema    MEDICATIONS  (STANDING):  allopurinol 100 milliGRAM(s) Oral daily  buMETAnide Infusion 4 mG/Hr (20 mL/Hr) IV Continuous <Continuous>  chlorhexidine 0.12% Liquid 15 milliLiter(s) Oral Mucosa every 12 hours  chlorhexidine 4% Liquid 1 Application(s) Topical <User Schedule>  dexMEDEtomidine Infusion 0.5 MICROgram(s)/kG/Hr (7.83 mL/Hr) IV Continuous <Continuous>  dextrose 40% Gel 15 Gram(s) Oral once  dextrose 5%. 1000 milliLiter(s) (50 mL/Hr) IV Continuous <Continuous>  dextrose 5%. 1000 milliLiter(s) (100 mL/Hr) IV Continuous <Continuous>  dextrose 50% Injectable 25 Gram(s) IV Push once  dextrose 50% Injectable 25 Gram(s) IV Push once  dextrose 50% Injectable 12.5 Gram(s) IV Push once  doxorubicin IVPB w/etoposide (eMAR) 5 milliGRAM(s) IV Intermittent daily  glucagon  Injectable 1 milliGRAM(s) IntraMuscular once  insulin lispro (ADMELOG) corrective regimen sliding scale   SubCutaneous every 6 hours  isoniazid 300 milliGRAM(s) Oral every 24 hours  midodrine 20 milliGRAM(s) Oral every 8 hours  norepinephrine Infusion 0.05 MICROgram(s)/kG/Min (2.93 mL/Hr) IV Continuous <Continuous>  ondansetron Injectable 8 milliGRAM(s) IV Push every 8 hours  petrolatum Ophthalmic Ointment 1 Application(s) Both EYES two times a day  piperacillin/tazobactam IVPB.. 3.375 Gram(s) IV Intermittent every 8 hours  polyethylene glycol 3350 17 Gram(s) Oral daily  pyridoxine 50 milliGRAM(s) Oral daily  senna Syrup 15 milliLiter(s) Oral at bedtime  sevelamer carbonate Powder 1200 milliGRAM(s) Oral three times a day with meals  sodium bicarbonate 1300 milliGRAM(s) Oral three times a day    MEDICATIONS  (PRN):  bisacodyl Suppository 10 milliGRAM(s) Rectal daily PRN Constipation  hydrocortisone sodium succinate Injectable 100 milliGRAM(s) IV Push once PRN PRN Chemotherapy Reaction      penicillins (Rash)      LABS:                        8.7    0.08  )-----------( 41       ( 12 Sep 2021 06:11 )             25.1     09-12    141  |  103  |  86<H>  ----------------------------<  167<H>  3.7   |  9<LL>  |  2.40<H>    Ca    7.2<L>      12 Sep 2021 02:45  Phos  7.7     09-12  Mg     1.70     09-12    TPro  5.7<L>  /  Alb  3.0<L>  /  TBili  4.1<H>  /  DBili  x   /  AST  32  /  ALT  12  /  AlkPhos  253<H>  09-12    PT/INR - ( 12 Sep 2021 02:46 )   PT: 13.5 sec;   INR: 1.19 ratio         PTT - ( 12 Sep 2021 02:46 )  PTT:30.2 sec Lactate Dehydrogenase, Serum: 687 U/L (09-12 @ 02:45)        RADIOLOGY & ADDITIONAL TESTS:  Studies reviewed.

## 2021-09-12 NOTE — PROGRESS NOTE ADULT - PROBLEM SELECTOR PLAN 2
Pt. with metabolic acidosis in the setting of PURVI. Serum CO2 low at 9. Continue sodium bicarbonate tablets 1300 mg TID. Plan for Hemodialysis today. Monitor SCO2 and pH daily. Pt. with metabolic acidosis in the setting of PURVI. Serum CO2 low at 9. Pt. on oral sodium bicarbonate tablets 1300 mg TID. Plan for HD today. Monitor SCO2.    If you have any questions, please feel free to contact me  Celio Barrow  Nephrology Fellow  [phone]  (After 5pm or on weekends please page the on-call fellow)

## 2021-09-12 NOTE — PROCEDURE NOTE - NSPOSTPRCRAD_GEN_A_CORE
no pneumothorax
pending
post-procedure radiography performed
central line located in the superior vena cava/post-procedure radiography performed
post-procedure radiography performed

## 2021-09-12 NOTE — PROGRESS NOTE ADULT - PROBLEM SELECTOR PLAN 1
Pt with PURVI in the setting of IV contrast exposure and obstructive uropathy. Of note, recent hospitalization at [facility] 7/26-8/4 for PURVI requiring urgent HD, however PURVI had resolved at discharge. SCr was at 0.99 on 08/19. Pt. had an PURVI episode which resolved during current hospitalization, but now with recurrent PURVI. Scr elevated since 8/31. Repeat CT scan showed stable B/L hydronephrosis. Scr elevated/stable at 2.40 today. Pt. now non-oliguric despite Bumex gtt with net positive fluid balance in 24 hours. Labs reviewed. Pt. now with worsening acidosis. Discussed with son David Camargo who agrees to proceed with dialytic therapy. Will plan for HD later today. Discussed with MICU team. Pt. will need non-tunneled HD catheter placement. Closely monitor urine output.  Avoid NSAIDs, ACEI/ARBS, RCA and nephrotoxins. Dose medications for eGFR < 10. Pt with PURVI in the setting of IV contrast exposure and obstructive uropathy. Of note, recent hospitalization at [facility] (7/26-8/4/21) for PURVI requiring urgent HD, however PURVI had resolved at discharge. Scr was at 0.99 on 8/19. Pt. had an PURVI episode which resolved during current hospitalization, but now with recurrent PURVI. Scr elevated since 8/31. Repeat CT scan showed stable B/L hydronephrosis. Scr elevated at 2.40 today. Pt. now oliguric despite Bumex gtt with net positive fluid balance in 24 hours. Labs reviewed. Pt. also with worsening acidosis. Discussed with son David Camargo who agrees to proceed with dialytic therapy. Will plan for HD later today. Discussed with MICU team. Pt. will need non-tunneled HD catheter placement. Closely monitor urine output.  Avoid NSAIDs, ACEI/ARBS, RCA and nephrotoxins. Dose medications for eGFR <10.

## 2021-09-13 NOTE — PROGRESS NOTE ADULT - ATTENDING COMMENTS
Pt. with oliguric PURVI and metabolic acidosis. Plan for HD as outlined above. Monitor labs and urine output. Avoid any potential nephrotoxins. Dose medications as per eGFR. Overall prognosis guarded. Assessment and plan discussed with MICU team.    Will do HD today with UF as tolerated. Cont to monitor for signs of recovery.

## 2021-09-13 NOTE — PROGRESS NOTE ADULT - PROBLEM SELECTOR PLAN 2
Pt. with metabolic acidosis in the setting of PURVI. Started on HD on 9/12, on sodium bicarbonate tablets 1300 mg TID.

## 2021-09-13 NOTE — PROGRESS NOTE ADULT - ASSESSMENT
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 Patient is now intubated in the MICU and with pressor support, started on R-CHOP chemotherapy. Patient also has bilateral adnexal masses and peritoneal carcinomatosis with ascites and extensive abdominal and pelvic adenopathy. Hospitalization course has been c/b ongoing fevers and increasing pressor and O2 support. S/p self extubation on 9/5 c/b cardiac arrest, then re-intubated and sedated      DLBCL  -s/p Dexamethasone 8/26-8/29, Rituxan 8/28  -s/p D1-D2 cyclophosphamide 100mg 9/1-9/2 with reduction of doses   -s/p D3 cyclophosphamide 225mg q12h on 9/3  -continue close monitoring of TLS labs q8h given high risk of TLS (phos, Mg, BMP, LDH, uric acid)    -continue with allopurinol for TLS ppx  -RBC transfusion support to keep Hgb >7 (last transfused 9/4)  -appreciate nephrology recommendations for PURVI and TLS, bumex gtt started for decreased urine output and volume overload    -f/u palliative care recommendations for ongoing GOC discussions  -continue DVT ppx with heparin subq  -s/p doxorubicin, vincristine, etoposide completed 9/4; only one day completed due to above events on 9/5 (self-extubated, cardiac arrest)  ---Patient now severely neutropenic, likely due to 24hrs of chemotherapy  ---Zarxio 300mcg daily given on 9/9. Please hold zarxio on 9/10, 9/11 and 9/12 while patient is getting chemotherapy.    ****Zarxio  Needs to restart on 9/13.*********     ---Treatment was initially held due to plans for trach by MICU team. Trach is now on hold, so we will proceed with continuation of treatment on 9/10 and 9/11.  ------No dose adjustments required for neutropenia  ------Dose adjustments to be made for hyperbilirubinemia:  -----------Etoposide 50% of dose: now 25mg/m2 (9/10-9/12)  -----------Vincristine: Will hold as Tbili >3  -----------Doxorubicine 25% of dose: now 2.5mg/m2 (9/10-9/12)    TLS labs revd today and no evidence of TLS - although LDH and P rising somewhat - cont to follow with TLS labs every 8 hours.    S/P 1u pRBC and plt transfusion yest with rise of both - repeat / follow. 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 Patient is now intubated in the MICU and with pressor support, started on R-CHOP chemotherapy. Patient also has bilateral adnexal masses and peritoneal carcinomatosis with ascites and extensive abdominal and pelvic adenopathy. Hospitalization course has been c/b ongoing fevers and increasing pressor and O2 support. S/p self extubation on 9/5 c/b cardiac arrest, then re-intubated and sedated      DLBCL  -s/p Dexamethasone 8/26-8/29, Rituxan 8/28  -s/p D1-D2 cyclophosphamide 100mg 9/1-9/2 with reduction of doses   -s/p D3 cyclophosphamide 225mg q12h on 9/3  -continue close monitoring of TLS labs q8h given high risk of TLS (phos, Mg, BMP, LDH, uric acid)    -continue with allopurinol for TLS ppx  -RBC transfusion support to keep Hgb >7 (last transfused 9/4)  -appreciate nephrology recommendations for PURVI and TLS, bumex gtt started for decreased urine output and volume overload    -f/u palliative care recommendations for ongoing GOC discussions  -continue DVT ppx with heparin subq  -s/p doxorubicin, vincristine, etoposide completed 9/4; only one day completed due to above events on 9/5 (self-extubated, cardiac arrest); patient subsequently neutropenic and s/p Zarxio 300mcg on 9/9.  -Pt improved but further tx ventura due to plans for trach. 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 Patient is now intubated in the MICU and with pressor support, started on R-CHOP chemotherapy. Patient also has bilateral adnexal masses and peritoneal carcinomatosis with ascites and extensive abdominal and pelvic adenopathy. Hospitalization course has been c/b ongoing fevers and increasing pressor and O2 support. S/p self extubation on 9/5 c/b cardiac arrest, then re-intubated and sedated      DLBCL  -s/p Dexamethasone 8/26-8/29, Rituxan 8/28  -s/p D1-D2 cyclophosphamide 100mg 9/1-9/2 with reduction of doses   -s/p D3 cyclophosphamide 225mg q12h on 9/3  -continue close monitoring of TLS labs q8h given high risk of TLS (phos, Mg, BMP, LDH, uric acid)    -continue with allopurinol for TLS ppx  -RBC transfusion support to keep Hgb >7 (last transfused 9/4)  -appreciate nephrology recommendations for PURVI and TLS, bumex gtt started for decreased urine output and volume overload    -f/u palliative care recommendations for ongoing GOC discussions  -continue DVT ppx with heparin subq  -s/p doxorubicin, vincristine, etoposide completed 9/4; only one day completed due to above events on 9/5 (self-extubated, cardiac arrest); patient subsequently neutropenic and s/p Zarxio 300mcg on 9/9.  -Pt improved but further tx held due to plans for trach. Trach now on hold so proceeded with treatment as below on 9/10 and 9/11:  ------No dose adjustments required for neutropenia  ------Dose adjustments to be made for hyperbilirubinemia:  -----------Etoposide 50% of dose: now 25mg/m2 (9/10-9/12)  -----------Vincristine: Will hold as Tbili >3  -----------Doxorubicine 25% of dose: now 2.5mg/m2 (9/10-9/12)  -plan was to proceed until 9/12 but 9/12 dose held d/t worsening clinical status  -continue to monitor TLS labs q8hrs   -Zarxio restarted 9/13

## 2021-09-13 NOTE — CONSULT NOTE ADULT - ASSESSMENT
66-year-old female with PMH significant for HTN, HLD, recently discovered ovarian mass suspicious for malignancy (7/2021), and L hydroureteronephrosis s/p renal stent (7/15/21) presented to [facility] on 8/12 with BLE edema and worsening abdominal distension and SOB.   patient was recently hospitalized ([facility] 7/26-8/4) for acute renal failure (likely obstructive vs MONO) requiring urgent HD, ascites s/p therapeutic paracentesis (7/27/21), and pleural effusion s/p R thoracentesis (8/2/21) showing lymphocytosis concern for malignancy  During hospitalization patient underwent L inguinal lymph node biopsy which showed diffuse high grade B-cell lymphoma with peritoneal carcinomatosis. Patient was seen by Hem/onc and started on chemotherapy. Hospital course complicated with Hypoxic/Hypercapnic respiratory failure s/p intubation likely due to recurrent malignant pleural effusion s/p multiple thoracocentesis and Pleurx on 8/27. Patient also self extubated herself on 9/5 and had an episode of cardiac arrest on 9/5.  Patient also has been on levophed.   hepatology consulted for elevated liver tests and direct Bilirubinemia.      # Elevated liver tests, cholestatic pattern , DILI +/- cholestasis of sepsis +/- ischemic liver injury +/- cholestasis of sepsis, less likely biliary stone    # Hyperbilirubinemia , mostly Direct  - Patient recently diagnosed DLBCL started on chemo as below:    -s/p Dexamethasone 8/26-8/29, Rituxan 8/28--> can cause hepatocellular liver injury     -s/p D1-D2 cyclophosphamide 100mg 9/1-9/2 with reduction of doses and s/p D3 cyclophosphamide 225mg q12h on 9/3--> can induce sinusoidal obscuration syn    - s/p doxorubicin, vincristine, etoposide completed 9/4; only one day completed due to self-extubation, cardiac arrest on 9/5-->  can induce sinusoidal obscuration syn    - INH since 9/1 indeterminate QuantiFeron)-->  can cause hepatocellular liver injury     - Allopurinol for TLS ppx--> can cause mixed pattern liver injury  - patient hypotensive on levophed --> sepsis ? ( on epimeric zosyn, strongyloides positive - s/p ivermectin on 9/1 - 9/2,  given IC state ) +/- sedative   - CT IC on 8/18--> no liver lesion, bile ducts NL  - TTE:  8/3 showing concentric left ventricular remodeling, hyperdynamic left ventricle, calcified trileaflet aortic valve with normal opening, EF 56%  - Bedside US on 9/13--> decreased LV/RV function  - Acute Hep A/B/C neg  - HSV PCR in CSF neg    Rec:  - Monitor INR and LFTs ( fractionated Bili ) daily  - Avoid hepatotoxic medications  - Avoid hypotension  - US liver + duplex to evaluate biliary tree and liver vasculature  - Patient unstable to have MRCP  - Anti HEV, Serum Ferritin, Transferrin Saturation, Ceruloplasmin level, SANJEEV, SMA, gamma globulin, AMA, LKM ab  - rest of care as per ICU  - poor prognosis           66-year-old female with PMH significant for HTN, HLD, recently discovered ovarian mass suspicious for malignancy (7/2021), and L hydroureteronephrosis s/p renal stent (7/15/21) presented to [facility] on 8/12 with BLE edema and worsening abdominal distension and SOB.   patient was recently hospitalized ([facility] 7/26-8/4) for acute renal failure (likely obstructive vs MONO) requiring urgent HD, ascites s/p therapeutic paracentesis (7/27/21), and pleural effusion s/p R thoracentesis (8/2/21) showing lymphocytosis concern for malignancy  During hospitalization patient underwent L inguinal lymph node biopsy which showed diffuse high grade B-cell lymphoma with peritoneal carcinomatosis. Patient was seen by Hem/onc and started on chemotherapy. Hospital course complicated with Hypoxic/Hypercapnic respiratory failure s/p intubation likely due to recurrent malignant pleural effusion s/p multiple thoracocentesis and Pleurx on 8/27. Patient also self extubated herself on 9/5 and had an episode of cardiac arrest on 9/5.  Patient also has been on levophed.   hepatology consulted for elevated liver tests and direct Bilirubinemia.      # Elevated liver tests, cholestatic pattern , DILI +/- cholestasis of sepsis +/- congestive hepatopathy r/o biliary obstruction, less likely infiltrative liver disease as ALP was normal on admission    # Hyperbilirubinemia , mostly Direct  - Patient recently diagnosed DLBCL started on chemo as below:    -s/p Dexamethasone 8/26-8/29, Rituxan 8/28--> can cause hepatocellular liver injury     -s/p D1-D2 cyclophosphamide 100mg 9/1-9/2 with reduction of doses and s/p D3 cyclophosphamide 225mg q12h on 9/3--> can induce sinusoidal obscuration syn    - s/p doxorubicin, vincristine, etoposide completed 9/4; only one day completed due to self-extubation, cardiac arrest on 9/5-->  can induce sinusoidal obscuration syn    - INH since 9/1 indeterminate QuantiFeron)-->  can cause hepatocellular liver injury     - Allopurinol for TLS ppx--> can cause mixed pattern liver injury  - patient hypotensive on levophed --> sepsis ? ( on epimeric zosyn, strongyloides positive - s/p ivermectin on 9/1 - 9/2,  given IC state ) +/- sedative   - CT IC on 8/18--> no liver lesion, bile ducts NL  - TTE:  8/3 showing concentric left ventricular remodeling, hyperdynamic left ventricle, calcified trileaflet aortic valve with normal opening, EF 56%  - Bedside US on 9/13--> decreased LV/RV function  - Acute Hep A/B/C neg  - HSV PCR in CSF neg    Rec:  - Monitor INR and LFTs ( fractionated Bili ) daily  - Avoid hepatotoxic medications  - Avoid hypotension  - US liver + duplex to evaluate biliary tree and liver vasculature  - Patient unstable to have MRCP  - Anti HEV, Serum Ferritin, Transferrin Saturation, Ceruloplasmin level, SANJEEV, SMA, gamma globulin, AMA, LKM ab  - r/o acute CMV, EBV, VZV, HSV ( PCR prefereably)  - rest of care as per ICU  - poor prognosis           66-year-old female with PMH significant for HTN, HLD, recently discovered ovarian mass suspicious for malignancy (7/2021), and L hydroureteronephrosis s/p renal stent (7/15/21) presented to [facility] on 8/12 with BLE edema and worsening abdominal distension and SOB.   patient was recently hospitalized ([facility] 7/26-8/4) for acute renal failure (likely obstructive vs MONO) requiring urgent HD, ascites s/p therapeutic paracentesis (7/27/21), and pleural effusion s/p R thoracentesis (8/2/21) showing lymphocytosis concern for malignancy  During hospitalization patient underwent L inguinal lymph node biopsy which showed diffuse high grade B-cell lymphoma with peritoneal carcinomatosis. Patient was seen by Hem/onc and started on chemotherapy. Hospital course complicated with Hypoxic/Hypercapnic respiratory failure s/p intubation likely due to recurrent malignant pleural effusion s/p multiple thoracocentesis and Pleurx on 8/27. Patient also self extubated herself on 9/5 and had an episode of cardiac arrest on 9/5.  Patient also has been on levophed.   hepatology consulted for elevated liver tests and direct Bilirubinemia.      # Elevated liver tests, cholestatic pattern , DILI +/- cholestasis of sepsis +/- congestive hepatopathy r/o biliary obstruction, less likely infiltrative liver disease as ALP was normal on admission    # Hyperbilirubinemia , mostly Direct  - Patient recently diagnosed DLBCL started on chemo as below:    -s/p Dexamethasone 8/26-8/29, Rituxan 8/28--> can cause hepatocellular liver injury     -s/p D1-D2 cyclophosphamide 100mg 9/1-9/2 with reduction of doses and s/p D3 cyclophosphamide 225mg q12h on 9/3--> can induce sinusoidal obscuration syn    - s/p doxorubicin, vincristine, etoposide completed 9/4; only one day completed due to self-extubation, cardiac arrest on 9/5-->  can induce sinusoidal obscuration syn    - INH since 9/1 indeterminate QuantiFeron)-->  can cause hepatocellular liver injury     - Allopurinol for TLS ppx--> can cause mixed pattern liver injury  - patient hypotensive on levophed --> sepsis ? ( on epimeric zosyn, strongyloides positive - s/p ivermectin on 9/1 - 9/2,  given IC state ) +/- sedative   - CT IC on 8/18--> no liver lesion, bile ducts NL  - TTE:  8/3 showing concentric left ventricular remodeling, hyperdynamic left ventricle, calcified trileaflet aortic valve with normal opening, EF 56%  - Bedside US on 9/13--> decreased LV/RV function  - Acute Hep A/B/C neg  - HSV PCR in CSF neg    Rec:  - Monitor INR and LFTs ( fractionated Bili ) daily  - Avoid hepatotoxic medications  - Avoid hypotension  - US liver + duplex to evaluate biliary tree and liver vasculature  - Patient unstable to have MRCP  - Fungitell, Anti HEV, Serum Ferritin, Transferrin Saturation, Ceruloplasmin level, SANJEEV, SMA, gamma globulin, AMA, LKM ab  - r/o acute CMV, EBV, VZV, HSV ( PCR prefereably)  - rest of care as per ICU  - poor guarded

## 2021-09-13 NOTE — PROGRESS NOTE ADULT - ATTENDING COMMENTS
66 F with DLBCL s/p thoracentesis, L pleurx, acute hypoxemic and hypercapnic respiratory failure requiring intubation c/b cardiac arrest, now with PURVI and metabolic acidosis, on chemo.    Pt with worsening liver enzymes, continued ascites.  LV/RV dysfunction.    - c/w HD as per renal, f/u repeat labs  - needs repeat imaging of CT head since she is not waking up, ct chest/abd/pelvis for bilirubinemia, ?RUQ sono  - c/w empiric course of abx, f/u cultures  - c/w vent, acute hypoxemic and hypercapnic respiratory failure, abg reviewed  - ascites, likely malignant, monitor for now  ongoing goc discussion

## 2021-09-13 NOTE — CONSULT NOTE ADULT - SUBJECTIVE AND OBJECTIVE BOX
Gastroenterology Consultation:    Patient is a 66y old  Female who presents with a chief complaint of Worsening volume overload (13 Sep 2021 12:03)      Admitted on: 08-12-21  HPI:  66-year-old female with PMH significant for HTN, HLD, recently discovered ovarian mass suspicious for malignancy (7/2021), and L hydroureteronephrosis s/p renal stent (7/15/21) presented to [facility] on 8/12 with BLE edema and worsening abdominal distension and SOB.   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 Patient was seen by Hem/onc and started on chemotherapy. Hospital course complicated with Hypoxic/Hypercapnic respiratory failure s/p intubation likely due to recurrent malignant pleural effusion s/p multiple thoracocentesis and Pleurx on 8/27. Patient also self extubated herself on 9/5 and had an episode of cardiac arrest on 9/5.  Patient also has been on levophed.   hepatology consulted for elevated liver tests and direct Bilirubinemia.          PAST MEDICAL & SURGICAL HISTORY:  Hypertension    Ovarian mass    Hypercholesteremia    S/P ureteral stent placement        FAMILY HISTORY:  FHx: hypertension (Mother)    FH: diabetes mellitus (Mother)        Social History:  Tobacco: unable to obtain  Alcohol: unable to obtain  Drugs: unable to obtain    Home Medications:    MEDICATIONS  (STANDING):  allopurinol 100 milliGRAM(s) Oral daily  chlorhexidine 0.12% Liquid 15 milliLiter(s) Oral Mucosa every 12 hours  chlorhexidine 4% Liquid 1 Application(s) Topical <User Schedule>  dexMEDEtomidine Infusion 0.5 MICROgram(s)/kG/Hr (7.83 mL/Hr) IV Continuous <Continuous>  dextrose 40% Gel 15 Gram(s) Oral once  dextrose 5%. 1000 milliLiter(s) (50 mL/Hr) IV Continuous <Continuous>  dextrose 5%. 1000 milliLiter(s) (100 mL/Hr) IV Continuous <Continuous>  dextrose 50% Injectable 25 Gram(s) IV Push once  dextrose 50% Injectable 12.5 Gram(s) IV Push once  dextrose 50% Injectable 25 Gram(s) IV Push once  filgrastim-sndz (ZARXIO) Injectable 300 MICROGram(s) SubCutaneous daily  glucagon  Injectable 1 milliGRAM(s) IntraMuscular once  insulin lispro (ADMELOG) corrective regimen sliding scale   SubCutaneous every 6 hours  isoniazid 300 milliGRAM(s) Oral every 24 hours  midodrine 20 milliGRAM(s) Oral every 8 hours  norepinephrine Infusion 0.05 MICROgram(s)/kG/Min (2.93 mL/Hr) IV Continuous <Continuous>  petrolatum Ophthalmic Ointment 1 Application(s) Both EYES two times a day  piperacillin/tazobactam IVPB.. 3.375 Gram(s) IV Intermittent every 8 hours  polyethylene glycol 3350 17 Gram(s) Oral daily  pyridoxine 50 milliGRAM(s) Oral daily  senna Syrup 15 milliLiter(s) Oral at bedtime  sevelamer carbonate Powder 1200 milliGRAM(s) Oral three times a day with meals  sodium bicarbonate 1300 milliGRAM(s) Oral three times a day    MEDICATIONS  (PRN):  bisacodyl Suppository 10 milliGRAM(s) Rectal daily PRN Constipation  fentaNYL    Injectable 100 MICROGram(s) IV Push every 15 minutes PRN Severe Pain (7 - 10)  hydrocortisone sodium succinate Injectable 100 milliGRAM(s) IV Push once PRN PRN Chemotherapy Reaction  midazolam Injectable 4 milliGRAM(s) IV Push every 15 minutes PRN anxiety  sodium chloride 0.9% lock flush 10 milliLiter(s) IV Push every 1 hour PRN Pre/post blood products, medications, blood draw, and to maintain line patency      Allergies  penicillins (Rash)      Review of Systems:   unable to obtain        Physical Examination:  T(C): 35.3 (09-13-21 @ 14:30), Max: 36.6 (09-12-21 @ 16:00)  HR: 67 (09-13-21 @ 14:30) (60 - 77)  BP: 148/53 (09-13-21 @ 14:30) (88/58 - 148/53)  RR: 26 (09-13-21 @ 14:30) (26 - 33)  SpO2: 100% (09-13-21 @ 14:30) (96% - 100%)      09-11-21 @ 07:01  -  09-12-21 @ 07:00  --------------------------------------------------------  IN: 1734.1 mL / OUT: 423 mL / NET: 1311.1 mL    09-12-21 @ 07:01  -  09-13-21 @ 07:00  --------------------------------------------------------  IN: 1923.4 mL / OUT: 130 mL / NET: 1793.4 mL    09-13-21 @ 07:01  -  09-13-21 @ 14:53  --------------------------------------------------------  IN: 712 mL / OUT: 1510 mL / NET: -798 mL          Eyes:. Conjunctivae are clear, Sclera is icteric.  Respiratory: intubated and ventilated. Lung sounds  bilaterally.  Cardiovascular:  S1 S2, Regular rate and rhythm.  GI: Abdomen is soft, symmetric, and non-tender without distention. There are no visible lesions. Bowel sounds are present and normoactive in all four quadrants. No masses, hepatomegaly, or splenomegaly are noted.   Neuro: on sedation, Non-focal            Data: (reviewed by attending)                        8.8    0.06  )-----------( 6        ( 13 Sep 2021 13:10 )             23.9     Hgb Trend:  8.8  09-13-21 @ 13:10  7.9  09-13-21 @ 01:35  7.5  09-12-21 @ 19:07  8.0  09-12-21 @ 15:57  8.7  09-12-21 @ 06:11  6.8  09-11-21 @ 17:09  7.7  09-11-21 @ 04:49  7.8  09-10-21 @ 23:19      09-11-21 @ 07:01  -  09-12-21 @ 07:00  --------------------------------------------------------  IN: 530 mL    09-12-21 @ 07:01  -  09-13-21 @ 07:00  --------------------------------------------------------  IN: 233 mL      09-13    140  |  100  |  63<H>  ----------------------------<  129<H>  3.5   |  12<L>  |  1.89<H>    Ca    7.9<L>      13 Sep 2021 12:22  Phos  5.7     09-13  Mg     1.90     09-13    TPro  5.4<L>  /  Alb  2.6<L>  /  TBili  6.2<H>  /  DBili  6.1<H>  /  AST  57<H>  /  ALT  16  /  AlkPhos  435<H>  09-13    Liver panel trend:  TBili 6.2   /   AST 57   /   ALT 16   /   AlkP 435   /   Tptn 5.4   /   Alb 2.6    /   DBili 6.1      09-13  TBili --   /   AST --   /   ALT --   /   AlkP --   /   Tptn --   /   Alb --    /   DBili 5.1      09-13  TBili 6.1   /   AST 42   /   ALT 15   /   AlkP 410   /   Tptn 5.9   /   Alb 2.7    /   DBili --      09-13  TBili 5.3   /   AST 35   /   ALT 12   /   AlkP 360   /   Tptn 5.6   /   Alb 2.9    /   DBili --      09-12  TBili 4.1   /   AST 32   /   ALT 12   /   AlkP 253   /   Tptn 5.7   /   Alb 3.0    /   DBili --      09-12  TBili 3.6   /   AST 32   /   ALT 10   /   AlkP 225   /   Tptn 5.6   /   Alb 3.0    /   DBili -- 09-11  TBili 3.6   /   AST 34   /   ALT 10   /   AlkP 236   /   Tptn 5.7   /   Alb 3.1    /   DBili --      09-10  TBili 3.7   /   AST 36   /   ALT 8   /   AlkP 217   /   Tptn 5.5   /   Alb 2.8    /   DBili --      09-10  TBili 4.1   /   AST 40   /   ALT 10   /   AlkP 213   /   Tptn 5.6   /   Alb 3.2    /   DBili --      09-10  TBili 3.7   /   AST 41   /   ALT 11   /   AlkP 187   /   Tptn 5.7   /   Alb 3.5    /   DBili -- 09-09  TBili 4.1   /   AST 40   /   ALT 10   /   AlkP 165   /   Tptn 5.6   /   Alb 3.2    /   DBili -- 09-09  TBili 2.5   /   AST 42   /   ALT 9   /   AlkP 146   /   Tptn 5.5   /   Alb 3.1    /   DBili -- 09-08  TBili 2.0   /   AST 43   /   ALT 10   /   AlkP 152   /   Tptn 5.4   /   Alb 2.9    /   DBili -- 09-08  TBili 1.5   /   AST 46   /   ALT 9   /   AlkP 167   /   Tptn 5.1   /   Alb 2.5    /   DBili -- 09-07  TBili 1.2   /   AST 43   /   ALT 12   /   AlkP 152   /   Tptn 5.1   /   Alb 2.3    /   DBili -- 09-07  TBili 1.0   /   AST 46   /   ALT 11   /   AlkP 145   /   Tptn 5.1   /   Alb 2.3    /   DBili -- 09-07  TBili 1.0   /   AST 41   /   ALT 9   /   AlkP 116   /   Tptn 5.4   /   Alb 2.7    /   DBili -- 09-06  TBili 0.9   /   AST 44   /   ALT 11   /   AlkP 96   /   Tptn 5.4   /   Alb 3.0    /   DBili -- 09-06  TBili 0.9   /   AST 39   /   ALT 11   /   AlkP 75   /   Tptn 5.4   /   Alb 3.4    /   DBili -- 09-05  TBili 1.2   /   AST 39   /   ALT 11   /   AlkP 76   /   Tptn 5.5   /   Alb 3.5    /   DBili -- 09-05  TBili 1.6   /   AST --   /   ALT --   /   AlkP --   /   Tptn --   /   Alb --    /   DBili 1.2      09-05  TBili 1.6   /   AST 35   /   ALT 11   /   AlkP 81   /   Tptn 5.6   /   Alb 3.5    /   DBili --      09-05  TBili 1.3   /   AST 31   /   ALT 9   /   AlkP 86   /   Tptn 5.7   /   Alb 3.6    /   DBili --      09-04  TBili 0.8   /   AST 32   /   ALT 12   /   AlkP 78   /   Tptn 5.0   /   Alb 2.7    /   DBili --      09-04  TBili 0.6   /   AST 35   /   ALT 11   /   AlkP 71   /   Tptn 5.0   /   Alb 2.9    /   DBili --      09-04  TBili 0.6   /   AST 30   /   ALT 7   /   AlkP 67   /   Tptn 5.1   /   Alb 3.2    /   DBili --      09-03      PT/INR - ( 13 Sep 2021 01:35 )   PT: 14.6 sec;   INR: 1.28 ratio         PTT - ( 13 Sep 2021 01:35 )  PTT:30.1 sec    < from: CT Abdomen and Pelvis w/ IV Cont (08.18.21 @ 23:06) >    IMPRESSION:  Abdominal, pelvic and mediastinal lymphadenopathy with the largest nodes in the central mesentery.    Ascites and pleural effusions with peritoneal/omental infiltration.    Diffuse enteritis.    Bilateral moderate hydronephrosis.    Bilateral enlarged adnexa. Consider sonographic correlation.    < end of copied text >

## 2021-09-13 NOTE — CHART NOTE - NSCHARTNOTEFT_GEN_A_CORE
:   Shweta Uribe, PGY4    INDICATION: shock    PROCEDURE:  [X] LIMITED ECHO  [X] LIMITED CHEST  [ ] LIMITED RETROPERITONEAL  [X ] LIMITED ABDOMINAL  [ ] LIMITED DVT    FINDINGS:  Lungs: A-line predominant pattern in anterior lung fields bilaterally. Unable to assess costophrenic angle due to dressing obstructing area of interest.  Heart: Limited views. LV systolic function grossly mildly reduced. RV larger than LV. Septal bowing on parasternal long axis and apical views. Septal flattening on parasternal short axis view. RV systolic function grossly mildly reduced. No pericardial effusion.  IVC: indeterminate in size  Abd: Loops of bowel with peristalsis. Diffuse moderate ascites. 4-5cm pocket of ascites with no obstructing bowel noted in lower right quadrant.    INTERPRETATION:  Lungs: Normal lung aeration pattern anteriorly.   Heart: Mildly reduced LV and RV function. Right sided appears overloaded, consider CHF vs pulmonary HTN.   Abdomen: Diffuse moderate to large ascites. Suitable pocket identified for possible paracentesis.     Images uploaded on [employer].    Shweta Uribe MD  PGY4, Anesthesiology, [facility]  Pager [phone] ([facility]) :   Shweta Uribe, PGY4    INDICATION: shock, acute hypoxemic respiratory failure , ascites    PROCEDURE:  [X] LIMITED ECHO  [X] LIMITED CHEST  [ ] LIMITED RETROPERITONEAL  [X ] LIMITED ABDOMINAL  [ ] LIMITED DVT    FINDINGS:  Lungs: A-line predominant pattern in anterior lung fields bilaterally. Unable to assess costophrenic angle due to dressing obstructing area of interest.  Heart: Limited views. LV systolic function grossly mildly reduced. RV larger than LV. Septal bowing on parasternal long axis and apical views. Septal flattening on parasternal short axis view. RV systolic function grossly mildly reduced. No pericardial effusion.  IVC: indeterminate in size  Abd: Loops of bowel with peristalsis. Diffuse moderate ascites. 4-5cm pocket of ascites with no obstructing bowel noted in lower right quadrant.    INTERPRETATION:  Lungs: Normal lung aeration pattern anteriorly.   Heart: Mildly reduced LV and RV function. Right sided appears overloaded, consider CHF vs pulmonary HTN.   Abdomen: Diffuse moderate to large ascites. Suitable pocket identified for possible paracentesis.     Images uploaded on [employer].    Shweta Uribe MD  PGY4, Anesthesiology, [facility]  Pager [phone] (J)    Attending Attestation:  I was present during the key portions of the procedure and immediately available during the entire procedure.  Rani Lopez MD  Attending  Pulmonary & Critical Care Medicine

## 2021-09-13 NOTE — PROGRESS NOTE ADULT - ASSESSMENT
66-year-old woman with PMH significant for HTN, HLD, L hydroureteronephrosis s/p renal stent on 7/15, who presents with volume overload 2/2 high grade B-cell lymphoma. S/p thoracentesis 8/13, paracentesis and excisional biopsy of left inguinal lymph node on 8/20. Accepted to MICU for acute hypoxic/hypercapneic respiratory failure now s/p intubation and L pleurex catheter placement, now undergoing chemotherapy.    #Neuro  - Altered mental status, likely 2/2 multifactorial in the setting of hypercarbic respiratory failure  - CTH with no intracranial pathology  - Concern for lymphomatous meningitis, however ruled out s/p LP with flow cytometry and cytopathology negative  - Currently sedated with precedex intermittently following commands - will wean as tolerated    #Cardiovascular  Vasoplegic shock 2/2 to sedatives   - Echo 8/3 showing concentric left ventricular remodeling, hyperdynamic left ventricle, calcified trileaflet aortic valve with normal opening, EF 56%  - POCUS showing adequate cardiac output  - On pressor support with levo, wean as tolerated, continue with midodrine 20 mg TID    #Respiratory  - Hypoxic/hypercapneic respiratory failure likely secondary to malignant pleural effusions from malignancy  - S/p thoracentesis on 8/13 with re-accumulation of pleural effusions  - S/p second thoracentesis 8/25, s/p L pleurex 8/27  - On pressure support, however remains tachypneic with low TVs, will repeat thoracentesis on R side before attempting extubation   - Unsuccessful breathing trials, will require future trach once platelets stabilize    #GI/Nutrition  Malignant ascites   - S/p paracentesis 8/20  - Still remains ascites, possible paracentesis    Ileus   - hold tube feeds as pt with residuals and persistent nausea 2/2 chemotherapy   - abd x-ray ordered  - zofran prn    #diarrhea   - previously constipated s/p golytely + fecal disimpaction    #/Renal  - PURVI likely 2/2 to ATN in the setting of previous supratherapeutic vancomycin level + tumor lysis + IV contrast  - B/L hydronephrosis stable on CT A/P 2/2 malignant LAD  - Nephrology on board, recommend holding LASIX & other nephrotoxic medications.   - Nephrostomy tubes considered however per IR recommendations not appropriate at this time as patient's Cr previously downtrending, may consider re-consulting if patient's renal function continues to worsen   - monitor TLS labs q8h, now with uptrending LDH and hyperphosphatemia, although uric acid remains low   - c/w phosphate binder  - s/p bicarb drip, transitioned to PO bicarb   - carnes placed for accurate I+Os  -plan for CRRT today 9/12    #Skin  - No sacral decubiti    #ID  - s/p Vanco and Zosyn (ended 8/31)   - U/A grossly positive, urine cx NGTD  - blood cx 8/28/21 NGTD  - strongyloides positive - s/p ivermectin (9/1 - 9/2) given IC state   - c/w INH + pyridoxine (given indeterminate quant gold)    #Endocrine  - SSI   - will readjust as necessary     #Hematologic/DVT ppx  - newly diagnosed diffuse large B-cell lymphoma  - TLS labs q8  - Allopurinol for TLS prophylaxis  - Heme/Onc recommending starting steroids with dexamethasone, s/p 40 mg dose (ended 8/29, 8/31)   - s/p Rituxan 8/28/21  - s/p cyclophosphamide (1st cycle 9/1, 2nd cycle 9/2, 3rd cycle 9/3)  - s/p doxorubicin 9/4  - restarting doxorubicin/etoposide on 9/10   - will hold filgastrim 300 mg IV for chemotherapy  - as per oncology, will resume chemotherapy once trach is done  - DVT prophylaxis with SCDs, no pharmacological px  -s/p 1U pRBC 9/11 6.8 --> 8.7    #Ethics  - Patient is FULL CODE per palliative care discussion with patient and her sons (Yuan and Jose)  - Son (Yuan) and father still deciding on whether they would want a trach, understanding that this would be the pt's only option     Fill-in proxy is Jose Camargo: [phone]     66-year-old woman with PMH significant for HTN, HLD, L hydroureteronephrosis s/p renal stent on 7/15, who presents with volume overload 2/2 high grade B-cell lymphoma. S/p thoracentesis 8/13, paracentesis and excisional biopsy of left inguinal lymph node on 8/20. Accepted to MICU for acute hypoxic/hypercapneic respiratory failure now s/p intubation and L pleurex catheter placement, now undergoing chemotherapy.    #Neuro  - Altered mental status, likely 2/2 multifactorial in the setting of hypercarbic respiratory failure  - CTH with no intracranial pathology  - Concern for lymphomatous meningitis, however ruled out s/p LP with flow cytometry and cytopathology negative  - Currently sedated with precedex intermittently following commands - will wean as tolerated  - pt noted to be less responsive today, ammonia level normal, if continues to be this way can consider CTH, CT chest, and CT Abdomen and Pelvis, as ABG appears to be appropriate    #Cardiovascular  Vasoplegic shock 2/2 to sedatives   - Echo 8/3 showing concentric left ventricular remodeling, hyperdynamic left ventricle, calcified trileaflet aortic valve with normal opening, EF 56%  - POCUS showing RV enalragement  - On pressor support with levo, wean as tolerated, continue with midodrine 20 mg TID    #Respiratory  - Hypoxic/hypercapneic respiratory failure likely secondary to malignant pleural effusions from malignancy  - S/p thoracentesis on 8/13 with re-accumulation of pleural effusions  - S/p second thoracentesis 8/25, s/p L pleurex 8/27  - On pressure support, however remains tachypneic with low TVs, will repeat thoracentesis on R side before attempting extubation   - Unsuccessful breathing trials, will require future trach once platelets stabilize    #GI/Nutrition  Malignant ascites   - S/p paracentesis 8/20  - Still remains ascites, possible paracentesis    Ileus   - hold tube feeds as pt with residuals and persistent nausea 2/2 chemotherapy   - abd x-ray ordered  - zofran prn    #diarrhea   - previously constipated s/p golytely + fecal disimpaction    #/Renal  - PURVI likely 2/2 to ATN in the setting of previous supratherapeutic vancomycin level + tumor lysis + IV contrast  - B/L hydronephrosis stable on CT A/P 2/2 malignant LAD  - Nephrology on board, recommend holding LASIX & other nephrotoxic medications.   - Nephrostomy tubes considered however per IR recommendations not appropriate at this time as patient's Cr previously downtrending, may consider re-consulting if patient's renal function continues to worsen   - monitor TLS labs q8h, now with uptrending LDH and hyperphosphatemia, although uric acid remains low   - c/w phosphate binder  - s/p bicarb drip, transitioned to PO bicarb   - carnes placed for accurate I+Os  - received first session of HD on 9/12, will receive again on 9/13, however will monitor BP and pressor requirements with fluid removal    #Skin  - No sacral decubiti    #ID  - s/p Vanco and Zosyn (ended 8/31)   - U/A grossly positive, urine cx NGTD  - blood cx 8/28/21 NGTD  - strongyloides positive - s/p ivermectin (9/1 - 9/2) given IC state   - c/w INH + pyridoxine (given indeterminate quant gold)    #Endocrine  - SSI   - will readjust as necessary     #Hematologic/DVT ppx  - newly diagnosed diffuse large B-cell lymphoma  - TLS labs q8  - Allopurinol for TLS prophylaxis  - Heme/Onc recommending starting steroids with dexamethasone, s/p 40 mg dose (ended 8/29, 8/31)   - s/p Rituxan 8/28/21  - s/p cyclophosphamide (1st cycle 9/1, 2nd cycle 9/2, 3rd cycle 9/3)  - s/p doxorubicin 9/4  - restarting doxorubicin/etoposide on 9/10   - restarted on filgastrim as per oncology  - chemotherapy held on 9/12 night given worsening hypotension and increasing pressor requirements  - DVT prophylaxis with SCDs, no pharmacological px  -s/p 1U pRBC 9/11 6.8 --> 8.7    #Ethics  - Patient is FULL CODE per palliative care discussion with patient and her sons (Yuan and Jose)  - Son (Yuan) and father still deciding on whether they would want a trach, understanding that this would be the pt's only option     Fill-in proxy is Jose Camargo: [phone]

## 2021-09-13 NOTE — PROGRESS NOTE ADULT - SUBJECTIVE AND OBJECTIVE BOX
Sciatica    Sciatica is a condition that causes pain in the lower back and down into the buttock, hip, and leg. Sometimes the leg pain can happen without any back pain. Sciatica happens when a spinal nerve is irritated or has pressure put on it as comes out of the spinal canal in the lower back. This most often happens when a bulge or rupture of a nearby spinal disk presses on the nerve. Sciatica can also be caused by a narrowing of the spinal canal (spinal stenosis) or spasm of the muscle in the buttocks that the sciatic nerve passes through (pyriform muscle). Sciatica is also called lumbar radiculopathy.  Sciatica may begin after a sudden twisting or bending force, such as in a car accident. Or it can happen after a simple awkward movement. In either case, muscle spasm often also happens. Muscle spasm makes the pain worse.  A health care provider makes a diagnosis of sciatica from your symptoms and a physical exam. Unless you had an injury from a car accident or fall, you usually won’t have X-rays taken at this time. This is because the nerves and disks in your back can’t be seen on an X-ray. If the provider sees signs of a compressed nerve, you will need to schedule an MRI scan as an outpatient. Signs of a compressed nerve include loss of strength in a leg.  Most sciatica gets better with medicine, exercise, and physical therapy. If your symptoms continue after at least 3 months of medical treatment, you may need surgery.  Home care  Follow these tips when caring for yourself at home:  · You may need to stay in bed the first few days. But as soon as possible, begin sitting or walking. This will help you avoid problems that come from staying in bed for long periods.  · When in bed, try to find a position that is comfortable. A firm mattress is best. Try lying flat on your back with pillows under your knees. You can also try lying on your side with your knees bent up toward your chest and a pillow between your  knees.  · Avoid sitting for long periods. This puts more stress on your lower back than standing or walking.  · Use heat from a hot shower, hot bath, or heating pad to help ease pain. Massage can also help. You can also try using an ice pack. You can make your own ice pack by putting ice cubes in a plastic bag. Wrap the bag in a thin towel. Try both heat and cold to see which works best. Use the method that feels best for 20 minutes several times a day.  · You may use acetaminophen or ibuprofen to ease pain, unless another pain medicine was prescribed. Note: If you have chronic liver or kidney disease, talk with your health care provider before taking these medicines. Also talk with your provider if you’ve had a stomach ulcer or GI bleeding.  · Use safe lifting methods. Don’t lift anything heavier than 15 pounds until all of the pain is gone.  Follow-up care  Follow up with your health care provider if your symptoms don’t start to get better after 1 week. You may need physical therapy or additional tests.  If X-rays were taken, they will be looked at by a radiologist. You will be told of any new findings that may affect your care.  When to seek medical advice  Call your health care provider right away if any of these occur:  · Pain gets worse even after taking prescribed medicine  · Weakness or numbness in 1 or both legs or hips  · Numbness in your groin or genital area  · You can’t control your bowel or bladder  · Fever  · Redness or swelling over your back or spine   © 8854-6990 [employer]. [address] [place] [address], [place] [address]. All rights reserved. This information is not intended as a substitute for professional medical care. Always follow your healthcare professional's instructions.          Back Care Tips    Caring for your back  These are things you can do to prevent a recurrence of acute back pain and to reduce symptoms from chronic back pain:  · Maintain a healthy weight. If you are  overweight, losing weight will help most types of back pain.  · Exercise is an important part of recovery from most types of back pain. The back is supported by the muscles behind and in front of the spine. This means both the back muscles and the abdominal muscles must be strengthened to provide better support for your spine.   · Swimming and brisk walking are good overall exercises to improve your fitness level.  · Practice safe lifting methods (below).  · Practice good posture when sitting, standing and walking. Avoid prolonged sitting. This puts more stress on the lower back than standing or walking.  · Wear quality shoes with sufficient arch support. Foot and ankle alignment can affect back symptoms. Women should avoid high heels.  · Therapeutic massage can help  relax the back muscles without stretching them.  · During the first 24 to 72 hours after an acute injury or flare-up of chronic back pain, apply an ice pack to the painful area for 20 minutes and then remove it for 20 minutes over a period of 60 to 90 minutes or several times a day. As a safety precaution, do not use a heating pad at bedtime. Sleeping on a heating pad can lead to skin burns or tissue damage.  · Ice and heat therapies can be alternated.  Medications  Talk to your doctor before using medications, especially if you have other medical problems or are taking other medicines.  · You may use acetaminophen or ibuprofen to control pain, unless other pain medicine was prescribed. If you have chronic conditions like diabetes, liver or kidney disease, stomach ulcers or gastrointestinal bleeding, or are taking blood thinners, talk with your doctor before taking any meidcations.  · Be careful if you are given prescription pain medicines, narcotics, or medication for muscle spasm. They can cause drowsiness, affect your coordination, reflexes and judgment. Do not drive or operate heavy machinery.  Lumbar stretch  Here is a simple stretching exercise  that will help relax muscle spasm and keep your back more limber. If exercise makes your back pain worse, don’t do it.  · Lie on your back with your knees bent and both feet on the ground.  · Slowly raise your left knee to your chest as you flatten your lower back against the floor. Hold for 5 seconds.  · Relax and repeat the exercise with your right knee.  · Do 10 of these exercises for each leg.  Safe lifting method  · Don’t bend over at the waist to lift an object off the floor.  Instead, bend your knees and hips in a squat.   · Keep your back and head upright  · Hold the object close to your body, directly in front of you.  · Straighten your legs to lift the object.   · Lower the object to the floor in the reverse fashion.  · If you must slide something across the floor, push it.  Posture tips  Sitting  Sit in chairs with straight backs or low-back support. rKeep your k nees lower than your hip, with your feet flat on the floor.  When driving, sit up straight. Adjust the seat forward so you are not leaning toward the steering wheel.  A small pillow or rolled towel behind your lower back may help if you are driving long distances.   Standing  When standing for long periods, shift most of your weight to one leg at a time. Alternate legs every few minutes.   Sleeping  The best way to sleep is on your side with your knees bent. Put a low pillow under your head to support your neck in a neutral spine position. Avoid thick pillows that bend your neck to one side. Put a pillow between your legs to further relax your lower back. If you sleep on your back, put pillows under your knees to support your legs in a slightly flexed position. Use a firm mattress. If your mattress sags, replace it, or use a 1/2-inch plywood board under the mattress to add support.  Follow-up care  Follow up with your health care provider or as directed by our staff.  If X-rays, a CT scan or an MRI scan were taken, they will be reviewed by a  radiologist. You will be notified of any new findings that may affect your care.  Call 911  Seek emergency medical care if any of the following occur:  · Trouble breathing  · Confusion  · Very drowsy or trouble breathing  · Fainting or loss of consciousness  · Rapid or very slow heart rate  · Loss of  bwel or bladder control  When to seek medical care  Call your health care provider if any of the following occur:  · Pain becomes worse or spreads to your arms or legs  · Weakness or numbness in one or both arms or legs  · Numbness in the groin area  © 8549-6304 [employer]. [address] [place] [address]. All rights reserved. This information is not intended as a substitute for professional medical care. Always follow your healthcare professional's instructions.         [facility] DIVISION OF KIDNEY DISEASES AND HYPERTENSION -- FOLLOW UP NOTE  --------------------------------------------------------------------------------  If any questions, please feel free to contact me  NS pager: [phone], [facility]: [id number]  Dusty Fountain M.D.  Nephrology Fellow    (After 5 pm or on weekends please page the on-call fellow)  --------------------------------------------------------------------------------    HPI:  66 year old female with HTN, HLD, recently discovered ovarian mass suspicious for malignancy (7/2021), L hydroureteronephrosis s/p renal stent (7/15/21), and recent hospitalization ([facility] 7/26-8/4) for PURVI requiring urgent HD, ascites s/p therapeutic paracentesis (7/27/21), and pleural effusion s/p R thoracentesis (8/2/21) admitted for acute hypercarbic respiratory failure due to pleural effusions in the setting of ovarian malignancy complicated with volume overload with ascites. Now found to have new onset PURVI. Baseline Cr 0.7-1.1mg/dl. Had a recent PURVI episode which resolved- started to trend up again on 8/31. Pt. initiated on chemotherapy on 9/1. Pt. had a cardiopulmonary arrest on 9/5/21. On 9/12 oliguric despite high dose Bumex gtt, started on HD for volume overload and remains intubated in MICU.    Patient seen and examined at bedside, she had HD yesterday, today with minimal UOP. Net positive 1.7L. Vitals/labs/imaging reviewed     PAST HISTORY  --------------------------------------------------------------------------------  No significant changes to PMH, PSH, FHx, SHx, unless otherwise noted    ALLERGIES & MEDICATIONS  --------------------------------------------------------------------------------  Allergies    penicillins (Rash)    Intolerances      Standing Inpatient Medications  allopurinol 100 milliGRAM(s) Oral daily  chlorhexidine 0.12% Liquid 15 milliLiter(s) Oral Mucosa every 12 hours  chlorhexidine 4% Liquid 1 Application(s) Topical <User Schedule>  dexMEDEtomidine Infusion 0.5 MICROgram(s)/kG/Hr IV Continuous <Continuous>  dextrose 40% Gel 15 Gram(s) Oral once  dextrose 5%. 1000 milliLiter(s) IV Continuous <Continuous>  dextrose 5%. 1000 milliLiter(s) IV Continuous <Continuous>  dextrose 50% Injectable 25 Gram(s) IV Push once  dextrose 50% Injectable 12.5 Gram(s) IV Push once  dextrose 50% Injectable 25 Gram(s) IV Push once  glucagon  Injectable 1 milliGRAM(s) IntraMuscular once  insulin lispro (ADMELOG) corrective regimen sliding scale   SubCutaneous every 6 hours  isoniazid 300 milliGRAM(s) Oral every 24 hours  midodrine 20 milliGRAM(s) Oral every 8 hours  norepinephrine Infusion 0.05 MICROgram(s)/kG/Min IV Continuous <Continuous>  petrolatum Ophthalmic Ointment 1 Application(s) Both EYES two times a day  piperacillin/tazobactam IVPB.. 3.375 Gram(s) IV Intermittent every 8 hours  polyethylene glycol 3350 17 Gram(s) Oral daily  pyridoxine 50 milliGRAM(s) Oral daily  senna Syrup 15 milliLiter(s) Oral at bedtime  sevelamer carbonate Powder 1200 milliGRAM(s) Oral three times a day with meals  sodium bicarbonate 1300 milliGRAM(s) Oral three times a day    PRN Inpatient Medications  bisacodyl Suppository 10 milliGRAM(s) Rectal daily PRN  fentaNYL    Injectable 100 MICROGram(s) IV Push every 15 minutes PRN  hydrocortisone sodium succinate Injectable 100 milliGRAM(s) IV Push once PRN  midazolam Injectable 4 milliGRAM(s) IV Push every 15 minutes PRN  sodium chloride 0.9% lock flush 10 milliLiter(s) IV Push every 1 hour PRN      REVIEW OF SYSTEMS  --------------------------------------------------------------------------------  unable to obtain due to current medical conditions     VITALS/PHYSICAL EXAM  --------------------------------------------------------------------------------  T(C): 35.2 (09-13-21 @ 08:00), Max: 36.6 (09-12-21 @ 12:00)  HR: 63 (09-13-21 @ 08:00) (58 - 79)  BP: 116/69 (09-13-21 @ 08:00) (88/58 - 141/67)  RR: 26 (09-13-21 @ 08:00) (26 - 33)  SpO2: 100% (09-13-21 @ 08:00) (96% - 100%)  Wt(kg): --        09-12-21 @ 07:01  -  09-13-21 @ 07:00  --------------------------------------------------------  IN: 1923.4 mL / OUT: 130 mL / NET: 1793.4 mL    09-13-21 @ 07:01  -  09-13-21 @ 08:44  --------------------------------------------------------  IN: 82 mL / OUT: 0 mL / NET: 82 mL        Physical Exam:  	  Gen: Intubated  	HEENT: ET tube +  	Pulm: Mechanically ventilated via ETT  	CV: S1S2  	Abd: Soft, +BS  	Ext: 2+ Pitting LE edema B/L  	Neuro: sedated              : Charles + clear urine  	Skin: Warm and dry    LABS/STUDIES  --------------------------------------------------------------------------------              7.9    0.09  >-----------<  20       [09-13-21 @ 01:35]              21.7     138  |  97  |  60  ----------------------------<  122      [09-13-21 @ 01:35]  3.1   |  13  |  1.72        Ca     7.5     [09-13-21 @ 01:35]      iCa    0.89     [09-13 @ 01:35]      Mg     2.00     [09-13-21 @ 01:35]      Phos  5.2     [09-13-21 @ 01:35]    TPro  5.9  /  Alb  2.7  /  TBili  6.1  /  DBili  x   /  AST  42  /  ALT  15  /  AlkPhos  410  [09-13-21 @ 01:35]    PT/INR: PT 14.6 , INR 1.28       [09-13-21 @ 01:35]  PTT: 30.1       [09-13-21 @ 01:35]    Uric acid 5.1      [09-13-21 @ 01:35]        [09-13-21 @ 01:35]    Creatinine Trend:  SCr 1.72 [09-13 @ 01:35]  SCr 2.38 [09-12 @ 19:07]  SCr 2.40 [09-12 @ 02:45]  SCr 2.40 [09-11 @ 04:49]  SCr 2.36 [09-10 @ 23:19]    Urinalysis - [08-28-21 @ 17:00]      Color Yellow / Appearance Turbid / SG 1.034 / pH 6.5      Gluc Trace / Ketone Negative  / Bili Negative / Urobili <2 mg/dL       Blood Small / Protein 100 mg/dL / Leuk Est Large / Nitrite Negative      RBC 6-10 / WBC 15-20 / Hyaline 3 / Gran 20 / Sq Epi  / Non Sq Epi Occasional / Bacteria Moderate      TSH 1.85      [07-29-21 @ 11:12]    HBsAg Nonreact      [08-26-21 @ 10:02]  HCV 0.20, Nonreact      [08-26-21 @ 10:02]  HIV Nonreact      [08-25-21 @ 12:13]

## 2021-09-13 NOTE — PROGRESS NOTE ADULT - PROBLEM SELECTOR PLAN 1
Pt with PURVI in the setting of IV contrast exposure and obstructive uropathy. Of note, recent hospitalization at [facility] 7/26-8/4 for PURVI requiring urgent HD, however PURVI had resolved at discharge. SCr was at 0.99 on 08/19. Pt. had an PURVI episode which resolved during current hospitalization, but now with recurrent PURVI. Scr elevated since 8/31. Repeat CT scan showed stable B/L hydronephrosis. On 9/12 patient with worsening kidney function and became oliguric despite high dose Bumex gtt. Had HD 1st 9/12, tolerated well. Today she is net positive ~1.7L. Labs reviewed.     Will schedule for HD again today UF goal ~1.5, on IV levophed titrate up as needed. If unable to tolerate HD will need to consider CRRT. Closely monitor urine output. Avoid NSAIDs, ACEI/ARBS, RCA and nephrotoxins. Dose medications for eGFR < 10.

## 2021-09-13 NOTE — CONSULT NOTE ADULT - ATTENDING COMMENTS
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   Patient intubated, on levophed, pancytopenic. decreased LV and RV function on today's US,  Chemotherapy was held.   Assessment: elevated liver tests and direct hyperbilirubinemia. She has cholestatic liver injury with jaundice, likely multifactorial from DILI, congestive hepatopathy, infection, or biliary tract obstruction and less likely viral disease  Would recommend- trend liver tests, abdominal US to re assess biliary tress and doppler to assess portal venous system and HV/IVC,  avoid hepatotoxic agents, fungitell, may consider Enio if cholestasis and jaundice worsen and continue rest of the care per MICU.

## 2021-09-13 NOTE — PROGRESS NOTE ADULT - ATTENDING COMMENTS
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 Patient is now intubated in the MICU and with pressor support, started on R-CHOP chemotherapy. Patient also has bilateral adnexal masses and peritoneal carcinomatosis with ascites and extensive abdominal and pelvic adenopathy. Hospitalization course has been c/b ongoing fevers and increasing pressor and O2 support. S/p self extubation on 9/5 c/b cardiac arrest, then re-intubated.  Dexamethasone given 8/26-8/29, Rituxan 8/28.  D1-D2 cyclophosphamide 100 mg 9/1-9/2 with reduction of doses   D3 cyclophosphamide 225 mg q12h on 9/3  Doxorubicin, vincristine, etoposide given on 9/4 but d/c after 1 day due to above events on 9/5 (self-extubated, cardiac arrest).  Treatment restarted on 9/10 with Etoposide now 50% of dose: 25mg/m2 (9/10-9/12), Doxorubicin now 25% of dose: 2.5mg/m2 (9/10-9/12)  Vincristine held due to Tbili >3.  The patient's clinical condition deteriorated on 9/12 so treatment was discontinued.  Will continue HD and monitor renal and liver function.  Zarxio restarted 9/13.  Will continue to follow and have discussed with family who understand condition is critical. If she were to stabilize and improve can consider further treatment but if no meaningful recovery then will recommend supportive care.

## 2021-09-13 NOTE — PROGRESS NOTE ADULT - SUBJECTIVE AND OBJECTIVE BOX
INTERVAL HPI/OVERNIGHT EVENTS:    SUBJECTIVE: Patient seen and examined at bedside.       VITAL SIGNS:  ICU Vital Signs Last 24 Hrs  T(C): 35.9 (13 Sep 2021 04:00), Max: 36.6 (12 Sep 2021 08:00)  T(F): 96.6 (13 Sep 2021 04:00), Max: 97.8 (12 Sep 2021 08:00)  HR: 62 (13 Sep 2021 06:00) (58 - 79)  BP: 105/55 (13 Sep 2021 06:00) (88/58 - 141/67)  BP(mean): 68 (13 Sep 2021 06:00) (66 - 89)  ABP: --  ABP(mean): --  RR: 26 (13 Sep 2021 06:00) (26 - 33)  SpO2: 100% (13 Sep 2021 06:00) (96% - 100%)    Mode: AC/ CMV (Assist Control/ Continuous Mandatory Ventilation), RR (machine): 26, TV (machine): 400, FiO2: 30, PEEP: 5, ITime: 0.7, MAP: 15, PIP: 42  Plateau pressure:   P/F ratio:     09-12 @ 07:01  -  09-13 @ 07:00  --------------------------------------------------------  IN: 1882.4 mL / OUT: 130 mL / NET: 1752.4 mL      CAPILLARY BLOOD GLUCOSE      POCT Blood Glucose.: 121 mg/dL (13 Sep 2021 05:48)    ECG:    PHYSICAL EXAM:    General:   HEENT:   Neck:   Respiratory:   Cardiovascular:   Abdomen:   Extremities:  Neurological:    MEDICATIONS:  MEDICATIONS  (STANDING):  allopurinol 100 milliGRAM(s) Oral daily  chlorhexidine 0.12% Liquid 15 milliLiter(s) Oral Mucosa every 12 hours  chlorhexidine 4% Liquid 1 Application(s) Topical <User Schedule>  dexMEDEtomidine Infusion 0.5 MICROgram(s)/kG/Hr (7.83 mL/Hr) IV Continuous <Continuous>  dextrose 40% Gel 15 Gram(s) Oral once  dextrose 5%. 1000 milliLiter(s) (100 mL/Hr) IV Continuous <Continuous>  dextrose 5%. 1000 milliLiter(s) (50 mL/Hr) IV Continuous <Continuous>  dextrose 50% Injectable 25 Gram(s) IV Push once  dextrose 50% Injectable 12.5 Gram(s) IV Push once  dextrose 50% Injectable 25 Gram(s) IV Push once  glucagon  Injectable 1 milliGRAM(s) IntraMuscular once  insulin lispro (ADMELOG) corrective regimen sliding scale   SubCutaneous every 6 hours  isoniazid 300 milliGRAM(s) Oral every 24 hours  midodrine 20 milliGRAM(s) Oral every 8 hours  norepinephrine Infusion 0.05 MICROgram(s)/kG/Min (2.93 mL/Hr) IV Continuous <Continuous>  petrolatum Ophthalmic Ointment 1 Application(s) Both EYES two times a day  piperacillin/tazobactam IVPB.. 3.375 Gram(s) IV Intermittent every 8 hours  polyethylene glycol 3350 17 Gram(s) Oral daily  pyridoxine 50 milliGRAM(s) Oral daily  senna Syrup 15 milliLiter(s) Oral at bedtime  sevelamer carbonate Powder 1200 milliGRAM(s) Oral three times a day with meals  sodium bicarbonate 1300 milliGRAM(s) Oral three times a day    MEDICATIONS  (PRN):  bisacodyl Suppository 10 milliGRAM(s) Rectal daily PRN Constipation  fentaNYL    Injectable 100 MICROGram(s) IV Push every 15 minutes PRN Severe Pain (7 - 10)  hydrocortisone sodium succinate Injectable 100 milliGRAM(s) IV Push once PRN PRN Chemotherapy Reaction  midazolam Injectable 4 milliGRAM(s) IV Push every 15 minutes PRN anxiety  sodium chloride 0.9% lock flush 10 milliLiter(s) IV Push every 1 hour PRN Pre/post blood products, medications, blood draw, and to maintain line patency      ALLERGIES:  Allergies    penicillins (Rash)    Intolerances        LABS:                        7.9    0.09  )-----------( 20       ( 13 Sep 2021 01:35 )             21.7     09-13    138  |  97<L>  |  60<H>  ----------------------------<  122<H>  3.1<L>   |  13<L>  |  1.72<H>    Ca    7.5<L>      13 Sep 2021 01:35  Phos  5.2     09-13  Mg     2.00     09-13    TPro  5.9<L>  /  Alb  2.7<L>  /  TBili  6.1<H>  /  DBili  x   /  AST  42<H>  /  ALT  15  /  AlkPhos  410<H>  09-13    PT/INR - ( 13 Sep 2021 01:35 )   PT: 14.6 sec;   INR: 1.28 ratio         PTT - ( 13 Sep 2021 01:35 )  PTT:30.1 sec      RADIOLOGY & ADDITIONAL TESTS: Reviewed.   INTERVAL HPI/OVERNIGHT EVENTS: Overnight, the pt received HD given no urine output.     SUBJECTIVE: Patient seen and examined at bedside.       VITAL SIGNS:  ICU Vital Signs Last 24 Hrs  T(C): 35.9 (13 Sep 2021 04:00), Max: 36.6 (12 Sep 2021 08:00)  T(F): 96.6 (13 Sep 2021 04:00), Max: 97.8 (12 Sep 2021 08:00)  HR: 62 (13 Sep 2021 06:00) (58 - 79)  BP: 105/55 (13 Sep 2021 06:00) (88/58 - 141/67)  BP(mean): 68 (13 Sep 2021 06:00) (66 - 89)  ABP: --  ABP(mean): --  RR: 26 (13 Sep 2021 06:00) (26 - 33)  SpO2: 100% (13 Sep 2021 06:00) (96% - 100%)    Mode: AC/ CMV (Assist Control/ Continuous Mandatory Ventilation), RR (machine): 26, TV (machine): 400, FiO2: 30, PEEP: 5, ITime: 0.7, MAP: 15, PIP: 42  Plateau pressure:   P/F ratio:     09-12 @ 07:01  -  09-13 @ 07:00  --------------------------------------------------------  IN: 1882.4 mL / OUT: 130 mL / NET: 1752.4 mL      CAPILLARY BLOOD GLUCOSE      POCT Blood Glucose.: 121 mg/dL (13 Sep 2021 05:48)      PHYSICAL EXAM:    GENERAL: Intubated, on precedex, noted to be less alert  HEAD:  Atraumatic, Normocephalic  EYES: EOMI, PERRLA, conjunctiva and sclera clear  NECK: Supple, No JVD  CHEST/LUNG: Clear to auscultation bilaterally; No wheeze  HEART: Regular rate and rhythm; No murmurs, rubs, or gallops  ABDOMEN: Soft, Nondistended; Bowel sounds present  EXTREMITIES:  2+ Peripheral Pulses, No clubbing, cyanosis, or edema  NEUROLOGY: unable to accurately assess  SKIN: No rashes or lesions    MEDICATIONS:  MEDICATIONS  (STANDING):  allopurinol 100 milliGRAM(s) Oral daily  chlorhexidine 0.12% Liquid 15 milliLiter(s) Oral Mucosa every 12 hours  chlorhexidine 4% Liquid 1 Application(s) Topical <User Schedule>  dexMEDEtomidine Infusion 0.5 MICROgram(s)/kG/Hr (7.83 mL/Hr) IV Continuous <Continuous>  dextrose 40% Gel 15 Gram(s) Oral once  dextrose 5%. 1000 milliLiter(s) (100 mL/Hr) IV Continuous <Continuous>  dextrose 5%. 1000 milliLiter(s) (50 mL/Hr) IV Continuous <Continuous>  dextrose 50% Injectable 25 Gram(s) IV Push once  dextrose 50% Injectable 12.5 Gram(s) IV Push once  dextrose 50% Injectable 25 Gram(s) IV Push once  glucagon  Injectable 1 milliGRAM(s) IntraMuscular once  insulin lispro (ADMELOG) corrective regimen sliding scale   SubCutaneous every 6 hours  isoniazid 300 milliGRAM(s) Oral every 24 hours  midodrine 20 milliGRAM(s) Oral every 8 hours  norepinephrine Infusion 0.05 MICROgram(s)/kG/Min (2.93 mL/Hr) IV Continuous <Continuous>  petrolatum Ophthalmic Ointment 1 Application(s) Both EYES two times a day  piperacillin/tazobactam IVPB.. 3.375 Gram(s) IV Intermittent every 8 hours  polyethylene glycol 3350 17 Gram(s) Oral daily  pyridoxine 50 milliGRAM(s) Oral daily  senna Syrup 15 milliLiter(s) Oral at bedtime  sevelamer carbonate Powder 1200 milliGRAM(s) Oral three times a day with meals  sodium bicarbonate 1300 milliGRAM(s) Oral three times a day    MEDICATIONS  (PRN):  bisacodyl Suppository 10 milliGRAM(s) Rectal daily PRN Constipation  fentaNYL    Injectable 100 MICROGram(s) IV Push every 15 minutes PRN Severe Pain (7 - 10)  hydrocortisone sodium succinate Injectable 100 milliGRAM(s) IV Push once PRN PRN Chemotherapy Reaction  midazolam Injectable 4 milliGRAM(s) IV Push every 15 minutes PRN anxiety  sodium chloride 0.9% lock flush 10 milliLiter(s) IV Push every 1 hour PRN Pre/post blood products, medications, blood draw, and to maintain line patency      ALLERGIES:  Allergies    penicillins (Rash)    Intolerances        LABS:                        7.9    0.09  )-----------( 20       ( 13 Sep 2021 01:35 )             21.7     09-13    138  |  97<L>  |  60<H>  ----------------------------<  122<H>  3.1<L>   |  13<L>  |  1.72<H>    Ca    7.5<L>      13 Sep 2021 01:35  Phos  5.2     09-13  Mg     2.00     09-13    TPro  5.9<L>  /  Alb  2.7<L>  /  TBili  6.1<H>  /  DBili  x   /  AST  42<H>  /  ALT  15  /  AlkPhos  410<H>  09-13    PT/INR - ( 13 Sep 2021 01:35 )   PT: 14.6 sec;   INR: 1.28 ratio         PTT - ( 13 Sep 2021 01:35 )  PTT:30.1 sec      RADIOLOGY & ADDITIONAL TESTS: Reviewed.

## 2021-09-13 NOTE — PROGRESS NOTE ADULT - SUBJECTIVE AND OBJECTIVE BOX
****************************************  Marin Adame PGY4  Hematology/Oncology  [phone]  ****************************************  SUBJECTIVE    Patient seen and examined at bedside. No acute events overnight  Reported  Denied HA, CP, SOB, n/v/d/c , fevers, chills    OBJECTIVE     Vital Signs Last 24 Hrs  T(C): 35.2 (13 Sep 2021 08:00), Max: 36.6 (12 Sep 2021 16:00)  T(F): 95.4 (13 Sep 2021 08:00), Max: 97.8 (12 Sep 2021 16:00)  HR: 63 (13 Sep 2021 11:11) (60 - 77)  BP: 121/58 (13 Sep 2021 11:00) (88/58 - 141/67)  BP(mean): 71 (13 Sep 2021 11:00) (66 - 89)  RR: 26 (13 Sep 2021 11:00) (26 - 33)  SpO2: 99% (13 Sep 2021 11:11) (96% - 100%)    09-12-21 @ 07:01  -  09-13-21 @ 07:00  --------------------------------------------------------  IN: 1923.4 mL / OUT: 130 mL / NET: 1793.4 mL    09-13-21 @ 07:01  -  09-13-21 @ 12:03  --------------------------------------------------------  IN: 205 mL / OUT: 10 mL / NET: 195 mL      PHYSICAL EXAM:  Constitutional: non-toxic, no distress  HEAD/EYES: anicteric, no conjunctival injection  ENT:  supple, no thrush  Cardiovascular:   normal S1, S2, no murmur, no edema  Respiratory:  clear BS bilaterally, no wheezes, no rales  GI:  soft, non-tender, normal bowel sounds  :  no carnes, no CVA tenderness  Musculoskeletal:  no synovitis, normal ROM  Neurologic: awake and alert, normal strength, no focal findings  Skin:  no rash, no erythema, no phlebitis  Heme/Onc: no lymphadenopathy   Psychiatric:  awake, alert, appropriate mood          LABS                        7.9    0.09  )-----------( 20       ( 13 Sep 2021 01:35 )             21.7       09-13    138  |  97<L>  |  60<H>  ----------------------------<  122<H>  3.1<L>   |  13<L>  |  1.72<H>    Ca    7.5<L>      13 Sep 2021 01:35  Phos  5.2     09-13  Mg     2.00     09-13    TPro  x   /  Alb  x   /  TBili  x   /  DBili  5.1<H>  /  AST  x   /  ALT  x   /  AlkPhos  x   09-13          Follow Up:      RADIOLOGY: ****************************************  Marin Adame PGY4  Hematology/Oncology  [phone]  ****************************************  SUBJECTIVE  Patient seen and examined at bedside. No acute events overnight  Unable to obtain ROS due to patient being intubated and sedated     OBJECTIVE   Vital Signs Last 24 Hrs  T(C): 35.2 (13 Sep 2021 08:00), Max: 36.6 (12 Sep 2021 16:00)  T(F): 95.4 (13 Sep 2021 08:00), Max: 97.8 (12 Sep 2021 16:00)  HR: 63 (13 Sep 2021 11:11) (60 - 77)  BP: 121/58 (13 Sep 2021 11:00) (88/58 - 141/67)  BP(mean): 71 (13 Sep 2021 11:00) (66 - 89)  RR: 26 (13 Sep 2021 11:00) (26 - 33)  SpO2: 99% (13 Sep 2021 11:11) (96% - 100%)    09-12-21 @ 07:01  -  09-13-21 @ 07:00  --------------------------------------------------------  IN: 1923.4 mL / OUT: 130 mL / NET: 1793.4 mL    09-13-21 @ 07:01  -  09-13-21 @ 12:03  --------------------------------------------------------  IN: 205 mL / OUT: 10 mL / NET: 195 mL      PHYSICAL EXAM:  GENERAL: Intubated, on precedex,  HEAD:  Atraumatic, Normocephalic  EYES: EOMI, PERRLA, conjunctiva and sclera clear  NECK: Supple, No JVD  CHEST/LUNG: Clear to auscultation bilaterally; No wheeze  HEART: Regular rate and rhythm; No murmurs, rubs, or gallops  ABDOMEN: Soft, Nondistended; Bowel sounds present  EXTREMITIES:  2+ Peripheral Pulses, No clubbing, cyanosis, or edema  NEUROLOGY: unable to accurately assess, no response to sternal rub  SKIN: No rashes or lesions          LABS                        7.9    0.09  )-----------( 20       ( 13 Sep 2021 01:35 )             21.7       09-13    138  |  97<L>  |  60<H>  ----------------------------<  122<H>  3.1<L>   |  13<L>  |  1.72<H>    Ca    7.5<L>      13 Sep 2021 01:35  Phos  5.2     09-13  Mg     2.00     09-13    TPro  x   /  Alb  x   /  TBili  x   /  DBili  5.1<H>  /  AST  x   /  ALT  x   /  AlkPhos  x   09-13          Follow Up:      RADIOLOGY:

## 2021-09-14 NOTE — PROGRESS NOTE ADULT - ASSESSMENT
66-year-old woman with PMH significant for HTN, HLD, L hydroureteronephrosis s/p renal stent on 7/15, who presents with volume overload 2/2 high grade B-cell lymphoma. S/p thoracentesis 8/13, paracentesis and excisional biopsy of left inguinal lymph node on 8/20. Accepted to MICU for acute hypoxic/hypercapneic respiratory failure now s/p intubation and L pleurex catheter placement, now undergoing chemotherapy.    #Neuro  - Altered mental status, likely 2/2 multifactorial in the setting of hypercarbic respiratory failure  - CTH with no intracranial pathology  - Concern for lymphomatous meningitis, however ruled out s/p LP with flow cytometry and cytopathology negative  - Currently sedated with precedex intermittently following commands - will wean as tolerated  - pt noted to be less responsive today, ammonia level normal, if continues to be this way can consider CTH, CT chest, and CT Abdomen and Pelvis, as ABG appears to be appropriate    #Cardiovascular  Vasoplegic shock 2/2 to sedatives   - Echo 8/3 showing concentric left ventricular remodeling, hyperdynamic left ventricle, calcified trileaflet aortic valve with normal opening, EF 56%  - POCUS showing RV enalragement  - On pressor support with levo, wean as tolerated, continue with midodrine 20 mg TID    #Respiratory  - Hypoxic/hypercapneic respiratory failure likely secondary to malignant pleural effusions from malignancy  - S/p thoracentesis on 8/13 with re-accumulation of pleural effusions  - S/p second thoracentesis 8/25, s/p L pleurex 8/27  - On pressure support, however remains tachypneic with low TVs, will repeat thoracentesis on R side before attempting extubation   - Unsuccessful breathing trials, will require future trach once platelets stabilize    #GI/Nutrition  Malignant ascites   - S/p paracentesis 8/20  - Still remains ascites, possible paracentesis    Ileus   - hold tube feeds as pt with residuals and persistent nausea 2/2 chemotherapy   - abd x-ray ordered  - zofran prn    #diarrhea   - previously constipated s/p golytely + fecal disimpaction    #/Renal  - PURVI likely 2/2 to ATN in the setting of previous supratherapeutic vancomycin level + tumor lysis + IV contrast  - B/L hydronephrosis stable on CT A/P 2/2 malignant LAD  - Nephrology on board, recommend holding LASIX & other nephrotoxic medications.   - Nephrostomy tubes considered however per IR recommendations not appropriate at this time as patient's Cr previously downtrending, may consider re-consulting if patient's renal function continues to worsen   - monitor TLS labs q8h, now with uptrending LDH and hyperphosphatemia, although uric acid remains low   - c/w phosphate binder  - s/p bicarb drip, transitioned to PO bicarb   - carnes placed for accurate I+Os  - received first session of HD on 9/12, will receive again on 9/13, however will monitor BP and pressor requirements with fluid removal    #Skin  - No sacral decubiti    #ID  - s/p Vanco and Zosyn (ended 8/31)   - U/A grossly positive, urine cx NGTD  - blood cx 8/28/21 NGTD  - strongyloides positive - s/p ivermectin (9/1 - 9/2) given IC state   - c/w INH + pyridoxine (given indeterminate quant gold)    #Endocrine  - SSI   - will readjust as necessary     #Hematologic/DVT ppx  - newly diagnosed diffuse large B-cell lymphoma  - TLS labs q8  - Allopurinol for TLS prophylaxis  - Heme/Onc recommending starting steroids with dexamethasone, s/p 40 mg dose (ended 8/29, 8/31)   - s/p Rituxan 8/28/21  - s/p cyclophosphamide (1st cycle 9/1, 2nd cycle 9/2, 3rd cycle 9/3)  - s/p doxorubicin 9/4  - restarting doxorubicin/etoposide on 9/10   - restarted on filgastrim as per oncology  - chemotherapy held on 9/12 night given worsening hypotension and increasing pressor requirements  - DVT prophylaxis with SCDs, no pharmacological px  -s/p 1U pRBC 9/11 6.8 --> 8.7    #Ethics  - Patient is FULL CODE per palliative care discussion with patient and her sons (Yuan and Jose)  - Son (Yuan) and father still deciding on whether they would want a trach, understanding that this would be the pt's only option     Fill-in proxy is Jose Camargo: [phone]

## 2021-09-14 NOTE — PROGRESS NOTE ADULT - ATTENDING COMMENTS
66 F with DLBCL s/p thoracentesis, L pleurx, acute hypoxemic and hypercapnic respiratory failure requiring intubation c/b cardiac arrest, now with PURVI and metabolic acidosis, on chemo.    Pt with worsening liver enzymes, continued ascites.  LV/RV dysfunction.  Worsening bilirubin.    - c/w HD as per renal, repeat labs for metabolic acidosis/purvi  - Ct head with no acute findings; patient was more alert this AM than prior; wean precedex as tolerated  - c/w empiric course of abx, f/u cultures  - will attempt paracentesis today (therapeutic)  - c/w vent, acute hypoxemic and hypercapnic respiratory failure  - ascites, likely malignant, monitor for now  ongoing goc discussion

## 2021-09-14 NOTE — PROGRESS NOTE ADULT - SUBJECTIVE AND OBJECTIVE BOX
[facility] DIVISION OF KIDNEY DISEASES AND HYPERTENSION -- FOLLOW UP NOTE  --------------------------------------------------------------------------------  If any questions, please feel free to contact me  NS pager: [phone], [facility]: [phone]  Dusty Fountain M.D.  Nephrology Fellow    (After 5 pm or on weekends please page the on-call fellow)  --------------------------------------------------------------------------------    HPI:  66 year old female with HTN, HLD, recently discovered ovarian mass suspicious for malignancy (7/2021), L hydroureteronephrosis s/p renal stent (7/15/21), and recent hospitalization ([facility] 7/26-8/4) for PURVI requiring urgent HD, ascites s/p therapeutic paracentesis (7/27/21), and pleural effusion s/p R thoracentesis (8/2/21) admitted for acute hypercarbic respiratory failure due to pleural effusions in the setting of ovarian malignancy complicated with volume overload with ascites. Now found to have new onset PURVI. Baseline Cr 0.7-1.1mg/dl. Had a recent PURVI episode which resolved- started to trend up again on 8/31. Pt. Initiated on chemotherapy on 9/1. Pt. had a cardiopulmonary arrest on 9/5/21. On 9/12 oliguric despite high dose Bumex gtt, started on HD for volume overload and remains intubated in MICU.    Patient seen and examined at bedside, she had HD yesterday with UF 1.1L. No acute events overnight. Vitals/labs/imaging reviewed     PAST HISTORY  --------------------------------------------------------------------------------  No significant changes to PMH, PSH, FHx, SHx, unless otherwise noted    ALLERGIES & MEDICATIONS  --------------------------------------------------------------------------------  Allergies    penicillins (Rash)    Intolerances      Standing Inpatient Medications  allopurinol 100 milliGRAM(s) Oral daily  chlorhexidine 0.12% Liquid 15 milliLiter(s) Oral Mucosa every 12 hours  chlorhexidine 4% Liquid 1 Application(s) Topical <User Schedule>  dexMEDEtomidine Infusion 0.5 MICROgram(s)/kG/Hr IV Continuous <Continuous>  dextrose 40% Gel 15 Gram(s) Oral once  dextrose 5%. 1000 milliLiter(s) IV Continuous <Continuous>  dextrose 5%. 1000 milliLiter(s) IV Continuous <Continuous>  dextrose 50% Injectable 25 Gram(s) IV Push once  dextrose 50% Injectable 12.5 Gram(s) IV Push once  dextrose 50% Injectable 25 Gram(s) IV Push once  filgrastim-sndz (ZARXIO) Injectable 300 MICROGram(s) SubCutaneous daily  glucagon  Injectable 1 milliGRAM(s) IntraMuscular once  insulin lispro (ADMELOG) corrective regimen sliding scale   SubCutaneous every 6 hours  isoniazid 300 milliGRAM(s) Oral every 24 hours  midodrine 20 milliGRAM(s) Oral every 8 hours  norepinephrine Infusion 0.05 MICROgram(s)/kG/Min IV Continuous <Continuous>  petrolatum Ophthalmic Ointment 1 Application(s) Both EYES two times a day  piperacillin/tazobactam IVPB.. 3.375 Gram(s) IV Intermittent every 8 hours  polyethylene glycol 3350 17 Gram(s) Oral daily  pyridoxine 50 milliGRAM(s) Oral daily  senna Syrup 15 milliLiter(s) Oral at bedtime  sevelamer carbonate Powder 1200 milliGRAM(s) Oral three times a day with meals  sodium bicarbonate 1300 milliGRAM(s) Oral three times a day    PRN Inpatient Medications  bisacodyl Suppository 10 milliGRAM(s) Rectal daily PRN  hydrocortisone sodium succinate Injectable 100 milliGRAM(s) IV Push once PRN  sodium chloride 0.9% lock flush 10 milliLiter(s) IV Push every 1 hour PRN      REVIEW OF SYSTEMS  --------------------------------------------------------------------------------  unable to obtain due to current medical conditions     VITALS/PHYSICAL EXAM  --------------------------------------------------------------------------------  T(C): 37.3 (09-14-21 @ 08:00), Max: 37.3 (09-14-21 @ 08:00)  HR: 101 (09-14-21 @ 08:00) (60 - 118)  BP: 112/69 (09-14-21 @ 08:00) (102/55 - 148/53)  RR: 26 (09-14-21 @ 08:00) (26 - 27)  SpO2: 100% (09-14-21 @ 08:00) (99% - 100%)  Wt(kg): --        09-13-21 @ 07:01  -  09-14-21 @ 07:00  --------------------------------------------------------  IN: 1342 mL / OUT: 2025 mL / NET: -683 mL    09-14-21 @ 07:01  -  09-14-21 @ 08:51  --------------------------------------------------------  IN: 56.6 mL / OUT: 5 mL / NET: 51.6 mL        Physical Exam:  	Gen: Intubated  	HEENT: ET tube +  	Pulm: Mechanically ventilated via ETT  	CV: S1S2  	Abd: Soft, +BS  	Ext: 2+ Pitting LE edema B/L  	Neuro: sedated              : Araceli +    	Skin: Warm and dry      LABS/STUDIES  --------------------------------------------------------------------------------              7.8    0.05  >-----------<  18       [09-14-21 @ 03:01]              21.5     138  |  96  |  45  ----------------------------<  116      [09-14-21 @ 03:01]  3.5   |  16  |  1.38        Ca     8.1     [09-14-21 @ 03:01]      iCa    0.98     [09-14 @ 03:01]      Mg     1.70     [09-14-21 @ 03:01]      Phos  4.2     [09-14-21 @ 03:01]    TPro  5.2  /  Alb  2.4  /  TBili  7.8  /  DBili  x   /  AST  64  /  ALT  20  /  AlkPhos  500  [09-14-21 @ 03:01]    PT/INR: PT 14.8 , INR 1.30       [09-14-21 @ 03:01]  PTT: 31.1       [09-14-21 @ 03:01]    Uric acid 4.0      [09-14-21 @ 03:01]        [09-14-21 @ 03:01]    Creatinine Trend:  SCr 1.38 [09-14 @ 03:01]  SCr 1.89 [09-13 @ 12:22]  SCr 1.72 [09-13 @ 01:35]  SCr 2.38 [09-12 @ 19:07]  SCr 2.40 [09-12 @ 02:45]    Urinalysis - [08-28-21 @ 17:00]      Color Yellow / Appearance Turbid / SG 1.034 / pH 6.5      Gluc Trace / Ketone Negative  / Bili Negative / Urobili <2 mg/dL       Blood Small / Protein 100 mg/dL / Leuk Est Large / Nitrite Negative      RBC 6-10 / WBC 15-20 / Hyaline 3 / Gran 20 / Sq Epi  / Non Sq Epi Occasional / Bacteria Moderate      TSH 1.85      [07-29-21 @ 11:12]    HBsAg Nonreact      [08-26-21 @ 10:02]  HCV 0.20, Nonreact      [08-26-21 @ 10:02]  HIV Nonreact      [08-25-21 @ 12:13]

## 2021-09-14 NOTE — PROGRESS NOTE ADULT - ASSESSMENT
66-year-old female with PMH significant for HTN, HLD, recently discovered ovarian mass suspicious for malignancy (7/2021), and L hydroureteronephrosis s/p renal stent (7/15/21) presented to [facility] on 8/12 with BLE edema and worsening abdominal distension and SOB.   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 Patient was seen by Hem/onc and started on chemotherapy. Hospital course complicated with Hypoxic/Hypercapnic respiratory failure s/p intubation likely due to recurrent malignant pleural effusion s/p multiple thoracocentesis and Pleurx on 8/27. Patient also self extubated herself on 9/5 and had an episode of cardiac arrest on 9/5.  Patient also has been on levophed.   hepatology consulted for elevated liver tests and direct Bilirubinemia.      # Elevated liver tests, cholestatic pattern , DILI +/- cholestasis of sepsis +/- congestive hepatopathy r/o biliary obstruction, less likely infiltrative liver disease as ALP was normal on admission    # Hyperbilirubinemia , mostly Direct  - Patient recently diagnosed DLBCL started on chemo as below:    -s/p Dexamethasone 8/26-8/29, Rituxan 8/28--> can cause hepatocellular liver injury     -s/p D1-D2 cyclophosphamide 100mg 9/1-9/2 with reduction of doses and s/p D3 cyclophosphamide 225mg q12h on 9/3--> can induce sinusoidal obscuration syn    - s/p doxorubicin, vincristine, etoposide completed 9/4; only one day completed due to self-extubation, cardiac arrest on 9/5-->  can induce sinusoidal obscuration syn    - INH since 9/1 indeterminate QuantiFeron)-->  can cause hepatocellular liver injury     - Allopurinol for TLS ppx--> can cause mixed pattern liver injury  - patient hypotensive on levophed --> sepsis ? ( on epimeric zosyn, strongyloides positive - s/p ivermectin on 9/1 - 9/2,  given IC state ) +/- sedative   - CT IC on 8/18--> no liver lesion, bile ducts NL  - TTE:  8/3 showing concentric left ventricular remodeling, hyperdynamic left ventricle, calcified trileaflet aortic valve with normal opening, EF 56%  - Bedside US on 9/13--> decreased LV/RV function  - Acute Hep A/B/C neg  - HSV PCR in CSF neg  - LFT are trending up    Rec:  - Monitor INR and LFTs ( fractionated Bili ) daily  - Avoid hepatotoxic medications  - Avoid hypotension  - US liver + duplex to evaluate biliary tree and liver vasculature  - May Hold allopurinol as patient is not getting any chemotherapy  - Patient unstable to have MRCP  - Fungitell, Anti HEV, Serum Ferritin, Transferrin Saturation, Ceruloplasmin level, SANJEEV, SMA, gamma globulin, AMA, LKM ab  - r/o acute CMV, EBV, VZV, HSV ( PCR preferably, otherwise serology for acute disease IgM)  - rest of care as per ICU  - poor guarded

## 2021-09-14 NOTE — PROCEDURE NOTE - NSASSISTBY_GEN_A_CORE
Tan Cardenas MD/Attending
Rani Lopez MD/Attending
Tan Cardenas MD/Attending/CRNA
Attending
Myself/Attending
Lyndsay Quintanilla PA-C/PA
Attending

## 2021-09-14 NOTE — PROGRESS NOTE ADULT - PROBLEM SELECTOR PLAN 2
Pt. with metabolic acidosis in the setting of PURVI. Improving. Started on HD on 9/12, on sodium bicarbonate tablets 1300 mg TID.

## 2021-09-14 NOTE — PROGRESS NOTE ADULT - SUBJECTIVE AND OBJECTIVE BOX
Gastroenterology progress note:     Patient is a 66y old  Female who presents with a chief complaint of Worsening volume overload (14 Sep 2021 08:50)       Admitted on: 08-12-21    We are following the patient for: abnormal liver tests     Interval History:  patient still intubated , on pressors, no acute event ON    PAST MEDICAL & SURGICAL HISTORY:  Hypertension    Ovarian mass    Hypercholesteremia    S/P ureteral stent placement        MEDICATIONS  (STANDING):  allopurinol 100 milliGRAM(s) Oral daily  chlorhexidine 0.12% Liquid 15 milliLiter(s) Oral Mucosa every 12 hours  chlorhexidine 4% Liquid 1 Application(s) Topical <User Schedule>  dexMEDEtomidine Infusion 0.5 MICROgram(s)/kG/Hr (7.83 mL/Hr) IV Continuous <Continuous>  dextrose 40% Gel 15 Gram(s) Oral once  dextrose 5%. 1000 milliLiter(s) (50 mL/Hr) IV Continuous <Continuous>  dextrose 5%. 1000 milliLiter(s) (100 mL/Hr) IV Continuous <Continuous>  dextrose 50% Injectable 25 Gram(s) IV Push once  dextrose 50% Injectable 12.5 Gram(s) IV Push once  dextrose 50% Injectable 25 Gram(s) IV Push once  filgrastim-sndz (ZARXIO) Injectable 300 MICROGram(s) SubCutaneous daily  glucagon  Injectable 1 milliGRAM(s) IntraMuscular once  insulin lispro (ADMELOG) corrective regimen sliding scale   SubCutaneous every 6 hours  isoniazid 300 milliGRAM(s) Oral every 24 hours  midodrine 20 milliGRAM(s) Oral every 8 hours  norepinephrine Infusion 0.05 MICROgram(s)/kG/Min (2.93 mL/Hr) IV Continuous <Continuous>  petrolatum Ophthalmic Ointment 1 Application(s) Both EYES two times a day  polyethylene glycol 3350 17 Gram(s) Oral daily  pyridoxine 50 milliGRAM(s) Oral daily  senna Syrup 15 milliLiter(s) Oral at bedtime  sevelamer carbonate Powder 1200 milliGRAM(s) Oral three times a day with meals  sodium bicarbonate 1300 milliGRAM(s) Oral three times a day    MEDICATIONS  (PRN):  bisacodyl Suppository 10 milliGRAM(s) Rectal daily PRN Constipation  hydrocortisone sodium succinate Injectable 100 milliGRAM(s) IV Push once PRN PRN Chemotherapy Reaction  sodium chloride 0.9% lock flush 10 milliLiter(s) IV Push every 1 hour PRN Pre/post blood products, medications, blood draw, and to maintain line patency      Allergies  penicillins (Rash)      Review of Systems:   unable to obtain    Physical Examination:  T(C): 37.4 (09-14-21 @ 12:00), Max: 37.4 (09-14-21 @ 12:00)  HR: 89 (09-14-21 @ 13:54) (67 - 118)  BP: 99/55 (09-14-21 @ 13:54) (71/54 - 148/53)  RR: 26 (09-14-21 @ 13:43) (26 - 26)  SpO2: 100% (09-14-21 @ 13:43) (99% - 100%)      09-13-21 @ 07:01  -  09-14-21 @ 07:00  --------------------------------------------------------  IN: 1342 mL / OUT: 2025 mL / NET: -683 mL    09-14-21 @ 07:01  -  09-14-21 @ 14:16  --------------------------------------------------------  IN: 418.3 mL / OUT: 10 mL / NET: 408.3 mL      Constitutional: No acute distress.  Respiratory:  intubated with breathing sound bilaterally, chest tube noted  Cardiovascular:  S1 S2, Regular rate and rhythm.  Abdominal: Abdomen is soft, symmetric, and non-tender without distention. There are no visible lesions. Bowel sounds are present and normoactive in all four quadrants. No masses, hepatomegaly, or splenomegaly are noted.   Neurology:  Non-focal  Vascular: No varicose vein, No cyanosis,  edema        Data: (reviewed by attending)                        7.8    0.05  )-----------( 18       ( 14 Sep 2021 03:01 )             21.5     Hgb trend:  7.8  09-14-21 @ 03:01  8.8  09-13-21 @ 13:10  7.9  09-13-21 @ 01:35  7.5  09-12-21 @ 19:07  8.0  09-12-21 @ 15:57  8.7  09-12-21 @ 06:11  6.8  09-11-21 @ 17:09      09-11-21 @ 07:01  -  09-12-21 @ 07:00  --------------------------------------------------------  IN: 530 mL    09-12-21 @ 07:01  -  09-13-21 @ 07:00  --------------------------------------------------------  IN: 233 mL    09-13-21 @ 07:01  -  09-14-21 @ 07:00  --------------------------------------------------------  IN: 200 mL      09-14    138  |  96<L>  |  45<H>  ----------------------------<  116<H>  3.5   |  16<L>  |  1.38<H>    Ca    8.1<L>      14 Sep 2021 03:01  Phos  4.2     09-14  Mg     1.70     09-14    TPro  5.2<L>  /  Alb  2.4<L>  /  TBili  7.8<H>  /  DBili  x   /  AST  64<H>  /  ALT  20  /  AlkPhos  500<H>  09-14    Liver panel trend:  TBili 7.8   /   AST 64   /   ALT 20   /   AlkP 500   /   Tptn 5.2   /   Alb 2.4    /   DBili --      09-14  TBili 6.2   /   AST 57   /   ALT 16   /   AlkP 435   /   Tptn 5.4   /   Alb 2.6    /   DBili 6.1      09-13  TBili --   /   AST --   /   ALT --   /   AlkP --   /   Tptn --   /   Alb --    /   DBili 5.1      09-13  TBili 6.1   /   AST 42   /   ALT 15   /   AlkP 410   /   Tptn 5.9   /   Alb 2.7    /   DBili --      09-13  TBili 5.3   /   AST 35   /   ALT 12   /   AlkP 360   /   Tptn 5.6   /   Alb 2.9    /   DBili --      09-12  TBili 4.1   /   AST 32   /   ALT 12   /   AlkP 253   /   Tptn 5.7   /   Alb 3.0    /   DBili --      09-12  TBili 3.6   /   AST 32   /   ALT 10   /   AlkP 225   /   Tptn 5.6   /   Alb 3.0    /   DBili --      09-11  TBili 3.6   /   AST 34   /   ALT 10   /   AlkP 236   /   Tptn 5.7   /   Alb 3.1    /   DBili --      09-10  TBili 3.7   /   AST 36   /   ALT 8   /   AlkP 217   /   Tptn 5.5   /   Alb 2.8    /   DBili --      09-10  TBili 4.1   /   AST 40   /   ALT 10   /   AlkP 213   /   Tptn 5.6   /   Alb 3.2    /   DBili --      09-10  TBili 3.7   /   AST 41   /   ALT 11   /   AlkP 187   /   Tptn 5.7   /   Alb 3.5    /   DBili --      09-09  TBili 4.1   /   AST 40   /   ALT 10   /   AlkP 165   /   Tptn 5.6   /   Alb 3.2    /   DBili --      09-09  TBili 2.5   /   AST 42   /   ALT 9   /   AlkP 146   /   Tptn 5.5   /   Alb 3.1    /   DBili --      09-08  TBili 2.0   /   AST 43   /   ALT 10   /   AlkP 152   /   Tptn 5.4   /   Alb 2.9    /   DBili --      09-08  TBili 1.5   /   AST 46   /   ALT 9   /   AlkP 167   /   Tptn 5.1   /   Alb 2.5    /   DBili --      09-07  TBili 1.2   /   AST 43   /   ALT 12   /   AlkP 152   /   Tptn 5.1   /   Alb 2.3    /   DBili --      09-07  TBili 1.0   /   AST 46   /   ALT 11   /   AlkP 145   /   Tptn 5.1   /   Alb 2.3    /   DBili --      09-07  TBili 1.0   /   AST 41   /   ALT 9   /   AlkP 116   /   Tptn 5.4   /   Alb 2.7    /   DBili --      09-06  TBili 0.9   /   AST 44   /   ALT 11   /   AlkP 96   /   Tptn 5.4   /   Alb 3.0    /   DBili --      09-06  TBili 0.9   /   AST 39   /   ALT 11   /   AlkP 75   /   Tptn 5.4   /   Alb 3.4    /   DBili --      09-05  TBili 1.2   /   AST 39   /   ALT 11   /   AlkP 76   /   Tptn 5.5   /   Alb 3.5    /   DBili --      09-05  TBili 1.6   /   AST --   /   ALT --   /   AlkP --   /   Tptn --   /   Alb --    /   DBili 1.2      09-05  TBili 1.6   /   AST 35   /   ALT 11   /   AlkP 81   /   Tptn 5.6   /   Alb 3.5    /   DBili --      09-05  TBili 1.3   /   AST 31   /   ALT 9   /   AlkP 86   /   Tptn 5.7   /   Alb 3.6    /   DBili --      09-04      PT/INR - ( 14 Sep 2021 03:01 )   PT: 14.8 sec;   INR: 1.30 ratio         PTT - ( 14 Sep 2021 03:01 )  PTT:31.1 sec       Radiology: (reviewed by attending)  CT Abdomen and Pelvis No Cont:   EXAM:  CT ABDOMEN AND PELVIS      EXAM:  CT CHEST        PROCEDURE DATE:  Sep 13 2021         INTERPRETATION:  HISTORY: malignancy Admitting Dxs: J90 J90 malignancy    TECHNIQUE: Noncontrast CT of the chest abdomen pelvis with coronal and sagittal reformats.    COMPARISON: 8.22.21    FINDINGS:  CHEST:  LUNGS AND LARGE AIRWAYS: Patent central airways. Scattered hazy opacities are noted. Endotracheal tube terminating at the teddy.  PLEURA: Moderate right pleural effusion unchanged. Left pleural effusion decreased in size with chest tube noted.  VESSELS: Right IJ line. Left IJ line terminates in the brachiocephalic vein.  HEART: Heart size is normal.  Trace pericardial effusion.  MEDIASTINUM AND JOSE: No lymphadenopathy.  CHEST WALL AND LOWERNECK: Within normal limits.    ABDOMEN AND PELVIS:  LIVER: Within normal limits.  BILE DUCTS: Normal caliber.  GALLBLADDER: Within normal limits.  SPLEEN: Within normal limits.  PANCREAS: Within normal limits.  ADRENALS: Within normal limits.  KIDNEYS/URETERS: There is mild left hydronephrosis with a ureteral stent in place.    BLADDER: Charles catheter in place.  REPRODUCTIVE ORGANS: Adnexal masses are decreased in size.    BOWEL: No bowel obstruction. Nasogastric tube is in place. Rectal tube. Thickened small and large bowel is again noted.  PERITONEUM: Large volume ascites.  Mesenteric nodes are decreased in size. A 2.6 x 1.7 cm node on image 23 previously 3.4 x 2.7 cm.  VESSELS:  Within normal limits.  RETROPERITONEUM/LYMPH NODES: Nodes aredecreased in size now subcentimeter.  ABDOMINAL WALL: Anasarca. Left groin clips are noted.  BONES: Within normal limits.    IMPRESSION: Improved improving lymphadenopathy. No adnexal masses or decrease in size.    Persistent bowel wall thickening.    Moderate right pleural effusion.    Large volume ascites and anasarca.    --- End of Report ---              MELISSA PAREDES MD; Attending Radiologist  This document has been electronically signed. Sep 14 2021  8:29AM (09-13-21 @ 22:57)

## 2021-09-14 NOTE — PROGRESS NOTE ADULT - ATTENDING COMMENTS
Pt. with oliguric PURVI and metabolic acidosis. Plan for UF today as outlined above. Monitor labs and urine output. Avoid any potential nephrotoxins. Dose medications as per eGFR. Overall prognosis guarded. Assessment and plan discussed with MICU team.    Cont to monitor for signs of recovery.

## 2021-09-14 NOTE — PROGRESS NOTE ADULT - ATTENDING COMMENTS
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   Patient intubated, on levophed, pancytopenic. Chemotherapy was held.   Assessment: elevated liver tests and direct hyperbilirubinemia. She has cholestatic liver injury with jaundice, likely multifactorial from DILI, congestive hepatopathy, infection, or biliary tract obstruction or infiltrative lymphoma in the liver and less likely opportunistic viral hepatitis.   Would recommend- trend liver tests, and INR, abdominal US to re assess biliary tract and doppler to assess portal venous system and HV/IVC, check Fungitell, avoid all potential hepatotoxic agents, may consider Enio if cholestasis and jaundice worsen and continue rest of care per primary team.

## 2021-09-14 NOTE — PROGRESS NOTE ADULT - PROBLEM SELECTOR PLAN 1
Pt with PURVI in the setting of IV contrast exposure + obstructive uropathy and ?TLS. Of note, recent hospitalization at [facility] 7/26-8/4 for PURVI requiring urgent HD. SCr was at 0.99 on 08/19. Pt. had an PURVI episode which resolved during current hospitalization, but now with recurrent PURVI. Scr elevated since 8/31. Repeat CT scan showed stable B/L hydronephrosis. On 9/12 patient with worsening kidney function and became oliguric despite high dose Bumex gtt. PURVI likely secondary to ATN. started on HD on 9/12.     She remains anuric and fluid overload. She most likely will benefit from daily HD/UF. s/p HD yesterday with 1.1L UF. Today will schedule for UF only with goal 1.5L. On IV levophed titrate up as needed. If unable to tolerate HD will need to consider CRRT. Closely monitor urine output. Avoid NSAIDs, ACEI/ARBS, RCA and nephrotoxins. Dose medications for HD

## 2021-09-14 NOTE — PROGRESS NOTE ADULT - SUBJECTIVE AND OBJECTIVE BOX
INTERVAL HPI/OVERNIGHT EVENTS:    SUBJECTIVE: Patient seen and examined at bedside.       VITAL SIGNS:  ICU Vital Signs Last 24 Hrs  T(C): 37.1 (14 Sep 2021 04:00), Max: 37.1 (14 Sep 2021 00:00)  T(F): 98.8 (14 Sep 2021 04:00), Max: 98.8 (14 Sep 2021 04:00)  HR: 96 (14 Sep 2021 06:56) (60 - 118)  BP: 127/65 (14 Sep 2021 06:00) (102/55 - 148/53)  BP(mean): 80 (14 Sep 2021 06:00) (65 - 80)  ABP: --  ABP(mean): --  RR: 26 (14 Sep 2021 06:00) (26 - 27)  SpO2: 100% (14 Sep 2021 06:56) (99% - 100%)    Mode: AC/ CMV (Assist Control/ Continuous Mandatory Ventilation), RR (machine): 26, TV (machine): 400, FiO2: 30, PEEP: 5, ITime: 0.8, MAP: 14, PIP: 39  Plateau pressure:   P/F ratio:     09-13 @ 07:01  -  09-14 @ 07:00  --------------------------------------------------------  IN: 1342 mL / OUT: 2025 mL / NET: -683 mL      CAPILLARY BLOOD GLUCOSE      POCT Blood Glucose.: 128 mg/dL (14 Sep 2021 05:42)    ECG:    PHYSICAL EXAM:    General:   HEENT:   Neck:   Respiratory:   Cardiovascular:   Abdomen:   Extremities:  Neurological:    MEDICATIONS:  MEDICATIONS  (STANDING):  allopurinol 100 milliGRAM(s) Oral daily  chlorhexidine 0.12% Liquid 15 milliLiter(s) Oral Mucosa every 12 hours  chlorhexidine 4% Liquid 1 Application(s) Topical <User Schedule>  dexMEDEtomidine Infusion 0.5 MICROgram(s)/kG/Hr (7.83 mL/Hr) IV Continuous <Continuous>  dextrose 40% Gel 15 Gram(s) Oral once  dextrose 5%. 1000 milliLiter(s) (50 mL/Hr) IV Continuous <Continuous>  dextrose 5%. 1000 milliLiter(s) (100 mL/Hr) IV Continuous <Continuous>  dextrose 50% Injectable 25 Gram(s) IV Push once  dextrose 50% Injectable 12.5 Gram(s) IV Push once  dextrose 50% Injectable 25 Gram(s) IV Push once  filgrastim-sndz (ZARXIO) Injectable 300 MICROGram(s) SubCutaneous daily  glucagon  Injectable 1 milliGRAM(s) IntraMuscular once  insulin lispro (ADMELOG) corrective regimen sliding scale   SubCutaneous every 6 hours  isoniazid 300 milliGRAM(s) Oral every 24 hours  midodrine 20 milliGRAM(s) Oral every 8 hours  norepinephrine Infusion 0.05 MICROgram(s)/kG/Min (2.93 mL/Hr) IV Continuous <Continuous>  petrolatum Ophthalmic Ointment 1 Application(s) Both EYES two times a day  piperacillin/tazobactam IVPB.. 3.375 Gram(s) IV Intermittent every 8 hours  polyethylene glycol 3350 17 Gram(s) Oral daily  pyridoxine 50 milliGRAM(s) Oral daily  senna Syrup 15 milliLiter(s) Oral at bedtime  sevelamer carbonate Powder 1200 milliGRAM(s) Oral three times a day with meals  sodium bicarbonate 1300 milliGRAM(s) Oral three times a day    MEDICATIONS  (PRN):  bisacodyl Suppository 10 milliGRAM(s) Rectal daily PRN Constipation  hydrocortisone sodium succinate Injectable 100 milliGRAM(s) IV Push once PRN PRN Chemotherapy Reaction  sodium chloride 0.9% lock flush 10 milliLiter(s) IV Push every 1 hour PRN Pre/post blood products, medications, blood draw, and to maintain line patency      ALLERGIES:  Allergies    penicillins (Rash)    Intolerances        LABS:                        7.8    0.05  )-----------( 18       ( 14 Sep 2021 03:01 )             21.5     09-14    138  |  96<L>  |  45<H>  ----------------------------<  116<H>  3.5   |  16<L>  |  1.38<H>    Ca    8.1<L>      14 Sep 2021 03:01  Phos  4.2     09-14  Mg     1.70     09-14    TPro  5.2<L>  /  Alb  2.4<L>  /  TBili  7.8<H>  /  DBili  x   /  AST  64<H>  /  ALT  20  /  AlkPhos  500<H>  09-14    PT/INR - ( 14 Sep 2021 03:01 )   PT: 14.8 sec;   INR: 1.30 ratio         PTT - ( 14 Sep 2021 03:01 )  PTT:31.1 sec      RADIOLOGY & ADDITIONAL TESTS: Reviewed.   INTERVAL HPI/OVERNIGHT EVENTS: No acute overnight events.    SUBJECTIVE: Patient seen and examined at bedside.       VITAL SIGNS:  ICU Vital Signs Last 24 Hrs  T(C): 37.1 (14 Sep 2021 04:00), Max: 37.1 (14 Sep 2021 00:00)  T(F): 98.8 (14 Sep 2021 04:00), Max: 98.8 (14 Sep 2021 04:00)  HR: 96 (14 Sep 2021 06:56) (60 - 118)  BP: 127/65 (14 Sep 2021 06:00) (102/55 - 148/53)  BP(mean): 80 (14 Sep 2021 06:00) (65 - 80)  ABP: --  ABP(mean): --  RR: 26 (14 Sep 2021 06:00) (26 - 27)  SpO2: 100% (14 Sep 2021 06:56) (99% - 100%)    Mode: AC/ CMV (Assist Control/ Continuous Mandatory Ventilation), RR (machine): 26, TV (machine): 400, FiO2: 30, PEEP: 5, ITime: 0.8, MAP: 14, PIP: 39  Plateau pressure:   P/F ratio:     09-13 @ 07:01  -  09-14 @ 07:00  --------------------------------------------------------  IN: 1342 mL / OUT: 2025 mL / NET: -683 mL      CAPILLARY BLOOD GLUCOSE      POCT Blood Glucose.: 128 mg/dL (14 Sep 2021 05:42)        PHYSICAL EXAM:    GENERAL: Intubated, on precedex, noted to be less alert  HEAD:  Atraumatic, Normocephalic  EYES: EOMI, PERRLA, conjunctiva and sclera clear  NECK: Supple, No JVD  CHEST/LUNG: Clear to auscultation bilaterally; No wheeze  HEART: Regular rate and rhythm; No murmurs, rubs, or gallops  ABDOMEN: Soft, Nondistended; Bowel sounds present  EXTREMITIES:  2+ Peripheral Pulses, No clubbing, cyanosis, or edema  NEUROLOGY: unable to accurately assess  SKIN: No rashes or lesions      MEDICATIONS:  MEDICATIONS  (STANDING):  allopurinol 100 milliGRAM(s) Oral daily  chlorhexidine 0.12% Liquid 15 milliLiter(s) Oral Mucosa every 12 hours  chlorhexidine 4% Liquid 1 Application(s) Topical <User Schedule>  dexMEDEtomidine Infusion 0.5 MICROgram(s)/kG/Hr (7.83 mL/Hr) IV Continuous <Continuous>  dextrose 40% Gel 15 Gram(s) Oral once  dextrose 5%. 1000 milliLiter(s) (50 mL/Hr) IV Continuous <Continuous>  dextrose 5%. 1000 milliLiter(s) (100 mL/Hr) IV Continuous <Continuous>  dextrose 50% Injectable 25 Gram(s) IV Push once  dextrose 50% Injectable 12.5 Gram(s) IV Push once  dextrose 50% Injectable 25 Gram(s) IV Push once  filgrastim-sndz (ZARXIO) Injectable 300 MICROGram(s) SubCutaneous daily  glucagon  Injectable 1 milliGRAM(s) IntraMuscular once  insulin lispro (ADMELOG) corrective regimen sliding scale   SubCutaneous every 6 hours  isoniazid 300 milliGRAM(s) Oral every 24 hours  midodrine 20 milliGRAM(s) Oral every 8 hours  norepinephrine Infusion 0.05 MICROgram(s)/kG/Min (2.93 mL/Hr) IV Continuous <Continuous>  petrolatum Ophthalmic Ointment 1 Application(s) Both EYES two times a day  piperacillin/tazobactam IVPB.. 3.375 Gram(s) IV Intermittent every 8 hours  polyethylene glycol 3350 17 Gram(s) Oral daily  pyridoxine 50 milliGRAM(s) Oral daily  senna Syrup 15 milliLiter(s) Oral at bedtime  sevelamer carbonate Powder 1200 milliGRAM(s) Oral three times a day with meals  sodium bicarbonate 1300 milliGRAM(s) Oral three times a day    MEDICATIONS  (PRN):  bisacodyl Suppository 10 milliGRAM(s) Rectal daily PRN Constipation  hydrocortisone sodium succinate Injectable 100 milliGRAM(s) IV Push once PRN PRN Chemotherapy Reaction  sodium chloride 0.9% lock flush 10 milliLiter(s) IV Push every 1 hour PRN Pre/post blood products, medications, blood draw, and to maintain line patency      ALLERGIES:  Allergies    penicillins (Rash)    Intolerances        LABS:                        7.8    0.05  )-----------( 18       ( 14 Sep 2021 03:01 )             21.5     09-14    138  |  96<L>  |  45<H>  ----------------------------<  116<H>  3.5   |  16<L>  |  1.38<H>    Ca    8.1<L>      14 Sep 2021 03:01  Phos  4.2     09-14  Mg     1.70     09-14    TPro  5.2<L>  /  Alb  2.4<L>  /  TBili  7.8<H>  /  DBili  x   /  AST  64<H>  /  ALT  20  /  AlkPhos  500<H>  09-14    PT/INR - ( 14 Sep 2021 03:01 )   PT: 14.8 sec;   INR: 1.30 ratio         PTT - ( 14 Sep 2021 03:01 )  PTT:31.1 sec      RADIOLOGY & ADDITIONAL TESTS: Reviewed.

## 2021-09-15 NOTE — PROCEDURE NOTE - NSPROCDETAILS_GEN_ALL_CORE
guidewire recovered/lumen(s) aspirated and flushed/sterile dressing applied/ultrasound guidance with use of sterile gel and probe cove
location identified, draped/prepped, sterile technique used, needle inserted/introduced/catheter inserted over needle/connection to syringe/ultrasound assessment of effusion (localization)
location identified, draped/prepped, sterile technique used, needle inserted/introduced/positive blood return obtained via catheter/connected to a pressurized flush line/sutured in place/hemostasis with direct pressure, dressing applied/Seldinger technique
orogastric
location identified, draped/prepped, sterile technique used, needle inserted/introduced/connection to syringe/ultrasound assessment of effusion (localization)
guidewire recovered/lumen(s) aspirated and flushed/sterile dressing applied/ultrasound guidance with use of sterile gel and probe cove
location identified, sterile technique used, catheter introduced, fluid drained
location identified, sterile technique used, catheter introduced, fluid drained

## 2021-09-15 NOTE — CONSULT NOTE ADULT - PROBLEM SELECTOR RECOMMENDATION 3
s/p chemo  now with significant decline  family unrealistic of further dmt given her current medical status  would have heme have dusty conversation with family as pt has grave prognosis

## 2021-09-15 NOTE — PROGRESS NOTE ADULT - SUBJECTIVE AND OBJECTIVE BOX
INTERVAL HPI/OVERNIGHT EVENTS:  Patient S&E at bedside. No o/n events  -Patient less responsive today, not opening eyes to voice or following commands    VITAL SIGNS:  T(F): 98.2 (09-15-21 @ 16:00)  HR: 104 (09-15-21 @ 16:30)  BP: 98/76 (09-15-21 @ 16:30)  RR: 26 (09-15-21 @ 16:30)  SpO2: 100% (09-15-21 @ 16:30)  Wt(kg): --    PHYSICAL EXAM:    GENERAL: Intubated, sedated  HEAD:  Atraumatic, Normocephalic  CHEST/LUNG: Intubated, equal chest rise  HEART: No audible murmurs, rubs, or gallops  ABDOMEN: Soft, Nondistended  EXTREMITIES:  2+ edema b/l UE and LE  NEUROLOGY: unable to accurately assess, no response to sternal rub  SKIN: No rashes or lesions      MEDICATIONS  (STANDING):  allopurinol 100 milliGRAM(s) Oral daily  chlorhexidine 0.12% Liquid 15 milliLiter(s) Oral Mucosa every 12 hours  chlorhexidine 4% Liquid 1 Application(s) Topical <User Schedule>  dexMEDEtomidine Infusion 0.5 MICROgram(s)/kG/Hr (7.83 mL/Hr) IV Continuous <Continuous>  dextrose 40% Gel 15 Gram(s) Oral once  dextrose 5%. 1000 milliLiter(s) (100 mL/Hr) IV Continuous <Continuous>  dextrose 5%. 1000 milliLiter(s) (50 mL/Hr) IV Continuous <Continuous>  dextrose 50% Injectable 25 Gram(s) IV Push once  dextrose 50% Injectable 12.5 Gram(s) IV Push once  dextrose 50% Injectable 25 Gram(s) IV Push once  filgrastim-sndz (ZARXIO) Injectable 300 MICROGram(s) SubCutaneous daily  glucagon  Injectable 1 milliGRAM(s) IntraMuscular once  insulin lispro (ADMELOG) corrective regimen sliding scale   SubCutaneous every 6 hours  isoniazid 300 milliGRAM(s) Oral every 24 hours  midodrine 20 milliGRAM(s) Oral every 8 hours  norepinephrine Infusion 0.05 MICROgram(s)/kG/Min (2.93 mL/Hr) IV Continuous <Continuous>  petrolatum Ophthalmic Ointment 1 Application(s) Both EYES two times a day  polyethylene glycol 3350 17 Gram(s) Oral daily  potassium chloride   Powder 40 milliEquivalent(s) Oral once  pyridoxine 50 milliGRAM(s) Oral daily  senna Syrup 15 milliLiter(s) Oral at bedtime  sevelamer carbonate Powder 1200 milliGRAM(s) Oral three times a day with meals  sodium bicarbonate 1300 milliGRAM(s) Oral three times a day    MEDICATIONS  (PRN):  bisacodyl Suppository 10 milliGRAM(s) Rectal daily PRN Constipation  fentaNYL    Injectable 100 MICROGram(s) IV Push every 15 minutes PRN Severe Pain (7 - 10)  hydrocortisone sodium succinate Injectable 100 milliGRAM(s) IV Push once PRN PRN Chemotherapy Reaction  midazolam Injectable 4 milliGRAM(s) IV Push every 15 minutes PRN anxiety  sodium chloride 0.9% lock flush 10 milliLiter(s) IV Push every 1 hour PRN Pre/post blood products, medications, blood draw, and to maintain line patency      Allergies    penicillins (Rash)    Intolerances        LABS:                        8.7    0.04  )-----------( 1        ( 15 Sep 2021 11:28 )             23.7     09-15    140  |  98  |  35<H>  ----------------------------<  105<H>  3.1<L>   |  20<L>  |  1.07    Ca    8.3<L>      15 Sep 2021 11:28  Phos  3.4     09-15  Mg     1.70     09-15    TPro  5.1<L>  /  Alb  2.2<L>  /  TBili  10.2<H>  /  DBili  x   /  AST  26  /  ALT  16  /  AlkPhos  579<H>  09-15    PT/INR - ( 15 Sep 2021 07:23 )   PT: 14.2 sec;   INR: 1.25 ratio         PTT - ( 15 Sep 2021 07:23 )  PTT:31.1 sec      RADIOLOGY & ADDITIONAL TESTS:  Studies reviewed.

## 2021-09-15 NOTE — PROGRESS NOTE ADULT - ATTENDING COMMENTS
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 Patient is now intubated in the MICU and with pressor support, started on R-CHOP chemotherapy. Patient also has bilateral adnexal masses and peritoneal carcinomatosis with ascites and extensive abdominal and pelvic adenopathy. Hospitalization course has been c/b ongoing fevers and increasing pressor and O2 support. S/p self extubation on 9/5 c/b cardiac arrest, then re-intubated.  Dexamethasone given 8/26-8/29, Rituxan 8/28.  D1-D2 cyclophosphamide 100 mg 9/1-9/2 with reduction of doses   D3 cyclophosphamide 225 mg q12h on 9/3  Doxorubicin, vincristine, etoposide given on 9/4 but d/c after 1 day due to above events on 9/5 (self-extubated, cardiac arrest).  Treatment restarted on 9/10 with Etoposide now 50% of dose: 25mg/m2 (9/10-9/12), Doxorubicin now 25% of dose: 2.5mg/m2 (9/10-9/12)  Vincristine held due to Tbili >3.  The patient's clinical condition deteriorated on 9/12 so treatment was discontinued. CBC counts continue to drop. Zarxio restarted 9/13.   Will continue HD and monitor renal and liver function, but progressively worsening liver function indicative of worsening prognosis with little likelihood of recovery. Discussed with MICU team and will discuss worsening prognosis with her sons.

## 2021-09-15 NOTE — CONSULT NOTE ADULT - ASSESSMENT
66-year-old female with PMH significant for HTN, HLD, recently discovered ovarian mass suspicious for malignancy (7/2021), and L hydroureteronephrosis s/p renal stent (7/15/21) presenting with BLE edema and worsening abdominal distension and SOB.  Found to have DBCL s/p chemo s/p respiratory and cardiac arrest.  Now intubated.  ASked to see for goc

## 2021-09-15 NOTE — CONSULT NOTE ADULT - PROBLEM SELECTOR RECOMMENDATION 9
s/p pleurocentesis, s/p pleurex  remains vented- concern for mental status  weaning sedation for evaluation  consider CT head given low plt and poor mentation s/p pleurocentesis, s/p pleurex  remains vented- concern for mental status  weaning sedation for evaluation  CT head unrevealing on 9/13 but given low plt and poor mentation concern for bleed

## 2021-09-15 NOTE — PROGRESS NOTE ADULT - ASSESSMENT
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 Patient is now intubated in the MICU and with pressor support, started on R-CHOP chemotherapy. Patient also has bilateral adnexal masses and peritoneal carcinomatosis with ascites and extensive abdominal and pelvic adenopathy. Hospitalization course has been c/b ongoing fevers and increasing pressor and O2 support. S/p self extubation on 9/5 c/b cardiac arrest, then re-intubated and sedated      DLBCL  -s/p Dexamethasone 8/26-8/29, Rituxan 8/28  -s/p D1-D2 cyclophosphamide 100mg 9/1-9/2 with reduction of doses   -s/p D3 cyclophosphamide 225mg q12h on 9/3  -continue close monitoring of TLS labs q8h given high risk of TLS (phos, Mg, BMP, LDH, uric acid)    -continue with allopurinol for TLS ppx  -RBC transfusion support to keep Hgb >7 (last transfused 9/4)  -appreciate nephrology recommendations for PURVI and TLS, bumex gtt started for decreased urine output and volume overload    -f/u palliative care recommendations for ongoing GOC discussions  -continue DVT ppx with heparin subq  -s/p doxorubicin, vincristine, etoposide completed 9/4; only one day completed due to above events on 9/5 (self-extubated, cardiac arrest); patient subsequently neutropenic and s/p Zarxio 300mcg on 9/9.  -Pt improved but further tx held due to plans for trach. Trach now on hold so proceeded with treatment as below on 9/10 and 9/11:  ------No dose adjustments required for neutropenia  ------Dose adjustments to be made for hyperbilirubinemia:  -----------Etoposide 50% of dose: now 25mg/m2 (9/10-9/12)  -----------Vincristine: Will hold as Tbili >3  -----------Doxorubicine 25% of dose: now 2.5mg/m2 (9/10-9/12)  -plan was to proceed until 9/12 but 9/12 dose held d/t worsening clinical status  -continue to monitor TLS labs BID  -Zarxio restarted 9/13-  -Patient is unfortunately worsening clinically with liver failure and renal failure. Tbili is now >10. At this time, patient's liver and kidney function preclude her from getting further chemotherapy. Regardless, she would not be due for her next cycle of EPOCH until 10/1/21. If she improves clinically and her liver and kidney function recover (Tbili would need to ideally improve to <3 and patient would need to be off HD), we could consider treatment at that time. For now, monitor kidney and liver function daily per MICU. Agree with Palliative Care consult. We will also discuss our recommendations with the patient's sons.     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 Patient is now intubated in the MICU and with pressor support, started on R-CHOP chemotherapy. Patient also has bilateral adnexal masses and peritoneal carcinomatosis with ascites and extensive abdominal and pelvic adenopathy. Hospitalization course has been c/b ongoing fevers and increasing pressor and O2 support. S/p self extubation on 9/5 c/b cardiac arrest, then re-intubated and sedated      DLBCL  -s/p Dexamethasone 8/26-8/29, Rituxan 8/28  -s/p D1-D2 cyclophosphamide 100mg 9/1-9/2 with reduction of doses   -s/p D3 cyclophosphamide 225mg q12h on 9/3  -RBC transfusion support to keep Hgb >7 (last transfused 9/4)  -appreciate nephrology recommendations for PURVI and TLS, bumex gtt started for decreased urine output and volume overload    -f/u palliative care recommendations for ongoing GOC discussions  -s/p doxorubicin, vincristine, etoposide completed 9/4; only one day completed due to above events on 9/5 (self-extubated, cardiac arrest); patient subsequently neutropenic and s/p Zarxio 300mcg on 9/9.  -Pt improved but further tx held due to plans for trach. Trach now on hold so proceeded with treatment as below on 9/10 and 9/11:  ------No dose adjustments required for neutropenia  ------Dose adjustments to be made for hyperbilirubinemia:  -----------Etoposide 50% of dose: now 25mg/m2 (9/10-9/12)  -----------Vincristine: Will hold as Tbili >3  -----------Doxorubicine 25% of dose: now 2.5mg/m2 (9/10-9/12)  -plan was to proceed until 9/12 but 9/12 dose held d/t worsening clinical status  -continue to monitor TLS labs BID (CMP, Mg, Phos, Uric Acid, LDH)  -Zarxio restarted 9/13-  -Patient is unfortunately worsening clinically with liver failure and renal failure. Tbili is now >10. At this time, patient's liver and kidney function preclude her from getting further chemotherapy. Regardless, she would not be due for her next cycle of EPOCH until 10/1/21. If she improves clinically and her liver and kidney function recover (Tbili would need to ideally improve to <3 and patient would need to be off HD), we could consider treatment at that time. For now, monitor kidney and liver function daily per MICU. Agree with Palliative Care consult. We will also discuss our recommendations with the patient's sons. Recommendations discussed with MICU team on 9/15.     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 Patient is now intubated in the MICU and with pressor support, started on R-CHOP chemotherapy. Patient also has bilateral adnexal masses and peritoneal carcinomatosis with ascites and extensive abdominal and pelvic adenopathy. Hospitalization course has been c/b ongoing fevers and increasing pressor and O2 support. S/p self extubation on 9/5 c/b cardiac arrest, then re-intubated and sedated      DLBCL  -s/p Dexamethasone 8/26-8/29, Rituxan 8/28  -s/p D1-D2 cyclophosphamide 100mg 9/1-9/2 with reduction of doses   -s/p D3 cyclophosphamide 225mg q12h on 9/3  -RBC transfusion support to keep Hgb >7 (last transfused 9/4)  -appreciate nephrology recommendations for PURVI and TLS, bumex gtt started for decreased urine output and volume overload    -f/u palliative care recommendations for ongoing GOC discussions  -s/p doxorubicin, vincristine, etoposide completed 9/4; only one day completed due to above events on 9/5 (self-extubated, cardiac arrest); patient subsequently neutropenic and s/p Zarxio 300mcg on 9/9.  -Pt improved but further tx held due to plans for trach. Trach now on hold so proceeded with treatment as below on 9/10 and 9/11:  ------No dose adjustments required for neutropenia  ------Dose adjustments to be made for hyperbilirubinemia:  -----------Etoposide 50% of dose: now 25mg/m2 (9/10-9/12)  -----------Vincristine: Will hold as Tbili >3  -----------Doxorubicine 25% of dose: now 2.5mg/m2 (9/10-9/12)  -plan was to proceed until 9/12 but 9/12 dose held d/t worsening clinical status  -continue to monitor TLS labs BID (CMP, Mg, Phos, Uric Acid, LDH)  -Zarxio restarted 9/13-  -Patient is unfortunately worsening clinically with liver failure and renal failure. Tbili is now >10. At this time, patient's liver and kidney function preclude her from getting further chemotherapy. Regardless, she would not be due for her next cycle of EPOCH until 10/1/21. If she were to improve clinically and her liver and kidney function recover (Tbili would need to be <3 and patient would need to be off HD), we could consider treatment at that time. For now, monitor kidney and liver function daily per MICU. Agree with Palliative Care consult. We will also discuss our recommendations with the patient's sons. Recommendations discussed with MICU team on 9/15.     Esther Lees MD  Hematology/Oncology Fellow, PGY-5  Pager: [phone]  After 5pm and on weekends please page on-call fellow

## 2021-09-15 NOTE — PROGRESS NOTE ADULT - ASSESSMENT
66-year-old female with PMH significant for HTN, HLD, recently discovered ovarian mass suspicious for malignancy (7/2021), and L hydroureteronephrosis s/p renal stent (7/15/21) presented to [facility] on 8/12 with BLE edema and worsening abdominal distension and SOB.   patient was recently hospitalized ([facility] 7/26-8/4) for acute renal failure (likely obstructive vs MONO) requiring urgent HD, ascites s/p therapeutic paracentesis (7/27/21), and pleural effusion s/p R thoracentesis (8/2/21) showing lymphocytosis concern for malignancy  During hospitalization patient underwent L inguinal lymph node biopsy which showed diffuse high grade B-cell lymphoma with peritoneal carcinomatosis. Patient was seen by Hem/onc and started on chemotherapy. Hospital course complicated with Hypoxic/Hypercapnic respiratory failure s/p intubation likely due to recurrent malignant pleural effusion s/p multiple thoracocentesis and Pleurx on 8/27. Patient also self extubated herself on 9/5 and had an episode of cardiac arrest on 9/5.  Patient also has been on levophed.   hepatology consulted for elevated liver tests and direct Bilirubinemia.      # Elevated liver tests, cholestatic pattern , DILI +/- cholestasis of sepsis +/- congestive hepatopathy r/o biliary obstruction, less likely infiltrative liver disease as ALP was normal on admission    # Hyperbilirubinemia , mostly Direct  - Patient recently diagnosed DLBCL started on chemo as below:    -s/p Dexamethasone 8/26-8/29, Rituxan 8/28--> can cause hepatocellular liver injury     -s/p D1-D2 cyclophosphamide 100mg 9/1-9/2 with reduction of doses and s/p D3 cyclophosphamide 225mg q12h on 9/3--> can induce sinusoidal obscuration syn    - s/p doxorubicin, vincristine, etoposide completed 9/4; only one day completed due to self-extubation, cardiac arrest on 9/5-->  can induce sinusoidal obscuration syn    - INH since 9/1 indeterminate QuantiFeron)-->  can cause hepatocellular liver injury     - Allopurinol for TLS ppx--> can cause mixed pattern liver injury  - patient hypotensive on levophed --> sepsis ? ( on epimeric zosyn, strongyloides positive - s/p ivermectin on 9/1 - 9/2,  given IC state ) +/- sedative   - CT IC on 8/18--> no liver lesion, bile ducts NL  - TTE:  8/3 showing concentric left ventricular remodeling, hyperdynamic left ventricle, calcified trileaflet aortic valve with normal opening, EF 56%  - Bedside US on 9/13--> decreased LV/RV function  - Acute Hep A/B/C neg  - HSV PCR in CSF neg  - Tbili and ALP fluctuating, same range  - INR improving  - AST/ALT normalized    Rec:  - Monitor INR and LFTs ( fractionated Bili ) daily  - Avoid hepatotoxic medications  - Avoid hypotension  - US liver + duplex to evaluate biliary tree and liver vasculature  - May Hold allopurinol as patient is not getting any chemotherapy  - Patient unstable to have MRCP  - Fungitell, Anti HEV, Serum Ferritin, Transferrin Saturation, Ceruloplasmin level, SANJEEV, SMA, gamma globulin, AMA, LKM ab  - r/o acute CMV, EBV, VZV, HSV ( PCR preferably, otherwise serology for acute disease IgM)

## 2021-09-15 NOTE — CHART NOTE - NSCHARTNOTEFT_GEN_A_CORE
66 year old female with recently diagnosed DLBCL s/p partial completion of DA-EPOCH. Patient is unfortunately worsening clinically with liver failure and renal failure. Tbili is now >10. At this time, patient's liver and kidney function preclude her from getting further chemotherapy. Regardless, she would not be due for her next cycle of EPOCH until 10/1/21. Discussed clinical status with MICU team; overall prognosis poor. Discussed patient's current prognosis in detail with her sons Yuan and Flavio via telephone. Explained that at this time the patient would not be a candidate for further chemotherapy, given progressively worsening clinical status. Explained that outside of the lymphoma, there is still little to no likelihood of recovery. Both sons demonstrated understanding of the situation. Discussed the option of focusing on comfort vs continuing to pursue aggressive measures despite above. Terence stated that their family is very Evangelical, and so would like to continue with aggressive measures at this time. Discussed code status, and the risks vs benefits of CPR, including the risk for even worsened quality of life. They stated that they would like patient to be resuscitated if needed.      ****************************************  Marin Adame PGY4  Hematology/Oncology   [phone]  ****************************************

## 2021-09-15 NOTE — PROCEDURE NOTE - ADDITIONAL PROCEDURE DETAILS
Arterial Line Placement
Unable to access L subclavian vein, L apical pneumothorax identified, already has a PleurX catheter in place now connected to atrium with suction.

## 2021-09-15 NOTE — PROGRESS NOTE ADULT - SUBJECTIVE AND OBJECTIVE BOX
INTERVAL HPI/OVERNIGHT EVENTS:    SUBJECTIVE: Patient seen and examined at bedside.       VITAL SIGNS:  ICU Vital Signs Last 24 Hrs  T(C): 37.2 (15 Sep 2021 04:00), Max: 37.4 (14 Sep 2021 12:00)  T(F): 98.9 (15 Sep 2021 04:00), Max: 99.3 (14 Sep 2021 12:00)  HR: 92 (15 Sep 2021 06:13) (70 - 115)  BP: 124/63 (15 Sep 2021 06:00) (71/54 - 145/60)  BP(mean): 78 (15 Sep 2021 06:00) (56 - 85)  ABP: --  ABP(mean): --  RR: 26 (15 Sep 2021 06:00) (26 - 26)  SpO2: 100% (15 Sep 2021 06:13) (100% - 100%)    Mode: AC/ CMV (Assist Control/ Continuous Mandatory Ventilation), RR (machine): 26, TV (machine): 400, FiO2: 30, PEEP: 5, ITime: 0.7, MAP: 14, PIP: 37  Plateau pressure:   P/F ratio:     09-14 @ 07:01  -  09-15 @ 07:00  --------------------------------------------------------  IN: 2098.7 mL / OUT: 2770 mL / NET: -671.3 mL      CAPILLARY BLOOD GLUCOSE      POCT Blood Glucose.: 130 mg/dL (15 Sep 2021 06:06)    ECG:    PHYSICAL EXAM:    General:   HEENT:   Neck:   Respiratory:   Cardiovascular:   Abdomen:   Extremities:  Neurological:    MEDICATIONS:  MEDICATIONS  (STANDING):  allopurinol 100 milliGRAM(s) Oral daily  chlorhexidine 0.12% Liquid 15 milliLiter(s) Oral Mucosa every 12 hours  chlorhexidine 4% Liquid 1 Application(s) Topical <User Schedule>  dexMEDEtomidine Infusion 0.5 MICROgram(s)/kG/Hr (7.83 mL/Hr) IV Continuous <Continuous>  dextrose 40% Gel 15 Gram(s) Oral once  dextrose 5%. 1000 milliLiter(s) (100 mL/Hr) IV Continuous <Continuous>  dextrose 5%. 1000 milliLiter(s) (50 mL/Hr) IV Continuous <Continuous>  dextrose 50% Injectable 25 Gram(s) IV Push once  dextrose 50% Injectable 12.5 Gram(s) IV Push once  dextrose 50% Injectable 25 Gram(s) IV Push once  filgrastim-sndz (ZARXIO) Injectable 300 MICROGram(s) SubCutaneous daily  glucagon  Injectable 1 milliGRAM(s) IntraMuscular once  insulin lispro (ADMELOG) corrective regimen sliding scale   SubCutaneous every 6 hours  isoniazid 300 milliGRAM(s) Oral every 24 hours  midodrine 20 milliGRAM(s) Oral every 8 hours  norepinephrine Infusion 0.05 MICROgram(s)/kG/Min (2.93 mL/Hr) IV Continuous <Continuous>  petrolatum Ophthalmic Ointment 1 Application(s) Both EYES two times a day  polyethylene glycol 3350 17 Gram(s) Oral daily  pyridoxine 50 milliGRAM(s) Oral daily  senna Syrup 15 milliLiter(s) Oral at bedtime  sevelamer carbonate Powder 1200 milliGRAM(s) Oral three times a day with meals  sodium bicarbonate 1300 milliGRAM(s) Oral three times a day    MEDICATIONS  (PRN):  bisacodyl Suppository 10 milliGRAM(s) Rectal daily PRN Constipation  fentaNYL    Injectable 100 MICROGram(s) IV Push every 15 minutes PRN Severe Pain (7 - 10)  hydrocortisone sodium succinate Injectable 100 milliGRAM(s) IV Push once PRN PRN Chemotherapy Reaction  midazolam Injectable 4 milliGRAM(s) IV Push every 15 minutes PRN anxiety  sodium chloride 0.9% lock flush 10 milliLiter(s) IV Push every 1 hour PRN Pre/post blood products, medications, blood draw, and to maintain line patency      ALLERGIES:  Allergies    penicillins (Rash)    Intolerances        LABS:                        8.3    0.04  )-----------( 44       ( 14 Sep 2021 19:37 )             24.0     09-14    139  |  96<L>  |  47<H>  ----------------------------<  127<H>  3.3<L>   |  16<L>  |  1.41<H>    Ca    8.1<L>      14 Sep 2021 22:41  Phos  4.5     09-14  Mg     1.60     09-14    TPro  5.2<L>  /  Alb  2.3<L>  /  TBili  9.6<H>  /  DBili  x   /  AST  33<H>  /  ALT  17  /  AlkPhos  565<H>  09-14    PT/INR - ( 14 Sep 2021 03:01 )   PT: 14.8 sec;   INR: 1.30 ratio         PTT - ( 14 Sep 2021 03:01 )  PTT:31.1 sec      RADIOLOGY & ADDITIONAL TESTS: Reviewed.   INTERVAL HPI/OVERNIGHT EVENTS: No acute overnight events.    SUBJECTIVE: Patient seen and examined at bedside. This AM received 1 U of platelets.       VITAL SIGNS:  ICU Vital Signs Last 24 Hrs  T(C): 37.2 (15 Sep 2021 04:00), Max: 37.4 (14 Sep 2021 12:00)  T(F): 98.9 (15 Sep 2021 04:00), Max: 99.3 (14 Sep 2021 12:00)  HR: 92 (15 Sep 2021 06:13) (70 - 115)  BP: 124/63 (15 Sep 2021 06:00) (71/54 - 145/60)  BP(mean): 78 (15 Sep 2021 06:00) (56 - 85)  ABP: --  ABP(mean): --  RR: 26 (15 Sep 2021 06:00) (26 - 26)  SpO2: 100% (15 Sep 2021 06:13) (100% - 100%)    Mode: AC/ CMV (Assist Control/ Continuous Mandatory Ventilation), RR (machine): 26, TV (machine): 400, FiO2: 30, PEEP: 5, ITime: 0.7, MAP: 14, PIP: 37  Plateau pressure:   P/F ratio:     09-14 @ 07:01  -  09-15 @ 07:00  --------------------------------------------------------  IN: 2098.7 mL / OUT: 2770 mL / NET: -671.3 mL      CAPILLARY BLOOD GLUCOSE      POCT Blood Glucose.: 130 mg/dL (15 Sep 2021 06:06)      PHYSICAL EXAM:    GENERAL: Intubated, on precedex, noted to be less alert  HEAD:  Atraumatic, Normocephalic  EYES: EOMI, PERRLA, conjunctiva and sclera clear  NECK: Supple, No JVD  CHEST/LUNG: Clear to auscultation bilaterally; No wheeze  HEART: Regular rate and rhythm; No murmurs, rubs, or gallops  ABDOMEN: Soft, Nondistended; Bowel sounds present  EXTREMITIES:  2+ Peripheral Pulses, No clubbing, cyanosis, or edema  NEUROLOGY: unable to accurately assess  SKIN: No rashes or lesions    MEDICATIONS:  MEDICATIONS  (STANDING):  allopurinol 100 milliGRAM(s) Oral daily  chlorhexidine 0.12% Liquid 15 milliLiter(s) Oral Mucosa every 12 hours  chlorhexidine 4% Liquid 1 Application(s) Topical <User Schedule>  dexMEDEtomidine Infusion 0.5 MICROgram(s)/kG/Hr (7.83 mL/Hr) IV Continuous <Continuous>  dextrose 40% Gel 15 Gram(s) Oral once  dextrose 5%. 1000 milliLiter(s) (100 mL/Hr) IV Continuous <Continuous>  dextrose 5%. 1000 milliLiter(s) (50 mL/Hr) IV Continuous <Continuous>  dextrose 50% Injectable 25 Gram(s) IV Push once  dextrose 50% Injectable 12.5 Gram(s) IV Push once  dextrose 50% Injectable 25 Gram(s) IV Push once  filgrastim-sndz (ZARXIO) Injectable 300 MICROGram(s) SubCutaneous daily  glucagon  Injectable 1 milliGRAM(s) IntraMuscular once  insulin lispro (ADMELOG) corrective regimen sliding scale   SubCutaneous every 6 hours  isoniazid 300 milliGRAM(s) Oral every 24 hours  midodrine 20 milliGRAM(s) Oral every 8 hours  norepinephrine Infusion 0.05 MICROgram(s)/kG/Min (2.93 mL/Hr) IV Continuous <Continuous>  petrolatum Ophthalmic Ointment 1 Application(s) Both EYES two times a day  polyethylene glycol 3350 17 Gram(s) Oral daily  pyridoxine 50 milliGRAM(s) Oral daily  senna Syrup 15 milliLiter(s) Oral at bedtime  sevelamer carbonate Powder 1200 milliGRAM(s) Oral three times a day with meals  sodium bicarbonate 1300 milliGRAM(s) Oral three times a day    MEDICATIONS  (PRN):  bisacodyl Suppository 10 milliGRAM(s) Rectal daily PRN Constipation  fentaNYL    Injectable 100 MICROGram(s) IV Push every 15 minutes PRN Severe Pain (7 - 10)  hydrocortisone sodium succinate Injectable 100 milliGRAM(s) IV Push once PRN PRN Chemotherapy Reaction  midazolam Injectable 4 milliGRAM(s) IV Push every 15 minutes PRN anxiety  sodium chloride 0.9% lock flush 10 milliLiter(s) IV Push every 1 hour PRN Pre/post blood products, medications, blood draw, and to maintain line patency      ALLERGIES:  Allergies    penicillins (Rash)    Intolerances        LABS:                        8.3    0.04  )-----------( 44       ( 14 Sep 2021 19:37 )             24.0     09-14    139  |  96<L>  |  47<H>  ----------------------------<  127<H>  3.3<L>   |  16<L>  |  1.41<H>    Ca    8.1<L>      14 Sep 2021 22:41  Phos  4.5     09-14  Mg     1.60     09-14    TPro  5.2<L>  /  Alb  2.3<L>  /  TBili  9.6<H>  /  DBili  x   /  AST  33<H>  /  ALT  17  /  AlkPhos  565<H>  09-14    PT/INR - ( 14 Sep 2021 03:01 )   PT: 14.8 sec;   INR: 1.30 ratio         PTT - ( 14 Sep 2021 03:01 )  PTT:31.1 sec      RADIOLOGY & ADDITIONAL TESTS: Reviewed.

## 2021-09-15 NOTE — PROCEDURE NOTE - NSCOMPLICATION_GEN_A_CORE
no complications
L pneumothorax
no complications

## 2021-09-15 NOTE — PROCEDURE NOTE - NSINDICATIONS_GEN_A_CORE
pleural effusion
dialysis/CRRT
critical illness/venous access
mental status change
critical patient/monitoring purposes
pleural effusion
critical illness
ascites/respiratory compromise
drainage/feeds
ascites

## 2021-09-15 NOTE — CONSULT NOTE ADULT - CONSULT REASON
FUO w/ impending chemotherapy
Hydronephrosis with PURVI
PURVI
r/o ovarian cancer
DLBCL new diagnosis
Lymph node biopsy
abnormal liver tests
Pleural effusion on CT
goc

## 2021-09-15 NOTE — PROGRESS NOTE ADULT - SUBJECTIVE AND OBJECTIVE BOX
Gastroenterology progress note:     Patient is a 66y old  Female who presents with a chief complaint of Worsening volume overload (15 Sep 2021 13:00)       Admitted on: 08-12-21    We are following the patient for: elevated liver tests     Interval History:  No acute event ON, still intubated, on pressors, had HD today    PAST MEDICAL & SURGICAL HISTORY:  Hypertension    Ovarian mass    Hypercholesteremia    S/P ureteral stent placement        MEDICATIONS  (STANDING):  allopurinol 100 milliGRAM(s) Oral daily  chlorhexidine 0.12% Liquid 15 milliLiter(s) Oral Mucosa every 12 hours  chlorhexidine 4% Liquid 1 Application(s) Topical <User Schedule>  dexMEDEtomidine Infusion 0.5 MICROgram(s)/kG/Hr (7.83 mL/Hr) IV Continuous <Continuous>  dextrose 40% Gel 15 Gram(s) Oral once  dextrose 5%. 1000 milliLiter(s) (50 mL/Hr) IV Continuous <Continuous>  dextrose 5%. 1000 milliLiter(s) (100 mL/Hr) IV Continuous <Continuous>  dextrose 50% Injectable 25 Gram(s) IV Push once  dextrose 50% Injectable 12.5 Gram(s) IV Push once  dextrose 50% Injectable 25 Gram(s) IV Push once  filgrastim-sndz (ZARXIO) Injectable 300 MICROGram(s) SubCutaneous daily  glucagon  Injectable 1 milliGRAM(s) IntraMuscular once  insulin lispro (ADMELOG) corrective regimen sliding scale   SubCutaneous every 6 hours  isoniazid 300 milliGRAM(s) Oral every 24 hours  midodrine 20 milliGRAM(s) Oral every 8 hours  norepinephrine Infusion 0.05 MICROgram(s)/kG/Min (2.93 mL/Hr) IV Continuous <Continuous>  petrolatum Ophthalmic Ointment 1 Application(s) Both EYES two times a day  polyethylene glycol 3350 17 Gram(s) Oral daily  potassium chloride   Powder 40 milliEquivalent(s) Oral once  pyridoxine 50 milliGRAM(s) Oral daily  senna Syrup 15 milliLiter(s) Oral at bedtime  sevelamer carbonate Powder 1200 milliGRAM(s) Oral three times a day with meals  sodium bicarbonate 1300 milliGRAM(s) Oral three times a day    MEDICATIONS  (PRN):  bisacodyl Suppository 10 milliGRAM(s) Rectal daily PRN Constipation  fentaNYL    Injectable 100 MICROGram(s) IV Push every 15 minutes PRN Severe Pain (7 - 10)  hydrocortisone sodium succinate Injectable 100 milliGRAM(s) IV Push once PRN PRN Chemotherapy Reaction  midazolam Injectable 4 milliGRAM(s) IV Push every 15 minutes PRN anxiety  sodium chloride 0.9% lock flush 10 milliLiter(s) IV Push every 1 hour PRN Pre/post blood products, medications, blood draw, and to maintain line patency      Allergies  penicillins (Rash)      Review of Systems:   unable to obtain    Physical Examination:  T(C): 36.8 (09-15-21 @ 12:00), Max: 37.7 (09-15-21 @ 06:40)  HR: 105 (09-15-21 @ 12:00) (70 - 118)  BP: 127/73 (09-15-21 @ 12:00) (58/29 - 159/68)  RR: 26 (09-15-21 @ 12:00) (26 - 27)  SpO2: 100% (09-15-21 @ 12:00) (100% - 100%)      09-14-21 @ 07:01  -  09-15-21 @ 07:00  --------------------------------------------------------  IN: 2137.5 mL / OUT: 2770 mL / NET: -632.5 mL    09-15-21 @ 07:01  -  09-15-21 @ 13:17  --------------------------------------------------------  IN: 826.3 mL / OUT: 1205 mL / NET: -378.7 mL        Constitutional: No acute distress.  Respiratory:  intubated with breathing sound bilaterally, chest tube noted  Cardiovascular:  S1 S2, Regular rate and rhythm.  Abdominal: Abdomen is soft, symmetric, and non-tender without distention. There are no visible lesions. Bowel sounds are present and normoactive in all four quadrants. No masses, hepatomegaly, or splenomegaly are noted.   Neurology:  Non-focal  Vascular: No varicose vein, No cyanosis,  edema     Data: (reviewed by attending)                        8.7    0.04  )-----------( 1        ( 15 Sep 2021 11:28 )             23.7     Hgb trend:  8.7  09-15-21 @ 11:28  8.6  09-15-21 @ 07:23  8.3  09-14-21 @ 19:37  7.8  09-14-21 @ 03:01  8.8  09-13-21 @ 13:10  7.9  09-13-21 @ 01:35  7.5  09-12-21 @ 19:07  8.0  09-12-21 @ 15:57      09-12-21 @ 07:01  -  09-13-21 @ 07:00  --------------------------------------------------------  IN: 233 mL    09-13-21 @ 07:01  -  09-14-21 @ 07:00  --------------------------------------------------------  IN: 200 mL    09-14-21 @ 07:01  -  09-15-21 @ 07:00  --------------------------------------------------------  IN: 320 mL      09-15    140  |  98  |  35<H>  ----------------------------<  105<H>  3.1<L>   |  20<L>  |  1.07    Ca    8.3<L>      15 Sep 2021 11:28  Phos  3.4     09-15  Mg     1.70     09-15    TPro  5.1<L>  /  Alb  2.2<L>  /  TBili  10.2<H>  /  DBili  x   /  AST  26  /  ALT  16  /  AlkPhos  579<H>  09-15    Liver panel trend:  TBili 10.2   /   AST 26   /   ALT 16   /   AlkP 579   /   Tptn 5.1   /   Alb 2.2    /   DBili --      09-15  TBili 8.6   /   AST 23   /   ALT 16   /   AlkP 487   /   Tptn 4.4   /   Alb 2.0    /   DBili --      09-15  TBili 9.6   /   AST 33   /   ALT 17   /   AlkP 565   /   Tptn 5.2   /   Alb 2.3    /   DBili --      09-14  TBili 8.4   /   AST 44   /   ALT 19   /   AlkP 535   /   Tptn 5.1   /   Alb 2.4    /   DBili --      09-14  TBili 7.8   /   AST 64   /   ALT 20   /   AlkP 500   /   Tptn 5.2   /   Alb 2.4    /   DBili --      09-14  TBili 6.2   /   AST 57   /   ALT 16   /   AlkP 435   /   Tptn 5.4   /   Alb 2.6    /   DBili 6.1      09-13  TBili --   /   AST --   /   ALT --   /   AlkP --   /   Tptn --   /   Alb --    /   DBili 5.1      09-13  TBili 6.1   /   AST 42   /   ALT 15   /   AlkP 410   /   Tptn 5.9   /   Alb 2.7    /   DBili --      09-13  TBili 5.3   /   AST 35   /   ALT 12   /   AlkP 360   /   Tptn 5.6   /   Alb 2.9    /   DBili --      09-12  TBili 4.1   /   AST 32   /   ALT 12   /   AlkP 253   /   Tptn 5.7   /   Alb 3.0    /   DBili --      09-12  TBili 3.6   /   AST 32   /   ALT 10   /   AlkP 225   /   Tptn 5.6   /   Alb 3.0    /   DBili --      09-11  TBili 3.6   /   AST 34   /   ALT 10   /   AlkP 236   /   Tptn 5.7   /   Alb 3.1    /   DBili --      09-10  TBili 3.7   /   AST 36   /   ALT 8   /   AlkP 217   /   Tptn 5.5   /   Alb 2.8    /   DBili --      09-10  TBili 4.1   /   AST 40   /   ALT 10   /   AlkP 213   /   Tptn 5.6   /   Alb 3.2    /   DBili --      09-10  TBili 3.7   /   AST 41   /   ALT 11   /   AlkP 187   /   Tptn 5.7   /   Alb 3.5    /   DBili -- 09-09  TBili 4.1   /   AST 40   /   ALT 10   /   AlkP 165   /   Tptn 5.6   /   Alb 3.2    /   DBili -- 09-09  TBili 2.5   /   AST 42   /   ALT 9   /   AlkP 146   /   Tptn 5.5   /   Alb 3.1    /   DBili --      09-08  TBili 2.0   /   AST 43   /   ALT 10   /   AlkP 152   /   Tptn 5.4   /   Alb 2.9    /   DBili --      09-08  TBili 1.5   /   AST 46   /   ALT 9   /   AlkP 167   /   Tptn 5.1   /   Alb 2.5    /   DBili -- 09-07  TBili 1.2   /   AST 43   /   ALT 12   /   AlkP 152   /   Tptn 5.1   /   Alb 2.3    /   DBili -- 09-07  TBili 1.0   /   AST 46   /   ALT 11   /   AlkP 145   /   Tptn 5.1   /   Alb 2.3    /   DBili -- 09-07  TBili 1.0   /   AST 41   /   ALT 9   /   AlkP 116   /   Tptn 5.4   /   Alb 2.7    /   DBili --      09-06  TBili 0.9   /   AST 44   /   ALT 11   /   AlkP 96   /   Tptn 5.4   /   Alb 3.0    /   DBili --      09-06  TBili 0.9   /   AST 39   /   ALT 11   /   AlkP 75   /   Tptn 5.4   /   Alb 3.4    /   DBili --      09-05      PT/INR - ( 15 Sep 2021 07:23 )   PT: 14.2 sec;   INR: 1.25 ratio         PTT - ( 15 Sep 2021 07:23 )  PTT:31.1 sec

## 2021-09-15 NOTE — CHART NOTE - NSCHARTNOTEFT_GEN_A_CORE
:   Shweta Uribe, PGY4, Anesthesiology, Pager [phone] ([facility])    INDICATION: shock    PROCEDURE:  [X] LIMITED ECHO  [ ] LIMITED CHEST  [ ] LIMITED RETROPERITONEAL  [X] LIMITED ABDOMINAL  [ ] LIMITED DVT    FINDINGS:  Heart: Limited views. LV systolic function grossly normal when in sinus. Patient intermittently going in and out of ventricular bigeminy and sinus tachycardia during exam. RV smaller than LV. No septal flattening on short parasternal view. No pericardial effusion.  IVC: 2cm. Moderate variability with respiration on mechanical ventilation.   Abd: Loops of bowel with peristalsis. Moderate ascites. Suitable unobstructed pockets for possible paracentesis identified in right lower quadrant.    INTERPRETATION:  Heart: Normal systolic function on limited cardiac views.   Abd: Moderate ascites. Suitable pockets for possible paracentesis identified.    Images uploaded on [employer].    Shweta Uribe MD  PGY4, Anesthesiology, [facility]  Pager [phone] ([facility]) :   Shweta Uribe, PGY4, Anesthesiology, Pager [phone] ([facility])    INDICATION: shock, acute hypoxemic respiratory failure, ascites    PROCEDURE:  [X] LIMITED ECHO  [ ] LIMITED CHEST  [ ] LIMITED RETROPERITONEAL  [X] LIMITED ABDOMINAL  [ ] LIMITED DVT    FINDINGS:  Heart: Limited views. LV systolic function grossly normal when in sinus. Patient intermittently going in and out of ventricular bigeminy and sinus tachycardia during exam. RV smaller than LV. No septal flattening on short parasternal view. No pericardial effusion.  IVC: 2cm. Moderate variability with respiration on mechanical ventilation.   Abd: Loops of bowel with peristalsis. Moderate ascites. Suitable unobstructed pockets for possible paracentesis identified in right lower quadrant.    INTERPRETATION:  Heart: Normal systolic function on limited cardiac views.   Abd: Moderate ascites. Suitable pockets for possible paracentesis identified.    Images uploaded on [employer].    Shweta Uribe MD  PGY4, Anesthesiology, [facility]  Pager [phone] ([facility])    Attending Attestation:  I was present during the key portions of the procedure and immediately available during the entire procedure.  Rani Lopez MD  Attending  Pulmonary & Critical Care Medicine

## 2021-09-15 NOTE — CONSULT NOTE ADULT - PROVIDER SPECIALTY LIST ADULT
Surgery
Heme/Onc
Surgery
Pulmonology
Gyn Onc
Urology
Infectious Disease
Intervent Radiology
Hepatology
Nephrology
Palliative Care

## 2021-09-15 NOTE — PROGRESS NOTE ADULT - ASSESSMENT
66-year-old woman with PMH significant for HTN, HLD, L hydroureteronephrosis s/p renal stent on 7/15, who presents with volume overload 2/2 high grade B-cell lymphoma. S/p thoracentesis 8/13, paracentesis and excisional biopsy of left inguinal lymph node on 8/20. Accepted to MICU for acute hypoxic/hypercapneic respiratory failure now s/p intubation and L pleurex catheter placement, now undergoing chemotherapy.    #Neuro  - Altered mental status, likely 2/2 multifactorial in the setting of hypercarbic respiratory failure  - CTH with no intracranial pathology  - Concern for lymphomatous meningitis, however ruled out s/p LP with flow cytometry and cytopathology negative  - Currently sedated with precedex intermittently following commands - will wean as tolerated  - pt noted to be less responsive today, ammonia level normal, if continues to be this way can consider CTH, CT chest, and CT Abdomen and Pelvis, as ABG appears to be appropriate    #Cardiovascular  Vasoplegic shock 2/2 to sedatives   - Echo 8/3 showing concentric left ventricular remodeling, hyperdynamic left ventricle, calcified trileaflet aortic valve with normal opening, EF 56%  - POCUS showing RV enalragement  - On pressor support with levo, wean as tolerated, continue with midodrine 20 mg TID    #Respiratory  - Hypoxic/hypercapneic respiratory failure likely secondary to malignant pleural effusions from malignancy  - S/p thoracentesis on 8/13 with re-accumulation of pleural effusions  - S/p second thoracentesis 8/25, s/p L pleurex 8/27  - On pressure support, however remains tachypneic with low TVs, will repeat thoracentesis on R side before attempting extubation   - Unsuccessful breathing trials, will require future trach once platelets stabilize    #GI/Nutrition  Malignant ascites   - S/p paracentesis 8/20  - Still remains ascites, possible paracentesis    Ileus   - hold tube feeds as pt with residuals and persistent nausea 2/2 chemotherapy   - abd x-ray ordered  - zofran prn    #diarrhea   - previously constipated s/p golytely + fecal disimpaction    #/Renal  - PURVI likely 2/2 to ATN in the setting of previous supratherapeutic vancomycin level + tumor lysis + IV contrast  - B/L hydronephrosis stable on CT A/P 2/2 malignant LAD  - Nephrology on board, recommend holding LASIX & other nephrotoxic medications.   - Nephrostomy tubes considered however per IR recommendations not appropriate at this time as patient's Cr previously downtrending, may consider re-consulting if patient's renal function continues to worsen   - monitor TLS labs q8h, now with uptrending LDH and hyperphosphatemia, although uric acid remains low   - c/w phosphate binder  - s/p bicarb drip, transitioned to PO bicarb   - carnes placed for accurate I+Os  - received first session of HD on 9/12, will receive again on 9/13, however will monitor BP and pressor requirements with fluid removal    #Skin  - No sacral decubiti    #ID  - s/p Vanco and Zosyn (ended 8/31)   - U/A grossly positive, urine cx NGTD  - blood cx 8/28/21 NGTD  - strongyloides positive - s/p ivermectin (9/1 - 9/2) given IC state   - c/w INH + pyridoxine (given indeterminate quant gold)    #Endocrine  - SSI   - will readjust as necessary     #Hematologic/DVT ppx  - newly diagnosed diffuse large B-cell lymphoma  - TLS labs q8  - Allopurinol for TLS prophylaxis  - Heme/Onc recommending starting steroids with dexamethasone, s/p 40 mg dose (ended 8/29, 8/31)   - s/p Rituxan 8/28/21  - s/p cyclophosphamide (1st cycle 9/1, 2nd cycle 9/2, 3rd cycle 9/3)  - s/p doxorubicin 9/4  - restarting doxorubicin/etoposide on 9/10   - restarted on filgastrim as per oncology  - chemotherapy held on 9/12 night given worsening hypotension and increasing pressor requirements  - DVT prophylaxis with SCDs, no pharmacological px  -s/p 1U pRBC 9/11 6.8 --> 8.7    #Ethics  - Patient is FULL CODE per palliative care discussion with patient and her sons (Yuan and Jose)  - Son (Yuan) and father still deciding on whether they would want a trach, understanding that this would be the pt's only option     Fill-in proxy is Jose Camargo: [phone]   66-year-old woman with PMH significant for HTN, HLD, L hydroureteronephrosis s/p renal stent on 7/15, who presents with volume overload 2/2 high grade B-cell lymphoma. S/p thoracentesis 8/13, paracentesis and excisional biopsy of left inguinal lymph node on 8/20. Accepted to MICU for acute hypoxic/hypercapneic respiratory failure now s/p intubation and L pleurex catheter placement, now undergoing chemotherapy.    #Neuro  - Altered mental status, likely 2/2 multifactorial in the setting of hypercarbic respiratory failure  - CTH with no intracranial pathology  - Concern for lymphomatous meningitis, however ruled out s/p LP with flow cytometry and cytopathology negative  - Currently sedated with precedex intermittently following commands - will wean as tolerated  - pt noted to be less responsive today, ammonia level normal, if continues to be this way can consider CTH, CT chest, and CT Abdomen and Pelvis, as ABG appears to be appropriate    #Cardiovascular  Vasoplegic shock 2/2 to sedatives   - Echo 8/3 showing concentric left ventricular remodeling, hyperdynamic left ventricle, calcified trileaflet aortic valve with normal opening, EF 56%  - POCUS showing RV enalragement  - On pressor support with levo, wean as tolerated, continue with midodrine 20 mg TID    #Respiratory  - Hypoxic/hypercapneic respiratory failure likely secondary to malignant pleural effusions from malignancy  - S/p thoracentesis on 8/13 with re-accumulation of pleural effusions  - S/p second thoracentesis 8/25, s/p L pleurex 8/27  - On pressure support, however remains tachypneic with low TVs, will repeat thoracentesis on R side before attempting extubation   - Unsuccessful breathing trials, will require future trach once platelets stabilize    #GI/Nutrition  Malignant ascites   - S/p paracentesis 8/20  - Still remains ascites, possible paracentesis  - tube feeds temporarily held in setting of high residuals    Hyperbilirubinemia  RUQ U/S pending  Hepatology recs appreciated  Fungitell, Anti HEV, CMV, EBV, VZV, HSV, SMA, ferritin, transferrin, ceruloplasmin, AMA, and LKM Ab    #diarrhea   - previously constipated s/p golytely + fecal disimpaction    #/Renal  - PURVI likely 2/2 to ATN in the setting of previous supratherapeutic vancomycin level + tumor lysis + IV contrast  - B/L hydronephrosis stable on CT A/P 2/2 malignant LAD  - Nephrology on board, recommend holding LASIX & other nephrotoxic medications.   - Nephrostomy tubes considered however per IR recommendations not appropriate at this time as patient's Cr previously downtrending, may consider re-consulting if patient's renal function continues to worsen   - monitor TLS labs q8h, now with uptrending LDH and hyperphosphatemia, although uric acid remains low   - c/w phosphate binder  - s/p bicarb drip, transitioned to PO bicarb   - carnes placed for accurate I+Os  - received first session of HD on 9/12, will receive again on 9/13, however will monitor BP and pressor requirements with fluid removal    #Skin  - No sacral decubiti    #ID  - s/p Vanco and Zosyn (ended 8/31)   - U/A grossly positive, urine cx NGTD  - blood cx 8/28/21 NGTD  - strongyloides positive - s/p ivermectin (9/1 - 9/2) given IC state   - c/w INH + pyridoxine (given indeterminate quant gold)    #Endocrine  - SSI   - will readjust as necessary     #Hematologic/DVT ppx  - newly diagnosed diffuse large B-cell lymphoma  - TLS labs q8  - Allopurinol for TLS prophylaxis  - Heme/Onc recommending starting steroids with dexamethasone, s/p 40 mg dose (ended 8/29, 8/31)   - s/p Rituxan 8/28/21  - s/p cyclophosphamide (1st cycle 9/1, 2nd cycle 9/2, 3rd cycle 9/3)  - s/p doxorubicin 9/4  - restarting doxorubicin/etoposide on 9/10   - restarted on filgastrim as per oncology  - chemotherapy held on 9/12 night given worsening hypotension and increasing pressor requirements  - DVT prophylaxis with SCDs, no pharmacological px  -s/p 1U pRBC 9/11 6.8 --> 8.7    #Ethics  - Patient is FULL CODE per palliative care discussion with patient and her sons (Yuan and Jose)  - Son (Yuan) and father still deciding on whether they would want a trach, understanding that this would be the pt's only option     Fill-in proxy is Jose Camargo: [phone]   66-year-old woman with PMH significant for HTN, HLD, L hydroureteronephrosis s/p renal stent on 7/15, who presents with volume overload 2/2 high grade B-cell lymphoma. S/p thoracentesis 8/13, paracentesis and excisional biopsy of left inguinal lymph node on 8/20. Accepted to MICU for acute hypoxic/hypercapneic respiratory failure now s/p intubation and L pleurex catheter placement, now undergoing chemotherapy.    #Neuro  - Altered mental status, likely 2/2 multifactorial in the setting of hypercarbic respiratory failure  - CTH with no intracranial pathology  - Concern for lymphomatous meningitis, however ruled out s/p LP with flow cytometry and cytopathology negative  - Currently sedated with precedex intermittently following commands - will wean as tolerated  - pt noted to be less responsive today, ammonia level normal, if continues to be this way can consider CTH, CT chest, and CT Abdomen and Pelvis, as ABG appears to be appropriate    #Cardiovascular  Vasoplegic shock 2/2 to sedatives   - Echo 8/3 showing concentric left ventricular remodeling, hyperdynamic left ventricle, calcified trileaflet aortic valve with normal opening, EF 56%  - POCUS showing RV enalragement  - On pressor support with levo, wean as tolerated, continue with midodrine 20 mg TID    #Respiratory  - Hypoxic/hypercapneic respiratory failure likely secondary to malignant pleural effusions from malignancy  - S/p thoracentesis on 8/13 with re-accumulation of pleural effusions  - S/p second thoracentesis 8/25, s/p L pleurex 8/27  - On pressure support, however remains tachypneic with low TVs, will repeat thoracentesis on R side before attempting extubation   - Unsuccessful breathing trials, will require future trach once platelets stabilize    #GI/Nutrition  Malignant ascites   - S/p paracentesis 8/20  - Still remains ascites, possible paracentesis  - tube feeds temporarily held in setting of high residuals    Hyperbilirubinemia  RUQ U/S pending  Hepatology recs appreciated  Fungitell, Anti HEV, CMV, EBV, VZV, HSV, SMA, ferritin, transferrin, ceruloplasmin, AMA, and LKM Ab  As per hepatology, concern for allopurinol as a possible source    #diarrhea   - previously constipated s/p golytely + fecal disimpaction    #/Renal  - PURVI likely 2/2 to ATN in the setting of previous supratherapeutic vancomycin level + tumor lysis + IV contrast  - B/L hydronephrosis stable on CT A/P 2/2 malignant LAD  - Nephrology on board, recommend holding LASIX & other nephrotoxic medications.   - Nephrostomy tubes considered however per IR recommendations not appropriate at this time as patient's Cr previously downtrending, may consider re-consulting if patient's renal function continues to worsen   - monitor TLS labs q8h, now with uptrending LDH and hyperphosphatemia, although uric acid remains low   - c/w phosphate binder  - s/p bicarb drip, transitioned to PO bicarb   - carnes placed for accurate I+Os  - received first session of HD on 9/12, will receive again on 9/13, however will monitor BP and pressor requirements with fluid removal    #Skin  - No sacral decubiti    #ID  - s/p Vanco and Zosyn (ended 8/31)   - U/A grossly positive, urine cx NGTD  - blood cx 8/28/21 NGTD  - strongyloides positive - s/p ivermectin (9/1 - 9/2) given IC state   - c/w INH + pyridoxine (given indeterminate quant gold)    #Endocrine  - SSI   - will readjust as necessary     #Hematologic/DVT ppx  - newly diagnosed diffuse large B-cell lymphoma  - TLS labs q8  - Allopurinol for TLS prophylaxis  - Heme/Onc recommending starting steroids with dexamethasone, s/p 40 mg dose (ended 8/29, 8/31)   - s/p Rituxan 8/28/21  - s/p cyclophosphamide (1st cycle 9/1, 2nd cycle 9/2, 3rd cycle 9/3)  - s/p doxorubicin 9/4  - restarting doxorubicin/etoposide on 9/10   - restarted on filgastrim as per oncology  - chemotherapy held on 9/12 night given worsening hypotension and increasing pressor requirements  - DVT prophylaxis with SCDs, no pharmacological px  -s/p 1U pRBC 9/11 6.8 --> 8.7    #Ethics  - Patient is FULL CODE per palliative care discussion with patient and her sons (Yuan and Jose)  - Son (Yuan) and father still deciding on whether they would want a trach, understanding that this would be the pt's only option     Fill-in proxy is Jose Camargo: [phone]   66-year-old woman with PMH significant for HTN, HLD, L hydroureteronephrosis s/p renal stent on 7/15, who presents with volume overload 2/2 high grade B-cell lymphoma. S/p thoracentesis 8/13, paracentesis and excisional biopsy of left inguinal lymph node on 8/20. Accepted to MICU for acute hypoxic/hypercapneic respiratory failure now s/p intubation and L pleurex catheter placement, now undergoing chemotherapy.    #Neuro  - Altered mental status, likely 2/2 multifactorial in the setting of hypercarbic respiratory failure  - CTH with no intracranial pathology  - Concern for lymphomatous meningitis, however ruled out s/p LP with flow cytometry and cytopathology negative  - Currently sedated with precedex intermittently following commands - will wean as tolerated  - pt noted to be less responsive today, ammonia level normal, if continues to be this way can consider CTH, CT chest, and CT Abdomen and Pelvis, as ABG appears to be appropriate    #Cardiovascular  Vasoplegic shock 2/2 to sedatives   - Echo 8/3 showing concentric left ventricular remodeling, hyperdynamic left ventricle, calcified trileaflet aortic valve with normal opening, EF 56%  - POCUS showing RV enalragement  - On pressor support with levo, wean as tolerated, continue with midodrine 20 mg TID    #Respiratory  - Hypoxic/hypercapneic respiratory failure likely secondary to malignant pleural effusions from malignancy  - S/p thoracentesis on 8/13 with re-accumulation of pleural effusions  - S/p second thoracentesis 8/25, s/p L pleurex 8/27  - On pressure support, however remains tachypneic with low TVs, will repeat thoracentesis on R side before attempting extubation   - Unsuccessful breathing trials, will require future trach once platelets stabilize    #GI/Nutrition  Malignant ascites   - S/p paracentesis 8/20  - Still remains ascites, possible paracentesis  - tube feeds temporarily held in setting of high residuals    Hyperbilirubinemia  RUQ U/S pending  Hepatology recs appreciated  Fungitell, Anti HEV, CMV, EBV, VZV, HSV, SMA, ferritin, transferrin, ceruloplasmin, AMA, and LKM Ab  As per hepatology, concern for allopurinol as a possible source    #diarrhea   - previously constipated s/p golytely + fecal disimpaction    #/Renal  - PURVI likely 2/2 to ATN in the setting of previous supratherapeutic vancomycin level + tumor lysis + IV contrast  - B/L hydronephrosis stable on CT A/P 2/2 malignant LAD  - Nephrology on board, recommend holding LASIX & other nephrotoxic medications.   - Nephrostomy tubes considered however per IR recommendations not appropriate at this time as patient's Cr previously downtrending, may consider re-consulting if patient's renal function continues to worsen   - monitor TLS labs q8h, now with uptrending LDH and hyperphosphatemia, although uric acid remains low   - c/w phosphate binder  - s/p bicarb drip, transitioned to PO bicarb   - carnes placed for accurate I+Os  - received first session of HD on 9/12, will receive again on 9/13, however will monitor BP and pressor requirements with fluid removal    #Skin  - No sacral decubiti    #ID  - s/p Vanco and Zosyn (ended 8/31)   - U/A grossly positive, urine cx NGTD  - blood cx 8/28/21 NGTD  - strongyloides positive - s/p ivermectin (9/1 - 9/2) given IC state   - c/w INH + pyridoxine (given indeterminate quant gold)    #Endocrine  - SSI   - will readjust as necessary     #Hematologic/DVT ppx  - newly diagnosed diffuse large B-cell lymphoma  - TLS labs q8  - Allopurinol for TLS prophylaxis  - Heme/Onc recommending starting steroids with dexamethasone, s/p 40 mg dose (ended 8/29, 8/31)   - s/p Rituxan 8/28/21  - s/p cyclophosphamide (1st cycle 9/1, 2nd cycle 9/2, 3rd cycle 9/3)  - s/p doxorubicin 9/4  - restarting doxorubicin/etoposide on 9/10   - restarted on filgastrim as per oncology  - thrombocytopenia s/p 2 U platelets  - chemotherapy held on 9/12 night given worsening hypotension and increasing pressor requirements  - DVT prophylaxis with SCDs, no pharmacological px  -s/p 1U pRBC 9/11 6.8 --> 8.7    #Ethics  - Patient is FULL CODE per palliative care discussion with patient and her sons (Yuan and Jose)  - Son (Yuan) and father still deciding on whether they would want a trach, understanding that this would be the pt's only option     Fill-in proxy is Jose Camargo: [phone]

## 2021-09-15 NOTE — CONSULT NOTE ADULT - CONSULT REQUESTED DATE/TIME
18-Aug-2021 17:28
23-Aug-2021 14:07
13-Sep-2021 14:52
15-Aug-2021 16:03
23-Aug-2021 14:12
25-Aug-2021 10:45
12-Aug-2021 08:00
15-Sep-2021 13:00
16-Aug-2021 12:15
18-Aug-2021 19:20
19-Aug-2021 09:03

## 2021-09-15 NOTE — PROCEDURE NOTE - NSPATIENTPOSTION_GEN_A_CORE
Trendelenburg
supine
lateral recumbent
Trendelenburg
supine
Trendelenburg
lateral recumbent
knee to chest
sitting

## 2021-09-15 NOTE — CONSULT NOTE ADULT - CONSULT REQUESTED BY NAME
Dr. Diaz
Tan Cardenas
Team
[facility]
MICU team
Edwige Ivey
Primary team
Dr. Ivey
Dr. Ivey
primary team
primary team

## 2021-09-15 NOTE — PROGRESS NOTE ADULT - ATTENDING COMMENTS
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   Patient intubated, on levophed, pancytopenic s/p PLT transfusion, s/p Filgrastim. Chemotherapy was held. AST and ALT normalized, worsening ALP to 579, and TB to 10.2.   Assessment: elevated liver tests and direct hyperbilirubinemia. She has cholestatic liver injury with jaundice, likely multifactorial from DILI, congestive hepatopathy, infection, less likely biliary tract or infiltrative lymphoma in the liver or opportunistic viral hepatitis.   Would recommend- trend liver tests, and INR, abdominal US to re assess biliary tract and doppler to assess portal venous system and HV/IVC, check direct bilirubin, Fungitell, surveillance for infection, avoid all potential hepatotoxic agents, Enio suspension 500 mg bid if cholestasis and jaundice continue to worsen and continue rest of care per primary team.   Patient's prognosis is guarded.

## 2021-09-15 NOTE — PROCEDURE NOTE - NSINFORMCONSENT_GEN_A_CORE
This was an emergent procedure.
Benefits, risks, and possible complications of procedure explained to patient/caregiver who verbalized understanding and gave verbal consent.
This was an emergent procedure.
Benefits, risks, and possible complications of procedure explained to patient/caregiver who verbalized understanding and gave written consent.
Benefits, risks, and possible complications of procedure explained to patient/caregiver who verbalized understanding and gave verbal consent.
Benefits, risks, and possible complications of procedure explained to patient/caregiver who verbalized understanding and gave verbal consent.
This was an emergent procedure.
Benefits, risks, and possible complications of procedure explained to patient/caregiver who verbalized understanding and gave verbal consent.
Benefits, risks, and possible complications of procedure explained to patient/caregiver who verbalized understanding and gave written consent.

## 2021-09-15 NOTE — PROGRESS NOTE ADULT - PROBLEM SELECTOR PLAN 1
Pt with PURVI in the setting of IV contrast exposure + obstructive uropathy and ?TLS. Of note, recent hospitalization at [facility] 7/26-8/4 for PURVI requiring urgent HD. SCr was at 0.99 on 08/19. Pt. had an PURVI episode which resolved during current hospitalization, but now with recurrent PURVI. Scr elevated since 8/31. Repeat CT scan showed stable B/L hydronephrosis. On 9/12 patient with worsening kidney function and became oliguric despite high dose Bumex gtt. PURVI likely secondary to ATN. started on HD on 9/12.     She remains anuric, She most likely will benefit from daily HD/UF. s/p Uf only yesterday with 1.5L UF. Today schedule for HD with goal 1-1.5L. On IV levophed titrate up as needed. Closely monitor urine output. Avoid NSAIDs, ACEI/ARBS, RCA and nephrotoxins. Dose medications for HD Pt with PURVI in the setting of IV contrast exposure + obstructive uropathy and ?TLS. Of note, recent hospitalization at [facility] 7/26-8/4 for PURVI requiring urgent HD. SCr was at 0.99 on 08/19. Pt. had an PURVI episode which resolved during current hospitalization, but now with recurrent PURVI. Scr elevated since 8/31. Repeat CT scan showed stable B/L hydronephrosis. On 9/12 patient with worsening kidney function and became oliguric despite high dose Bumex gtt. PURVI likely secondary to ATN. started on HD on 9/12.     She remains anuric, s/p Uf only yesterday with 1.5L UF. Today schedule for HD with goal 1-1.5L. On IV levophed titrate up as needed. Closely monitor urine output. Avoid NSAIDs, ACEI/ARBS, RCA and nephrotoxins. Dose medications for HD

## 2021-09-15 NOTE — PROGRESS NOTE ADULT - SUBJECTIVE AND OBJECTIVE BOX
Health system DIVISION OF KIDNEY DISEASES AND HYPERTENSION -- FOLLOW UP NOTE  --------------------------------------------------------------------------------  If any questions, please feel free to contact me  [facility] pager: [phone], [facility]: [phone]  Dusty Fountain M.D.  Nephrology Fellow    (After 5 pm or on weekends please page the on-call fellow)  --------------------------------------------------------------------------------    HPI:  66 year old female with HTN, HLD, recently discovered ovarian mass suspicious for malignancy (7/2021), L hydroureteronephrosis s/p renal stent (7/15/21), and recent hospitalization ([facility] 7/26-8/4) for PURVI requiring urgent HD, ascites s/p therapeutic paracentesis (7/27/21), and pleural effusion s/p R thoracentesis (8/2/21) admitted for acute hypercarbic respiratory failure due to pleural effusions in the setting of ovarian malignancy complicated with volume overload with ascites. Now found to have new onset PURVI. Baseline Cr 0.7-1.1mg/dl. Had a recent PURVI episode which resolved- started to trend up again on 8/31. Pt. Initiated on chemotherapy on 9/1. Pt. had a cardiopulmonary arrest on 9/5/21. On 9/12 oliguric despite high dose Bumex gtt, started on HD for volume overload and remains intubated in MICU.    Patient seen and examined at bedside, this morning on HD, UF decreased due to hypotension, patient on IV pressor support. s/p paracentesis yesterday. Vitals/labs/imaging reviewed     PAST HISTORY  --------------------------------------------------------------------------------  No significant changes to PMH, PSH, FHx, SHx, unless otherwise noted    ALLERGIES & MEDICATIONS  --------------------------------------------------------------------------------  Allergies    penicillins (Rash)    Intolerances      Standing Inpatient Medications  allopurinol 100 milliGRAM(s) Oral daily  chlorhexidine 0.12% Liquid 15 milliLiter(s) Oral Mucosa every 12 hours  chlorhexidine 4% Liquid 1 Application(s) Topical <User Schedule>  dexMEDEtomidine Infusion 0.5 MICROgram(s)/kG/Hr IV Continuous <Continuous>  dextrose 40% Gel 15 Gram(s) Oral once  dextrose 5%. 1000 milliLiter(s) IV Continuous <Continuous>  dextrose 5%. 1000 milliLiter(s) IV Continuous <Continuous>  dextrose 50% Injectable 25 Gram(s) IV Push once  dextrose 50% Injectable 12.5 Gram(s) IV Push once  dextrose 50% Injectable 25 Gram(s) IV Push once  filgrastim-sndz (ZARXIO) Injectable 300 MICROGram(s) SubCutaneous daily  glucagon  Injectable 1 milliGRAM(s) IntraMuscular once  insulin lispro (ADMELOG) corrective regimen sliding scale   SubCutaneous every 6 hours  isoniazid 300 milliGRAM(s) Oral every 24 hours  midodrine 20 milliGRAM(s) Oral every 8 hours  norepinephrine Infusion 0.05 MICROgram(s)/kG/Min IV Continuous <Continuous>  petrolatum Ophthalmic Ointment 1 Application(s) Both EYES two times a day  polyethylene glycol 3350 17 Gram(s) Oral daily  pyridoxine 50 milliGRAM(s) Oral daily  senna Syrup 15 milliLiter(s) Oral at bedtime  sevelamer carbonate Powder 1200 milliGRAM(s) Oral three times a day with meals  sodium bicarbonate 1300 milliGRAM(s) Oral three times a day    PRN Inpatient Medications  bisacodyl Suppository 10 milliGRAM(s) Rectal daily PRN  fentaNYL    Injectable 100 MICROGram(s) IV Push every 15 minutes PRN  hydrocortisone sodium succinate Injectable 100 milliGRAM(s) IV Push once PRN  midazolam Injectable 4 milliGRAM(s) IV Push every 15 minutes PRN  sodium chloride 0.9% lock flush 10 milliLiter(s) IV Push every 1 hour PRN      REVIEW OF SYSTEMS  --------------------------------------------------------------------------------  unable to obtain due to current medical conditions     VITALS/PHYSICAL EXAM  --------------------------------------------------------------------------------  T(C): 37.7 (09-15-21 @ 06:40), Max: 37.7 (09-15-21 @ 06:40)  HR: 110 (09-15-21 @ 07:42) (70 - 115)  BP: 84/50 (09-15-21 @ 07:42) (58/29 - 145/60)  RR: 26 (09-15-21 @ 07:42) (26 - 27)  SpO2: 100% (09-15-21 @ 07:42) (100% - 100%)  Wt(kg): --        09-14-21 @ 07:01  -  09-15-21 @ 07:00  --------------------------------------------------------  IN: 2137.5 mL / OUT: 2770 mL / NET: -632.5 mL        Physical Exam:  	Gen: Intubated  	HEENT: ET tube +  	Pulm: Mechanically ventilated via ETT  	CV: S1S2  	Abd: Soft, +BS  	Ext: 2+ Pitting LE edema B/L  	Neuro: sedated              : Charles +    	Skin: Warm and dry        LABS/STUDIES  --------------------------------------------------------------------------------              8.3    0.04  >-----------<  44       [09-14-21 @ 19:37]              24.0     139  |  96  |  47  ----------------------------<  127      [09-14-21 @ 22:41]  3.3   |  16  |  1.41        Ca     8.1     [09-14-21 @ 22:41]      iCa    1.08     [09-15 @ 07:23]      Mg     1.60     [09-14-21 @ 22:41]      Phos  4.5     [09-14-21 @ 22:41]    TPro  5.2  /  Alb  2.3  /  TBili  9.6  /  DBili  x   /  AST  33  /  ALT  17  /  AlkPhos  565  [09-14-21 @ 22:41]    PT/INR: PT 14.8 , INR 1.30       [09-14-21 @ 03:01]  PTT: 31.1       [09-14-21 @ 03:01]    Uric acid 4.0      [09-14-21 @ 22:41]        [09-14-21 @ 22:41]    Creatinine Trend:  SCr 1.41 [09-14 @ 22:41]  SCr 1.48 [09-14 @ 13:40]  SCr 1.38 [09-14 @ 03:01]  SCr 1.89 [09-13 @ 12:22]  SCr 1.72 [09-13 @ 01:35]    Urinalysis - [08-28-21 @ 17:00]      Color Yellow / Appearance Turbid / SG 1.034 / pH 6.5      Gluc Trace / Ketone Negative  / Bili Negative / Urobili <2 mg/dL       Blood Small / Protein 100 mg/dL / Leuk Est Large / Nitrite Negative      RBC 6-10 / WBC 15-20 / Hyaline 3 / Gran 20 / Sq Epi  / Non Sq Epi Occasional / Bacteria Moderate      TSH 1.85      [07-29-21 @ 11:12]    HBsAg Nonreact      [08-26-21 @ 10:02]  HCV 0.20, Nonreact      [08-26-21 @ 10:02]  HIV Nonreact      [08-25-21 @ 12:13]

## 2021-09-15 NOTE — PROCEDURE NOTE - NSSITEPREP_SKIN_A_CORE
chlorhexidine

## 2021-09-15 NOTE — PROGRESS NOTE ADULT - ATTENDING COMMENTS
Pt. with oliguric PURVI and metabolic acidosis. HD this AM. Monitor labs and urine output. Avoid any potential nephrotoxins. Dose medications as per eGFR. Overall prognosis guarded. Assessment and plan discussed with MICU team.    Cont to monitor for signs of recovery.

## 2021-09-15 NOTE — CONSULT NOTE ADULT - SUBJECTIVE AND OBJECTIVE BOX
HPI:  66-year-old female with PMH significant for HTN, HLD, recently discovered ovarian mass suspicious for malignancy (7/2021), and L hydroureteronephrosis s/p renal stent (7/15/21) presenting with BLE edema and worsening abdominal distension and SOB. Of note, pt with recent hospitalization ([facility] 7/26-8/4) for acute renal failure (likely obstructive vs MONO) requiring urgent HD, ascites s/p therapeutic paracentesis (7/27/21), and pleural effusion s/p R thoracentesis (8/2/21) showing lymphocytosis but no malignant cells. Patient states she was discharged to follow up with gyn-onc at [facility] as previously scheduled after her discharge from [place], where her ovarian mass was discovered earlier in July. However, when she went home she developed BLE edema, worsening over the past few days along with worsening abdominal distension and SOB. Patient denies fevers/chills, lightheadedness/dizziness, cough, CP, palpitations, cough, n/v/d, abdominal pain, LE pain.     In the ED, vitals T 97-99, -117, //90, RR 26 - 30 (satting 94-96% on NC). Labs significant for WBC 13.4, proBNP 34. VBG 7.45/50/44/35, Lactate 2.7. RVP/COVID PCR neg. CXR shows moderate R-sided and small L-sided pleural effusions. Pt received IV lasix 40mg x 1 in ED. Placed on BiPAP for increased WOB. MICU consulted, however not a candidate. (12 Aug 2021 04:16)    Pt known to our service from previous admission.  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  Asked for goc.     PERTINENT PM/SXH:   Hypertension    Ovarian mass    Hypercholesteremia      S/P ureteral stent placement      FAMILY HISTORY:  FHx: hypertension (Mother)    FH: diabetes mellitus (Mother)      ITEMS NOT CHECKED ARE NOT PRESENT    SOCIAL HISTORY:   Significant other/partner:  [ ]  Children:  [ x]  Denominational/Spirituality:  Substance hx:  [ ]   Tobacco hx:  [ ]   Alcohol hx: [ ]   Home Opioid hx:  [ ] I-Stop Reference No:  Living Situation: [ ]Home  [ ]Long term care  [x ]Rehab [ ]Other    ADVANCE DIRECTIVES:    DNR  MOLST  [ ]  Living Will  [ ]   DECISION MAKER(s):  [ ] Health Care Proxy(s)  [ ] Surrogate(s)  [ ] Guardian           Name(s): Phone Number(s): David Camargo: [phone]    BASELINE (I)ADL(s) (prior to admission):  [place]: [ ]Total  [ x] Moderate [ ]Dependent    Allergies    penicillins (Rash)    Intolerances    MEDICATIONS  (STANDING):  allopurinol 100 milliGRAM(s) Oral daily  calcium gluconate IVPB 2 Gram(s) IV Intermittent once  chlorhexidine 0.12% Liquid 15 milliLiter(s) Oral Mucosa every 12 hours  chlorhexidine 4% Liquid 1 Application(s) Topical <User Schedule>  dexMEDEtomidine Infusion 0.5 MICROgram(s)/kG/Hr (7.83 mL/Hr) IV Continuous <Continuous>  dextrose 40% Gel 15 Gram(s) Oral once  dextrose 5%. 1000 milliLiter(s) (50 mL/Hr) IV Continuous <Continuous>  dextrose 5%. 1000 milliLiter(s) (100 mL/Hr) IV Continuous <Continuous>  dextrose 50% Injectable 25 Gram(s) IV Push once  dextrose 50% Injectable 12.5 Gram(s) IV Push once  dextrose 50% Injectable 25 Gram(s) IV Push once  filgrastim-sndz (ZARXIO) Injectable 300 MICROGram(s) SubCutaneous daily  glucagon  Injectable 1 milliGRAM(s) IntraMuscular once  insulin lispro (ADMELOG) corrective regimen sliding scale   SubCutaneous every 6 hours  isoniazid 300 milliGRAM(s) Oral every 24 hours  midodrine 20 milliGRAM(s) Oral every 8 hours  norepinephrine Infusion 0.05 MICROgram(s)/kG/Min (2.93 mL/Hr) IV Continuous <Continuous>  petrolatum Ophthalmic Ointment 1 Application(s) Both EYES two times a day  polyethylene glycol 3350 17 Gram(s) Oral daily  potassium chloride   Powder 40 milliEquivalent(s) Oral once  pyridoxine 50 milliGRAM(s) Oral daily  senna Syrup 15 milliLiter(s) Oral at bedtime  sevelamer carbonate Powder 1200 milliGRAM(s) Oral three times a day with meals  sodium bicarbonate 1300 milliGRAM(s) Oral three times a day    MEDICATIONS  (PRN):  bisacodyl Suppository 10 milliGRAM(s) Rectal daily PRN Constipation  fentaNYL    Injectable 100 MICROGram(s) IV Push every 15 minutes PRN Severe Pain (7 - 10)  hydrocortisone sodium succinate Injectable 100 milliGRAM(s) IV Push once PRN PRN Chemotherapy Reaction  midazolam Injectable 4 milliGRAM(s) IV Push every 15 minutes PRN anxiety  sodium chloride 0.9% lock flush 10 milliLiter(s) IV Push every 1 hour PRN Pre/post blood products, medications, blood draw, and to maintain line patency    PRESENT SYMPTOMS: [x ]Unable to obtain due to poor mentation   Source if other than patient:  [ ]Family   [ ]Team     Pain: [ ] yes [ ] no  QOL impact -   Location -                    Aggravating factors -  Quality -  Radiation -  Timing-  Severity (0-10 scale):  Minimal acceptable level (0-10 scale):     PAIN AD Score:     http://geriatrictoolkit.[email or web address]/cog/painad.pdf (press ctrl +  left click to view)    Dyspnea:                           [ ]Mild [ ]Moderate [ ]Severe  Anxiety:                             [ ]Mild [ ]Moderate [ ]Severe  Fatigue:                             [ ]Mild [ ]Moderate [ ]Severe  Nausea:                             [ ]Mild [ ]Moderate [ ]Severe  Loss of appetite:              [ ]Mild [ ]Moderate [ ]Severe  Constipation:                    [ ]Mild [ ]Moderate [ ]Severe    Other Symptoms:  [ ]All other review of systems negative     Karnofsky Performance Score/Palliative Performance Status Version 2:   10      %    http://npcrc.org/files/news/palliative_performance_scale_ppsv2.pdf  PHYSICAL EXAM:  Vital Signs Last 24 Hrs  T(C): 36.8 (15 Sep 2021 12:00), Max: 37.7 (15 Sep 2021 06:40)  T(F): 98.2 (15 Sep 2021 12:00), Max: 99.9 (15 Sep 2021 06:40)  HR: 105 (15 Sep 2021 12:00) (70 - 118)  BP: 127/73 (15 Sep 2021 12:00) (58/29 - 159/68)  BP(mean): 86 (15 Sep 2021 12:00) (36 - 105)  RR: 26 (15 Sep 2021 12:00) (26 - 27)  SpO2: 100% (15 Sep 2021 12:00) (100% - 100%) I&O's Summary    14 Sep 2021 07:01  -  15 Sep 2021 07:00  --------------------------------------------------------  IN: 2137.5 mL / OUT: 2770 mL / NET: -632.5 mL    15 Sep 2021 07:01  -  15 Sep 2021 13:00  --------------------------------------------------------  IN: 826.3 mL / OUT: 1205 mL / NET: -378.7 mL    GENERAL:  [ ]Alert  [ ]Oriented x   [ ]Lethargic  [ ]Cachexia  [ ]Unarousable  [ ]Verbal  [ x]Non-Verbal  Behavioral:   [ ] Anxiety  [ ] Delirium [ ] Agitation [ ] Other  HEENT:  [ ]Normal   [ ]Dry mouth   [x ]ET Tube/Trach  [ ]Oral lesions  PULMONARY:   [x ]Clear [ ]Tachypnea  [ ]Audible excessive secretions   [ ]Rhonchi        [ ]Right [ ]Left [ ]Bilateral  [ ]Crackles        [ ]Right [ ]Left [ ]Bilateral  [ ]Wheezing     [ ]Right [ ]Left [ ]Bilateral  CARDIOVASCULAR:    [ ]Regular [ ]Irregular [ x]Tachy  [ ]Crow [ ]Murmur [ ]Other  GASTROINTESTINAL:  [x ]Soft  [ x]Distended   [ ]+BS  [ ]Non tender [ ]Tender  [ ]PEG [ ]OGT/ NGT  Last BM: GENITOURINARY:  [ ]Normal [ ] Incontinent   [ ]Oliguria/Anuria   [ x]Charles  MUSCULOSKELETAL:   [ ]Normal   [ ]Weakness  [ ]Bed/Wheelchair bound [x ]Edema  NEUROLOGIC:   [ ]No focal deficits  [ ] Cognitive impairment  [ ] Dysphagia [ ]Dysarthria [ ] Paresis [ x]Other sedated  SKIN:   [x ]Normal   [ ]Pressure ulcer(s)  [ ]Rash    CRITICAL CARE:  [ ] Shock Present  [ ]Septic [ ]Cardiogenic [ ]Neurologic [ ]Hypovolemic  [ ]  Vasopressors [ ]  Inotropes   [ ] Respiratory failure present [ ] mechanical ventilation [ ] non-invasive ventilatory support [ ] High flow  [ ] Acute  [ ] Chronic [ ] Hypoxic  [ ] Hypercarbic [ ] Other  [ ] Other organ failure     LABS:                        8.7    0.04  )-----------( 1        ( 15 Sep 2021 11:28 )             23.7   09-15    140  |  98  |  35<H>  ----------------------------<  105<H>  3.1<L>   |  20<L>  |  1.07    Ca    8.3<L>      15 Sep 2021 11:28  Phos  3.4     09-15  Mg     1.70     09-15    TPro  5.1<L>  /  Alb  2.2<L>  /  TBili  10.2<H>  /  DBili  x   /  AST  26  /  ALT  16  /  AlkPhos  579<H>  09-15  PT/INR - ( 15 Sep 2021 07:23 )   PT: 14.2 sec;   INR: 1.25 ratio         PTT - ( 15 Sep 2021 07:23 )  PTT:31.1 sec      RADIOLOGY & ADDITIONAL STUDIES:    PROTEIN CALORIE MALNUTRITION PRESENT: [ ] Yes [ ] No  [ ] PPSV2 < or = to 30% [ ] significant weight loss  [ ] poor nutritional intake [ ] catabolic state [ ] anasarca     Artificial Nutrition [ ]     REFERRALS:   [ ]Chaplaincy  [ ] Hospice  [ ]Child Life  [ ]Social Work  [ ]Case management [ ]Holistic Therapy     Goals of Care Document:   Care Coordination Assessment 201 [C. Provider] (08-17-21 @ 09:52)   Progress Notes - Care Coordination [C. Provider] (08-24-21 @ 12:08)

## 2021-09-15 NOTE — CONSULT NOTE ADULT - REASON FOR ADMISSION
Worsening volume overload

## 2021-09-15 NOTE — CONSULT NOTE ADULT - PROBLEM SELECTOR RECOMMENDATION 4
family wish to continue all current medical management  hopeful for recovery and further dmt  this is consistent with past conversaion  would consider family meeting with heme and await mental status eval off sedation

## 2021-09-15 NOTE — PROCEDURE NOTE - NSPROCNAME_GEN_A_CORE
Lumbar Puncture
Central Line Insertion
Gastric Intubation/Gastric Lavage
Paracentesis
Arterial Puncture/Cannulation
Central Line Insertion
Central Line Insertion
Thoracentesis
Paracentesis
Thoracentesis

## 2021-09-15 NOTE — PROCEDURE NOTE - PROCEDURE DATE TIME, MLM
15-Sep-2021 15:37
13-Aug-2021 15:00
24-Aug-2021 17:49
14-Sep-2021 18:13
25-Aug-2021 15:21
25-Aug-2021 15:24
27-Aug-2021 18:23
12-Sep-2021 17:58
20-Aug-2021 10:37
27-Aug-2021 19:12

## 2021-09-15 NOTE — PROGRESS NOTE ADULT - ATTENDING COMMENTS
66 F with DLBCL s/p thoracentesis, L pleurx, acute hypoxemic and hypercapnic respiratory failure requiring intubation c/b cardiac arrest, now with PURVI and metabolic acidosis, on chemo.    Pt with worsening liver enzymes, continued ascites.  LV/RV dysfunction.  Bilirubin continuing to rise    - did not tolerate full session of HD today for PURVI/metabolic acidosis, monitor repeat labs. No change on POCUS in LV/RV function.  check abg  - RUQ sono for bilirubinemia  - still not waking up, wean precedex as tolerated  - c/w empiric course of abx, f/u cultures  - c/w vent, acute hypoxemic and hypercapnic respiratory failure  - ascites, likely malignant, monitor for now, appears less than yesterday  ongoing goc discussion - worried about her overall prognosis

## 2021-09-16 NOTE — PROGRESS NOTE ADULT - SUBJECTIVE AND OBJECTIVE BOX
INTERVAL HPI/OVERNIGHT EVENTS:    SUBJECTIVE: Patient seen and examined at bedside.       VITAL SIGNS:  ICU Vital Signs Last 24 Hrs  T(C): 37.2 (16 Sep 2021 04:00), Max: 37.2 (16 Sep 2021 04:00)  T(F): 99 (16 Sep 2021 04:00), Max: 99 (16 Sep 2021 04:00)  HR: 109 (16 Sep 2021 07:00) (81 - 118)  BP: 99/65 (16 Sep 2021 07:00) (66/36 - 159/68)  BP(mean): 74 (16 Sep 2021 07:00) (43 - 119)  ABP: 92/61 (16 Sep 2021 07:00) (84/57 - 110/65)  ABP(mean): 73 (16 Sep 2021 07:00) (67 - 82)  RR: 26 (16 Sep 2021 07:00) (26 - 27)  SpO2: 100% (16 Sep 2021 07:00) (100% - 100%)    Mode: AC/ CMV (Assist Control/ Continuous Mandatory Ventilation), RR (machine): 26, TV (machine): 400, FiO2: 30, PEEP: 5, ITime: 0.64, MAP: 14, PIP: 37  Plateau pressure:   P/F ratio:     09-15 @ 07:01  -  09-16 @ 07:00  --------------------------------------------------------  IN: 2067.4 mL / OUT: 1705 mL / NET: 362.4 mL      CAPILLARY BLOOD GLUCOSE      POCT Blood Glucose.: 104 mg/dL (16 Sep 2021 06:13)    ECG:    PHYSICAL EXAM:    General:   HEENT:   Neck:   Respiratory:   Cardiovascular:   Abdomen:   Extremities:  Neurological:    MEDICATIONS:  MEDICATIONS  (STANDING):  allopurinol 100 milliGRAM(s) Oral daily  chlorhexidine 0.12% Liquid 15 milliLiter(s) Oral Mucosa every 12 hours  chlorhexidine 4% Liquid 1 Application(s) Topical <User Schedule>  dexMEDEtomidine Infusion 0.5 MICROgram(s)/kG/Hr (7.83 mL/Hr) IV Continuous <Continuous>  dextrose 40% Gel 15 Gram(s) Oral once  dextrose 5%. 1000 milliLiter(s) (100 mL/Hr) IV Continuous <Continuous>  dextrose 5%. 1000 milliLiter(s) (50 mL/Hr) IV Continuous <Continuous>  dextrose 50% Injectable 25 Gram(s) IV Push once  dextrose 50% Injectable 12.5 Gram(s) IV Push once  dextrose 50% Injectable 25 Gram(s) IV Push once  filgrastim-sndz (ZARXIO) Injectable 300 MICROGram(s) SubCutaneous daily  glucagon  Injectable 1 milliGRAM(s) IntraMuscular once  insulin lispro (ADMELOG) corrective regimen sliding scale   SubCutaneous every 6 hours  isoniazid 300 milliGRAM(s) Oral every 24 hours  midodrine 20 milliGRAM(s) Oral every 8 hours  norepinephrine Infusion 0.05 MICROgram(s)/kG/Min (2.93 mL/Hr) IV Continuous <Continuous>  petrolatum Ophthalmic Ointment 1 Application(s) Both EYES two times a day  polyethylene glycol 3350 17 Gram(s) Oral daily  pyridoxine 50 milliGRAM(s) Oral daily  senna Syrup 15 milliLiter(s) Oral at bedtime  sevelamer carbonate Powder 1200 milliGRAM(s) Oral three times a day with meals  sodium bicarbonate 1300 milliGRAM(s) Oral three times a day    MEDICATIONS  (PRN):  bisacodyl Suppository 10 milliGRAM(s) Rectal daily PRN Constipation  hydrocortisone sodium succinate Injectable 100 milliGRAM(s) IV Push once PRN PRN Chemotherapy Reaction  sodium chloride 0.9% lock flush 10 milliLiter(s) IV Push every 1 hour PRN Pre/post blood products, medications, blood draw, and to maintain line patency      ALLERGIES:  Allergies    penicillins (Rash)    Intolerances        LABS:                        8.2    0.06  )-----------( 1        ( 16 Sep 2021 04:25 )             23.6     09-16    140  |  99  |  40<H>  ----------------------------<  97  3.3<L>   |  17<L>  |  1.16    Ca    8.3<L>      16 Sep 2021 04:25  Phos  3.7     09-16  Mg     1.60     09-16    TPro  4.8<L>  /  Alb  1.9<L>  /  TBili  10.6<H>  /  DBili  x   /  AST  24  /  ALT  15  /  AlkPhos  520<H>  09-16    PT/INR - ( 16 Sep 2021 04:25 )   PT: 14.8 sec;   INR: 1.32 ratio         PTT - ( 16 Sep 2021 04:25 )  PTT:30.2 sec      RADIOLOGY & ADDITIONAL TESTS: Reviewed.   INTERVAL HPI/OVERNIGHT EVENTS: With small episodes of darkened stool.    SUBJECTIVE: Patient seen and examined at bedside.       VITAL SIGNS:  ICU Vital Signs Last 24 Hrs  T(C): 37.2 (16 Sep 2021 04:00), Max: 37.2 (16 Sep 2021 04:00)  T(F): 99 (16 Sep 2021 04:00), Max: 99 (16 Sep 2021 04:00)  HR: 109 (16 Sep 2021 07:00) (81 - 118)  BP: 99/65 (16 Sep 2021 07:00) (66/36 - 159/68)  BP(mean): 74 (16 Sep 2021 07:00) (43 - 119)  ABP: 92/61 (16 Sep 2021 07:00) (84/57 - 110/65)  ABP(mean): 73 (16 Sep 2021 07:00) (67 - 82)  RR: 26 (16 Sep 2021 07:00) (26 - 27)  SpO2: 100% (16 Sep 2021 07:00) (100% - 100%)    Mode: AC/ CMV (Assist Control/ Continuous Mandatory Ventilation), RR (machine): 26, TV (machine): 400, FiO2: 30, PEEP: 5, ITime: 0.64, MAP: 14, PIP: 37  Plateau pressure:   P/F ratio:     09-15 @ 07:01  -  09-16 @ 07:00  --------------------------------------------------------  IN: 2067.4 mL / OUT: 1705 mL / NET: 362.4 mL      CAPILLARY BLOOD GLUCOSE      POCT Blood Glucose.: 104 mg/dL (16 Sep 2021 06:13)      PHYSICAL EXAM:    GENERAL: Intubated, on precedex, noted to be less alert  HEAD:  Atraumatic, Normocephalic  EYES: EOMI, PERRLA, conjunctiva and sclera clear  NECK: Supple, No JVD  CHEST/LUNG: Clear to auscultation bilaterally; No wheeze  HEART: Regular rate and rhythm; No murmurs, rubs, or gallops  ABDOMEN: Soft, Nondistended; Bowel sounds present  EXTREMITIES:  2+ Peripheral Pulses, No clubbing, cyanosis, or edema  NEUROLOGY: unable to accurately assess  SKIN: No rashes or lesions    MEDICATIONS:  MEDICATIONS  (STANDING):  allopurinol 100 milliGRAM(s) Oral daily  chlorhexidine 0.12% Liquid 15 milliLiter(s) Oral Mucosa every 12 hours  chlorhexidine 4% Liquid 1 Application(s) Topical <User Schedule>  dexMEDEtomidine Infusion 0.5 MICROgram(s)/kG/Hr (7.83 mL/Hr) IV Continuous <Continuous>  dextrose 40% Gel 15 Gram(s) Oral once  dextrose 5%. 1000 milliLiter(s) (100 mL/Hr) IV Continuous <Continuous>  dextrose 5%. 1000 milliLiter(s) (50 mL/Hr) IV Continuous <Continuous>  dextrose 50% Injectable 25 Gram(s) IV Push once  dextrose 50% Injectable 12.5 Gram(s) IV Push once  dextrose 50% Injectable 25 Gram(s) IV Push once  filgrastim-sndz (ZARXIO) Injectable 300 MICROGram(s) SubCutaneous daily  glucagon  Injectable 1 milliGRAM(s) IntraMuscular once  insulin lispro (ADMELOG) corrective regimen sliding scale   SubCutaneous every 6 hours  isoniazid 300 milliGRAM(s) Oral every 24 hours  midodrine 20 milliGRAM(s) Oral every 8 hours  norepinephrine Infusion 0.05 MICROgram(s)/kG/Min (2.93 mL/Hr) IV Continuous <Continuous>  petrolatum Ophthalmic Ointment 1 Application(s) Both EYES two times a day  polyethylene glycol 3350 17 Gram(s) Oral daily  pyridoxine 50 milliGRAM(s) Oral daily  senna Syrup 15 milliLiter(s) Oral at bedtime  sevelamer carbonate Powder 1200 milliGRAM(s) Oral three times a day with meals  sodium bicarbonate 1300 milliGRAM(s) Oral three times a day    MEDICATIONS  (PRN):  bisacodyl Suppository 10 milliGRAM(s) Rectal daily PRN Constipation  hydrocortisone sodium succinate Injectable 100 milliGRAM(s) IV Push once PRN PRN Chemotherapy Reaction  sodium chloride 0.9% lock flush 10 milliLiter(s) IV Push every 1 hour PRN Pre/post blood products, medications, blood draw, and to maintain line patency      ALLERGIES:  Allergies    penicillins (Rash)    Intolerances        LABS:                        8.2    0.06  )-----------( 1        ( 16 Sep 2021 04:25 )             23.6     09-16    140  |  99  |  40<H>  ----------------------------<  97  3.3<L>   |  17<L>  |  1.16    Ca    8.3<L>      16 Sep 2021 04:25  Phos  3.7     09-16  Mg     1.60     09-16    TPro  4.8<L>  /  Alb  1.9<L>  /  TBili  10.6<H>  /  DBili  x   /  AST  24  /  ALT  15  /  AlkPhos  520<H>  09-16    PT/INR - ( 16 Sep 2021 04:25 )   PT: 14.8 sec;   INR: 1.32 ratio         PTT - ( 16 Sep 2021 04:25 )  PTT:30.2 sec      RADIOLOGY & ADDITIONAL TESTS: Reviewed.

## 2021-09-16 NOTE — PROGRESS NOTE ADULT - SUBJECTIVE AND OBJECTIVE BOX
Gastroenterology progress note:     Patient is a 66y old  Female who presents with a chief complaint of Worsening volume overload (16 Sep 2021 07:11)       Admitted on: 08-12-21    We are following the patient for: elevated liver tests     Interval History:  patient still intubated, no acute event ON, on pressors , has BM, on tube feed      PAST MEDICAL & SURGICAL HISTORY:  Hypertension    Ovarian mass    Hypercholesteremia    S/P ureteral stent placement        MEDICATIONS  (STANDING):  allopurinol 100 milliGRAM(s) Oral daily  chlorhexidine 0.12% Liquid 15 milliLiter(s) Oral Mucosa every 12 hours  chlorhexidine 4% Liquid 1 Application(s) Topical <User Schedule>  dexMEDEtomidine Infusion 0.5 MICROgram(s)/kG/Hr (7.83 mL/Hr) IV Continuous <Continuous>  dextrose 40% Gel 15 Gram(s) Oral once  dextrose 5%. 1000 milliLiter(s) (100 mL/Hr) IV Continuous <Continuous>  dextrose 5%. 1000 milliLiter(s) (50 mL/Hr) IV Continuous <Continuous>  dextrose 50% Injectable 25 Gram(s) IV Push once  dextrose 50% Injectable 12.5 Gram(s) IV Push once  dextrose 50% Injectable 25 Gram(s) IV Push once  fentaNYL    Injectable 100 MICROGram(s) IV Push once  filgrastim-sndz (ZARXIO) Injectable 300 MICROGram(s) SubCutaneous daily  glucagon  Injectable 1 milliGRAM(s) IntraMuscular once  insulin lispro (ADMELOG) corrective regimen sliding scale   SubCutaneous every 6 hours  isoniazid 300 milliGRAM(s) Oral every 24 hours  midodrine 20 milliGRAM(s) Oral every 8 hours  norepinephrine Infusion 0.05 MICROgram(s)/kG/Min (2.93 mL/Hr) IV Continuous <Continuous>  petrolatum Ophthalmic Ointment 1 Application(s) Both EYES two times a day  polyethylene glycol 3350 17 Gram(s) Oral daily  pyridoxine 50 milliGRAM(s) Oral daily  senna Syrup 15 milliLiter(s) Oral at bedtime  sevelamer carbonate Powder 1200 milliGRAM(s) Oral three times a day with meals  sodium bicarbonate 1300 milliGRAM(s) Oral three times a day    MEDICATIONS  (PRN):  bisacodyl Suppository 10 milliGRAM(s) Rectal daily PRN Constipation  hydrocortisone sodium succinate Injectable 100 milliGRAM(s) IV Push once PRN PRN Chemotherapy Reaction  sodium chloride 0.9% lock flush 10 milliLiter(s) IV Push every 1 hour PRN Pre/post blood products, medications, blood draw, and to maintain line patency      Allergies  penicillins (Rash)      Review of Systems:   unable to obtain      Physical Examination:  T(C): 37.2 (09-16-21 @ 04:00), Max: 37.2 (09-16-21 @ 04:00)  HR: 110 (09-16-21 @ 08:00) (98 - 118)  BP: 105/58 (09-16-21 @ 08:00) (95/49 - 139/110)  RR: 26 (09-16-21 @ 08:00) (26 - 26)  SpO2: 100% (09-16-21 @ 08:00) (100% - 100%)      09-15-21 @ 07:01  -  09-16-21 @ 07:00  --------------------------------------------------------  IN: 2067.4 mL / OUT: 1705 mL / NET: 362.4 mL        Constitutional: No acute distress.  Respiratory: blood noted around ET tube,   intubated with breathing sound bilaterally, chest tube noted  Cardiovascular:  S1 S2, Regular rate and rhythm.  Abdominal: Abdomen is soft, symmetric, and non-tender without distention. There are no visible lesions. Bowel sounds are present and normoactive in all four quadrants. No masses, hepatomegaly, or splenomegaly are noted.   Neurology: opening eyes to verbal stimuli,  Non-focal  Vascular: No varicose vein, No cyanosis,  edema        Data: (reviewed by attending)                        8.2    0.06  )-----------( 1        ( 16 Sep 2021 04:25 )             23.6     Hgb trend:  8.2  09-16-21 @ 04:25  8.7  09-15-21 @ 11:28  8.6  09-15-21 @ 07:23  8.3  09-14-21 @ 19:37  7.8  09-14-21 @ 03:01  8.8  09-13-21 @ 13:10      09-13-21 @ 07:01  -  09-14-21 @ 07:00  --------------------------------------------------------  IN: 200 mL    09-14-21 @ 07:01  -  09-15-21 @ 07:00  --------------------------------------------------------  IN: 320 mL      09-16    140  |  99  |  40<H>  ----------------------------<  97  3.3<L>   |  17<L>  |  1.16    Ca    8.3<L>      16 Sep 2021 04:25  Phos  3.7     09-16  Mg     1.60     09-16    TPro  4.8<L>  /  Alb  1.9<L>  /  TBili  10.6<H>  /  DBili  x   /  AST  24  /  ALT  15  /  AlkPhos  520<H>  09-16    Liver panel trend:  TBili 10.6   /   AST 24   /   ALT 15   /   AlkP 520   /   Tptn 4.8   /   Alb 1.9    /   DBili --      09-16  TBili 10.1   /   AST 24   /   ALT 14   /   AlkP 528   /   Tptn 4.8   /   Alb 2.2    /   DBili --      09-15  TBili 10.2   /   AST 26   /   ALT 16   /   AlkP 579   /   Tptn 5.1   /   Alb 2.2    /   DBili --      09-15  TBili 8.6   /   AST 23   /   ALT 16   /   AlkP 487   /   Tptn 4.4   /   Alb 2.0    /   DBili --      09-15  TBili 9.6   /   AST 33   /   ALT 17   /   AlkP 565   /   Tptn 5.2   /   Alb 2.3    /   DBili --      09-14  TBili 8.4   /   AST 44   /   ALT 19   /   AlkP 535   /   Tptn 5.1   /   Alb 2.4    /   DBili --      09-14  TBili 7.8   /   AST 64   /   ALT 20   /   AlkP 500   /   Tptn 5.2   /   Alb 2.4    /   DBili --      09-14  TBili 6.2   /   AST 57   /   ALT 16   /   AlkP 435   /   Tptn 5.4   /   Alb 2.6    /   DBili 6.1      09-13  TBili --   /   AST --   /   ALT --   /   AlkP --   /   Tptn --   /   Alb --    /   DBili 5.1      09-13  TBili 6.1   /   AST 42   /   ALT 15   /   AlkP 410   /   Tptn 5.9   /   Alb 2.7    /   DBili --      09-13  TBili 5.3   /   AST 35   /   ALT 12   /   AlkP 360   /   Tptn 5.6   /   Alb 2.9    /   DBili --      09-12  TBili 4.1   /   AST 32   /   ALT 12   /   AlkP 253   /   Tptn 5.7   /   Alb 3.0    /   DBili --      09-12  TBili 3.6   /   AST 32   /   ALT 10   /   AlkP 225   /   Tptn 5.6   /   Alb 3.0    /   DBili --      09-11  TBili 3.6   /   AST 34   /   ALT 10   /   AlkP 236   /   Tptn 5.7   /   Alb 3.1    /   DBili --      09-10  TBili 3.7   /   AST 36   /   ALT 8   /   AlkP 217   /   Tptn 5.5   /   Alb 2.8    /   DBili --      09-10  TBili 4.1   /   AST 40   /   ALT 10   /   AlkP 213   /   Tptn 5.6   /   Alb 3.2    /   DBili --      09-10  TBili 3.7   /   AST 41   /   ALT 11   /   AlkP 187   /   Tptn 5.7   /   Alb 3.5    /   DBili --      09-09  TBili 4.1   /   AST 40   /   ALT 10   /   AlkP 165   /   Tptn 5.6   /   Alb 3.2    /   DBili --      09-09  TBili 2.5   /   AST 42   /   ALT 9   /   AlkP 146   /   Tptn 5.5   /   Alb 3.1    /   DBili --      09-08  TBili 2.0   /   AST 43   /   ALT 10   /   AlkP 152   /   Tptn 5.4   /   Alb 2.9    /   DBili --      09-08  TBili 1.5   /   AST 46   /   ALT 9   /   AlkP 167   /   Tptn 5.1   /   Alb 2.5    /   DBili --      09-07  TBili 1.2   /   AST 43   /   ALT 12   /   AlkP 152   /   Tptn 5.1   /   Alb 2.3    /   DBili --      09-07  TBili 1.0   /   AST 46   /   ALT 11   /   AlkP 145   /   Tptn 5.1   /   Alb 2.3    /   DBili --      09-07  TBili 1.0   /   AST 41   /   ALT 9   /   AlkP 116   /   Tptn 5.4   /   Alb 2.7    /   DBili --      09-06      PT/INR - ( 16 Sep 2021 04:25 )   PT: 14.8 sec;   INR: 1.32 ratio         PTT - ( 16 Sep 2021 04:25 )  PTT:30.2 sec       Radiology: (reviewed by attending)    US Abdomen Complete:   EXAM:  US ABDOMEN COMPLETE        PROCEDURE DATE:  Sep 15 2021         INTERPRETATION:  CLINICAL INFORMATION: Hyperbilirubinemia. Diffuse large B-cell lymphoma.    COMPARISON: Ultrasound abdomen 8/26/2021. CT chest abdomen pelvis 9/13/2021.    TECHNIQUE: Sonography of the abdomen.    FINDINGS:    Liver: Within normal limits.  Bile ducts: Normal caliber. Common bile duct measures 3 mm.  Gallbladder: Contracted gallbladder. Polyp measuring 0.2 cm.  Pancreas: Poorly visualized.  Spleen: Not visualized.  Right kidney: 10.5 cm. Mild hydronephrosis. No mass or calculus.  Left kidney: 12.8 cm. Limited evaluation. Moderate hydronephrosis no mass or calculus.  Ascites: Moderate perihepatic ascites.  Aorta and IVC: Visualized portions are within normal limits.    Miscellaneous: Right pleural effusion.    IMPRESSION:  Normal sonographic appearance of the liver with no evidence for biliary obstruction.    Moderate left hydronephrosis and mild right hydronephrosis.    Mild perihepatic ascites.    Right pleural effusion.        --- End of Report ---            HEATHER AVENDAÑO MD; Resident Radiology  This document has been electronically signed.  WAYNE RAYA MD; Attending Radiologist  This document has been electronically signed. Sep 15 2021  4:54PM (09-15-21 @ 15:38)

## 2021-09-16 NOTE — PROGRESS NOTE ADULT - ASSESSMENT
66-year-old woman with PMH significant for HTN, HLD, L hydroureteronephrosis s/p renal stent on 7/15, who presents with volume overload 2/2 high grade B-cell lymphoma. S/p thoracentesis 8/13, paracentesis and excisional biopsy of left inguinal lymph node on 8/20. Accepted to MICU for acute hypoxic/hypercapneic respiratory failure now s/p intubation and L pleurex catheter placement, now undergoing chemotherapy.    #Neuro  - Altered mental status, likely 2/2 multifactorial in the setting of hypercarbic respiratory failure  - CTH with no intracranial pathology  - Concern for lymphomatous meningitis, however ruled out s/p LP with flow cytometry and cytopathology negative  - Currently sedated with precedex intermittently following commands - will wean as tolerated  - pt noted to be less responsive today, ammonia level normal, if continues to be this way can consider CTH, CT chest, and CT Abdomen and Pelvis, as ABG appears to be appropriate    #Cardiovascular  Vasoplegic shock 2/2 to sedatives   - Echo 8/3 showing concentric left ventricular remodeling, hyperdynamic left ventricle, calcified trileaflet aortic valve with normal opening, EF 56%  - POCUS showing RV enalragement  - On pressor support with levo, wean as tolerated, continue with midodrine 20 mg TID    #Respiratory  - Hypoxic/hypercapneic respiratory failure likely secondary to malignant pleural effusions from malignancy  - S/p thoracentesis on 8/13 with re-accumulation of pleural effusions  - S/p second thoracentesis 8/25, s/p L pleurex 8/27  - On pressure support, however remains tachypneic with low TVs, will repeat thoracentesis on R side before attempting extubation   - Unsuccessful breathing trials, will require future trach once platelets stabilize    #GI/Nutrition  Malignant ascites   - S/p paracentesis 8/20  - Still remains ascites, possible paracentesis  - tube feeds temporarily held in setting of high residuals    Hyperbilirubinemia  RUQ U/S pending  Hepatology recs appreciated  Fungitell, Anti HEV, CMV, EBV, VZV, HSV, SMA, ferritin, transferrin, ceruloplasmin, AMA, and LKM Ab  As per hepatology, concern for allopurinol as a possible source    #diarrhea   - previously constipated s/p golytely + fecal disimpaction    #/Renal  - PURVI likely 2/2 to ATN in the setting of previous supratherapeutic vancomycin level + tumor lysis + IV contrast  - B/L hydronephrosis stable on CT A/P 2/2 malignant LAD  - Nephrology on board, recommend holding LASIX & other nephrotoxic medications.   - Nephrostomy tubes considered however per IR recommendations not appropriate at this time as patient's Cr previously downtrending, may consider re-consulting if patient's renal function continues to worsen   - monitor TLS labs q8h, now with uptrending LDH and hyperphosphatemia, although uric acid remains low   - c/w phosphate binder  - s/p bicarb drip, transitioned to PO bicarb   - carnes placed for accurate I+Os  - received first session of HD on 9/12, will receive again on 9/13, however will monitor BP and pressor requirements with fluid removal    #Skin  - No sacral decubiti    #ID  - s/p Vanco and Zosyn (ended 8/31)   - U/A grossly positive, urine cx NGTD  - blood cx 8/28/21 NGTD  - strongyloides positive - s/p ivermectin (9/1 - 9/2) given IC state   - c/w INH + pyridoxine (given indeterminate quant gold)    #Endocrine  - SSI   - will readjust as necessary     #Hematologic/DVT ppx  - newly diagnosed diffuse large B-cell lymphoma  - TLS labs q8  - Allopurinol for TLS prophylaxis  - Heme/Onc recommending starting steroids with dexamethasone, s/p 40 mg dose (ended 8/29, 8/31)   - s/p Rituxan 8/28/21  - s/p cyclophosphamide (1st cycle 9/1, 2nd cycle 9/2, 3rd cycle 9/3)  - s/p doxorubicin 9/4  - restarting doxorubicin/etoposide on 9/10   - restarted on filgastrim as per oncology  - thrombocytopenia s/p 2 U platelets  - chemotherapy held on 9/12 night given worsening hypotension and increasing pressor requirements  - DVT prophylaxis with SCDs, no pharmacological px  -s/p 1U pRBC 9/11 6.8 --> 8.7    #Ethics  - Patient is FULL CODE per palliative care discussion with patient and her sons (Yuan and Jose)  - Son (Yuan) and father still deciding on whether they would want a trach, understanding that this would be the pt's only option     Fill-in proxy is Jose Camargo: [phone]   66-year-old woman with PMH significant for HTN, HLD, L hydroureteronephrosis s/p renal stent on 7/15, who presents with volume overload 2/2 high grade B-cell lymphoma. S/p thoracentesis 8/13, paracentesis and excisional biopsy of left inguinal lymph node on 8/20. Accepted to MICU for acute hypoxic/hypercapneic respiratory failure now s/p intubation and L pleurex catheter placement, now undergoing chemotherapy.    #Neuro  - Altered mental status, likely 2/2 multifactorial in the setting of hypercarbic respiratory failure  - CTH with no intracranial pathology  - Concern for lymphomatous meningitis, however ruled out s/p LP with flow cytometry and cytopathology negative  - Spot EEG pending  - pt remains alert, however not as responsive as previously    #Cardiovascular  Vasoplegic shock 2/2 to sedatives   - Echo 8/3 showing concentric left ventricular remodeling, hyperdynamic left ventricle, calcified trileaflet aortic valve with normal opening, EF 56%  - POCUS showing RV enalragement  - On pressor support with levo, wean as tolerated, midodrine increased to 30 mg TID    #Respiratory  - Hypoxic/hypercapneic respiratory failure likely secondary to malignant pleural effusions from malignancy  - S/p thoracentesis on 8/13 with re-accumulation of pleural effusions  - S/p second thoracentesis 8/25, s/p L pleurex 8/27  - On pressure support, however remains tachypneic with low TVs, will repeat thoracentesis on R side before attempting extubation   - Unsuccessful breathing trials, will require future trach once platelets stabilize    #GI/Nutrition  Malignant ascites   - S/p paracentesis 8/20  - Still remains ascites, possible paracentesis  - tube feeds temporarily held in setting of high residuals  - constipation with miralax BID    Hyperbilirubinemia  RUQ U/S pending  Hepatology recs appreciated  Fungitell, Anti HEV, CMV, EBV, VZV, HSV, SMA, ferritin, transferrin, ceruloplasmin, AMA, and LKM Ab  As per hepatology, concern for allopurinol as a possible source, allopurinol discontinued    #diarrhea   - previously constipated s/p golytely + fecal disimpaction    #/Renal  - PURVI likely 2/2 to ATN in the setting of previous supratherapeutic vancomycin level + tumor lysis + IV contrast  - B/L hydronephrosis stable on CT A/P 2/2 malignant LAD  - Nephrology on board, recommend holding LASIX & other nephrotoxic medications.   - Nephrostomy tubes considered however per IR recommendations not appropriate at this time as patient's Cr previously downtrending, may consider re-consulting if patient's renal function continues to worsen   - monitor TLS labs q8h, now with uptrending LDH and hyperphosphatemia, although uric acid remains low   - c/w phosphate binder  - s/p bicarb drip, transitioned to PO bicarb   - carnes placed for accurate I+Os  - received first session of HD on 9/12, additional sessions on 9/13, 9/15    #Skin  - No sacral decubiti    #ID  - s/p Vanco and Zosyn (ended 8/31)   - U/A grossly positive, urine cx NGTD  - blood cx 8/28/21 NGTD, will repeat blood cxs on Saturday  - strongyloides positive - s/p ivermectin (9/1 - 9/2) given IC state   - c/w INH + pyridoxine (given indeterminate quant gold)    #Endocrine  - SSI   - will readjust as necessary     #Hematologic/DVT ppx  - newly diagnosed diffuse large B-cell lymphoma  - TLS labs q8  - Allopurinol discontinued possibly in setting of hyperbilirubinemia  - Heme/Onc recommending starting steroids with dexamethasone, s/p 40 mg dose (ended 8/29, 8/31)   - s/p Rituxan 8/28/21  - s/p cyclophosphamide (1st cycle 9/1, 2nd cycle 9/2, 3rd cycle 9/3)  - s/p doxorubicin 9/4  - restarting doxorubicin/etoposide on 9/10   - restarted on filgastrim as per oncology  - thrombocytopenia s/p 2 U platelets  - chemotherapy held on 9/12 night given worsening hypotension and increasing pressor requirements  - DVT prophylaxis with SCDs, no pharmacological px  -s/p 1U pRBC 9/11 6.8 --> 8.7    #Ethics  - Patient is FULL CODE per palliative care discussion with patient and her sons (Yuan and Jose)  - Son (Yuan) and father still deciding on whether they would want a trach, understanding that this would be the pt's only option   - Palliative care on board to speak with family with regards to code statuts    Fill-in proxy is Jose Camargo: [phone]

## 2021-09-16 NOTE — PROGRESS NOTE ADULT - ATTENDING COMMENTS
66 F with DLBCL s/p thoracentesis, L pleurx, acute hypoxemic and hypercapnic respiratory failure requiring intubation c/b cardiac arrest, now with PURVI and metabolic acidosis, on chemo.    Son at bedside, asked for PS trial and patient subsequently developed SVT with hypotension that broke     Continued hyperbilirubinemia, thrombocytopenia.  Suspect DILI, will stop allopurinol.    - still not waking up, off precedex at this point.  Likely acute metabolic encephalopathy.  - c/w empiric course of abx, f/u cultures  - c/w vent, acute hypoxemic and hypercapnic respiratory failure  - ascites, likely malignant, monitor for now given refractory thrombocytopenia.  ongoing [facility] discussion - sons asking to speak to palliative care

## 2021-09-16 NOTE — PROGRESS NOTE ADULT - ATTENDING COMMENTS
Chart reviewed. Pt seen and examined. Agree with note above.     Pt with PURVI-D, multifactorial: MONO, obstructive uropathy and TLS.   No other critical electrolyte, acid-base, or volume derangements based on available labs.   Last HD 9/15. Monitoring for HD needs daily.

## 2021-09-16 NOTE — PROGRESS NOTE ADULT - ATTENDING COMMENTS
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   Patient intubated, on pressor, pancytopenic s/p PLT transfusion, s/p Filgrastim. Chemotherapy was held. AST and ALT normalized, ALP slightly downtrending, and TB remained high at 10.6. INR 1.35. abdominal US reported no biliary tract obstruction.   Assessment: elevated liver tests and hyperbilirubinemia. She has cholestatic liver injury with jaundice, likely multifactorial from DILI, congestive hepatopathy, infection, less likely biliary tract or infiltrative lymphoma in the liver or opportunistic viral hepatitis.   Would recommend- trend liver tests, and INR, abdominal doppler to assess portal venous system and HV/IVC, check direct bilirubin, Fungitell, surveillance for infection, avoid all potential hepatotoxic agents, Enio suspension 500 mg bid if cholestasis and jaundice continue to worsen and continue rest of care per primary team.   .

## 2021-09-16 NOTE — PROGRESS NOTE ADULT - PROBLEM SELECTOR PLAN 1
Pt with PURVI in the setting of IV contrast exposure + obstructive uropathy and ?TLS. Of note, recent hospitalization at [facility] 7/26-8/4 for PURVI requiring urgent HD. SCr was at 0.99 on 08/19. Pt. had an PURVI episode which resolved during current hospitalization, but now with recurrent PURVI. Scr elevated since 8/31. Repeat CT scan showed stable B/L hydronephrosis. On 9/12 patient with worsening kidney function and became oliguric despite high dose Bumex gtt. PURVI likely secondary to ATN. started on HD on 9/12. Pt with DLBCL s/p partial completion of DA-EPOCH.    She remains anuric, s/p HD yesterday. On IV levophed. No indication for Hd today, ongoing GOC discussion with family as pt with worsening clinical status. Closely monitor urine output. Avoid NSAIDs, ACEI/ARBS, RCA and nephrotoxins. Dose medications for HD.

## 2021-09-16 NOTE — PROGRESS NOTE ADULT - SUBJECTIVE AND OBJECTIVE BOX
[facility] DIVISION OF KIDNEY DISEASES AND HYPERTENSION -- FOLLOW UP NOTE  --------------------------------------------------------------------------------  If any questions, please feel free to contact me  [facility] pager: [phone], [facility]: [phone]  Dusty Fountain M.D.  Nephrology Fellow    (After 5 pm or on weekends please page the on-call fellow)  --------------------------------------------------------------------------------    HPI:  66 year old female with HTN, HLD, recently discovered ovarian mass suspicious for malignancy (7/2021), L hydroureteronephrosis s/p renal stent (7/15/21), and recent hospitalization ([facility] 7/26-8/4) for PURVI requiring urgent HD, ascites s/p therapeutic paracentesis (7/27/21), and pleural effusion s/p R thoracentesis (8/2/21) admitted for acute hypercarbic respiratory failure due to pleural effusions in the setting of ovarian malignancy complicated with volume overload with ascites. Now found to have new onset PURVI. Baseline Cr 0.7-1.1mg/dl. Had a recent PURVI episode which resolved- started to trend up again on 8/31. Pt. Initiated on chemotherapy on 9/1. Pt. had a cardiopulmonary arrest on 9/5/21. On 9/12 oliguric despite high dose Bumex gtt, started on HD for volume overload and remains intubated in MICU.    Patient seen and examined at bedside, s/p HD yesterday. remains intubated.  Vitals/labs/imaging reviewed     PAST HISTORY  --------------------------------------------------------------------------------  No significant changes to PMH, PSH, FHx, SHx, unless otherwise noted    ALLERGIES & MEDICATIONS  --------------------------------------------------------------------------------  Allergies    penicillins (Rash)    Intolerances      Standing Inpatient Medications  allopurinol 100 milliGRAM(s) Oral daily  chlorhexidine 0.12% Liquid 15 milliLiter(s) Oral Mucosa every 12 hours  chlorhexidine 4% Liquid 1 Application(s) Topical <User Schedule>  dexMEDEtomidine Infusion 0.5 MICROgram(s)/kG/Hr IV Continuous <Continuous>  dextrose 40% Gel 15 Gram(s) Oral once  dextrose 5%. 1000 milliLiter(s) IV Continuous <Continuous>  dextrose 5%. 1000 milliLiter(s) IV Continuous <Continuous>  dextrose 50% Injectable 25 Gram(s) IV Push once  dextrose 50% Injectable 12.5 Gram(s) IV Push once  dextrose 50% Injectable 25 Gram(s) IV Push once  fentaNYL    Injectable 100 MICROGram(s) IV Push once  filgrastim-sndz (ZARXIO) Injectable 300 MICROGram(s) SubCutaneous daily  glucagon  Injectable 1 milliGRAM(s) IntraMuscular once  insulin lispro (ADMELOG) corrective regimen sliding scale   SubCutaneous every 6 hours  isoniazid 300 milliGRAM(s) Oral every 24 hours  midodrine 20 milliGRAM(s) Oral every 8 hours  norepinephrine Infusion 0.05 MICROgram(s)/kG/Min IV Continuous <Continuous>  petrolatum Ophthalmic Ointment 1 Application(s) Both EYES two times a day  polyethylene glycol 3350 17 Gram(s) Oral daily  pyridoxine 50 milliGRAM(s) Oral daily  senna Syrup 15 milliLiter(s) Oral at bedtime  sevelamer carbonate Powder 1200 milliGRAM(s) Oral three times a day with meals  sodium bicarbonate 1300 milliGRAM(s) Oral three times a day    PRN Inpatient Medications  bisacodyl Suppository 10 milliGRAM(s) Rectal daily PRN  hydrocortisone sodium succinate Injectable 100 milliGRAM(s) IV Push once PRN  sodium chloride 0.9% lock flush 10 milliLiter(s) IV Push every 1 hour PRN      REVIEW OF SYSTEMS  --------------------------------------------------------------------------------  unable to obtain due to current medical conditions     VITALS/PHYSICAL EXAM  --------------------------------------------------------------------------------  T(C): 37.2 (09-16-21 @ 04:00), Max: 37.2 (09-16-21 @ 04:00)  HR: 110 (09-16-21 @ 08:00) (98 - 118)  BP: 105/58 (09-16-21 @ 08:00) (95/49 - 139/110)  RR: 26 (09-16-21 @ 08:00) (26 - 26)  SpO2: 100% (09-16-21 @ 08:00) (100% - 100%)  Wt(kg): --        09-15-21 @ 07:01  -  09-16-21 @ 07:00  --------------------------------------------------------  IN: 2067.4 mL / OUT: 1705 mL / NET: 362.4 mL        Physical Exam:  	Gen: Intubated  	HEENT: ET tube +  	Pulm: Mechanically ventilated via ETT  	CV: S1S2  	Abd: Soft, +BS  	Ext: 2+ Pitting LE edema B/L  	Neuro: sedated              : Charles +    	Skin: Warm and dry    LABS/STUDIES  --------------------------------------------------------------------------------              8.2    0.06  >-----------<  1        [09-16-21 @ 04:25]              23.6     140  |  99  |  40  ----------------------------<  97      [09-16-21 @ 04:25]  3.3   |  17  |  1.16        Ca     8.3     [09-16-21 @ 04:25]      iCa    1.04     [09-16 @ 04:25]      Mg     1.60     [09-16-21 @ 04:25]      Phos  3.7     [09-16-21 @ 04:25]    TPro  4.8  /  Alb  1.9  /  TBili  10.6  /  DBili  x   /  AST  24  /  ALT  15  /  AlkPhos  520  [09-16-21 @ 04:25]    PT/INR: PT 14.8 , INR 1.32       [09-16-21 @ 04:25]  PTT: 30.2       [09-16-21 @ 04:25]    Uric acid 3.3      [09-16-21 @ 04:25]        [09-16-21 @ 04:25]    Creatinine Trend:  SCr 1.16 [09-16 @ 04:25]  SCr 1.14 [09-15 @ 21:37]  SCr 1.07 [09-15 @ 11:28]  SCr 1.26 [09-15 @ 07:23]  SCr 1.41 [09-14 @ 22:41]    Urinalysis - [08-28-21 @ 17:00]      Color Yellow / Appearance Turbid / SG 1.034 / pH 6.5      Gluc Trace / Ketone Negative  / Bili Negative / Urobili <2 mg/dL       Blood Small / Protein 100 mg/dL / Leuk Est Large / Nitrite Negative      RBC 6-10 / WBC 15-20 / Hyaline 3 / Gran 20 / Sq Epi  / Non Sq Epi Occasional / Bacteria Moderate      TSH 1.85      [07-29-21 @ 11:12]    HBsAg Nonreact      [08-26-21 @ 10:02]  HCV 0.20, Nonreact      [08-26-21 @ 10:02]  HIV Nonreact      [08-25-21 @ 12:13]

## 2021-09-16 NOTE — PROGRESS NOTE ADULT - ASSESSMENT
66-year-old female with PMH significant for HTN, HLD, recently discovered ovarian mass suspicious for malignancy (7/2021), and L hydroureteronephrosis s/p renal stent (7/15/21) presented to [facility] on 8/12 with BLE edema and worsening abdominal distension and SOB.   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 Patient was seen by Hem/onc and started on chemotherapy. Hospital course complicated with Hypoxic/Hypercapnic respiratory failure s/p intubation likely due to recurrent malignant pleural effusion s/p multiple thoracocentesis and Pleurx on 8/27. Patient also self extubated herself on 9/5 and had an episode of cardiac arrest on 9/5.  Patient also has been on levophed.   hepatology consulted for elevated liver tests and direct Bilirubinemia.      # Elevated liver tests, cholestatic pattern , DILI +/- cholestasis of sepsis +/- congestive hepatopathy r/o biliary obstruction, less likely infiltrative liver disease as ALP was normal on admission    # Hyperbilirubinemia , mostly Direct  - Patient recently diagnosed DLBCL started on chemo as below:    -s/p Dexamethasone 8/26-8/29, Rituxan 8/28--> can cause hepatocellular liver injury     -s/p D1-D2 cyclophosphamide 100mg 9/1-9/2 with reduction of doses and s/p D3 cyclophosphamide 225mg q12h on 9/3--> can induce sinusoidal obscuration syn    - s/p doxorubicin, vincristine, etoposide completed 9/4; only one day completed due to self-extubation, cardiac arrest on 9/5-->  can induce sinusoidal obscuration syn    - INH since 9/1 indeterminate QuantiFeron)-->  can cause hepatocellular liver injury     - Allopurinol for TLS ppx--> can cause mixed pattern liver injury  - patient hypotensive on levophed --> sepsis ? ( on epimeric zosyn, strongyloides positive - s/p ivermectin on 9/1 - 9/2,  given IC state ) +/- sedative   - CT IC on 8/18--> no liver lesion, bile ducts NL  - TTE:  8/3 showing concentric left ventricular remodeling, hyperdynamic left ventricle, calcified trileaflet aortic valve with normal opening, EF 56%  - Bedside US on 9/13--> decreased LV/RV function  - Acute Hep A/B/C neg  - HSV PCR in CSF neg  - Tbili and ALP fluctuating, same range since yesterday  - INR improving  - AST/ALT normalized  - US liver: Normal sonographic appearance of the liver with no evidence for biliary obstruction.    Rec:  - Monitor INR and LFTs ( fractionated Bili ) daily  - Avoid hepatotoxic medications  - Avoid hypotension  - US liver duplex   - May Hold allopurinol as patient is not getting any chemotherapy  - Patient unstable to have MRCP  - Fungitell, Anti HEV, Serum Ferritin, Transferrin Saturation, Ceruloplasmin level, SANJEEV, SMA, gamma globulin, AMA, LKM ab  - r/o acute CMV, EBV, VZV, HSV ( PCR preferably, otherwise serology for acute disease IgM)

## 2021-09-17 NOTE — PROGRESS NOTE ADULT - PROBLEM SELECTOR PLAN 3
no further dmt  appreciate heme input  would not continue to provide blood products as it is not improving quality

## 2021-09-17 NOTE — PROGRESS NOTE ADULT - SUBJECTIVE AND OBJECTIVE BOX
SUBJECTIVE AND OBJECTIVE: pt intuabed, sedated.  requiring pressors, blood products.    INTERVAL HPI/OVERNIGHT EVENTS:    DNR on chart:   Allergies    penicillins (Rash)    Intolerances    MEDICATIONS  (STANDING):  aMIOdarone Infusion 1 mG/Min (33.3 mL/Hr) IV Continuous <Continuous>  aMIOdarone Infusion 0.5 mG/Min (16.7 mL/Hr) IV Continuous <Continuous>  chlorhexidine 0.12% Liquid 15 milliLiter(s) Oral Mucosa every 12 hours  chlorhexidine 4% Liquid 1 Application(s) Topical <User Schedule>  dexMEDEtomidine Infusion 0.5 MICROgram(s)/kG/Hr (7.83 mL/Hr) IV Continuous <Continuous>  dextrose 40% Gel 15 Gram(s) Oral once  dextrose 5%. 1000 milliLiter(s) (100 mL/Hr) IV Continuous <Continuous>  dextrose 5%. 1000 milliLiter(s) (50 mL/Hr) IV Continuous <Continuous>  dextrose 50% Injectable 25 Gram(s) IV Push once  dextrose 50% Injectable 12.5 Gram(s) IV Push once  dextrose 50% Injectable 25 Gram(s) IV Push once  filgrastim-sndz (ZARXIO) Injectable 300 MICROGram(s) SubCutaneous daily  glucagon  Injectable 1 milliGRAM(s) IntraMuscular once  insulin lispro (ADMELOG) corrective regimen sliding scale   SubCutaneous every 6 hours  isoniazid 300 milliGRAM(s) Oral every 24 hours  meropenem  IVPB 500 milliGRAM(s) IV Intermittent every 24 hours  midodrine 20 milliGRAM(s) Oral every 8 hours  norepinephrine Infusion 0.05 MICROgram(s)/kG/Min (2.93 mL/Hr) IV Continuous <Continuous>  petrolatum Ophthalmic Ointment 1 Application(s) Both EYES two times a day  phenylephrine    Infusion 0.1 MICROgram(s)/kG/Min (1.17 mL/Hr) IV Continuous <Continuous>  polyethylene glycol 3350 17 Gram(s) Oral two times a day  potassium chloride  20 mEq/100 mL IVPB 20 milliEquivalent(s) IV Intermittent every 2 hours  pyridoxine 50 milliGRAM(s) Oral daily  senna Syrup 15 milliLiter(s) Oral at bedtime  sevelamer carbonate Powder 1200 milliGRAM(s) Oral three times a day with meals  sodium bicarbonate 1300 milliGRAM(s) Oral three times a day  vasopressin Infusion 0.04 Unit(s)/Min (2.4 mL/Hr) IV Continuous <Continuous>    MEDICATIONS  (PRN):  bisacodyl Suppository 10 milliGRAM(s) Rectal daily PRN Constipation  hydrocortisone sodium succinate Injectable 100 milliGRAM(s) IV Push once PRN PRN Chemotherapy Reaction  sodium chloride 0.9% lock flush 10 milliLiter(s) IV Push every 1 hour PRN Pre/post blood products, medications, blood draw, and to maintain line patency      ITEMS UNCHECKED ARE NOT PRESENT    PRESENT SYMPTOMS: [x ]Unable to obtain due to poor mentation   Source if other than patient:  [ ]Family   [ ]Team     Pain:  [ ]yes [ ]no  QOL impact -   Location -                    Aggravating factors -  Quality -  Radiation -  Timing-  Severity (0-10 scale):  Minimal acceptable level (0-10 scale):     Dyspnea:                           [ ]Mild [ ]Moderate [ ]Severe  Anxiety:                             [ ]Mild [ ]Moderate [ ]Severe  Fatigue:                             [ ]Mild [ ]Moderate [ ]Severe  Nausea:                             [ ]Mild [ ]Moderate [ ]Severe  Loss of appetite:              [ ]Mild [ ]Moderate [ ]Severe  Constipation:                    [ ]Mild [ ]Moderate [ ]Severe    CPOT:    https://www.[facility].org/getattachment/ufc77p79-0r4s-[email or web address]-[email or web address]/Critical-Care-Pain-Observation-Tool-(CPOT)    PAIN AD Score:	  http://geriatrictoolkit.[facility]/cog/painad.pdf (Ctrl + left click to view)    Other Symptoms:  [ ]All other review of systems negative     Palliative Performance Status Version 2:   10     %      http://npcrc.org/files/news/palliative_performance_scale_ppsv2.pdf  PHYSICAL EXAM:  Vital Signs Last 24 Hrs  T(C): 37.2 (17 Sep 2021 16:00), Max: 39.5 (16 Sep 2021 20:00)  T(F): 99 (17 Sep 2021 16:00), Max: 103.1 (16 Sep 2021 20:00)  HR: 70 (17 Sep 2021 19:00) (65 - 85)  BP: --  BP(mean): --  RR: 26 (17 Sep 2021 19:00) (16 - 26)  SpO2: 100% (17 Sep 2021 19:00) (96% - 100%) I&O's Summary    16 Sep 2021 07:01  -  17 Sep 2021 07:00  --------------------------------------------------------  IN: 3845.5 mL / OUT: 223 mL / NET: 3622.5 mL    17 Sep 2021 07:01  -  17 Sep 2021 19:01  --------------------------------------------------------  IN: 1373.9 mL / OUT: 30 mL / NET: 1343.9 mL       GENERAL:  [ ]Alert  [ ]Oriented x   [ ]Lethargic  [ ]Cachexia  [ ]Unarousable  [ ]Verbal  [x ]Non-Verbal  Behavioral:   [ ]Anxiety  [ ]Delirium [ ]Agitation [ ]Other  HEENT:  [ ]Normal   [ ]Dry mouth   [x ]ET Tube/Trach  [ ]Oral lesions  PULMONARY:   x[ ]Clear [ ]Tachypnea  [ ]Audible excessive secretions   [ ]Rhonchi        [ ]Right [ ]Left [ ]Bilateral  [ ]Crackles        [ ]Right [ ]Left [ ]Bilateral  [ ]Wheezing     [ ]Right [ ]Left [ ]Bilateral  [ ]Diminished BS [ ] Right [ ]Left [ ]Bilateral  CARDIOVASCULAR:    [ x]Regular [ ]Irregular [ ]Tachy  [ ]Crow [ ]Murmur [ ]Other  GASTROINTESTINAL:  [ x]Soft  [ ]Distended   [ ]+BS  [ ]Non tender [ ]Tender  [ ]PEG [x ]OGT/ NGT   Last BM:    GENITOURINARY:  [ ]Normal [ ]Incontinent   [ ]Oliguria/Anuria   [x ]Charles  MUSCULOSKELETAL:   [ ]Normal   [ ]Weakness  [ ]Bed/Wheelchair bound [x ]Edema  NEUROLOGIC:   [ ]No focal deficits  [ ] Cognitive impairment  [ ] Dysphagia [ ]Dysarthria [ ] Paresis xOther sedated  SKIN:   [ x]Normal  [ ]Rash   [ ]Pressure ulcer(s) [ ]y [ ]n present on admission    CRITICAL CARE:  [ ]Shock Present  [ ]Septic [ ]Cardiogenic [ ]Neurologic [ ]Hypovolemic  [ ]Vasopressors [ ]Inotropes  [ ]Respiratory failure present [ ]Mechanical Ventilation [ ]Non-invasive ventilatory support [ ]High-Flow Mode: AC/ CMV (Assist Control/ Continuous Mandatory Ventilation), RR (machine): 26, TV (machine): 400, FiO2: 30, PEEP: 5, ITime: 0.7, MAP: 17, PIP: 47  [ ]Acute  [ ]Chronic [ ]Hypoxic  [ ]Hypercarbic [ ]Other  [ ]Other organ failure     LABS:                        9.0    0.07  )-----------( 26       ( 17 Sep 2021 15:32 )             24.8   09-17    140  |  99  |  45<H>  ----------------------------<  136<H>  3.0<L>   |  18<L>  |  1.07    Ca    8.3<L>      17 Sep 2021 15:32  Phos  4.1     09-17  Mg     1.90     09-17    TPro  4.4<L>  /  Alb  2.1<L>  /  TBili  12.0<H>  /  DBili  x   /  AST  24  /  ALT  13  /  AlkPhos  416<H>  09-17  PT/INR - ( 17 Sep 2021 02:47 )   PT: 15.2 sec;   INR: 1.34 ratio         PTT - ( 17 Sep 2021 02:47 )  PTT:32.8 sec      RADIOLOGY & ADDITIONAL STUDIES:    Protein Calorie Malnutrition Present: [ ]mild [ ]moderate [ ]severe [ ]underweight [ ]morbid obesity  https://www.andeal.org/vault/2440/web/files/ONC/Table_Clinical%20Characteristics%20to%20Document%20Malnutrition-White%20JV%20et%20al%202012.pdf    Height (cm): 160 (08-12-21 @ 14:32), 160 (07-26-21 @ 19:38)  Weight (kg): 62.6 (09-08-21 @ 15:00), 63 (07-26-21 @ 15:22)  BMI (kg/m2): 24.5 (09-08-21 @ 15:00), 24.6 (08-12-21 @ 14:32), 24.6 (07-26-21 @ 19:38)    [ ]PPSV2 < or = 30%  [ ]significant weight loss [ ]poor nutritional intake [ ]anasarca    [ ]Artificial Nutrition    REFERRALS:   [ ]Chaplaincy  [ ]Hospice  [ ]Child Life  [ ]Social Work  [ ]Case management [ ]Holistic Therapy     Goals of Care Document:

## 2021-09-17 NOTE — CHART NOTE - NSCHARTNOTEFT_GEN_A_CORE
Patient last seen by RDN on 9/8, now for nutrition follow up. Spoke with RN and obtained subjective information from extensive chart review.     Enteral /Parenteral Nutrition: Diet, NPO with Tube Feed:   Tube Feeding Modality: Orogastric  Nepro with Carb Steady (NEPRORTH)  Total Volume for 24 Hours (mL): 720  Continuous  Starting Tube Feed Rate {mL per Hour}: 30  Until Goal Tube Feed Rate (mL per Hour): 30  Tube Feed Duration (in Hours): 24  Tube Feed Start Time: 14:30  No Carb Prosource TF     Qty per Day:  2 (08-26-21 @ 14:17)    TF Provides:   720mL total volume                       1296 kcals (~25 cals/kg IBW)                      88g total protein (1.7g protein/kg IBW)    Current Weight Trend:     Weight:                                 Height:   63"                       Ideal Body Weight = 52.3kg   79kg (9/17)  80.2kg (9/12)  80.7kg (9/10)  77.1kg (9/8)  75.2kg (9/7)  69.1kg (8/30)  63.5kg (8/26)  62.6kg (8/25)  61.3kg (8/24)  64.0kg (8/12)    Nutrition Interval Events: Pt remains intubated with sedative of precedex, being weaned. Pt with extremely poor prognosis. Palliative following with family for GOC discussions. TF has been held since 9/11, initially due to multiple episodes of vomiting, now as pt continues to have ongoing residuals. Rectal tube in place with flexiseal with 50 ml output 9/16. Receiving IVPB fluids. Pt received first HD on 9/12 with HD also on 9/13 & 9/15. She remains very fluid overloaded with edema now 3+ generalized and +ascites. Weight trend reflective of fluid status. If medically feasible, suggest trickle feeding of Nepro with Carb Steady and monitor for tolerance. Suggest GOC discussion to include whether TF to continue as per family wishes. RDN services to remain available as needed.       __________________ Pertinent Medications__________________   MEDICATIONS  (STANDING):  aMIOdarone Infusion 1 mG/Min (33.3 mL/Hr) IV Continuous <Continuous>  aMIOdarone Infusion 0.5 mG/Min (16.7 mL/Hr) IV Continuous <Continuous>  chlorhexidine 0.12% Liquid 15 milliLiter(s) Oral Mucosa every 12 hours  chlorhexidine 4% Liquid 1 Application(s) Topical <User Schedule>  dexMEDEtomidine Infusion 0.5 MICROgram(s)/kG/Hr (7.83 mL/Hr) IV Continuous <Continuous>  dextrose 40% Gel 15 Gram(s) Oral once  dextrose 5%. 1000 milliLiter(s) (50 mL/Hr) IV Continuous <Continuous>  dextrose 5%. 1000 milliLiter(s) (100 mL/Hr) IV Continuous <Continuous>  dextrose 50% Injectable 25 Gram(s) IV Push once  dextrose 50% Injectable 12.5 Gram(s) IV Push once  dextrose 50% Injectable 25 Gram(s) IV Push once  filgrastim-sndz (ZARXIO) Injectable 300 MICROGram(s) SubCutaneous daily  glucagon  Injectable 1 milliGRAM(s) IntraMuscular once  insulin lispro (ADMELOG) corrective regimen sliding scale   SubCutaneous every 6 hours  isoniazid 300 milliGRAM(s) Oral every 24 hours  meropenem  IVPB 500 milliGRAM(s) IV Intermittent every 24 hours  midodrine 20 milliGRAM(s) Oral every 8 hours  norepinephrine Infusion 0.05 MICROgram(s)/kG/Min (2.93 mL/Hr) IV Continuous <Continuous>  petrolatum Ophthalmic Ointment 1 Application(s) Both EYES two times a day  phenylephrine    Infusion 0.1 MICROgram(s)/kG/Min (1.17 mL/Hr) IV Continuous <Continuous>  polyethylene glycol 3350 17 Gram(s) Oral two times a day  pyridoxine 50 milliGRAM(s) Oral daily  senna Syrup 15 milliLiter(s) Oral at bedtime  sevelamer carbonate Powder 1200 milliGRAM(s) Oral three times a day with meals  sodium bicarbonate 1300 milliGRAM(s) Oral three times a day  vasopressin Infusion 0.04 Unit(s)/Min (2.4 mL/Hr) IV Continuous <Continuous>    MEDICATIONS  (PRN):  bisacodyl Suppository 10 milliGRAM(s) Rectal daily PRN Constipation  hydrocortisone sodium succinate Injectable 100 milliGRAM(s) IV Push once PRN PRN Chemotherapy Reaction  sodium chloride 0.9% lock flush 10 milliLiter(s) IV Push every 1 hour PRN Pre/post blood products, medications, blood draw, and to maintain line patency      __________________ Pertinent Labs__________________   09-17 Na138 mmol/L Glu 149 mg/dL<H> K+ 2.6 mmol/L<LL> Cr  1.17 mg/dL BUN 44 mg/dL<H> 09-17 Phos 3.7 mg/dL 09-17 Alb 2.1 g/dL<L>        Skin: Intact    Estimated Needs:     25-30 kcals/kg IBW= 1123-7221 kcals/day  1.8g -2.0g protein/kg IBW= 94-105g protein/day        Previous Nutrition Diagnosis:     Moderate Malnutrition       Nutrition Diagnosis is [x] ongoing       Goal(s):  1. Patient to meet > 75% estimated energy needs    Recommendations:   1. Nutrition plan of care as per family GOC discussion.    Monitoring and Evaluation:   1. Monitor weights, labs, BMs, skin integrity, TF tolerance and edema.  2. RD services to remain available. Continue weights to assess trend.

## 2021-09-17 NOTE — PROGRESS NOTE ADULT - CONVERSATION DETAILS
spoke with pt's son over the phone  explained pt overall poor prognosis   explained that blood products were not helping and the medical team was going to have to stop providing as it was causing harm without benefit   explained that pt was dying and we should focus on comfort  sons heard me, and understand prognosis but remain hopeful and have ruma

## 2021-09-17 NOTE — PROGRESS NOTE ADULT - SUBJECTIVE AND OBJECTIVE BOX
[facility] DIVISION OF KIDNEY DISEASES AND HYPERTENSION -- FOLLOW UP NOTE  --------------------------------------------------------------------------------  If any questions, please feel free to contact me  [facility] pager: [phone], [facility]: [id number]  Dusty Fountain M.D.  Nephrology Fellow    (After 5 pm or on weekends please page the on-call fellow)  --------------------------------------------------------------------------------    HPI:  66 year old female with HTN, HLD, recently discovered ovarian mass suspicious for malignancy (7/2021), L hydroureteronephrosis s/p renal stent (7/15/21), and recent hospitalization ([facility] 7/26-8/4) for PURVI requiring urgent HD, ascites s/p therapeutic paracentesis (7/27/21), and pleural effusion s/p R thoracentesis (8/2/21) admitted for acute hypercarbic respiratory failure due to pleural effusions in the setting of ovarian malignancy complicated with volume overload with ascites. Now found to have new onset PURVI. Baseline Cr 0.7-1.1mg/dl. Had a recent PURVI episode which resolved- started to trend up again on 8/31. Pt. Initiated on chemotherapy on 9/1. Pt. had a cardiopulmonary arrest on 9/5/21. On 9/12 oliguric despite high dose Bumex gtt, started on HD for volume overload and remains intubated in MICU.    Patient seen and examined at bedside, last HD 9/15/21. remains intubated, oliguric.  Vitals/labs/imaging reviewed     PAST HISTORY  --------------------------------------------------------------------------------  No significant changes to PMH, PSH, FHx, SHx, unless otherwise noted    ALLERGIES & MEDICATIONS  --------------------------------------------------------------------------------  Allergies    penicillins (Rash)    Intolerances      Standing Inpatient Medications  aMIOdarone Infusion 1 mG/Min IV Continuous <Continuous>  aMIOdarone Infusion 0.5 mG/Min IV Continuous <Continuous>  calcium gluconate IVPB 2 Gram(s) IV Intermittent once  chlorhexidine 0.12% Liquid 15 milliLiter(s) Oral Mucosa every 12 hours  chlorhexidine 4% Liquid 1 Application(s) Topical <User Schedule>  dexMEDEtomidine Infusion 0.5 MICROgram(s)/kG/Hr IV Continuous <Continuous>  dextrose 40% Gel 15 Gram(s) Oral once  dextrose 5%. 1000 milliLiter(s) IV Continuous <Continuous>  dextrose 5%. 1000 milliLiter(s) IV Continuous <Continuous>  dextrose 50% Injectable 25 Gram(s) IV Push once  dextrose 50% Injectable 12.5 Gram(s) IV Push once  dextrose 50% Injectable 25 Gram(s) IV Push once  filgrastim-sndz (ZARXIO) Injectable 300 MICROGram(s) SubCutaneous daily  glucagon  Injectable 1 milliGRAM(s) IntraMuscular once  insulin lispro (ADMELOG) corrective regimen sliding scale   SubCutaneous every 6 hours  isoniazid 300 milliGRAM(s) Oral every 24 hours  magnesium sulfate  IVPB 2 Gram(s) IV Intermittent once  meropenem  IVPB      meropenem  IVPB 1000 milliGRAM(s) IV Intermittent every 12 hours  midodrine 20 milliGRAM(s) Oral every 8 hours  norepinephrine Infusion 0.05 MICROgram(s)/kG/Min IV Continuous <Continuous>  petrolatum Ophthalmic Ointment 1 Application(s) Both EYES two times a day  phenylephrine    Infusion 0.1 MICROgram(s)/kG/Min IV Continuous <Continuous>  polyethylene glycol 3350 17 Gram(s) Oral two times a day  potassium chloride  20 mEq/100 mL IVPB 20 milliEquivalent(s) IV Intermittent every 2 hours  pyridoxine 50 milliGRAM(s) Oral daily  senna Syrup 15 milliLiter(s) Oral at bedtime  sevelamer carbonate Powder 1200 milliGRAM(s) Oral three times a day with meals  sodium bicarbonate 1300 milliGRAM(s) Oral three times a day  vancomycin  IVPB      vancomycin  IVPB 1000 milliGRAM(s) IV Intermittent every 24 hours  vasopressin Infusion 0.04 Unit(s)/Min IV Continuous <Continuous>    PRN Inpatient Medications  bisacodyl Suppository 10 milliGRAM(s) Rectal daily PRN  hydrocortisone sodium succinate Injectable 100 milliGRAM(s) IV Push once PRN  sodium chloride 0.9% lock flush 10 milliLiter(s) IV Push every 1 hour PRN      REVIEW OF SYSTEMS  --------------------------------------------------------------------------------  unable to obtain due to current medical conditions      VITALS/PHYSICAL EXAM  --------------------------------------------------------------------------------  T(C): 37.9 (09-17-21 @ 04:00), Max: 39.5 (09-16-21 @ 20:00)  HR: 72 (09-17-21 @ 07:00) (65 - 102)  BP: 106/49 (09-16-21 @ 18:00) (94/55 - 126/68)  RR: 16 (09-17-21 @ 07:00) (16 - 26)  SpO2: 100% (09-17-21 @ 07:00) (99% - 100%)  Wt(kg): --        09-16-21 @ 07:01  -  09-17-21 @ 07:00  --------------------------------------------------------  IN: 3845.5 mL / OUT: 223 mL / NET: 3622.5 mL        Physical Exam:  	Gen: Intubated  	HEENT: ET tube +  	Pulm: Mechanically ventilated via ETT  	CV: S1S2  	Abd: Soft, +BS  	Ext: 2+ Pitting LE edema B/L  	Neuro: sedated              : Charles +    	Skin: Warm and dry      LABS/STUDIES  --------------------------------------------------------------------------------              6.8    0.10  >-----------<  2        [09-17-21 @ 02:47]              18.8     138  |  98  |  44  ----------------------------<  149      [09-17-21 @ 02:47]  2.6   |  18  |  1.17        Ca     7.8     [09-17-21 @ 02:47]      iCa    1.03     [09-17 @ 02:47]      Mg     1.60     [09-17-21 @ 02:47]      Phos  3.7     [09-17-21 @ 02:47]    TPro  4.4  /  Alb  2.1  /  TBili  12.0  /  DBili  x   /  AST  24  /  ALT  13  /  AlkPhos  416  [09-17-21 @ 02:47]    PT/INR: PT 15.2 , INR 1.34       [09-17-21 @ 02:47]  PTT: 32.8       [09-17-21 @ 02:47]    Uric acid 3.5      [09-17-21 @ 02:47]        [09-17-21 @ 02:47]    Creatinine Trend:  SCr 1.17 [09-17 @ 02:47]  SCr 1.16 [09-16 @ 04:25]  SCr 1.14 [09-15 @ 21:37]  SCr 1.07 [09-15 @ 11:28]  SCr 1.26 [09-15 @ 07:23]    Urinalysis - [08-28-21 @ 17:00]      Color Yellow / Appearance Turbid / SG 1.034 / pH 6.5      Gluc Trace / Ketone Negative  / Bili Negative / Urobili <2 mg/dL       Blood Small / Protein 100 mg/dL / Leuk Est Large / Nitrite Negative      RBC 6-10 / WBC 15-20 / Hyaline 3 / Gran 20 / Sq Epi  / Non Sq Epi Occasional / Bacteria Moderate      TSH 1.85      [07-29-21 @ 11:12]    HBsAg Nonreact      [08-26-21 @ 10:02]  HCV 0.20, Nonreact      [08-26-21 @ 10:02]  HIV Nonreact      [08-25-21 @ 12:13]

## 2021-09-17 NOTE — PROGRESS NOTE ADULT - SUBJECTIVE AND OBJECTIVE BOX
INTERVAL HPI/OVERNIGHT EVENTS:    SUBJECTIVE: Patient seen and examined at bedside.       VITAL SIGNS:  ICU Vital Signs Last 24 Hrs  T(C): 37.9 (17 Sep 2021 04:00), Max: 39.5 (16 Sep 2021 20:00)  T(F): 100.3 (17 Sep 2021 04:00), Max: 103.1 (16 Sep 2021 20:00)  HR: 72 (17 Sep 2021 07:00) (65 - 110)  BP: 106/49 (16 Sep 2021 18:00) (94/55 - 126/68)  BP(mean): 66 (16 Sep 2021 18:00) (66 - 89)  ABP: 119/58 (17 Sep 2021 07:00) (86/59 - 126/68)  ABP(mean): 77 (17 Sep 2021 07:00) (65 - 89)  RR: 16 (17 Sep 2021 07:00) (16 - 26)  SpO2: 100% (17 Sep 2021 07:00) (99% - 100%)    Mode: AC/ CMV (Assist Control/ Continuous Mandatory Ventilation), RR (machine): 26, TV (machine): 400, FiO2: 30, PEEP: 5, ITime: 0.71, MAP: 17, PIP: 46  Plateau pressure:   P/F ratio:     09-16 @ 07:01  -  09-17 @ 07:00  --------------------------------------------------------  IN: 3845.5 mL / OUT: 223 mL / NET: 3622.5 mL      CAPILLARY BLOOD GLUCOSE      POCT Blood Glucose.: 132 mg/dL (17 Sep 2021 05:03)    ECG:    PHYSICAL EXAM:    General:   HEENT:   Neck:   Respiratory:   Cardiovascular:   Abdomen:   Extremities:  Neurological:    MEDICATIONS:  MEDICATIONS  (STANDING):  aMIOdarone Infusion 1 mG/Min (33.3 mL/Hr) IV Continuous <Continuous>  aMIOdarone Infusion 0.5 mG/Min (16.7 mL/Hr) IV Continuous <Continuous>  chlorhexidine 0.12% Liquid 15 milliLiter(s) Oral Mucosa every 12 hours  chlorhexidine 4% Liquid 1 Application(s) Topical <User Schedule>  dexMEDEtomidine Infusion 0.5 MICROgram(s)/kG/Hr (7.83 mL/Hr) IV Continuous <Continuous>  dextrose 40% Gel 15 Gram(s) Oral once  dextrose 5%. 1000 milliLiter(s) (100 mL/Hr) IV Continuous <Continuous>  dextrose 5%. 1000 milliLiter(s) (50 mL/Hr) IV Continuous <Continuous>  dextrose 50% Injectable 25 Gram(s) IV Push once  dextrose 50% Injectable 12.5 Gram(s) IV Push once  dextrose 50% Injectable 25 Gram(s) IV Push once  filgrastim-sndz (ZARXIO) Injectable 300 MICROGram(s) SubCutaneous daily  glucagon  Injectable 1 milliGRAM(s) IntraMuscular once  insulin lispro (ADMELOG) corrective regimen sliding scale   SubCutaneous every 6 hours  isoniazid 300 milliGRAM(s) Oral every 24 hours  meropenem  IVPB      meropenem  IVPB 1000 milliGRAM(s) IV Intermittent every 12 hours  midodrine 20 milliGRAM(s) Oral every 8 hours  norepinephrine Infusion 0.05 MICROgram(s)/kG/Min (2.93 mL/Hr) IV Continuous <Continuous>  petrolatum Ophthalmic Ointment 1 Application(s) Both EYES two times a day  phenylephrine    Infusion 0.1 MICROgram(s)/kG/Min (1.17 mL/Hr) IV Continuous <Continuous>  polyethylene glycol 3350 17 Gram(s) Oral two times a day  potassium chloride  20 mEq/100 mL IVPB 20 milliEquivalent(s) IV Intermittent every 2 hours  pyridoxine 50 milliGRAM(s) Oral daily  senna Syrup 15 milliLiter(s) Oral at bedtime  sevelamer carbonate Powder 1200 milliGRAM(s) Oral three times a day with meals  sodium bicarbonate 1300 milliGRAM(s) Oral three times a day  vancomycin  IVPB      vancomycin  IVPB 1000 milliGRAM(s) IV Intermittent every 24 hours  vasopressin Infusion 0.04 Unit(s)/Min (2.4 mL/Hr) IV Continuous <Continuous>    MEDICATIONS  (PRN):  bisacodyl Suppository 10 milliGRAM(s) Rectal daily PRN Constipation  hydrocortisone sodium succinate Injectable 100 milliGRAM(s) IV Push once PRN PRN Chemotherapy Reaction  sodium chloride 0.9% lock flush 10 milliLiter(s) IV Push every 1 hour PRN Pre/post blood products, medications, blood draw, and to maintain line patency      ALLERGIES:  Allergies    penicillins (Rash)    Intolerances        LABS:                        6.8    0.10  )-----------( 2        ( 17 Sep 2021 02:47 )             18.8     09-17    138  |  98  |  44<H>  ----------------------------<  149<H>  2.6<LL>   |  18<L>  |  1.17    Ca    7.8<L>      17 Sep 2021 02:47  Phos  3.7     09-17  Mg     1.60     09-17    TPro  4.4<L>  /  Alb  2.1<L>  /  TBili  12.0<H>  /  DBili  x   /  AST  24  /  ALT  13  /  AlkPhos  416<H>  09-17    PT/INR - ( 17 Sep 2021 02:47 )   PT: 15.2 sec;   INR: 1.34 ratio         PTT - ( 17 Sep 2021 02:47 )  PTT:32.8 sec      RADIOLOGY & ADDITIONAL TESTS: Reviewed.   INTERVAL HPI/OVERNIGHT EVENTS: Overnight, Hb and platelets dropped and pt received  transfusions.     SUBJECTIVE: Patient seen and examined at bedside.       VITAL SIGNS:  ICU Vital Signs Last 24 Hrs  T(C): 37.9 (17 Sep 2021 04:00), Max: 39.5 (16 Sep 2021 20:00)  T(F): 100.3 (17 Sep 2021 04:00), Max: 103.1 (16 Sep 2021 20:00)  HR: 72 (17 Sep 2021 07:00) (65 - 110)  BP: 106/49 (16 Sep 2021 18:00) (94/55 - 126/68)  BP(mean): 66 (16 Sep 2021 18:00) (66 - 89)  ABP: 119/58 (17 Sep 2021 07:00) (86/59 - 126/68)  ABP(mean): 77 (17 Sep 2021 07:00) (65 - 89)  RR: 16 (17 Sep 2021 07:00) (16 - 26)  SpO2: 100% (17 Sep 2021 07:00) (99% - 100%)    Mode: AC/ CMV (Assist Control/ Continuous Mandatory Ventilation), RR (machine): 26, TV (machine): 400, FiO2: 30, PEEP: 5, ITime: 0.71, MAP: 17, PIP: 46  Plateau pressure:   P/F ratio:     09-16 @ 07:01  -  09-17 @ 07:00  --------------------------------------------------------  IN: 3845.5 mL / OUT: 223 mL / NET: 3622.5 mL      CAPILLARY BLOOD GLUCOSE      POCT Blood Glucose.: 132 mg/dL (17 Sep 2021 05:03)      PHYSICAL EXAM:    GENERAL: Intubated, on precedex, noted to be less alert  HEAD:  Atraumatic, Normocephalic  EYES: EOMI, PERRLA, conjunctiva and sclera clear  NECK: Supple, No JVD  CHEST/LUNG: Clear to auscultation bilaterally; No wheeze  HEART: Regular rate and rhythm; No murmurs, rubs, or gallops  ABDOMEN: Soft, Nondistended; Bowel sounds present  EXTREMITIES:  2+ Peripheral Pulses, No clubbing, cyanosis, or edema  NEUROLOGY: unable to accurately assess  SKIN: No rashes or lesions    MEDICATIONS:  MEDICATIONS  (STANDING):  aMIOdarone Infusion 1 mG/Min (33.3 mL/Hr) IV Continuous <Continuous>  aMIOdarone Infusion 0.5 mG/Min (16.7 mL/Hr) IV Continuous <Continuous>  chlorhexidine 0.12% Liquid 15 milliLiter(s) Oral Mucosa every 12 hours  chlorhexidine 4% Liquid 1 Application(s) Topical <User Schedule>  dexMEDEtomidine Infusion 0.5 MICROgram(s)/kG/Hr (7.83 mL/Hr) IV Continuous <Continuous>  dextrose 40% Gel 15 Gram(s) Oral once  dextrose 5%. 1000 milliLiter(s) (100 mL/Hr) IV Continuous <Continuous>  dextrose 5%. 1000 milliLiter(s) (50 mL/Hr) IV Continuous <Continuous>  dextrose 50% Injectable 25 Gram(s) IV Push once  dextrose 50% Injectable 12.5 Gram(s) IV Push once  dextrose 50% Injectable 25 Gram(s) IV Push once  filgrastim-sndz (ZARXIO) Injectable 300 MICROGram(s) SubCutaneous daily  glucagon  Injectable 1 milliGRAM(s) IntraMuscular once  insulin lispro (ADMELOG) corrective regimen sliding scale   SubCutaneous every 6 hours  isoniazid 300 milliGRAM(s) Oral every 24 hours  meropenem  IVPB      meropenem  IVPB 1000 milliGRAM(s) IV Intermittent every 12 hours  midodrine 20 milliGRAM(s) Oral every 8 hours  norepinephrine Infusion 0.05 MICROgram(s)/kG/Min (2.93 mL/Hr) IV Continuous <Continuous>  petrolatum Ophthalmic Ointment 1 Application(s) Both EYES two times a day  phenylephrine    Infusion 0.1 MICROgram(s)/kG/Min (1.17 mL/Hr) IV Continuous <Continuous>  polyethylene glycol 3350 17 Gram(s) Oral two times a day  potassium chloride  20 mEq/100 mL IVPB 20 milliEquivalent(s) IV Intermittent every 2 hours  pyridoxine 50 milliGRAM(s) Oral daily  senna Syrup 15 milliLiter(s) Oral at bedtime  sevelamer carbonate Powder 1200 milliGRAM(s) Oral three times a day with meals  sodium bicarbonate 1300 milliGRAM(s) Oral three times a day  vancomycin  IVPB      vancomycin  IVPB 1000 milliGRAM(s) IV Intermittent every 24 hours  vasopressin Infusion 0.04 Unit(s)/Min (2.4 mL/Hr) IV Continuous <Continuous>    MEDICATIONS  (PRN):  bisacodyl Suppository 10 milliGRAM(s) Rectal daily PRN Constipation  hydrocortisone sodium succinate Injectable 100 milliGRAM(s) IV Push once PRN PRN Chemotherapy Reaction  sodium chloride 0.9% lock flush 10 milliLiter(s) IV Push every 1 hour PRN Pre/post blood products, medications, blood draw, and to maintain line patency      ALLERGIES:  Allergies    penicillins (Rash)    Intolerances        LABS:                        6.8    0.10  )-----------( 2        ( 17 Sep 2021 02:47 )             18.8     09-17    138  |  98  |  44<H>  ----------------------------<  149<H>  2.6<LL>   |  18<L>  |  1.17    Ca    7.8<L>      17 Sep 2021 02:47  Phos  3.7     09-17  Mg     1.60     09-17    TPro  4.4<L>  /  Alb  2.1<L>  /  TBili  12.0<H>  /  DBili  x   /  AST  24  /  ALT  13  /  AlkPhos  416<H>  09-17    PT/INR - ( 17 Sep 2021 02:47 )   PT: 15.2 sec;   INR: 1.34 ratio         PTT - ( 17 Sep 2021 02:47 )  PTT:32.8 sec      RADIOLOGY & ADDITIONAL TESTS: Reviewed.

## 2021-09-17 NOTE — PROGRESS NOTE ADULT - PROBLEM SELECTOR PLAN 1
Pt with PURVI in the setting of IV contrast exposure + obstructive uropathy and ?TLS. Of note, recent hospitalization at [facility] 7/26-8/4 for PURVI requiring urgent HD. SCr was at 0.99 on 08/19. Pt. had an PURVI episode which resolved during current hospitalization, but now with recurrent PURVI. Scr elevated since 8/31. Repeat CT scan showed stable B/L hydronephrosis. On 9/12 patient with worsening kidney function and became oliguric despite high dose Bumex gtt. PURVI likely secondary to ATN. started on HD on 9/12. Pt with DLBCL s/p partial completion of DA-EPOCH.    She remains anuric, s/p HD 9/15. On IV pressor support. Consider Bumex challenge today, if no improvement UOP, will consider HD. Pending [facility] discussion with family as pt with worsening clinical status. Closely monitor urine output. Avoid NSAIDs, ACEI/ARBS, RCA and nephrotoxins. Dose medications for HD.

## 2021-09-17 NOTE — PROGRESS NOTE ADULT - ASSESSMENT
66-year-old woman with PMH significant for HTN, HLD, L hydroureteronephrosis s/p renal stent on 7/15, who presents with volume overload 2/2 high grade B-cell lymphoma. S/p thoracentesis 8/13, paracentesis and excisional biopsy of left inguinal lymph node on 8/20. Accepted to MICU for acute hypoxic/hypercapneic respiratory failure now s/p intubation and L pleurex catheter placement, now undergoing chemotherapy.    #Neuro  - Altered mental status, likely 2/2 multifactorial in the setting of hypercarbic respiratory failure  - CTH with no intracranial pathology  - Concern for lymphomatous meningitis, however ruled out s/p LP with flow cytometry and cytopathology negative  - Spot EEG pending  - pt remains alert, however not as responsive as previously    #Cardiovascular  Vasoplegic shock 2/2 to sedatives   - Echo 8/3 showing concentric left ventricular remodeling, hyperdynamic left ventricle, calcified trileaflet aortic valve with normal opening, EF 56%  - POCUS showing RV enalragement  - On pressor support with levo, wean as tolerated, midodrine increased to 30 mg TID    #Respiratory  - Hypoxic/hypercapneic respiratory failure likely secondary to malignant pleural effusions from malignancy  - S/p thoracentesis on 8/13 with re-accumulation of pleural effusions  - S/p second thoracentesis 8/25, s/p L pleurex 8/27  - On pressure support, however remains tachypneic with low TVs, will repeat thoracentesis on R side before attempting extubation   - Unsuccessful breathing trials, will require future trach once platelets stabilize    #GI/Nutrition  Malignant ascites   - S/p paracentesis 8/20  - Still remains ascites, possible paracentesis  - tube feeds temporarily held in setting of high residuals  - constipation with miralax BID    Hyperbilirubinemia  RUQ U/S pending  Hepatology recs appreciated  Fungitell, Anti HEV, CMV, EBV, VZV, HSV, SMA, ferritin, transferrin, ceruloplasmin, AMA, and LKM Ab  As per hepatology, concern for allopurinol as a possible source, allopurinol discontinued    #diarrhea   - previously constipated s/p golytely + fecal disimpaction    #/Renal  - PURVI likely 2/2 to ATN in the setting of previous supratherapeutic vancomycin level + tumor lysis + IV contrast  - B/L hydronephrosis stable on CT A/P 2/2 malignant LAD  - Nephrology on board, recommend holding LASIX & other nephrotoxic medications.   - Nephrostomy tubes considered however per IR recommendations not appropriate at this time as patient's Cr previously downtrending, may consider re-consulting if patient's renal function continues to worsen   - monitor TLS labs q8h, now with uptrending LDH and hyperphosphatemia, although uric acid remains low   - c/w phosphate binder  - s/p bicarb drip, transitioned to PO bicarb   - carnes placed for accurate I+Os  - received first session of HD on 9/12, additional sessions on 9/13, 9/15    #Skin  - No sacral decubiti    #ID  - s/p Vanco and Zosyn (ended 8/31)   - U/A grossly positive, urine cx NGTD  - blood cx 8/28/21 NGTD, will repeat blood cxs on Saturday  - strongyloides positive - s/p ivermectin (9/1 - 9/2) given IC state   - c/w INH + pyridoxine (given indeterminate quant gold)    #Endocrine  - SSI   - will readjust as necessary     #Hematologic/DVT ppx  - newly diagnosed diffuse large B-cell lymphoma  - TLS labs q8  - Allopurinol discontinued possibly in setting of hyperbilirubinemia  - Heme/Onc recommending starting steroids with dexamethasone, s/p 40 mg dose (ended 8/29, 8/31)   - s/p Rituxan 8/28/21  - s/p cyclophosphamide (1st cycle 9/1, 2nd cycle 9/2, 3rd cycle 9/3)  - s/p doxorubicin 9/4  - restarting doxorubicin/etoposide on 9/10   - restarted on filgastrim as per oncology  - thrombocytopenia s/p 2 U platelets  - chemotherapy held on 9/12 night given worsening hypotension and increasing pressor requirements  - DVT prophylaxis with SCDs, no pharmacological px  -s/p 1U pRBC 9/11 6.8 --> 8.7    #Ethics  - Patient is FULL CODE per palliative care discussion with patient and her sons (Yuan and Jose)  - Son (Yuan) and father still deciding on whether they would want a trach, understanding that this would be the pt's only option   - Palliative care on board to speak with family with regards to code statuts    Fill-in proxy is Jose Camargo: [phone]   66-year-old woman with PMH significant for HTN, HLD, L hydroureteronephrosis s/p renal stent on 7/15, who presents with volume overload 2/2 high grade B-cell lymphoma. S/p thoracentesis 8/13, paracentesis and excisional biopsy of left inguinal lymph node on 8/20. Accepted to MICU for acute hypoxic/hypercapneic respiratory failure now s/p intubation and L pleurex catheter placement, now undergoing chemotherapy.    #Neuro  - Altered mental status, likely 2/2 multifactorial in the setting of hypercarbic respiratory failure  - CTH with no intracranial pathology  - Concern for lymphomatous meningitis, however ruled out s/p LP with flow cytometry and cytopathology negative  - pt remains alert, however not as responsive as previously    #Cardiovascular  Vasoplegic shock 2/2 to sedatives   - Echo 8/3 showing concentric left ventricular remodeling, hyperdynamic left ventricle, calcified trileaflet aortic valve with normal opening, EF 56%  - POCUS showing RV enalragement  - On pressor support with levo, wean as tolerated, midodrine increased to 30 mg TID    #Respiratory  - Hypoxic/hypercapneic respiratory failure likely secondary to malignant pleural effusions from malignancy  - S/p thoracentesis on 8/13 with re-accumulation of pleural effusions  - S/p second thoracentesis 8/25, s/p L pleurex 8/27  - On pressure support, however remains tachypneic with low TVs, will repeat thoracentesis on R side before attempting extubation   - Unsuccessful breathing trials, will require future trach once platelets stabilize    #GI/Nutrition  Malignant ascites   - S/p paracentesis 8/20  - Still remains ascites, possible paracentesis  - tube feeds temporarily held in setting of high residuals  - constipation with miralax BID    Hyperbilirubinemia  RUQ U/S pending  Hepatology recs appreciated  Fungitell, Anti HEV, CMV, EBV, VZV, HSV, SMA, ferritin, transferrin, ceruloplasmin, AMA, and LKM Ab  As per hepatology, concern for allopurinol as a possible source, allopurinol discontinued    #diarrhea   - previously constipated s/p golytely + fecal disimpaction    #/Renal  - PURVI likely 2/2 to ATN in the setting of previous supratherapeutic vancomycin level + tumor lysis + IV contrast  - B/L hydronephrosis stable on CT A/P 2/2 malignant LAD  - Nephrology on board, recommend holding LASIX & other nephrotoxic medications.   - Nephrostomy tubes considered however per IR recommendations not appropriate at this time as patient's Cr previously downtrending, may consider re-consulting if patient's renal function continues to worsen   - monitor TLS labs q8h, now with uptrending LDH and hyperphosphatemia, although uric acid remains low   - c/w phosphate binder  - s/p bicarb drip, transitioned to PO bicarb   - carnes placed for accurate I+Os  - received first session of HD on 9/12, additional sessions on 9/13, 9/15    #Skin  - No sacral decubiti    #ID  - s/p Vanco and Zosyn (ended 8/31)   - U/A grossly positive, urine cx NGTD  - blood cx 8/28/21 NGTD, will repeat blood cxs on Saturday  - strongyloides positive - s/p ivermectin (9/1 - 9/2) given IC state   - c/w INH + pyridoxine (given indeterminate quant gold)    #Endocrine  - SSI   - will readjust as necessary     #Hematologic/DVT ppx  - newly diagnosed diffuse large B-cell lymphoma  - TLS labs q8  - Allopurinol discontinued possibly in setting of hyperbilirubinemia  - Heme/Onc recommending starting steroids with dexamethasone, s/p 40 mg dose (ended 8/29, 8/31)   - s/p Rituxan 8/28/21  - s/p cyclophosphamide (1st cycle 9/1, 2nd cycle 9/2, 3rd cycle 9/3)  - s/p doxorubicin 9/4  - restarting doxorubicin/etoposide on 9/10  - oncology no longer offering chemotherpay  - thrombocytopenia s/p 2 U platelets  - chemotherapy held on 9/12 night given worsening hypotension and increasing pressor requirements  - DVT prophylaxis with SCDs, no pharmacological px  -s/p 1U pRBC 9/11 6.8 --> 8.7    #Ethics  - Patient is FULL CODE per palliative care discussion with patient and her sons (Yuan and Jose)  - Palliative on board to have further discussion regarding goals of care as pt's prognosis is poor and chemotherapy will no longer be offered

## 2021-09-17 NOTE — PROGRESS NOTE ADULT - ATTENDING COMMENTS
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   Patient intubated, on 3 pressors, persistent pancytopenia, developed high fever, ongoing AFib with RVR,  anuria. Chemotherapy was held. AST and ALT normal, ALP slightly downtrending, and TB worsening to 12, abdominal US reported no biliary tract obstruction.   Assessment: elevated liver tests and hyperbilirubinemia. She has cholestatic liver injury with jaundice, likely multifactorial from DILI, congestive hepatopathy, infection, or possible infiltrative lymphoma in the liver or opportunistic infection.  Would recommend- trend liver tests, surveillance for infection, avoid all potential hepatotoxic agents, may consider Enio suspension 500 mg bid for worsening cholestasis and jaundice, and goals of care discussion.   Patient's prognosis is poor.     .

## 2021-09-17 NOTE — PROGRESS NOTE ADULT - ATTENDING COMMENTS
66 F with DLBCL s/p thoracentesis, L pleurx, acute hypoxemic and hypercapnic respiratory failure requiring intubation c/b cardiac arrest, now with PURVI and metabolic acidosis, on chemo.    Pt in worsening shock, unclear etiology, ?sepsis  three pressors  worsening bilirubinemia, refractory anemia and thrombocytopenia      - still not waking up, off precedex at this point.  Likely acute metabolic encephalopathy.  - c/w empiric course of abx, f/u cultures  - c/w vent, acute hypoxemic and hypercapnic respiratory failure  - ascites, likely malignant, abdomen not as tense as prior  ongoing goc discussion - sons asking to speak to palliative care; extremely poor prognosis at this point despite our aggressive measures, will speak to family again

## 2021-09-17 NOTE — PROGRESS NOTE ADULT - ASSESSMENT
66-year-old female with PMH significant for HTN, HLD, recently discovered ovarian mass suspicious for malignancy (7/2021), and L hydroureteronephrosis s/p renal stent (7/15/21) presented to [facility] on 8/12 with BLE edema and worsening abdominal distension and SOB.   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 Patient was seen by Hem/onc and started on chemotherapy. Hospital course complicated with Hypoxic/Hypercapnic respiratory failure s/p intubation likely due to recurrent malignant pleural effusion s/p multiple thoracocentesis and Pleurx on 8/27. Patient also self extubated herself on 9/5 and had an episode of cardiac arrest on 9/5.  Patient also has been on levophed.   hepatology consulted for elevated liver tests and direct Bilirubinemia.      # Elevated liver tests, cholestatic pattern , DILI +/- cholestasis of sepsis +/- congestive hepatopathy r/o biliary obstruction, less likely infiltrative liver disease as ALP was normal on admission    # Hyperbilirubinemia , mostly Direct  - Patient recently diagnosed DLBCL started on chemo as below:    -s/p Dexamethasone 8/26-8/29, Rituxan 8/28--> can cause hepatocellular liver injury     -s/p D1-D2 cyclophosphamide 100mg 9/1-9/2 with reduction of doses and s/p D3 cyclophosphamide 225mg q12h on 9/3--> can induce sinusoidal obscuration syn    - s/p doxorubicin, vincristine, etoposide completed 9/4; only one day completed due to self-extubation, cardiac arrest on 9/5-->  can induce sinusoidal obscuration syn    - INH since 9/1 indeterminate QuantiFeron)-->  can cause hepatocellular liver injury     - Allopurinol for TLS ppx--> can cause mixed pattern liver injury  - patient hypotensive on levophed --> sepsis ? ( on epimeric zosyn, strongyloides positive - s/p ivermectin on 9/1 - 9/2,  given IC state ) +/- sedative   - CT IC on 8/18--> no liver lesion, bile ducts NL  - TTE:  8/3 showing concentric left ventricular remodeling, hyperdynamic left ventricle, calcified trileaflet aortic valve with normal opening, EF 56%  - Bedside US on 9/13--> decreased LV/RV function  - Acute Hep A/B/C neg  - HSV PCR in CSF neg  - Tbili and ALP fluctuating, same range since yesterday  - INR improving  - AST/ALT normalized  - US liver: Normal sonographic appearance of the liver with no evidence for biliary obstruction.    Rec:  - GOC discussion  - Monitor INR and LFTs ( fractionated Bili ) daily  - Avoid hepatotoxic medications  - Avoid hypotension  - US liver duplex   - May Hold allopurinol as patient is not getting any chemotherapy  - Patient unstable to have MRCP  - c/w infectious w/u and autoimmune liver workup  - rest of care per MICU    GI will continue to follow.     Hernán Mishra, PGY5  Gastroenterology/Hepatology Fellow  Available on [employer] Teams  [phone] ([employer] Short Range Pager)  [phone] (Long Range Pager)    After 5pm, please contact the on-call GI fellow. [phone]   66-year-old female with PMH significant for HTN, HLD, recently discovered ovarian mass suspicious for malignancy (7/2021), and L hydroureteronephrosis s/p renal stent (7/15/21) presented to [facility] on 8/12 with BLE edema and worsening abdominal distension and SOB.   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 Patient was seen by Hem/onc and started on chemotherapy. Hospital course complicated with Hypoxic/Hypercapnic respiratory failure s/p intubation likely due to recurrent malignant pleural effusion s/p multiple thoracocentesis and Pleurx on 8/27. Patient also self extubated herself on 9/5 and had an episode of cardiac arrest on 9/5.  Patient also has been on levophed.   hepatology consulted for elevated liver tests and direct Bilirubinemia.      # Elevated liver tests, cholestatic pattern , DILI +/- cholestasis of sepsis +/- congestive hepatopathy r/o biliary obstruction, less likely infiltrative liver disease as ALP was normal on admission    # Hyperbilirubinemia , mostly Direct  - Patient recently diagnosed DLBCL started on chemo as below:    -s/p Dexamethasone 8/26-8/29, Rituxan 8/28--> can cause hepatocellular liver injury     -s/p D1-D2 cyclophosphamide 100mg 9/1-9/2 with reduction of doses and s/p D3 cyclophosphamide 225mg q12h on 9/3--> can induce sinusoidal obscuration syn    - s/p doxorubicin, vincristine, etoposide completed 9/4; only one day completed due to self-extubation, cardiac arrest on 9/5-->  can induce sinusoidal obscuration syn    - INH since 9/1 indeterminate QuantiFeron)-->  can cause hepatocellular liver injury     - Allopurinol for TLS ppx--> can cause mixed pattern liver injury  - patient hypotensive on levophed --> sepsis ? ( on epimeric zosyn, strongyloides positive - s/p ivermectin on 9/1 - 9/2,  given IC state ) +/- sedative   - CT IC on 8/18--> no liver lesion, bile ducts NL  - TTE:  8/3 showing concentric left ventricular remodeling, hyperdynamic left ventricle, calcified trileaflet aortic valve with normal opening, EF 56%  - Bedside US on 9/13--> decreased LV/RV function  - Acute Hep A/B/C neg  - HSV PCR in CSF neg  - Tbili and ALP fluctuating, same range since yesterday  - INR improving  - AST/ALT normalized  - US liver: Normal sonographic appearance of the liver with no evidence for biliary obstruction.    Rec:  - GOC discussion  - Monitor INR and LFTs ( fractionated Bili ) daily  - Avoid hepatotoxic medications  - Avoid hypotension  - US liver duplex   - May Hold allopurinol as patient is not getting any chemotherapy  - Patient unstable to have MRCP  - c/w infectious w/u and autoimmune liver workup  - rest of care per MICU    Hepatology will sign off. Please reconsult as necessary    Hernán Mishra, PGY5  Gastroenterology/Hepatology Fellow  Available on [employer] Teams  [phone] ([employer] Short Range Pager)  [phone] (Long Range Pager)    After 5pm, please contact the on-call GI fellow. [phone]

## 2021-09-17 NOTE — PROGRESS NOTE ADULT - PROBLEM SELECTOR PLAN 4
overall prognosis is poor  would not escalate care as it will not change outcome  explained this to children    referral made

## 2021-09-17 NOTE — PROGRESS NOTE ADULT - ATTENDING COMMENTS
no emergent dialysis need. waiting for palliative team.     pat talavera  nephrology attending   Cell# [phone]   [facility]- [phone]

## 2021-09-17 NOTE — PROGRESS NOTE ADULT - SUBJECTIVE AND OBJECTIVE BOX
Gastroenterology/Hepatology Progress Note      Interval Events:   - overnight febrile to 103, cultured, on 3 pressors  - no UOP, minimal stool output  - ongoing afib w/ RVR  - labs w/ persistent pancytopenia, plt 2  - on precedex, no mental status    Allergies:  penicillins (Rash)      Hospital Medications:  aMIOdarone Infusion 1 mG/Min IV Continuous <Continuous>  aMIOdarone Infusion 0.5 mG/Min IV Continuous <Continuous>  bisacodyl Suppository 10 milliGRAM(s) Rectal daily PRN  calcium gluconate IVPB 2 Gram(s) IV Intermittent once  chlorhexidine 0.12% Liquid 15 milliLiter(s) Oral Mucosa every 12 hours  chlorhexidine 4% Liquid 1 Application(s) Topical <User Schedule>  dexMEDEtomidine Infusion 0.5 MICROgram(s)/kG/Hr IV Continuous <Continuous>  dextrose 40% Gel 15 Gram(s) Oral once  dextrose 5%. 1000 milliLiter(s) IV Continuous <Continuous>  dextrose 5%. 1000 milliLiter(s) IV Continuous <Continuous>  dextrose 50% Injectable 25 Gram(s) IV Push once  dextrose 50% Injectable 12.5 Gram(s) IV Push once  dextrose 50% Injectable 25 Gram(s) IV Push once  filgrastim-sndz (ZARXIO) Injectable 300 MICROGram(s) SubCutaneous daily  glucagon  Injectable 1 milliGRAM(s) IntraMuscular once  hydrocortisone sodium succinate Injectable 100 milliGRAM(s) IV Push once PRN  insulin lispro (ADMELOG) corrective regimen sliding scale   SubCutaneous every 6 hours  isoniazid 300 milliGRAM(s) Oral every 24 hours  magnesium sulfate  IVPB 2 Gram(s) IV Intermittent once  meropenem  IVPB      meropenem  IVPB 1000 milliGRAM(s) IV Intermittent every 12 hours  midodrine 20 milliGRAM(s) Oral every 8 hours  norepinephrine Infusion 0.05 MICROgram(s)/kG/Min IV Continuous <Continuous>  petrolatum Ophthalmic Ointment 1 Application(s) Both EYES two times a day  phenylephrine    Infusion 0.1 MICROgram(s)/kG/Min IV Continuous <Continuous>  polyethylene glycol 3350 17 Gram(s) Oral two times a day  potassium chloride  20 mEq/100 mL IVPB 20 milliEquivalent(s) IV Intermittent every 2 hours  pyridoxine 50 milliGRAM(s) Oral daily  senna Syrup 15 milliLiter(s) Oral at bedtime  sevelamer carbonate Powder 1200 milliGRAM(s) Oral three times a day with meals  sodium bicarbonate 1300 milliGRAM(s) Oral three times a day  sodium chloride 0.9% lock flush 10 milliLiter(s) IV Push every 1 hour PRN  vancomycin  IVPB      vancomycin  IVPB 1000 milliGRAM(s) IV Intermittent every 24 hours  vasopressin Infusion 0.04 Unit(s)/Min IV Continuous <Continuous>        PHYSICAL EXAM:   Vital Signs:  Vital Signs Last 24 Hrs  T(C): 37.9 (17 Sep 2021 04:00), Max: 39.5 (16 Sep 2021 20:00)  T(F): 100.3 (17 Sep 2021 04:00), Max: 103.1 (16 Sep 2021 20:00)  HR: 80 (17 Sep 2021 08:00) (65 - 102)  BP: 106/49 (16 Sep 2021 18:00) (94/55 - 126/68)  BP(mean): 66 (16 Sep 2021 18:00) (66 - 89)  RR: 26 (17 Sep 2021 08:00) (16 - 26)  SpO2: 100% (17 Sep 2021 08:00) (99% - 100%)  Daily     Daily Weight in k (17 Sep 2021 00:00)    GENERAL:  intubated, sedated  HEENT:  NCAT, + scleral icterus  CHEST: intubated  HEART:  RRR  ABDOMEN:  distended, nontender  EXTREMITIES:  No cyanosis, clubbing, or edema  SKIN:  No rash/erythema/ecchymoses/petechiae/wounds/abscess/warm/dry  NEURO: sedated    LABS:                        6.8    0.10  )-----------( 2        ( 17 Sep 2021 02:47 )             18.8     Mean Cell Volume: 75.5 fL (- @ 02:47)        138  |  98  |  44<H>  ----------------------------<  149<H>  2.6<LL>   |  18<L>  |  1.17    Ca    7.8<L>      17 Sep 2021 02:47  Phos  3.7       Mg     1.60         TPro  4.4<L>  /  Alb  2.1<L>  /  TBili  12.0<H>  /  DBili  x   /  AST  24  /  ALT  13  /  AlkPhos  416<H>      LIVER FUNCTIONS - ( 17 Sep 2021 02:47 )  Alb: 2.1 g/dL / Pro: 4.4 g/dL / ALK PHOS: 416 U/L / ALT: 13 U/L / AST: 24 U/L / GGT: x           PT/INR - ( 17 Sep 2021 02:47 )   PT: 15.2 sec;   INR: 1.34 ratio         PTT - ( 17 Sep 2021 02:47 )  PTT:32.8 sec          Imaging:

## 2021-09-18 NOTE — PROGRESS NOTE ADULT - ATTENDING COMMENTS
66 F with DLBCL s/p thoracentesis, L pleurx, acute hypoxemic and hypercapnic respiratory failure requiring intubation c/b cardiac arrest, now with PURVI and metabolic acidosis, on chemo.    Pt continues to be in worsening shock, likely septic shock due to klebsiella bacteremia and underlying disease.  Refractory anemia and thrombocytopenia given underlying disease      - still not waking up, off precedex at this point.  Likely acute metabolic encephalopathy.  - c/w abx for bacteremia  - c/w vent, acute hypoxemic and hypercapnic respiratory failure  - ascites, likely malignant, abdomen not as tense as prior    Family at bedside (son and ); family aware of poor prognosis.  They know mom is dying despite all that we are doing.  AT this point, they are not willing to withdraw care, but also understand that escalating care will not change ultimate outcome of death and are okay with that.    Cap pressors, no further escalation of care.  No transfusions.

## 2021-09-18 NOTE — PROGRESS NOTE ADULT - SUBJECTIVE AND OBJECTIVE BOX
INTERVAL HPI/OVERNIGHT EVENTS:    SUBJECTIVE: Patient seen and examined at bedside.       VITAL SIGNS:  ICU Vital Signs Last 24 Hrs  T(C): 38.6 (18 Sep 2021 04:00), Max: 38.8 (18 Sep 2021 00:00)  T(F): 101.5 (18 Sep 2021 04:00), Max: 101.9 (18 Sep 2021 00:00)  HR: 74 (18 Sep 2021 07:00) (63 - 80)  BP: --  BP(mean): --  ABP: 108/51 (18 Sep 2021 07:00) (99/47 - 137/68)  ABP(mean): 69 (18 Sep 2021 07:00) (63 - 90)  RR: 26 (18 Sep 2021 07:00) (26 - 26)  SpO2: 97% (18 Sep 2021 07:00) (96% - 100%)    Mode: AC/ CMV (Assist Control/ Continuous Mandatory Ventilation), RR (machine): 26, TV (machine): 400, FiO2: 30, PEEP: 5, ITime: 0.75, MAP: 18, PIP: 49  Plateau pressure:   P/F ratio:     09-17 @ 07:01  -  09-18 @ 07:00  --------------------------------------------------------  IN: 2942.6 mL / OUT: 710 mL / NET: 2232.6 mL      CAPILLARY BLOOD GLUCOSE      POCT Blood Glucose.: 95 mg/dL (18 Sep 2021 05:24)    ECG:    PHYSICAL EXAM:    General:   HEENT:   Neck:   Respiratory:   Cardiovascular:   Abdomen:   Extremities:  Neurological:    MEDICATIONS:  MEDICATIONS  (STANDING):  aMIOdarone Infusion 1 mG/Min (33.3 mL/Hr) IV Continuous <Continuous>  aMIOdarone Infusion 0.5 mG/Min (16.7 mL/Hr) IV Continuous <Continuous>  chlorhexidine 0.12% Liquid 15 milliLiter(s) Oral Mucosa every 12 hours  chlorhexidine 4% Liquid 1 Application(s) Topical <User Schedule>  dexMEDEtomidine Infusion 0.5 MICROgram(s)/kG/Hr (7.83 mL/Hr) IV Continuous <Continuous>  dextrose 40% Gel 15 Gram(s) Oral once  dextrose 5%. 1000 milliLiter(s) (100 mL/Hr) IV Continuous <Continuous>  dextrose 5%. 1000 milliLiter(s) (50 mL/Hr) IV Continuous <Continuous>  dextrose 50% Injectable 25 Gram(s) IV Push once  dextrose 50% Injectable 12.5 Gram(s) IV Push once  dextrose 50% Injectable 25 Gram(s) IV Push once  filgrastim-sndz (ZARXIO) Injectable 300 MICROGram(s) SubCutaneous daily  glucagon  Injectable 1 milliGRAM(s) IntraMuscular once  insulin lispro (ADMELOG) corrective regimen sliding scale   SubCutaneous every 6 hours  isoniazid 300 milliGRAM(s) Oral every 24 hours  meropenem  IVPB 500 milliGRAM(s) IV Intermittent every 24 hours  midodrine 20 milliGRAM(s) Oral every 8 hours  norepinephrine Infusion 0.05 MICROgram(s)/kG/Min (2.93 mL/Hr) IV Continuous <Continuous>  petrolatum Ophthalmic Ointment 1 Application(s) Both EYES two times a day  phenylephrine    Infusion 0.1 MICROgram(s)/kG/Min (1.17 mL/Hr) IV Continuous <Continuous>  polyethylene glycol 3350 17 Gram(s) Oral two times a day  pyridoxine 50 milliGRAM(s) Oral daily  senna Syrup 15 milliLiter(s) Oral at bedtime  sevelamer carbonate Powder 1200 milliGRAM(s) Oral three times a day with meals  sodium bicarbonate 1300 milliGRAM(s) Oral three times a day  vasopressin Infusion 0.04 Unit(s)/Min (2.4 mL/Hr) IV Continuous <Continuous>    MEDICATIONS  (PRN):  bisacodyl Suppository 10 milliGRAM(s) Rectal daily PRN Constipation  hydrocortisone sodium succinate Injectable 100 milliGRAM(s) IV Push once PRN PRN Chemotherapy Reaction  sodium chloride 0.9% lock flush 10 milliLiter(s) IV Push every 1 hour PRN Pre/post blood products, medications, blood draw, and to maintain line patency      ALLERGIES:  Allergies    penicillins (Rash)    Intolerances        LABS:                        8.4    0.05  )-----------( 5        ( 18 Sep 2021 02:00 )             22.7     09-18    137  |  102  |  47<H>  ----------------------------<  119<H>  3.6   |  18<L>  |  1.06    Ca    8.3<L>      18 Sep 2021 02:00  Phos  4.0     09-18  Mg     1.70     09-18    TPro  4.5<L>  /  Alb  2.0<L>  /  TBili  13.6<H>  /  DBili  x   /  AST  26  /  ALT  15  /  AlkPhos  337<H>  09-18    PT/INR - ( 18 Sep 2021 02:00 )   PT: 15.1 sec;   INR: 1.33 ratio         PTT - ( 18 Sep 2021 02:00 )  PTT:31.7 sec      RADIOLOGY & ADDITIONAL TESTS: Reviewed   INTERVAL HPI/OVERNIGHT EVENTS: No acute overnight events.     SUBJECTIVE: Patient seen and examined at bedside. Pt's family at bedside. Pt remains sedated on precedex and on renee, levo, and vaso.      VITAL SIGNS:  ICU Vital Signs Last 24 Hrs  T(C): 38.6 (18 Sep 2021 04:00), Max: 38.8 (18 Sep 2021 00:00)  T(F): 101.5 (18 Sep 2021 04:00), Max: 101.9 (18 Sep 2021 00:00)  HR: 74 (18 Sep 2021 07:00) (63 - 80)  BP: --  BP(mean): --  ABP: 108/51 (18 Sep 2021 07:00) (99/47 - 137/68)  ABP(mean): 69 (18 Sep 2021 07:00) (63 - 90)  RR: 26 (18 Sep 2021 07:00) (26 - 26)  SpO2: 97% (18 Sep 2021 07:00) (96% - 100%)    Mode: AC/ CMV (Assist Control/ Continuous Mandatory Ventilation), RR (machine): 26, TV (machine): 400, FiO2: 30, PEEP: 5, ITime: 0.75, MAP: 18, PIP: 49  Plateau pressure:   P/F ratio:     09-17 @ 07:01  -  09-18 @ 07:00  --------------------------------------------------------  IN: 2942.6 mL / OUT: 710 mL / NET: 2232.6 mL      CAPILLARY BLOOD GLUCOSE      POCT Blood Glucose.: 95 mg/dL (18 Sep 2021 05:24)    PHYSICAL EXAM:  T(C): 35.3 (09-18-21 @ 08:00), Max: 38.8 (09-18-21 @ 00:00)  HR: 80 (09-18-21 @ 10:47) (63 - 90)  BP: --  RR: 26 (09-18-21 @ 10:00) (26 - 26)  SpO2: 95% (09-18-21 @ 10:47) (95% - 100%)  GENERAL: Sedated and intubated  HEAD:  Atraumatic, Normocephalic  EYES: EOMI, PERRLA, conjunctiva and sclera clear  NECK: Supple, No JVD  CHEST/LUNG: Clear to auscultation bilaterally; No wheeze  HEART: Regular rate and rhythm; No murmurs, rubs, or gallops  ABDOMEN: Soft, Nontender, Nondistended; Bowel sounds present  EXTREMITIES:  2+ Peripheral Pulses, No clubbing, cyanosis, or edema  PSYCH: AAOx3  NEUROLOGY: non-focal  SKIN: No rashes or lesions  Psych: Not depressed or anxious.    MEDICATIONS:  MEDICATIONS  (STANDING):  aMIOdarone Infusion 1 mG/Min (33.3 mL/Hr) IV Continuous <Continuous>  aMIOdarone Infusion 0.5 mG/Min (16.7 mL/Hr) IV Continuous <Continuous>  chlorhexidine 0.12% Liquid 15 milliLiter(s) Oral Mucosa every 12 hours  chlorhexidine 4% Liquid 1 Application(s) Topical <User Schedule>  dexMEDEtomidine Infusion 0.5 MICROgram(s)/kG/Hr (7.83 mL/Hr) IV Continuous <Continuous>  dextrose 40% Gel 15 Gram(s) Oral once  dextrose 5%. 1000 milliLiter(s) (100 mL/Hr) IV Continuous <Continuous>  dextrose 5%. 1000 milliLiter(s) (50 mL/Hr) IV Continuous <Continuous>  dextrose 50% Injectable 25 Gram(s) IV Push once  dextrose 50% Injectable 12.5 Gram(s) IV Push once  dextrose 50% Injectable 25 Gram(s) IV Push once  filgrastim-sndz (ZARXIO) Injectable 300 MICROGram(s) SubCutaneous daily  glucagon  Injectable 1 milliGRAM(s) IntraMuscular once  insulin lispro (ADMELOG) corrective regimen sliding scale   SubCutaneous every 6 hours  isoniazid 300 milliGRAM(s) Oral every 24 hours  meropenem  IVPB 500 milliGRAM(s) IV Intermittent every 24 hours  midodrine 20 milliGRAM(s) Oral every 8 hours  norepinephrine Infusion 0.05 MICROgram(s)/kG/Min (2.93 mL/Hr) IV Continuous <Continuous>  petrolatum Ophthalmic Ointment 1 Application(s) Both EYES two times a day  phenylephrine    Infusion 0.1 MICROgram(s)/kG/Min (1.17 mL/Hr) IV Continuous <Continuous>  polyethylene glycol 3350 17 Gram(s) Oral two times a day  pyridoxine 50 milliGRAM(s) Oral daily  senna Syrup 15 milliLiter(s) Oral at bedtime  sevelamer carbonate Powder 1200 milliGRAM(s) Oral three times a day with meals  sodium bicarbonate 1300 milliGRAM(s) Oral three times a day  vasopressin Infusion 0.04 Unit(s)/Min (2.4 mL/Hr) IV Continuous <Continuous>    MEDICATIONS  (PRN):  bisacodyl Suppository 10 milliGRAM(s) Rectal daily PRN Constipation  hydrocortisone sodium succinate Injectable 100 milliGRAM(s) IV Push once PRN PRN Chemotherapy Reaction  sodium chloride 0.9% lock flush 10 milliLiter(s) IV Push every 1 hour PRN Pre/post blood products, medications, blood draw, and to maintain line patency      ALLERGIES:  Allergies    penicillins (Rash)    Intolerances        LABS:                        8.4    0.05  )-----------( 5        ( 18 Sep 2021 02:00 )             22.7     09-18    137  |  102  |  47<H>  ----------------------------<  119<H>  3.6   |  18<L>  |  1.06    Ca    8.3<L>      18 Sep 2021 02:00  Phos  4.0     09-18  Mg     1.70     09-18    TPro  4.5<L>  /  Alb  2.0<L>  /  TBili  13.6<H>  /  DBili  x   /  AST  26  /  ALT  15  /  AlkPhos  337<H>  09-18    PT/INR - ( 18 Sep 2021 02:00 )   PT: 15.1 sec;   INR: 1.33 ratio         PTT - ( 18 Sep 2021 02:00 )  PTT:31.7 sec      RADIOLOGY & ADDITIONAL TESTS: Reviewed   INTERVAL HPI/OVERNIGHT EVENTS: No acute overnight events.     SUBJECTIVE: Patient seen and examined at bedside. Pt's family at bedside. Pt remains sedated on precedex and on renee, levo, and vaso.      VITAL SIGNS:  ICU Vital Signs Last 24 Hrs  T(C): 38.6 (18 Sep 2021 04:00), Max: 38.8 (18 Sep 2021 00:00)  T(F): 101.5 (18 Sep 2021 04:00), Max: 101.9 (18 Sep 2021 00:00)  HR: 74 (18 Sep 2021 07:00) (63 - 80)  BP: --  BP(mean): --  ABP: 108/51 (18 Sep 2021 07:00) (99/47 - 137/68)  ABP(mean): 69 (18 Sep 2021 07:00) (63 - 90)  RR: 26 (18 Sep 2021 07:00) (26 - 26)  SpO2: 97% (18 Sep 2021 07:00) (96% - 100%)    Mode: AC/ CMV (Assist Control/ Continuous Mandatory Ventilation), RR (machine): 26, TV (machine): 400, FiO2: 30, PEEP: 5, ITime: 0.75, MAP: 18, PIP: 49  Plateau pressure:   P/F ratio:     09-17 @ 07:01  -  09-18 @ 07:00  --------------------------------------------------------  IN: 2942.6 mL / OUT: 710 mL / NET: 2232.6 mL      CAPILLARY BLOOD GLUCOSE      POCT Blood Glucose.: 95 mg/dL (18 Sep 2021 05:24)    PHYSICAL EXAM:  T(C): 35.3 (09-18-21 @ 08:00), Max: 38.8 (09-18-21 @ 00:00)  HR: 80 (09-18-21 @ 10:47) (63 - 90)  BP: --  RR: 26 (09-18-21 @ 10:00) (26 - 26)  SpO2: 95% (09-18-21 @ 10:47) (95% - 100%)  GENERAL: Sedated and intubated  HEAD:  Atraumatic, Normocephalic  EYES: EOMI, PERRLA, conjunctiva and sclera clear  NECK: Supple, No JVD  CHEST/LUNG: Mechanical breath sounds  HEART: Regular rate and rhythm; No murmurs, rubs, or gallops  ABDOMEN: Soft, Distended; Bowel sounds present  EXTREMITIES:  2+ Peripheral Pulses, No clubbing, cyanosis, or edema  PSYCH: Sedated on precedex, not alert or following commands  NEUROLOGY: non-focal  SKIN: No rashes or lesions    MEDICATIONS:  MEDICATIONS  (STANDING):  aMIOdarone Infusion 1 mG/Min (33.3 mL/Hr) IV Continuous <Continuous>  aMIOdarone Infusion 0.5 mG/Min (16.7 mL/Hr) IV Continuous <Continuous>  chlorhexidine 0.12% Liquid 15 milliLiter(s) Oral Mucosa every 12 hours  chlorhexidine 4% Liquid 1 Application(s) Topical <User Schedule>  dexMEDEtomidine Infusion 0.5 MICROgram(s)/kG/Hr (7.83 mL/Hr) IV Continuous <Continuous>  dextrose 40% Gel 15 Gram(s) Oral once  dextrose 5%. 1000 milliLiter(s) (100 mL/Hr) IV Continuous <Continuous>  dextrose 5%. 1000 milliLiter(s) (50 mL/Hr) IV Continuous <Continuous>  dextrose 50% Injectable 25 Gram(s) IV Push once  dextrose 50% Injectable 12.5 Gram(s) IV Push once  dextrose 50% Injectable 25 Gram(s) IV Push once  filgrastim-sndz (ZARXIO) Injectable 300 MICROGram(s) SubCutaneous daily  glucagon  Injectable 1 milliGRAM(s) IntraMuscular once  insulin lispro (ADMELOG) corrective regimen sliding scale   SubCutaneous every 6 hours  isoniazid 300 milliGRAM(s) Oral every 24 hours  meropenem  IVPB 500 milliGRAM(s) IV Intermittent every 24 hours  midodrine 20 milliGRAM(s) Oral every 8 hours  norepinephrine Infusion 0.05 MICROgram(s)/kG/Min (2.93 mL/Hr) IV Continuous <Continuous>  petrolatum Ophthalmic Ointment 1 Application(s) Both EYES two times a day  phenylephrine    Infusion 0.1 MICROgram(s)/kG/Min (1.17 mL/Hr) IV Continuous <Continuous>  polyethylene glycol 3350 17 Gram(s) Oral two times a day  pyridoxine 50 milliGRAM(s) Oral daily  senna Syrup 15 milliLiter(s) Oral at bedtime  sevelamer carbonate Powder 1200 milliGRAM(s) Oral three times a day with meals  sodium bicarbonate 1300 milliGRAM(s) Oral three times a day  vasopressin Infusion 0.04 Unit(s)/Min (2.4 mL/Hr) IV Continuous <Continuous>    MEDICATIONS  (PRN):  bisacodyl Suppository 10 milliGRAM(s) Rectal daily PRN Constipation  hydrocortisone sodium succinate Injectable 100 milliGRAM(s) IV Push once PRN PRN Chemotherapy Reaction  sodium chloride 0.9% lock flush 10 milliLiter(s) IV Push every 1 hour PRN Pre/post blood products, medications, blood draw, and to maintain line patency      ALLERGIES:  Allergies    penicillins (Rash)    Intolerances        LABS:                        8.4    0.05  )-----------( 5        ( 18 Sep 2021 02:00 )             22.7     09-18    137  |  102  |  47<H>  ----------------------------<  119<H>  3.6   |  18<L>  |  1.06    Ca    8.3<L>      18 Sep 2021 02:00  Phos  4.0     09-18  Mg     1.70     09-18    TPro  4.5<L>  /  Alb  2.0<L>  /  TBili  13.6<H>  /  DBili  x   /  AST  26  /  ALT  15  /  AlkPhos  337<H>  09-18    PT/INR - ( 18 Sep 2021 02:00 )   PT: 15.1 sec;   INR: 1.33 ratio         PTT - ( 18 Sep 2021 02:00 )  PTT:31.7 sec      RADIOLOGY & ADDITIONAL TESTS: Reviewed

## 2021-09-18 NOTE — PROGRESS NOTE ADULT - PROBLEM SELECTOR PLAN 2
Pt. with metabolic acidosis in the setting of PURVI. Improving. Started on HD on 9/12.    If any questions, please feel free to contact me     Deisi Spencer  Nephrology Fellow  [facility] Pager: [phone]  [facility] Pager: [phone]

## 2021-09-18 NOTE — PROGRESS NOTE ADULT - SUBJECTIVE AND OBJECTIVE BOX
[facility] DIVISION OF KIDNEY DISEASES AND HYPERTENSION -- FOLLOW UP NOTE  --------------------------------------------------------------------------------    66 year old female with HTN, HLD, recently discovered ovarian mass suspicious for malignancy (7/2021), L hydroureteronephrosis s/p renal stent (7/15/21), and recent hospitalization ([facility] 7/26-8/4) for PURVI requiring urgent HD, ascites s/p therapeutic paracentesis (7/27/21), and pleural effusion s/p R thoracentesis (8/2/21) admitted for acute hypercarbic respiratory failure due to pleural effusions in the setting of ovarian malignancy complicated with volume overload with ascites. Now found to have new onset PURVI. Baseline Cr 0.7-1.1mg/dl. Had a recent PURVI episode which resolved- started to trend up again on 8/31. Pt. Initiated on chemotherapy on 9/1. Pt. had a cardiopulmonary arrest on 9/5/21. On 9/12 oliguric despite high dose Bumex gtt, started on HD for volume overload and remains intubated in MICU.    Currently on multiple vasopressors. Pt. was seen and examined at bedside. Currently sedated and intubated. Unable to examine ROS.    PAST HISTORY  --------------------------------------------------------------------------------  No significant changes to PMH, PSH, FHx, SHx, unless otherwise noted    ALLERGIES & MEDICATIONS  --------------------------------------------------------------------------------  Allergies    penicillins (Rash)    Intolerances    Standing Inpatient Medications  aMIOdarone Infusion 1 mG/Min IV Continuous <Continuous>  aMIOdarone Infusion 0.5 mG/Min IV Continuous <Continuous>  chlorhexidine 0.12% Liquid 15 milliLiter(s) Oral Mucosa every 12 hours  chlorhexidine 4% Liquid 1 Application(s) Topical <User Schedule>  dexMEDEtomidine Infusion 0.5 MICROgram(s)/kG/Hr IV Continuous <Continuous>  dextrose 40% Gel 15 Gram(s) Oral once  dextrose 5%. 1000 milliLiter(s) IV Continuous <Continuous>  dextrose 5%. 1000 milliLiter(s) IV Continuous <Continuous>  dextrose 50% Injectable 25 Gram(s) IV Push once  dextrose 50% Injectable 12.5 Gram(s) IV Push once  dextrose 50% Injectable 25 Gram(s) IV Push once  filgrastim-sndz (ZARXIO) Injectable 300 MICROGram(s) SubCutaneous daily  glucagon  Injectable 1 milliGRAM(s) IntraMuscular once  insulin lispro (ADMELOG) corrective regimen sliding scale   SubCutaneous every 6 hours  isoniazid 300 milliGRAM(s) Oral every 24 hours  meropenem  IVPB 500 milliGRAM(s) IV Intermittent every 24 hours  midodrine 20 milliGRAM(s) Oral every 8 hours  norepinephrine Infusion 0.05 MICROgram(s)/kG/Min IV Continuous <Continuous>  petrolatum Ophthalmic Ointment 1 Application(s) Both EYES two times a day  phenylephrine    Infusion 0.1 MICROgram(s)/kG/Min IV Continuous <Continuous>  polyethylene glycol 3350 17 Gram(s) Oral two times a day  pyridoxine 50 milliGRAM(s) Oral daily  senna Syrup 15 milliLiter(s) Oral at bedtime  sevelamer carbonate Powder 1200 milliGRAM(s) Oral three times a day with meals  sodium bicarbonate 1300 milliGRAM(s) Oral three times a day  vasopressin Infusion 0.04 Unit(s)/Min IV Continuous <Continuous>    PRN Inpatient Medications  bisacodyl Suppository 10 milliGRAM(s) Rectal daily PRN  hydrocortisone sodium succinate Injectable 100 milliGRAM(s) IV Push once PRN  sodium chloride 0.9% lock flush 10 milliLiter(s) IV Push every 1 hour PRN      REVIEW OF SYSTEMS  --------------------------------------------------------------------------------  Unable to obtain ROS      VITALS/PHYSICAL EXAM  --------------------------------------------------------------------------------  T(C): 38.6 (09-18-21 @ 04:00), Max: 38.8 (09-18-21 @ 00:00)  HR: 74 (09-18-21 @ 07:00) (63 - 80)  BP: --  RR: 26 (09-18-21 @ 07:00) (26 - 26)  SpO2: 97% (09-18-21 @ 07:00) (96% - 100%)  Wt(kg): --        09-17-21 @ 07:01  -  09-18-21 @ 07:00  --------------------------------------------------------  IN: 2942.6 mL / OUT: 710 mL / NET: 2232.6 mL    Physical Exam:              Gen: Intubated  	HEENT: ET tube +  	Pulm: Mechanically ventilated via ETT  	CV: S1S2  	Abd: Soft, +BS  	Ext: 2+ Pitting LE edema B/L, weeping edema   	Neuro: sedated              : Araceli +    	Skin: Warm and dry      LABS/STUDIES  --------------------------------------------------------------------------------              8.4    0.05  >-----------<  5        [09-18-21 @ 02:00]              22.7     137  |  102  |  47  ----------------------------<  119      [09-18-21 @ 02:00]  3.6   |  18  |  1.06        Ca     8.3     [09-18-21 @ 02:00]      iCa    1.03     [09-18 @ 02:00]      Mg     1.70     [09-18-21 @ 02:00]      Phos  4.0     [09-18-21 @ 02:00]    TPro  4.5  /  Alb  2.0  /  TBili  13.6  /  DBili  x   /  AST  26  /  ALT  15  /  AlkPhos  337  [09-18-21 @ 02:00]    PT/INR: PT 15.1 , INR 1.33       [09-18-21 @ 02:00]  PTT: 31.7       [09-18-21 @ 02:00]    Uric acid 3.5      [09-17-21 @ 02:47]        [09-17-21 @ 02:47]    Creatinine Trend:  SCr 1.06 [09-18 @ 02:00]  SCr 1.07 [09-17 @ 15:32]  SCr 1.17 [09-17 @ 02:47]  SCr 1.16 [09-16 @ 04:25]  SCr 1.14 [09-15 @ 21:37]    Urinalysis - [08-28-21 @ 17:00]      Color Yellow / Appearance Turbid / SG 1.034 / pH 6.5      Gluc Trace / Ketone Negative  / Bili Negative / Urobili <2 mg/dL       Blood Small / Protein 100 mg/dL / Leuk Est Large / Nitrite Negative      RBC 6-10 / WBC 15-20 / Hyaline 3 / Gran 20 / Sq Epi  / Non Sq Epi Occasional / Bacteria Moderate      TSH 1.85      [07-29-21 @ 11:12]    HBsAg Nonreact      [08-26-21 @ 10:02]  HCV 0.20, Nonreact      [08-26-21 @ 10:02]  HIV Nonreact      [08-25-21 @ 12:13]

## 2021-09-18 NOTE — PROGRESS NOTE ADULT - PROBLEM SELECTOR PLAN 1
Pt with PURVI in the setting of IV contrast exposure + obstructive uropathy and ?TLS. Of note, recent hospitalization at [facility] 7/26-8/4 for PURVI requiring urgent HD. SCr was at 0.99 on 08/19. Pt. had an PURVI episode which resolved during current hospitalization, but now with recurrent PURVI. Scr elevated since 8/31. Repeat CT scan showed stable B/L hydronephrosis. On 9/12 patient with worsening kidney function and became oliguric despite high dose Bumex gtt. PURVI likely secondary to ATN. started on HD on 9/12. Pt with DLBCL s/p partial completion of DA-EPOCH.    She remains anuric, s/p HD 9/15. On multiple IV pressor support. No indication for HD today. Monitor labs and urine output. Avoid NSAIDs, ACEI/ARBS, RCA and nephrotoxins.

## 2021-09-18 NOTE — PROGRESS NOTE ADULT - ATTENDING COMMENTS
Chart reviewed. Pt seen and examined. Agree with note above.     Pt with PURVI-D, multifactorial: MONO, obstructive uropathy and TLS.   Currently hypotensive while on 3 pressors. Would not be able to undergo HD today. Will cont to monitor for improvement. Follow up goals of care.

## 2021-09-18 NOTE — PROGRESS NOTE ADULT - NUTRITIONAL ASSESSMENT
This patient has been assessed with a concern for Malnutrition and has been determined to have a diagnosis/diagnoses of Moderate protein-calorie malnutrition.    This patient is being managed with:   Diet NPO with Tube Feed-  Tube Feeding Modality: Orogastric  Nepro with Carb Steady (NEPRORTH)  Total Volume for 24 Hours (mL): 720  Continuous  Starting Tube Feed Rate {mL per Hour}: 30  Until Goal Tube Feed Rate (mL per Hour): 30  Tube Feed Duration (in Hours): 24  Tube Feed Start Time: 14:30  No Carb Prosource TF     Qty per Day:  2  Entered: Aug 26 2021  2:16PM     This patient has been assessed with a concern for Malnutrition and has been determined to have a diagnosis/diagnoses of Moderate protein-calorie malnutrition.    This patient is being managed with:   Diet NPO with Tube Feed-  Tube Feeding Modality: Orogastric  Nepro with Carb Steady (NEPRORTH)v  Total Volume for 24 Hours (mL): 720  Continuous  Starting Tube Feed Rate {mL per Hour}: 30  Until Goal Tube Feed Rate (mL per Hour): 30  Tube Feed Duration (in Hours): 24  Tube Feed Start Time: 14:30  No Carb Prosource TF     Qty per Day:  2  Entered: Aug 26 2021  2:16PM This patient has been assessed with a concern for Malnutrition and has been determined to have a diagnosis/diagnoses of Moderate protein-calorie malnutrition.    This patient is being managed with:   Diet NPO with Tube Feed-  Tube Feeding Modality: Orogastric  Nepro with Carb Steady (NEPRORTH)  Total Volume for 24 Hours (mL): 720  Continuous  Starting Tube Feed Rate {mL per Hour}: 30  Until Goal Tube Feed Rate (mL per Hour): 30  Tube Feed Duration (in Hours): 24  Tube Feed Start Time: 14:30  No Carb Prosource TF     Qty per Day:  2  Entered: Aug 26 2021  2:16PM

## 2021-09-19 NOTE — PROGRESS NOTE ADULT - ATTENDING COMMENTS
66 F with DLBCL s/p thoracentesis, L pleurx, acute hypoxemic and hypercapnic respiratory failure requiring intubation c/b cardiac arrest, now with PURVI and metabolic acidosis, on chemo.    Continues to be on triple pressors.  No uop.  septic shock due to klebsiella bacteremia and underlying disease.  Refractory anemia and thrombocytopenia given underlying disease    - still not waking up, off precedex at this point.  Likely acute metabolic encephalopathy.  - c/w abx for bacteremia  - c/w vent, acute hypoxemic and hypercapnic respiratory failure  - ascites, likely malignant, abdomen not as tense as prior    I spoke to both children yesterday throughout the day.  THey all understand that mom is actively dying and that there is not more that can be offered. They understand and agree with it, and want to focus on her comfort.  They are okay with no lab draws and no transfusions.  They are not willing to verbally state she is DNR/DNI, but understand that when her heart stops, CPR will not be performed, and are okay with that.  Their focus is on comfort.

## 2021-09-19 NOTE — PROGRESS NOTE ADULT - SUBJECTIVE AND OBJECTIVE BOX
[facility] DIVISION OF KIDNEY DISEASES AND HYPERTENSION -- FOLLOW UP NOTE  --------------------------------------------------------------------------------  66 year old female with HTN, HLD, recently discovered ovarian mass suspicious for malignancy (7/2021), L hydroureteronephrosis s/p renal stent (7/15/21), and recent hospitalization ([facility] 7/26-8/4) for PURVI requiring urgent HD, ascites s/p therapeutic paracentesis (7/27/21), and pleural effusion s/p R thoracentesis (8/2/21) admitted for acute hypercarbic respiratory failure due to pleural effusions in the setting of ovarian malignancy complicated with volume overload with ascites. Now found to have new onset PURVI. Baseline Cr 0.7-1.1mg/dl. Had a recent PURVI episode which resolved- started to trend up again on 8/31. Pt. Initiated on chemotherapy on 9/1. Pt. had a cardiopulmonary arrest on 9/5/21. On 9/12 oliguric despite high dose Bumex gtt, started on HD for volume overload and remains intubated in MICU.    Currently on multiple vasopressors. Pt. was seen and examined at bedside. Currently sedated and intubated. Unable to examine ROS.      PAST HISTORY  --------------------------------------------------------------------------------  No significant changes to PMH, PSH, FHx, SHx, unless otherwise noted    ALLERGIES & MEDICATIONS  --------------------------------------------------------------------------------  Allergies    penicillins (Rash)    Intolerances      Standing Inpatient Medications  aMIOdarone Infusion 1 mG/Min IV Continuous <Continuous>  aMIOdarone Infusion 0.5 mG/Min IV Continuous <Continuous>  chlorhexidine 0.12% Liquid 15 milliLiter(s) Oral Mucosa every 12 hours  chlorhexidine 4% Liquid 1 Application(s) Topical <User Schedule>  dexMEDEtomidine Infusion 0.5 MICROgram(s)/kG/Hr IV Continuous <Continuous>  filgrastim-sndz (ZARXIO) Injectable 300 MICROGram(s) SubCutaneous daily  isoniazid 300 milliGRAM(s) Oral every 24 hours  meropenem  IVPB 500 milliGRAM(s) IV Intermittent every 24 hours  midodrine 20 milliGRAM(s) Oral every 8 hours  norepinephrine Infusion 0.05 MICROgram(s)/kG/Min IV Continuous <Continuous>  petrolatum Ophthalmic Ointment 1 Application(s) Both EYES two times a day  phenylephrine    Infusion 0.1 MICROgram(s)/kG/Min IV Continuous <Continuous>  polyethylene glycol 3350 17 Gram(s) Oral two times a day  pyridoxine 50 milliGRAM(s) Oral daily  senna Syrup 15 milliLiter(s) Oral at bedtime  sevelamer carbonate Powder 1200 milliGRAM(s) Oral three times a day with meals  sodium bicarbonate 1300 milliGRAM(s) Oral three times a day  vasopressin Infusion 0.04 Unit(s)/Min IV Continuous <Continuous>    PRN Inpatient Medications  bisacodyl Suppository 10 milliGRAM(s) Rectal daily PRN  hydrocortisone sodium succinate Injectable 100 milliGRAM(s) IV Push once PRN  sodium chloride 0.9% lock flush 10 milliLiter(s) IV Push every 1 hour PRN      REVIEW OF SYSTEMS  --------------------------------------------------------------------------------  Unable to obtain ROS      VITALS/PHYSICAL EXAM  --------------------------------------------------------------------------------  T(C): 37.7 (09-19-21 @ 04:00), Max: 38.9 (09-18-21 @ 20:00)  HR: 89 (09-19-21 @ 07:00) (77 - 106)  BP: 101/54 (09-19-21 @ 07:00) (90/48 - 113/58)  RR: 26 (09-19-21 @ 07:00) (26 - 26)  SpO2: 100% (09-19-21 @ 07:00) (94% - 100%)  Wt(kg): --        09-18-21 @ 07:01  -  09-19-21 @ 07:00  --------------------------------------------------------  IN: 3026.2 mL / OUT: 45 mL / NET: 2981.2 mL        Physical Exam:  	Gen: Intubated  	HEENT: ET tube +  	Pulm: Mechanically ventilated via ETT  	CV: S1S2  	Abd: Soft, +BS  	Ext:  weeping edema   	Neuro: sedated              : Araceli +    	Skin: Warm and dry    LABS/STUDIES  --------------------------------------------------------------------------------              8.4    0.05  >-----------<  5        [09-18-21 @ 02:00]              22.7     137  |  102  |  47  ----------------------------<  119      [09-18-21 @ 02:00]  3.6   |  18  |  1.06        Ca     8.3     [09-18-21 @ 02:00]      iCa    1.03     [09-18 @ 02:00]      Mg     1.70     [09-18-21 @ 02:00]      Phos  4.0     [09-18-21 @ 02:00]    TPro  4.5  /  Alb  2.0  /  TBili  13.6  /  DBili  x   /  AST  26  /  ALT  15  /  AlkPhos  337  [09-18-21 @ 02:00]    PT/INR: PT 15.1 , INR 1.33       [09-18-21 @ 02:00]  PTT: 31.7       [09-18-21 @ 02:00]      Creatinine Trend:  SCr 1.06 [09-18 @ 02:00]  SCr 1.07 [09-17 @ 15:32]  SCr 1.17 [09-17 @ 02:47]  SCr 1.16 [09-16 @ 04:25]  SCr 1.14 [09-15 @ 21:37]    Urinalysis - [08-28-21 @ 17:00]      Color Yellow / Appearance Turbid / SG 1.034 / pH 6.5      Gluc Trace / Ketone Negative  / Bili Negative / Urobili <2 mg/dL       Blood Small / Protein 100 mg/dL / Leuk Est Large / Nitrite Negative      RBC 6-10 / WBC 15-20 / Hyaline 3 / Gran 20 / Sq Epi  / Non Sq Epi Occasional / Bacteria Moderate      TSH 1.85      [07-29-21 @ 11:12]    HBsAg Nonreact      [08-26-21 @ 10:02]  HCV 0.20, Nonreact      [08-26-21 @ 10:02]  HIV Nonreact      [08-25-21 @ 12:13]

## 2021-09-19 NOTE — PROGRESS NOTE ADULT - CRITICAL CARE SERVICES PROVIDED
Critical care services provided

## 2021-09-19 NOTE — PROGRESS NOTE ADULT - SUBJECTIVE AND OBJECTIVE BOX
INTERVAL HPI/OVERNIGHT EVENTS: Patient hypoglycemic overnight, requiring an amp of D50.    SUBJECTIVE: Patient seen and examined at bedside.       VITAL SIGNS:  ICU Vital Signs Last 24 Hrs  T(C): 37.7 (19 Sep 2021 04:00), Max: 38.9 (18 Sep 2021 20:00)  T(F): 99.8 (19 Sep 2021 04:00), Max: 102 (18 Sep 2021 20:00)  HR: 89 (19 Sep 2021 07:00) (77 - 106)  BP: 101/54 (19 Sep 2021 07:00) (90/48 - 113/58)  BP(mean): 68 (19 Sep 2021 07:00) (61 - 74)  ABP: 118/54 (18 Sep 2021 18:00) (103/48 - 122/58)  ABP(mean): 68 (18 Sep 2021 18:00) (65 - 77)  RR: 26 (19 Sep 2021 07:00) (26 - 26)  SpO2: 100% (19 Sep 2021 07:00) (94% - 100%)    Mode: AC/ CMV (Assist Control/ Continuous Mandatory Ventilation), RR (machine): 26, TV (machine): 400, FiO2: 30, PEEP: 5, ITime: 0.7, MAP: 16, PIP: 43  Plateau pressure:   P/F ratio:     09-18 @ 07:01  -  09-19 @ 07:00  --------------------------------------------------------  IN: 3026.2 mL / OUT: 45 mL / NET: 2981.2 mL      CAPILLARY BLOOD GLUCOSE      POCT Blood Glucose.: 80 mg/dL (19 Sep 2021 05:07)    ECG:    PHYSICAL EXAM:    GENERAL: Intubated, on precedex  HEAD:  Atraumatic, Normocephalic  EYES: EOMI, PERRLA, conjunctiva and sclera clear  NECK: Supple, No JVD  CHEST/LUNG: Mechanical breath sounds with b/l rhonchi  HEART: Regular rate and rhythm; No murmurs, rubs, or gallops  ABDOMEN: Distended  EXTREMITIES:  Edematous   NEUROLOGY: unable to accurately assess  SKIN: No rashes or lesions    MEDICATIONS:  MEDICATIONS  (STANDING):  aMIOdarone Infusion 1 mG/Min (33.3 mL/Hr) IV Continuous <Continuous>  aMIOdarone Infusion 0.5 mG/Min (16.7 mL/Hr) IV Continuous <Continuous>  chlorhexidine 0.12% Liquid 15 milliLiter(s) Oral Mucosa every 12 hours  chlorhexidine 4% Liquid 1 Application(s) Topical <User Schedule>  dexMEDEtomidine Infusion 0.5 MICROgram(s)/kG/Hr (7.83 mL/Hr) IV Continuous <Continuous>  filgrastim-sndz (ZARXIO) Injectable 300 MICROGram(s) SubCutaneous daily  isoniazid 300 milliGRAM(s) Oral every 24 hours  meropenem  IVPB 500 milliGRAM(s) IV Intermittent every 24 hours  midodrine 20 milliGRAM(s) Oral every 8 hours  norepinephrine Infusion 0.05 MICROgram(s)/kG/Min (2.93 mL/Hr) IV Continuous <Continuous>  petrolatum Ophthalmic Ointment 1 Application(s) Both EYES two times a day  phenylephrine    Infusion 0.1 MICROgram(s)/kG/Min (1.17 mL/Hr) IV Continuous <Continuous>  polyethylene glycol 3350 17 Gram(s) Oral two times a day  pyridoxine 50 milliGRAM(s) Oral daily  senna Syrup 15 milliLiter(s) Oral at bedtime  sevelamer carbonate Powder 1200 milliGRAM(s) Oral three times a day with meals  sodium bicarbonate 1300 milliGRAM(s) Oral three times a day  vasopressin Infusion 0.04 Unit(s)/Min (2.4 mL/Hr) IV Continuous <Continuous>    MEDICATIONS  (PRN):  bisacodyl Suppository 10 milliGRAM(s) Rectal daily PRN Constipation  hydrocortisone sodium succinate Injectable 100 milliGRAM(s) IV Push once PRN PRN Chemotherapy Reaction  sodium chloride 0.9% lock flush 10 milliLiter(s) IV Push every 1 hour PRN Pre/post blood products, medications, blood draw, and to maintain line patency      ALLERGIES:  Allergies    penicillins (Rash)    Intolerances        LABS:                        8.4    0.05  )-----------( 5        ( 18 Sep 2021 02:00 )             22.7     09-18    137  |  102  |  47<H>  ----------------------------<  119<H>  3.6   |  18<L>  |  1.06    Ca    8.3<L>      18 Sep 2021 02:00  Phos  4.0     09-18  Mg     1.70     09-18    TPro  4.5<L>  /  Alb  2.0<L>  /  TBili  13.6<H>  /  DBili  x   /  AST  26  /  ALT  15  /  AlkPhos  337<H>  09-18    PT/INR - ( 18 Sep 2021 02:00 )   PT: 15.1 sec;   INR: 1.33 ratio         PTT - ( 18 Sep 2021 02:00 )  PTT:31.7 sec      RADIOLOGY & ADDITIONAL TESTS: Reviewed.

## 2021-09-19 NOTE — PROGRESS NOTE ADULT - ATTENDING COMMENTS
Chart reviewed. Pt seen and examined. Agree with note above.     Pt with PURVI-D, multifactorial: MONO, obstructive uropathy and TLS.   Currently hypotensive while on 3 pressors.   Patient critically ill and we would not be able to provide RRT at this time as she would not tolerate it. Will sign off for now, please call with questions.

## 2021-09-19 NOTE — PROGRESS NOTE ADULT - PROBLEM SELECTOR PLAN 1
Pt with PURVI in the setting of IV contrast exposure + obstructive uropathy and ?TLS. Of note, recent hospitalization at [facility] 7/26-8/4 for PURVI requiring urgent HD. SCr was at 0.99 on 08/19. Pt. had an PURVI episode which resolved during current hospitalization, but now with recurrent PURVI. Scr elevated since 8/31. Repeat CT scan showed stable B/L hydronephrosis. On 9/12 patient with worsening kidney function and became oliguric despite high dose Bumex gtt. PURVI likely secondary to ATN. started on HD on 9/12. Pt with DLBCL s/p partial completion of DA-EPOCH.    She remains anuric, s/p HD 9/15. On multiple IV pressor support. Not medically stable for renal replacement therapy.  Monitor labs and urine output. Avoid NSAIDs, ACEI/ARBS, RCA and nephrotoxins.

## 2021-09-19 NOTE — PROGRESS NOTE ADULT - ASSESSMENT
66-year-old woman with PMH significant for HTN, HLD, L hydroureteronephrosis s/p renal stent on 7/15, who presents with volume overload 2/2 high grade B-cell lymphoma. S/p thoracentesis 8/13, paracentesis and excisional biopsy of left inguinal lymph node on 8/20. Accepted to MICU for acute hypoxic/hypercapneic respiratory failure now s/p intubation and L pleurex catheter placement, now undergoing chemotherapy.    #Neuro  - Altered mental status, likely 2/2 multifactorial in the setting of hypercarbic respiratory failure  - CTH with no intracranial pathology  - Concern for lymphomatous meningitis, however ruled out s/p LP with flow cytometry and cytopathology negative  - pt remains alert, however not as responsive as previously    #Cardiovascular  Vasoplegic shock 2/2 to sedatives   - Echo 8/3 showing concentric left ventricular remodeling, hyperdynamic left ventricle, calcified trileaflet aortic valve with normal opening, EF 56%  - POCUS showing RV enalragement  - On pressor support with levo, renee, and vaso, on midodrine 30 mg TID    #Respiratory  - Hypoxic/hypercapneic respiratory failure likely secondary to malignant pleural effusions from malignancy  - S/p thoracentesis on 8/13 with re-accumulation of pleural effusions  - S/p second thoracentesis 8/25, s/p L pleurex 8/27  - On pressure support, however remains tachypneic with low TVs, will repeat thoracentesis on R side before attempting extubation   - Unsuccessful breathing trials, will require future trach once platelets stabilize    #GI/Nutrition  Malignant ascites   - S/p paracentesis 8/20  - Still remains ascites, possible paracentesis  - tube feeds temporarily held in setting of high residuals  - constipation with miralax BID    Hyperbilirubinemia  RUQ U/S pending  Hepatology recs appreciated  Fungitell, Anti HEV, CMV, EBV, VZV, HSV, SMA, ferritin, transferrin, ceruloplasmin, AMA, and LKM Ab  As per hepatology, concern for allopurinol as a possible source, allopurinol discontinued    #diarrhea   - previously constipated s/p golytely + fecal disimpaction    #/Renal  - PURVI likely 2/2 to ATN in the setting of previous supratherapeutic vancomycin level + tumor lysis + IV contrast  - B/L hydronephrosis stable on CT A/P 2/2 malignant LAD  - Nephrology on board, recommend holding LASIX & other nephrotoxic medications.   - Nephrostomy tubes considered however per IR recommendations not appropriate at this time as patient's Cr previously downtrending, may consider re-consulting if patient's renal function continues to worsen   - TLS labs q12  - c/w phosphate binder  - c/w PO bicarb   - carnes in, poor urine output, monitor I&O's  - received a few sessions of HD, as nephrology currently not offering HD in overall stage of poor prognosis    #Skin  - No sacral decubiti    #ID  - s/p Vanco and Zosyn (ended 8/31)   - U/A grossly positive, urine cx NGTD  - blood cx 8/28/21 NGTD, will repeat blood cxs on Saturday  - strongyloides positive - s/p ivermectin (9/1 - 9/2) given IC state   - c/w INH + pyridoxine (given indeterminate quant gold)    #Endocrine  - SSI   - will readjust as necessary     #Hematologic/DVT ppx  - newly diagnosed diffuse large B-cell lymphoma  - Allopurinol discontinued possibly in setting of hyperbilirubinemia  - Heme/Onc recommending starting steroids with dexamethasone, s/p 40 mg dose (ended 8/29, 8/31)   - s/p Rituxan 8/28/21  - s/p cyclophosphamide (1st cycle 9/1, 2nd cycle 9/2, 3rd cycle 9/3)  - s/p doxorubicin 9/4  - TLS q12  - restarting doxorubicin/etoposide on 9/10  - oncology no longer offering chemotherapy  - thrombocytopenia s/p 2 U platelets  - chemotherapy held on 9/12 night given worsening hypotension and increasing pressor requirements  - DVT prophylaxis with SCDs, no pharmacological px  -s/p 1U pRBC 9/11 6.8 --> 8.7    #Ethics  - Patient is FULL CODE per palliative care discussion with patient and her sons (Yuan and Jose)  - Palliative on board to have further discussion regarding goals of care as pt's prognosis is poor and chemotherapy will no longer be offered     66-year-old woman with PMH significant for HTN, HLD, L hydroureteronephrosis s/p renal stent on 7/15, who presents with volume overload 2/2 high grade B-cell lymphoma. S/p thoracentesis 8/13, paracentesis and excisional biopsy of left inguinal lymph node on 8/20. Accepted to MICU for acute hypoxic/hypercapneic respiratory failure now s/p intubation and L pleurex catheter placement, s/p chemotherapy, now with septic shock requiring multiple pressors and acute renal failure with worsening anasarca and oliguria.     #Neuro  - Altered mental status, likely 2/2 multifactorial in the setting of hypercarbic respiratory failure  - CTH with no intracranial pathology  - Concern for lymphomatous meningitis, however ruled out s/p LP with flow cytometry and cytopathology negative  - Patient remains sedated on precedex    #Cardiovascular  Vasoplegic shock 2/2 to sedatives + septic shock  - Echo 8/3 showing concentric left ventricular remodeling, hyperdynamic left ventricle, calcified trileaflet aortic valve with normal opening, EF 56%  - POCUS showing RV enlargement  - On pressor support with levo, renee, and vaso, on midodrine 30 mg TID (pressors capped)  - Of note, there is some discrepancy between A-line and BP cuff reading    #Respiratory  - Hypoxic/hypercapneic respiratory failure likely secondary to malignant pleural effusions from malignancy  - S/p thoracentesis on 8/13 with re-accumulation of pleural effusions  - S/p second thoracentesis 8/25, s/p L pleurex 8/27  - Failed pressure support trials, c/w vent   - Pending C for potential palliative extubation    #GI/Nutrition  Malignant ascites   - S/p paracentesis 8/20, remains with anasarca, unable to tolerate further paracentesis   - tube feeds temporarily held in setting of high residuals, will continue to hold   - c/w bowel regimen    #/Renal  - PURVI likely 2/2 to ATN in the setting of previous supratherapeutic vancomycin level + tumor lysis + IV contrast  - B/L hydronephrosis stable on CT A/P 2/2 malignant LAD  - Nephrology on board, recommend holding LASIX & other nephrotoxic medications.   - Nephrostomy tubes considered however per IR recommendations not appropriate at this time as patient's Cr previously downtrending, may consider re-consulting if patient's renal function continues to worsen   - c/w phosphate binder  - c/w PO bicarb   - carnes in, poor urine output, monitor I&O's  - received a few sessions of HD, as nephrology currently not offering HD in overall stage of poor prognosis    #Skin  - No sacral decubiti    #ID  - s/p Vanco and Zosyn (ended 8/31)   - U/A grossly positive, urine cx NGTD  - blood cx growing klebsiella  - strongyloides positive - s/p ivermectin (9/1 - 9/2) given IC state   - c/w INH + pyridoxine (given indeterminate quant gold)  - c/w meropenem    #Endocrine  hypoglycemic overnight   - hold SSI     #Hematologic/DVT ppx  - newly diagnosed diffuse large B-cell lymphoma  - Allopurinol discontinued possibly in setting of hyperbilirubinemia  - Heme/Onc recommending starting steroids with dexamethasone, s/p 40 mg dose (ended 8/29, 8/31)   - s/p Rituxan 8/28/21  - s/p cyclophosphamide (1st cycle 9/1, 2nd cycle 9/2, 3rd cycle 9/3)  - s/p doxorubicin 9/4  - thrombocytopenia s/p 2 U platelets  - DVT prophylaxis with SCDs, no pharmacological px  - s/p 1U pRBC 9/11 6.8 --> 8.7  - oncology no longer offering chemotherapy    #Ethics  - Patient is FULL CODE per palliative care discussion with patient and her sons (Yuan and Jose)  - Palliative on board to have further discussion regarding goals of care as pt's prognosis is poor and chemotherapy will no longer be offered, pressors are capped, no further blood draws - will discuss with family today regarding next steps

## 2021-09-20 NOTE — PROGRESS NOTE ADULT - SUBJECTIVE AND OBJECTIVE BOX
****************************************  Marin Adame PGY4  Hematology/Oncology  [phone]/[id number]  ****************************************  SUBJECTIVE  Patient seen and examined at bedside. No acute events overnight  Unable to obtain ROS due to patient being intubated and sedated     OBJECTIVE   Vital Signs Last 24 Hrs  T(C): 37.6 (20 Sep 2021 12:00), Max: 39.2 (19 Sep 2021 16:00)  T(F): 99.7 (20 Sep 2021 12:00), Max: 102.6 (19 Sep 2021 16:00)  HR: 98 (20 Sep 2021 13:00) (81 - 118)  BP: 70/52 (20 Sep 2021 13:00) (46/26 - 104/49)  BP(mean): 58 (20 Sep 2021 13:00) (34 - 69)  RR: 26 (20 Sep 2021 13:00) (26 - 26)  SpO2: 100% (20 Sep 2021 13:00) (90% - 100%)    09-19-21 @ 07:01  -  09-20-21 @ 07:00  --------------------------------------------------------  IN: 3116.8 mL / OUT: 855 mL / NET: 2261.8 mL    09-20-21 @ 07:01  -  09-20-21 @ 13:20  --------------------------------------------------------  IN: 754.2 mL / OUT: 15 mL / NET: 739.2 mL      PHYSICAL EXAM:  GENERAL: Intubated, sedated  HEAD:  Atraumatic, Normocephalic  CHEST/LUNG: Intubated, equal chest rise  HEART: No audible murmurs, rubs, or gallops  ABDOMEN: Soft, Nondistended  EXTREMITIES:  2+ edema b/l UE and LE  NEUROLOGY: unable to accurately assess, no response to external stimuli             LABS                  Follow Up:      RADIOLOGY:

## 2021-09-20 NOTE — PROGRESS NOTE ADULT - ASSESSMENT
66-year-old woman with PMH significant for HTN, HLD, L hydroureteronephrosis s/p renal stent on 7/15, who presents with volume overload 2/2 high grade B-cell lymphoma. S/p thoracentesis 8/13, paracentesis and excisional biopsy of left inguinal lymph node on 8/20. Accepted to MICU for acute hypoxic/hypercapneic respiratory failure now s/p intubation and L pleurex catheter placement, s/p chemotherapy, now with septic shock requiring multiple pressors and acute renal failure with worsening anasarca and oliguria.     #Neuro  - Altered mental status, likely 2/2 multifactorial in the setting of hypercarbic respiratory failure  - CTH with no intracranial pathology  - Concern for lymphomatous meningitis, however ruled out s/p LP with flow cytometry and cytopathology negative  - Patient remains sedated on precedex    #Cardiovascular  Vasoplegic shock 2/2 to sedatives + septic shock  - Echo 8/3 showing concentric left ventricular remodeling, hyperdynamic left ventricle, calcified trileaflet aortic valve with normal opening, EF 56%  - POCUS showing RV enlargement  - On pressor support with levo, renee, and vaso, on midodrine 30 mg TID (pressors capped)  - Of note, there is some discrepancy between A-line and BP cuff reading    #Respiratory  - Hypoxic/hypercapneic respiratory failure likely secondary to malignant pleural effusions from malignancy  - S/p thoracentesis on 8/13 with re-accumulation of pleural effusions  - S/p second thoracentesis 8/25, s/p L pleurex 8/27  - Failed pressure support trials, c/w vent   - Pending C for potential palliative extubation    #GI/Nutrition  Malignant ascites   - S/p paracentesis 8/20, remains with anasarca, unable to tolerate further paracentesis   - tube feeds temporarily held in setting of high residuals, will continue to hold   - c/w bowel regimen    #/Renal  - PURVI likely 2/2 to ATN in the setting of previous supratherapeutic vancomycin level + tumor lysis + IV contrast  - B/L hydronephrosis stable on CT A/P 2/2 malignant LAD  - Nephrology on board, recommend holding LASIX & other nephrotoxic medications.   - Nephrostomy tubes considered however per IR recommendations not appropriate at this time as patient's Cr previously downtrending, may consider re-consulting if patient's renal function continues to worsen   - c/w phosphate binder  - c/w PO bicarb   - carnes in, poor urine output, monitor I&O's  - received a few sessions of HD, as nephrology currently not offering HD in overall stage of poor prognosis    #Skin  - No sacral decubiti    #ID  - s/p Vanco and Zosyn (ended 8/31)   - U/A grossly positive, urine cx NGTD  - blood cx growing klebsiella  - strongyloides positive - s/p ivermectin (9/1 - 9/2) given IC state   - c/w INH + pyridoxine (given indeterminate quant gold)  - c/w meropenem    #Endocrine  hypoglycemic overnight   - hold SSI     #Hematologic/DVT ppx  - newly diagnosed diffuse large B-cell lymphoma  - Allopurinol discontinued possibly in setting of hyperbilirubinemia  - Heme/Onc recommending starting steroids with dexamethasone, s/p 40 mg dose (ended 8/29, 8/31)   - s/p Rituxan 8/28/21  - s/p cyclophosphamide (1st cycle 9/1, 2nd cycle 9/2, 3rd cycle 9/3)  - s/p doxorubicin 9/4  - thrombocytopenia s/p 2 U platelets  - DVT prophylaxis with SCDs, no pharmacological px  - s/p 1U pRBC 9/11 6.8 --> 8.7  - oncology no longer offering chemotherapy    #Ethics  - Patient is FULL CODE per palliative care discussion with patient and her sons (Yuan and Jose)  - Palliative on board to have further discussion regarding goals of care as pt's prognosis is poor and chemotherapy will no longer be offered, pressors are capped, no further blood draws - will discuss with family today regarding next steps      66-year-old woman with PMH significant for HTN, HLD, L hydroureteronephrosis s/p renal stent on 7/15, who presents with volume overload 2/2 high grade B-cell lymphoma. S/p thoracentesis 8/13, paracentesis and excisional biopsy of left inguinal lymph node on 8/20. Accepted to MICU for acute hypoxic/hypercapneic respiratory failure now s/p intubation and L pleurex catheter placement, s/p chemotherapy, now with septic shock requiring multiple pressors and acute renal failure with worsening anasarca and oliguria.     #Neuro  - Altered mental status, likely 2/2 multifactorial in the setting of hypercarbic respiratory failure  - CTH with no intracranial pathology  - Concern for lymphomatous meningitis, however ruled out s/p LP with flow cytometry and cytopathology negative  - Patient remains sedated on precedex    #Cardiovascular  Vasoplegic shock 2/2 to sedatives + septic shock  - Echo 8/3 showing concentric left ventricular remodeling, hyperdynamic left ventricle, calcified trileaflet aortic valve with normal opening, EF 56%  - POCUS showing RV enlargement  - On pressor support with levo, renee, and vaso (pressors capped)  - Of note, there is some discrepancy between A-line and BP cuff reading    #Respiratory  - Hypoxic/hypercapneic respiratory failure likely secondary to malignant pleural effusions from malignancy  - S/p thoracentesis on 8/13 with re-accumulation of pleural effusions  - S/p second thoracentesis 8/25, s/p L pleurex 8/27  - Failed pressure support trials, c/w vent   - Pending [facility] for potential palliative extubation    #GI/Nutrition  Malignant ascites   - S/p paracentesis 8/20, remains with anasarca, unable to tolerate further paracentesis   - tube feeds temporarily held in setting of high residuals, will continue to hold   - c/w bowel regimen    #/Renal  - PURVI likely 2/2 to ATN in the setting of previous supratherapeutic vancomycin level + tumor lysis + IV contrast  - B/L hydronephrosis stable on CT A/P 2/2 malignant LAD  - Nephrology on board, recommend holding LASIX & other nephrotoxic medications.   - Nephrostomy tubes considered however per IR recommendations not appropriate at this time as patient's Cr previously downtrending, may consider re-consulting if patient's renal function continues to worsen   - c/w phosphate binder  - c/w PO bicarb   - carnes in, poor urine output, monitor I&O's  - received a few sessions of HD, as nephrology currently not offering HD in overall stage of poor prognosis    #Skin  - No sacral decubiti    #ID  - s/p Vanco and Zosyn (ended 8/31)   - U/A grossly positive, urine cx NGTD  - blood cx growing klebsiella  - strongyloides positive - s/p ivermectin (9/1 - 9/2) given IC state   - INH + pyridoxine d/sekou (initially given for indeterminate quant gold)    #Endocrine  - SSI as needed    #Hematologic/DVT ppx  - newly diagnosed diffuse large B-cell lymphoma  - Allopurinol discontinued possibly in setting of hyperbilirubinemia  - Heme/Onc recommending starting steroids with dexamethasone, s/p 40 mg dose (ended 8/29, 8/31)   - s/p Rituxan 8/28/21  - s/p cyclophosphamide (1st cycle 9/1, 2nd cycle 9/2, 3rd cycle 9/3)  - s/p doxorubicin 9/4  - thrombocytopenia s/p 2 U platelets  - DVT prophylaxis with SCDs, no pharmacological px  - s/p 1U pRBC 9/11 6.8 --> 8.7  - oncology no longer offering chemotherapy    #Ethics  - Patient is FULL CODE per palliative care discussion with patient and her sons (Yuan and Jose)  - Palliative on board to have further discussion regarding goals of care as pt's prognosis is poor and chemotherapy will no longer be offered, pressors are capped, no further blood draws - will discuss with family today regarding next steps

## 2021-09-20 NOTE — PROGRESS NOTE ADULT - PROVIDER SPECIALTY LIST ADULT
Heme/Onc
Hepatology
Infectious Disease
Internal Medicine
MICU
Nephrology
Nephrology
Pulmonology
Pulmonology
Surgery
Heme/Onc
Hepatology
Internal Medicine
Internal Medicine
MICU
Nephrology
Pulmonology
Pulmonology
Surgery
Heme/Onc
Infectious Disease
Internal Medicine
MICU
Nephrology
Pulmonology
Urology
Heme/Onc
Hepatology
MICU
Nephrology
Pulmonology
Hepatology
MICU
Nephrology
Palliative Care
Nephrology
Internal Medicine

## 2021-09-20 NOTE — PROGRESS NOTE ADULT - ASSESSMENT
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 Patient is now intubated in the MICU and with pressor support, started on R-CHOP chemotherapy. Patient also has bilateral adnexal masses and peritoneal carcinomatosis with ascites and extensive abdominal and pelvic adenopathy. Hospitalization course has been c/b ongoing fevers and increasing pressor and O2 support. S/p self extubation on 9/5 c/b cardiac arrest, then re-intubated and sedated . Now with worsening clinical status. No escalation of care as per MICU team.     DLBCL  -s/p Dexamethasone 8/26-8/29, Rituxan 8/28  -s/p D1-D2 cyclophosphamide 100mg 9/1-9/2 with reduction of doses   -s/p D3 cyclophosphamide 225mg q12h on 9/3  -RBC transfusion support to keep Hgb >7 (last transfused 9/4)  -appreciate nephrology recommendations for PURVI and TLS, bumex gtt started for decreased urine output and volume overload    -f/u palliative care recommendations for ongoing GOC discussions  -s/p doxorubicin, vincristine, etoposide completed 9/4; only one day completed due to above events on 9/5 (self-extubated, cardiac arrest); patient subsequently neutropenic and s/p Zarxio 300mcg on 9/9.  -Pt improved but further tx held due to plans for trach. Trach now on hold so proceeded with treatment as below on 9/10 and 9/11:  ------No dose adjustments required for neutropenia  ------Dose adjustments to be made for hyperbilirubinemia:  -----------Etoposide 50% of dose: now 25mg/m2 (9/10-9/12)  -----------Vincristine: Will hold as Tbili >3  -----------Doxorubicine 25% of dose: now 2.5mg/m2 (9/10-9/12)  -plan was to proceed until 9/12 but 9/12 dose held d/t worsening clinical status  -continue to monitor TLS labs BID (CMP, Mg, Phos, Uric Acid, LDH)  -s/p Zarxio restarted 9/13-9/19  -Patient is unfortunately worsening clinically with liver failure and renal failure. Tbili is now >10. At this time, patient's liver and kidney function preclude her from getting further chemotherapy. Regardless, she would not be due for her next cycle of EPOCH until 10/1/21. If she were to improve clinically and her liver and kidney function recover (Tbili would need to be <3 and patient would need to be off HD), we could consider treatment at that time. Per MICU team, no longer escalating care. Ongoing GOC with family members.

## 2021-09-20 NOTE — CHART NOTE - NSCHARTNOTEFT_GEN_A_CORE
Called to bedside to evaluate the patient for asystole on monitor.     On physical exam, patient did not respond to verbal or noxious stimuli.  No spontaneous respirations.  Absent heart and breath sounds.  Absent radial and carotid pulses.   Pupils are fixed and dilated, no corneal reflex.  EKG rhythm strip shows asystole.   Patient pronounced dead at 21:20.  Attending notified. Family/HCP declined autopsy.

## 2021-09-20 NOTE — PROGRESS NOTE ADULT - REASON FOR ADMISSION
Worsening volume overload
Volume overload
Worsening volume overload

## 2021-09-20 NOTE — PROGRESS NOTE ADULT - SUBJECTIVE AND OBJECTIVE BOX
Progress Note    BETH ENGLAND 66y (1955) Female [id number]  08-12-21 (39d)    Dr. Cam Bryant, EM/IM PGY2  Pager# [phone]    Chief Complaint: Worsening volume overload    Subjective:  No acute events overnight. Patient seen and examined at bedside.      PAST MEDICAL & SURGICAL HISTORY:  Hypertension [I10]    Ovarian mass [N83.8]    Hypercholesteremia [E78.00]    S/P ureteral stent placement [Z96.0]      bisacodyl Suppository 10 milliGRAM(s) Rectal daily PRN  chlorhexidine 0.12% Liquid 15 milliLiter(s) Oral Mucosa every 12 hours  chlorhexidine 4% Liquid 1 Application(s) Topical <User Schedule>  dexMEDEtomidine Infusion 0.5 MICROgram(s)/kG/Hr IV Continuous <Continuous>  filgrastim-sndz (ZARXIO) Injectable 300 MICROGram(s) SubCutaneous daily  hydrocortisone sodium succinate Injectable 100 milliGRAM(s) IV Push once PRN  isoniazid 300 milliGRAM(s) Oral every 24 hours  meropenem  IVPB 500 milliGRAM(s) IV Intermittent every 24 hours  midodrine 20 milliGRAM(s) Oral every 8 hours  norepinephrine Infusion 0.05 MICROgram(s)/kG/Min IV Continuous <Continuous>  petrolatum Ophthalmic Ointment 1 Application(s) Both EYES two times a day  phenylephrine    Infusion 0.1 MICROgram(s)/kG/Min IV Continuous <Continuous>  polyethylene glycol 3350 17 Gram(s) Oral two times a day  pyridoxine 50 milliGRAM(s) Oral daily  senna Syrup 15 milliLiter(s) Oral at bedtime  sevelamer carbonate Powder 1200 milliGRAM(s) Oral three times a day with meals  sodium bicarbonate 1300 milliGRAM(s) Oral three times a day  sodium chloride 0.9% lock flush 10 milliLiter(s) IV Push every 1 hour PRN  vasopressin Infusion 0.04 Unit(s)/Min IV Continuous <Continuous>    Objective:  T(C): 37.8 (09-20-21 @ 08:00), Max: 39.2 (09-19-21 @ 16:00)  HR: 104 (09-20-21 @ 07:00) (81 - 118)  BP: 51/25 (09-20-21 @ 07:00) (51/25 - 104/49)  RR: 26 (09-20-21 @ 07:00) (26 - 26)  SpO2: 98% (09-20-21 @ 07:00) (90% - 100%)    Physical exam:  GENERAL: Intubated, on precedex  HEAD:  Atraumatic, Normocephalic  EYES: EOMI, PERRLA, conjunctiva and sclera clear  NECK: Supple, No JVD  CHEST/LUNG: Mechanical breath sounds with b/l rhonchi  HEART: Regular rate and rhythm; No murmurs, rubs, or gallops  ABDOMEN: Distended  EXTREMITIES:  Edematous   NEUROLOGY: unable to accurately assess  SKIN: No rashes or lesions      09-19-21 @ 07:01  -  09-20-21 @ 07:00  --------------------------------------------------------  IN: 2991.1 mL / OUT: 855 mL / NET: 2136.1 mL        WBC Trend: 0.05<--, 0.07<--, 0.10<--    Hb Trend: 8.4<--, 9.0<--, 6.8<--, 8.2<--, 8.7<--      New imaging in last 24 hours:  Consult notes reviewed:  Nephro--anuric with PURVI/met acidosis, last HD 9/15, no longer medically stable for RRT.

## 2021-09-20 NOTE — DISCHARGE NOTE FOR THE EXPIRED PATIENT - HOSPITAL COURSE
66-year-old woman with PMH significant for HTN, HLD, L hydroureteronephrosis s/p renal stent on 7/15, who presents with volume overload 2/2 high grade B-cell lymphoma. S/p thoracentesis , paracentesis and excisional biopsy of left inguinal lymph node on . Accepted to MICU for acute hypoxic/hypercapneic respiratory failure now s/p intubation and L pleurex catheter placement, s/p chemotherapy, now with septic shock requiring multiple pressors and acute renal failure with worsening anasarca and oliguria.     During the admission, patient required multiple pressor support and was put on ventilators and could not tolerate ventilation. Pt's lactate was uptrending during the entire hospitalization at MICU, and she was capped on all three pressors. Eventually patient  due to respiratory failure. 66-year-old woman with PMH significant for HTN, HLD, L hydroureteronephrosis s/p renal stent on 7/15, who presents with volume overload 2/2 high grade B-cell lymphoma. S/p thoracentesis 8/13, paracentesis and excisional biopsy of left inguinal lymph node on 8/20. Accepted to MICU for acute hypoxic/hypercapneic respiratory failure now s/p intubation and L pleurex catheter placement, s/p chemotherapy, now with septic shock requiring multiple pressors and acute renal failure with worsening anasarca and oliguria.     During the admission, patient required multiple pressor support and was put on ventilators and could not tolerate ventilation. Pt's lactate was uptrending during the entire hospitalization at MICU, and she was capped on all three pressors. Patient continued to decline while intubated and sedated in MICU, with numerous episodes of hypotension and desaturation. Ultimately, patient passed away due to respiratory failure.

## 2021-09-20 NOTE — CHART NOTE - NSCHARTNOTESELECT_GEN_ALL_CORE
Event Note
GOC Heme/Event Note
MICU Attending New BiPAP evaluation/Event Note
POCUS/Event Note
PleurX catheter placement/Event Note
Ultrasound
Event Note
Follow Up/Nutrition Services
Follow Up/Nutrition Services
Infectious disease/Event Note
Intervent Radiology
MICU Acceptance/Transfer Note
NEPHROLOGY/Event Note
Nutrition Services - Follow Up/Nutrition Services
POCUS/Event Note
POCUS/Event Note
Post-Op Check
Pre-Op/Event Note
Ultrasound
Ultrasound
cardiac arrest/Event Note

## 2021-09-20 NOTE — PROGRESS NOTE ADULT - ATTENDING COMMENTS
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 Patient is now intubated in the MICU and with pressor support, started on R-CHOP chemotherapy. Patient also has bilateral adnexal masses and peritoneal carcinomatosis with ascites and extensive abdominal and pelvic adenopathy. Hospitalization course has been c/b ongoing fevers and increasing pressor and O2 support. S/p self extubation on 9/5 c/b cardiac arrest, then re-intubated.  Dexamethasone given 8/26-8/29, Rituxan 8/28.  D1-D2 cyclophosphamide 100 mg 9/1-9/2 with reduction of doses   D3 cyclophosphamide 225 mg q12h on 9/3  Doxorubicin, vincristine, etoposide given on 9/4 but d/c after 1 day due to above events on 9/5 (self-extubated, cardiac arrest).  Treatment restarted on 9/10 with Etoposide 50% of dose: 25mg/m2 (9/10-9/12), Doxorubicin 25% of dose: 2.5mg/m2 (9/10-9/12)  Vincristine held due to Tbili >3.  The patient's clinical condition deteriorated on 9/12 so treatment was discontinued.  In spite of ongoing supportive care and HD she has progressively worsened with little likelihood of improvement or recovery.  Her disease course and prognosis was discussed with her brother and mother who were at the bedside today. This has also been discussed daily with her sons who are aware of prognosis.

## 2021-09-20 NOTE — PROGRESS NOTE ADULT - ATTENDING SUPERVISION STATEMENT
Fellow
Resident
Fellow
Resident
Fellow
Resident
ACP
Fellow
Resident
Fellow
Resident
Resident
Fellow
Resident
Fellow
Resident

## 2021-09-25 LAB
CULTURE RESULTS: SIGNIFICANT CHANGE UP
CULTURE RESULTS: SIGNIFICANT CHANGE UP
SPECIMEN SOURCE: SIGNIFICANT CHANGE UP
SPECIMEN SOURCE: SIGNIFICANT CHANGE UP

## 2021-10-16 LAB
CULTURE RESULTS: SIGNIFICANT CHANGE UP
SPECIMEN SOURCE: SIGNIFICANT CHANGE UP

## 2024-02-26 NOTE — PROGRESS NOTE ADULT - ASSESSMENT
Pt states the tiZANidine (ZANAFLEX) 2 MG tablet is not working and her back is getting worse,  it is affecting her legs, she is having tingling shooting pains- pt would like to know if something else can be called in for her since she is having problems coming to the office  Please advise   66-year-old woman with PMH significant for HTN, HLD, L hydroureteronephrosis s/p renal stent on 7/15, who presents with volume overload 2/2 high grade B-cell lymphoma. S/p thoracentesis 8/13, paracentesis and excisional biopsy of left inguinal lymph node on 8/20. Accepted to MICU for acute hypoxic/hypercapneic respiratory failure now s/p intubation and L pleurex catheter placement, now undergoing chemotherapy.    #Neuro  - Altered mental status, likely 2/2 multifactorial in the setting of hypercarbic respiratory failure  - CTH with no intracranial pathology  - Concern for lymphomatous meningitis, however ruled out s/p LP with flow cytometry and cytopathology negative  - pt remains alert, however not as responsive as previously    #Cardiovascular  Vasoplegic shock 2/2 to sedatives   - Echo 8/3 showing concentric left ventricular remodeling, hyperdynamic left ventricle, calcified trileaflet aortic valve with normal opening, EF 56%  - POCUS showing RV enalragement  - On pressor support with levo, wean as tolerated, midodrine increased to 30 mg TID    #Respiratory  - Hypoxic/hypercapneic respiratory failure likely secondary to malignant pleural effusions from malignancy  - S/p thoracentesis on 8/13 with re-accumulation of pleural effusions  - S/p second thoracentesis 8/25, s/p L pleurex 8/27  - On pressure support, however remains tachypneic with low TVs, will repeat thoracentesis on R side before attempting extubation   - Unsuccessful breathing trials, will require future trach once platelets stabilize    #GI/Nutrition  Malignant ascites   - S/p paracentesis 8/20  - Still remains ascites, possible paracentesis  - tube feeds temporarily held in setting of high residuals  - constipation with miralax BID    Hyperbilirubinemia  RUQ U/S pending  Hepatology recs appreciated  Fungitell, Anti HEV, CMV, EBV, VZV, HSV, SMA, ferritin, transferrin, ceruloplasmin, AMA, and LKM Ab  As per hepatology, concern for allopurinol as a possible source, allopurinol discontinued    #diarrhea   - previously constipated s/p golytely + fecal disimpaction    #/Renal  - PURVI likely 2/2 to ATN in the setting of previous supratherapeutic vancomycin level + tumor lysis + IV contrast  - B/L hydronephrosis stable on CT A/P 2/2 malignant LAD  - Nephrology on board, recommend holding LASIX & other nephrotoxic medications.   - Nephrostomy tubes considered however per IR recommendations not appropriate at this time as patient's Cr previously downtrending, may consider re-consulting if patient's renal function continues to worsen   - monitor TLS labs q8h, now with uptrending LDH and hyperphosphatemia, although uric acid remains low   - c/w phosphate binder  - s/p bicarb drip, transitioned to PO bicarb   - carnes placed for accurate I+Os  - received first session of HD on 9/12, additional sessions on 9/13, 9/15    #Skin  - No sacral decubiti    #ID  - s/p Vanco and Zosyn (ended 8/31)   - U/A grossly positive, urine cx NGTD  - blood cx 8/28/21 NGTD, will repeat blood cxs on Saturday  - strongyloides positive - s/p ivermectin (9/1 - 9/2) given IC state   - c/w INH + pyridoxine (given indeterminate quant gold)    #Endocrine  - SSI   - will readjust as necessary     #Hematologic/DVT ppx  - newly diagnosed diffuse large B-cell lymphoma  - TLS labs q8  - Allopurinol discontinued possibly in setting of hyperbilirubinemia  - Heme/Onc recommending starting steroids with dexamethasone, s/p 40 mg dose (ended 8/29, 8/31)   - s/p Rituxan 8/28/21  - s/p cyclophosphamide (1st cycle 9/1, 2nd cycle 9/2, 3rd cycle 9/3)  - s/p doxorubicin 9/4  - restarting doxorubicin/etoposide on 9/10  - oncology no longer offering chemotherpay  - thrombocytopenia s/p 2 U platelets  - chemotherapy held on 9/12 night given worsening hypotension and increasing pressor requirements  - DVT prophylaxis with SCDs, no pharmacological px  -s/p 1U pRBC 9/11 6.8 --> 8.7    #Ethics  - Patient is FULL CODE per palliative care discussion with patient and her sons (Yuan and Jose)  - Palliative on board to have further discussion regarding goals of care as pt's prognosis is poor and chemotherapy will no longer be offered     66-year-old woman with PMH significant for HTN, HLD, L hydroureteronephrosis s/p renal stent on 7/15, who presents with volume overload 2/2 high grade B-cell lymphoma. S/p thoracentesis 8/13, paracentesis and excisional biopsy of left inguinal lymph node on 8/20. Accepted to MICU for acute hypoxic/hypercapneic respiratory failure now s/p intubation and L pleurex catheter placement, now undergoing chemotherapy.    #Neuro  - Altered mental status, likely 2/2 multifactorial in the setting of hypercarbic respiratory failure  - CTH with no intracranial pathology  - Concern for lymphomatous meningitis, however ruled out s/p LP with flow cytometry and cytopathology negative  - pt remains alert, however not as responsive as previously    #Cardiovascular  Vasoplegic shock 2/2 to sedatives   - Echo 8/3 showing concentric left ventricular remodeling, hyperdynamic left ventricle, calcified trileaflet aortic valve with normal opening, EF 56%  - POCUS showing RV enalragement  - On pressor support with levo, wean as tolerated, midodrine increased to 30 mg TID    #Respiratory  - Hypoxic/hypercapneic respiratory failure likely secondary to malignant pleural effusions from malignancy  - S/p thoracentesis on 8/13 with re-accumulation of pleural effusions  - S/p second thoracentesis 8/25, s/p L pleurex 8/27  - On pressure support, however remains tachypneic with low TVs, will repeat thoracentesis on R side before attempting extubation   - Unsuccessful breathing trials, will require future trach once platelets stabilize    #GI/Nutrition  Malignant ascites   - S/p paracentesis 8/20  - Still remains ascites, possible paracentesis  - tube feeds temporarily held in setting of high residuals  - constipation with miralax BID    Hyperbilirubinemia  RUQ U/S pending  Hepatology recs appreciated  Fungitell, Anti HEV, CMV, EBV, VZV, HSV, SMA, ferritin, transferrin, ceruloplasmin, AMA, and LKM Ab  As per hepatology, concern for allopurinol as a possible source, allopurinol discontinued    #diarrhea   - previously constipated s/p golytely + fecal disimpaction    #/Renal  - PURVI likely 2/2 to ATN in the setting of previous supratherapeutic vancomycin level + tumor lysis + IV contrast  - B/L hydronephrosis stable on CT A/P 2/2 malignant LAD  - Nephrology on board, recommend holding LASIX & other nephrotoxic medications.   - Nephrostomy tubes considered however per IR recommendations not appropriate at this time as patient's Cr previously downtrending, may consider re-consulting if patient's renal function continues to worsen   - TLS labs q12  - c/w phosphate binder  - c/w PO bicarb   - carnes in, poor urine output, monitor I&O's  - received a few sessions of HD, as nephrology currently not offering HD in overall stage of poor prognosis    #Skin  - No sacral decubiti    #ID  - s/p Vanco and Zosyn (ended 8/31)   - U/A grossly positive, urine cx NGTD  - blood cx 8/28/21 NGTD, will repeat blood cxs on Saturday  - strongyloides positive - s/p ivermectin (9/1 - 9/2) given IC state   - c/w INH + pyridoxine (given indeterminate quant gold)    #Endocrine  - SSI   - will readjust as necessary     #Hematologic/DVT ppx  - newly diagnosed diffuse large B-cell lymphoma  - Allopurinol discontinued possibly in setting of hyperbilirubinemia  - Heme/Onc recommending starting steroids with dexamethasone, s/p 40 mg dose (ended 8/29, 8/31)   - s/p Rituxan 8/28/21  - s/p cyclophosphamide (1st cycle 9/1, 2nd cycle 9/2, 3rd cycle 9/3)  - s/p doxorubicin 9/4  - TLS q12  - restarting doxorubicin/etoposide on 9/10  - oncology no longer offering chemotherapy  - thrombocytopenia s/p 2 U platelets  - chemotherapy held on 9/12 night given worsening hypotension and increasing pressor requirements  - DVT prophylaxis with SCDs, no pharmacological px  -s/p 1U pRBC 9/11 6.8 --> 8.7    #Ethics  - Patient is FULL CODE per palliative care discussion with patient and her sons (Yuan and Jose)  - Palliative on board to have further discussion regarding goals of care as pt's prognosis is poor and chemotherapy will no longer be offered     66-year-old woman with PMH significant for HTN, HLD, L hydroureteronephrosis s/p renal stent on 7/15, who presents with volume overload 2/2 high grade B-cell lymphoma. S/p thoracentesis 8/13, paracentesis and excisional biopsy of left inguinal lymph node on 8/20. Accepted to MICU for acute hypoxic/hypercapneic respiratory failure now s/p intubation and L pleurex catheter placement, now undergoing chemotherapy.    #Neuro  - Altered mental status, likely 2/2 multifactorial in the setting of hypercarbic respiratory failure  - CTH with no intracranial pathology  - Concern for lymphomatous meningitis, however ruled out s/p LP with flow cytometry and cytopathology negative  - pt remains alert, however not as responsive as previously    #Cardiovascular  Vasoplegic shock 2/2 to sedatives   - Echo 8/3 showing concentric left ventricular remodeling, hyperdynamic left ventricle, calcified trileaflet aortic valve with normal opening, EF 56%  - POCUS showing RV enalragement  - On pressor support with levo, renee, and vaso, on midodrine 30 mg TID    #Respiratory  - Hypoxic/hypercapneic respiratory failure likely secondary to malignant pleural effusions from malignancy  - S/p thoracentesis on 8/13 with re-accumulation of pleural effusions  - S/p second thoracentesis 8/25, s/p L pleurex 8/27  - On pressure support, however remains tachypneic with low TVs, will repeat thoracentesis on R side before attempting extubation   - Unsuccessful breathing trials, will require future trach once platelets stabilize    #GI/Nutrition  Malignant ascites   - S/p paracentesis 8/20  - Still remains ascites, possible paracentesis  - tube feeds temporarily held in setting of high residuals  - constipation with miralax BID    Hyperbilirubinemia  RUQ U/S pending  Hepatology recs appreciated  Fungitell, Anti HEV, CMV, EBV, VZV, HSV, SMA, ferritin, transferrin, ceruloplasmin, AMA, and LKM Ab  As per hepatology, concern for allopurinol as a possible source, allopurinol discontinued    #diarrhea   - previously constipated s/p golytely + fecal disimpaction    #/Renal  - PURVI likely 2/2 to ATN in the setting of previous supratherapeutic vancomycin level + tumor lysis + IV contrast  - B/L hydronephrosis stable on CT A/P 2/2 malignant LAD  - Nephrology on board, recommend holding LASIX & other nephrotoxic medications.   - Nephrostomy tubes considered however per IR recommendations not appropriate at this time as patient's Cr previously downtrending, may consider re-consulting if patient's renal function continues to worsen   - TLS labs q12  - c/w phosphate binder  - c/w PO bicarb   - carnes in, poor urine output, monitor I&O's  - received a few sessions of HD, as nephrology currently not offering HD in overall stage of poor prognosis    #Skin  - No sacral decubiti    #ID  - s/p Vanco and Zosyn (ended 8/31)   - U/A grossly positive, urine cx NGTD  - blood cx 8/28/21 NGTD, will repeat blood cxs on Saturday  - strongyloides positive - s/p ivermectin (9/1 - 9/2) given IC state   - c/w INH + pyridoxine (given indeterminate quant gold)    #Endocrine  - SSI   - will readjust as necessary     #Hematologic/DVT ppx  - newly diagnosed diffuse large B-cell lymphoma  - Allopurinol discontinued possibly in setting of hyperbilirubinemia  - Heme/Onc recommending starting steroids with dexamethasone, s/p 40 mg dose (ended 8/29, 8/31)   - s/p Rituxan 8/28/21  - s/p cyclophosphamide (1st cycle 9/1, 2nd cycle 9/2, 3rd cycle 9/3)  - s/p doxorubicin 9/4  - TLS q12  - restarting doxorubicin/etoposide on 9/10  - oncology no longer offering chemotherapy  - thrombocytopenia s/p 2 U platelets  - chemotherapy held on 9/12 night given worsening hypotension and increasing pressor requirements  - DVT prophylaxis with SCDs, no pharmacological px  -s/p 1U pRBC 9/11 6.8 --> 8.7    #Ethics  - Patient is FULL CODE per palliative care discussion with patient and her sons (Yuan and Jose)  - Palliative on board to have further discussion regarding goals of care as pt's prognosis is poor and chemotherapy will no longer be offered

## 2025-04-01 NOTE — PROGRESS NOTE ADULT - NUTRITIONAL ASSESSMENT
This patient has been assessed with a concern for Malnutrition and has been determined to have a diagnosis/diagnoses of Moderate protein-calorie malnutrition.    This patient is being managed with:   Diet NPO with Tube Feed-  Tube Feeding Modality: Orogastric  Nepro with Carb Steady (NEPRORTH)  Total Volume for 24 Hours (mL): 720  Continuous  Starting Tube Feed Rate {mL per Hour}: 30  Until Goal Tube Feed Rate (mL per Hour): 30  Tube Feed Duration (in Hours): 24  Tube Feed Start Time: 14:30  No Carb Prosource TF     Qty per Day:  2  Entered: Aug 26 2021  2:16PM     [Negative] : Allergic/Immunologic [FreeTextEntry9] : joint pain worsening - specifically R hip  [Patient Intake Form Reviewed] : Patient intake form was reviewed [Night Sweats] : no night sweats [Fatigue] : no fatigue [Joint Pain] : no joint pain [Joint Stiffness] : no joint stiffness [Muscle Pain] : no muscle pain [FreeTextEntry2] : brain fog improved on NAC [de-identified] : brain fog